# Patient Record
Sex: FEMALE | Race: WHITE | NOT HISPANIC OR LATINO
[De-identification: names, ages, dates, MRNs, and addresses within clinical notes are randomized per-mention and may not be internally consistent; named-entity substitution may affect disease eponyms.]

---

## 2018-08-18 ENCOUNTER — TRANSCRIPTION ENCOUNTER (OUTPATIENT)
Age: 63
End: 2018-08-18

## 2022-09-09 ENCOUNTER — INPATIENT (INPATIENT)
Facility: HOSPITAL | Age: 67
LOS: 20 days | Discharge: NURSING, REHAB OR LONG-TERM CARE | DRG: 454 | End: 2022-09-30
Attending: INTERNAL MEDICINE | Admitting: HOSPITALIST
Payer: MEDICARE

## 2022-09-09 VITALS
TEMPERATURE: 99 F | OXYGEN SATURATION: 96 % | WEIGHT: 130.07 LBS | RESPIRATION RATE: 20 BRPM | HEART RATE: 74 BPM | DIASTOLIC BLOOD PRESSURE: 84 MMHG | SYSTOLIC BLOOD PRESSURE: 150 MMHG | HEIGHT: 65.5 IN

## 2022-09-09 LAB
ALBUMIN SERPL ELPH-MCNC: 4.1 G/DL — SIGNIFICANT CHANGE UP (ref 3.3–5.2)
ALP SERPL-CCNC: 150 U/L — HIGH (ref 40–120)
ALT FLD-CCNC: 17 U/L — SIGNIFICANT CHANGE UP
ANION GAP SERPL CALC-SCNC: 18 MMOL/L — HIGH (ref 5–17)
AST SERPL-CCNC: 61 U/L — HIGH
BASOPHILS # BLD AUTO: 0.07 K/UL — SIGNIFICANT CHANGE UP (ref 0–0.2)
BASOPHILS NFR BLD AUTO: 1 % — SIGNIFICANT CHANGE UP (ref 0–2)
BILIRUB SERPL-MCNC: 1.2 MG/DL — SIGNIFICANT CHANGE UP (ref 0.4–2)
BUN SERPL-MCNC: 11.7 MG/DL — SIGNIFICANT CHANGE UP (ref 8–20)
CALCIUM SERPL-MCNC: 10.4 MG/DL — SIGNIFICANT CHANGE UP (ref 8.4–10.5)
CHLORIDE SERPL-SCNC: 103 MMOL/L — SIGNIFICANT CHANGE UP (ref 98–107)
CK SERPL-CCNC: 95 U/L — SIGNIFICANT CHANGE UP (ref 25–170)
CO2 SERPL-SCNC: 20 MMOL/L — LOW (ref 22–29)
CREAT SERPL-MCNC: 0.4 MG/DL — LOW (ref 0.5–1.3)
EGFR: 109 ML/MIN/1.73M2 — SIGNIFICANT CHANGE UP
EOSINOPHIL # BLD AUTO: 0.03 K/UL — SIGNIFICANT CHANGE UP (ref 0–0.5)
EOSINOPHIL NFR BLD AUTO: 0.4 % — SIGNIFICANT CHANGE UP (ref 0–6)
GLUCOSE SERPL-MCNC: 84 MG/DL — SIGNIFICANT CHANGE UP (ref 70–99)
HCT VFR BLD CALC: 42 % — SIGNIFICANT CHANGE UP (ref 34.5–45)
HGB BLD-MCNC: 15 G/DL — SIGNIFICANT CHANGE UP (ref 11.5–15.5)
IMM GRANULOCYTES NFR BLD AUTO: 0.6 % — SIGNIFICANT CHANGE UP (ref 0–1.5)
LYMPHOCYTES # BLD AUTO: 1.33 K/UL — SIGNIFICANT CHANGE UP (ref 1–3.3)
LYMPHOCYTES # BLD AUTO: 18.8 % — SIGNIFICANT CHANGE UP (ref 13–44)
MCHC RBC-ENTMCNC: 30.4 PG — SIGNIFICANT CHANGE UP (ref 27–34)
MCHC RBC-ENTMCNC: 35.7 GM/DL — SIGNIFICANT CHANGE UP (ref 32–36)
MCV RBC AUTO: 85 FL — SIGNIFICANT CHANGE UP (ref 80–100)
MONOCYTES # BLD AUTO: 0.47 K/UL — SIGNIFICANT CHANGE UP (ref 0–0.9)
MONOCYTES NFR BLD AUTO: 6.7 % — SIGNIFICANT CHANGE UP (ref 2–14)
NEUTROPHILS # BLD AUTO: 5.12 K/UL — SIGNIFICANT CHANGE UP (ref 1.8–7.4)
NEUTROPHILS NFR BLD AUTO: 72.5 % — SIGNIFICANT CHANGE UP (ref 43–77)
PLATELET # BLD AUTO: 240 K/UL — SIGNIFICANT CHANGE UP (ref 150–400)
POTASSIUM SERPL-MCNC: 3.9 MMOL/L — SIGNIFICANT CHANGE UP (ref 3.5–5.3)
POTASSIUM SERPL-SCNC: 3.9 MMOL/L — SIGNIFICANT CHANGE UP (ref 3.5–5.3)
PROT SERPL-MCNC: 7.1 G/DL — SIGNIFICANT CHANGE UP (ref 6.6–8.7)
RBC # BLD: 4.94 M/UL — SIGNIFICANT CHANGE UP (ref 3.8–5.2)
RBC # FLD: 11.9 % — SIGNIFICANT CHANGE UP (ref 10.3–14.5)
SODIUM SERPL-SCNC: 141 MMOL/L — SIGNIFICANT CHANGE UP (ref 135–145)
WBC # BLD: 7.06 K/UL — SIGNIFICANT CHANGE UP (ref 3.8–10.5)
WBC # FLD AUTO: 7.06 K/UL — SIGNIFICANT CHANGE UP (ref 3.8–10.5)

## 2022-09-09 PROCEDURE — 99285 EMERGENCY DEPT VISIT HI MDM: CPT

## 2022-09-09 PROCEDURE — G1004: CPT

## 2022-09-09 PROCEDURE — 74177 CT ABD & PELVIS W/CONTRAST: CPT | Mod: 26,MG

## 2022-09-09 PROCEDURE — 71260 CT THORAX DX C+: CPT | Mod: 26,MG

## 2022-09-09 PROCEDURE — 72125 CT NECK SPINE W/O DYE: CPT | Mod: 26,MG

## 2022-09-09 PROCEDURE — 70450 CT HEAD/BRAIN W/O DYE: CPT | Mod: 26,MG

## 2022-09-09 NOTE — ED PROVIDER NOTE - PROGRESS NOTE DETAILS
pt stable in no pain dr patricia nolen of plan of care for tonight [place]: CTs suggestive of diffusely metastatic breast malignancy with extensive bony metastasis to the spine. Bladder distended on CT but pt since able to void. Spine consulted. Will admit to medical service for pain management and specialty consultations.

## 2022-09-09 NOTE — ED ADULT TRIAGE NOTE - CHIEF COMPLAINT QUOTE
Pt BIBA c/o worsening low back pain, bilateral shoulder pain and right arm tingling and "feels like it asleep."  Pt. states low back pain has been worsening since march; shoulder pain began 3 weeks ago and 12 days ago, pt states tingling and asleep sensation to right arm began.  Pt denies trauma or injury and is endorsing pain is so severe she is unable to walk.  Pt also states to the RN she has not urinated since last night at 8pm; "I feel like I have to go but the pain is so bad I can't walk to the bathroom so I haven't drank that much and am holding it."

## 2022-09-09 NOTE — ED PROVIDER NOTE - OBJECTIVE STATEMENT
BIBA c/o worsening low back pain, bilateral shoulder pain and right arm tingling and "feels like it asleep."  Pt. states low back pain has been worsening since march; shoulder pain began 3 weeks ago and 12 days ago, pt states tingling and asleep sensation to right arm began.  Pt denies trauma or injury and is endorsing pain is so severe she is unable to walk.  Pt also states to the RN she has not urinated since last night at 8pm; "I feel like I have to go but the pain is so bad I can't walk to the bathroom so I haven't drank that much and am holding it. denies fever. no headache no vision loss. states she works a lot and has not seen a primary doctor in quite some time.

## 2022-09-09 NOTE — ED ADULT NURSE NOTE - NSICDXPASTMEDICALHX_GEN_ALL_CORE_FT
PAST MEDICAL HISTORY:  Abdominal mass 2014    Concussion pt hit head on MD examining table when she was 16  no surgery performed    Skin cancer of arm, left pt was 16 year old    Stomach ulcer 20 years ago   no pain not taking any meds

## 2022-09-09 NOTE — ED ADULT NURSE NOTE - NSICDXFAMILYHX_GEN_ALL_CORE_FT
FAMILY HISTORY:  Father  Still living? No  Family history of colon cancer, Age at diagnosis: Age Unknown    Mother  Still living? No  Family history of dementia, Age at diagnosis: Age Unknown

## 2022-09-09 NOTE — ED ADULT NURSE NOTE - OBJECTIVE STATEMENT
pt arrives to ED c/o increasing pain to shoulders, back pain and right hip pain. pt with hx of uterine cancer, reporting no chest pain no SOB no abd pain, but her body aches. no recent fevers or illnesses. even and unlabored resps present. skin warm dry and intact.

## 2022-09-10 DIAGNOSIS — Z90.710 ACQUIRED ABSENCE OF BOTH CERVIX AND UTERUS: Chronic | ICD-10-CM

## 2022-09-10 DIAGNOSIS — C50.919 MALIGNANT NEOPLASM OF UNSPECIFIED SITE OF UNSPECIFIED FEMALE BREAST: ICD-10-CM

## 2022-09-10 LAB
ANION GAP SERPL CALC-SCNC: 15 MMOL/L — SIGNIFICANT CHANGE UP (ref 5–17)
ANION GAP SERPL CALC-SCNC: 16 MMOL/L — SIGNIFICANT CHANGE UP (ref 5–17)
APPEARANCE UR: CLEAR — SIGNIFICANT CHANGE UP
BACTERIA # UR AUTO: ABNORMAL
BASOPHILS # BLD AUTO: 0.06 K/UL — SIGNIFICANT CHANGE UP (ref 0–0.2)
BASOPHILS NFR BLD AUTO: 0.9 % — SIGNIFICANT CHANGE UP (ref 0–2)
BILIRUB UR-MCNC: NEGATIVE — SIGNIFICANT CHANGE UP
BUN SERPL-MCNC: 11 MG/DL — SIGNIFICANT CHANGE UP (ref 8–20)
BUN SERPL-MCNC: 11.8 MG/DL — SIGNIFICANT CHANGE UP (ref 8–20)
CALCIUM SERPL-MCNC: 10 MG/DL — SIGNIFICANT CHANGE UP (ref 8.4–10.5)
CALCIUM SERPL-MCNC: 10.2 MG/DL — SIGNIFICANT CHANGE UP (ref 8.4–10.5)
CHLORIDE SERPL-SCNC: 102 MMOL/L — SIGNIFICANT CHANGE UP (ref 98–107)
CHLORIDE SERPL-SCNC: 103 MMOL/L — SIGNIFICANT CHANGE UP (ref 98–107)
CO2 SERPL-SCNC: 21 MMOL/L — LOW (ref 22–29)
CO2 SERPL-SCNC: 21 MMOL/L — LOW (ref 22–29)
COLOR SPEC: YELLOW — SIGNIFICANT CHANGE UP
CREAT SERPL-MCNC: 0.42 MG/DL — LOW (ref 0.5–1.3)
CREAT SERPL-MCNC: 0.43 MG/DL — LOW (ref 0.5–1.3)
DIFF PNL FLD: ABNORMAL
EGFR: 107 ML/MIN/1.73M2 — SIGNIFICANT CHANGE UP
EGFR: 108 ML/MIN/1.73M2 — SIGNIFICANT CHANGE UP
EOSINOPHIL # BLD AUTO: 0.06 K/UL — SIGNIFICANT CHANGE UP (ref 0–0.5)
EOSINOPHIL NFR BLD AUTO: 0.9 % — SIGNIFICANT CHANGE UP (ref 0–6)
EPI CELLS # UR: ABNORMAL
GLUCOSE SERPL-MCNC: 103 MG/DL — HIGH (ref 70–99)
GLUCOSE SERPL-MCNC: 111 MG/DL — HIGH (ref 70–99)
GLUCOSE UR QL: NEGATIVE MG/DL — SIGNIFICANT CHANGE UP
HCT VFR BLD CALC: 38.8 % — SIGNIFICANT CHANGE UP (ref 34.5–45)
HGB BLD-MCNC: 13.9 G/DL — SIGNIFICANT CHANGE UP (ref 11.5–15.5)
IMM GRANULOCYTES NFR BLD AUTO: 0.6 % — SIGNIFICANT CHANGE UP (ref 0–1.5)
KETONES UR-MCNC: ABNORMAL
LEUKOCYTE ESTERASE UR-ACNC: NEGATIVE — SIGNIFICANT CHANGE UP
LYMPHOCYTES # BLD AUTO: 1.31 K/UL — SIGNIFICANT CHANGE UP (ref 1–3.3)
LYMPHOCYTES # BLD AUTO: 19.9 % — SIGNIFICANT CHANGE UP (ref 13–44)
MAGNESIUM SERPL-MCNC: 1.7 MG/DL — SIGNIFICANT CHANGE UP (ref 1.6–2.6)
MAGNESIUM SERPL-MCNC: 2 MG/DL — SIGNIFICANT CHANGE UP (ref 1.6–2.6)
MCHC RBC-ENTMCNC: 30.1 PG — SIGNIFICANT CHANGE UP (ref 27–34)
MCHC RBC-ENTMCNC: 35.8 GM/DL — SIGNIFICANT CHANGE UP (ref 32–36)
MCV RBC AUTO: 84 FL — SIGNIFICANT CHANGE UP (ref 80–100)
MONOCYTES # BLD AUTO: 0.47 K/UL — SIGNIFICANT CHANGE UP (ref 0–0.9)
MONOCYTES NFR BLD AUTO: 7.2 % — SIGNIFICANT CHANGE UP (ref 2–14)
NEUTROPHILS # BLD AUTO: 4.63 K/UL — SIGNIFICANT CHANGE UP (ref 1.8–7.4)
NEUTROPHILS NFR BLD AUTO: 70.5 % — SIGNIFICANT CHANGE UP (ref 43–77)
NITRITE UR-MCNC: NEGATIVE — SIGNIFICANT CHANGE UP
PH UR: 5 — SIGNIFICANT CHANGE UP (ref 5–8)
PHOSPHATE SERPL-MCNC: 3.4 MG/DL — SIGNIFICANT CHANGE UP (ref 2.4–4.7)
PLATELET # BLD AUTO: 210 K/UL — SIGNIFICANT CHANGE UP (ref 150–400)
POTASSIUM SERPL-MCNC: 2.8 MMOL/L — CRITICAL LOW (ref 3.5–5.3)
POTASSIUM SERPL-MCNC: 3.8 MMOL/L — SIGNIFICANT CHANGE UP (ref 3.5–5.3)
POTASSIUM SERPL-SCNC: 2.8 MMOL/L — CRITICAL LOW (ref 3.5–5.3)
POTASSIUM SERPL-SCNC: 3.8 MMOL/L — SIGNIFICANT CHANGE UP (ref 3.5–5.3)
PROT UR-MCNC: 15
RAPID RVP RESULT: SIGNIFICANT CHANGE UP
RBC # BLD: 4.62 M/UL — SIGNIFICANT CHANGE UP (ref 3.8–5.2)
RBC # FLD: 11.9 % — SIGNIFICANT CHANGE UP (ref 10.3–14.5)
RBC CASTS # UR COMP ASSIST: SIGNIFICANT CHANGE UP /HPF (ref 0–4)
SARS-COV-2 RNA SPEC QL NAA+PROBE: SIGNIFICANT CHANGE UP
SODIUM SERPL-SCNC: 138 MMOL/L — SIGNIFICANT CHANGE UP (ref 135–145)
SODIUM SERPL-SCNC: 140 MMOL/L — SIGNIFICANT CHANGE UP (ref 135–145)
SP GR SPEC: 1.02 — SIGNIFICANT CHANGE UP (ref 1.01–1.02)
UROBILINOGEN FLD QL: NEGATIVE MG/DL — SIGNIFICANT CHANGE UP
WBC # BLD: 6.57 K/UL — SIGNIFICANT CHANGE UP (ref 3.8–10.5)
WBC # FLD AUTO: 6.57 K/UL — SIGNIFICANT CHANGE UP (ref 3.8–10.5)
WBC UR QL: SIGNIFICANT CHANGE UP /HPF (ref 0–5)

## 2022-09-10 PROCEDURE — 99223 1ST HOSP IP/OBS HIGH 75: CPT

## 2022-09-10 PROCEDURE — 99497 ADVNCD CARE PLAN 30 MIN: CPT | Mod: 25

## 2022-09-10 RX ORDER — LANOLIN ALCOHOL/MO/W.PET/CERES
3 CREAM (GRAM) TOPICAL AT BEDTIME
Refills: 0 | Status: DISCONTINUED | OUTPATIENT
Start: 2022-09-10 | End: 2022-09-21

## 2022-09-10 RX ORDER — ACETAMINOPHEN 500 MG
650 TABLET ORAL EVERY 6 HOURS
Refills: 0 | Status: DISCONTINUED | OUTPATIENT
Start: 2022-09-10 | End: 2022-09-21

## 2022-09-10 RX ORDER — POTASSIUM CHLORIDE 20 MEQ
10 PACKET (EA) ORAL ONCE
Refills: 0 | Status: COMPLETED | OUTPATIENT
Start: 2022-09-10 | End: 2022-09-10

## 2022-09-10 RX ORDER — MORPHINE SULFATE 50 MG/1
2 CAPSULE, EXTENDED RELEASE ORAL EVERY 4 HOURS
Refills: 0 | Status: DISCONTINUED | OUTPATIENT
Start: 2022-09-10 | End: 2022-09-16

## 2022-09-10 RX ORDER — DEXAMETHASONE 0.5 MG/5ML
4 ELIXIR ORAL EVERY 6 HOURS
Refills: 0 | Status: DISCONTINUED | OUTPATIENT
Start: 2022-09-10 | End: 2022-09-21

## 2022-09-10 RX ORDER — ACETAMINOPHEN 500 MG
1000 TABLET ORAL ONCE
Refills: 0 | Status: COMPLETED | OUTPATIENT
Start: 2022-09-10 | End: 2022-09-10

## 2022-09-10 RX ORDER — MORPHINE SULFATE 50 MG/1
4 CAPSULE, EXTENDED RELEASE ORAL EVERY 4 HOURS
Refills: 0 | Status: DISCONTINUED | OUTPATIENT
Start: 2022-09-10 | End: 2022-09-17

## 2022-09-10 RX ORDER — POTASSIUM CHLORIDE 20 MEQ
10 PACKET (EA) ORAL ONCE
Refills: 0 | Status: DISCONTINUED | OUTPATIENT
Start: 2022-09-10 | End: 2022-09-10

## 2022-09-10 RX ORDER — SENNA PLUS 8.6 MG/1
2 TABLET ORAL AT BEDTIME
Refills: 0 | Status: DISCONTINUED | OUTPATIENT
Start: 2022-09-10 | End: 2022-09-21

## 2022-09-10 RX ORDER — ONDANSETRON 8 MG/1
4 TABLET, FILM COATED ORAL EVERY 8 HOURS
Refills: 0 | Status: DISCONTINUED | OUTPATIENT
Start: 2022-09-10 | End: 2022-09-21

## 2022-09-10 RX ORDER — PANTOPRAZOLE SODIUM 20 MG/1
40 TABLET, DELAYED RELEASE ORAL
Refills: 0 | Status: DISCONTINUED | OUTPATIENT
Start: 2022-09-10 | End: 2022-09-21

## 2022-09-10 RX ORDER — DEXAMETHASONE 0.5 MG/5ML
10 ELIXIR ORAL ONCE
Refills: 0 | Status: COMPLETED | OUTPATIENT
Start: 2022-09-10 | End: 2022-09-10

## 2022-09-10 RX ORDER — POTASSIUM CHLORIDE 20 MEQ
20 PACKET (EA) ORAL ONCE
Refills: 0 | Status: COMPLETED | OUTPATIENT
Start: 2022-09-10 | End: 2022-09-10

## 2022-09-10 RX ORDER — MAGNESIUM SULFATE 500 MG/ML
2 VIAL (ML) INJECTION ONCE
Refills: 0 | Status: COMPLETED | OUTPATIENT
Start: 2022-09-10 | End: 2022-09-10

## 2022-09-10 RX ORDER — INFLUENZA VIRUS VACCINE 15; 15; 15; 15 UG/.5ML; UG/.5ML; UG/.5ML; UG/.5ML
0.7 SUSPENSION INTRAMUSCULAR ONCE
Refills: 0 | Status: DISCONTINUED | OUTPATIENT
Start: 2022-09-10 | End: 2022-09-30

## 2022-09-10 RX ORDER — MORPHINE SULFATE 50 MG/1
4 CAPSULE, EXTENDED RELEASE ORAL ONCE
Refills: 0 | Status: DISCONTINUED | OUTPATIENT
Start: 2022-09-10 | End: 2022-09-10

## 2022-09-10 RX ORDER — MAGNESIUM OXIDE 400 MG ORAL TABLET 241.3 MG
400 TABLET ORAL
Refills: 0 | Status: COMPLETED | OUTPATIENT
Start: 2022-09-10 | End: 2022-09-12

## 2022-09-10 RX ORDER — NALOXONE HYDROCHLORIDE 4 MG/.1ML
0.4 SPRAY NASAL ONCE
Refills: 0 | Status: DISCONTINUED | OUTPATIENT
Start: 2022-09-10 | End: 2022-09-21

## 2022-09-10 RX ORDER — POLYETHYLENE GLYCOL 3350 17 G/17G
17 POWDER, FOR SOLUTION ORAL DAILY
Refills: 0 | Status: DISCONTINUED | OUTPATIENT
Start: 2022-09-10 | End: 2022-09-21

## 2022-09-10 RX ORDER — ENOXAPARIN SODIUM 100 MG/ML
40 INJECTION SUBCUTANEOUS EVERY 24 HOURS
Refills: 0 | Status: DISCONTINUED | OUTPATIENT
Start: 2022-09-10 | End: 2022-09-11

## 2022-09-10 RX ADMIN — MAGNESIUM OXIDE 400 MG ORAL TABLET 400 MILLIGRAM(S): 241.3 TABLET ORAL at 17:14

## 2022-09-10 RX ADMIN — Medication 400 MILLIGRAM(S): at 01:37

## 2022-09-10 RX ADMIN — Medication 650 MILLIGRAM(S): at 21:08

## 2022-09-10 RX ADMIN — Medication 4 MILLIGRAM(S): at 23:07

## 2022-09-10 RX ADMIN — MORPHINE SULFATE 4 MILLIGRAM(S): 50 CAPSULE, EXTENDED RELEASE ORAL at 04:15

## 2022-09-10 RX ADMIN — POLYETHYLENE GLYCOL 3350 17 GRAM(S): 17 POWDER, FOR SOLUTION ORAL at 11:33

## 2022-09-10 RX ADMIN — Medication 25 GRAM(S): at 09:44

## 2022-09-10 RX ADMIN — Medication 100 MILLIEQUIVALENT(S): at 08:31

## 2022-09-10 RX ADMIN — MORPHINE SULFATE 4 MILLIGRAM(S): 50 CAPSULE, EXTENDED RELEASE ORAL at 04:00

## 2022-09-10 RX ADMIN — Medication 1000 MILLIGRAM(S): at 02:37

## 2022-09-10 RX ADMIN — Medication 650 MILLIGRAM(S): at 08:21

## 2022-09-10 RX ADMIN — Medication 650 MILLIGRAM(S): at 06:36

## 2022-09-10 RX ADMIN — ENOXAPARIN SODIUM 40 MILLIGRAM(S): 100 INJECTION SUBCUTANEOUS at 21:07

## 2022-09-10 RX ADMIN — Medication 650 MILLIGRAM(S): at 22:08

## 2022-09-10 RX ADMIN — MORPHINE SULFATE 4 MILLIGRAM(S): 50 CAPSULE, EXTENDED RELEASE ORAL at 12:00

## 2022-09-10 RX ADMIN — SENNA PLUS 2 TABLET(S): 8.6 TABLET ORAL at 21:08

## 2022-09-10 RX ADMIN — MAGNESIUM OXIDE 400 MG ORAL TABLET 400 MILLIGRAM(S): 241.3 TABLET ORAL at 11:33

## 2022-09-10 RX ADMIN — MORPHINE SULFATE 4 MILLIGRAM(S): 50 CAPSULE, EXTENDED RELEASE ORAL at 11:14

## 2022-09-10 RX ADMIN — Medication 10 MILLIGRAM(S): at 14:09

## 2022-09-10 RX ADMIN — Medication 20 MILLIEQUIVALENT(S): at 14:31

## 2022-09-10 RX ADMIN — Medication 4 MILLIGRAM(S): at 17:14

## 2022-09-10 NOTE — H&P ADULT - NSHPLABSRESULTS_GEN_ALL_CORE
CARDIAC MARKERS ( 09 Sep 2022 16:50 )  x     / x     / 95 U/L / x     / x                                15.0   7.06  )-----------( 240      ( 09 Sep 2022 16:50 )             42.0     09 Sep 2022 16:50    141    |  103    |  11.7   ----------------------------<  84     3.9     |  20.0   |  0.40     Ca    10.4       09 Sep 2022 16:50    TPro  7.1    /  Alb  4.1    /  TBili  1.2    /  DBili  x      /  AST  61     /  ALT  17     /  AlkPhos  150    09 Sep 2022 16:50      CAPILLARY BLOOD GLUCOSE        LIVER FUNCTIONS - ( 09 Sep 2022 16:50 )  Alb: 4.1 g/dL / Pro: 7.1 g/dL / ALK PHOS: 150 U/L / ALT: 17 U/L / AST: 61 U/L / GGT: x             < from: CT Chest w/ IV Cont (09.09.22 @ 21:42) >    IMPRESSION:  1. 6.5 cm right breast mass.  2. Diffuse metastatic bone disease including the entire visualized axial  skeleton.  3. Extensive right axillary and subpectoral adenopathy. Diffuse right   axillary  and right chest wall edematous changes.  4. Extensive superior and middle mediastinal as well as bilateral hilar   and  subcarinal adenopathy.  5.  Findings consistent with metastatic breast cancer.    < end of copied text >    < from: CT Chest w/ IV Cont (09.09.22 @ 21:42) >      IMPRESSION:  1. Severely Distended urinary bladder.  2. Diffuse metastatic bone disease including the entire visualized axial  skeleton.  3. Findings consistent with metastatic breast cancer.      < end of copied text >

## 2022-09-10 NOTE — CONSULT NOTE ADULT - SUBJECTIVE AND OBJECTIVE BOX
Patient is a 66y old  Female who presents with a chief complaint of   HPI:      HISTORY OF PRESENT ILLNESS:   66yF [facility]     PAST MEDICAL & SURGICAL HISTORY:  Abdominal mass  2014      Stomach ulcer  20 years ago   no pain not taking any meds      Skin cancer of arm, left  pt was 16 year old      Concussion  pt hit head on MD examining table when she was 16  no surgery performed      H/O excision of mass  left arm  when patient was 16 years ago        FAMILY HISTORY:  Family history of dementia (Mother)    Family history of colon cancer (Father)        SOCIAL HISTORY:  Tobacco Use:  EtOH use:   Substance:    Allergies    tetanus toxoid (Hives)    Intolerances    lactose (Other)      REVIEW OF SYSTEMS  Negative except as noted in HPI  CONSTITUTIONAL: No fever, weight loss, or fatigue  EYES: No eye pain, visual disturbances, or discharge  ENMT:  No difficulty hearing, tinnitus, vertigo; No sinus or throat pain  NECK: No pain or stiffness  BREASTS: No pain, masses, or nipple discharge  RESPIRATORY: No cough, wheezing, chills or hemoptysis; No shortness of breath  CARDIOVASCULAR: No chest pain, palpitations, dizziness, or leg swelling  GASTROINTESTINAL: No abdominal or epigastric pain. No nausea, vomiting, or hematemesis; No diarrhea or constipation. No melena or hematochezia.  GENITOURINARY: No dysuria, frequency, hematuria, or incontinence  NEUROLOGICAL: No headaches, memory loss, loss of strength, numbness, or tremors  SKIN: No itching, burning, rashes, or lesions   LYMPH NODES: No enlarged glands  ENDOCRINE: No heat or cold intolerance; No hair loss  MUSCULOSKELETAL: No joint pain or swelling; No muscle, back, or extremity pain  PSYCHIATRIC: No depression, anxiety, mood swings, or difficulty sleeping  HEME/LYMPH: No easy bruising, or bleeding gums  ALLERY AND IMMUNOLOGIC: No hives or eczema    HOME MEDICATIONS:  Home Medications:  hydrocortisone topical 2.5% topical cream: Apply topically to affected area 3 times a day, As Needed (08 Dec 2014 09:47)   mg oral tablet: 1 tab(s) orally every 6 hours, As needed, mild pain (08 Dec 2014 10:44)  Percocet 5/325 oral tablet: 1 tab(s) orally every 3 hours, As needed, Moderate Pain (08 Dec 2014 10:44)      MEDICATIONS:  Antibiotics:    Neuro:  acetaminophen   IVPB .. 1000 milliGRAM(s) IV Intermittent Once  morphine  - Injectable 4 milliGRAM(s) IV Push Once    Anticoagulation:    OTHER:    IVF:      Vital Signs Last 24 Hrs  T(C): 37.2 (10 Sep 2022 00:41), Max: 37.2 (09 Sep 2022 15:26)  T(F): 99 (10 Sep 2022 00:41), Max: 99 (10 Sep 2022 00:41)  HR: 76 (10 Sep 2022 00:41) (74 - 76)  BP: 137/87 (10 Sep 2022 00:41) (137/87 - 150/84)  BP(mean): --  RR: 19 (10 Sep 2022 00:41) (19 - 20)  SpO2: 96% (10 Sep 2022 00:41) (96% - 96%)    Parameters below as of 10 Sep 2022 00:41  Patient On (Oxygen Delivery Method): room air          PHYSICAL EXAM:  GENERAL: NAD, well-groomed, well-developed  HEAD:  Atraumatic, normocephalic  DRAINS:   WOUND: Dressing clean dry intact; well healed  SHUNT: easily compressible and refills  EYES: Conjunctiva and sclera clear; corneal reflex intact  ENMT: No tonsillar erythema, exudates, or enlargement; moist mucous membranes, good dentition, no lesions  NECK: Supple, no JVD, dormal thyroid  JULIA COMA SCORE: E- V- M- =       E: 4= opens eyes spontaneously 3= to voice 2= to noxious 1= no opening       V: 5= oriented 4= confused 3= inappropriate words 2= incomprehensible sounds 1= nonverbal 1T= intubated       M: 6= follows commands 5= localizes 4= withdraws 3= flexor posturing 2= extensor posturing 1= no movement  MENTAL STATUS: AAO x3; Awake/Comatose; Opens eyes spontaneously/to voice/to light touch/to noxious stimuli; Appropriately conversant without aphasia/Nonverbal; following simple commands/mimicking/not following commands  CRANIAL NERVES: Visual acuity normal for age, visual fields full to confrontation, PERRL. EOMI without nystagmus. Facial sensation intact V1-3 distribution b/l. Face symmetric w/ normal eye closure and smile, tongue midline. Hearing grossly intact. Speech clear. Head turning and shoulder shrug intact.   REFLEXES: PERRL. Corneals intact b/l. Gag intact. Cough intact. Oculocephalic reflex intact (Doll's eye). Negative Mcdonald's b/l. Negative clonus b/l  MOTOR: strength 5/5 b/l upper and lower extremities  Uppers     Delt (C5/6)     Bicep (C5/6)     Wrist Extend (C6)     Tricep (C7)     HG (C8/T1)  R                     5/5                 5/5                         5/5                           5/5                   5/5  L                      5/5                 5/5                         5/5                           5/5                   5/5  Lowers      HF(L1/L2)     KE (L3)     DF (L4)     EHL (L5)     PF (S1)      R                     5/5              5/5           5/5           5/5            5/5  L                     5/5               5/5          5/5            5/5            5/5  SENSATION: grossly intact to light touch all extremities  COORDINATION: Gait intact; rapid alternating movements intact b/l upper extremities; no upper extremity dysmetria  MUSCLE STRETCH REFLEXES: DTRs 2+ intact and symmetric  PLANTAR: upgoing/downgoing/mute (Babinski)  CHEST/LUNG: Clear to auscultation bilaterally; no rales, rhonchi, wheezing, or rubs  HEART: +S1/+S2; Regular rate and rhythm; no murmurs, rubs, or gallops  ABDOMEN: Soft, nontender, nondistended; bowel sounds present all four quadrants  EXTREMITIES:  2+ peripheral pulses, no clubbing, cyanosis, or edema  LYMPH: No lymphadenopathy noted  SKIN: Warm, dry; no rashes or lesions    LABS:                        15.0   7.06  )-----------( 240      ( 09 Sep 2022 16:50 )             42.0     09-09    141  |  103  |  11.7  ----------------------------<  84  3.9   |  20.0<L>  |  0.40<L>    Ca    10.4      09 Sep 2022 16:50    TPro  7.1  /  Alb  4.1  /  TBili  1.2  /  DBili  x   /  AST  61<H>  /  ALT  17  /  AlkPhos  150<H>  09-09          CULTURES:      RADIOLOGY & ADDITIONAL STUDIES:      CAPRINI SCORE [CLOT]:  Patient has an estimated Caprini score of greater than 5.  However, the patient's unique clinical situation will be addressed in an individual manner to determine appropriate anticoagulation treatment, if any. 65 yo female with no known pmhx presented to the ED with progressive weakness to the point that she felt she could not get out of bed this morning. She started to note pain in her shoulders and lower back at the end of July. She went to the ED at [facility] and also to urgent care, but states they sent her home. She also has had decreased appetite over this time. Her last mammogram was in 2014. She reports they found 2 lumps in her right breast and recommended biopsy. When she went for biopsy, she could not tolerate the pain. She was supposed to return to re-attempt with pain medications, but she never went back. Currently, her pain is well controlled after morphine in the ED. She denies chest pain, SOB, N/V/D, F/C, or other associated symptoms. Patient denies skin changes on the breast or nipple discharge. (10 Sep 2022 02:11)     She reports inability to ambulate 2/2 pain when standing up and not so much weakness of LE.  Over the past 2 weeks she has had severe pain especially when upright and over the last week she complains of RUE pain and numbness as well w/ inability to use right hand.    The pain originated in March but became more significant in July and rapidly increased since August 1st.  She also complains of b/l hip/pelvis pain w/o radiation to LE       PAST MEDICAL & SURGICAL HISTORY:  Stomach ulcer  20 years ago   no pain not taking any meds      Skin cancer of arm, left  pt was 16 year old      H/O excision of mass  left arm  when patient was 16 years ago      S/P hysterectomy        FAMILY HISTORY:  Family history of dementia (Mother)    Family history of colon cancer (Father)        SOCIAL HISTORY:  Tobacco Use: Denies   EtOH use: Denies   Substance: Denies     Allergies    tetanus toxoid (Hives)    Intolerances    lactose (Other)      REVIEW OF SYSTEMS  Negative except as noted in HPI      Neuro:  acetaminophen     Tablet .. 650 milliGRAM(s) Oral every 6 hours PRN  melatonin 3 milliGRAM(s) Oral at bedtime PRN  morphine  - Injectable 2 milliGRAM(s) IV Push every 4 hours PRN  morphine  - Injectable 4 milliGRAM(s) IV Push every 4 hours PRN  ondansetron Injectable 4 milliGRAM(s) IV Push every 8 hours PRN    Anticoagulation:  enoxaparin Injectable 40 milliGRAM(s) SubCutaneous every 24 hours    OTHER:  aluminum hydroxide/magnesium hydroxide/simethicone Suspension 30 milliLiter(s) Oral every 4 hours PRN  bisacodyl 5 milliGRAM(s) Oral daily PRN  influenza  Vaccine (HIGH DOSE) 0.7 milliLiter(s) IntraMuscular once  naloxone Injectable 0.4 milliGRAM(s) IV Push once  polyethylene glycol 3350 17 Gram(s) Oral daily  senna 2 Tablet(s) Oral at bedtime      Vital Signs Last 24 Hrs  T(C): 37 (10 Sep 2022 04:20), Max: 37.2 (09 Sep 2022 15:26)  T(F): 98.6 (10 Sep 2022 04:20), Max: 99 (10 Sep 2022 00:41)  HR: 69 (10 Sep 2022 04:20) (69 - 76)  BP: 138/75 (10 Sep 2022 04:20) (137/87 - 150/84)  BP(mean): --  RR: 18 (10 Sep 2022 04:20) (18 - 20)  SpO2: 94% (10 Sep 2022 04:20) (94% - 96%)    Parameters below as of 10 Sep 2022 04:20  Patient On (Oxygen Delivery Method): room air        PHYSICAL EXAM:  GENERAL: NAD, well-groomed, well-developed  HEAD:  Atraumatic, normocephalic  EYES: Conjunctiva and sclera clear; corneal reflex intact  ENMT: No tonsillar erythema, exudates, or enlargement; moist mucous membranes, good dentition, no lesions  NECK: Supple, no JVD  Awake, alert and oriented x3; speech clear and fluent   Pupils equal and reactive b/l; CHAMORRO x4   Uppers     Delt (C5/6)     Bicep (C5/6)     Wrist Extend (C6)     Tricep (C7)     HG (C8/T1)  R                4+/5                 4/5                         4-/5                 4-/5          3/5  L                  5/5                 5/5                         5/5                   5/5            5/5  Lowers      HF(L1/L2)     KE (L3)     DF (L4)     EHL (L5)     PF (S1)      R                    4+/5          5/5           5/5           5/5            5/5  L                     5/5               5/5          5/5            5/5          5/5  SENSATION: decreased sensation to RUE  No Karla's, No clonus   following commands briskly   CHEST/LUNG: Clear to auscultation bilaterally  HEART: +S1/+S2; Regular rate and rhythm  ABDOMEN: Soft, nontender, nondistended  EXTREMITIES:  2+ peripheral pulses  SKIN: Warm, dry    LABS:                        15.0   7.06  )-----------( 240      ( 09 Sep 2022 16:50 )             42.0     09-09    141  |  103  |  11.7  ----------------------------<  84  3.9   |  20.0<L>  |  0.40<L>    Ca    10.4      09 Sep 2022 16:50    TPro  7.1  /  Alb  4.1  /  TBili  1.2  /  DBili  x   /  AST  61<H>  /  ALT  17  /  AlkPhos  150<H>  09-09        RADIOLOGY & ADDITIONAL STUDIES:  CT spinal axis: extensive vertebral body metastasis; C7/T1 with pathological fx   no malalignment     CAPRINI SCORE [CLOT]:  Patient has an estimated Caprini score of greater than 5.  However, the patient's unique clinical situation will be addressed in an individual manner to determine appropriate anticoagulation treatment, if any.

## 2022-09-10 NOTE — H&P ADULT - HISTORY OF PRESENT ILLNESS
65 yo female with no known pmhx presented to the ED with progressive weakness to the point that she felt she could not get out of bed this morning. She started to note pain in her shoulders and lower back at the end of July. She went to the ED at [facility] and also to urgent care, but states they sent her home. She also has had decreased appetite over this time. Her last mammogram was in 2014. She reports they found 2 lumps in her right breast and recommended biopsy. When she went for biopsy, she could not tolerate the pain. She was supposed to return to re-attempt with pain medications, but she never went back. Currently, her pain is well controlled after morphine in the ED. She denies chest pain, SOB, N/V/D, F/C, or other associated symptoms. Patient denies skin changes on the breast or nipple discharge.

## 2022-09-10 NOTE — CONSULT NOTE ADULT - ASSESSMENT
67 y/o female with severe pain since July presents with newly diagnosed breast mass and extensive spinal metastatic disease; ? C7/T1 pathological fx   - needs custom Cervical collar but in mean time please place patient in cervical collar from in house; will order custom brace   - bedrest   - MRI entire cranial/spinal axis +/- contrast   - pain control   - surgery for breast mass  - rad/med oncology

## 2022-09-10 NOTE — H&P ADULT - NSHPSOCIALHISTORY_GEN_ALL_CORE
Works as a live in health aide, stays in a hotel between jobs  Closest family member is her brother who lives nearby

## 2022-09-10 NOTE — H&P ADULT - NSHPOUTPATIENTPROVIDERS_GEN_ALL_CORE
Patient does not currently have a PCP, has and appointment scheduled for 9/14 to establish with Dr. Mello in [place]

## 2022-09-10 NOTE — H&P ADULT - CONVERSATION DETAILS
Discussed the treatment options in the event that patient were to go into cardiac arrest including chest compressions and intubation. Explain in detail what this entails. Patient would like all measure to be taken. Verbalized understanding and consent.

## 2022-09-10 NOTE — CHART NOTE - NSCHARTNOTEFT_GEN_A_CORE
RN called to report critical value K 2.8  mag added to am labs  patient placed on telemetry  Potassium supplemented

## 2022-09-10 NOTE — PATIENT PROFILE ADULT - FALL HARM RISK - HARM RISK INTERVENTIONS
Assistance with ambulation/Assistance OOB with selected safe patient handling equipment/Communicate Risk of Fall with Harm to all staff/Discuss with provider need for PT consult/Monitor gait and stability/Reinforce activity limits and safety measures with patient and family/Tailored Fall Risk Interventions/Visual Cue: Yellow wristband and red socks/Bed in lowest position, wheels locked, appropriate side rails in place/Call bell, personal items and telephone in reach/Instruct patient to call for assistance before getting out of bed or chair/Non-slip footwear when patient is out of bed/[place] to call system/Physically safe environment - no spills, clutter or unnecessary equipment/Purposeful Proactive Rounding/Room/bathroom lighting operational, light cord in reach

## 2022-09-10 NOTE — PROGRESS NOTE ADULT - SUBJECTIVE AND OBJECTIVE BOX
Sherley was awake and alert in bed as I fit her with an AspenCTO4 cervical brace with thoracic extension. She was instructed on donning and adjustment of the brace. The Velcro straps were marked to help with keeping it postioned  under her chin. Additional liners were provided for regular washing and changing. Brace should be wiped clean daily. Contact info given.  [facility]  [phone]

## 2022-09-10 NOTE — H&P ADULT - NSICDXPASTSURGICALHX_GEN_ALL_CORE_FT
PAST SURGICAL HISTORY:  H/O excision of mass left arm  when patient was 16 years ago    S/P hysterectomy

## 2022-09-10 NOTE — H&P ADULT - NSICDXPASTMEDICALHX_GEN_ALL_CORE_FT
PAST MEDICAL HISTORY:  Skin cancer of arm, left pt was 16 year old    Stomach ulcer 20 years ago   no pain not taking any meds

## 2022-09-10 NOTE — H&P ADULT - ASSESSMENT
67 yo female with no known pmhx presented to the ED with progressive weakness to the point that she felt she could not get out of bed this morning. 65 yo female with no known pmhx presented to the ED with progressive weakness to the point that she felt she could not get out of bed this morning. CT imaging revealed a 6.5 cm breast mass with multiple spinal lesions concerning for metastatic breast cancer. 67 yo female with no known pmhx presented to the ED with progressive weakness to the point that she felt she could not get out of bed this morning. CT imaging revealed a 6.5 cm breast mass with multiple spinal lesions concerning for metastatic breast cancer.    Generalized weakness and pain 2/2 likely metastatic breast cancer   -Admit to medicine  -CT showing 6.5 cm breast mass and spinal lesions  -Consult oncology, [facility]  -Pain control  -Physical therapy eval  -Monitor routine labwork    DVTppx: Lovenox  GOC: Full Code

## 2022-09-10 NOTE — PATIENT PROFILE ADULT - STATED REASON FOR ADMISSION
C/o right sided pain radiating from neck to shoulders and hip. Inability to ambulate due to the pain.

## 2022-09-10 NOTE — H&P ADULT - NSHPPHYSICALEXAM_GEN_ALL_CORE
Vital Signs Last 24 Hrs  T(C): 37.2 (10 Sep 2022 00:41), Max: 37.2 (09 Sep 2022 15:26)  T(F): 99 (10 Sep 2022 00:41), Max: 99 (10 Sep 2022 00:41)  HR: 76 (10 Sep 2022 00:41) (74 - 76)  BP: 137/87 (10 Sep 2022 00:41) (137/87 - 150/84)  BP(mean): --  RR: 19 (10 Sep 2022 00:41) (19 - 20)  SpO2: 96% (10 Sep 2022 00:41) (96% - 96%)    Parameters below as of 10 Sep 2022 00:41  Patient On (Oxygen Delivery Method): room air    General: Age-appearing, in no acute distress  Head: Normochephalic, atraumatic  ENMT: EOMI, neck supple  Cardiovascular: +S1, S2; Regular rate and rhythm, no murmurs, rubs, gallops  Respiratory: CTA BL, no wheezes, rales, rhonchi  Breast: Firm, baseball-sized mass in RUQ of the R breast, no associated skin changes  Gastrointestinal: Abdomen soft, non-tender, +BS in all 4 quadrants  Extremities: No clubbing, cyanosis, or edema  Vascular: 2+ pulses, cap refill < 2 seconds  Neuro: Non-focal, AAOx4, sensation intact BL  Musculoskeletal: Normal tone, no deformities  Skin: Warm, dry; no acute rash seen  Psych: Flat affect, cooperative

## 2022-09-11 DIAGNOSIS — N63.0 UNSPECIFIED LUMP IN UNSPECIFIED BREAST: ICD-10-CM

## 2022-09-11 DIAGNOSIS — C79.51 SECONDARY MALIGNANT NEOPLASM OF BONE: ICD-10-CM

## 2022-09-11 DIAGNOSIS — E87.6 HYPOKALEMIA: ICD-10-CM

## 2022-09-11 DIAGNOSIS — R53.1 WEAKNESS: ICD-10-CM

## 2022-09-11 LAB
ALP SERPL-CCNC: 147 U/L — HIGH (ref 40–120)
ANION GAP SERPL CALC-SCNC: 15 MMOL/L — SIGNIFICANT CHANGE UP (ref 5–17)
ANION GAP SERPL CALC-SCNC: 16 MMOL/L — SIGNIFICANT CHANGE UP (ref 5–17)
BUN SERPL-MCNC: 16.8 MG/DL — SIGNIFICANT CHANGE UP (ref 8–20)
BUN SERPL-MCNC: 19.5 MG/DL — SIGNIFICANT CHANGE UP (ref 8–20)
CALCIUM SERPL-MCNC: 10.7 MG/DL — HIGH (ref 8.4–10.5)
CALCIUM SERPL-MCNC: 11 MG/DL — HIGH (ref 8.4–10.5)
CHLORIDE SERPL-SCNC: 101 MMOL/L — SIGNIFICANT CHANGE UP (ref 98–107)
CHLORIDE SERPL-SCNC: 101 MMOL/L — SIGNIFICANT CHANGE UP (ref 98–107)
CO2 SERPL-SCNC: 22 MMOL/L — SIGNIFICANT CHANGE UP (ref 22–29)
CO2 SERPL-SCNC: 23 MMOL/L — SIGNIFICANT CHANGE UP (ref 22–29)
CREAT SERPL-MCNC: 0.48 MG/DL — LOW (ref 0.5–1.3)
CREAT SERPL-MCNC: 0.49 MG/DL — LOW (ref 0.5–1.3)
CULTURE RESULTS: SIGNIFICANT CHANGE UP
EGFR: 104 ML/MIN/1.73M2 — SIGNIFICANT CHANGE UP
EGFR: 104 ML/MIN/1.73M2 — SIGNIFICANT CHANGE UP
GLUCOSE SERPL-MCNC: 106 MG/DL — HIGH (ref 70–99)
GLUCOSE SERPL-MCNC: 110 MG/DL — HIGH (ref 70–99)
HCT VFR BLD CALC: 41.5 % — SIGNIFICANT CHANGE UP (ref 34.5–45)
HCV AB S/CO SERPL IA: 0.06 S/CO — SIGNIFICANT CHANGE UP (ref 0–0.99)
HCV AB SERPL-IMP: SIGNIFICANT CHANGE UP
HGB BLD-MCNC: 14.6 G/DL — SIGNIFICANT CHANGE UP (ref 11.5–15.5)
MAGNESIUM SERPL-MCNC: 2.1 MG/DL — SIGNIFICANT CHANGE UP (ref 1.6–2.6)
MCHC RBC-ENTMCNC: 29.7 PG — SIGNIFICANT CHANGE UP (ref 27–34)
MCHC RBC-ENTMCNC: 35.2 GM/DL — SIGNIFICANT CHANGE UP (ref 32–36)
MCV RBC AUTO: 84.5 FL — SIGNIFICANT CHANGE UP (ref 80–100)
PHOSPHATE SERPL-MCNC: 4 MG/DL — SIGNIFICANT CHANGE UP (ref 2.4–4.7)
PLATELET # BLD AUTO: 251 K/UL — SIGNIFICANT CHANGE UP (ref 150–400)
POTASSIUM SERPL-MCNC: 4.3 MMOL/L — SIGNIFICANT CHANGE UP (ref 3.5–5.3)
POTASSIUM SERPL-MCNC: 4.4 MMOL/L — SIGNIFICANT CHANGE UP (ref 3.5–5.3)
POTASSIUM SERPL-SCNC: 4.3 MMOL/L — SIGNIFICANT CHANGE UP (ref 3.5–5.3)
POTASSIUM SERPL-SCNC: 4.4 MMOL/L — SIGNIFICANT CHANGE UP (ref 3.5–5.3)
RBC # BLD: 4.91 M/UL — SIGNIFICANT CHANGE UP (ref 3.8–5.2)
RBC # FLD: 11.9 % — SIGNIFICANT CHANGE UP (ref 10.3–14.5)
SODIUM SERPL-SCNC: 138 MMOL/L — SIGNIFICANT CHANGE UP (ref 135–145)
SODIUM SERPL-SCNC: 139 MMOL/L — SIGNIFICANT CHANGE UP (ref 135–145)
SPECIMEN SOURCE: SIGNIFICANT CHANGE UP
WBC # BLD: 7.02 K/UL — SIGNIFICANT CHANGE UP (ref 3.8–10.5)
WBC # FLD AUTO: 7.02 K/UL — SIGNIFICANT CHANGE UP (ref 3.8–10.5)

## 2022-09-11 PROCEDURE — 72156 MRI NECK SPINE W/O & W/DYE: CPT | Mod: 26

## 2022-09-11 PROCEDURE — 72157 MRI CHEST SPINE W/O & W/DYE: CPT | Mod: 26

## 2022-09-11 PROCEDURE — 99233 SBSQ HOSP IP/OBS HIGH 50: CPT

## 2022-09-11 PROCEDURE — 99232 SBSQ HOSP IP/OBS MODERATE 35: CPT | Mod: FS

## 2022-09-11 PROCEDURE — 99223 1ST HOSP IP/OBS HIGH 75: CPT

## 2022-09-11 PROCEDURE — 72158 MRI LUMBAR SPINE W/O & W/DYE: CPT | Mod: 26

## 2022-09-11 PROCEDURE — 70553 MRI BRAIN STEM W/O & W/DYE: CPT | Mod: 26

## 2022-09-11 RX ORDER — LACTULOSE 10 G/15ML
20 SOLUTION ORAL DAILY
Refills: 0 | Status: DISCONTINUED | OUTPATIENT
Start: 2022-09-11 | End: 2022-09-21

## 2022-09-11 RX ORDER — GABAPENTIN 400 MG/1
200 CAPSULE ORAL THREE TIMES A DAY
Refills: 0 | Status: DISCONTINUED | OUTPATIENT
Start: 2022-09-11 | End: 2022-09-21

## 2022-09-11 RX ORDER — SODIUM CHLORIDE 9 MG/ML
1000 INJECTION INTRAMUSCULAR; INTRAVENOUS; SUBCUTANEOUS
Refills: 0 | Status: DISCONTINUED | OUTPATIENT
Start: 2022-09-11 | End: 2022-09-14

## 2022-09-11 RX ORDER — ACETAMINOPHEN 500 MG
1000 TABLET ORAL ONCE
Refills: 0 | Status: COMPLETED | OUTPATIENT
Start: 2022-09-11 | End: 2022-09-11

## 2022-09-11 RX ORDER — METHOCARBAMOL 500 MG/1
500 TABLET, FILM COATED ORAL THREE TIMES A DAY
Refills: 0 | Status: DISCONTINUED | OUTPATIENT
Start: 2022-09-11 | End: 2022-09-21

## 2022-09-11 RX ADMIN — MORPHINE SULFATE 4 MILLIGRAM(S): 50 CAPSULE, EXTENDED RELEASE ORAL at 13:52

## 2022-09-11 RX ADMIN — GABAPENTIN 200 MILLIGRAM(S): 400 CAPSULE ORAL at 22:18

## 2022-09-11 RX ADMIN — MAGNESIUM OXIDE 400 MG ORAL TABLET 400 MILLIGRAM(S): 241.3 TABLET ORAL at 08:26

## 2022-09-11 RX ADMIN — Medication 650 MILLIGRAM(S): at 06:11

## 2022-09-11 RX ADMIN — Medication 4 MILLIGRAM(S): at 22:18

## 2022-09-11 RX ADMIN — Medication 4 MILLIGRAM(S): at 17:38

## 2022-09-11 RX ADMIN — SENNA PLUS 2 TABLET(S): 8.6 TABLET ORAL at 22:18

## 2022-09-11 RX ADMIN — Medication 400 MILLIGRAM(S): at 15:35

## 2022-09-11 RX ADMIN — MORPHINE SULFATE 4 MILLIGRAM(S): 50 CAPSULE, EXTENDED RELEASE ORAL at 18:05

## 2022-09-11 RX ADMIN — MORPHINE SULFATE 4 MILLIGRAM(S): 50 CAPSULE, EXTENDED RELEASE ORAL at 12:39

## 2022-09-11 RX ADMIN — MORPHINE SULFATE 4 MILLIGRAM(S): 50 CAPSULE, EXTENDED RELEASE ORAL at 18:52

## 2022-09-11 RX ADMIN — MAGNESIUM OXIDE 400 MG ORAL TABLET 400 MILLIGRAM(S): 241.3 TABLET ORAL at 17:38

## 2022-09-11 RX ADMIN — Medication 650 MILLIGRAM(S): at 05:11

## 2022-09-11 RX ADMIN — Medication 4 MILLIGRAM(S): at 05:11

## 2022-09-11 RX ADMIN — MAGNESIUM OXIDE 400 MG ORAL TABLET 400 MILLIGRAM(S): 241.3 TABLET ORAL at 12:50

## 2022-09-11 RX ADMIN — METHOCARBAMOL 500 MILLIGRAM(S): 500 TABLET, FILM COATED ORAL at 22:18

## 2022-09-11 RX ADMIN — SODIUM CHLORIDE 100 MILLILITER(S): 9 INJECTION INTRAMUSCULAR; INTRAVENOUS; SUBCUTANEOUS at 13:08

## 2022-09-11 RX ADMIN — PANTOPRAZOLE SODIUM 40 MILLIGRAM(S): 20 TABLET, DELAYED RELEASE ORAL at 05:11

## 2022-09-11 RX ADMIN — LACTULOSE 20 GRAM(S): 10 SOLUTION ORAL at 17:37

## 2022-09-11 RX ADMIN — Medication 4 MILLIGRAM(S): at 12:50

## 2022-09-11 RX ADMIN — POLYETHYLENE GLYCOL 3350 17 GRAM(S): 17 POWDER, FOR SOLUTION ORAL at 12:51

## 2022-09-11 RX ADMIN — MORPHINE SULFATE 4 MILLIGRAM(S): 50 CAPSULE, EXTENDED RELEASE ORAL at 08:26

## 2022-09-11 RX ADMIN — Medication 1000 MILLIGRAM(S): at 16:03

## 2022-09-11 RX ADMIN — MORPHINE SULFATE 4 MILLIGRAM(S): 50 CAPSULE, EXTENDED RELEASE ORAL at 15:20

## 2022-09-11 RX ADMIN — METHOCARBAMOL 500 MILLIGRAM(S): 500 TABLET, FILM COATED ORAL at 15:55

## 2022-09-11 NOTE — PROGRESS NOTE ADULT - ASSESSMENT
65 yo female with no known pmhx presented to the ED with progressive weakness to the point that she felt she could not get out of bed this morning. CT imaging revealed a 6.5 cm breast mass with multiple spinal lesions concerning for metastatic breast cancer.    1- Breast mass with mets to spine , liver possible breast cancer   NS on board ;  d/w Dr Dhaliwal med oncology on the phone  rec biopsy of mass ,d/w IR DR Quigley  , IR can not do breast biopsy as inpatient apparently   will try for liver biopsy  by IR in am ?   called surgical oncology consult re breast biopsy ; also i was told they do not do breast BX , to contact DR Abbott in am , she does not come to inpatient consult ?       called radiation oncology consult stat for radiation therapy to spine   bed rest keep head and whole spine flat per NS   nurse instructed   cont decadron iv   overall prognosis is poor   cont pain meds , add gabapentin   iv morphine prn     2- Neck pain , back pain due to spina involevements   flat in the bed strict bed rest needed   NS input appreciated   official MR result P   cont pain meds     3- Hypercalcemia due to bone mets malignancy \  add IV fluid , check alk phos   cont to trend daily   phos normal     4- Hypokalemia   replaced   resolved     DVT prophylaxis on lovenox   will hold for possible liver bx in am     DVTppx: Lovenox  GOC: Full Code

## 2022-09-11 NOTE — CONSULT NOTE ADULT - SUBJECTIVE AND OBJECTIVE BOX
Patient is a 66y old  Female who presents with a chief complaint of Weakness, new breast mass with spinal lesions suspicious for metastasis (11 Sep 2022 11:26)      HPI:  65 yo female with no known pmhx presented to the ED with progressive weakness to the point that she felt she could not get out of bed. She started to note pain in her shoulders and lower back at the end of July. She went to the ED at [facility] and also to urgent care, but states they sent her home. She also has had decreased appetite over this time. Her last mammogram was in 2014. She reports they found 2 lumps in her right breast and recommended biopsy. When she went for biopsy, she could not tolerate the pain. She was supposed to return to re-attempt with pain medications, but she never went back. Her pain is controlled after morphine in the ED. She denies chest pain, SOB, N/V/D, F/C, or other associated symptoms. Patient denies skin changes on the breast or nipple discharge. 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  Noted right breast lump ~3/2022.  Finished work in end of July, and started to undergo medical evaluation in early August.  Went to [facility] ED x2 with low back pain, and was referred to orthopaedics and pain management.  Was scheduled for outpatient MRI, but unable to get out of bed due to pain.  Describes right arm weakness (patient left-handed), inability to grasp items, and pain limiting ADLs.    < from: CT Head & C-Spine No Cont (09.09.22 @ 21:34) >  Head CT: No displaced calvarial fracture or acute intracranial hemorrhage.  Cervical spine CT: Lytic lesion of the left C5 lamina. Lytic lesions involving the C6, C7, T1, and T2 vertebral bodies. There is T1 vertebral plana. Suspect T1 spinous process lytic lesion. Lytic lesions involve the right T1 transverse process, bilateral T1 pedicles, and right first and second ribs. Fractured right posterior third rib.    < from: CT Chest, Abdomen and Pelvis w/ IV Cont (09.09.22 @ 21:42) >  Dominant right breast mass. Right chest wall, axillary and retropectoral involvement.  Osseous, pulmonary, and likely hepatic metastatic disease. Metastatic adenopathy of the thorax.  Diffuse extensive osseous metastatic disease with multiple lucent and sclerotic lesions and compression fractures of T1, T9, T12, and L2.   Severe T1 compression with epidural extension. Cord compression cannot be excluded. Additional neoplastic disease of the spine with epidural extension. Multifocal neoplastic lesions involving the pelvis and ribs as well.     MRI C/T/L-spine performed earlier this morning, report pending.    Patient currently on strict bed rest with AspenCTO4 cervical brace.      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 1000 milliLiter(s) (100 mL/Hr) IV Continuous <Continuous>    MEDICATIONS  (PRN):  acetaminophen     Tablet .. 650 milliGRAM(s) Oral every 6 hours PRN Temp greater or equal to 38C (100.4F), Mild Pain (1 - 3)  aluminum hydroxide/magnesium hydroxide/simethicone Suspension 30 milliLiter(s) Oral every 4 hours PRN Dyspepsia  bisacodyl 5 milliGRAM(s) Oral daily PRN Constipation  melatonin 3 milliGRAM(s) Oral at bedtime PRN Insomnia  morphine  - Injectable 2 milliGRAM(s) IV Push every 4 hours PRN Moderate Pain (4 - 6)  morphine  - Injectable 4 milliGRAM(s) IV Push every 4 hours PRN Severe Pain (7 - 10)  ondansetron Injectable 4 milliGRAM(s) IV Push every 8 hours PRN Nausea and/or Vomiting      PHYSICAL EXAM:  Vital Signs Last 24 Hrs  T(C): 36.6 (11 Sep 2022 05:17), Max: 36.7 (10 Sep 2022 16:23)  T(F): 97.9 (11 Sep 2022 05:17), Max: 98 (10 Sep 2022 16:23)  HR: 62 (11 Sep 2022 05:17) (62 - 74)  BP: 148/86 (11 Sep 2022 05:17) (148/86 - 148/86)  BP(mean): --  RR: 18 (11 Sep 2022 05:17) (18 - 18)  SpO2: 94% (11 Sep 2022 05:17) (94% - 95%)    Parameters below as of 11 Sep 2022 05:17  Patient On (Oxygen Delivery Method): room air      IMAGING/LABS/PATHOLOGY: I have personally reviewed the relevant labs, pathology, and imaging as noted in the HPI.  In addition,                        14.6   7.02  )-----------( 251      ( 11 Sep 2022 06:37 )             41.5     09-11    138  |  101  |  16.8  ----------------------------<  110<H>  4.3   |  22.0  |  0.48<L>    Ca    11.0<H>      11 Sep 2022 06:37  Phos  4.0     09-11  Mg     2.1     09-11    TPro  7.1  /  Alb  4.1  /  TBili  1.2  /  DBili  x   /  AST  61<H>  /  ALT  17  /  AlkPhos  150<H>  09-09

## 2022-09-11 NOTE — PROGRESS NOTE ADULT - ASSESSMENT
65 y/o female with metastatic breast ca to the entire spine, liver & possibly lung.     1. Strict bedrest  2. HOB flat. May elevate bed for meals not elevate head of bed for meals  3. Emergent rad/onc consult for diffuse breast ca with mets  4. Heme/Onc consulted, official note pending  5. IR consult for biopsy of right breast mass  6. MRI B/C/T/L spine with & without kandy done, pending read

## 2022-09-11 NOTE — PROGRESS NOTE ADULT - SUBJECTIVE AND OBJECTIVE BOX
HPI: 67 yo female with no known pmhx presented to the ED with progressive weakness to the point that she felt she could not get out of bed this morning. She started to note pain in her shoulders and lower back at the end of July. She went to the ED at [facility] and also to urgent care, but states they sent her home. She also has had decreased appetite over this time. Her last mammogram was in . She reports they found 2 lumps in her right breast and recommended biopsy. When she went for biopsy, she could not tolerate the pain. She was supposed to return to re-attempt with pain medications, but she never went back. Currently, her pain is well controlled after morphine in the ED. She denies chest pain, SOB, N/V/D, F/C, or other associated symptoms. Patient denies skin changes on the breast or nipple discharge. (10 Sep 2022 02:11)      INTERVAL OVERNIGHT EVENTS:   Cervical collar with thoracic extension ordered & delivered yesterday 9/10 with good fit & comfort. MRI's completed this am     Vital Signs Last 24 Hrs  T(C): 36.6 (11 Sep 2022 05:17), Max: 36.8 (10 Sep 2022 11:57)  T(F): 97.9 (11 Sep 2022 05:17), Max: 98.2 (10 Sep 2022 11:57)  HR: 62 (11 Sep 2022 05:17) (62 - 74)  BP: 148/86 (11 Sep 2022 05:17) (148/86 - 149/89)  BP(mean): --  RR: 18 (11 Sep 2022 05:17) (18 - 18)  SpO2: 94% (11 Sep 2022 05:17) (94% - 95%)    Parameters below as of 11 Sep 2022 05:17  Patient On (Oxygen Delivery Method): room air        PHYSICAL EXAM:  GENERAL: NAD, well-groomed, well-developed  HEAD:  Atraumatic, normocephalic  MENTAL STATUS: AAO x3,  Appropriately conversant without aphasia,  following simple commands  CRANIAL NERVES:  EOMI without nystagmus. Facial sensation intact V1-3 distribution b/l. Face symmetric w/ normal eye closure and smile, tongue midline. Hearing grossly intact. Speech clear. Head turning and shoulder shrug intact.   MOTOR:   Uppers     Delt (C5/6)     Bicep (C5/6)          Tricep (C7)     HG (C8/T1)  R                     5/5                 4-/5                   4-/5         3/5                     L                      5/5                 5/5                    5/5        5/5                     Lowers      HF(L1/L2)     KE (L3)     DF (L4)     EHL (L5)     PF (S1)      R                     5/5              5/5           5/5           5/5            5/5  L                     5/5               5/5          5/5            5/5            5/5    SENSATION: Diminished in right hand      LABS:                        14.6   7.02  )-----------( 251      ( 11 Sep 2022 06:37 )             41.5         138  |  101  |  16.8  ----------------------------<  110<H>  4.3   |  22.0  |  0.48<L>    Ca    11.0<H>      11 Sep 2022 06:37  Phos  4.0       Mg     2.1         TPro  7.1  /  Alb  4.1  /  TBili  1.2  /  DBili  x   /  AST  61<H>  /  ALT  17  /  AlkPhos  150<H>        Urinalysis Basic - ( 10 Sep 2022 07:48 )    Color: Yellow / Appearance: Clear / S.025 / pH: x  Gluc: x / Ketone: Large  / Bili: Negative / Urobili: Negative mg/dL   Blood: x / Protein: 15 / Nitrite: Negative   Leuk Esterase: Negative / RBC: 0-2 /HPF / WBC 3-5 /HPF   Sq Epi: x / Non Sq Epi: Moderate / Bacteria: Occasional        09-10 @ 07:  -   @ 07:00  --------------------------------------------------------  IN: 630 mL / OUT: 400 mL / NET: 230 mL        RADIOLOGY & ADDITIONAL TESTS:     CT Cervical Spine No Cont (22 @ 21:34)   ALIGNMENT: The normal cervical lordosis is maintained.  BONES: No acutefracture or prevertebral soft tissue swelling. The   craniocervical junction is unremarkable. Lytic lesion of the left C5   lamina. Lytic lesions involving the C6, C7,, T1, and T2 vertebral bodies.   There is T1 vertebral plana. Suspect T1 spinous process lytic lesion.   Lytic lesions involve the right T1 transverse process, bilateral T1   pedicles, and right first and second ribs. Fractured right posterior   third rib.  DISC LEVEL EVALUATION: Multilevel degenerative changes of the cervical   spine characterized by marginal osteophyte formation, small disc bulges,   and uncovertebral and facet joint arthrosis.  NECK SOFT TISSUES: Within normal limits.  VISUALIZED LUNGS: Within normal limits.    IMPRESSION:    Head CT: No displaced calvarial fracture or acute intracranial hemorrhage.    Cervical spine CT: No acute fracture. Multiple lytic lesions of the   cervical and visualized thoracic spine as well as right first and second   ribs concerning for metastases versus multiple myeloma.       CT Chest w/ IV Cont (22 @ 21:42)   Metastatic breast cancer with dominant right breast mass. Inflammatory   right breast cancer cannot be excluded. Right chest wall, axillary and   retropectoral involvement.    Osseous, pulmonary, and likely hepatic metastatic disease. Metastatic   adenopathy of the thorax.    Diffuse extensive osseous metastatic disease with multiple lucent and   sclerotic lesions and compression fractures of T1, T9, T12, and L2.   Severe T1 compression with epidural extension. Cord compression cannot be   excluded. Additional neoplastic disease of the spine with epidural   extension. Multifocal neoplastic lesions involving the pelvis and ribs as   well. Correlate with pending spinal MRI to evaluate for cord compression.    Dr. Huang discussed these above findings with Dr. Rowe on   9/10/2022 at 12:47PM, with read back.    Distended urinary bladder.            CAPRINI SCORE [CLOT]:  Patient has an estimated Caprini score of greater than 5.  However, the patient's unique clinical situation will be addressed in an individual manner to determine appropriate anticoagulation treatment, if any.

## 2022-09-11 NOTE — CHART NOTE - NSCHARTNOTEFT_GEN_A_CORE
67 yo female a/w neck pain, RUE weakness, lumbar, and hip pain.  MRI spine concerning for T1 cord compression.  Chart, labs and VS reviewed.  Plan for breast or liver biopsy on Monday with IR.  Spoke with Dr. Shah who does not come to [facility] for inpatient breast biopsy.  Will need tissue to confirm diagnosis prior to potential systemic  treatment.  Plan per RT onc Dr. bIarra is for emergent palliative RT to symptomatic area.  Cont decadron iv, gabapentin and iv morphine prn.  Discussed with primary team, Dr. Rowe.  Official consult in AM. 67 yo female a/w neck pain, RUE weakness, lumbar, and hip pain.  CT:  Metastatic breast cancer with dominant right breast mass 6 x 6 cm. Right chest wall, axillary, thoracic and retropectoral involvement.  Diffuse extensive osseous metastatic disease, pulmonary, and likely hepatic metastatic disease, largest 1.1 cm.  Pending spinal MRI to evaluate for cord compression, concerning for T1 cord compression.  Chart, labs and VS reviewed.  Plan for breast or liver biopsy on Monday with IR.  Spoke with Dr. Shah who does not come to [facility] for inpatient breast biopsy.  Will need tissue to confirm diagnosis prior to potential systemic  treatment.  Plan per RT onc Dr. Ibarra is for emergent palliative RT to symptomatic area.  Cont decadron iv, gabapentin and iv morphine prn.  Discussed with primary team, Dr. Rowe.  Official consult in AM.

## 2022-09-11 NOTE — PROGRESS NOTE ADULT - SUBJECTIVE AND OBJECTIVE BOX
Patient is a 66y old  Female who presents with a chief complaint of Weakness, new breast mass with spinal lesions suspicious for metastasis    pt is seen in am   she is with severe pain , MR is pending thsi am   performed now   d/w NS team     ]    ALLERGIES:  lactose (Other)  tetanus toxoid (Hives)    MEDICATIONS  (STANDING):  dexAMETHasone  Injectable 4 milliGRAM(s) IV Push every 6 hours  enoxaparin Injectable 40 milliGRAM(s) SubCutaneous every 24 hours  influenza  Vaccine (HIGH DOSE) 0.7 milliLiter(s) IntraMuscular once  magnesium oxide 400 milliGRAM(s) Oral three times a day with meals  naloxone Injectable 0.4 milliGRAM(s) IV Push once  pantoprazole    Tablet 40 milliGRAM(s) Oral before breakfast  polyethylene glycol 3350 17 Gram(s) Oral daily  senna 2 Tablet(s) Oral at bedtime  sodium chloride 0.9%. 1000 milliLiter(s) (100 mL/Hr) IV Continuous <Continuous>    MEDICATIONS  (PRN):  acetaminophen     Tablet .. 650 milliGRAM(s) Oral every 6 hours PRN Temp greater or equal to 38C (100.4F), Mild Pain (1 - 3)  aluminum hydroxide/magnesium hydroxide/simethicone Suspension 30 milliLiter(s) Oral every 4 hours PRN Dyspepsia  bisacodyl 5 milliGRAM(s) Oral daily PRN Constipation  melatonin 3 milliGRAM(s) Oral at bedtime PRN Insomnia  morphine  - Injectable 2 milliGRAM(s) IV Push every 4 hours PRN Moderate Pain (4 - 6)  morphine  - Injectable 4 milliGRAM(s) IV Push every 4 hours PRN Severe Pain (7 - 10)  ondansetron Injectable 4 milliGRAM(s) IV Push every 8 hours PRN Nausea and/or Vomiting    Vital Signs Last 24 Hrs  T(F): 97.9 (11 Sep 2022 05:17), Max: 98.2 (10 Sep 2022 11:57)  HR: 62 (11 Sep 2022 05:17) (62 - 74)  BP: 148/86 (11 Sep 2022 05:17) (148/86 - 149/89)  RR: 18 (11 Sep 2022 05:17) (18 - 18)  SpO2: 94% (11 Sep 2022 05:17) (94% - 95%)  I&O's Summary    10 Sep 2022 07:01  -  11 Sep 2022 07:00  --------------------------------------------------------  IN: 630 mL / OUT: 400 mL / NET: 230 mL        PHYSICAL EXAM:  General: awake alert   Neck: c collar in place   Lungs: CTA bilateral   Cardio: RRR, S1/S2, No murmur  Abdomen: Soft, Nontender, Nondistended; Bowel sounds present  Extremities: No calf tenderness, No pitting edema  Musculoskeletal: limited exam can not move flat in the bed       LABS:                        14.6   7.02  )-----------( 251      ( 11 Sep 2022 06:37 )             41.5         138  |  101  |  16.8  ----------------------------<  110  4.3   |  22.0  |  0.48    Ca    11.0      11 Sep 2022 06:37  Phos  4.0       Mg     2.1         TPro  7.1  /  Alb  4.1  /  TBili  1.2  /  DBili  x   /  AST  61  /  ALT  17  /  AlkPhos  150                  CARDIAC MARKERS ( 09 Sep 2022 16:50 )  x     / x     / 95 U/L / x     / x                            Urinalysis Basic - ( 10 Sep 2022 07:48 )    Color: Yellow / Appearance: Clear / S.025 / pH: x  Gluc: x / Ketone: Large  / Bili: Negative / Urobili: Negative mg/dL   Blood: x / Protein: 15 / Nitrite: Negative   Leuk Esterase: Negative / RBC: 0-2 /HPF / WBC 3-5 /HPF   Sq Epi: x / Non Sq Epi: Moderate / Bacteria: Occasional          RADIOLOGY & ADDITIONAL TESTS:

## 2022-09-11 NOTE — CONSULT NOTE ADULT - ASSESSMENT
66 year old woman with clinical findings suspicious for metastatic breast cancer with diffuse bone involvement and concern for T1 cord compression.

## 2022-09-12 ENCOUNTER — RESULT REVIEW (OUTPATIENT)
Age: 67
End: 2022-09-12

## 2022-09-12 LAB — 24R-OH-CALCIDIOL SERPL-MCNC: 66.7 NG/ML — SIGNIFICANT CHANGE UP (ref 30–80)

## 2022-09-12 PROCEDURE — 19083 BX BREAST 1ST LESION US IMAG: CPT | Mod: RT

## 2022-09-12 PROCEDURE — 88342 IMHCHEM/IMCYTCHM 1ST ANTB: CPT | Mod: 26

## 2022-09-12 PROCEDURE — 88305 TISSUE EXAM BY PATHOLOGIST: CPT | Mod: 26

## 2022-09-12 PROCEDURE — 88341 IMHCHEM/IMCYTCHM EA ADD ANTB: CPT | Mod: 26

## 2022-09-12 PROCEDURE — 99233 SBSQ HOSP IP/OBS HIGH 50: CPT

## 2022-09-12 PROCEDURE — 99223 1ST HOSP IP/OBS HIGH 75: CPT

## 2022-09-12 PROCEDURE — 99232 SBSQ HOSP IP/OBS MODERATE 35: CPT | Mod: FS

## 2022-09-12 RX ADMIN — METHOCARBAMOL 500 MILLIGRAM(S): 500 TABLET, FILM COATED ORAL at 21:12

## 2022-09-12 RX ADMIN — SENNA PLUS 2 TABLET(S): 8.6 TABLET ORAL at 21:12

## 2022-09-12 RX ADMIN — METHOCARBAMOL 500 MILLIGRAM(S): 500 TABLET, FILM COATED ORAL at 05:33

## 2022-09-12 RX ADMIN — POLYETHYLENE GLYCOL 3350 17 GRAM(S): 17 POWDER, FOR SOLUTION ORAL at 14:15

## 2022-09-12 RX ADMIN — SODIUM CHLORIDE 100 MILLILITER(S): 9 INJECTION INTRAMUSCULAR; INTRAVENOUS; SUBCUTANEOUS at 09:30

## 2022-09-12 RX ADMIN — MORPHINE SULFATE 2 MILLIGRAM(S): 50 CAPSULE, EXTENDED RELEASE ORAL at 09:31

## 2022-09-12 RX ADMIN — MORPHINE SULFATE 2 MILLIGRAM(S): 50 CAPSULE, EXTENDED RELEASE ORAL at 10:00

## 2022-09-12 RX ADMIN — GABAPENTIN 200 MILLIGRAM(S): 400 CAPSULE ORAL at 21:12

## 2022-09-12 RX ADMIN — LACTULOSE 20 GRAM(S): 10 SOLUTION ORAL at 14:15

## 2022-09-12 RX ADMIN — METHOCARBAMOL 500 MILLIGRAM(S): 500 TABLET, FILM COATED ORAL at 14:15

## 2022-09-12 RX ADMIN — Medication 4 MILLIGRAM(S): at 12:05

## 2022-09-12 RX ADMIN — Medication 4 MILLIGRAM(S): at 17:29

## 2022-09-12 RX ADMIN — GABAPENTIN 200 MILLIGRAM(S): 400 CAPSULE ORAL at 14:13

## 2022-09-12 RX ADMIN — Medication 650 MILLIGRAM(S): at 21:12

## 2022-09-12 RX ADMIN — Medication 4 MILLIGRAM(S): at 05:33

## 2022-09-12 RX ADMIN — Medication 650 MILLIGRAM(S): at 21:42

## 2022-09-12 RX ADMIN — GABAPENTIN 200 MILLIGRAM(S): 400 CAPSULE ORAL at 05:33

## 2022-09-12 NOTE — CONSULT NOTE ADULT - ASSESSMENT
67 yo female no no significant past medical history now admitted with RUE weakness, lumbar, and hip pain.  CT:      Metastatic breast cancer with dominant right breast mass 6 x 6 cm. Right chest wall, axillary, thoracic and retropectoral involvement.  Diffuse extensive osseous metastatic disease, pulmonary, and likely hepatic metastatic disease, largest 1.1 cm.  Pending spinal MRI to evaluate for cord compression, concerning for T1 cord compression.  Chart, labs and VS reviewed.  Plan for breast or liver biopsy on Monday with IR.  Spoke with Dr. Shah who does not come to [facility] for inpatient breast biopsy.  Will need tissue to confirm diagnosis prior to potential systemic  treatment.  Plan per RT onc Dr. Ibarra is for emergent palliative RT to symptomatic area.  Cont decadron iv, gabapentin and iv morphine prn.  Discussed with primary team, Dr. Rowe.  Official consult in AM.    < from: CT Chest, Abdomen and Pelvis w/ IV Cont (09.09.22 @ 21:42) >  Dominant right breast mass. Right chest wall, axillary and retropectoral involvement.  Osseous, pulmonary, and likely hepatic metastatic disease. Metastatic adenopathy of the thorax.  Diffuse extensive osseous metastatic disease with multiple lucent and sclerotic lesions and compression fractures of T1, T9, T12, and L2.   Severe T1 compression with epidural extension. Cord compression cannot be excluded. Additional neoplastic disease of the spine with epidural extension. Multifocal neoplastic lesions involving the pelvis and ribs as well.       MRI TLS SPINE:  Extensive osseous metastatic disease with pathologic compression fracture deformities at C7, T1, T9, T12 and L2, severe at T1.  Abnormal ventral and dorsal epidural enhancement from C7 through T2. Severe canal stenosis at T1 and L2 without associated cord or cauda  equina compression.  There is motion artifact present in the thoracic spine which limits evaluation. No abnormal leptomeningeal enhancement or definite cord signal abnormality.    MRI BRAIN:< from: MR Head w/wo IV Cont (09.11.22 @ 11:27) >Subcentimeter enhancing parenchymal lesions as above compatible with intracranial metastatic disease. No associated vasogenic edema. No   evidence of acute infarct or hemorrhage.  Likely left parietal calvarial osseous metastatic lesion.    INPROGRESS   65 yo female no significant past medical history now admitted with RUE weakness, lumbar, and hip pain.    Rt sided large breast lesion, ~ 6 cm,, diffuse oseous metasttaic disease, LIver and pulm metastases with osseous disese in the spine and Spinal disease from C7- t2    No Leptomeningeal enhancement  Subcentimeter enhancing parenchymal lesions     # Metastatic Cancer , presumably breast primary   # Cord compression     - Plan for Breat biopsy per IR. Discussed at length with patient that prognosis/ possible treatment options. However will not be able to give options unless receptor status on breast is finalized Given that she notice breast mass 5-6 months ago and now widely metastatic, likely to be aggressive.   -  palliative RT to spine per Dr mojica   - alexandr kwan      67 yo female no significant past medical history now admitted with RUE weakness, lumbar, and hip pain.    Rt sided large breast lesion, ~ 6 cm,, diffuse oseous metasttaic disease, LIver and pulm metastases with osseous disese in the spine and Spinal disease from C7- t2    No Leptomeningeal enhancement  Subcentimeter enhancing parenchymal lesions     # Metastatic Cancer , presumably breast primary   # Cord compression     - Plan for Breat biopsy per IR. Discussed at length with patient that prognosis/ possible treatment options. However will not be able to give options unless receptor status on breast is finalized Given that she notice breast mass 5-6 months ago and now widely metastatic, likely to be aggressive.   - no leptomeningeal disease, consider neurosurgical intervention for stabilization of spine/ Cord compression  and pathological fracture   - continue decadron 4 q6

## 2022-09-12 NOTE — CONSULT NOTE ADULT - ASSESSMENT
66F  acute spine pain 2/2 breast CA mets  pain controlled on morphine IVP + non-opioid adjuncts  patient will like to keep the pain meds as is    Pain controlled  Pain service will sign off  Please reconsult as needed     When due for discharge planning:  - DC morphine IVP  - Recommend starting oxycodone PO 5mg/10mg [id number] PRN mod/severe pain

## 2022-09-12 NOTE — CONSULT NOTE ADULT - SUBJECTIVE AND OBJECTIVE BOX
HPI:  67 yo female with no known pmhx presented to the ED with progressive weakness to the point that she felt she could not get out of bed. She started to note pain in her shoulders and lower back at the end of July. She went to the ED at [facility] and also to urgent care, but states they sent her home. She also has had decreased appetite over this time. Her last mammogram was in 2014. She reports they found 2 lumps in her right breast and recommended biopsy. When she went for biopsy, she could not tolerate the pain. She was supposed to return to re-attempt with pain medications, but she never went back. Currently, her pain is well controlled after morphine in the ED. She denies chest pain, SOB, N/V/D, F/C, or other associated symptoms. Patient denies skin changes on the breast or nipple discharge. 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  Noted right breast lump ~3/2022.  Finished work in end of July, and started to undergo medical evaluation in early August.  Went to [facility] ED x2 with low back pain, and was referred to orthopaedics and pain management.  Was scheduled for outpatient MRI, but unable to get out of bed due to pain.  Describes right arm weakness (patient left-handed), inability to grasp items, and pain limiting ADLs.    < from: CT Chest, Abdomen and Pelvis w/ IV Cont (09.09.22 @ 21:42) >  Dominant right breast mass. Right chest wall, axillary and retropectoral involvement.  Osseous, pulmonary, and likely hepatic metastatic disease. Metastatic adenopathy of the thorax.  Diffuse extensive osseous metastatic disease with multiple lucent and sclerotic lesions and compression fractures of T1, T9, T12, and L2.   Severe T1 compression with epidural extension. Cord compression cannot be excluded. Additional neoplastic disease of the spine with epidural extension. Multifocal neoplastic lesions involving the pelvis and ribs as well.       MRI TLS SPINE:  Extensive osseous metastatic disease with pathologic compression fracture deformities at C7, T1, T9, T12 and L2, severe at T1.  Abnormal ventral and dorsal epidural enhancement from C7 through T2. Severe canal stenosis at T1 and L2 without associated cord or cauda  equina compression.  There is motion artifact present in the thoracic spine which limits evaluation. No abnormal leptomeningeal enhancement or definite cord signal abnormality.    MRI BRAIN:< from: MR Head w/wo IV Cont (09.11.22 @ 11:27) >Subcentimeter enhancing parenchymal lesions as above compatible with intracranial metastatic disease. No associated vasogenic edema. No   evidence of acute infarct or hemorrhage.  Likely left parietal calvarial osseous metastatic lesion.      REVIEW OF SYSTEMS:  Constitutional, Eyes, ENT, Cardiovascular, Respiratory, Gastrointestinal, Genitourinary, Musculoskeletal, Integumentary, Neurological, Psychiatric, Endocrine, Heme/Lymph, and Allergic/Immunologic review of systems are otherwise negative except as noted in the HPI.    PAST MEDICAL & SURGICAL HISTORY:  Stomach ulcer  20 years ago   no pain not taking any meds      Skin cancer of arm, left  pt was 16 year old      H/O excision of mass  left arm  when patient was 16 years ago      S/P hysterectomy          FAMILY HISTORY:  Family history of dementia (Mother)    Family history of colon cancer (Father)        SOCIAL HISTORY:    Allergies    tetanus toxoid (Hives)    Intolerances    lactose (Other)      MEDICATIONS  (STANDING):  dexAMETHasone  Injectable 4 milliGRAM(s) IV Push every 6 hours  gabapentin 200 milliGRAM(s) Oral three times a day  influenza  Vaccine (HIGH DOSE) 0.7 milliLiter(s) IntraMuscular once  lactulose Syrup 20 Gram(s) Oral daily  methocarbamol 500 milliGRAM(s) Oral three times a day  naloxone Injectable 0.4 milliGRAM(s) IV Push once  pantoprazole    Tablet 40 milliGRAM(s) Oral before breakfast  polyethylene glycol 3350 17 Gram(s) Oral daily  senna 2 Tablet(s) Oral at bedtime  sodium chloride 0.9%. 1000 milliLiter(s) (100 mL/Hr) IV Continuous <Continuous>    MEDICATIONS  (PRN):  acetaminophen     Tablet .. 650 milliGRAM(s) Oral every 6 hours PRN Temp greater or equal to 38C (100.4F), Mild Pain (1 - 3)  aluminum hydroxide/magnesium hydroxide/simethicone Suspension 30 milliLiter(s) Oral every 4 hours PRN Dyspepsia  bisacodyl 5 milliGRAM(s) Oral daily PRN Constipation  melatonin 3 milliGRAM(s) Oral at bedtime PRN Insomnia  morphine  - Injectable 4 milliGRAM(s) IV Push every 4 hours PRN Severe Pain (7 - 10)  morphine  - Injectable 2 milliGRAM(s) IV Push every 4 hours PRN Moderate Pain (4 - 6)  ondansetron Injectable 4 milliGRAM(s) IV Push every 8 hours PRN Nausea and/or Vomiting      Vital Signs Last 24 Hrs  T(C): 36.7 (12 Sep 2022 05:31), Max: 36.7 (12 Sep 2022 05:31)  T(F): 98.1 (12 Sep 2022 05:31), Max: 98.1 (12 Sep 2022 05:31)  HR: 64 (12 Sep 2022 05:31) (64 - 64)  BP: 148/92 (12 Sep 2022 05:31) (148/92 - 148/92)  BP(mean): --  RR: 18 (12 Sep 2022 05:31) (18 - 18)  SpO2: 93% (12 Sep 2022 05:31) (93% - 93%)        PHYSICAL EXAM:    GENERAL: NAD, well-groomed, well-developed  HEAD:  Atraumatic, Normocephalic  EYES: EOMI, PERRLA, conjunctiva and sclera clear  ENMT: No tonsillar erythema, exudates, or enlargement; Moist mucous membranes, Good dentition, No lesions  NECK: Supple, No JVD, Normal thyroid  NERVOUS SYSTEM:  Alert & Oriented X3, Good concentration; Motor Strength 5/5 B/L upper and lower extremities; DTRs 2+ intact and symmetric  CHEST/LUNG: Clear to auscultation bilaterally; No rales, rhonchi, wheezing, or rubs  HEART: Regular rate and rhythm; No murmurs, rubs, or gallops  ABDOMEN: Soft, Nontender, Nondistended; Bowel sounds present  EXTREMITIES:  2+ Peripheral Pulses, No clubbing, cyanosis, or edema  LYMPH: No lymphadenopathy noted  SKIN: No rashes or lesions      LABS:                        14.6   7.02  )-----------( 251      ( 11 Sep 2022 06:37 )             41.5     09-11    139  |  101  |  19.5  ----------------------------<  106<H>  4.4   |  23.0  |  0.49<L>    Ca    10.7<H>      11 Sep 2022 12:29  Phos  4.0     09-11  Mg     2.1     09-11    TPro  x   /  Alb  x   /  TBili  x   /  DBili  x   /  AST  x   /  ALT  x   /  AlkPhos  147<H>  09-11            RADIOLOGY & ADDITIONAL STUDIES:    PATHOLOGY:     HPI:  67 yo female with no known pmhx presented to the ED with progressive weakness to the point that she felt she could not get out of bed. She started to note pain in her shoulders and lower back at the end of July. She went to the ED at [facility] and also to urgent care, but states they sent her home. She also has had decreased appetite over this time. Her last mammogram was in 2014. She reports they found 2 lumps in her right breast and recommended biopsy. When she went for biopsy, she could not tolerate the pain. She was supposed to return to re-attempt with pain medications, but she never went back. Currently, her pain is well controlled after morphine in the ED. She denies chest pain, SOB, N/V/D, F/C, or other associated symptoms. Patient denies skin changes on the breast or nipple discharge. 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  Noted right breast lump ~3/2022.  Finished work in end of July, and started to undergo medical evaluation in early August.  Went to [facility] ED x2 with low back pain, and was referred to orthopaedics and pain management.  Was scheduled for outpatient MRI, but unable to get out of bed due to pain.  Describes right arm weakness (patient left-handed), inability to grasp items, and pain limiting ADLs.    < from: CT Chest, Abdomen and Pelvis w/ IV Cont (09.09.22 @ 21:42) >  Dominant right breast mass. Right chest wall, axillary and retropectoral involvement.  Osseous, pulmonary, and likely hepatic metastatic disease. Metastatic adenopathy of the thorax.  Diffuse extensive osseous metastatic disease with multiple lucent and sclerotic lesions and compression fractures of T1, T9, T12, and L2.   Severe T1 compression with epidural extension. Cord compression cannot be excluded. Additional neoplastic disease of the spine with epidural extension. Multifocal neoplastic lesions involving the pelvis and ribs as well.       MRI TLS SPINE:  Extensive osseous metastatic disease with pathologic compression fracture deformities at C7, T1, T9, T12 and L2, severe at T1.  Abnormal ventral and dorsal epidural enhancement from C7 through T2. Severe canal stenosis at T1 and L2 without associated cord or cauda  equina compression.  There is motion artifact present in the thoracic spine which limits evaluation. No abnormal leptomeningeal enhancement or definite cord signal abnormality.    MRI BRAIN:< from: MR Head w/wo IV Cont (09.11.22 @ 11:27) >Subcentimeter enhancing parenchymal lesions as above compatible with intracranial metastatic disease. No associated vasogenic edema. No   evidence of acute infarct or hemorrhage.  Likely left parietal calvarial osseous metastatic lesion.      REVIEW OF SYSTEMS:  Constitutional, Eyes, ENT, Cardiovascular, Respiratory, Gastrointestinal, Genitourinary, Musculoskeletal, Integumentary, Neurological, Psychiatric, Endocrine, Heme/Lymph, and Allergic/Immunologic review of systems are otherwise negative except as noted in the HPI.    PAST MEDICAL & SURGICAL HISTORY:  Stomach ulcer  20 years ago   no pain not taking any meds      Skin cancer of arm, left  pt was 16 year old      H/O excision of mass  left arm  when patient was 16 years ago      S/P hysterectomy          FAMILY HISTORY:  Family history of dementia (Mother)    Family history of colon cancer (Father)        SOCIAL HISTORY:    Allergies    tetanus toxoid (Hives)    Intolerances    lactose (Other)      MEDICATIONS  (STANDING):  dexAMETHasone  Injectable 4 milliGRAM(s) IV Push every 6 hours  gabapentin 200 milliGRAM(s) Oral three times a day  influenza  Vaccine (HIGH DOSE) 0.7 milliLiter(s) IntraMuscular once  lactulose Syrup 20 Gram(s) Oral daily  methocarbamol 500 milliGRAM(s) Oral three times a day  naloxone Injectable 0.4 milliGRAM(s) IV Push once  pantoprazole    Tablet 40 milliGRAM(s) Oral before breakfast  polyethylene glycol 3350 17 Gram(s) Oral daily  senna 2 Tablet(s) Oral at bedtime  sodium chloride 0.9%. 1000 milliLiter(s) (100 mL/Hr) IV Continuous <Continuous>    MEDICATIONS  (PRN):  acetaminophen     Tablet .. 650 milliGRAM(s) Oral every 6 hours PRN Temp greater or equal to 38C (100.4F), Mild Pain (1 - 3)  aluminum hydroxide/magnesium hydroxide/simethicone Suspension 30 milliLiter(s) Oral every 4 hours PRN Dyspepsia  bisacodyl 5 milliGRAM(s) Oral daily PRN Constipation  melatonin 3 milliGRAM(s) Oral at bedtime PRN Insomnia  morphine  - Injectable 4 milliGRAM(s) IV Push every 4 hours PRN Severe Pain (7 - 10)  morphine  - Injectable 2 milliGRAM(s) IV Push every 4 hours PRN Moderate Pain (4 - 6)  ondansetron Injectable 4 milliGRAM(s) IV Push every 8 hours PRN Nausea and/or Vomiting      Vital Signs Last 24 Hrs  T(C): 36.7 (12 Sep 2022 05:31), Max: 36.7 (12 Sep 2022 05:31)  T(F): 98.1 (12 Sep 2022 05:31), Max: 98.1 (12 Sep 2022 05:31)  HR: 64 (12 Sep 2022 05:31) (64 - 64)  BP: 148/92 (12 Sep 2022 05:31) (148/92 - 148/92)  BP(mean): --  RR: 18 (12 Sep 2022 05:31) (18 - 18)  SpO2: 93% (12 Sep 2022 05:31) (93% - 93%)        PHYSICAL EXAM:    GENERAL: NAD,   HEAD:  Atraumatic, Normocephalic  breast: 6 cm large breast mass on upper outer quadrant of RT breast   EYES: EOMI, PERRLA, conjunctiva and sclera clear  ENMT: No tonsillar erythema, exudates, or enlargement; Moist mucous membranes, Good dentition, No lesions  NECK: Supple, No JVD, Normal thyroid  CHEST/LUNG: Clear to auscultation bilaterally; No rales, rhonchi, wheezing, or rubs  HEART: Regular rate and rhythm; No murmurs, rubs, or gallops  ABDOMEN: Soft, Nontender, Nondistended; Bowel sounds present  EXTREMITIES:  2+ Peripheral Pulses, No clubbing, cyanosis, or edema  LYMPH: No lymphadenopathy noted  SKIN: No rashes or lesions      LABS:                        14.6   7.02  )-----------( 251      ( 11 Sep 2022 06:37 )             41.5     09-11    139  |  101  |  19.5  ----------------------------<  106<H>  4.4   |  23.0  |  0.49<L>    Ca    10.7<H>      11 Sep 2022 12:29  Phos  4.0     09-11  Mg     2.1     09-11    TPro  x   /  Alb  x   /  TBili  x   /  DBili  x   /  AST  x   /  ALT  x   /  AlkPhos  147<H>  09-11            RADIOLOGY & ADDITIONAL STUDIES:    PATHOLOGY:

## 2022-09-12 NOTE — PROCEDURE NOTE - PROCEDURE FINDINGS AND DETAILS
procedure performed at the bedside due to patient's condition    5 cores US guided right breast mass    no compilation    formal report to follow

## 2022-09-12 NOTE — DIETITIAN INITIAL EVALUATION ADULT - OTHER INFO
67 yo female with no known pmhx presented to the ED with progressive weakness to the point that she felt she could not get out of bed this morning. She started to note pain in her shoulders and lower back at the end of July. She went to the ED at [facility] and also to urgent care, but states they sent her home. She also has had decreased appetite over this time. Her last mammogram was in 2014. She reports they found 2 lumps in her right breast and recommended biopsy. Pt s/p breast biopsy today. Aware possible spine, brain and liver mets. Concern for T1 cord compression fx.

## 2022-09-12 NOTE — PROGRESS NOTE ADULT - ASSESSMENT
This is a 66yf admitted on 9/10 with decreased toleration of ambulation and standing.   Hx of breast mass.     Imp diffuse extensive osseous metastatic disease with compression fracture of the T1, T9, T12, L2. Abnorm ventral and dorsal epidural enhancement c7-T2    Plan  1. hematology/oncology - d/w with Dr De Paz who believe the patient would benefit more readily with decompression and fusion of the spine than just adjuct therapy without decompression will discuss with Dr Saha.  2. Radiation therapy- will need radiation therapy which time frame will be determined as per Dr Ibarra  3. continue with decadron 4mg q 6  4. pain control  5. IR performed a right breast bx today - awaiting path results.  6. will review the recomm with Dr Trevino

## 2022-09-12 NOTE — DIETITIAN INITIAL EVALUATION ADULT - ORAL INTAKE PTA/DIET HISTORY
Pt reports weight 6mo ago ~145-150lbs, weight PTA ~130lbs. 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 Discussed the importance of adequate nutrition to maintain LBM and optimize nutrition status given increase calorie expenditure 2/2 metastatic breast CA. Pt receptive to recommendations and agreeable to trial Ensure Plant.

## 2022-09-12 NOTE — CONSULT NOTE ADULT - SUBJECTIVE AND OBJECTIVE BOX
Patient is a 66y old  Female who presents with a chief complaint of Weakness, new breast mass with spinal lesions suspicious for metastasis (12 Sep 2022 11:16)      HPI:  67 yo female with no known pmhx presented to the ED with progressive weakness to the point that she felt she could not get out of bed this morning. She started to note pain in her shoulders and lower back at the end of July. She went to the ED at [facility] and also to urgent care, but states they sent her home. She also has had decreased appetite over this time. Her last mammogram was in 2014. She reports they found 2 lumps in her right breast and recommended biopsy. When she went for biopsy, she could not tolerate the pain. She was supposed to return to re-attempt with pain medications, but she never went back. Currently, her pain is well controlled after morphine in the ED. She denies chest pain, SOB, N/V/D, F/C, or other associated symptoms. Patient denies skin changes on the breast or nipple discharge. (10 Sep 2022 02:11)        Interval Hx:  Patient seen during rounds  c/o neck and back pain  Patient reports pain to be controlled on current medications  reports return of pain w movement  Patient denies sedation with medications   does not take any opioids as outpatient  offered to increase pain meds  patient refused stating that she would like to remain on the current medications as they are working for her  I informed patient that I will rec to keep pain meds as is until discharge planning    Analgesic Dosing for past 24 hours reviewed as below:    acetaminophen   IVPB ..   400 mL/Hr IV Intermittent (09-11-22 @ 15:35)    gabapentin   200 milliGRAM(s) Oral (09-12-22 @ 05:33)   200 milliGRAM(s) Oral (09-11-22 @ 22:18)    methocarbamol   500 milliGRAM(s) Oral (09-12-22 @ 05:33)   500 milliGRAM(s) Oral (09-11-22 @ 22:18)   500 milliGRAM(s) Oral (09-11-22 @ 15:55)    morphine  - Injectable   2 milliGRAM(s) IV Push (09-12-22 @ 09:31)    morphine  - Injectable   4 milliGRAM(s) IV Push (09-11-22 @ 18:05)   4 milliGRAM(s) IV Push (09-11-22 @ 13:52)          T(C): 36.7 (09-12-22 @ 11:16), Max: 36.7 (09-12-22 @ 05:31)  HR: 82 (09-12-22 @ 11:16) (64 - 82)  BP: 159/77 (09-12-22 @ 11:16) (148/92 - 159/77)  RR: 18 (09-12-22 @ 11:16) (18 - 18)  SpO2: 93% (09-12-22 @ 05:31) (93% - 93%)      09-11-22 @ 07:01  -  09-12-22 @ 07:00  --------------------------------------------------------  IN: 700 mL / OUT: 0 mL / NET: 700 mL        acetaminophen     Tablet .. 650 milliGRAM(s) Oral every 6 hours PRN  aluminum hydroxide/magnesium hydroxide/simethicone Suspension 30 milliLiter(s) Oral every 4 hours PRN  bisacodyl 5 milliGRAM(s) Oral daily PRN  dexAMETHasone  Injectable 4 milliGRAM(s) IV Push every 6 hours  gabapentin 200 milliGRAM(s) Oral three times a day  influenza  Vaccine (HIGH DOSE) 0.7 milliLiter(s) IntraMuscular once  lactulose Syrup 20 Gram(s) Oral daily  melatonin 3 milliGRAM(s) Oral at bedtime PRN  methocarbamol 500 milliGRAM(s) Oral three times a day  morphine  - Injectable 2 milliGRAM(s) IV Push every 4 hours PRN  morphine  - Injectable 4 milliGRAM(s) IV Push every 4 hours PRN  naloxone Injectable 0.4 milliGRAM(s) IV Push once  ondansetron Injectable 4 milliGRAM(s) IV Push every 8 hours PRN  pantoprazole    Tablet 40 milliGRAM(s) Oral before breakfast  polyethylene glycol 3350 17 Gram(s) Oral daily  senna 2 Tablet(s) Oral at bedtime  sodium chloride 0.9%. 1000 milliLiter(s) IV Continuous <Continuous>                          14.6   7.02  )-----------( 251      ( 11 Sep 2022 06:37 )             41.5     09-11    139  |  101  |  19.5  ----------------------------<  106<H>  4.4   |  23.0  |  0.49<L>    Ca    10.7<H>      11 Sep 2022 12:29  Phos  4.0     09-11  Mg     2.1     09-11    TPro  x   /  Alb  x   /  TBili  x   /  DBili  x   /  AST  x   /  ALT  x   /  AlkPhos  147<H>  09-11          Pain Service   [phone]

## 2022-09-12 NOTE — PROGRESS NOTE ADULT - SUBJECTIVE AND OBJECTIVE BOX
Patient is a 66y old  Female who presents with a chief complaint of right arm Weakness, dxx with  breast mass with spinal lesions suspicious for metastasis    pt is seen in am , pain controlled lying in the bed   Pt is awake alert     d/w the IR attending Dr Haro , for breast mass biopsy liekly later today   ordered     oncology input appreciated       ROS   constipation , right hand weak , no HA , no diarrhea , no fever       ALLERGIES:  lactose (Other)  tetanus toxoid (Hives)    MEDICATIONS  (STANDING):  dexAMETHasone  Injectable 4 milliGRAM(s) IV Push every 6 hours  gabapentin 200 milliGRAM(s) Oral three times a day  influenza  Vaccine (HIGH DOSE) 0.7 milliLiter(s) IntraMuscular once  lactulose Syrup 20 Gram(s) Oral daily  methocarbamol 500 milliGRAM(s) Oral three times a day  naloxone Injectable 0.4 milliGRAM(s) IV Push once  pantoprazole    Tablet 40 milliGRAM(s) Oral before breakfast  polyethylene glycol 3350 17 Gram(s) Oral daily  senna 2 Tablet(s) Oral at bedtime  sodium chloride 0.9%. 1000 milliLiter(s) (100 mL/Hr) IV Continuous <Continuous>    ondansetron Injectable 4 milliGRAM(s) IV Push every 8 hours PRN Nausea and/or Vomiting    Vital Signs Last 24 Hrs  T(C): 36.7 (12 Sep 2022 11:16), Max: 36.7 (12 Sep 2022 05:31)  T(F): 98 (12 Sep 2022 11:16), Max: 98.1 (12 Sep 2022 05:31)  HR: 82 (12 Sep 2022 11:16) (64 - 82)  BP: 159/77 (12 Sep 2022 11:16) (148/92 - 159/77)  BP(mean): --  RR: 18 (12 Sep 2022 11:16) (18 - 18)  SpO2: 93% (12 Sep 2022 05:31) (93% - 93%)      PHYSICAL EXAM:  General: awake alert , no distress   Neck: c collar in place   Lungs: CTA bilateral   Breast : right breast outer upper large mass hard tender , not warm skin normal color   Cardio: RRR, S1/S2, No murmur  Abdomen: Soft, Nontender, Nondistended; Bowel sounds present  Extremities: No calf tenderness, No pitting edema  Neuro awake alert , orineted , generalized weakness right hand grib weak , able to move extremities , normal speech   Skin : no rash , warm       LABS:                        14.6   7.02  )-----------( 251      ( 11 Sep 2022 06:37 )             41.5         138  |  101  |  16.8  ----------------------------<  110  4.3   |  22.0  |  0.48    Ca    11.0      11 Sep 2022 06:37  Phos  4.0       Mg     2.1         TPro  7.1  /  Alb  4.1  /  TBili  1.2  /  DBili  x   /  AST  61  /  ALT  17  /  AlkPhos  150                  CARDIAC MARKERS ( 09 Sep 2022 16:50 )  x     / x     / 95 U/L / x     / x                            Urinalysis Basic - ( 10 Sep 2022 07:48 )    Color: Yellow / Appearance: Clear / S.025 / pH: x  Gluc: x / Ketone: Large  / Bili: Negative / Urobili: Negative mg/dL   Blood: x / Protein: 15 / Nitrite: Negative   Leuk Esterase: Negative / RBC: 0-2 /HPF / WBC 3-5 /HPF   Sq Epi: x / Non Sq Epi: Moderate / Bacteria: Occasional          RADIOLOGY & ADDITIONAL TESTS:      mmr< from: MR Head w/wo IV Cont (22 @ 11:27) >    IMPRESSION:  Subcentimeter enhancing parenchymal lesions as above compatible with   intracranial metastatic disease. No associated vasogenic edema. No   evidence of acute infarct or hemorrhage.    Likely left parietal calvarial osseous metastatic lesion.    --- End of Report ---      < end of copied text >  < from: MR Cervical Spine w/wo IV Cont (22 @ 11:27) >  IMPRESSION:  Extensive osseous metastatic disease with pathologic compression fracture   deformities at C7, T1, T9, T12 and L2, severe at T1.    Abnormal ventral and dorsal epidural enhancement from C7 through T2.   Severe canal stenosis at T1 and L2 without associated cord or cauda   equina compression.    There is motion artifact present in the thoracic spine which limits   evaluation. No abnormal leptomeningeal enhancement or definite cord   signal abnormality.      < end of copied text >

## 2022-09-12 NOTE — DIETITIAN INITIAL EVALUATION ADULT - PERTINENT LABORATORY DATA
09-11    139  |  101  |  19.5  ----------------------------<  106<H>  4.4   |  23.0  |  0.49<L>    Ca    10.7<H>      11 Sep 2022 12:29  Phos  4.0     09-11  Mg     2.1     09-11    TPro  x   /  Alb  x   /  TBili  x   /  DBili  x   /  AST  x   /  ALT  x   /  AlkPhos  147<H>  09-11

## 2022-09-12 NOTE — DIETITIAN NUTRITION RISK NOTIFICATION - ADDITIONAL COMMENTS/DIETITIAN RECOMMENDATIONS
1) Add Ensure Plant TID   2) Encourage HBV protein sources   3) Monitor weights daily for trend/accuracy

## 2022-09-12 NOTE — DIETITIAN INITIAL EVALUATION ADULT - PERTINENT MEDS FT
MEDICATIONS  (STANDING):  dexAMETHasone  Injectable 4 milliGRAM(s) IV Push every 6 hours  gabapentin 200 milliGRAM(s) Oral three times a day  influenza  Vaccine (HIGH DOSE) 0.7 milliLiter(s) IntraMuscular once  lactulose Syrup 20 Gram(s) Oral daily  methocarbamol 500 milliGRAM(s) Oral three times a day  naloxone Injectable 0.4 milliGRAM(s) IV Push once  pantoprazole    Tablet 40 milliGRAM(s) Oral before breakfast  polyethylene glycol 3350 17 Gram(s) Oral daily  senna 2 Tablet(s) Oral at bedtime  sodium chloride 0.9%. 1000 milliLiter(s) (100 mL/Hr) IV Continuous <Continuous>    MEDICATIONS  (PRN):  acetaminophen     Tablet .. 650 milliGRAM(s) Oral every 6 hours PRN Temp greater or equal to 38C (100.4F), Mild Pain (1 - 3)  aluminum hydroxide/magnesium hydroxide/simethicone Suspension 30 milliLiter(s) Oral every 4 hours PRN Dyspepsia  bisacodyl 5 milliGRAM(s) Oral daily PRN Constipation  melatonin 3 milliGRAM(s) Oral at bedtime PRN Insomnia  morphine  - Injectable 2 milliGRAM(s) IV Push every 4 hours PRN Moderate Pain (4 - 6)  morphine  - Injectable 4 milliGRAM(s) IV Push every 4 hours PRN Severe Pain (7 - 10)  ondansetron Injectable 4 milliGRAM(s) IV Push every 8 hours PRN Nausea and/or Vomiting

## 2022-09-12 NOTE — PROGRESS NOTE ADULT - ASSESSMENT
67 yo female with no known pmhx presented to the ED with progressive weakness to the point that she felt she could not get out of bed this morning. CT imaging revealed a 6.5 cm breast mass with multiple spinal lesions concerning for metastatic breast cancer.    1- Breast mass with mets to spine , brain liver liekly breast ca   for right breast mass biopsy today by IR Dr Haro   med oncology and radiation oncology input appreciated   bed rest keep head and whole spine flat per NS   cont decadron iv   overall prognosis is poor   cont pain meds , gabapentin   iv morphine prn     2-Neck pain arm weakness due to Extensive osseous metastatic disease with pathologic compression fracture deformities at C7, T1, T9, T12 and L2, severe at T1.  Abnormal ventral and dorsal epidural enhancement from C7 through T2  seen by radiation oncology and NS team   pt for radiation of spine pending Dr Ibarra input   cont iv decadron   cont strict bed rest   mr result reviewed     3- Hypercalcemia due to bone mets malignancy   on IVF   repeat lab Ca is better   will continue to monitor     4- Hypokalemia   replaced , resolved       DVT prophylaxis on lovenox  resume post biopsy       GOC: Full Code

## 2022-09-12 NOTE — PROGRESS NOTE ADULT - SUBJECTIVE AND OBJECTIVE BOX
INTERVAL HPI/OVERNIGHT EVENTS:  This is a 66yf admitted on 9/10 with a dx in March of a breast mass.  Pt has been having difficulty with standing for long periods.  Mri of the brain /c/t/l demonstrates extensive lytic disease.   Pt states that her pain in under control with the meds at this time.   MEDICATIONS  (STANDING):  dexAMETHasone  Injectable 4 milliGRAM(s) IV Push every 6 hours  gabapentin 200 milliGRAM(s) Oral three times a day  influenza  Vaccine (HIGH DOSE) 0.7 milliLiter(s) IntraMuscular once  lactulose Syrup 20 Gram(s) Oral daily  methocarbamol 500 milliGRAM(s) Oral three times a day  naloxone Injectable 0.4 milliGRAM(s) IV Push once  pantoprazole    Tablet 40 milliGRAM(s) Oral before breakfast  polyethylene glycol 3350 17 Gram(s) Oral daily  senna 2 Tablet(s) Oral at bedtime  sodium chloride 0.9%. 1000 milliLiter(s) (100 mL/Hr) IV Continuous <Continuous>    MEDICATIONS  (PRN):  acetaminophen     Tablet .. 650 milliGRAM(s) Oral every 6 hours PRN Temp greater or equal to 38C (100.4F), Mild Pain (1 - 3)  aluminum hydroxide/magnesium hydroxide/simethicone Suspension 30 milliLiter(s) Oral every 4 hours PRN Dyspepsia  bisacodyl 5 milliGRAM(s) Oral daily PRN Constipation  melatonin 3 milliGRAM(s) Oral at bedtime PRN Insomnia  morphine  - Injectable 2 milliGRAM(s) IV Push every 4 hours PRN Moderate Pain (4 - 6)  morphine  - Injectable 4 milliGRAM(s) IV Push every 4 hours PRN Severe Pain (7 - 10)  ondansetron Injectable 4 milliGRAM(s) IV Push every 8 hours PRN Nausea and/or Vomiting    Allergies  tetanus toxoid (Hives)  Intolerances  lactose (Other)    Vital Signs Last 24 Hrs  T(C): 36.7 (12 Sep 2022 11:16), Max: 36.7 (12 Sep 2022 05:31)  T(F): 98 (12 Sep 2022 11:16), Max: 98.1 (12 Sep 2022 05:31)  HR: 82 (12 Sep 2022 11:16) (64 - 82)  BP: 159/77 (12 Sep 2022 11:16) (148/92 - 159/77)  BP(mean): --  RR: 18 (12 Sep 2022 11:16) (18 - 18)  SpO2: 93% (12 Sep 2022 05:31) (93% - 93%)     BMI (kg/m2): 21.3 (09-09-22 @ 15:26)    PHYSICAL EXAM  GENERAL: NAD,   HEAD:  Atraumatic, Normocephalic  EYES: EOMI, PERRLA, conjunctiva and sclera clear  ENMT: No tonsillar erythema, exudates, or enlargement; Moist mucous membranes, Good dentition, neg facial  NECK: aspen collar with thoracic extension .   NERVOUS SYSTEM:  Alert & Oriented X3, Good concentration; Motor Strength 5/5 B/L upper and lower extremities; DTRs 2+ intact and symmetric  Uppers     Delt (C5/6)     Bicep (C5/6)     Wrist Extend (C6)     Tricep (C7)     HG (C8/T1)  R                4+/5                 4/5                         4-/5                 4-/5          3/5  L                  5/5                 5/5                         5/5                   5/5            5/5  Lowers      HF(L1/L2)     KE (L3)     DF (L4)     EHL (L5)     PF (S1)      R                    4+/5          5/5           5/5           5/5            5/5  L                     5/5               5/5          5/5            5/5          5/5  SENSATION: decreased sensation to RUE  No Karla's, No clonus   CHEST/LUNG: Clear BS bilat  HEART: Regular rate and rhythm; No murmurs, rubs, or gallops  ABDOMEN: Soft, Nontender, Nondistended; Bowel sounds present  EXTREMITIES:  No edema      LABS:                          14.6   7.02  )-----------( 251      ( 11 Sep 2022 06:37 )             41.5     09-11    139  |  101  |  19.5  ----------------------------<  106<H>  4.4   |  23.0  |  0.49<L>    Ca    10.7<H>      11 Sep 2022 12:29  Phos  4.0     09-11  Mg     2.1     09-11    TPro  x   /  Alb  x   /  TBili  x   /  DBili  x   /  AST  x   /  ALT  x   /  AlkPhos  147<H>  09-11      I&O's Detail    11 Sep 2022 07:01  -  12 Sep 2022 07:00  --------------------------------------------------------  IN:    IV PiggyBack: 100 mL    sodium chloride 0.9%: 600 mL  Total IN: 700 mL    OUT:  Total OUT: 0 mL    Total NET: 700 mL    RADIOLOGY & ADDITIONAL TESTS:  < from: CT Abdomen and Pelvis w/ IV Cont (09.09.22 @ 21:42) >  ACC: [id number] EXAM:  CT ABDOMEN AND PELVIS IC                        ACC: [id number] EXAM:  CT CHEST IC                        PROCEDURE DATE:  09/09/2022    < end of copied text >  < from: CT Abdomen and Pelvis w/ IV Cont (09.09.22 @ 21:42) >  IMPRESSION:  < from: MR Cervical Spine w/wo IV Cont (09.11.22 @ 11:27) >  ACC: [id number] EXAM:  MR SPINE LUMBAR WAW IC                        ACC: [id number] EXAM:  MR SPINE THORACIC WAW IC                        ACC: [id number] EXAM:  MR SPINE CERVICAL WAW IC                         from: MR Cervical Spine w/wo IV Cont (09.11.22 @ 11:27) >  IMPRESSION:  Extensive osseous metastatic disease with pathologic compression fracture   deformities at C7, T1, T9, T12 and L2, severe at T1.  Abnormal ventral and dorsal epidural enhancement from C7 through T2.   Severe canal stenosis at T1 and L2 without associated cord or cauda   equina compression.  There is motion artifact present in the thoracic spine which limits   evaluation. No abnormal leptomeningeal enhancement or definite cord   signal abnormality.  --- End of Report ---  < end of copied text >    Metastatic breast cancer with dominant right breast mass. Inflammatory   right breast cancer cannot be excluded. Right chest wall, axillary and   retropectoral involvement.  Osseous, pulmonary, and likely hepatic metastatic disease. Metastatic   adenopathy of the thorax.  Diffuse extensive osseous metastatic disease with multiple lucent and   sclerotic lesions and compression fractures of T1, T9, T12, and L2.   Severe T1 compression with epidural extension. Cord compression cannot be   excluded. Additional neoplastic disease of the spine with epidural   extension. Multifocal neoplastic lesions involving the pelvis and ribs as   well. Correlate with pending spinal MRI to evaluate for cord compression.  Distended urinary bladder.  --- End of Report ---  < end of copied text >

## 2022-09-13 LAB
ALBUMIN SERPL ELPH-MCNC: 3.6 G/DL — SIGNIFICANT CHANGE UP (ref 3.3–5.2)
ALP SERPL-CCNC: 140 U/L — HIGH (ref 40–120)
ALT FLD-CCNC: 8 U/L — SIGNIFICANT CHANGE UP
ANION GAP SERPL CALC-SCNC: 12 MMOL/L — SIGNIFICANT CHANGE UP (ref 5–17)
AST SERPL-CCNC: 48 U/L — HIGH
BILIRUB SERPL-MCNC: 0.2 MG/DL — LOW (ref 0.4–2)
BUN SERPL-MCNC: 21.3 MG/DL — HIGH (ref 8–20)
CALCIUM SERPL-MCNC: 9.7 MG/DL — SIGNIFICANT CHANGE UP (ref 8.4–10.5)
CHLORIDE SERPL-SCNC: 106 MMOL/L — SIGNIFICANT CHANGE UP (ref 98–107)
CO2 SERPL-SCNC: 21 MMOL/L — LOW (ref 22–29)
CREAT SERPL-MCNC: 0.54 MG/DL — SIGNIFICANT CHANGE UP (ref 0.5–1.3)
EGFR: 101 ML/MIN/1.73M2 — SIGNIFICANT CHANGE UP
GLUCOSE SERPL-MCNC: 113 MG/DL — HIGH (ref 70–99)
POTASSIUM SERPL-MCNC: 3.9 MMOL/L — SIGNIFICANT CHANGE UP (ref 3.5–5.3)
POTASSIUM SERPL-SCNC: 3.9 MMOL/L — SIGNIFICANT CHANGE UP (ref 3.5–5.3)
PROT SERPL-MCNC: 5.9 G/DL — LOW (ref 6.6–8.7)
SODIUM SERPL-SCNC: 139 MMOL/L — SIGNIFICANT CHANGE UP (ref 135–145)

## 2022-09-13 PROCEDURE — 99232 SBSQ HOSP IP/OBS MODERATE 35: CPT | Mod: FS

## 2022-09-13 PROCEDURE — 99233 SBSQ HOSP IP/OBS HIGH 50: CPT

## 2022-09-13 RX ADMIN — LACTULOSE 20 GRAM(S): 10 SOLUTION ORAL at 13:07

## 2022-09-13 RX ADMIN — GABAPENTIN 200 MILLIGRAM(S): 400 CAPSULE ORAL at 13:07

## 2022-09-13 RX ADMIN — PANTOPRAZOLE SODIUM 40 MILLIGRAM(S): 20 TABLET, DELAYED RELEASE ORAL at 05:34

## 2022-09-13 RX ADMIN — Medication 4 MILLIGRAM(S): at 23:43

## 2022-09-13 RX ADMIN — Medication 4 MILLIGRAM(S): at 05:34

## 2022-09-13 RX ADMIN — MORPHINE SULFATE 2 MILLIGRAM(S): 50 CAPSULE, EXTENDED RELEASE ORAL at 10:09

## 2022-09-13 RX ADMIN — GABAPENTIN 200 MILLIGRAM(S): 400 CAPSULE ORAL at 05:35

## 2022-09-13 RX ADMIN — SODIUM CHLORIDE 100 MILLILITER(S): 9 INJECTION INTRAMUSCULAR; INTRAVENOUS; SUBCUTANEOUS at 17:33

## 2022-09-13 RX ADMIN — METHOCARBAMOL 500 MILLIGRAM(S): 500 TABLET, FILM COATED ORAL at 21:22

## 2022-09-13 RX ADMIN — POLYETHYLENE GLYCOL 3350 17 GRAM(S): 17 POWDER, FOR SOLUTION ORAL at 13:07

## 2022-09-13 RX ADMIN — Medication 4 MILLIGRAM(S): at 00:26

## 2022-09-13 RX ADMIN — Medication 4 MILLIGRAM(S): at 17:34

## 2022-09-13 RX ADMIN — METHOCARBAMOL 500 MILLIGRAM(S): 500 TABLET, FILM COATED ORAL at 05:34

## 2022-09-13 RX ADMIN — MORPHINE SULFATE 2 MILLIGRAM(S): 50 CAPSULE, EXTENDED RELEASE ORAL at 10:53

## 2022-09-13 RX ADMIN — SENNA PLUS 2 TABLET(S): 8.6 TABLET ORAL at 21:22

## 2022-09-13 RX ADMIN — Medication 4 MILLIGRAM(S): at 13:07

## 2022-09-13 RX ADMIN — METHOCARBAMOL 500 MILLIGRAM(S): 500 TABLET, FILM COATED ORAL at 13:07

## 2022-09-13 RX ADMIN — GABAPENTIN 200 MILLIGRAM(S): 400 CAPSULE ORAL at 21:22

## 2022-09-13 NOTE — PROGRESS NOTE ADULT - SUBJECTIVE AND OBJECTIVE BOX
INTERVAL HPI/OVERNIGHT EVENTS:  This is a 66yf admitted on 9/10 with a dx in March of a breast mass.  Pt has been having difficulty with standing for long periods.  Mri of the brain /c/t/l demonstrates extensive lytic disease.   PT seen today and states that she is tolerating wearing the collar. She has raised the bed to eat.   Pain is somewhat under control.    MEDICATIONS  (STANDING):  dexAMETHasone  Injectable 4 milliGRAM(s) IV Push every 6 hours  gabapentin 200 milliGRAM(s) Oral three times a day  influenza  Vaccine (HIGH DOSE) 0.7 milliLiter(s) IntraMuscular once  lactulose Syrup 20 Gram(s) Oral daily  methocarbamol 500 milliGRAM(s) Oral three times a day  naloxone Injectable 0.4 milliGRAM(s) IV Push once  pantoprazole    Tablet 40 milliGRAM(s) Oral before breakfast  polyethylene glycol 3350 17 Gram(s) Oral daily  senna 2 Tablet(s) Oral at bedtime  sodium chloride 0.9%. 1000 milliLiter(s) (100 mL/Hr) IV Continuous <Continuous>    MEDICATIONS  (PRN):  acetaminophen     Tablet .. 650 milliGRAM(s) Oral every 6 hours PRN Temp greater or equal to 38C (100.4F), Mild Pain (1 - 3)  aluminum hydroxide/magnesium hydroxide/simethicone Suspension 30 milliLiter(s) Oral every 4 hours PRN Dyspepsia  bisacodyl 5 milliGRAM(s) Oral daily PRN Constipation  melatonin 3 milliGRAM(s) Oral at bedtime PRN Insomnia  morphine  - Injectable 2 milliGRAM(s) IV Push every 4 hours PRN Moderate Pain (4 - 6)  morphine  - Injectable 4 milliGRAM(s) IV Push every 4 hours PRN Severe Pain (7 - 10)  ondansetron Injectable 4 milliGRAM(s) IV Push every 8 hours PRN Nausea and/or Vomiting      Allergies  tetanus toxoid (Hives)  Intolerances  lactose (Other)    Vital Signs Last 24 Hrs  T(C): 36.6 (13 Sep 2022 18:13), Max: 36.8 (13 Sep 2022 11:33)  T(F): 97.9 (13 Sep 2022 18:13), Max: 98.3 (13 Sep 2022 11:33)  HR: 77 (13 Sep 2022 18:13) (67 - 77)  BP: 151/87 (13 Sep 2022 18:13) (121/82 - 158/77)  BP(mean): --  RR: 18 (13 Sep 2022 18:13) (18 - 19)  SpO2: 91% (13 Sep 2022 18:13) (91% - 94%)    Parameters below as of 13 Sep 2022 18:13  Patient On (Oxygen Delivery Method): room air     BMI (kg/m2): 21.3 (09-09-22 @ 15:26)      PHYSICAL EXAM  GENERAL: NAD, well-groomed, well-developed  HEAD:  Atraumatic, Normocephalic  EYES: EOMI, PERRLA, conjunctiva and sclera clear  ENMT: No tonsillar erythema, exudates, or enlargement; Moist mucous membranes, Good dentition, No lesions  NECK: Supple,  NERVOUS SYSTEM:  Alert & Oriented X3, Motor Strength right 4/5 and l 5/5 B/L upper and lower extremities; DTRs 2+ intact and symmetric  CHEST/LUNG: Clear BS bilaterally;  HEART: Regular rate and rhythm; No murmurs, rubs, or gallops  ABDOMEN: Soft, Nontender, Nondistended; Bowel sounds present  EXTREMITIES:  2+ Peripheral Pulses, No edema      LABS:      09-13    139  |  106  |  21.3<H>  ----------------------------<  113<H>  3.9   |  21.0<L>  |  0.54    Ca    9.7      13 Sep 2022 04:58    TPro  5.9<L>  /  Alb  3.6  /  TBili  0.2<L>  /  DBili  x   /  AST  48<H>  /  ALT  8   /  AlkPhos  140<H>  09-13        I&O's Detail    12 Sep 2022 07:01  -  13 Sep 2022 07:00  --------------------------------------------------------  IN:  Total IN: 0 mL    OUT:    Voided (mL): 450 mL  Total OUT: 450 mL    Total NET: -450 mL    RADIOLOGY & ADDITIONAL TESTS:  < from: CT Abdomen and Pelvis w/ IV Cont (09.09.22 @ 21:42) >  ACC: [id number] EXAM:  CT ABDOMEN AND PELVIS IC                        ACC: [id number] EXAM:  CT CHEST IC                        PROCEDURE DATE:  09/09/2022    IMPRESSION:  Metastatic breast cancer with dominant right breast mass. Inflammatory   right breast cancer cannot be excluded. Right chest wall, axillary and   retropectoral involvement.  Osseous, pulmonary, and likely hepatic metastatic disease. Metastatic   adenopathy of the thorax.  Diffuse extensive osseous metastatic disease with multiple lucent and   sclerotic lesions and compression fractures of T1, T9, T12, and L2.   Severe T1 compression with epidural extension. Cord compression cannot be   excluded. Additional neoplastic disease of the spine with epidural   extension. Multifocal neoplastic lesions involving the pelvis and ribs as   well. Correlate with pending spinal MRI to evaluate for cord compression.  Dr. Huang discussed these above findings with Dr. Rowe on   9/10/2022 at 12:47PM, with read back.  Distended urinary bladder.  --- End of Report ---  < from: MR Cervical Spine w/wo IV Cont (09.11.22 @ 11:27) >  ACC: [id number] EXAM:  MR SPINE LUMBAR WAW IC                        ACC: [id number] EXAM:  MR SPINE THORACIC WAW IC                        ACC: [id number] EXAM:  MR SPINE CERVICAL WAW IC                        PROCEDURE DATE:  09/11/2022    IMPRESSION:  Extensive osseous metastatic disease with pathologic compression fracture   deformities at C7, T1, T9, T12 and L2, severe at T1.  Abnormal ventral and dorsal epidural enhancement from C7 through T2.   Severe canal stenosis at T1 and L2 without associated cord or cauda   equina compression.  There is motion artifact present in the thoracic spine which limits   evaluation. No abnormal leptomeningeal enhancement or definite cord   signal abnormality.  --- End of Report ---  < end of copied text >   INTERVAL HPI/OVERNIGHT EVENTS:  This is a 66yf admitted on 9/10 with a dx in March of a breast mass.  Pt has been having difficulty with standing for long periods.  Mri of the brain /c/t/l demonstrates extensive lytic disease.   PT seen today and states that she is tolerating wearing the collar. She has raised the bed to eat.   Pain is somewhat under control.    MEDICATIONS  (STANDING):  dexAMETHasone  Injectable 4 milliGRAM(s) IV Push every 6 hours  gabapentin 200 milliGRAM(s) Oral three times a day  influenza  Vaccine (HIGH DOSE) 0.7 milliLiter(s) IntraMuscular once  lactulose Syrup 20 Gram(s) Oral daily  methocarbamol 500 milliGRAM(s) Oral three times a day  naloxone Injectable 0.4 milliGRAM(s) IV Push once  pantoprazole    Tablet 40 milliGRAM(s) Oral before breakfast  polyethylene glycol 3350 17 Gram(s) Oral daily  senna 2 Tablet(s) Oral at bedtime  sodium chloride 0.9%. 1000 milliLiter(s) (100 mL/Hr) IV Continuous <Continuous>    MEDICATIONS  (PRN):  acetaminophen     Tablet .. 650 milliGRAM(s) Oral every 6 hours PRN Temp greater or equal to 38C (100.4F), Mild Pain (1 - 3)  aluminum hydroxide/magnesium hydroxide/simethicone Suspension 30 milliLiter(s) Oral every 4 hours PRN Dyspepsia  bisacodyl 5 milliGRAM(s) Oral daily PRN Constipation  melatonin 3 milliGRAM(s) Oral at bedtime PRN Insomnia  morphine  - Injectable 2 milliGRAM(s) IV Push every 4 hours PRN Moderate Pain (4 - 6)  morphine  - Injectable 4 milliGRAM(s) IV Push every 4 hours PRN Severe Pain (7 - 10)  ondansetron Injectable 4 milliGRAM(s) IV Push every 8 hours PRN Nausea and/or Vomiting      Allergies  tetanus toxoid (Hives)  Intolerances  lactose (Other)    Vital Signs Last 24 Hrs  T(C): 36.6 (13 Sep 2022 18:13), Max: 36.8 (13 Sep 2022 11:33)  T(F): 97.9 (13 Sep 2022 18:13), Max: 98.3 (13 Sep 2022 11:33)  HR: 77 (13 Sep 2022 18:13) (67 - 77)  BP: 151/87 (13 Sep 2022 18:13) (121/82 - 158/77)  BP(mean): --  RR: 18 (13 Sep 2022 18:13) (18 - 19)  SpO2: 91% (13 Sep 2022 18:13) (91% - 94%)    Parameters below as of 13 Sep 2022 18:13  Patient On (Oxygen Delivery Method): room air     BMI (kg/m2): 21.3 (09-09-22 @ 15:26)      PHYSICAL EXAM  GENERAL: NAD, well-groomed, well-developed  HEAD:  Atraumatic, Normocephalic  EYES: EOMI, PERRLA, conjunctiva and sclera clear  ENMT: No tonsillar erythema, exudates, or enlargement; Moist mucous membranes, Good dentition, No lesions  NECK: aspen collar with extension   NERVOUS SYSTEM:  Alert & Oriented X3, Motor Strength right 4/5 grasp 3/5and l 5/5 B/L upper and lower extremities; DTRs 2+ intact and symmetric  CHEST/LUNG: Clear BS bilaterally;  HEART: Regular rate and rhythm; No murmurs, rubs, or gallops  ABDOMEN: Soft, Nontender, Nondistended; Bowel sounds present  EXTREMITIES:  2+ Peripheral Pulses, No edema      LABS:      09-13    139  |  106  |  21.3<H>  ----------------------------<  113<H>  3.9   |  21.0<L>  |  0.54    Ca    9.7      13 Sep 2022 04:58    TPro  5.9<L>  /  Alb  3.6  /  TBili  0.2<L>  /  DBili  x   /  AST  48<H>  /  ALT  8   /  AlkPhos  140<H>  09-13        I&O's Detail    12 Sep 2022 07:01  -  13 Sep 2022 07:00  --------------------------------------------------------  IN:  Total IN: 0 mL    OUT:    Voided (mL): 450 mL  Total OUT: 450 mL    Total NET: -450 mL    RADIOLOGY & ADDITIONAL TESTS:  < from: CT Abdomen and Pelvis w/ IV Cont (09.09.22 @ 21:42) >  ACC: [id number] EXAM:  CT ABDOMEN AND PELVIS IC                        ACC: [id number] EXAM:  CT CHEST IC                        PROCEDURE DATE:  09/09/2022    IMPRESSION:  Metastatic breast cancer with dominant right breast mass. Inflammatory   right breast cancer cannot be excluded. Right chest wall, axillary and   retropectoral involvement.  Osseous, pulmonary, and likely hepatic metastatic disease. Metastatic   adenopathy of the thorax.  Diffuse extensive osseous metastatic disease with multiple lucent and   sclerotic lesions and compression fractures of T1, T9, T12, and L2.   Severe T1 compression with epidural extension. Cord compression cannot be   excluded. Additional neoplastic disease of the spine with epidural   extension. Multifocal neoplastic lesions involving the pelvis and ribs as   well. Correlate with pending spinal MRI to evaluate for cord compression.  Dr. Huang discussed these above findings with Dr. Rowe on   9/10/2022 at 12:47PM, with read back.  Distended urinary bladder.  --- End of Report ---  < from: MR Cervical Spine w/wo IV Cont (09.11.22 @ 11:27) >  ACC: [id number] EXAM:  MR SPINE LUMBAR WAW IC                        ACC: [id number] EXAM:  MR SPINE THORACIC WAW IC                        ACC: [id number] EXAM:  MR SPINE CERVICAL WAW IC                        PROCEDURE DATE:  09/11/2022    IMPRESSION:  Extensive osseous metastatic disease with pathologic compression fracture   deformities at C7, T1, T9, T12 and L2, severe at T1.  Abnormal ventral and dorsal epidural enhancement from C7 through T2.   Severe canal stenosis at T1 and L2 without associated cord or cauda   equina compression.  There is motion artifact present in the thoracic spine which limits   evaluation. No abnormal leptomeningeal enhancement or definite cord   signal abnormality.  --- End of Report ---  < end of copied text >

## 2022-09-13 NOTE — PROGRESS NOTE ADULT - ASSESSMENT
65 yo female with no known pmhx presented to the ED with progressive weakness to the point that she felt she could not get out of bed this morning. CT imaging revealed a 6.5 cm breast mass with multiple spinal lesions concerning for metastatic breast cancer.    1- Breast mass with mets to spine , brain liver liekly breast ca   s/p right breast biopsy   med oncology and radiation oncology input appreciated   bed rest keep head and whole spine flat per NS   cont decadron iv   cont pain meds , gabapentin   iv morphine prn     2-Neck pain arm weakness due to Extensive osseous metastatic disease with pathologic compression fracture deformities at C7, T1, T9, T12 and L2, severe at T1.  Abnormal ventral and dorsal epidural enhancement from C7 through T2  seen by radiation oncology and NS team   pt for possible NS intervention pending decision from NS team   cont strict bed rest   radiation therapy eventually for the spine mets per DR Ibarra     3- Hypercalcemia due to bone mets malignancy   on IVF   repeat ca improving   will continue to monitor     4- Hypokalemia   replaced , resolved       DVT prophylaxis on loevonox      GOC: Full Code

## 2022-09-13 NOTE — PROGRESS NOTE ADULT - ASSESSMENT
This is a 66yf admitted on 9/10 with decreased toleration of ambulation and standing.   Hx of breast mass.     Imp diffuse extensive osseous metastatic disease with compression fracture of the T1, T9, T12, L2. Abnorm ventral and dorsal epidural enhancement c7-T2  Plan  1. pt informed that surgical intervention is being planned.  Pt will discuss with Oncologist about prognosis and then will consider surg.   2. Radiation therapy- Following will Dr Ibarra  3. continue with decadron 4mg q 6  4. pain control  5 bx results pending.  6. please medical clear for surgical intervention This is a 66yf admitted on 9/10 with decreased toleration of ambulation and standing.   Hx of breast mass.     Imp diffuse extensive osseous metastatic disease with compression fracture of the T1, T9, T12, L2. Abnorm ventral and dorsal epidural enhancement c7-T2  Plan  1. pt informed that surgical intervention is being planned.  Pt will discuss with Oncologist about prognosis and then will consider surg.   2. Radiation therapy- Following will Dr Ibarra  3. continue with decadron 4mg q 6  4. pain control  5 bx results pending.  6. please medically clear for surgical intervention

## 2022-09-13 NOTE — PROGRESS NOTE ADULT - SUBJECTIVE AND OBJECTIVE BOX
Patient is a 66y old  Female with metastatic breast ca , T spine pathologic fracture     pt is seen in am , pain controlled at rest   c/.o back pain neck pain with movements     d/w NS and oncology , radiation oncology team re plan     + BM     ROS    as above , otherwise neg       ALLERGIES:  lactose (Other)  tetanus toxoid (Hives)  MEDICATIONS  (STANDING):  dexAMETHasone  Injectable 4 milliGRAM(s) IV Push every 6 hours  gabapentin 200 milliGRAM(s) Oral three times a day  influenza  Vaccine (HIGH DOSE) 0.7 milliLiter(s) IntraMuscular once  lactulose Syrup 20 Gram(s) Oral daily  methocarbamol 500 milliGRAM(s) Oral three times a day  naloxone Injectable 0.4 milliGRAM(s) IV Push once  pantoprazole    Tablet 40 milliGRAM(s) Oral before breakfast  polyethylene glycol 3350 17 Gram(s) Oral daily  senna 2 Tablet(s) Oral at bedtime  sodium chloride 0.9%. 1000 milliLiter(s) (100 mL/Hr) IV Continuous <Continuous>      Vital Signs Last 24 Hrs  T(C): 36.8 (13 Sep 2022 11:33), Max: 36.8 (13 Sep 2022 11:33)  T(F): 98.3 (13 Sep 2022 11:33), Max: 98.3 (13 Sep 2022 11:33)  HR: 76 (13 Sep 2022 11:33) (67 - 100)  BP: 121/82 (13 Sep 2022 11:33) (121/82 - 164/81)  BP(mean): --  RR: 19 (13 Sep 2022 11:33) (18 - 19)  SpO2: 94% (13 Sep 2022 04:55) (94% - 95%)    Parameters below as of 13 Sep 2022 04:55  Patient On (Oxygen Delivery Method): room air        PHYSICAL EXAM:  General: awake alert , no distress   Neck: c collar in place   Lungs: CTA bilateral anteriorly   Cardio: RRR, S1/S2, No murmur  Abdomen: Soft, Nontender, Nondistended; Bowel sounds +   Extremities: No calf tenderness, No pitting edema  Neuro awake alert , oriented , generalized weakness right hand weak  Skin : no rash , warm     09-13    139  |  106  |  21.3<H>  ----------------------------<  113<H>  3.9   |  21.0<L>  |  0.54    Ca    9.7      13 Sep 2022 04:58    TPro  5.9<L>  /  Alb  3.6  /  TBili  0.2<L>  /  DBili  x   /  AST  48<H>  /  ALT  8   /  AlkPhos  140<H>  09-13

## 2022-09-14 PROCEDURE — 99232 SBSQ HOSP IP/OBS MODERATE 35: CPT

## 2022-09-14 PROCEDURE — 99232 SBSQ HOSP IP/OBS MODERATE 35: CPT | Mod: FS

## 2022-09-14 RX ADMIN — Medication 4 MILLIGRAM(S): at 12:11

## 2022-09-14 RX ADMIN — MORPHINE SULFATE 2 MILLIGRAM(S): 50 CAPSULE, EXTENDED RELEASE ORAL at 19:05

## 2022-09-14 RX ADMIN — METHOCARBAMOL 500 MILLIGRAM(S): 500 TABLET, FILM COATED ORAL at 05:12

## 2022-09-14 RX ADMIN — GABAPENTIN 200 MILLIGRAM(S): 400 CAPSULE ORAL at 05:11

## 2022-09-14 RX ADMIN — GABAPENTIN 200 MILLIGRAM(S): 400 CAPSULE ORAL at 12:13

## 2022-09-14 RX ADMIN — METHOCARBAMOL 500 MILLIGRAM(S): 500 TABLET, FILM COATED ORAL at 21:35

## 2022-09-14 RX ADMIN — PANTOPRAZOLE SODIUM 40 MILLIGRAM(S): 20 TABLET, DELAYED RELEASE ORAL at 05:12

## 2022-09-14 RX ADMIN — Medication 4 MILLIGRAM(S): at 18:05

## 2022-09-14 RX ADMIN — MORPHINE SULFATE 2 MILLIGRAM(S): 50 CAPSULE, EXTENDED RELEASE ORAL at 18:08

## 2022-09-14 RX ADMIN — SENNA PLUS 2 TABLET(S): 8.6 TABLET ORAL at 21:35

## 2022-09-14 RX ADMIN — SODIUM CHLORIDE 100 MILLILITER(S): 9 INJECTION INTRAMUSCULAR; INTRAVENOUS; SUBCUTANEOUS at 02:48

## 2022-09-14 RX ADMIN — METHOCARBAMOL 500 MILLIGRAM(S): 500 TABLET, FILM COATED ORAL at 12:11

## 2022-09-14 RX ADMIN — GABAPENTIN 200 MILLIGRAM(S): 400 CAPSULE ORAL at 21:35

## 2022-09-14 RX ADMIN — Medication 4 MILLIGRAM(S): at 05:11

## 2022-09-14 NOTE — PROGRESS NOTE ADULT - SUBJECTIVE AND OBJECTIVE BOX
Patient is a 66y old  Female with metastatic breast ca , T spine pathologic fracture     pt is seen in am , pain controlled at present   + BM  multiple   c/o hemorrhoids pain in rectal area     d/w NS and oncology , radiation oncology team re plan       ROS    as above , otherwise neg       ALLERGIES:  lactose (Other)  tetanus toxoid (Hives)  MEDICATIONS  (STANDING):  dexAMETHasone  Injectable 4 milliGRAM(s) IV Push every 6 hours  gabapentin 200 milliGRAM(s) Oral three times a day  influenza  Vaccine (HIGH DOSE) 0.7 milliLiter(s) IntraMuscular once  lactulose Syrup 20 Gram(s) Oral daily  methocarbamol 500 milliGRAM(s) Oral three times a day  naloxone Injectable 0.4 milliGRAM(s) IV Push once  pantoprazole    Tablet 40 milliGRAM(s) Oral before breakfast  polyethylene glycol 3350 17 Gram(s) Oral daily  senna 2 Tablet(s) Oral at bedtime  sodium chloride 0.9%. 1000 milliLiter(s) (100 mL/Hr) IV Continuous <Continuous>      Vital Signs Last 24 Hrs  T(C): 36.8 (14 Sep 2022 10:49), Max: 36.8 (14 Sep 2022 04:20)  T(F): 98.2 (14 Sep 2022 10:49), Max: 98.3 (14 Sep 2022 04:20)  HR: 66 (14 Sep 2022 10:49) (62 - 77)  BP: 127/79 (14 Sep 2022 10:49) (127/79 - 155/90)  BP(mean): --  RR: 19 (14 Sep 2022 10:49) (18 - 19)  SpO2: 95% (14 Sep 2022 10:49) (91% - 95%)    Parameters below as of 14 Sep 2022 10:49  Patient On (Oxygen Delivery Method): room air        PHYSICAL EXAM:  General: awake alert , no distress   Neck: c collar in place   Lungs: CTA bilateral anteriorly   Cardio: RRR, S1/S2, No murmur  Abdomen: Soft, Nontender, Nondistended; Bowel sounds +   Extremities: No calf tenderness, No pitting edema  Neuro awake alert , oriented , generalized weakness right hand weak  Skin : no rash , warm

## 2022-09-14 NOTE — PROGRESS NOTE ADULT - SUBJECTIVE AND OBJECTIVE BOX
INTERVAL HPI/OVERNIGHT EVENTS:    This is a 66yf admitted on 9/10 with a dx in March of a breast mass.  Pt has been having difficulty with standing for long periods.  Mri of the brain /c/t/l demonstrates extensive lytic disease.   PT seen today cleaning up. Pt planning to discuss      MEDICATIONS  (STANDING):  dexAMETHasone  Injectable 4 milliGRAM(s) IV Push every 6 hours  gabapentin 200 milliGRAM(s) Oral three times a day  influenza  Vaccine (HIGH DOSE) 0.7 milliLiter(s) IntraMuscular once  lactulose Syrup 20 Gram(s) Oral daily  methocarbamol 500 milliGRAM(s) Oral three times a day  naloxone Injectable 0.4 milliGRAM(s) IV Push once  pantoprazole    Tablet 40 milliGRAM(s) Oral before breakfast  polyethylene glycol 3350 17 Gram(s) Oral daily  senna 2 Tablet(s) Oral at bedtime    MEDICATIONS  (PRN):  acetaminophen     Tablet .. 650 milliGRAM(s) Oral every 6 hours PRN Temp greater or equal to 38C (100.4F), Mild Pain (1 - 3)  aluminum hydroxide/magnesium hydroxide/simethicone Suspension 30 milliLiter(s) Oral every 4 hours PRN Dyspepsia  bisacodyl 5 milliGRAM(s) Oral daily PRN Constipation  melatonin 3 milliGRAM(s) Oral at bedtime PRN Insomnia  morphine  - Injectable 2 milliGRAM(s) IV Push every 4 hours PRN Moderate Pain (4 - 6)  morphine  - Injectable 4 milliGRAM(s) IV Push every 4 hours PRN Severe Pain (7 - 10)  ondansetron Injectable 4 milliGRAM(s) IV Push every 8 hours PRN Nausea and/or Vomiting      Allergies  tetanus toxoid (Hives)  Intolerances  lactose (Other)    Vital Signs Last 24 Hrs  T(C): 36.8 (14 Sep 2022 10:49), Max: 36.8 (14 Sep 2022 04:20)  T(F): 98.2 (14 Sep 2022 10:49), Max: 98.3 (14 Sep 2022 04:20)  HR: 66 (14 Sep 2022 10:49) (62 - 77)  BP: 127/79 (14 Sep 2022 10:49) (127/79 - 155/90)  BP(mean): --  RR: 19 (14 Sep 2022 10:49) (18 - 19)  SpO2: 95% (14 Sep 2022 10:49) (91% - 95%)    Parameters below as of 14 Sep 2022 10:49  Patient On (Oxygen Delivery Method): room air     BMI (kg/m2): 21.3 (09-09-22 @ 15:26)      PHYSICAL EXAM  GENERAL: NAD,   HEAD:  Atraumatic, Normocephalic  EYES: EOMI, PERRLA, conjunctiva and sclera clear  ENMT: No tonsillar erythema, exudates, or enlargement; Moist mucous membranes, Good dentition, No lesions, neg facial  NECK: aspen collar with extension  NERVOUS SYSTEM:  Alert & Oriented X3, Good concentration; Motor Strength right upper extrem -4/5  3/5. Left upper and lower extremities; DTRs 2+ intact and symmetric  EXTREMITIES:  2+ Peripheral Pulses, No edema      LABS:    09-13    139  |  106  |  21.3<H>  ----------------------------<  113<H>  3.9   |  21.0<L>  |  0.54    Ca    9.7      13 Sep 2022 04:58    TPro  5.9<L>  /  Alb  3.6  /  TBili  0.2<L>  /  DBili  x   /  AST  48<H>  /  ALT  8   /  AlkPhos  140<H>  09-13      I&O's Detail    RADIOLOGY & ADDITIONAL TESTS:  < from: CT Cervical Spine No Cont (09.09.22 @ 21:34) >  ACC: [id number] EXAM:  CT CERVICAL SPINE                        ACC: [id number] EXAM:  CT BRAIN                        PROCEDURE DATE:  09/09/2022    IMPRESSION:  Head CT: No displaced calvarial fracture or acute intracranial hemorrhage.  Cervical spine CT: No acute fracture. Multiple lytic lesions of the   cervical and visualized thoracic spine as well as right first and second   ribs concerning for metastases versus multiple myeloma.  --- End of Report ---  < end of copied text >

## 2022-09-14 NOTE — PROGRESS NOTE ADULT - ASSESSMENT
67 yo female with no known pmhx presented to the ED with progressive weakness to the point that she felt she could not get out of bed this morning. CT imaging revealed a 6.5 cm breast mass with multiple spinal lesions concerning for metastatic breast cancer.    1- Breast mass with mets to spine , brain liver liekly breast ca   s/p right breast biopsy   med oncology and radiation oncology input appreciated   bed rest keep head and whole spine flat per NS   cont decadron iv   cont pain meds , gabapentin   iv morphine prn     2-Neck pain arm weakness due to Extensive osseous metastatic disease with pathologic compression fracture deformities at C7, T1, T9, T12 and L2, severe at T1.  Abnormal ventral and dorsal epidural enhancement from C7 through T2  seen by radiation oncology and NS team   pt for possible NS intervention pending decision from NS team   cont strict bed rest   radiation therapy eventually for the spine mets per DR Ibarra     3- Hypercalcemia due to bone mets malignancy   on IVF   repeat ca improving   will continue to monitor     4- Hypokalemia   replaced , resolved       DVT prophylaxis on loevonox      GOC: Full Code

## 2022-09-14 NOTE — PROGRESS NOTE ADULT - ASSESSMENT
66yf presented with hx of breast now ct of the spine demonstrating multi lytic lesion  cervical and thoracic spine.   IMP Diffuse extensive osseous metastatic disease with compression fracture of T1, T9, T12, L2 abnormal ventral and dorsal epidural enhancement C7-T2.     Plan  1 will schedule surgical intervention based on Heme/Onc recommendation and pt's consenting to.  2. Continued Aspen collar with extension  3. Decadron 4mg q 6  4. pain control

## 2022-09-14 NOTE — ADVANCED PRACTICE NURSE CONSULT - RECOMMEDATIONS
Awaiting patient's decision to go for surgery before further oncology planning can occur. Dr. Ibarra aware for possible need for RT, awaiting for decision.

## 2022-09-15 LAB
ANION GAP SERPL CALC-SCNC: 13 MMOL/L — SIGNIFICANT CHANGE UP (ref 5–17)
BUN SERPL-MCNC: 17.2 MG/DL — SIGNIFICANT CHANGE UP (ref 8–20)
CALCIUM SERPL-MCNC: 10.1 MG/DL — SIGNIFICANT CHANGE UP (ref 8.4–10.5)
CHLORIDE SERPL-SCNC: 99 MMOL/L — SIGNIFICANT CHANGE UP (ref 98–107)
CO2 SERPL-SCNC: 22 MMOL/L — SIGNIFICANT CHANGE UP (ref 22–29)
CREAT SERPL-MCNC: 0.47 MG/DL — LOW (ref 0.5–1.3)
EGFR: 105 ML/MIN/1.73M2 — SIGNIFICANT CHANGE UP
GLUCOSE SERPL-MCNC: 105 MG/DL — HIGH (ref 70–99)
HCT VFR BLD CALC: 40.6 % — SIGNIFICANT CHANGE UP (ref 34.5–45)
HGB BLD-MCNC: 14.6 G/DL — SIGNIFICANT CHANGE UP (ref 11.5–15.5)
MCHC RBC-ENTMCNC: 30.2 PG — SIGNIFICANT CHANGE UP (ref 27–34)
MCHC RBC-ENTMCNC: 36 GM/DL — SIGNIFICANT CHANGE UP (ref 32–36)
MCV RBC AUTO: 84.1 FL — SIGNIFICANT CHANGE UP (ref 80–100)
PLATELET # BLD AUTO: 286 K/UL — SIGNIFICANT CHANGE UP (ref 150–400)
POTASSIUM SERPL-MCNC: 3.6 MMOL/L — SIGNIFICANT CHANGE UP (ref 3.5–5.3)
POTASSIUM SERPL-SCNC: 3.6 MMOL/L — SIGNIFICANT CHANGE UP (ref 3.5–5.3)
RBC # BLD: 4.83 M/UL — SIGNIFICANT CHANGE UP (ref 3.8–5.2)
RBC # FLD: 11.9 % — SIGNIFICANT CHANGE UP (ref 10.3–14.5)
SODIUM SERPL-SCNC: 134 MMOL/L — LOW (ref 135–145)
WBC # BLD: 13.03 K/UL — HIGH (ref 3.8–10.5)
WBC # FLD AUTO: 13.03 K/UL — HIGH (ref 3.8–10.5)

## 2022-09-15 PROCEDURE — 99232 SBSQ HOSP IP/OBS MODERATE 35: CPT

## 2022-09-15 PROCEDURE — 99233 SBSQ HOSP IP/OBS HIGH 50: CPT

## 2022-09-15 PROCEDURE — 99232 SBSQ HOSP IP/OBS MODERATE 35: CPT | Mod: FS

## 2022-09-15 RX ORDER — ENOXAPARIN SODIUM 100 MG/ML
40 INJECTION SUBCUTANEOUS EVERY 24 HOURS
Refills: 0 | Status: DISCONTINUED | OUTPATIENT
Start: 2022-09-15 | End: 2022-09-20

## 2022-09-15 RX ORDER — POTASSIUM CHLORIDE 20 MEQ
40 PACKET (EA) ORAL ONCE
Refills: 0 | Status: COMPLETED | OUTPATIENT
Start: 2022-09-15 | End: 2022-09-15

## 2022-09-15 RX ADMIN — Medication 4 MILLIGRAM(S): at 05:20

## 2022-09-15 RX ADMIN — LACTULOSE 20 GRAM(S): 10 SOLUTION ORAL at 11:09

## 2022-09-15 RX ADMIN — Medication 650 MILLIGRAM(S): at 09:19

## 2022-09-15 RX ADMIN — Medication 650 MILLIGRAM(S): at 17:35

## 2022-09-15 RX ADMIN — GABAPENTIN 200 MILLIGRAM(S): 400 CAPSULE ORAL at 05:20

## 2022-09-15 RX ADMIN — METHOCARBAMOL 500 MILLIGRAM(S): 500 TABLET, FILM COATED ORAL at 13:48

## 2022-09-15 RX ADMIN — ENOXAPARIN SODIUM 40 MILLIGRAM(S): 100 INJECTION SUBCUTANEOUS at 08:38

## 2022-09-15 RX ADMIN — PANTOPRAZOLE SODIUM 40 MILLIGRAM(S): 20 TABLET, DELAYED RELEASE ORAL at 05:19

## 2022-09-15 RX ADMIN — Medication 650 MILLIGRAM(S): at 18:16

## 2022-09-15 RX ADMIN — Medication 650 MILLIGRAM(S): at 08:19

## 2022-09-15 RX ADMIN — Medication 4 MILLIGRAM(S): at 11:10

## 2022-09-15 RX ADMIN — POLYETHYLENE GLYCOL 3350 17 GRAM(S): 17 POWDER, FOR SOLUTION ORAL at 11:10

## 2022-09-15 RX ADMIN — Medication 4 MILLIGRAM(S): at 17:26

## 2022-09-15 RX ADMIN — Medication 4 MILLIGRAM(S): at 01:35

## 2022-09-15 RX ADMIN — GABAPENTIN 200 MILLIGRAM(S): 400 CAPSULE ORAL at 21:19

## 2022-09-15 RX ADMIN — METHOCARBAMOL 500 MILLIGRAM(S): 500 TABLET, FILM COATED ORAL at 05:19

## 2022-09-15 RX ADMIN — METHOCARBAMOL 500 MILLIGRAM(S): 500 TABLET, FILM COATED ORAL at 21:19

## 2022-09-15 RX ADMIN — SENNA PLUS 2 TABLET(S): 8.6 TABLET ORAL at 21:19

## 2022-09-15 RX ADMIN — GABAPENTIN 200 MILLIGRAM(S): 400 CAPSULE ORAL at 13:48

## 2022-09-15 RX ADMIN — Medication 40 MILLIEQUIVALENT(S): at 13:47

## 2022-09-15 NOTE — PROGRESS NOTE ADULT - SUBJECTIVE AND OBJECTIVE BOX
Patient is a 66y old  Female with metastatic breast ca , T spine pathologic fracture     pt is seen in am resting in the bed ,pain with any movements   otherwise controlled     no overnight events reported         ROS    as above , otherwise neg       MEDICATIONS  (STANDING):  dexAMETHasone  Injectable 4 milliGRAM(s) IV Push every 6 hours  enoxaparin Injectable 40 milliGRAM(s) SubCutaneous every 24 hours  gabapentin 200 milliGRAM(s) Oral three times a day  influenza  Vaccine (HIGH DOSE) 0.7 milliLiter(s) IntraMuscular once  lactulose Syrup 20 Gram(s) Oral daily  methocarbamol 500 milliGRAM(s) Oral three times a day  naloxone Injectable 0.4 milliGRAM(s) IV Push once  pantoprazole    Tablet 40 milliGRAM(s) Oral before breakfast  polyethylene glycol 3350 17 Gram(s) Oral daily  senna 2 Tablet(s) Oral at bedtime    Vital Signs Last 24 Hrs  T(C): 36.8 (15 Sep 2022 11:36), Max: 36.8 (14 Sep 2022 17:29)  T(F): 98.2 (15 Sep 2022 11:36), Max: 98.3 (14 Sep 2022 17:29)  HR: 65 (15 Sep 2022 11:36) (61 - 68)  BP: 125/79 (15 Sep 2022 11:36) (125/79 - 144/83)  BP(mean): --  RR: 18 (15 Sep 2022 11:36) (18 - 18)  SpO2: 93% (15 Sep 2022 11:36) (91% - 94%)    Parameters below as of 15 Sep 2022 11:36  Patient On (Oxygen Delivery Method): room air        General: awake alert , no distress   Mouth : no oral lesion   Lungs: CTA bilateral anteriorly   Cardio: RRR, S1/S2, No murmur  Abdomen: Soft, Nontender, Nondistended; Bowel sounds +   Extremities: No calf tenderness, No pitting edema  Neuro awake alert. no distress ;latrice in the bed   Skin : no rash , warm                           14.6   13.03 )-----------( 286      ( 15 Sep 2022 09:45 )             40.6   09-15    134<L>  |  99  |  17.2  ----------------------------<  105<H>  3.6   |  22.0  |  0.47<L>    Ca    10.1      15 Sep 2022 08:30

## 2022-09-15 NOTE — PROGRESS NOTE ADULT - ASSESSMENT
65 yo female no significant past medical history now admitted with RUE weakness, lumbar, and hip pain.    Rt sided large breast lesion, ~ 6 cm,, diffuse oseous metastatic disease, LIver and pulm metastases with osseous disease in the spine and Spinal disease from C7- t2    No Leptomeningeal enhancement  Subcentimeter enhancing parenchymal lesions     # Metastatic Cancer , presumably breast primary   # Cord compression with pathologic fracture  with epidural enhancement at C7-T2    - s/p Breat biopsy per IR. Will await final path  with receptor status   - no leptomeningeal disease,   - neurosurgical intervention for stabilization of spine/ Cord compression  and pathological fracture   - continue decadron 4 q6

## 2022-09-15 NOTE — PROGRESS NOTE ADULT - ASSESSMENT
66F with new Dx of breast mass presenting to [facility] with back pain & weakness.   -MR spinal axis reveals extensive spine mets.  -MR brain reveals metastatic lesions.    Plan:  -D/w Dr. Bennett  -Q4h neuro checks  -Continue c-collar at all times   -Continue Decadron 4a6  -Pain control PRN   -SCDs & Lovenox for DVT PPX   -Strict bedrest with pt. maintaining head & spine flat   -Heme/onc following; need pt.'s decision regarding surgery prior to onc planning  -Will formulate surgical plan today with pt. & family  -Further medical management per primary team  66F with new Dx of breast mass presenting to [facility] with back pain & weakness.   -MR spinal axis reveals extensive spine mets.  -MR brain reveals metastatic lesions.    Plan:  -D/w Dr. Bennett  -Q4h neuro checks  -Continue c-collar at all times   -Continue Decadron 4a6  -Pain control PRN   -SCDs & Lovenox for DVT PPX   -Strict bedrest with pt. maintaining head & spine flat   -Heme/onc following; need pt.'s decision regarding surgery prior to onc planning  -Pt. to discuss surgery with her brother & sister (her HCPs)  -Further medical management per primary team

## 2022-09-15 NOTE — PROGRESS NOTE ADULT - SUBJECTIVE AND OBJECTIVE BOX
HPI:  67 yo female with no known pmhx presented to the ED with progressive weakness to the point that she felt she could not get out of bed this morning. She started to note pain in her shoulders and lower back at the end of July. She went to the ED at [facility] and also to urgent care, but states they sent her home. She also has had decreased appetite over this time. Her last mammogram was in 2014. She reports they found 2 lumps in her right breast and recommended biopsy. When she went for biopsy, she could not tolerate the pain. She was supposed to return to re-attempt with pain medications, but she never went back. Currently, her pain is well controlled after morphine in the ED. She denies chest pain, SOB, N/V/D, F/C, or other associated symptoms. Patient denies skin changes on the breast or nipple discharge. (10 Sep 2022 02:11)     She reports inability to ambulate 2/2 pain when standing up and not so much weakness of LE.  Over the past 2 weeks she has had severe pain especially when upright and over the last week she complains of RUE pain and numbness as well w/ inability to use right hand.    The pain originated in March but became more significant in July and rapidly increased since August 1st.  She also complains of b/l hip/pelvis pain w/o radiation to LE     INTERVAL HPI/OVERNIGHT EVENTS:  66y Female s/p __ seen lying comfortably in bed. Tolerating diet. Passing gas/BM. Voiding. Hamm in with __ clear urine. Denies headache, weakness, numbness, n/v/d, fevers, chills, chest pain, SOB.     Vital Signs Last 24 Hrs  T(C): 36.4 (15 Sep 2022 04:26), Max: 36.8 (14 Sep 2022 10:49)  T(F): 97.5 (15 Sep 2022 04:26), Max: 98.3 (14 Sep 2022 17:29)  HR: 61 (15 Sep 2022 04:26) (61 - 68)  BP: 137/78 (15 Sep 2022 04:26) (127/79 - 144/83)  BP(mean): --  RR: 18 (15 Sep 2022 04:26) (18 - 19)  SpO2: 91% (15 Sep 2022 04:26) (91% - 95%)    Parameters below as of 14 Sep 2022 17:29  Patient On (Oxygen Delivery Method): room air    PHYSICAL EXAM:  GENERAL: NAD, well-groomed  HEAD:  Atraumatic, normocephalic  MENTAL STATUS: AAO x3; Awake; Opens eyes spontaneously; Appropriately conversant without aphasia; following simple commands  CRANIAL NERVES: PERRL. Face symmetric.  REFLEXES: Negative Mcdonald's b/l. Negative clonus b/l  MOTOR: RUE bicep 4-/5, tricep 4-/5, HG 3/5, LUE/RLE/LLE 5/5  SENSATION: grossly intact to light touch all extremities  EXTREMITIES:  2+ peripheral pulses, no clubbing, cyanosis, or edema  SKIN: Warm, dry; no rashes or lesions    RADIOLOGY & ADDITIONAL TESTS:  < from: MR Cervical/Thoracic/Lumbar Spine w/wo IV Cont (09.11.22 @ 11:27) >  IMPRESSION:  Extensive osseous metastatic disease with pathologic compression fracture   deformities at C7, T1, T9, T12 and L2, severe at T1.    Abnormal ventral and dorsal epidural enhancement from C7 through T2.   Severe canal stenosis at T1 and L2 without associated cord or cauda   equina compression.    There is motion artifact present in the thoracic spine which limits   evaluation. No abnormal leptomeningeal enhancement or definite cord   signal abnormality.    --- End of Report ---      CARMEN POOLE MD; Attending Radiologist  This document has been electronically signed. Sep 11 2022  2:34PM    < end of copied text >      < from: MR Head w/wo IV Cont (09.11.22 @ 11:27) >  IMPRESSION:  Subcentimeter enhancing parenchymal lesions as above compatible with   intracranial metastatic disease. No associated vasogenic edema. No   evidence of acute infarct or hemorrhage.    Likely left parietal calvarial osseous metastatic lesion.    --- End of Report ---            CARMEN POOLE MD; Attending Radiologist  This document has been electronically signed. Sep 11 2022  2:56PM    < end of copied text >   HPI:  65 yo female with no known pmhx presented to the ED with progressive weakness to the point that she felt she could not get out of bed this morning. She started to note pain in her shoulders and lower back at the end of July. She went to the ED at [facility] and also to urgent care, but states they sent her home. She also has had decreased appetite over this time. Her last mammogram was in 2014. She reports they found 2 lumps in her right breast and recommended biopsy. When she went for biopsy, she could not tolerate the pain. She was supposed to return to re-attempt with pain medications, but she never went back. Currently, her pain is well controlled after morphine in the ED. She denies chest pain, SOB, N/V/D, F/C, or other associated symptoms. Patient denies skin changes on the breast or nipple discharge. (10 Sep 2022 02:11)     She reports inability to ambulate 2/2 pain when standing up and not so much weakness of LE.  Over the past 2 weeks she has had severe pain especially when upright and over the last week she complains of RUE pain and numbness as well w/ inability to use right hand.    The pain originated in March but became more significant in July and rapidly increased since August 1st.  She also complains of b/l hip/pelvis pain w/o radiation to LE     INTERVAL HPI/OVERNIGHT EVENTS:  66y Female seen lying comfortably in bed. Patient states she had pain this AM but feels relief after eating breakfast and taking Tylenol. Pt. states she's leaning toward surgery but would like to discuss with her HCPs (brother and sister).    Vital Signs Last 24 Hrs  T(C): 36.4 (15 Sep 2022 04:26), Max: 36.8 (14 Sep 2022 10:49)  T(F): 97.5 (15 Sep 2022 04:26), Max: 98.3 (14 Sep 2022 17:29)  HR: 61 (15 Sep 2022 04:26) (61 - 68)  BP: 137/78 (15 Sep 2022 04:26) (127/79 - 144/83)  BP(mean): --  RR: 18 (15 Sep 2022 04:26) (18 - 19)  SpO2: 91% (15 Sep 2022 04:26) (91% - 95%)    Parameters below as of 14 Sep 2022 17:29  Patient On (Oxygen Delivery Method): room air    PHYSICAL EXAM:  GENERAL: NAD, well-groomed  HEAD:  Atraumatic, normocephalic  MENTAL STATUS: AAO x3; Awake; Opens eyes spontaneously; Appropriately conversant without aphasia; following simple commands  CRANIAL NERVES: PERRL. Face symmetric.  REFLEXES: Negative Mcdonald's b/l. Negative clonus b/l  MOTOR: RUE delt 5/5, bicep 4-/5, tricep 4-/5, HG 3/5, LUE/RLE/LLE 5/5  SENSATION: Diminished sensation in RUE specifically 4th and 5th digit, otherwise intact to light touch all extremities  EXTREMITIES:  2+ peripheral pulses, no clubbing, cyanosis, or edema  SKIN: Warm, dry; no rashes or lesions    RADIOLOGY & ADDITIONAL TESTS:  < from: MR Cervical/Thoracic/Lumbar Spine w/wo IV Cont (09.11.22 @ 11:27) >  IMPRESSION:  Extensive osseous metastatic disease with pathologic compression fracture   deformities at C7, T1, T9, T12 and L2, severe at T1.    Abnormal ventral and dorsal epidural enhancement from C7 through T2.   Severe canal stenosis at T1 and L2 without associated cord or cauda   equina compression.    There is motion artifact present in the thoracic spine which limits   evaluation. No abnormal leptomeningeal enhancement or definite cord   signal abnormality.    --- End of Report ---      CARMEN POOLE MD; Attending Radiologist  This document has been electronically signed. Sep 11 2022  2:34PM    < end of copied text >      < from: MR Head w/wo IV Cont (09.11.22 @ 11:27) >  IMPRESSION:  Subcentimeter enhancing parenchymal lesions as above compatible with   intracranial metastatic disease. No associated vasogenic edema. No   evidence of acute infarct or hemorrhage.    Likely left parietal calvarial osseous metastatic lesion.    --- End of Report ---            CARMEN POOLE MD; Attending Radiologist  This document has been electronically signed. Sep 11 2022  2:56PM    < end of copied text >

## 2022-09-15 NOTE — PROGRESS NOTE ADULT - SUBJECTIVE AND OBJECTIVE BOX
Patient is a 66y old  Female who presents with a chief complaint of Weakness, new breast mass with spinal lesions suspicious for metastasis (15 Sep 2022 13:21)      SUBJECTIVE / OVERNIGHT EVENTS: Patient seen and examined   Brother at bedside with her   No pain     ADDITIONAL REVIEW OF SYSTEMS:    MEDICATIONS  (STANDING):  dexAMETHasone  Injectable 4 milliGRAM(s) IV Push every 6 hours  enoxaparin Injectable 40 milliGRAM(s) SubCutaneous every 24 hours  gabapentin 200 milliGRAM(s) Oral three times a day  influenza  Vaccine (HIGH DOSE) 0.7 milliLiter(s) IntraMuscular once  lactulose Syrup 20 Gram(s) Oral daily  methocarbamol 500 milliGRAM(s) Oral three times a day  naloxone Injectable 0.4 milliGRAM(s) IV Push once  pantoprazole    Tablet 40 milliGRAM(s) Oral before breakfast  polyethylene glycol 3350 17 Gram(s) Oral daily  senna 2 Tablet(s) Oral at bedtime    MEDICATIONS  (PRN):  acetaminophen     Tablet .. 650 milliGRAM(s) Oral every 6 hours PRN Temp greater or equal to 38C (100.4F), Mild Pain (1 - 3)  aluminum hydroxide/magnesium hydroxide/simethicone Suspension 30 milliLiter(s) Oral every 4 hours PRN Dyspepsia  bisacodyl 5 milliGRAM(s) Oral daily PRN Constipation  melatonin 3 milliGRAM(s) Oral at bedtime PRN Insomnia  morphine  - Injectable 2 milliGRAM(s) IV Push every 4 hours PRN Moderate Pain (4 - 6)  morphine  - Injectable 4 milliGRAM(s) IV Push every 4 hours PRN Severe Pain (7 - 10)  ondansetron Injectable 4 milliGRAM(s) IV Push every 8 hours PRN Nausea and/or Vomiting    CAPILLARY BLOOD GLUCOSE        I&O's Summary      PHYSICAL EXAM:  Vital Signs Last 24 Hrs  T(C): 36.8 (15 Sep 2022 11:36), Max: 36.8 (14 Sep 2022 17:29)  T(F): 98.2 (15 Sep 2022 11:36), Max: 98.3 (14 Sep 2022 17:29)  HR: 65 (15 Sep 2022 11:36) (61 - 68)  BP: 125/79 (15 Sep 2022 11:36) (125/79 - 144/83)  BP(mean): --  RR: 18 (15 Sep 2022 11:36) (18 - 18)  SpO2: 93% (15 Sep 2022 11:36) (91% - 94%)    Parameters below as of 15 Sep 2022 11:36  Patient On (Oxygen Delivery Method): room air      CONSTITUTIONAL: NAD, well-developed, well-groomed  ENMT: Moist oral mucosa, no pharyngeal injection or exudates; normal dentition  RESPIRATORY: Normal respiratory effort; lungs are clear to auscultation bilaterally  CARDIOVASCULAR: Regular rate and rhythm, normal S1 and S2, no murmur/rub/gallop; No lower extremity edema; Peripheral pulses are 2+ bilaterally  ABDOMEN: Nontender to palpation, normoactive bowel sounds, no rebound/guarding; No hepatosplenomegaly  PSYCH: A+O to person, place, and time; affect appropriate  NEUROLOGY: CN 2-12 are intact and symmetric; no gross sensory deficits   SKIN: No rashes; no palpable lesions    LABS:                        14.6   13.03 )-----------( 286      ( 15 Sep 2022 09:45 )             40.6     09-15    134<L>  |  99  |  17.2  ----------------------------<  105<H>  3.6   |  22.0  |  0.47<L>    Ca    10.1      15 Sep 2022 08:30                SARS-CoV-2: NotDetec (10 Sep 2022 02:11)      RADIOLOGY & ADDITIONAL TESTS:  Imaging from Last 24 Hours:    Electrocardiogram/QTc Interval:    COORDINATION OF CARE:  Care Discussed with Consultants/Other Providers:

## 2022-09-15 NOTE — PROGRESS NOTE ADULT - ASSESSMENT
67 yo female with no known pmhx presented to the ED with progressive weakness to the point that she felt she could not get out of bed this morning. CT imaging revealed a 6.5 cm breast mass with multiple spinal lesions concerning for metastatic breast cancer.    1-Large R  Breast mass with mets to spine , brain liver / breast ca   s/p right breast biopsy ,   med oncology and radiation oncology input appreciated   oncology and radiatiion oncology consult appreciated   diffuse spine involemnet pathologic fx   need surgery , pendiing pt discussion with her brother and sisters to make final decision   leaning toward surgery   cont Iv decadron ,   Strict bedrest with pt. maintaining head & spine flat per NS   cont pain meds , gabapentin   iv morphine prn     2-Neck . back pain with  arm weakness due to Extensive osseous metastatic disease with pathologic compression fracture deformities at C7, T1, T9, T12 and L2, severe at T1.  Abnormal ventral and dorsal epidural enhancement from C7 through T2  MR with diffuse spine involment    radiation oncology and NS team on board   pt for possible NS intervention surgery pending Pt decision   cont strict bed rest   radiation therapy eventually for the spine mets per DR Ibarra     3- Hypercalcemia due to bone mets malignancy   s/p IVF   improving   cont to monitor      4- Hypokalemia   replaced , resolved       DVT prophylaxis on lovenox      GOC: Full Code

## 2022-09-16 LAB
GLUCOSE BLDC GLUCOMTR-MCNC: 107 MG/DL — HIGH (ref 70–99)
SURGICAL PATHOLOGY STUDY: SIGNIFICANT CHANGE UP

## 2022-09-16 PROCEDURE — 99232 SBSQ HOSP IP/OBS MODERATE 35: CPT

## 2022-09-16 PROCEDURE — 99232 SBSQ HOSP IP/OBS MODERATE 35: CPT | Mod: FS

## 2022-09-16 RX ADMIN — GABAPENTIN 200 MILLIGRAM(S): 400 CAPSULE ORAL at 22:51

## 2022-09-16 RX ADMIN — Medication 4 MILLIGRAM(S): at 12:50

## 2022-09-16 RX ADMIN — GABAPENTIN 200 MILLIGRAM(S): 400 CAPSULE ORAL at 13:48

## 2022-09-16 RX ADMIN — SENNA PLUS 2 TABLET(S): 8.6 TABLET ORAL at 22:51

## 2022-09-16 RX ADMIN — Medication 4 MILLIGRAM(S): at 05:13

## 2022-09-16 RX ADMIN — ENOXAPARIN SODIUM 40 MILLIGRAM(S): 100 INJECTION SUBCUTANEOUS at 12:50

## 2022-09-16 RX ADMIN — POLYETHYLENE GLYCOL 3350 17 GRAM(S): 17 POWDER, FOR SOLUTION ORAL at 12:50

## 2022-09-16 RX ADMIN — GABAPENTIN 200 MILLIGRAM(S): 400 CAPSULE ORAL at 05:12

## 2022-09-16 RX ADMIN — Medication 4 MILLIGRAM(S): at 17:14

## 2022-09-16 RX ADMIN — MORPHINE SULFATE 2 MILLIGRAM(S): 50 CAPSULE, EXTENDED RELEASE ORAL at 07:04

## 2022-09-16 RX ADMIN — METHOCARBAMOL 500 MILLIGRAM(S): 500 TABLET, FILM COATED ORAL at 05:12

## 2022-09-16 RX ADMIN — PANTOPRAZOLE SODIUM 40 MILLIGRAM(S): 20 TABLET, DELAYED RELEASE ORAL at 05:12

## 2022-09-16 RX ADMIN — LACTULOSE 20 GRAM(S): 10 SOLUTION ORAL at 12:50

## 2022-09-16 RX ADMIN — MORPHINE SULFATE 4 MILLIGRAM(S): 50 CAPSULE, EXTENDED RELEASE ORAL at 13:20

## 2022-09-16 RX ADMIN — Medication 4 MILLIGRAM(S): at 00:19

## 2022-09-16 RX ADMIN — MORPHINE SULFATE 2 MILLIGRAM(S): 50 CAPSULE, EXTENDED RELEASE ORAL at 06:49

## 2022-09-16 RX ADMIN — METHOCARBAMOL 500 MILLIGRAM(S): 500 TABLET, FILM COATED ORAL at 13:48

## 2022-09-16 RX ADMIN — METHOCARBAMOL 500 MILLIGRAM(S): 500 TABLET, FILM COATED ORAL at 22:51

## 2022-09-16 RX ADMIN — Medication 4 MILLIGRAM(S): at 23:03

## 2022-09-16 RX ADMIN — MORPHINE SULFATE 4 MILLIGRAM(S): 50 CAPSULE, EXTENDED RELEASE ORAL at 12:49

## 2022-09-16 NOTE — PROGRESS NOTE ADULT - ASSESSMENT
66F with new Dx of breast mass presenting to [facility] with back pain & weakness.   - MR spinal axis reveals extensive spine mets.  - MR brain reveals metastatic lesions.      Plan  - Q4h neuro checks  - Continue c-collar at all times   - Continue Decadron 4a6  - Pain control PRN   - b/l SCDs & Lovenox 40  - Strict bedrest with pt. maintaining head & spine flat   - Heme/onc following; need pt.'s decision regarding surgery prior to onc planning  - Pending family disucussion  - Further medical management per primary team   - D/w Dr. Bennett

## 2022-09-16 NOTE — PROGRESS NOTE ADULT - SUBJECTIVE AND OBJECTIVE BOX
Patient is a 66y old  Female with metastatic cancer with spine and pathologic fracture C7- T2       pt is seen in am c/o back pain     no overnight events , pt is leaning toward spine surgery to discuss with NS team about details       ROS    as above , otherwise neg       Vital Signs Last 24 Hrs  T(C): 36.5 (16 Sep 2022 11:37), Max: 36.7 (16 Sep 2022 04:25)  T(F): 97.7 (16 Sep 2022 11:37), Max: 98 (16 Sep 2022 04:25)  HR: 66 (16 Sep 2022 11:37) (62 - 73)  BP: 130/81 (16 Sep 2022 11:37) (130/81 - 151/82)  BP(mean): --  RR: 18 (16 Sep 2022 11:37) (18 - 18)  SpO2: 95% (16 Sep 2022 11:37) (95% - 96%)    Parameters below as of 16 Sep 2022 11:37  Patient On (Oxygen Delivery Method): room air    MEDICATIONS  (STANDING):  dexAMETHasone  Injectable 4 milliGRAM(s) IV Push every 6 hours  enoxaparin Injectable 40 milliGRAM(s) SubCutaneous every 24 hours  gabapentin 200 milliGRAM(s) Oral three times a day  influenza  Vaccine (HIGH DOSE) 0.7 milliLiter(s) IntraMuscular once  lactulose Syrup 20 Gram(s) Oral daily  methocarbamol 500 milliGRAM(s) Oral three times a day  naloxone Injectable 0.4 milliGRAM(s) IV Push once  pantoprazole    Tablet 40 milliGRAM(s) Oral before breakfast  polyethylene glycol 3350 17 Gram(s) Oral daily  senna 2 Tablet(s) Oral at bedtime        General: awake alert , no distress   Mouth : mucosa moist   Lungs: CTA bilateral anteriorly   Cardio: RRR, S1/S2, No murmur  Abdomen: Soft, Nontender, Nondistended; Bowel sounds +   Extremities: No calf tenderness, No pitting edema  Neuro awake alert,  no distress , lying in the bed   Skin : no rash , warm normal color                           14.6   13.03 )-----------( 286      ( 15 Sep 2022 09:45 )             40.6   09-15    134<L>  |  99  |  17.2  ----------------------------<  105<H>  3.6   |  22.0  |  0.47<L>    Ca    10.1      15 Sep 2022 08:30

## 2022-09-16 NOTE — PROGRESS NOTE ADULT - SUBJECTIVE AND OBJECTIVE BOX
HPI:  66 year old Female with no known pmhx presented to the ED with progressive weakness to the point that she felt she could not get out of bed this morning. She started to note pain in her shoulders and lower back at the end of July. She went to the ED at [facility] and also to urgent care, but states they sent her home. She also has had decreased appetite over this time. Her last mammogram was in 2014. She reports they found 2 lumps in her right breast and recommended biopsy. When she went for biopsy, she could not tolerate the pain. She was supposed to return to re-attempt with pain medications, but she never went back. Currently, her pain is well controlled after morphine in the ED. She denies chest pain, SOB, N/V/D, F/C, or other associated symptoms. Patient denies skin changes on the breast or nipple discharge. (10 Sep 2022 02:11)    INTERVAL HPI/OVERNIGHT EVENTS:  Patient seen and examined with Dr. Bennett. Pending family discussion    Vital Signs Last 24 Hrs  T(C): 36.5 (16 Sep 2022 11:37), Max: 36.7 (16 Sep 2022 04:25)  T(F): 97.7 (16 Sep 2022 11:37), Max: 98 (16 Sep 2022 04:25)  HR: 66 (16 Sep 2022 11:37) (62 - 66)  BP: 130/81 (16 Sep 2022 11:37) (130/81 - 151/82)  BP(mean): --  RR: 18 (16 Sep 2022 11:37) (18 - 18)  SpO2: 95% (16 Sep 2022 11:37) (95% - 95%)  Parameters below as of 16 Sep 2022 11:37  Patient On (Oxygen Delivery Method): room air    PHYSICAL EXAM:  GENERAL: NAD, well-groomed, pleasant  HEAD: Atraumatic, normocephalic  NECK: Cervical collar in place  MENTAL STATUS: AAO x3; Awake; Opens eyes spontaneously; Appropriately conversant without aphasia; following simple commands  CRANIAL NERVES: PERRL. Face symmetric.  REFLEXES: Negative Mcdonald's b/l. Negative clonus b/l  MOTOR: RUE delt 5/5, bicep 4-/5, tricep 4-/5, HG 3/5, LUE/RLE/LLE 5/5  SENSATION: Diminished sensation in RUE specifically 4th and 5th digit, otherwise intact to light touch all extremities  SKIN: Warm, dry    LABS:             14.6   13.03 )-----------( 286      ( 15 Sep 2022 09:45 )             40.6     09-15    134<L>  |  99  |  17.2  ----------------------------<  105<H>  3.6   |  22.0  |  0.47<L>    Ca    10.1      15 Sep 2022 08:30      RADIOLOGY & ADDITIONAL TESTS:  MR Cervical/Thoracic/Lumbar Spine w/wo IV Cont (09.11.22 @ 11:27)   IMPRESSION:  Extensive osseous metastatic disease with pathologic compression fracture   deformities at C7, T1, T9, T12 and L2, severe at T1.  Abnormal ventral and dorsal epidural enhancement from C7 through T2.   Severe canal stenosis at T1 and L2 without associated cord or cauda   equina compression.  There is motion artifact present in the thoracic spine which limits   evaluation. No abnormal leptomeningeal enhancement or definite cord   signal abnormality.

## 2022-09-16 NOTE — PROGRESS NOTE ADULT - ASSESSMENT
67 yo female with no known pmhx presented to the ED with progressive weakness to the point that she felt she could not get out of bed this morning. CT imaging revealed a 6.5 cm breast mass with multiple spinal lesions concerning for metastatic breast cancer.    1-Large R  Breast mass likely cancer with  mets to spine , brain,  liver   s/p right breast biopsy  by IR , result P   med oncology and radiation oncology input appreciated   result pending     2-Back pain   due to Extensive osseous metastatic disease with pathologic compression fracture deformities at C7, T1, T9, T12 and L2, severe at T1.  need surgery to stabilize FX in T spine   likely next week  cont Iv decadron , pain meds as needed   Strict bedrest with pt. maintaining head & spine flat per NS    radiation oncology and NS team on board     radiation therapy eventually for the spine mets per DR Ibarra     3- Hypercalcemia due to bone mets malignancy   s/p IVF , improving   cont to monitor      4- Hypokalemia   replaced     5- Hyponatremia   monitor closely     6- Moderate protein malia malnutrition   regular diet   add ensure     DVT prophylaxis on lovenox      GOC: Full Code

## 2022-09-16 NOTE — CHART NOTE - NSCHARTNOTEFT_GEN_A_CORE
Source: Patient [ x]  Family [ ]   other [ ]    Current Diet: Diet, Regular (09-10-22 @ 01:31)    PO intake:  < 50% [ ]   50-75%  [x ]   %  [ ]  other :    Current Weight:   (9/9) 130 lbs    % Weight Change - no new wt to assess, will continue to monitor.  No edema noted.     Pertinent Medications: MEDICATIONS  (STANDING):  dexAMETHasone  Injectable 4 milliGRAM(s) IV Push every 6 hours  enoxaparin Injectable 40 milliGRAM(s) SubCutaneous every 24 hours  gabapentin 200 milliGRAM(s) Oral three times a day  influenza  Vaccine (HIGH DOSE) 0.7 milliLiter(s) IntraMuscular once  lactulose Syrup 20 Gram(s) Oral daily  methocarbamol 500 milliGRAM(s) Oral three times a day  naloxone Injectable 0.4 milliGRAM(s) IV Push once  pantoprazole    Tablet 40 milliGRAM(s) Oral before breakfast  polyethylene glycol 3350 17 Gram(s) Oral daily  senna 2 Tablet(s) Oral at bedtime    MEDICATIONS  (PRN):  acetaminophen     Tablet .. 650 milliGRAM(s) Oral every 6 hours PRN Temp greater or equal to 38C (100.4F), Mild Pain (1 - 3)  aluminum hydroxide/magnesium hydroxide/simethicone Suspension 30 milliLiter(s) Oral every 4 hours PRN Dyspepsia  bisacodyl 5 milliGRAM(s) Oral daily PRN Constipation  melatonin 3 milliGRAM(s) Oral at bedtime PRN Insomnia  morphine  - Injectable 2 milliGRAM(s) IV Push every 4 hours PRN Moderate Pain (4 - 6)  morphine  - Injectable 4 milliGRAM(s) IV Push every 4 hours PRN Severe Pain (7 - 10)  ondansetron Injectable 4 milliGRAM(s) IV Push every 8 hours PRN Nausea and/or Vomiting    Pertinent Labs: CBC Full  -  ( 15 Sep 2022 09:45 )  WBC Count : 13.03 K/uL  RBC Count : 4.83 M/uL  Hemoglobin : 14.6 g/dL  Hematocrit : 40.6 %  Platelet Count - Automated : 286 K/uL  Mean Cell Volume : 84.1 fl  Mean Cell Hemoglobin : 30.2 pg  Mean Cell Hemoglobin Concentration : 36.0 gm/dL  09-15 Na134 mmol/L<L> Glu 105 mg/dL<H> K+ 3.6 mmol/L Cr  0.47 mg/dL<L> BUN 17.2 mg/dL Phos n/a   Alb n/a   PAB n/a       Skin: no skin breakdown noted     Nutrition focused physical exam previously conducted - found signs of malnutrition [ ]absent [x ]present    Subcutaneous fat loss: [x ] Orbital fat pads region, [ ]Buccal fat region, [ ]Triceps region,  [ ]Ribs region    Muscle wasting: [x ]Temples region, [ x]Clavicle region, [ x]Shoulder region, [ ]Scapula region, [ ]Interosseous region,  [ ]thigh region, [ ]Calf region    Current Nutrition Diagnosis: Pt remains at nutrition risk secondary to moderate protein calorie malnutrition (chronic) related to inability to meet sufficient protein-energy needs in setting of metastatic breast CA as evidenced by pt with moderate muscle wasting/fat loss, meeting <75% estimated energy intake >1 month and 15lb (10%) unintentional wt loss x 6 months.  Pt continues with mostly fair po intake at meals; no issues at this time.  Aware pt awaiting radiation treatment.  RD to remain available.     Recommendations:   1. RX: Ensure Plant TID  2. Obtain daily weight and monitor trend     Monitoring and Evaluation:   [ x] PO intake [x ] Tolerance to diet prescription [X] Weights  [X] Follow up per protocol [X] Labs:

## 2022-09-17 LAB
GLUCOSE BLDC GLUCOMTR-MCNC: 103 MG/DL — HIGH (ref 70–99)
GLUCOSE BLDC GLUCOMTR-MCNC: 119 MG/DL — HIGH (ref 70–99)
GLUCOSE BLDC GLUCOMTR-MCNC: 134 MG/DL — HIGH (ref 70–99)
SARS-COV-2 RNA SPEC QL NAA+PROBE: SIGNIFICANT CHANGE UP

## 2022-09-17 PROCEDURE — 99232 SBSQ HOSP IP/OBS MODERATE 35: CPT

## 2022-09-17 PROCEDURE — 99232 SBSQ HOSP IP/OBS MODERATE 35: CPT | Mod: FS

## 2022-09-17 RX ADMIN — MORPHINE SULFATE 4 MILLIGRAM(S): 50 CAPSULE, EXTENDED RELEASE ORAL at 05:15

## 2022-09-17 RX ADMIN — METHOCARBAMOL 500 MILLIGRAM(S): 500 TABLET, FILM COATED ORAL at 13:13

## 2022-09-17 RX ADMIN — MORPHINE SULFATE 4 MILLIGRAM(S): 50 CAPSULE, EXTENDED RELEASE ORAL at 13:11

## 2022-09-17 RX ADMIN — POLYETHYLENE GLYCOL 3350 17 GRAM(S): 17 POWDER, FOR SOLUTION ORAL at 13:12

## 2022-09-17 RX ADMIN — MORPHINE SULFATE 4 MILLIGRAM(S): 50 CAPSULE, EXTENDED RELEASE ORAL at 20:10

## 2022-09-17 RX ADMIN — SENNA PLUS 2 TABLET(S): 8.6 TABLET ORAL at 21:58

## 2022-09-17 RX ADMIN — MORPHINE SULFATE 4 MILLIGRAM(S): 50 CAPSULE, EXTENDED RELEASE ORAL at 04:27

## 2022-09-17 RX ADMIN — METHOCARBAMOL 500 MILLIGRAM(S): 500 TABLET, FILM COATED ORAL at 07:09

## 2022-09-17 RX ADMIN — GABAPENTIN 200 MILLIGRAM(S): 400 CAPSULE ORAL at 13:17

## 2022-09-17 RX ADMIN — GABAPENTIN 200 MILLIGRAM(S): 400 CAPSULE ORAL at 07:09

## 2022-09-17 RX ADMIN — Medication 4 MILLIGRAM(S): at 13:12

## 2022-09-17 RX ADMIN — MORPHINE SULFATE 4 MILLIGRAM(S): 50 CAPSULE, EXTENDED RELEASE ORAL at 13:43

## 2022-09-17 RX ADMIN — GABAPENTIN 200 MILLIGRAM(S): 400 CAPSULE ORAL at 21:57

## 2022-09-17 RX ADMIN — ENOXAPARIN SODIUM 40 MILLIGRAM(S): 100 INJECTION SUBCUTANEOUS at 07:08

## 2022-09-17 RX ADMIN — Medication 4 MILLIGRAM(S): at 07:09

## 2022-09-17 RX ADMIN — METHOCARBAMOL 500 MILLIGRAM(S): 500 TABLET, FILM COATED ORAL at 21:58

## 2022-09-17 RX ADMIN — MORPHINE SULFATE 4 MILLIGRAM(S): 50 CAPSULE, EXTENDED RELEASE ORAL at 21:27

## 2022-09-17 RX ADMIN — LACTULOSE 20 GRAM(S): 10 SOLUTION ORAL at 13:12

## 2022-09-17 RX ADMIN — PANTOPRAZOLE SODIUM 40 MILLIGRAM(S): 20 TABLET, DELAYED RELEASE ORAL at 07:09

## 2022-09-17 NOTE — PROGRESS NOTE ADULT - SUBJECTIVE AND OBJECTIVE BOX
chief complaint: weakness    Patient seen and examined at bedside. No acute overnight events reported. No fever, chills, cough, nausea or vomiting.     Vital Signs Last 24 Hrs  T(F): 98.4 (17 Sep 2022 04:25), Max: 98.5 (16 Sep 2022 17:58)  HR: 89 (17 Sep 2022 04:25) (66 - 89)  BP: 142/87 (17 Sep 2022 04:25) (130/81 - 142/87)  RR: 18 (17 Sep 2022 04:25) (18 - 18)  SpO2: 91% (17 Sep 2022 04:25) (91% - 95%)    Physical Exam:  Constitutional: alert and oriented, in no acute distress   Neck: Soft and supple  Respiratory: Clear to auscultation bilaterally  Cardiovascular: Regular rate and rhyhtm  Gastrointestinal: Soft, non-tender to palpation, +bs  Vascular: 2+ peripheral pulses  Musculoskeletal: no lower extremity edema bilaterally

## 2022-09-17 NOTE — PROGRESS NOTE ADULT - ASSESSMENT
65 yo female with no known pmhx presented to the ED with progressive weakness to the point that she felt she could not get out of bed this morning. CT imaging revealed a 6.5 cm breast mass with multiple spinal lesions concerning for metastatic breast cancer.    Large right breast mass  - likely cancer with mets to spine, brain, liver  - s/p right breast biopsy   - Oncology recs appreciated  - Biopsy positive for invasive ductal carcinoma     Back pain due to extensive osseous metastatic disease with pathologic compression fracture deformities at C7, T1, T9, T12 and L2, severe at T1  - Decadron 4mg IVP q6h  - Robaxin 500mg TID  - Gabapentin 200mg TID  - Morphine PRN  - Oncology recs appreciated  - NSG recs appreciated    Hypercalcemia due to bone mets malignancy   - s/p IVF , improving   - Monitor BMP     Hyponatremia  - 134    Moderate protein malia malnutrition   - Regular diet  - Ensure added    DVT ppx  - Lovenox SQ

## 2022-09-17 NOTE — PROGRESS NOTE ADULT - SUBJECTIVE AND OBJECTIVE BOX
HPI:  66 year old Female with no known pmhx presented to the ED with progressive weakness to the point that she felt she could not get out of bed this morning. She started to note pain in her shoulders and lower back at the end of July. She went to the ED at [facility] and also to urgent care, but states they sent her home. She also has had decreased appetite over this time. Her last mammogram was in 2014. She reports they found 2 lumps in her right breast and recommended biopsy. When she went for biopsy, she could not tolerate the pain. She was supposed to return to re-attempt with pain medications, but she never went back. Currently, her pain is well controlled after morphine in the ED. She denies chest pain, SOB, N/V/D, F/C, or other associated symptoms. Patient denies skin changes on the breast or nipple discharge. (10 Sep 2022 02:11)    INTERVAL HPI/OVERNIGHT EVENTS:  66 year old female seen and examined by neurosurgery team, lying comfortably in bed. Family discussion held yesterday with Dr. Bennett, patient agreeable to surgery and expresses full understanding of risks vs benefits. No acute overnight events reported. Reports scapular pain which is improving, feels the strength in R arm improving minimally each day.    Vital Signs Last 24 Hrs  T(C): 36.6 (17 Sep 2022 10:54), Max: 36.9 (16 Sep 2022 17:58)  T(F): 97.9 (17 Sep 2022 10:54), Max: 98.5 (16 Sep 2022 17:58)  HR: 75 (17 Sep 2022 10:54) (75 - 89)  BP: 125/79 (17 Sep 2022 10:54) (125/79 - 142/87)  BP(mean): --  RR: 18 (17 Sep 2022 10:54) (18 - 18)  SpO2: 93% (17 Sep 2022 10:54) (91% - 93%)    Parameters below as of 17 Sep 2022 10:54  Patient On (Oxygen Delivery Method): room air        PHYSICAL EXAM:  GENERAL: NAD, well-groomed, pleasant  HEAD: Atraumatic, normocephalic  NECK: Cervical collar in place  MENTAL STATUS: AAO x3; Awake; Opens eyes spontaneously; Appropriately conversant without aphasia; following simple commands  CRANIAL NERVES: PERRL. Face symmetric.  REFLEXES: Negative Mcdonald's b/l. Negative clonus b/l  MOTOR: RUE delt 5/5, bicep 4-/5, tricep 4-/5, HG 3/5, LUE/RLE/LLE 5/5  SENSATION: Diminished sensation in RUE specifically 4th and 5th digit, otherwise intact to light touch all extremities  SKIN: Warm, dry      LABS:      RADIOLOGY & ADDITIONAL TESTS:  IR US Guided Needle Placement (09.12.22 @ 14:04):  IMPRESSION:  SUCCESSFUL CORE BIOPSY OF A LARGE PALPABLE RIGHT BREAST MASS.    MR Cervical Spine w/wo IV Cont (09.11.22 @ 11:27):  IMPRESSION:  Extensive osseous metastatic disease with pathologic compression fracture   deformities at C7, T1, T9, T12 and L2, severe at T1.    Abnormal ventral and dorsal epidural enhancement from C7 through T2.   Severe canal stenosis at T1 and L2 without associated cord or cauda   equina compression.    There is motion artifact present in the thoracic spine which limits   evaluation. No abnormal leptomeningeal enhancement or definite cord   signal abnormality.    MR Head w/wo IV Cont (09.11.22 @ 11:27):  IMPRESSION:  Subcentimeter enhancing parenchymal lesions as above compatible with   intracranial metastatic disease. No associated vasogenic edema. No   evidence of acute infarct or hemorrhage.    Likely left parietal calvarial osseous metastatic lesion.    MR Cervical, Thoracic, & Lumbar Spine w/wo IV Cont (09.11.22 @ 11:26):  IMPRESSION:  Extensive osseous metastatic disease with pathologic compression fracture   deformities at C7, T1, T9, T12 and L2, severe at T1.    Abnormal ventral and dorsal epidural enhancement from C7 through T2.   Severe canal stenosis at T1 and L2 without associated cord or cauda   equina compression.    There is motion artifact present in the thoracic spine which limits   evaluation. No abnormal leptomeningeal enhancement or definite cord   signal abnormality.

## 2022-09-17 NOTE — PROGRESS NOTE ADULT - ASSESSMENT
66F with new Dx of breast mass presenting to [facility] with back pain & weakness.   - MR spinal axis reveals extensive spine mets.  - MR brain reveals metastatic lesions.    Plan:  - Q4h neuro checks  - Continue c-collar at all times   - Continue Decadron 4mg Q6hrs  - Pain control PRN, avoid oversedation   - b/l SCDs & Lovenox 40  - Strict bedrest with pt. maintaining head & spine flat   - Heme/onc following; appreciate recommendations   - Parient/family agreeable for neurosurgical intervention and express full understanding of risks vs benefits, OR tentatively planned for Wednesday 9/21  - Further medical management/supportive care per primary team   - D/w attending Dr. Awan

## 2022-09-18 LAB
ANION GAP SERPL CALC-SCNC: 13 MMOL/L — SIGNIFICANT CHANGE UP (ref 5–17)
BUN SERPL-MCNC: 25.8 MG/DL — HIGH (ref 8–20)
CALCIUM SERPL-MCNC: 10.1 MG/DL — SIGNIFICANT CHANGE UP (ref 8.4–10.5)
CHLORIDE SERPL-SCNC: 98 MMOL/L — SIGNIFICANT CHANGE UP (ref 98–107)
CO2 SERPL-SCNC: 22 MMOL/L — SIGNIFICANT CHANGE UP (ref 22–29)
CREAT SERPL-MCNC: 0.44 MG/DL — LOW (ref 0.5–1.3)
EGFR: 107 ML/MIN/1.73M2 — SIGNIFICANT CHANGE UP
GLUCOSE SERPL-MCNC: 94 MG/DL — SIGNIFICANT CHANGE UP (ref 70–99)
HCT VFR BLD CALC: 41.7 % — SIGNIFICANT CHANGE UP (ref 34.5–45)
HGB BLD-MCNC: 14.7 G/DL — SIGNIFICANT CHANGE UP (ref 11.5–15.5)
MCHC RBC-ENTMCNC: 30.1 PG — SIGNIFICANT CHANGE UP (ref 27–34)
MCHC RBC-ENTMCNC: 35.3 GM/DL — SIGNIFICANT CHANGE UP (ref 32–36)
MCV RBC AUTO: 85.3 FL — SIGNIFICANT CHANGE UP (ref 80–100)
PLATELET # BLD AUTO: 312 K/UL — SIGNIFICANT CHANGE UP (ref 150–400)
POTASSIUM SERPL-MCNC: 4.3 MMOL/L — SIGNIFICANT CHANGE UP (ref 3.5–5.3)
POTASSIUM SERPL-SCNC: 4.3 MMOL/L — SIGNIFICANT CHANGE UP (ref 3.5–5.3)
RBC # BLD: 4.89 M/UL — SIGNIFICANT CHANGE UP (ref 3.8–5.2)
RBC # FLD: 12.3 % — SIGNIFICANT CHANGE UP (ref 10.3–14.5)
SODIUM SERPL-SCNC: 133 MMOL/L — LOW (ref 135–145)
WBC # BLD: 16.58 K/UL — HIGH (ref 3.8–10.5)
WBC # FLD AUTO: 16.58 K/UL — HIGH (ref 3.8–10.5)

## 2022-09-18 PROCEDURE — 99232 SBSQ HOSP IP/OBS MODERATE 35: CPT | Mod: FS

## 2022-09-18 PROCEDURE — 99232 SBSQ HOSP IP/OBS MODERATE 35: CPT

## 2022-09-18 RX ORDER — MORPHINE SULFATE 50 MG/1
4 CAPSULE, EXTENDED RELEASE ORAL EVERY 4 HOURS
Refills: 0 | Status: DISCONTINUED | OUTPATIENT
Start: 2022-09-18 | End: 2022-09-21

## 2022-09-18 RX ORDER — MORPHINE SULFATE 50 MG/1
1 CAPSULE, EXTENDED RELEASE ORAL ONCE
Refills: 0 | Status: DISCONTINUED | OUTPATIENT
Start: 2022-09-18 | End: 2022-09-18

## 2022-09-18 RX ORDER — MORPHINE SULFATE 50 MG/1
2 CAPSULE, EXTENDED RELEASE ORAL EVERY 4 HOURS
Refills: 0 | Status: DISCONTINUED | OUTPATIENT
Start: 2022-09-18 | End: 2022-09-21

## 2022-09-18 RX ADMIN — MORPHINE SULFATE 4 MILLIGRAM(S): 50 CAPSULE, EXTENDED RELEASE ORAL at 13:39

## 2022-09-18 RX ADMIN — Medication 4 MILLIGRAM(S): at 01:22

## 2022-09-18 RX ADMIN — LACTULOSE 20 GRAM(S): 10 SOLUTION ORAL at 13:12

## 2022-09-18 RX ADMIN — MORPHINE SULFATE 1 MILLIGRAM(S): 50 CAPSULE, EXTENDED RELEASE ORAL at 05:25

## 2022-09-18 RX ADMIN — MORPHINE SULFATE 1 MILLIGRAM(S): 50 CAPSULE, EXTENDED RELEASE ORAL at 05:10

## 2022-09-18 RX ADMIN — MORPHINE SULFATE 4 MILLIGRAM(S): 50 CAPSULE, EXTENDED RELEASE ORAL at 20:56

## 2022-09-18 RX ADMIN — Medication 4 MILLIGRAM(S): at 17:12

## 2022-09-18 RX ADMIN — MORPHINE SULFATE 2 MILLIGRAM(S): 50 CAPSULE, EXTENDED RELEASE ORAL at 17:10

## 2022-09-18 RX ADMIN — ENOXAPARIN SODIUM 40 MILLIGRAM(S): 100 INJECTION SUBCUTANEOUS at 05:18

## 2022-09-18 RX ADMIN — GABAPENTIN 200 MILLIGRAM(S): 400 CAPSULE ORAL at 05:17

## 2022-09-18 RX ADMIN — MORPHINE SULFATE 2 MILLIGRAM(S): 50 CAPSULE, EXTENDED RELEASE ORAL at 17:25

## 2022-09-18 RX ADMIN — Medication 650 MILLIGRAM(S): at 04:46

## 2022-09-18 RX ADMIN — GABAPENTIN 200 MILLIGRAM(S): 400 CAPSULE ORAL at 20:48

## 2022-09-18 RX ADMIN — MORPHINE SULFATE 4 MILLIGRAM(S): 50 CAPSULE, EXTENDED RELEASE ORAL at 13:24

## 2022-09-18 RX ADMIN — PANTOPRAZOLE SODIUM 40 MILLIGRAM(S): 20 TABLET, DELAYED RELEASE ORAL at 05:18

## 2022-09-18 RX ADMIN — METHOCARBAMOL 500 MILLIGRAM(S): 500 TABLET, FILM COATED ORAL at 13:12

## 2022-09-18 RX ADMIN — Medication 4 MILLIGRAM(S): at 13:12

## 2022-09-18 RX ADMIN — SENNA PLUS 2 TABLET(S): 8.6 TABLET ORAL at 20:46

## 2022-09-18 RX ADMIN — METHOCARBAMOL 500 MILLIGRAM(S): 500 TABLET, FILM COATED ORAL at 05:11

## 2022-09-18 RX ADMIN — Medication 4 MILLIGRAM(S): at 05:11

## 2022-09-18 RX ADMIN — Medication 650 MILLIGRAM(S): at 05:46

## 2022-09-18 RX ADMIN — Medication 4 MILLIGRAM(S): at 20:46

## 2022-09-18 RX ADMIN — POLYETHYLENE GLYCOL 3350 17 GRAM(S): 17 POWDER, FOR SOLUTION ORAL at 13:12

## 2022-09-18 RX ADMIN — GABAPENTIN 200 MILLIGRAM(S): 400 CAPSULE ORAL at 13:13

## 2022-09-18 RX ADMIN — METHOCARBAMOL 500 MILLIGRAM(S): 500 TABLET, FILM COATED ORAL at 20:46

## 2022-09-18 NOTE — PROGRESS NOTE ADULT - ASSESSMENT
67 y/o F with no known PMH presented to the ED with progressive weakness to the point that she felt she could not get out of bed this morning. CT imaging revealed a 6.5 cm breast mass with multiple spinal lesions concerning for metastatic breast cancer.    Large right breast mass  - likely cancer with mets to spine, brain, liver  - s/p right breast biopsy   - Oncology recs appreciated  - Biopsy positive for invasive ductal carcinoma     Back pain due to extensive osseous metastatic disease with pathologic compression fracture deformities at C7, T1, T9, T12 and L2, severe at T1  - Decadron 4mg IVP q6h  - Robaxin 500mg TID  - Gabapentin 200mg TID  - Morphine PRN  - Oncology recs appreciated  - NSG recs appreciated  - OR tentatively planned for 9/21  - Pain management consult    Hypercalcemia due to bone mets malignancy   - s/p IVF , improving   - Monitor BMP     Moderate protein malia malnutrition   - Regular diet  - Ensure added    DVT ppx  - Lovenox SQ

## 2022-09-18 NOTE — PROGRESS NOTE ADULT - ASSESSMENT
66F with new Dx of breast mass presenting to [facility] with back pain & weakness.   - MR spinal axis reveals extensive spine mets.  - MR brain reveals metastatic lesions.    Plan:  -D/w Dr. Awan  -Q4h neuro checks  -Continue c-collar at all times   -Continue Decadron 4a6  -Pain control PRN   -SCDs & Lovenox for DVT PPX   -Strict bedrest with pt. maintaining head & spine flat   -Heme/onc following; appreciate recs  -Pt. agreeable to surgery with tentative plan for OR Wednesday 9/21, will f/u schedule tomorrow   -Further medical management per primary team

## 2022-09-18 NOTE — PROGRESS NOTE ADULT - SUBJECTIVE AND OBJECTIVE BOX
HPI:  66 year old Female with no known pmhx presented to the ED with progressive weakness to the point that she felt she could not get out of bed this morning. She started to note pain in her shoulders and lower back at the end of July. She went to the ED at [facility] and also to urgent care, but states they sent her home. She also has had decreased appetite over this time. Her last mammogram was in 2014. She reports they found 2 lumps in her right breast and recommended biopsy. When she went for biopsy, she could not tolerate the pain. She was supposed to return to re-attempt with pain medications, but she never went back. Currently, her pain is well controlled after morphine in the ED. She denies chest pain, SOB, N/V/D, F/C, or other associated symptoms. Patient denies skin changes on the breast or nipple discharge. (10 Sep 2022 02:11)    INTERVAL HPI/OVERNIGHT EVENTS:  66y Female seen lying comfortably in bed. Patient has no acute complaints today, strength in R arm with continued improvement.     Vital Signs Last 24 Hrs  T(C): 36.6 (18 Sep 2022 11:44), Max: 36.7 (18 Sep 2022 04:30)  T(F): 97.9 (18 Sep 2022 11:44), Max: 98.1 (18 Sep 2022 04:30)  HR: 87 (18 Sep 2022 11:44) (65 - 87)  BP: 127/73 (18 Sep 2022 11:44) (127/73 - 134/81)  BP(mean): --  RR: 19 (18 Sep 2022 11:44) (18 - 19)  SpO2: 93% (18 Sep 2022 11:44) (93% - 94%)    Parameters below as of 18 Sep 2022 11:44  Patient On (Oxygen Delivery Method): room air    PHYSICAL EXAM:  GENERAL: NAD, well-groomed, pleasant  HEAD: Atraumatic, normocephalic  NECK: Cervical collar in place  MENTAL STATUS: AAO x3; Awake; Opens eyes spontaneously; Appropriately conversant without aphasia; following simple commands  CRANIAL NERVES: PERRL. Face symmetric.  REFLEXES: Negative Mcdonald's b/l. Negative clonus b/l  MOTOR: RUE delt 5/5, bicep 4-/5, tricep 4-/5, HG 3/5, LUE/RLE/LLE 5/5  SENSATION: Diminished sensation in RUE specifically 4th and 5th digit, otherwise intact to light touch all extremities  SKIN: Warm, dry    LABS:                        14.7   16.58 )-----------( 312      ( 18 Sep 2022 05:22 )             41.7     09-18    133<L>  |  98  |  25.8<H>  ----------------------------<  94  4.3   |  22.0  |  0.44<L>    Ca    10.1      18 Sep 2022 05:22              RADIOLOGY & ADDITIONAL TESTS:  IR US Guided Needle Placement (09.12.22 @ 14:04):  IMPRESSION:  SUCCESSFUL CORE BIOPSY OF A LARGE PALPABLE RIGHT BREAST MASS.    MR Cervical Spine w/wo IV Cont (09.11.22 @ 11:27):  IMPRESSION:  Extensive osseous metastatic disease with pathologic compression fracture   deformities at C7, T1, T9, T12 and L2, severe at T1.    Abnormal ventral and dorsal epidural enhancement from C7 through T2.   Severe canal stenosis at T1 and L2 without associated cord or cauda   equina compression.    There is motion artifact present in the thoracic spine which limits   evaluation. No abnormal leptomeningeal enhancement or definite cord   signal abnormality.    MR Head w/wo IV Cont (09.11.22 @ 11:27):  IMPRESSION:  Subcentimeter enhancing parenchymal lesions as above compatible with   intracranial metastatic disease. No associated vasogenic edema. No   evidence of acute infarct or hemorrhage.    Likely left parietal calvarial osseous metastatic lesion.    MR Cervical, Thoracic, & Lumbar Spine w/wo IV Cont (09.11.22 @ 11:26):  IMPRESSION:  Extensive osseous metastatic disease with pathologic compression fracture   deformities at C7, T1, T9, T12 and L2, severe at T1.    Abnormal ventral and dorsal epidural enhancement from C7 through T2.   Severe canal stenosis at T1 and L2 without associated cord or cauda   equina compression.    There is motion artifact present in the thoracic spine which limits   evaluation. No abnormal leptomeningeal enhancement or definite cord   signal abnormality.

## 2022-09-18 NOTE — PROGRESS NOTE ADULT - SUBJECTIVE AND OBJECTIVE BOX
Chief complaint: Weakness    Patient seen and examined at bedside. No acute overnight events reported. No fever, chills, cough, chest pain, nausea or vomiting.    Vital Signs Last 24 Hrs  T(F): 97.9 (18 Sep 2022 11:44), Max: 98.1 (18 Sep 2022 04:30)  HR: 87 (18 Sep 2022 11:44) (65 - 87)  BP: 127/73 (18 Sep 2022 11:44) (127/73 - 134/81)  RR: 19 (18 Sep 2022 11:44) (18 - 19)  SpO2: 93% (18 Sep 2022 11:44) (93% - 94%)    Physical Exam:  Constitutional: alert and oriented, in no acute distress   Neck: Soft and supple  Respiratory: Clear to auscultation bilaterally  Cardiovascular: Regular rate and rhyhtm  Gastrointestinal: Soft, non-tender to palpation, +bs  Musculoskeletal: no lower extremity edema bilaterally    Labs:                        14.7   16.58 )-----------( 312      ( 18 Sep 2022 05:22 )             41.7   09-18    133<L>  |  98  |  25.8<H>  ----------------------------<  94  4.3   |  22.0  |  0.44<L>    Ca    10.1      18 Sep 2022 05:22

## 2022-09-19 LAB
ANION GAP SERPL CALC-SCNC: 11 MMOL/L — SIGNIFICANT CHANGE UP (ref 5–17)
BUN SERPL-MCNC: 25.2 MG/DL — HIGH (ref 8–20)
CALCIUM SERPL-MCNC: 9.8 MG/DL — SIGNIFICANT CHANGE UP (ref 8.4–10.5)
CHLORIDE SERPL-SCNC: 100 MMOL/L — SIGNIFICANT CHANGE UP (ref 98–107)
CO2 SERPL-SCNC: 24 MMOL/L — SIGNIFICANT CHANGE UP (ref 22–29)
CREAT SERPL-MCNC: 0.45 MG/DL — LOW (ref 0.5–1.3)
EGFR: 106 ML/MIN/1.73M2 — SIGNIFICANT CHANGE UP
GLUCOSE BLDC GLUCOMTR-MCNC: 111 MG/DL — HIGH (ref 70–99)
GLUCOSE BLDC GLUCOMTR-MCNC: 123 MG/DL — HIGH (ref 70–99)
GLUCOSE SERPL-MCNC: 104 MG/DL — HIGH (ref 70–99)
HCT VFR BLD CALC: 38.9 % — SIGNIFICANT CHANGE UP (ref 34.5–45)
HGB BLD-MCNC: 13.7 G/DL — SIGNIFICANT CHANGE UP (ref 11.5–15.5)
MCHC RBC-ENTMCNC: 30 PG — SIGNIFICANT CHANGE UP (ref 27–34)
MCHC RBC-ENTMCNC: 35.2 GM/DL — SIGNIFICANT CHANGE UP (ref 32–36)
MCV RBC AUTO: 85.1 FL — SIGNIFICANT CHANGE UP (ref 80–100)
PLATELET # BLD AUTO: 292 K/UL — SIGNIFICANT CHANGE UP (ref 150–400)
POTASSIUM SERPL-MCNC: 4.3 MMOL/L — SIGNIFICANT CHANGE UP (ref 3.5–5.3)
POTASSIUM SERPL-SCNC: 4.3 MMOL/L — SIGNIFICANT CHANGE UP (ref 3.5–5.3)
RBC # BLD: 4.57 M/UL — SIGNIFICANT CHANGE UP (ref 3.8–5.2)
RBC # FLD: 12.5 % — SIGNIFICANT CHANGE UP (ref 10.3–14.5)
SODIUM SERPL-SCNC: 135 MMOL/L — SIGNIFICANT CHANGE UP (ref 135–145)
WBC # BLD: 14.83 K/UL — HIGH (ref 3.8–10.5)
WBC # FLD AUTO: 14.83 K/UL — HIGH (ref 3.8–10.5)

## 2022-09-19 PROCEDURE — 99232 SBSQ HOSP IP/OBS MODERATE 35: CPT | Mod: FS

## 2022-09-19 PROCEDURE — 99232 SBSQ HOSP IP/OBS MODERATE 35: CPT

## 2022-09-19 RX ADMIN — MORPHINE SULFATE 4 MILLIGRAM(S): 50 CAPSULE, EXTENDED RELEASE ORAL at 07:05

## 2022-09-19 RX ADMIN — LACTULOSE 20 GRAM(S): 10 SOLUTION ORAL at 12:32

## 2022-09-19 RX ADMIN — Medication 4 MILLIGRAM(S): at 17:48

## 2022-09-19 RX ADMIN — METHOCARBAMOL 500 MILLIGRAM(S): 500 TABLET, FILM COATED ORAL at 07:05

## 2022-09-19 RX ADMIN — Medication 4 MILLIGRAM(S): at 12:32

## 2022-09-19 RX ADMIN — POLYETHYLENE GLYCOL 3350 17 GRAM(S): 17 POWDER, FOR SOLUTION ORAL at 12:36

## 2022-09-19 RX ADMIN — GABAPENTIN 200 MILLIGRAM(S): 400 CAPSULE ORAL at 07:05

## 2022-09-19 RX ADMIN — METHOCARBAMOL 500 MILLIGRAM(S): 500 TABLET, FILM COATED ORAL at 13:40

## 2022-09-19 RX ADMIN — MORPHINE SULFATE 4 MILLIGRAM(S): 50 CAPSULE, EXTENDED RELEASE ORAL at 01:37

## 2022-09-19 RX ADMIN — Medication 30 MILLILITER(S): at 12:32

## 2022-09-19 RX ADMIN — ENOXAPARIN SODIUM 40 MILLIGRAM(S): 100 INJECTION SUBCUTANEOUS at 07:04

## 2022-09-19 RX ADMIN — GABAPENTIN 200 MILLIGRAM(S): 400 CAPSULE ORAL at 13:40

## 2022-09-19 RX ADMIN — SENNA PLUS 2 TABLET(S): 8.6 TABLET ORAL at 21:15

## 2022-09-19 RX ADMIN — GABAPENTIN 200 MILLIGRAM(S): 400 CAPSULE ORAL at 21:15

## 2022-09-19 RX ADMIN — Medication 30 MILLILITER(S): at 21:55

## 2022-09-19 RX ADMIN — MORPHINE SULFATE 4 MILLIGRAM(S): 50 CAPSULE, EXTENDED RELEASE ORAL at 16:33

## 2022-09-19 RX ADMIN — PANTOPRAZOLE SODIUM 40 MILLIGRAM(S): 20 TABLET, DELAYED RELEASE ORAL at 07:05

## 2022-09-19 RX ADMIN — Medication 4 MILLIGRAM(S): at 07:04

## 2022-09-19 RX ADMIN — MORPHINE SULFATE 4 MILLIGRAM(S): 50 CAPSULE, EXTENDED RELEASE ORAL at 02:06

## 2022-09-19 RX ADMIN — MORPHINE SULFATE 4 MILLIGRAM(S): 50 CAPSULE, EXTENDED RELEASE ORAL at 07:06

## 2022-09-19 RX ADMIN — MORPHINE SULFATE 4 MILLIGRAM(S): 50 CAPSULE, EXTENDED RELEASE ORAL at 16:07

## 2022-09-19 RX ADMIN — MORPHINE SULFATE 4 MILLIGRAM(S): 50 CAPSULE, EXTENDED RELEASE ORAL at 01:39

## 2022-09-19 RX ADMIN — METHOCARBAMOL 500 MILLIGRAM(S): 500 TABLET, FILM COATED ORAL at 21:15

## 2022-09-19 NOTE — PROGRESS NOTE ADULT - ASSESSMENT
67 y/o F with no known PMH presented to the ED with progressive weakness to the point that she felt she could not get out of bed this morning. CT imaging revealed a 6.5 cm breast mass with multiple spinal lesions concerning for metastatic breast cancer.    Back pain due to extensive osseous metastatic disease with pathologic compression fracture deformities at C7, T1, T9, T12 and L2, severe at T1  - Decadron 4mg IVP q6h  - Robaxin 500mg TID  - Gabapentin 200mg TID  - Morphine PRN  - Oncology recs appreciated  - NSG recs appreciated  - Pain management consulted  - OR on 9/21  - Cardiology consulted for clearance    Large right breast mass  - likely cancer with mets to spine, brain, liver  - s/p right breast biopsy   - Oncology recs appreciated  - Biopsy positive for invasive ductal carcinoma      Hypercalcemia due to bone mets malignancy   - s/p IVF , improving   - Monitor BMP     Moderate protein malia malnutrition   - Regular diet  - Ensure added    DVT ppx  - Lovenox SQ    Dispo: Pending clinical course

## 2022-09-19 NOTE — PROGRESS NOTE ADULT - ASSESSMENT
66F  acute spine pain 2/2 breast CA mets  pain mod controlled on morphine IVP + non-opioid adjuncts    Pain Recs:  increase robaxin to 750mg TID standing HOLD for sedation       If pain continues to persist:  change morphine IVP order to q3h PRN mod/sev pain

## 2022-09-19 NOTE — CONSULT NOTE ADULT - SUBJECTIVE AND OBJECTIVE BOX
[facility]                                   [address] [place] [address]                                                      PHONE: [phone]                                                         FAX: [phone]  [email or web address]  ---------------------------------------------------------------------------------------------------------------------------------    Reason for Consult: Preoperative Risk Stratification    HPI:  SHELLEY DE LEON is an 66y Female with no PMHx who presents with weakness. Patient has been having progressive weakness and musculoskeletal pain. Patient was found to have right breast mass. Patient was having so much weakness she could not get out of bed. Patient found to have metastatic disease. Cardiology consulted for preoperative risk stratification. Patient denies any chest pain, SOB, palpitations, near syncope, or syncope. Patient states that she used to walk 3-5 hours and would be able to walk up 2 flights of steps without a problem prior to her weakness.    PAST MEDICAL & SURGICAL HISTORY:  Stomach ulcer  20 years ago   no pain not taking any meds      Skin cancer of arm, left  pt was 16 year old      H/O excision of mass  left arm  when patient was 16 years ago      S/P hysterectomy          lactose (Other)  tetanus toxoid (Hives)      MEDICATIONS  (STANDING):  dexAMETHasone  Injectable 4 milliGRAM(s) IV Push every 6 hours  enoxaparin Injectable 40 milliGRAM(s) SubCutaneous every 24 hours  gabapentin 200 milliGRAM(s) Oral three times a day  influenza  Vaccine (HIGH DOSE) 0.7 milliLiter(s) IntraMuscular once  lactulose Syrup 20 Gram(s) Oral daily  methocarbamol 500 milliGRAM(s) Oral three times a day  naloxone Injectable 0.4 milliGRAM(s) IV Push once  pantoprazole    Tablet 40 milliGRAM(s) Oral before breakfast  polyethylene glycol 3350 17 Gram(s) Oral daily  senna 2 Tablet(s) Oral at bedtime    MEDICATIONS  (PRN):  acetaminophen     Tablet .. 650 milliGRAM(s) Oral every 6 hours PRN Temp greater or equal to 38C (100.4F), Mild Pain (1 - 3)  aluminum hydroxide/magnesium hydroxide/simethicone Suspension 30 milliLiter(s) Oral every 4 hours PRN Dyspepsia  bisacodyl 5 milliGRAM(s) Oral daily PRN Constipation  melatonin 3 milliGRAM(s) Oral at bedtime PRN Insomnia  morphine  - Injectable 2 milliGRAM(s) IV Push every 4 hours PRN Moderate Pain (4 - 6)  morphine  - Injectable 4 milliGRAM(s) IV Push every 4 hours PRN Severe Pain (7 - 10)  ondansetron Injectable 4 milliGRAM(s) IV Push every 8 hours PRN Nausea and/or Vomiting      Social History:  Cigarettes:  Denies current tobacco use                  Alcohol:  Denies daily etoh            Illicit Drug Abuse:  Denies    Family History:  denies sudden cardiac death.    ROS: Negative other than as mentioned in HPI.    Vital Signs Last 24 Hrs  T(C): 36.7 (19 Sep 2022 12:44), Max: 36.7 (18 Sep 2022 17:59)  T(F): 98 (19 Sep 2022 12:44), Max: 98 (18 Sep 2022 17:59)  HR: 84 (19 Sep 2022 12:44) (82 - 84)  BP: 143/78 (19 Sep 2022 12:44) (128/84 - 143/78)  BP(mean): --  RR: 18 (19 Sep 2022 12:44) (18 - 19)  SpO2: 94% (19 Sep 2022 12:44) (92% - 94%)      ICU Vital Signs Last 24 Hrs  SHELLEY DE LEON  I&O's Detail    I&O's Summary    Drug Dosing Weight  SHELLEY HALEY      PHYSICAL EXAM:  General: NAD  HEENT: Head; normocephalic, atraumatic.  Eyes: EOMI, conjunctiva normal  Neck: In c-collar  CARDIOVASCULAR: RRR, S1 S2, No murmurs or gallops  LUNGS: Clear to auscultation b/l, No rales, rhonchi or wheeze, normal inspiratory effort  ABDOMEN: Soft, nontender, non-distended, +bowel sounds  EXTREMITIES: No edema b/l, no cyanosis   SKIN: warm and dry  NEURO: Alert/oriented x 3  PSYCH: normal affect.        LABS:                        13.7   14.83 )-----------( 292      ( 19 Sep 2022 04:55 )             38.9     09-19    135  |  100  |  25.2<H>  ----------------------------<  104<H>  4.3   |  24.0  |  0.45<L>    Ca    9.8      19 Sep 2022 04:55      SHELLEY DE LEON            RADIOLOGY & ADDITIONAL STUDIES:    ECG: Sinus rhythm, isolated TWI in lead V2    Assessment and Plan:  66 year old female who presents with metastatic disease.    Preoperative Risk Stratification  - patient going to OR on 9/21  - EKG with sinus rhythm with isolated TWI in lead V2  - patient denies any chest pain  - RCRI 0 points  - Patient able to tolerate > 4 METS  - No clinical evidence to suggest severe AS, ACS, or ADHF  - No further cardiac workup required prior to planned procedure  - Patient is low to intermediate risk for an intermediate to high risk procedure    Thank you for letting [facility] to assist in the management of this patient. Please call with any questions.

## 2022-09-19 NOTE — PROGRESS NOTE ADULT - SUBJECTIVE AND OBJECTIVE BOX
Interval Hx:  Patient seen during rounds  Patient reports pain to be mod controlled on current medications  good relief w morphine for ~3 hours  mild pain  that persists  Patient denies sedation with medications     Analgesic Dosing for past 24 hours reviewed as below:    gabapentin   200 milliGRAM(s) Oral (09-19-22 @ 13:40)   200 milliGRAM(s) Oral (09-19-22 @ 07:05)   200 milliGRAM(s) Oral (09-18-22 @ 20:48)    methocarbamol   500 milliGRAM(s) Oral (09-19-22 @ 13:40)   500 milliGRAM(s) Oral (09-19-22 @ 07:05)   500 milliGRAM(s) Oral (09-18-22 @ 20:46)    morphine  - Injectable   2 milliGRAM(s) IV Push (09-18-22 @ 17:10)    morphine  - Injectable   4 milliGRAM(s) IV Push (09-19-22 @ 07:05)   4 milliGRAM(s) IV Push (09-19-22 @ 01:39)   4 milliGRAM(s) IV Push (09-18-22 @ 20:56)          T(C): 36.7 (09-19-22 @ 12:44), Max: 36.7 (09-18-22 @ 17:59)  HR: 84 (09-19-22 @ 12:44) (82 - 84)  BP: 143/78 (09-19-22 @ 12:44) (128/84 - 143/78)  RR: 18 (09-19-22 @ 12:44) (18 - 19)  SpO2: 94% (09-19-22 @ 12:44) (92% - 94%)        acetaminophen     Tablet .. 650 milliGRAM(s) Oral every 6 hours PRN  aluminum hydroxide/magnesium hydroxide/simethicone Suspension 30 milliLiter(s) Oral every 4 hours PRN  bisacodyl 5 milliGRAM(s) Oral daily PRN  dexAMETHasone  Injectable 4 milliGRAM(s) IV Push every 6 hours  enoxaparin Injectable 40 milliGRAM(s) SubCutaneous every 24 hours  gabapentin 200 milliGRAM(s) Oral three times a day  influenza  Vaccine (HIGH DOSE) 0.7 milliLiter(s) IntraMuscular once  lactulose Syrup 20 Gram(s) Oral daily  melatonin 3 milliGRAM(s) Oral at bedtime PRN  methocarbamol 500 milliGRAM(s) Oral three times a day  morphine  - Injectable 2 milliGRAM(s) IV Push every 4 hours PRN  morphine  - Injectable 4 milliGRAM(s) IV Push every 4 hours PRN  naloxone Injectable 0.4 milliGRAM(s) IV Push once  ondansetron Injectable 4 milliGRAM(s) IV Push every 8 hours PRN  pantoprazole    Tablet 40 milliGRAM(s) Oral before breakfast  polyethylene glycol 3350 17 Gram(s) Oral daily  senna 2 Tablet(s) Oral at bedtime                          13.7   14.83 )-----------( 292      ( 19 Sep 2022 04:55 )             38.9     09-19    135  |  100  |  25.2<H>  ----------------------------<  104<H>  4.3   |  24.0  |  0.45<L>    Ca    9.8      19 Sep 2022 04:55            Pain Service   [phone]

## 2022-09-19 NOTE — PROGRESS NOTE ADULT - ASSESSMENT
65 y/o female  admitted with RUE weakness, found to have Rt  large breast lesion with diffuse osseous metastatic disease, Liver and pulm metastases with osseous disease in the spine. No Leptomeningeal enhancement    1. Need Medical Clearance for OR Wednesday 9/21  2. Need cardiac clearance for OR Wednesday 9/21  3.  Cont Q4h neuro checks  4. Continue c-collar at all times   5. Continue Decadron 4a6  6. Pain control PRN   7. SCDs & Lovenox for DVT PPX   8. Strict bedrest with pt. maintaining head & spine flat   9. Heme/onc following; appreciate recs

## 2022-09-19 NOTE — PROGRESS NOTE ADULT - SUBJECTIVE AND OBJECTIVE BOX
Chief complaint: weakness    Patient seen and examined at bedside. No acute overnight events reported. No fever, chills, cough, chest pain, nausea or vomiting.     Vital Signs Last 24 Hrs  T(F): 98 (18 Sep 2022 17:59), Max: 98 (18 Sep 2022 17:59)  HR: 82 (18 Sep 2022 17:59) (82 - 82)  BP: 128/84 (18 Sep 2022 17:59) (128/84 - 128/84)  RR: 19 (18 Sep 2022 17:59) (19 - 19)  SpO2: 92% (18 Sep 2022 17:59) (92% - 92%)    Physical Exam:  Constitutional: alert and oriented, in no acute distress   Neck: Soft and supple  Respiratory: Clear to auscultation bilaterally, no wheezes or crackles  Cardiovascular: Regular rate and rhyhtm, no murmurs, gallops, rubs  Gastrointestinal: Soft, non-tender to palpation, +bs  Musculoskeletal: no lower extremity edema bilaterally    Labs:                        13.7   14.83 )-----------( 292      ( 19 Sep 2022 04:55 )             38.9   09-19    135  |  100  |  25.2<H>  ----------------------------<  104<H>  4.3   |  24.0  |  0.45<L>    Ca    9.8      19 Sep 2022 04:55

## 2022-09-19 NOTE — PROGRESS NOTE ADULT - SUBJECTIVE AND OBJECTIVE BOX
HPI: 65 yo female with no known pmhx presented to the ED with progressive weakness to the point that she felt she could not get out of bed this morning. She started to note pain in her shoulders and lower back at the end of July. She went to the ED at [facility] and also to urgent care, but states they sent her home. She also has had decreased appetite over this time. Her last mammogram was in 2014. She reports they found 2 lumps in her right breast and recommended biopsy. When she went for biopsy, she could not tolerate the pain. She was supposed to return to re-attempt with pain medications, but she never went back. Currently, her pain is well controlled after morphine in the ED. She denies chest pain, SOB, N/V/D, F/C, or other associated symptoms. Patient denies skin changes on the breast or nipple discharge. (10 Sep 2022 02:11)      INTERVAL OVERNIGHT EVENTS: 9/19  66y Female seen lying in bed.  Cervical collar with thoracic extension on at all times. No change in motor exam.           Vital Signs Last 24 Hrs  T(C): 36.7 (18 Sep 2022 17:59), Max: 36.7 (18 Sep 2022 17:59)  T(F): 98 (18 Sep 2022 17:59), Max: 98 (18 Sep 2022 17:59)  HR: 82 (18 Sep 2022 17:59) (82 - 87)  BP: 128/84 (18 Sep 2022 17:59) (127/73 - 128/84)  BP(mean): --  RR: 19 (18 Sep 2022 17:59) (19 - 19)  SpO2: 92% (18 Sep 2022 17:59) (92% - 93%)    Parameters below as of 18 Sep 2022 11:44  Patient On (Oxygen Delivery Method): room air        PHYSICAL EXAM:  GENERAL: NAD, well-groomed, well-developed female  HEAD:  Atraumatic, normocephalic  MENTAL STATUS: AAO x3,Appropriately conversant without aphasia,  following simple commands  MOTOR: strength 5/5 b/l upper and lower extremities  Uppers     Delt (C5/6)     Bicep (C5/6)         Tricep (C7)     HG (C8/T1)  R                     5/5                 4-/5              4-/5                 3/5                    L                      5/5                 5/5             5/5                 5/5                     Lowers      HF(L1/L2)     KE (L3)     DF (L4)        EHL (L5)       PF (S1)      R                     5/5 5/5 5/5 5/5 5/5  L                     5/5 5/5 5/5           5/5 5/5  SENSATION: decreased on right upper extremity    LABS:                        13.7   14.83 )-----------( 292      ( 19 Sep 2022 04:55 )             38.9     09-19    135  |  100  |  25.2<H>  ----------------------------<  104<H>  4.3   |  24.0  |  0.45<L>    Ca    9.8      19 Sep 2022 04:55              RADIOLOGY & ADDITIONAL TESTS:          CAPRINI SCORE [CLOT]:  Patient has an estimated Caprini score of greater than 5.  However, the patient's unique clinical situation will be addressed in an individual manner to determine appropriate anticoagulation treatment, if any.

## 2022-09-20 LAB
ABO RH CONFIRMATION: SIGNIFICANT CHANGE UP
APTT BLD: 23.9 SEC — LOW (ref 27.5–35.5)
BLD GP AB SCN SERPL QL: SIGNIFICANT CHANGE UP
GLUCOSE BLDC GLUCOMTR-MCNC: 112 MG/DL — HIGH (ref 70–99)
GLUCOSE BLDC GLUCOMTR-MCNC: 116 MG/DL — HIGH (ref 70–99)
GLUCOSE BLDC GLUCOMTR-MCNC: 118 MG/DL — HIGH (ref 70–99)
INR BLD: 0.96 RATIO — SIGNIFICANT CHANGE UP (ref 0.88–1.16)
PROTHROM AB SERPL-ACNC: 11.1 SEC — SIGNIFICANT CHANGE UP (ref 10.5–13.4)
SARS-COV-2 RNA SPEC QL NAA+PROBE: SIGNIFICANT CHANGE UP

## 2022-09-20 PROCEDURE — 99221 1ST HOSP IP/OBS SF/LOW 40: CPT | Mod: FS

## 2022-09-20 PROCEDURE — 99232 SBSQ HOSP IP/OBS MODERATE 35: CPT

## 2022-09-20 PROCEDURE — 99223 1ST HOSP IP/OBS HIGH 75: CPT | Mod: FS,57

## 2022-09-20 PROCEDURE — 72128 CT CHEST SPINE W/O DYE: CPT | Mod: 26

## 2022-09-20 PROCEDURE — 72125 CT NECK SPINE W/O DYE: CPT | Mod: 26

## 2022-09-20 RX ADMIN — PANTOPRAZOLE SODIUM 40 MILLIGRAM(S): 20 TABLET, DELAYED RELEASE ORAL at 05:16

## 2022-09-20 RX ADMIN — Medication 4 MILLIGRAM(S): at 12:57

## 2022-09-20 RX ADMIN — SENNA PLUS 2 TABLET(S): 8.6 TABLET ORAL at 22:54

## 2022-09-20 RX ADMIN — MORPHINE SULFATE 2 MILLIGRAM(S): 50 CAPSULE, EXTENDED RELEASE ORAL at 10:53

## 2022-09-20 RX ADMIN — GABAPENTIN 200 MILLIGRAM(S): 400 CAPSULE ORAL at 14:24

## 2022-09-20 RX ADMIN — METHOCARBAMOL 500 MILLIGRAM(S): 500 TABLET, FILM COATED ORAL at 22:55

## 2022-09-20 RX ADMIN — GABAPENTIN 200 MILLIGRAM(S): 400 CAPSULE ORAL at 22:56

## 2022-09-20 RX ADMIN — METHOCARBAMOL 500 MILLIGRAM(S): 500 TABLET, FILM COATED ORAL at 05:12

## 2022-09-20 RX ADMIN — LACTULOSE 20 GRAM(S): 10 SOLUTION ORAL at 12:57

## 2022-09-20 RX ADMIN — METHOCARBAMOL 500 MILLIGRAM(S): 500 TABLET, FILM COATED ORAL at 14:24

## 2022-09-20 RX ADMIN — ENOXAPARIN SODIUM 40 MILLIGRAM(S): 100 INJECTION SUBCUTANEOUS at 09:07

## 2022-09-20 RX ADMIN — Medication 4 MILLIGRAM(S): at 18:26

## 2022-09-20 RX ADMIN — MORPHINE SULFATE 2 MILLIGRAM(S): 50 CAPSULE, EXTENDED RELEASE ORAL at 11:23

## 2022-09-20 RX ADMIN — Medication 4 MILLIGRAM(S): at 05:13

## 2022-09-20 RX ADMIN — POLYETHYLENE GLYCOL 3350 17 GRAM(S): 17 POWDER, FOR SOLUTION ORAL at 12:57

## 2022-09-20 RX ADMIN — GABAPENTIN 200 MILLIGRAM(S): 400 CAPSULE ORAL at 05:12

## 2022-09-20 RX ADMIN — MORPHINE SULFATE 2 MILLIGRAM(S): 50 CAPSULE, EXTENDED RELEASE ORAL at 19:36

## 2022-09-20 RX ADMIN — Medication 4 MILLIGRAM(S): at 00:21

## 2022-09-20 RX ADMIN — MORPHINE SULFATE 2 MILLIGRAM(S): 50 CAPSULE, EXTENDED RELEASE ORAL at 05:19

## 2022-09-20 RX ADMIN — MORPHINE SULFATE 2 MILLIGRAM(S): 50 CAPSULE, EXTENDED RELEASE ORAL at 05:34

## 2022-09-20 RX ADMIN — MORPHINE SULFATE 2 MILLIGRAM(S): 50 CAPSULE, EXTENDED RELEASE ORAL at 19:06

## 2022-09-20 NOTE — PROGRESS NOTE ADULT - NS ATTEND AMEND GEN_ALL_CORE FT
agree
The procedure was explained in details to the pt along with the and the necessity of the procedure. The risks were explained including but not limited to infection, leg and arms weakness, paralysis DVT, PE, MI, death were explained in details The pt signed the consent.
agree
The medical oncologists and Radiation oncologist estimated the life expectancy at at least 1 year. They recommend stabilization procedure. I explained the procedure in details to the pt and her health care proxy her brother and nephew. I went through the risk and the morbidity of the procedure including would infection, post op hematoma, leg, arm weakness or paralysis, pneumonia, pneumothorax, DVT, MI, PE, stroke, risk of death. All alternatives were explained. I specifically asked the pt to have a discussion with her oncologists regarding life expectancy. The pt after thinking together with the health care proxy decided to proceed with surgery. All appropriate clearances will be obtained.
The pat asked for a more in depth discussion with oncologist before consider surgery.
The procedure, the goals of the procedure and the necessity of the procedure were explained in details to the pt again. The risks of the surgery including but not limited to infection, risk for arm or leg weakness, swallowing issues, tracheostomy, PE, DVT, MI, STROKE, death were explained in details. The pt signed the consent.
NSGY Attg:    see above    patient seen and examined    agree with exam and plan as above
NSGY Attg:    see above    patient seen and examined    agree with exam and plan as above

## 2022-09-20 NOTE — PROGRESS NOTE ADULT - SUBJECTIVE AND OBJECTIVE BOX
Chief complaint: back pain    Patient seen and examined at bedside. No acute overnight events reported. Pain is well controlled. No fever, chills, cough, chest pain, nausea or vomiting.     Vital Signs Last 24 Hrs  T(F): 98 (20 Sep 2022 04:28), Max: 98 (19 Sep 2022 12:44)  HR: 74 (20 Sep 2022 04:28) (74 - 84)  BP: 133/81 (20 Sep 2022 04:28) (133/81 - 143/78)  RR: 18 (20 Sep 2022 04:28) (18 - 18)  SpO2: 94% (20 Sep 2022 04:28) (92% - 94%)    Physical Exam:  Constitutional: alert and oriented, in no acute distress   Neck: collar in place  Respiratory: Clear to auscultation bilaterally  Cardiovascular: Regular rate and rhyhtm  Gastrointestinal: Soft, non-tender to palpation, +bs  Vascular: 2+ peripheral pulses  Musculoskeletal: no lower extremity edema bilaterally    Labs:                      13.7   14.83 )-----------( 292      ( 19 Sep 2022 04:55 )             38.9   09-19    135  |  100  |  25.2<H>  ----------------------------<  104<H>  4.3   |  24.0  |  0.45<L>    Ca    9.8      19 Sep 2022 04:55

## 2022-09-20 NOTE — PROGRESS NOTE ADULT - ASSESSMENT
67 y/o F with no known PMH presented to the ED with progressive weakness to the point that she felt she could not get out of bed this morning. CT imaging revealed a 6.5 cm breast mass with multiple spinal lesions concerning for metastatic breast cancer.    Back pain due to extensive osseous metastatic disease with pathologic compression fracture deformities at C7, T1, T9, T12 and L2, severe at T1  - Decadron 4mg IVP q6h  - Robaxin 500mg TID  - Gabapentin 200mg TID  - Morphine PRN  - Oncology recs appreciated  - NSG recs appreciated  - Pain management consult appreciated  - OR on 9/21 for corpectomy and laminectomy   - Cardiology consulted for clearance    Large right breast mass  - likely cancer with mets to spine, brain, liver  - s/p right breast biopsy   - Oncology recs appreciated  - Biopsy positive for invasive ductal carcinoma      Hypercalcemia due to bone mets malignancy   - s/p IVF , improving   - Monitor BMP     Moderate protein malia malnutrition   - Regular diet  - Ensure    DVT ppx  - Lovenox SQ

## 2022-09-20 NOTE — CONSULT NOTE ADULT - PROBLEM SELECTOR RECOMMENDATION 9
OR 9/21 with neurosx for corpectomy and laminectomy  thoracic surgery to be on standby for possible sternotomy for exposure  remainder of care per primary team    d/w Dr. Jordan
- Patient with associated neck pain, RUE weakness, lumbar, and hip pain  - MRI spine completed, report pending; concerning for T1 cord compression  - Briefly discussed results of testing thus far with patient, and potential roles of systemic therapy, radiation, and surgery in the management of metastatic disease.  - Appreciate Neurosurgery/Ortho input regarding role of surgery.  - If no surgery recommended, would plan for emergent palliative RT to symptomatic area(s).  Overview of logistics, rationale, risks, benefits, and alternatives.  Specifically addressed limitation of RT if structural instability/vertebral fractures.

## 2022-09-20 NOTE — CONSULT NOTE ADULT - SUBJECTIVE AND OBJECTIVE BOX
Surgeon: Nikki    Consult requesting by: neurosx     HISTORY OF PRESENT ILLNESS:  66F, pmhx no significant pmhx, newly dx breast mass in July presented 9/10 with progressive weakness, ultimately found to have large right breast lesion with diffuse osseous metastatic disease, Liver and pulm metastases with pathological fx of the spine, pending OR with neurosx for corpectomy and laminectomy, thoracic surgery consulted for possible sternotomy for exposure.      PAST MEDICAL & SURGICAL HISTORY:  Stomach ulcer  20 years ago   no pain not taking any meds      Skin cancer of arm, left  pt was 16 year old  H/O excision of mass  left arm  when patient was 16 years ago  S/P hysterectomy    MEDICATIONS  (STANDING):  dexAMETHasone  Injectable 4 milliGRAM(s) IV Push every 6 hours  enoxaparin Injectable 40 milliGRAM(s) SubCutaneous every 24 hours  gabapentin 200 milliGRAM(s) Oral three times a day  influenza  Vaccine (HIGH DOSE) 0.7 milliLiter(s) IntraMuscular once  lactulose Syrup 20 Gram(s) Oral daily  methocarbamol 500 milliGRAM(s) Oral three times a day  naloxone Injectable 0.4 milliGRAM(s) IV Push once  pantoprazole    Tablet 40 milliGRAM(s) Oral before breakfast  polyethylene glycol 3350 17 Gram(s) Oral daily  senna 2 Tablet(s) Oral at bedtime    MEDICATIONS  (PRN):  acetaminophen     Tablet .. 650 milliGRAM(s) Oral every 6 hours PRN Temp greater or equal to 38C (100.4F), Mild Pain (1 - 3)  aluminum hydroxide/magnesium hydroxide/simethicone Suspension 30 milliLiter(s) Oral every 4 hours PRN Dyspepsia  bisacodyl 5 milliGRAM(s) Oral daily PRN Constipation  melatonin 3 milliGRAM(s) Oral at bedtime PRN Insomnia  morphine  - Injectable 2 milliGRAM(s) IV Push every 4 hours PRN Moderate Pain (4 - 6)  morphine  - Injectable 4 milliGRAM(s) IV Push every 4 hours PRN Severe Pain (7 - 10)  ondansetron Injectable 4 milliGRAM(s) IV Push every 8 hours PRN Nausea and/or Vomiting    Allergies  tetanus toxoid (Hives)  Intolerances  lactose (Other)      SOCIAL HISTORY:      FAMILY HISTORY:  Family history of dementia (Mother)  Family history of colon cancer (Father)    Review of Systems  negative x 10 systems except as noted above    PHYSICAL EXAM  Vital Signs Last 24 Hrs  T(C): 36.7 (20 Sep 2022 04:28), Max: 36.7 (19 Sep 2022 12:44)  T(F): 98 (20 Sep 2022 04:28), Max: 98 (19 Sep 2022 12:44)  HR: 74 (20 Sep 2022 04:28) (74 - 84)  BP: 133/81 (20 Sep 2022 04:28) (133/81 - 143/78)  BP(mean): --  RR: 18 (20 Sep 2022 04:28) (18 - 18)  SpO2: 94% (20 Sep 2022 04:28) (92% - 94%)    Parameters below as of 19 Sep 2022 20:27  Patient On (Oxygen Delivery Method): room air    Gen:  Neuro:  HEENT:  Neck:  CV:  Pulm:  Abd:  Ext:  skin:                                                          LABS:                        13.7   14.83 )-----------( 292      ( 19 Sep 2022 04:55 )             38.9     09-19    135  |  100  |  25.2<H>  ----------------------------<  104<H>  4.3   |  24.0  |  0.45<L>    Ca    9.8      19 Sep 2022 04:55    PT/INR - ( 20 Sep 2022 05:28 )   PT: 11.1 sec;   INR: 0.96 ratio    PTT - ( 20 Sep 2022 05:28 )  PTT:23.9 sec                               Surgeon: Nikki    Consult requesting by: neurosx     HISTORY OF PRESENT ILLNESS:  66F, pmhx no significant pmhx, newly dx breast mass, presented 9/10 with progressive weakness, ultimately found to have large right breast lesion with diffuse osseous metastatic disease, Liver and pulm metastases with pathological fx of the spine, pending OR with neurosx for corpectomy and laminectomy, thoracic surgery consulted for possible sternotomy for exposure. Pt states she was initially told she had 2 small breast masses in March and was waiting to finish a job assignment before follow up. In July she attended a "healing conference" in [place] which she felt helped briefly with the size of her mass. At the beginning of August she developed pain across her back between her shoulders and breast pain. She states the mass began to swell and become hard. Since August 1st, she has had rapid progression of pain and weakness. For last 2 weeks she has notice RIGHT hand weakness/numbness/tingling to the point she can not hold anything (non dominant hand).  The day of admission pt was unable to get out of bed due to weakness/fatigue so her brother called an ambulance for her to come to the hospital. She denies numbness/tingling of her LE or pain radiating down her back. She states that the pain in her back/breast was sometimes so unbearable she could not stand for more then a few minutes before feeling like she was going to pass out. Pt denies CP, palpitations, SOB, cough, fever, chills, itchiness/rash, diaphoresis, vision changes, HA, dizziness/lightheadedness, numbness/tingling, abd pain, N/V.    PAST MEDICAL & SURGICAL HISTORY:  Stomach ulcer  20 years ago   no pain not taking any meds    Skin cancer of arm, left  pt was 16 year old  H/O excision of mass  left arm  when patient was 16 years ago  S/P hysterectomy    MEDICATIONS  (STANDING):  dexAMETHasone  Injectable 4 milliGRAM(s) IV Push every 6 hours  enoxaparin Injectable 40 milliGRAM(s) SubCutaneous every 24 hours  gabapentin 200 milliGRAM(s) Oral three times a day  influenza  Vaccine (HIGH DOSE) 0.7 milliLiter(s) IntraMuscular once  lactulose Syrup 20 Gram(s) Oral daily  methocarbamol 500 milliGRAM(s) Oral three times a day  naloxone Injectable 0.4 milliGRAM(s) IV Push once  pantoprazole    Tablet 40 milliGRAM(s) Oral before breakfast  polyethylene glycol 3350 17 Gram(s) Oral daily  senna 2 Tablet(s) Oral at bedtime    MEDICATIONS  (PRN):  acetaminophen     Tablet .. 650 milliGRAM(s) Oral every 6 hours PRN Temp greater or equal to 38C (100.4F), Mild Pain (1 - 3)  aluminum hydroxide/magnesium hydroxide/simethicone Suspension 30 milliLiter(s) Oral every 4 hours PRN Dyspepsia  bisacodyl 5 milliGRAM(s) Oral daily PRN Constipation  melatonin 3 milliGRAM(s) Oral at bedtime PRN Insomnia  morphine  - Injectable 2 milliGRAM(s) IV Push every 4 hours PRN Moderate Pain (4 - 6)  morphine  - Injectable 4 milliGRAM(s) IV Push every 4 hours PRN Severe Pain (7 - 10)  ondansetron Injectable 4 milliGRAM(s) IV Push every 8 hours PRN Nausea and/or Vomiting    Allergies  tetanus toxoid (Hives)  Intolerances  lactose (Other)    SOCIAL HISTORY:  not , no children, has a brother and sister local and nieces/nephews that are supportive  non smoker, denies ETOH/illicit drugs   does not have permanent residence due to her job. 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  independent prior to this hospitalization    FAMILY HISTORY:  Family history of dementia (Mother)  Family history of colon cancer (Father)    Review of Systems  negative x 10 systems except as noted above    PHYSICAL EXAM  Vital Signs Last 24 Hrs  T(C): 36.7 (20 Sep 2022 04:28), Max: 36.7 (19 Sep 2022 12:44)  T(F): 98 (20 Sep 2022 04:28), Max: 98 (19 Sep 2022 12:44)  HR: 74 (20 Sep 2022 04:28) (74 - 84)  BP: 133/81 (20 Sep 2022 04:28) (133/81 - 143/78)  BP(mean): --  RR: 18 (20 Sep 2022 04:28) (18 - 18)  SpO2: 94% (20 Sep 2022 04:28) (92% - 94%)    Parameters below as of 19 Sep 2022 20:27  Patient On (Oxygen Delivery Method): room air    Gen: NAD  Neuro:A&Ox3, no LE weakness, + R hand weakness,   HEENT:PERRL  Neck: C collar in place, unable to exam  CV: S1S2 RRR no murmurs  Pulm: CTA b/l no wheezing  Abd: +BS soft NT ND, old mid abd scar  Ext: no edema, or cyanosis, CHAMORRO  skin: warm dry perfused no rashes, firm R breast mass                                                           LABS:                        13.7   14.83 )-----------( 292      ( 19 Sep 2022 04:55 )             38.9     09-19    135  |  100  |  25.2<H>  ----------------------------<  104<H>  4.3   |  24.0  |  0.45<L>    Ca    9.8      19 Sep 2022 04:55    PT/INR - ( 20 Sep 2022 05:28 )   PT: 11.1 sec;   INR: 0.96 ratio    PTT - ( 20 Sep 2022 05:28 )  PTT:23.9 sec

## 2022-09-20 NOTE — CONSULT NOTE ADULT - NS ATTEND AMEND GEN_ALL_CORE FT
I explained to the pt in details the plan.
Pt with met spinal focus for stabilization tomorrow.  Will be available for sternotomy if needed.

## 2022-09-20 NOTE — CONSULT NOTE ADULT - ASSESSMENT
66F, pmhx no significant pmhx, newly dx breast mass in July presented 9/10 with progressive weakness, ultimately found to have large right breast lesion with diffuse osseous metastatic disease, Liver and pulm metastases with pathological fx of the spine, pending OR with neurosx for corpectomy and laminectomy, thoracic surgery consulted for possible sternotomy for exposure.

## 2022-09-20 NOTE — PROGRESS NOTE ADULT - ASSESSMENT
66F  acute spine pain 2/2 breast CA mets  pain controlled on morphine IVP + non-opioid adjuncts    Pain controlled  Pain service will sign off  Please reconsult as needed

## 2022-09-20 NOTE — PROGRESS NOTE ADULT - SUBJECTIVE AND OBJECTIVE BOX
Interval Hx:  Patient seen during rounds  Patient reports pain to be controlled on current medications  Patient denies sedation with medications     Analgesic Dosing for past 24 hours reviewed as below:    gabapentin   200 milliGRAM(s) Oral (09-20-22 @ 05:12)   200 milliGRAM(s) Oral (09-19-22 @ 21:15)   200 milliGRAM(s) Oral (09-19-22 @ 13:40)    methocarbamol   500 milliGRAM(s) Oral (09-20-22 @ 05:12)   500 milliGRAM(s) Oral (09-19-22 @ 21:15)   500 milliGRAM(s) Oral (09-19-22 @ 13:40)    morphine  - Injectable   2 milliGRAM(s) IV Push (09-20-22 @ 05:19)    morphine  - Injectable   4 milliGRAM(s) IV Push (09-19-22 @ 16:07)          T(C): 36.7 (09-20-22 @ 04:28), Max: 36.7 (09-19-22 @ 12:44)  HR: 74 (09-20-22 @ 04:28) (74 - 84)  BP: 133/81 (09-20-22 @ 04:28) (133/81 - 143/78)  RR: 18 (09-20-22 @ 04:28) (18 - 18)  SpO2: 94% (09-20-22 @ 04:28) (92% - 94%)      09-19-22 @ 07:01  -  09-20-22 @ 07:00  --------------------------------------------------------  IN: 0 mL / OUT: 300 mL / NET: -300 mL        acetaminophen     Tablet .. 650 milliGRAM(s) Oral every 6 hours PRN  aluminum hydroxide/magnesium hydroxide/simethicone Suspension 30 milliLiter(s) Oral every 4 hours PRN  bisacodyl 5 milliGRAM(s) Oral daily PRN  dexAMETHasone  Injectable 4 milliGRAM(s) IV Push every 6 hours  enoxaparin Injectable 40 milliGRAM(s) SubCutaneous every 24 hours  gabapentin 200 milliGRAM(s) Oral three times a day  influenza  Vaccine (HIGH DOSE) 0.7 milliLiter(s) IntraMuscular once  lactulose Syrup 20 Gram(s) Oral daily  melatonin 3 milliGRAM(s) Oral at bedtime PRN  methocarbamol 500 milliGRAM(s) Oral three times a day  morphine  - Injectable 2 milliGRAM(s) IV Push every 4 hours PRN  morphine  - Injectable 4 milliGRAM(s) IV Push every 4 hours PRN  naloxone Injectable 0.4 milliGRAM(s) IV Push once  ondansetron Injectable 4 milliGRAM(s) IV Push every 8 hours PRN  pantoprazole    Tablet 40 milliGRAM(s) Oral before breakfast  polyethylene glycol 3350 17 Gram(s) Oral daily  senna 2 Tablet(s) Oral at bedtime                          13.7   14.83 )-----------( 292      ( 19 Sep 2022 04:55 )             38.9     09-19    135  |  100  |  25.2<H>  ----------------------------<  104<H>  4.3   |  24.0  |  0.45<L>    Ca    9.8      19 Sep 2022 04:55      PT/INR - ( 20 Sep 2022 05:28 )   PT: 11.1 sec;   INR: 0.96 ratio         PTT - ( 20 Sep 2022 05:28 )  PTT:23.9 sec      Pain Service   [phone]

## 2022-09-20 NOTE — PROGRESS NOTE ADULT - SUBJECTIVE AND OBJECTIVE BOX
HPI:  65 yo female with no known pmhx presented to the ED with progressive weakness to the point that she felt she could not get out of bed this morning. She started to note pain in her shoulders and lower back at the end of July. She went to the ED at [facility] and also to urgent care, but states they sent her home. She also has had decreased appetite over this time. Her last mammogram was in 2014. She reports they found 2 lumps in her right breast and recommended biopsy. When she went for biopsy, she could not tolerate the pain. She was supposed to return to re-attempt with pain medications, but she never went back. Currently, her pain is well controlled after morphine in the ED. She denies chest pain, SOB, N/V/D, F/C, or other associated symptoms. Patient denies skin changes on the breast or nipple discharge. (10 Sep 2022 02:11)      INTERVAL HPI/OVERNIGHT EVENTS:  66y Female s/p __ seen lying comfortably in bed. Tolerating diet. Passing gas/BM. Voiding. Hamm in with __ clear urine. Denies headache, weakness, numbness, n/v/d, fevers, chills, chest pain, SOB.     Vital Signs Last 24 Hrs  T(C): 37.1 (20 Sep 2022 12:24), Max: 37.1 (20 Sep 2022 12:24)  T(F): 98.8 (20 Sep 2022 12:24), Max: 98.8 (20 Sep 2022 12:24)  HR: 74 (20 Sep 2022 12:24) (74 - 83)  BP: 126/74 (20 Sep 2022 12:24) (126/74 - 142/87)  BP(mean): --  RR: 18 (20 Sep 2022 12:24) (18 - 18)  SpO2: 94% (20 Sep 2022 12:24) (92% - 94%)    Parameters below as of 20 Sep 2022 12:24  Patient On (Oxygen Delivery Method): room air        PHYSICAL EXAM:  GENERAL: NAD, well-groomed, well-developed  HEAD:  Atraumatic, normocephalic  DRAINS:   WOUND: Dressing clean dry intact  JULIA COMA SCORE: E- V- M- =       E: 4= opens eyes spontaneously 3= to voice 2= to noxious 1= no opening       V: 5= oriented 4= confused 3= inappropriate words 2= incomprehensible sounds 1= nonverbal 1T= intubated       M: 6= follows commands 5= localizes 4= withdraws 3= flexor posturing 2= extensor posturing 1= no movement  MENTAL STATUS: AAO x3; Awake/Comatose; Opens eyes spontaneously/to voice/to light touch/to noxious stimuli; Appropriately conversant without aphasia/Nonverbal; following simple commands/mimicking/not following commands  CRANIAL NERVES: Visual acuity normal for age, visual fields full to confrontation, PERRL. EOMI without nystagmus. Facial sensation intact V1-3 distribution b/l. Face symmetric w/ normal eye closure and smile, tongue midline. Hearing grossly intact. Speech clear. Head turning and shoulder shrug intact.   REFLEXES: PERRL. Corneals intact b/l. Gag intact. Cough intact. Oculocephalic reflex intact (Doll's eye). Negative Mcdonald's b/l. Negative clonus b/l  MOTOR: strength 5/5 b/l upper and lower extremities  Uppers     Delt (C5/6)     Bicep (C5/6)     Wrist Extend (C6)     Tricep (C7)     HG (C8/T1)  R                     5/5                 5/5                         5/5                           5/5                   5/5  L                      5/5                 5/5                         5/5                           5/5                   5/5  Lowers      HF(L1/L2)     KE (L3)     DF (L4)     EHL (L5)     PF (S1)      R                     5/5              5/5           5/5           5/5            5/5  L                     5/5               5/5          5/5            5/5            5/5  SENSATION: grossly intact to light touch all extremities  COORDINATION: Gait intact; rapid alternating movements intact; heel to shin intact; no upper extremity dysmetria  CHEST/LUNG: Clear to auscultation bilaterally; no rales, rhonchi, wheezing, or rubs  HEART: +S1/+S2; Regular rate and rhythm; no murmurs, rubs, or gallops  ABDOMEN: Soft, nontender, nondistended; bowel sounds present all four quadrants  EXTREMITIES:  2+ peripheral pulses, no clubbing, cyanosis, or edema  SKIN: Warm, dry; no rashes or lesions    LABS:                        13.7   14.83 )-----------( 292      ( 19 Sep 2022 04:55 )             38.9     09-19    135  |  100  |  25.2<H>  ----------------------------<  104<H>  4.3   |  24.0  |  0.45<L>    Ca    9.8      19 Sep 2022 04:55      PT/INR - ( 20 Sep 2022 05:28 )   PT: 11.1 sec;   INR: 0.96 ratio         PTT - ( 20 Sep 2022 05:28 )  PTT:23.9 sec      09-19 @ 07:01  -  09-20 @ 07:00  --------------------------------------------------------  IN: 0 mL / OUT: 300 mL / NET: -300 mL        RADIOLOGY & ADDITIONAL TESTS:          CAPRINI SCORE [CLOT]:  Patient has an estimated Caprini score of greater than 5.  However, the patient's unique clinical situation will be addressed in an individual manner to determine appropriate anticoagulation treatment, if any. HPI:  67 yo female with no known pmhx presented to the ED with progressive weakness to the point that she felt she could not get out of bed this morning. She started to note pain in her shoulders and lower back at the end of July. She went to the ED at [facility] and also to urgent care, but states they sent her home. She also has had decreased appetite over this time. Her last mammogram was in 2014. She reports they found 2 lumps in her right breast and recommended biopsy. When she went for biopsy, she could not tolerate the pain. She was supposed to return to re-attempt with pain medications, but she never went back. Currently, her pain is well controlled after morphine in the ED. She denies chest pain, SOB, N/V/D, F/C, or other associated symptoms. Patient denies skin changes on the breast or nipple discharge. (10 Sep 2022 02:11)      INTERVAL HPI/OVERNIGHT EVENTS:  66y Female seen lying comfortably in bed. Patient has no acute complaints. Anticipating OR in AM.    Vital Signs Last 24 Hrs  T(C): 37.1 (20 Sep 2022 12:24), Max: 37.1 (20 Sep 2022 12:24)  T(F): 98.8 (20 Sep 2022 12:24), Max: 98.8 (20 Sep 2022 12:24)  HR: 74 (20 Sep 2022 12:24) (74 - 83)  BP: 126/74 (20 Sep 2022 12:24) (126/74 - 142/87)  BP(mean): --  RR: 18 (20 Sep 2022 12:24) (18 - 18)  SpO2: 94% (20 Sep 2022 12:24) (92% - 94%)    Parameters below as of 20 Sep 2022 12:24  Patient On (Oxygen Delivery Method): room air      PHYSICAL EXAM:  GENERAL: NAD, well-groomed, pleasant  HEAD: Atraumatic, normocephalic  NECK: Cervical collar in place  MENTAL STATUS: AAO x3; Awake; Opens eyes spontaneously; Appropriately conversant without aphasia; following simple commands  CRANIAL NERVES: PERRL. Face symmetric.  REFLEXES: Negative Mcdonald's b/l. Negative clonus b/l  MOTOR: RUE delt 5/5, bicep 4-/5, tricep 4-/5, HG 3/5, LUE/RLE/LLE 5/5  SENSATION: Diminished sensation in RUE specifically 4th and 5th digit, otherwise intact to light touch all extremities  SKIN: Warm, dry    LABS:                        13.7   14.83 )-----------( 292      ( 19 Sep 2022 04:55 )             38.9     09-19    135  |  100  |  25.2<H>  ----------------------------<  104<H>  4.3   |  24.0  |  0.45<L>    Ca    9.8      19 Sep 2022 04:55      PT/INR - ( 20 Sep 2022 05:28 )   PT: 11.1 sec;   INR: 0.96 ratio         PTT - ( 20 Sep 2022 05:28 )  PTT:23.9 sec      09-19 @ 07:01  -  09-20 @ 07:00  --------------------------------------------------------  IN: 0 mL / OUT: 300 mL / NET: -300 mL        RADIOLOGY & ADDITIONAL TESTS:  IR US Guided Needle Placement (09.12.22 @ 14:04):  IMPRESSION:  SUCCESSFUL CORE BIOPSY OF A LARGE PALPABLE RIGHT BREAST MASS.    MR Cervical Spine w/wo IV Cont (09.11.22 @ 11:27):  IMPRESSION:  Extensive osseous metastatic disease with pathologic compression fracture   deformities at C7, T1, T9, T12 and L2, severe at T1.    Abnormal ventral and dorsal epidural enhancement from C7 through T2.   Severe canal stenosis at T1 and L2 without associated cord or cauda   equina compression.    There is motion artifact present in the thoracic spine which limits   evaluation. No abnormal leptomeningeal enhancement or definite cord   signal abnormality.    MR Head w/wo IV Cont (09.11.22 @ 11:27):  IMPRESSION:  Subcentimeter enhancing parenchymal lesions as above compatible with   intracranial metastatic disease. No associated vasogenic edema. No   evidence of acute infarct or hemorrhage.    Likely left parietal calvarial osseous metastatic lesion.    MR Cervical, Thoracic, & Lumbar Spine w/wo IV Cont (09.11.22 @ 11:26):  IMPRESSION:  Extensive osseous metastatic disease with pathologic compression fracture   deformities at C7, T1, T9, T12 and L2, severe at T1.    Abnormal ventral and dorsal epidural enhancement from C7 through T2.   Severe canal stenosis at T1 and L2 without associated cord or cauda   equina compression.    There is motion artifact present in the thoracic spine which limits   evaluation. No abnormal leptomeningeal enhancement or definite cord   signal abnormality.

## 2022-09-20 NOTE — PROGRESS NOTE ADULT - ASSESSMENT
66F with new Dx of breast mass presenting to [facility] with back pain & weakness.   - MR spinal axis reveals extensive spine mets.  - MR brain reveals metastatic lesions.    Plan:  -D/w Dr. Bennett   -Q4h neuro checks  -Continue c-collar at all times   -Continue Decadron 4a6  -Pain control PRN   -SCDs for DVT PPX  -Strict bedrest with pt. maintaining head & spine flat   -Heme/onc following; appreciate recs  -Plan for OR tomorrow 9/21; pre-op   -Further medical management per primary team

## 2022-09-21 ENCOUNTER — APPOINTMENT (OUTPATIENT)
Dept: NEUROSURGERY | Facility: HOSPITAL | Age: 67
End: 2022-09-21

## 2022-09-21 ENCOUNTER — TRANSCRIPTION ENCOUNTER (OUTPATIENT)
Age: 67
End: 2022-09-21

## 2022-09-21 ENCOUNTER — RESULT REVIEW (OUTPATIENT)
Age: 67
End: 2022-09-21

## 2022-09-21 ENCOUNTER — APPOINTMENT (OUTPATIENT)
Dept: THORACIC SURGERY | Facility: HOSPITAL | Age: 67
End: 2022-09-21

## 2022-09-21 LAB
ANION GAP SERPL CALC-SCNC: 12 MMOL/L — SIGNIFICANT CHANGE UP (ref 5–17)
ANION GAP SERPL CALC-SCNC: 13 MMOL/L — SIGNIFICANT CHANGE UP (ref 5–17)
APTT BLD: 25.1 SEC — LOW (ref 27.5–35.5)
BUN SERPL-MCNC: 21.9 MG/DL — HIGH (ref 8–20)
BUN SERPL-MCNC: 27 MG/DL — HIGH (ref 8–20)
CALCIUM SERPL-MCNC: 10 MG/DL — SIGNIFICANT CHANGE UP (ref 8.4–10.5)
CALCIUM SERPL-MCNC: 10 MG/DL — SIGNIFICANT CHANGE UP (ref 8.4–10.5)
CHLORIDE SERPL-SCNC: 102 MMOL/L — SIGNIFICANT CHANGE UP (ref 98–107)
CHLORIDE SERPL-SCNC: 103 MMOL/L — SIGNIFICANT CHANGE UP (ref 98–107)
CO2 SERPL-SCNC: 22 MMOL/L — SIGNIFICANT CHANGE UP (ref 22–29)
CO2 SERPL-SCNC: 24 MMOL/L — SIGNIFICANT CHANGE UP (ref 22–29)
CREAT SERPL-MCNC: 0.44 MG/DL — LOW (ref 0.5–1.3)
CREAT SERPL-MCNC: 0.51 MG/DL — SIGNIFICANT CHANGE UP (ref 0.5–1.3)
EGFR: 103 ML/MIN/1.73M2 — SIGNIFICANT CHANGE UP
EGFR: 107 ML/MIN/1.73M2 — SIGNIFICANT CHANGE UP
GAS PNL BLDA: SIGNIFICANT CHANGE UP
GAS PNL BLDA: SIGNIFICANT CHANGE UP
GLUCOSE BLDC GLUCOMTR-MCNC: 100 MG/DL — HIGH (ref 70–99)
GLUCOSE SERPL-MCNC: 100 MG/DL — HIGH (ref 70–99)
GLUCOSE SERPL-MCNC: 146 MG/DL — HIGH (ref 70–99)
HCT VFR BLD CALC: 32.1 % — LOW (ref 34.5–45)
HCT VFR BLD CALC: 38.3 % — SIGNIFICANT CHANGE UP (ref 34.5–45)
HGB BLD-MCNC: 11 G/DL — LOW (ref 11.5–15.5)
HGB BLD-MCNC: 13.3 G/DL — SIGNIFICANT CHANGE UP (ref 11.5–15.5)
INR BLD: 0.95 RATIO — SIGNIFICANT CHANGE UP (ref 0.88–1.16)
MAGNESIUM SERPL-MCNC: 1.9 MG/DL — SIGNIFICANT CHANGE UP (ref 1.6–2.6)
MCHC RBC-ENTMCNC: 30.1 PG — SIGNIFICANT CHANGE UP (ref 27–34)
MCHC RBC-ENTMCNC: 30.2 PG — SIGNIFICANT CHANGE UP (ref 27–34)
MCHC RBC-ENTMCNC: 34.3 GM/DL — SIGNIFICANT CHANGE UP (ref 32–36)
MCHC RBC-ENTMCNC: 34.7 GM/DL — SIGNIFICANT CHANGE UP (ref 32–36)
MCV RBC AUTO: 87 FL — SIGNIFICANT CHANGE UP (ref 80–100)
MCV RBC AUTO: 87.7 FL — SIGNIFICANT CHANGE UP (ref 80–100)
PHOSPHATE SERPL-MCNC: 4.5 MG/DL — SIGNIFICANT CHANGE UP (ref 2.4–4.7)
PLATELET # BLD AUTO: 261 K/UL — SIGNIFICANT CHANGE UP (ref 150–400)
PLATELET # BLD AUTO: 281 K/UL — SIGNIFICANT CHANGE UP (ref 150–400)
POTASSIUM SERPL-MCNC: 4.1 MMOL/L — SIGNIFICANT CHANGE UP (ref 3.5–5.3)
POTASSIUM SERPL-MCNC: 4.4 MMOL/L — SIGNIFICANT CHANGE UP (ref 3.5–5.3)
POTASSIUM SERPL-SCNC: 4.1 MMOL/L — SIGNIFICANT CHANGE UP (ref 3.5–5.3)
POTASSIUM SERPL-SCNC: 4.4 MMOL/L — SIGNIFICANT CHANGE UP (ref 3.5–5.3)
PROTHROM AB SERPL-ACNC: 11 SEC — SIGNIFICANT CHANGE UP (ref 10.5–13.4)
RBC # BLD: 3.66 M/UL — LOW (ref 3.8–5.2)
RBC # BLD: 4.4 M/UL — SIGNIFICANT CHANGE UP (ref 3.8–5.2)
RBC # FLD: 12.6 % — SIGNIFICANT CHANGE UP (ref 10.3–14.5)
RBC # FLD: 12.7 % — SIGNIFICANT CHANGE UP (ref 10.3–14.5)
SODIUM SERPL-SCNC: 137 MMOL/L — SIGNIFICANT CHANGE UP (ref 135–145)
SODIUM SERPL-SCNC: 139 MMOL/L — SIGNIFICANT CHANGE UP (ref 135–145)
WBC # BLD: 14.97 K/UL — HIGH (ref 3.8–10.5)
WBC # BLD: 28.03 K/UL — HIGH (ref 3.8–10.5)
WBC # FLD AUTO: 14.97 K/UL — HIGH (ref 3.8–10.5)
WBC # FLD AUTO: 28.03 K/UL — HIGH (ref 3.8–10.5)

## 2022-09-21 PROCEDURE — 22600 ARTHRD PST TQ 1NTRSPC CRV: CPT

## 2022-09-21 PROCEDURE — 72040 X-RAY EXAM NECK SPINE 2-3 VW: CPT | Mod: 26

## 2022-09-21 PROCEDURE — 22843 INSERT SPINE FIXATION DEVICE: CPT

## 2022-09-21 PROCEDURE — 22614 ARTHRD PST TQ 1NTRSPC EA ADD: CPT | Mod: 82

## 2022-09-21 PROCEDURE — 22552 ARTHRD ANT NTRBD CERVICAL EA: CPT | Mod: 82

## 2022-09-21 PROCEDURE — 88307 TISSUE EXAM BY PATHOLOGIST: CPT | Mod: 26

## 2022-09-21 PROCEDURE — 22552 ARTHRD ANT NTRBD CERVICAL EA: CPT

## 2022-09-21 PROCEDURE — 22854 INSJ BIOMECHANICAL DEVICE: CPT | Mod: 82

## 2022-09-21 PROCEDURE — 22600 ARTHRD PST TQ 1NTRSPC CRV: CPT | Mod: 82

## 2022-09-21 PROCEDURE — 22846 INSERT SPINE FIXATION DEVICE: CPT | Mod: 82,59

## 2022-09-21 PROCEDURE — 63308 REMOVE VERTEBRAL BODY ADD-ON: CPT

## 2022-09-21 PROCEDURE — 22551 ARTHRD ANT NTRBDY CERVICAL: CPT

## 2022-09-21 PROCEDURE — 88342 IMHCHEM/IMCYTCHM 1ST ANTB: CPT | Mod: 26

## 2022-09-21 PROCEDURE — 88341 IMHCHEM/IMCYTCHM EA ADD ANTB: CPT | Mod: 26

## 2022-09-21 PROCEDURE — 22846 INSERT SPINE FIXATION DEVICE: CPT | Mod: 59

## 2022-09-21 PROCEDURE — 22854 INSJ BIOMECHANICAL DEVICE: CPT

## 2022-09-21 PROCEDURE — 63300 REMOVE VERT XDRL BODY CRVCL: CPT | Mod: 82,22

## 2022-09-21 PROCEDURE — 63300 REMOVE VERT XDRL BODY CRVCL: CPT | Mod: 22

## 2022-09-21 PROCEDURE — 22843 INSERT SPINE FIXATION DEVICE: CPT | Mod: 82

## 2022-09-21 PROCEDURE — 22551 ARTHRD ANT NTRBDY CERVICAL: CPT | Mod: 82

## 2022-09-21 PROCEDURE — 22614 ARTHRD PST TQ 1NTRSPC EA ADD: CPT

## 2022-09-21 PROCEDURE — 63308 REMOVE VERTEBRAL BODY ADD-ON: CPT | Mod: 82

## 2022-09-21 DEVICE — SPONGE HSTAT SURGICEL 2X14": Type: IMPLANTABLE DEVICE | Status: FUNCTIONAL

## 2022-09-21 DEVICE — SPONGE GELFOAM COMPRESSED SZ 100: Type: IMPLANTABLE DEVICE | Status: FUNCTIONAL

## 2022-09-21 DEVICE — IMPLANTABLE DEVICE: Type: IMPLANTABLE DEVICE | Status: FUNCTIONAL

## 2022-09-21 DEVICE — SCREW SET YUKON: Type: IMPLANTABLE DEVICE | Status: FUNCTIONAL

## 2022-09-21 DEVICE — OSTEOSELECT DBM PLUS 5CC: Type: IMPLANTABLE DEVICE | Status: FUNCTIONAL

## 2022-09-21 DEVICE — SCREW POLYAXIAL 3.5X12MM: Type: IMPLANTABLE DEVICE | Status: FUNCTIONAL

## 2022-09-21 DEVICE — OSTEOSELECT DBM PLUS 2.5CC: Type: IMPLANTABLE DEVICE | Status: FUNCTIONAL

## 2022-09-21 DEVICE — SCREW SELF STRT VAR 4.5X14MM: Type: IMPLANTABLE DEVICE | Status: FUNCTIONAL

## 2022-09-21 DEVICE — SURGIFOAM PAD SZ 100: Type: IMPLANTABLE DEVICE | Status: FUNCTIONAL

## 2022-09-21 DEVICE — VITOSS SUB FOAM PK GRAFT BONE: Type: IMPLANTABLE DEVICE | Status: FUNCTIONAL

## 2022-09-21 DEVICE — SPONGE GELFOAM SZ 100 UNCOMPRESSED: Type: IMPLANTABLE DEVICE | Status: FUNCTIONAL

## 2022-09-21 DEVICE — SEALANT FLOSEAL FAST PREP HEMOSTATIC MATRIX 10ML: Type: IMPLANTABLE DEVICE | Status: FUNCTIONAL

## 2022-09-21 DEVICE — CHIP CANCELLOUS 1-8MM 30CC: Type: IMPLANTABLE DEVICE | Status: FUNCTIONAL

## 2022-09-21 RX ORDER — SODIUM CHLORIDE 9 MG/ML
1000 INJECTION INTRAMUSCULAR; INTRAVENOUS; SUBCUTANEOUS
Refills: 0 | Status: DISCONTINUED | OUTPATIENT
Start: 2022-09-21 | End: 2022-09-23

## 2022-09-21 RX ORDER — HYDROMORPHONE HYDROCHLORIDE 2 MG/ML
1 INJECTION INTRAMUSCULAR; INTRAVENOUS; SUBCUTANEOUS
Refills: 0 | Status: DISCONTINUED | OUTPATIENT
Start: 2022-09-21 | End: 2022-09-24

## 2022-09-21 RX ORDER — ONDANSETRON 8 MG/1
4 TABLET, FILM COATED ORAL EVERY 6 HOURS
Refills: 0 | Status: DISCONTINUED | OUTPATIENT
Start: 2022-09-21 | End: 2022-09-30

## 2022-09-21 RX ORDER — CEFAZOLIN SODIUM 1 G
2000 VIAL (EA) INJECTION EVERY 8 HOURS
Refills: 0 | Status: COMPLETED | OUTPATIENT
Start: 2022-09-21 | End: 2022-09-22

## 2022-09-21 RX ORDER — POLYETHYLENE GLYCOL 3350 17 G/17G
17 POWDER, FOR SOLUTION ORAL DAILY
Refills: 0 | Status: DISCONTINUED | OUTPATIENT
Start: 2022-09-21 | End: 2022-09-23

## 2022-09-21 RX ORDER — SENNA PLUS 8.6 MG/1
2 TABLET ORAL AT BEDTIME
Refills: 0 | Status: DISCONTINUED | OUTPATIENT
Start: 2022-09-21 | End: 2022-09-30

## 2022-09-21 RX ORDER — PHENYLEPHRINE HYDROCHLORIDE 10 MG/ML
0.1 INJECTION INTRAVENOUS
Qty: 40 | Refills: 0 | Status: DISCONTINUED | OUTPATIENT
Start: 2022-09-21 | End: 2022-09-22

## 2022-09-21 RX ORDER — METHOCARBAMOL 500 MG/1
500 TABLET, FILM COATED ORAL EVERY 8 HOURS
Refills: 0 | Status: DISCONTINUED | OUTPATIENT
Start: 2022-09-21 | End: 2022-09-24

## 2022-09-21 RX ORDER — GABAPENTIN 400 MG/1
200 CAPSULE ORAL EVERY 8 HOURS
Refills: 0 | Status: DISCONTINUED | OUTPATIENT
Start: 2022-09-21 | End: 2022-09-24

## 2022-09-21 RX ORDER — CHLORHEXIDINE GLUCONATE 213 G/1000ML
1 SOLUTION TOPICAL DAILY
Refills: 0 | Status: DISCONTINUED | OUTPATIENT
Start: 2022-09-21 | End: 2022-09-23

## 2022-09-21 RX ADMIN — HYDROMORPHONE HYDROCHLORIDE 1 MILLIGRAM(S): 2 INJECTION INTRAMUSCULAR; INTRAVENOUS; SUBCUTANEOUS at 00:34

## 2022-09-21 RX ADMIN — METHOCARBAMOL 500 MILLIGRAM(S): 500 TABLET, FILM COATED ORAL at 22:54

## 2022-09-21 RX ADMIN — HYDROMORPHONE HYDROCHLORIDE 1 MILLIGRAM(S): 2 INJECTION INTRAMUSCULAR; INTRAVENOUS; SUBCUTANEOUS at 23:12

## 2022-09-21 RX ADMIN — GABAPENTIN 200 MILLIGRAM(S): 400 CAPSULE ORAL at 22:50

## 2022-09-21 RX ADMIN — Medication 100 MILLIGRAM(S): at 21:42

## 2022-09-21 RX ADMIN — Medication 4 MILLIGRAM(S): at 00:08

## 2022-09-21 RX ADMIN — MORPHINE SULFATE 4 MILLIGRAM(S): 50 CAPSULE, EXTENDED RELEASE ORAL at 00:23

## 2022-09-21 RX ADMIN — SODIUM CHLORIDE 50 MILLILITER(S): 9 INJECTION INTRAMUSCULAR; INTRAVENOUS; SUBCUTANEOUS at 21:12

## 2022-09-21 RX ADMIN — MORPHINE SULFATE 4 MILLIGRAM(S): 50 CAPSULE, EXTENDED RELEASE ORAL at 00:08

## 2022-09-21 RX ADMIN — Medication 4 MILLIGRAM(S): at 06:31

## 2022-09-21 RX ADMIN — SENNA PLUS 2 TABLET(S): 8.6 TABLET ORAL at 22:54

## 2022-09-21 RX ADMIN — HYDROMORPHONE HYDROCHLORIDE 1 MILLIGRAM(S): 2 INJECTION INTRAMUSCULAR; INTRAVENOUS; SUBCUTANEOUS at 21:09

## 2022-09-21 RX ADMIN — HYDROMORPHONE HYDROCHLORIDE 1 MILLIGRAM(S): 2 INJECTION INTRAMUSCULAR; INTRAVENOUS; SUBCUTANEOUS at 21:39

## 2022-09-21 RX ADMIN — ONDANSETRON 4 MILLIGRAM(S): 8 TABLET, FILM COATED ORAL at 21:09

## 2022-09-21 NOTE — PROGRESS NOTE ADULT - SUBJECTIVE AND OBJECTIVE BOX
POST-OPERATIVE NOTE  Procedure: C7-T1-T2 Corpectomy & fusion, Posterior C4-T5 Fusion  Diagnosis/Indication: Spinal Mets  Surgeon: Dr. Sabrina NAIDU, Dr. Yifan NAIDU    INTERVAL HPI/ACUTE EVENTS:  66Female now s/p C7-T1-T2 Corpectomy & fusion, Posterior C4-T5 Fusion POD#0. Patient seen lying comfortably in bed. Extubated, still waking up from anesthesia      VITALS:  T(C): 36.4 (09-21-22 @ 06:50), Max: 36.7 (09-21-22 @ 04:33)  HR: 72 (09-21-22 @ 06:50) (72 - 89)  BP: 140/90 (09-21-22 @ 06:50) (140/90 - 148/87)  RR: 20 (09-21-22 @ 06:50) (18 - 20)  SpO2: 97% (09-21-22 @ 06:50) (91% - 97%)  Wt(kg): --    PHYSICAL EXAM: unable to assess secondary to anesthesia.    LABS:                        13.3   14.97 )-----------( 281      ( 21 Sep 2022 06:06 )             38.3     09-21    139  |  103  |  27.0<H>  ----------------------------<  100<H>  4.4   |  24.0  |  0.44<L>    Ca    10.0      21 Sep 2022 06:06        RADIOLOGY/OTHER:  < from: CT Thoracic Spine No Cont (09.20.22 @ 12:02) >  IMPRESSION:    CT CERVICAL SPINE:  Osseous metastases and pathologic fractures as described    CT THORACIC SPINE:  Osseous metastases and pathologic fractures as described.    < end of copied text >      CAPRINI SCORE [CLOT]:  Patient has an estimated Caprini score of greater than 5.  However, the patient's unique clinical situation will be addressed in an individual manner to determine appropriate anticoagulation treatment, if any.

## 2022-09-21 NOTE — PROGRESS NOTE ADULT - ASSESSMENT
ASSESSMENT  66F no known PMH presented with progressive generalized weakness, found to have new diagnosis of breast mass with extensive spinal metastatic lesions, now s/p C7-T1-T2 Corpectomy & fusion, Posterior C4-T5 Fusion POD 0.    PLAN  - case d/w team  - neuro checks q1  - pain control as needed, avoid over sedation  - CT C & T spine in AM without contrast  - C-collar at all times, will need thoracic extension in AM  - Anterior & posterior CASIMIRO drains to full suction, monitor output  - Ancef while drains in  - MAPS >80  - SCD's b/l for dvt ppx  - further medical care per NSICU  - will continue to follow

## 2022-09-21 NOTE — BRIEF OPERATIVE NOTE - NSICDXBRIEFPROCEDURE_GEN_ALL_CORE_FT
PROCEDURES:  Corpectomy, thoracic, with fusion 21-Sep-2022 18:34:56 T1-T2 corpectomy, C4-T5 fusion Kinga Walker  Cervical corpectomy 21-Sep-2022 18:35:08  Kinga Walker  Corpectomy, spine, cervical 21-Sep-2022 18:38:07  Kinga Walker

## 2022-09-21 NOTE — BRIEF OPERATIVE NOTE - ESTIMATED BLOOD LOSS
02/03/2020 LIZA   (QTY 1)  RIGHT  KNEE. NO AUTHORIZATION REQUIRED. STATE COVERED CODE. NOTES W/CLAIM FOR MEDICAL REVIEW OF MEDICAL NECESSITY. VALID & BILLABLE. CAN BUY& BILL. PER CAMILA ASTORGA @ [address] [place] [address] ON 2/3/3030, REF [id number].   AP
500
500

## 2022-09-21 NOTE — ASU PREOP CHECKLIST - LAST TOOK
04/13/18 0800   Patient Belongings   Clothing Search Yes   Second Staff Pt present.   A               Admission:  I am responsible for any personal items that are not sent to the safe or pharmacy.  [facility] is not responsible for loss, theft or damage of any property in my possession.    2 pair sweat pants  1 t-shirt - game of thrones  1 pair underwear      Given to pt for use on the unit.    Elastics for braces placed in pt locker.        Signature:  _________________________________ Date: _______  Time: _____                                              Staff Signature:  ____________________________ Date: ________  Time: _____      2nd Staff person, if patient is unable/unwilling to sign:    Signature: ________________________________ Date: ________  Time: _____     Discharge:  [facility] has returned all of my personal belongings:    Signature: _________________________________ Date: ________  Time: _____                                          Staff Signature:  ____________________________ Date: ________  Time: _____          solids

## 2022-09-21 NOTE — BRIEF OPERATIVE NOTE - NSICDXBRIEFPREOP_GEN_ALL_CORE_FT
PRE-OP DIAGNOSIS:  Metastasis to spinal column 21-Sep-2022 18:40:33  Kinga Walker  
PRE-OP DIAGNOSIS:  Metastasis to spinal column 21-Sep-2022 18:40:33  Kinga Walker

## 2022-09-21 NOTE — BRIEF OPERATIVE NOTE - NSICDXBRIEFPOSTOP_GEN_ALL_CORE_FT
POST-OP DIAGNOSIS:  Metastasis to spinal column 21-Sep-2022 18:41:44  Kinga Walker  
POST-OP DIAGNOSIS:  Metastasis to spinal column 21-Sep-2022 18:41:44  Kinga Walker

## 2022-09-21 NOTE — BRIEF OPERATIVE NOTE - NSICDXBRIEFPROCEDURE_GEN_ALL_CORE_FT
PROCEDURES:  Corpectomy, thoracic, with fusion 21-Sep-2022 18:34:56 T1-T2 corpectomy, C4-T5 fusion Kinga Walker  Corpectomy, spine, cervical 21-Sep-2022 18:38:07 C7 corpectomy Kinga Walker

## 2022-09-22 LAB
ANION GAP SERPL CALC-SCNC: 11 MMOL/L — SIGNIFICANT CHANGE UP (ref 5–17)
BUN SERPL-MCNC: 23.9 MG/DL — HIGH (ref 8–20)
CALCIUM SERPL-MCNC: 10 MG/DL — SIGNIFICANT CHANGE UP (ref 8.4–10.5)
CHLORIDE SERPL-SCNC: 102 MMOL/L — SIGNIFICANT CHANGE UP (ref 98–107)
CO2 SERPL-SCNC: 24 MMOL/L — SIGNIFICANT CHANGE UP (ref 22–29)
CREAT SERPL-MCNC: 0.52 MG/DL — SIGNIFICANT CHANGE UP (ref 0.5–1.3)
EGFR: 102 ML/MIN/1.73M2 — SIGNIFICANT CHANGE UP
GLUCOSE SERPL-MCNC: 144 MG/DL — HIGH (ref 70–99)
HCT VFR BLD CALC: 34.8 % — SIGNIFICANT CHANGE UP (ref 34.5–45)
HGB BLD-MCNC: 11.7 G/DL — SIGNIFICANT CHANGE UP (ref 11.5–15.5)
MAGNESIUM SERPL-MCNC: 2.1 MG/DL — SIGNIFICANT CHANGE UP (ref 1.6–2.6)
MCHC RBC-ENTMCNC: 29.9 PG — SIGNIFICANT CHANGE UP (ref 27–34)
MCHC RBC-ENTMCNC: 33.6 GM/DL — SIGNIFICANT CHANGE UP (ref 32–36)
MCV RBC AUTO: 89 FL — SIGNIFICANT CHANGE UP (ref 80–100)
MRSA PCR RESULT.: SIGNIFICANT CHANGE UP
PHOSPHATE SERPL-MCNC: 5.1 MG/DL — HIGH (ref 2.4–4.7)
PLATELET # BLD AUTO: 234 K/UL — SIGNIFICANT CHANGE UP (ref 150–400)
POTASSIUM SERPL-MCNC: 4.1 MMOL/L — SIGNIFICANT CHANGE UP (ref 3.5–5.3)
POTASSIUM SERPL-SCNC: 4.1 MMOL/L — SIGNIFICANT CHANGE UP (ref 3.5–5.3)
RBC # BLD: 3.91 M/UL — SIGNIFICANT CHANGE UP (ref 3.8–5.2)
RBC # FLD: 12.9 % — SIGNIFICANT CHANGE UP (ref 10.3–14.5)
S AUREUS DNA NOSE QL NAA+PROBE: SIGNIFICANT CHANGE UP
SODIUM SERPL-SCNC: 137 MMOL/L — SIGNIFICANT CHANGE UP (ref 135–145)
WBC # BLD: 22.35 K/UL — HIGH (ref 3.8–10.5)
WBC # FLD AUTO: 22.35 K/UL — HIGH (ref 3.8–10.5)

## 2022-09-22 PROCEDURE — 72128 CT CHEST SPINE W/O DYE: CPT | Mod: 26

## 2022-09-22 PROCEDURE — 72125 CT NECK SPINE W/O DYE: CPT | Mod: 26

## 2022-09-22 PROCEDURE — 99232 SBSQ HOSP IP/OBS MODERATE 35: CPT | Mod: FS

## 2022-09-22 PROCEDURE — 99233 SBSQ HOSP IP/OBS HIGH 50: CPT

## 2022-09-22 RX ORDER — OXYCODONE HYDROCHLORIDE 5 MG/1
5 TABLET ORAL EVERY 4 HOURS
Refills: 0 | Status: DISCONTINUED | OUTPATIENT
Start: 2022-09-22 | End: 2022-09-24

## 2022-09-22 RX ORDER — FAMOTIDINE 10 MG/ML
20 INJECTION INTRAVENOUS
Refills: 0 | Status: COMPLETED | OUTPATIENT
Start: 2022-09-22 | End: 2022-09-25

## 2022-09-22 RX ORDER — OXYCODONE HYDROCHLORIDE 5 MG/1
10 TABLET ORAL EVERY 4 HOURS
Refills: 0 | Status: DISCONTINUED | OUTPATIENT
Start: 2022-09-22 | End: 2022-09-24

## 2022-09-22 RX ORDER — ACETAMINOPHEN 500 MG
1000 TABLET ORAL ONCE
Refills: 0 | Status: COMPLETED | OUTPATIENT
Start: 2022-09-22 | End: 2022-09-22

## 2022-09-22 RX ORDER — ACETAMINOPHEN 500 MG
650 TABLET ORAL EVERY 6 HOURS
Refills: 0 | Status: DISCONTINUED | OUTPATIENT
Start: 2022-09-22 | End: 2022-09-24

## 2022-09-22 RX ORDER — DEXAMETHASONE 0.5 MG/5ML
4 ELIXIR ORAL EVERY 6 HOURS
Refills: 0 | Status: DISCONTINUED | OUTPATIENT
Start: 2022-09-22 | End: 2022-09-23

## 2022-09-22 RX ADMIN — HYDROMORPHONE HYDROCHLORIDE 1 MILLIGRAM(S): 2 INJECTION INTRAMUSCULAR; INTRAVENOUS; SUBCUTANEOUS at 03:31

## 2022-09-22 RX ADMIN — HYDROMORPHONE HYDROCHLORIDE 1 MILLIGRAM(S): 2 INJECTION INTRAMUSCULAR; INTRAVENOUS; SUBCUTANEOUS at 14:46

## 2022-09-22 RX ADMIN — HYDROMORPHONE HYDROCHLORIDE 1 MILLIGRAM(S): 2 INJECTION INTRAMUSCULAR; INTRAVENOUS; SUBCUTANEOUS at 01:43

## 2022-09-22 RX ADMIN — HYDROMORPHONE HYDROCHLORIDE 1 MILLIGRAM(S): 2 INJECTION INTRAMUSCULAR; INTRAVENOUS; SUBCUTANEOUS at 01:13

## 2022-09-22 RX ADMIN — ONDANSETRON 4 MILLIGRAM(S): 8 TABLET, FILM COATED ORAL at 23:27

## 2022-09-22 RX ADMIN — HYDROMORPHONE HYDROCHLORIDE 1 MILLIGRAM(S): 2 INJECTION INTRAMUSCULAR; INTRAVENOUS; SUBCUTANEOUS at 06:10

## 2022-09-22 RX ADMIN — HYDROMORPHONE HYDROCHLORIDE 1 MILLIGRAM(S): 2 INJECTION INTRAMUSCULAR; INTRAVENOUS; SUBCUTANEOUS at 08:38

## 2022-09-22 RX ADMIN — OXYCODONE HYDROCHLORIDE 10 MILLIGRAM(S): 5 TABLET ORAL at 20:30

## 2022-09-22 RX ADMIN — POLYETHYLENE GLYCOL 3350 17 GRAM(S): 17 POWDER, FOR SOLUTION ORAL at 11:21

## 2022-09-22 RX ADMIN — HYDROMORPHONE HYDROCHLORIDE 1 MILLIGRAM(S): 2 INJECTION INTRAMUSCULAR; INTRAVENOUS; SUBCUTANEOUS at 21:18

## 2022-09-22 RX ADMIN — OXYCODONE HYDROCHLORIDE 10 MILLIGRAM(S): 5 TABLET ORAL at 13:07

## 2022-09-22 RX ADMIN — HYDROMORPHONE HYDROCHLORIDE 1 MILLIGRAM(S): 2 INJECTION INTRAMUSCULAR; INTRAVENOUS; SUBCUTANEOUS at 03:40

## 2022-09-22 RX ADMIN — OXYCODONE HYDROCHLORIDE 10 MILLIGRAM(S): 5 TABLET ORAL at 19:31

## 2022-09-22 RX ADMIN — FAMOTIDINE 20 MILLIGRAM(S): 10 INJECTION INTRAVENOUS at 17:56

## 2022-09-22 RX ADMIN — METHOCARBAMOL 500 MILLIGRAM(S): 500 TABLET, FILM COATED ORAL at 21:03

## 2022-09-22 RX ADMIN — Medication 400 MILLIGRAM(S): at 04:04

## 2022-09-22 RX ADMIN — HYDROMORPHONE HYDROCHLORIDE 1 MILLIGRAM(S): 2 INJECTION INTRAMUSCULAR; INTRAVENOUS; SUBCUTANEOUS at 21:03

## 2022-09-22 RX ADMIN — GABAPENTIN 200 MILLIGRAM(S): 400 CAPSULE ORAL at 13:07

## 2022-09-22 RX ADMIN — Medication 100 MILLIGRAM(S): at 08:41

## 2022-09-22 RX ADMIN — SODIUM CHLORIDE 50 MILLILITER(S): 9 INJECTION INTRAMUSCULAR; INTRAVENOUS; SUBCUTANEOUS at 13:07

## 2022-09-22 RX ADMIN — CHLORHEXIDINE GLUCONATE 1 APPLICATION(S): 213 SOLUTION TOPICAL at 11:21

## 2022-09-22 RX ADMIN — HYDROMORPHONE HYDROCHLORIDE 1 MILLIGRAM(S): 2 INJECTION INTRAMUSCULAR; INTRAVENOUS; SUBCUTANEOUS at 08:53

## 2022-09-22 RX ADMIN — HYDROMORPHONE HYDROCHLORIDE 1 MILLIGRAM(S): 2 INJECTION INTRAMUSCULAR; INTRAVENOUS; SUBCUTANEOUS at 07:00

## 2022-09-22 RX ADMIN — Medication 1000 MILLIGRAM(S): at 04:30

## 2022-09-22 RX ADMIN — GABAPENTIN 200 MILLIGRAM(S): 400 CAPSULE ORAL at 06:11

## 2022-09-22 RX ADMIN — GABAPENTIN 200 MILLIGRAM(S): 400 CAPSULE ORAL at 21:03

## 2022-09-22 RX ADMIN — Medication 4 MILLIGRAM(S): at 11:20

## 2022-09-22 RX ADMIN — SENNA PLUS 2 TABLET(S): 8.6 TABLET ORAL at 21:03

## 2022-09-22 RX ADMIN — Medication 4 MILLIGRAM(S): at 17:56

## 2022-09-22 RX ADMIN — Medication 4 MILLIGRAM(S): at 06:10

## 2022-09-22 RX ADMIN — OXYCODONE HYDROCHLORIDE 10 MILLIGRAM(S): 5 TABLET ORAL at 14:07

## 2022-09-22 RX ADMIN — METHOCARBAMOL 500 MILLIGRAM(S): 500 TABLET, FILM COATED ORAL at 13:07

## 2022-09-22 RX ADMIN — METHOCARBAMOL 500 MILLIGRAM(S): 500 TABLET, FILM COATED ORAL at 06:11

## 2022-09-22 RX ADMIN — HYDROMORPHONE HYDROCHLORIDE 1 MILLIGRAM(S): 2 INJECTION INTRAMUSCULAR; INTRAVENOUS; SUBCUTANEOUS at 15:01

## 2022-09-22 NOTE — PROGRESS NOTE ADULT - SUBJECTIVE AND OBJECTIVE BOX
Sherley was awake in bed as I replaced her current collar with an Aspen Multipost cervical brace. Additional liners were provided for regular washing and changing. Velcro straps were marked to the tightness needed to keep brace under her chin. I also measured and sized an Aspen TLSO with shoulder straps for OOB use. Donning and adjustment of the brace was demonstrated to her and RN Merlyn. Printed instructions were provided and the brace was labeled and placed on couch .   [facility]   [phone]

## 2022-09-22 NOTE — CHART NOTE - NSCHARTNOTEFT_GEN_A_CORE
Source: Patient [x ]  Family [ ]   other [ ] EMR and discussed in A.M. rounds     Current Diet: Diet, Clear Liquid (09-22-22 @ 06:36)  Diet, Regular (09-21-22 @ 18:10)  Diet, Full Liquid (09-21-22 @ 18:10)    PO intake:  < 50% [x ]   50-75%  [ ]   %  [ ]  other :    Source for PO intake [x ] Patient [ ] family [ ] chart [ ] staff [ ] other    Current Weight:   9/21: 59.6 kg   9/9: 59 kg     % Weight Change: N/A     Pertinent Medications: MEDICATIONS  (STANDING):  chlorhexidine 2% Cloths 1 Application(s) Topical daily  dexAMETHasone  Injectable 4 milliGRAM(s) IV Push every 6 hours  gabapentin 200 milliGRAM(s) Oral every 8 hours  influenza  Vaccine (HIGH DOSE) 0.7 milliLiter(s) IntraMuscular once  methocarbamol 500 milliGRAM(s) Oral every 8 hours  polyethylene glycol 3350 17 Gram(s) Oral daily  senna 2 Tablet(s) Oral at bedtime  sodium chloride 0.9%. 1000 milliLiter(s) (50 mL/Hr) IV Continuous <Continuous>    MEDICATIONS  (PRN):  bisacodyl Suppository 10 milliGRAM(s) Rectal daily PRN Constipation  HYDROmorphone  Injectable 1 milliGRAM(s) IV Push every 2 hours PRN Severe Pain (7 - 10)  ondansetron Injectable 4 milliGRAM(s) IV Push every 6 hours PRN Nausea and/or Vomiting    Pertinent Labs: CBC Full  -  ( 22 Sep 2022 02:23 )  WBC Count : 22.35 K/uL  RBC Count : 3.91 M/uL  Hemoglobin : 11.7 g/dL  Hematocrit : 34.8 %  Platelet Count - Automated : 234 K/uL  Mean Cell Volume : 89.0 fl  Mean Cell Hemoglobin : 29.9 pg  Mean Cell Hemoglobin Concentration : 33.6 gm/dL  Auto Neutrophil # : x  Auto Lymphocyte # : x  Auto Monocyte # : x  Auto Eosinophil # : x  Auto Basophil # : x  Auto Neutrophil % : x  Auto Lymphocyte % : x  Auto Monocyte % : x  Auto Eosinophil % : x  Auto Basophil % : x      Skin: Posterior neck surgical site       Estimated Needs:   [x ] no change since previous assessment  [ ] recalculated:     Brief Hospital Course:   Pt is a 66 year old Female with no known PMH who originally presented for progressive weakness, found to have C7-T1 pathologic fracture, newly diagnosed breast ca with brain, liver, and lung metastatic lesions. Underwent C7-T2 corpectomy and fusion with posterior C4-T5 fusion on 9/21.       Current Nutrition Diagnosis:  Pt remains at nutrition risk secondary to moderate protein calorie malnutrition (chronic) related to inability to meet sufficient protein-energy needs in setting of metastatic breast CA,C7-T1 pathologic fracture (s/p fusion)  as evidenced by pt with moderate muscle wasting/fat loss, meeting <75% estimated energy intake >1 month and 15lb (10%) unintentional wt loss x 6 months.  Pt s/p fusion; started on clear liquid diet this morning; pt with c-collar in place, tolerating sips of clears at this time pt reports not being able to swallow anything too thick at this time secondary collar being too tight. Observed pt drinking water; no s/s of aspiration noted.  RD to remain available.       Recommendations:   1. RX: MVI and Vit. C daily (500mg).   2. Check weight daily to monitor trends   3. Assistance with meals as needed   4. Advance diet to Full liquids with Ensure TID as tolerated/medically feasible     Monitoring and Evaluation:   [ x] PO intake [ x] Tolerance to diet prescription [X] Weights  [X] Follow up per protocol [X] Labs:

## 2022-09-22 NOTE — PROGRESS NOTE ADULT - ASSESSMENT
Assessment:  66F with no known PMH who originally presented for progressive weakness, found to have C7-T1 pathologic fracture, newly diagnosed breast ca with brain, liver, and lung metastatic lesions. Underwent C7-T2 corpectomy and fusion with posterior C4-T5 fusion on 9/21.   - POD1   - Exam somewhat improved from baseline      Plan:  - Q1 hour neuro checks  - MAP goal >80 for spinal perfusion  - CT C/T spine pending  - C collar at all times, will require thoracic extension  - Keep flat until cleared by attending  - Anterior / Posterior CASIMIRO drains to full suction, monitor and record output  - DVT prophylaxis: SCDs only   - Ancef x3 doses for drain prophylaxis   - Pain control PRN, will re-consult pain, possible PCA pump?   - PT/OT/OOB when cleared by attending  - Chest PT / incentive spirometry  - Last BM 9/19, on bowel regimen, escalated given narcotic administration for pain  - Further care per NSICU team  - Final plan pending morning rounds with attending

## 2022-09-22 NOTE — PROGRESS NOTE ADULT - SUBJECTIVE AND OBJECTIVE BOX
Patient is a 66y old  Female who presents with a chief complaint of Weakness, new breast mass with spinal lesions suspicious for metastasis (21 Sep 2022 19:01)    HPI:  65 yo female with no known pmhx presented to the ED with progressive weakness to the point that she felt she could not get out of bed this morning. She started to note pain in her shoulders and lower back at the end of July. She went to the ED at [facility] and also to urgent care, but states they sent her home. She also has had decreased appetite over this time. Her last mammogram was in 2014. She reports they found 2 lumps in her right breast and recommended biopsy. When she went for biopsy, she could not tolerate the pain. She was supposed to return to re-attempt with pain medications, but she never went back. Currently, her pain is well controlled after morphine in the ED. She denies chest pain, SOB, N/V/D, F/C, or other associated symptoms. Patient denies skin changes on the breast or nipple discharge. (10 Sep 2022 02:11)      Interval history:  Patient seen and examined by neurosurgery team. Patient now POD1 from C7-T2 corpectomy & fusion, posterior C4-T5 fusion. Patient extubated and admitted to NSICU for post-op care. Patient reports she has pain but otherwise no acute complaints.       Vital Signs Last 24 Hrs  T(C): 36.8 (21 Sep 2022 23:43), Max: 36.8 (21 Sep 2022 19:15)  T(F): 98.2 (21 Sep 2022 23:43), Max: 98.3 (21 Sep 2022 19:15)  HR: 78 (22 Sep 2022 02:30) (66 - 89)  BP: 121/75 (22 Sep 2022 02:30) (102/65 - 148/87)  BP(mean): 90 (22 Sep 2022 02:30) (77 - 108)  RR: 12 (22 Sep 2022 02:30) (7 - 25)  SpO2: 99% (22 Sep 2022 02:30) (91% - 100%)    Parameters below as of 22 Sep 2022 00:00  Patient On (Oxygen Delivery Method): nasal cannula  O2 Flow (L/min): 2      Physical Exam:  Constitutional: NAD, lying in bed  Neuro  * Mental Status:  GCS 15: Awake, alert, oriented to conversation. No aphasia or difficulty speaking. No dysarthria.   * Cranial Nerves: Cnii-Cnxii grossly intact. PERRL, EOMI, tongue midline, no gaze deviation  * Motor: RUE 4+/5, LUE 5/5, RLE 4+/5, LLE 5/5, no drift   * Sensory: Sensation intact to light touch  * Reflexes: not assessed   Neck: C collar in place, anterior and posterior CASIMIRO drains in place   Cardiovascular: regular rate and rhythm  Respiratory: nonlabored breathing       LABS:                        11.0   28.03 )-----------( 261      ( 21 Sep 2022 20:37 )             32.1     09-21    137  |  102  |  21.9<H>  ----------------------------<  146<H>  4.1   |  22.0  |  0.51    Ca    10.0      21 Sep 2022 20:37  Phos  4.5     09-21  Mg     1.9     09-21    PT/INR - ( 21 Sep 2022 06:06 )   PT: 11.0 sec;   INR: 0.95 ratio    PTT - ( 21 Sep 2022 06:06 )  PTT:25.1 sec    CULTURES:  Culture Results:   <10,000 CFU/mL Normal Urogenital Lainey (09-10 @ 07:48)      Medications:  MEDICATIONS  (STANDING):  acetaminophen   IVPB .. 1000 milliGRAM(s) IV Intermittent once  ceFAZolin   IVPB 2000 milliGRAM(s) IV Intermittent every 8 hours  chlorhexidine 2% Cloths 1 Application(s) Topical daily  gabapentin 200 milliGRAM(s) Oral every 8 hours  influenza  Vaccine (HIGH DOSE) 0.7 milliLiter(s) IntraMuscular once  methocarbamol 500 milliGRAM(s) Oral every 8 hours  phenylephrine    Infusion 0.1 MICROgram(s)/kG/Min (2.21 mL/Hr) IV Continuous <Continuous>  polyethylene glycol 3350 17 Gram(s) Oral daily  senna 2 Tablet(s) Oral at bedtime  sodium chloride 0.9%. 1000 milliLiter(s) (50 mL/Hr) IV Continuous <Continuous>    MEDICATIONS  (PRN):  bisacodyl Suppository 10 milliGRAM(s) Rectal daily PRN Constipation  HYDROmorphone  Injectable 1 milliGRAM(s) IV Push every 2 hours PRN Severe Pain (7 - 10)  ondansetron Injectable 4 milliGRAM(s) IV Push every 6 hours PRN Nausea and/or Vomiting      RADIOLOGY & ADDITIONAL STUDIES:  No new neurosurgical imaging to review.     < from: CT Cervical Spine No Cont (09.20.22 @ 12:01) >  IMPRESSION:    CT CERVICAL SPINE:  Osseous metastases and pathologic fractures as described    CT THORACIC SPINE:  Osseous metastases and pathologic fractures as described.    --- End of Report ---  DILCIA HARKINS MD; Attending Radiologist  This document has been electronically signed. Sep 20 2022  1:30PM    < end of copied text >

## 2022-09-22 NOTE — PROGRESS NOTE ADULT - ASSESSMENT
66F s/p C7-T2 corpectomy and fusion with posterior C4-T5 fusion on 9/21 for C7-T1 pathologic fracture (mts dz).  Newly diagnosed breast Ca with brain, liver, and lung metastatic lesions.   With unremarkable PMH.    Plan:  - Q1 hour neuro checks  - MAP goal >80 for spinal perfusion  - CT C/T spine pending  - C collar at all times, will require thoracic extension  - Keep flat until cleared by attending  - Anterior / Posterior CASIMIRO drains to full suction, monitor and record output  - DVT prophylaxis: SCDs only   - Ancef x3 doses for drain prophylaxis   - Pain control PRN, will re-consult pain, possible PCA pump?   - PT/OT/OOB when cleared by attending  - Chest PT / incentive spirometry  - Last BM 9/19, on bowel regimen, escalated given narcotic administration for pain  - Further care per NSICU team  - Final plan pending morning rounds with attending    66F s/p C7-T2 corpectomy and fusion with posterior C4-T5 fusion on 9/21 for C7-T1 pathologic fracture (mts dz).  Newly diagnosed breast Ca with brain, liver, and lung metastatic lesions.   With unremarkable PMH.    Plan:  - Q1 hour neuro checks  - maintain normotension and MAP>65, avoid hypotension  - C collar at all times, adding TLSO in view of other spinal lesions  - Keep flat till am  - Anterior / Posterior CASIMIRO drains to full suction, monitor and record output, per NSGy  - Ancef x3 doses for drain prophylaxis, completed  - Pain control PRN, regimen adjusted - Oxy with Dilaudid for breakthrough pain  - Chest PT / incentive spirometry  - Last BM 9/19, on bowel regimen  - diet as tolerated  - monitor e-lytes and FS  - DVT prophylaxis: SCDs only as fresh postop

## 2022-09-22 NOTE — PROGRESS NOTE ADULT - SUBJECTIVE AND OBJECTIVE BOX
HPI:  65 yo female with no known pmhx presented to the ED with progressive weakness to the point that she felt she could not get out of bed this morning. She started to note pain in her shoulders and lower back at the end of July. She went to the ED at [facility] and also to urgent care, but states they sent her home. She also has had decreased appetite over this time. Her last mammogram was in 2014. She reports they found 2 lumps in her right breast and recommended biopsy. When she went for biopsy, she could not tolerate the pain. She was supposed to return to re-attempt with pain medications, but she never went back. Currently, her pain is well controlled after morphine in the ED. She denies chest pain, SOB, N/V/D, F/C, or other associated symptoms. Patient denies skin changes on the breast or nipple discharge. (10 Sep 2022 02:11)  Underwent C7-T2 corpectomy & fusion, posterior C4-T5 fusion.      OVERNIGHT EVENTS: No acute events overnight.    VITALS:  T(C): , Max: 36.8 (09-21-22 @ 19:15)  HR:  (66 - 103)  BP:  (102/65 - 143/80)  ABP:  (105/86 - 151/73)  RR:  (6 - 25)  SpO2:  (95% - 100%)      09-21-22 @ 07:01  -  09-22-22 @ 07:00  --------------------------------------------------------  IN: 612 mL / OUT: 887 mL / NET: -275 mL    09-22-22 @ 07:01  -  09-22-22 @ 18:18  --------------------------------------------------------  IN: 900 mL / OUT: 45 mL / NET: 855 mL      LABS:  Na: 137 (09-22 @ 02:23), 137 (09-21 @ 20:37), 139 (09-21 @ 06:06)  K: 4.1 (09-22 @ 02:23), 4.1 (09-21 @ 20:37), 4.4 (09-21 @ 06:06)  Cl: 102 (09-22 @ 02:23), 102 (09-21 @ 20:37), 103 (09-21 @ 06:06)  CO2: 24.0 (09-22 @ 02:23), 22.0 (09-21 @ 20:37), 24.0 (09-21 @ 06:06)  BUN: 23.9 (09-22 @ 02:23), 21.9 (09-21 @ 20:37), 27.0 (09-21 @ 06:06)  Cr: 0.52 (09-22 @ 02:23), 0.51 (09-21 @ 20:37), 0.44 (09-21 @ 06:06)  Glu: 144(09-22 @ 02:23), 146(09-21 @ 20:37), 100(09-21 @ 06:06)    Hgb: 11.7 (09-22 @ 02:23), 11.0 (09-21 @ 20:37), 13.3 (09-21 @ 06:06)  Hct: 34.8 (09-22 @ 02:23), 32.1 (09-21 @ 20:37), 38.3 (09-21 @ 06:06)  WBC: 22.35 (09-22 @ 02:23), 28.03 (09-21 @ 20:37), 14.97 (09-21 @ 06:06)  Plt: 234 (09-22 @ 02:23), 261 (09-21 @ 20:37), 281 (09-21 @ 06:06)    INR: 0.95 09-21-22 @ 06:06, 0.96 09-20-22 @ 05:28  PTT: 25.1 09-21-22 @ 06:06, 23.9 09-20-22 @ 05:28    IMAGING:   Recent imaging studies were reviewed.    MEDICATIONS:  acetaminophen     Tablet .. 650 milliGRAM(s) Oral every 6 hours PRN  bisacodyl Suppository 10 milliGRAM(s) Rectal daily PRN  chlorhexidine 2% Cloths 1 Application(s) Topical daily  dexAMETHasone  Injectable 4 milliGRAM(s) IV Push every 6 hours  famotidine    Tablet 20 milliGRAM(s) Oral two times a day  gabapentin 200 milliGRAM(s) Oral every 8 hours  HYDROmorphone  Injectable 1 milliGRAM(s) IV Push every 2 hours PRN  influenza  Vaccine (HIGH DOSE) 0.7 milliLiter(s) IntraMuscular once  methocarbamol 500 milliGRAM(s) Oral every 8 hours  ondansetron Injectable 4 milliGRAM(s) IV Push every 6 hours PRN  oxyCODONE    IR 5 milliGRAM(s) Oral every 4 hours PRN  oxyCODONE    IR 10 milliGRAM(s) Oral every 4 hours PRN  polyethylene glycol 3350 17 Gram(s) Oral daily  senna 2 Tablet(s) Oral at bedtime  sodium chloride 0.9%. 1000 milliLiter(s) IV Continuous <Continuous>    EXAMINATION:  General:  calm, cooperative.  Neuro:  awake, alert, oriented x 3, follows commands, moves all extremities - exam affected by pain in the back - BL UE 5/5 except for 4-/5 R HG with no drift, R LE 4-/5, LLE 4/5.  Cards:  RRR  Respiratory:  no respiratory distress, CTAB.  Abdomen:  soft, nontender  Extremities:  no edema  Skin:  warm/dry

## 2022-09-23 LAB
ANION GAP SERPL CALC-SCNC: 11 MMOL/L — SIGNIFICANT CHANGE UP (ref 5–17)
BUN SERPL-MCNC: 20.5 MG/DL — HIGH (ref 8–20)
CALCIUM SERPL-MCNC: 10.1 MG/DL — SIGNIFICANT CHANGE UP (ref 8.4–10.5)
CHLORIDE SERPL-SCNC: 101 MMOL/L — SIGNIFICANT CHANGE UP (ref 98–107)
CO2 SERPL-SCNC: 25 MMOL/L — SIGNIFICANT CHANGE UP (ref 22–29)
CREAT SERPL-MCNC: 0.4 MG/DL — LOW (ref 0.5–1.3)
EGFR: 109 ML/MIN/1.73M2 — SIGNIFICANT CHANGE UP
GLUCOSE SERPL-MCNC: 138 MG/DL — HIGH (ref 70–99)
HCT VFR BLD CALC: 33.9 % — LOW (ref 34.5–45)
HGB BLD-MCNC: 11.4 G/DL — LOW (ref 11.5–15.5)
MAGNESIUM SERPL-MCNC: 2 MG/DL — SIGNIFICANT CHANGE UP (ref 1.8–2.6)
MCHC RBC-ENTMCNC: 30.2 PG — SIGNIFICANT CHANGE UP (ref 27–34)
MCHC RBC-ENTMCNC: 33.6 GM/DL — SIGNIFICANT CHANGE UP (ref 32–36)
MCV RBC AUTO: 89.7 FL — SIGNIFICANT CHANGE UP (ref 80–100)
PHOSPHATE SERPL-MCNC: 2.5 MG/DL — SIGNIFICANT CHANGE UP (ref 2.4–4.7)
PLATELET # BLD AUTO: 219 K/UL — SIGNIFICANT CHANGE UP (ref 150–400)
POTASSIUM SERPL-MCNC: 4.1 MMOL/L — SIGNIFICANT CHANGE UP (ref 3.5–5.3)
POTASSIUM SERPL-SCNC: 4.1 MMOL/L — SIGNIFICANT CHANGE UP (ref 3.5–5.3)
RBC # BLD: 3.78 M/UL — LOW (ref 3.8–5.2)
RBC # FLD: 13 % — SIGNIFICANT CHANGE UP (ref 10.3–14.5)
SODIUM SERPL-SCNC: 137 MMOL/L — SIGNIFICANT CHANGE UP (ref 135–145)
WBC # BLD: 19.87 K/UL — HIGH (ref 3.8–10.5)
WBC # FLD AUTO: 19.87 K/UL — HIGH (ref 3.8–10.5)

## 2022-09-23 PROCEDURE — 99233 SBSQ HOSP IP/OBS HIGH 50: CPT | Mod: 57

## 2022-09-23 PROCEDURE — 99233 SBSQ HOSP IP/OBS HIGH 50: CPT

## 2022-09-23 PROCEDURE — 99232 SBSQ HOSP IP/OBS MODERATE 35: CPT | Mod: FS

## 2022-09-23 RX ORDER — POLYETHYLENE GLYCOL 3350 17 G/17G
17 POWDER, FOR SOLUTION ORAL EVERY 12 HOURS
Refills: 0 | Status: DISCONTINUED | OUTPATIENT
Start: 2022-09-23 | End: 2022-09-30

## 2022-09-23 RX ORDER — DEXAMETHASONE 0.5 MG/5ML
ELIXIR ORAL
Refills: 0 | Status: DISCONTINUED | OUTPATIENT
Start: 2022-09-23 | End: 2022-09-24

## 2022-09-23 RX ORDER — DEXAMETHASONE 0.5 MG/5ML
2 ELIXIR ORAL EVERY 12 HOURS
Refills: 0 | Status: DISCONTINUED | OUTPATIENT
Start: 2022-09-24 | End: 2022-09-24

## 2022-09-23 RX ORDER — DEXAMETHASONE 0.5 MG/5ML
2 ELIXIR ORAL EVERY 6 HOURS
Refills: 0 | Status: COMPLETED | OUTPATIENT
Start: 2022-09-23 | End: 2022-09-24

## 2022-09-23 RX ADMIN — HYDROMORPHONE HYDROCHLORIDE 1 MILLIGRAM(S): 2 INJECTION INTRAMUSCULAR; INTRAVENOUS; SUBCUTANEOUS at 11:10

## 2022-09-23 RX ADMIN — POLYETHYLENE GLYCOL 3350 17 GRAM(S): 17 POWDER, FOR SOLUTION ORAL at 17:31

## 2022-09-23 RX ADMIN — OXYCODONE HYDROCHLORIDE 10 MILLIGRAM(S): 5 TABLET ORAL at 17:30

## 2022-09-23 RX ADMIN — OXYCODONE HYDROCHLORIDE 10 MILLIGRAM(S): 5 TABLET ORAL at 04:00

## 2022-09-23 RX ADMIN — METHOCARBAMOL 500 MILLIGRAM(S): 500 TABLET, FILM COATED ORAL at 06:08

## 2022-09-23 RX ADMIN — OXYCODONE HYDROCHLORIDE 10 MILLIGRAM(S): 5 TABLET ORAL at 10:15

## 2022-09-23 RX ADMIN — HYDROMORPHONE HYDROCHLORIDE 1 MILLIGRAM(S): 2 INJECTION INTRAMUSCULAR; INTRAVENOUS; SUBCUTANEOUS at 10:54

## 2022-09-23 RX ADMIN — Medication 2 MILLIGRAM(S): at 23:12

## 2022-09-23 RX ADMIN — OXYCODONE HYDROCHLORIDE 10 MILLIGRAM(S): 5 TABLET ORAL at 09:14

## 2022-09-23 RX ADMIN — HYDROMORPHONE HYDROCHLORIDE 1 MILLIGRAM(S): 2 INJECTION INTRAMUSCULAR; INTRAVENOUS; SUBCUTANEOUS at 22:59

## 2022-09-23 RX ADMIN — FAMOTIDINE 20 MILLIGRAM(S): 10 INJECTION INTRAVENOUS at 17:31

## 2022-09-23 RX ADMIN — OXYCODONE HYDROCHLORIDE 10 MILLIGRAM(S): 5 TABLET ORAL at 03:00

## 2022-09-23 RX ADMIN — Medication 4 MILLIGRAM(S): at 00:11

## 2022-09-23 RX ADMIN — GABAPENTIN 200 MILLIGRAM(S): 400 CAPSULE ORAL at 21:36

## 2022-09-23 RX ADMIN — Medication 2 MILLIGRAM(S): at 17:30

## 2022-09-23 RX ADMIN — Medication 4 MILLIGRAM(S): at 06:09

## 2022-09-23 RX ADMIN — METHOCARBAMOL 500 MILLIGRAM(S): 500 TABLET, FILM COATED ORAL at 21:36

## 2022-09-23 RX ADMIN — METHOCARBAMOL 500 MILLIGRAM(S): 500 TABLET, FILM COATED ORAL at 15:00

## 2022-09-23 RX ADMIN — SENNA PLUS 2 TABLET(S): 8.6 TABLET ORAL at 21:36

## 2022-09-23 RX ADMIN — Medication 2 MILLIGRAM(S): at 13:46

## 2022-09-23 RX ADMIN — FAMOTIDINE 20 MILLIGRAM(S): 10 INJECTION INTRAVENOUS at 06:08

## 2022-09-23 RX ADMIN — HYDROMORPHONE HYDROCHLORIDE 1 MILLIGRAM(S): 2 INJECTION INTRAMUSCULAR; INTRAVENOUS; SUBCUTANEOUS at 04:43

## 2022-09-23 RX ADMIN — OXYCODONE HYDROCHLORIDE 10 MILLIGRAM(S): 5 TABLET ORAL at 21:36

## 2022-09-23 RX ADMIN — OXYCODONE HYDROCHLORIDE 10 MILLIGRAM(S): 5 TABLET ORAL at 22:00

## 2022-09-23 RX ADMIN — GABAPENTIN 200 MILLIGRAM(S): 400 CAPSULE ORAL at 15:01

## 2022-09-23 RX ADMIN — GABAPENTIN 200 MILLIGRAM(S): 400 CAPSULE ORAL at 06:07

## 2022-09-23 NOTE — CHART NOTE - NSCHARTNOTEFT_GEN_A_CORE
HPI:  65 yo female with no known pmhx presented to the ED with progressive weakness to the point that she felt she could not get out of bed this morning. She started to note pain in her shoulders and lower back at the end of July. She went to the ED at [facility] and also to urgent care, but states they sent her home. She also has had decreased appetite over this time. Her last mammogram was in 2014. She reports they found 2 lumps in her right breast and recommended biopsy. When she went for biopsy, she could not tolerate the pain. She was supposed to return to re-attempt with pain medications, but she never went back. Currently, her pain is well controlled after morphine in the ED. She denies chest pain, SOB, N/V/D, F/C, or other associated symptoms. Patient denies skin changes on the breast or nipple discharge. (10 Sep 2022 02:11)  Underwent C7-T2 corpectomy & fusion, posterior C4-T5 fusion.      Hospital Course:   9/10 - 9/20 admitted to the medicine floor for mets work up, newly diagnosed breast cancer   9/21: patient went to OR for c2-t2 corpectomy and anterior fusion, c4-t5 posterior fusion for c7-t1 pathologic fracture. Patient tolerated well, was extubated and return to ICU post op. Post op course uncomplicated, gomez removed pt requiring some straight cath, pt also with some subjective dysphagia requesting liquids only at this time.     Vital Signs Last 24 Hrs  T(C): 36.8 (23 Sep 2022 07:16), Max: 36.8 (22 Sep 2022 20:48)  T(F): 98.2 (23 Sep 2022 07:16), Max: 98.2 (22 Sep 2022 20:48)  HR: 90 (23 Sep 2022 07:00) (88 - 108)  BP: 140/74 (23 Sep 2022 07:00) (122/83 - 156/83)  BP(mean): 95 (23 Sep 2022 07:00) (94 - 107)  RR: 11 (23 Sep 2022 07:00) (7 - 20)  SpO2: 98% (23 Sep 2022 07:00) (94% - 100%)    Parameters below as of 23 Sep 2022 07:00  Patient On (Oxygen Delivery Method): room air    EXAMINATION:  General:  calm, cooperative.  Neuro:  awake, alert, oriented x 3, follows commands, moves all extremities  LUE 5/5, RUE handgrip 4/5, bicep 4/5, tri 4/5, delt 5/5 + drift, b/l LE 4/5  Cards:  RRR  Respiratory:  no respiratory distress, CTAB.  Abdomen:  soft, nontender, last BM 9/19  Extremities:  no edema  Skin:  warm/dry    66F s/p C7-T2 corpectomy and fusion with posterior C4-T5 fusion on 9/21 for C7-T1 pathologic fracture (mts dz).  Newly diagnosed breast Ca with brain, liver, and lung metastatic lesions.   With unremarkable PMH.    Plan  Neuro: q2 neuro checks, c-collar at all times, TSLO when oob. Dexamethasone taper over 2 days, gabapentin 200 TID, robaxin 500 TID, PRN dilaudid, oxy 5/10, apap as needed. pain is well controlled   Cards: -160, no medications required   Resp: on room air, encourage IS   GI: full liquids for subjective dysphagia, speech consult palced. Pt on miralax increased to twice a day, senna, dulcolax PRN last BM was 9/19, abd is soft +passing gas. Zofran PRN   : voiding, bladder scan q6   ID: afebrile   Endo: no issues   Drains: Anterior and posterior nguyen drain to full suction   Neurosurgery continuing to follow HPI:  67 yo female with no known pmhx presented to the ED with progressive weakness to the point that she felt she could not get out of bed this morning. She started to note pain in her shoulders and lower back at the end of July. She went to the ED at [facility] and also to urgent care, but states they sent her home. She also has had decreased appetite over this time. Her last mammogram was in 2014. She reports they found 2 lumps in her right breast and recommended biopsy. When she went for biopsy, she could not tolerate the pain. She was supposed to return to re-attempt with pain medications, but she never went back. Currently, her pain is well controlled after morphine in the ED. She denies chest pain, SOB, N/V/D, F/C, or other associated symptoms. Patient denies skin changes on the breast or nipple discharge. (10 Sep 2022 02:11)  Underwent C7-T2 corpectomy & fusion, posterior C4-T5 fusion.      Hospital Course:   9/10 - 9/20 admitted to the medicine floor for mets work up, newly diagnosed breast cancer   9/21: patient went to OR for c2-t2 corpectomy and anterior fusion, c4-t5 posterior fusion for c7-t1 pathologic fracture. Patient tolerated well, was extubated and return to ICU post op. Post op course uncomplicated, gomez removed pt requiring some straight cath, pt also with some subjective dysphagia requesting liquids only at this time.     Vital Signs Last 24 Hrs  T(C): 36.8 (23 Sep 2022 07:16), Max: 36.8 (22 Sep 2022 20:48)  T(F): 98.2 (23 Sep 2022 07:16), Max: 98.2 (22 Sep 2022 20:48)  HR: 90 (23 Sep 2022 07:00) (88 - 108)  BP: 140/74 (23 Sep 2022 07:00) (122/83 - 156/83)  BP(mean): 95 (23 Sep 2022 07:00) (94 - 107)  RR: 11 (23 Sep 2022 07:00) (7 - 20)  SpO2: 98% (23 Sep 2022 07:00) (94% - 100%)    Parameters below as of 23 Sep 2022 07:00  Patient On (Oxygen Delivery Method): room air    EXAMINATION:  General:  calm, cooperative.  Neuro:  awake, alert, oriented x 3, follows commands, moves all extremities  LUE 5/5, RUE handgrip 4/5, bicep 4/5, tri 4/5, delt 5/5 + drift, b/l LE 4/5  Cards:  RRR  Respiratory:  no respiratory distress, CTAB.  Abdomen:  soft, nontender, last BM 9/19  Extremities:  no edema  Skin:  warm/dry    66F s/p C7-T2 corpectomy and fusion with posterior C4-T5 fusion on 9/21 for C7-T1 pathologic fracture (mts dz).  Newly diagnosed breast Ca with brain, liver, and lung metastatic lesions.   With unremarkable PMH.    Plan  Neuro: q2 neuro checks, c-collar at all times, TSLO when oob. Dexamethasone taper over 2 days, gabapentin 200 TID, robaxin 500 TID, PRN dilaudid, oxy 5/10, apap as needed. pain is well controlled   Cards: -160, no medications required   Resp: on room air, encourage IS   GI: full liquids for subjective dysphagia, speech consult palced. Pt on miralax increased to twice a day, senna, dulcolax PRN last BM was 9/19, abd is soft +passing gas. Zofran PRN   : voiding, bladder scan q6   ID: afebrile   Endo: no issues   Drains: Anterior and posterior nguyen drain to full suction   Neurosurgery continuing to follow  Pt stable for downgrade to stepdown under medicine, signed out to Dr. Alford.

## 2022-09-23 NOTE — PROGRESS NOTE ADULT - SUBJECTIVE AND OBJECTIVE BOX
SHELLEY DE LEON    [id number]    66y      Female    Patient is a 66y old  Female who presents with a chief complaint of Weakness, new breast mass with spinal lesions suspicious for metastasis (22 Sep 2022 18:17)      INTERVAL HPI/OVERNIGHT EVENTS:    patient's pain is well controlled with pain meds, able to tolerate clear liquid diet, working with swallow team, did not have bowel movement for few days  REVIEW OF SYSTEMS:    CONSTITUTIONAL: No fever, has some fatigue  RESPIRATORY: No cough, No shortness of breath  CARDIOVASCULAR: No chest pain, palpitations  NEUROLOGICAL: No headaches   MISCELLANEOUS: No joint swelling or pain       Vital Signs Last 24 Hrs  T(C): 36.8 (23 Sep 2022 07:16), Max: 36.8 (22 Sep 2022 20:48)  T(F): 98.2 (23 Sep 2022 07:16), Max: 98.2 (22 Sep 2022 20:48)  HR: 86 (23 Sep 2022 08:00) (86 - 108)  BP: 139/78 (23 Sep 2022 08:00) (122/83 - 156/83)  BP(mean): 96 (23 Sep 2022 08:00) (94 - 107)  RR: 6 (23 Sep 2022 08:00) (6 - 20)  SpO2: 96% (23 Sep 2022 08:00) (94% - 100%)    Parameters below as of 23 Sep 2022 08:00  Patient On (Oxygen Delivery Method): room air        PHYSICAL EXAM:    GENERAL: Middle age female looking comfortable   HEENT: PERRL  NECK:  CHEST/LUNG: Decrease air entry bilaterally; No wheezing  HEART: S1S2+, Regular rate and rhythm; No murmurs  ABDOMEN: Soft, Nontender, Nondistended; Bowel sounds present  EXTREMITIES:  1+ Peripheral Pulses, No edema  SKIN: No rashes or lesions  NEURO: AAOX3  PSYCH: normal mood  Post upper back and neck has clean dressings on and has drain in place     LABS:                        11.4   19.87 )-----------( 219      ( 23 Sep 2022 03:07 )             33.9     09-23    137  |  101  |  20.5<H>  ----------------------------<  138<H>  4.1   |  25.0  |  0.40<L>    Ca    10.1      23 Sep 2022 03:07  Phos  2.5     09-23  Mg     2.0     09-23              I&O's Summary    22 Sep 2022 07:01  -  23 Sep 2022 07:00  --------------------------------------------------------  IN: 2130 mL / OUT: 1325 mL / NET: 805 mL        MEDICATIONS  (STANDING):  dexAMETHasone  Injectable   IV Push   dexAMETHasone  Injectable 2 milliGRAM(s) IV Push every 6 hours  famotidine    Tablet 20 milliGRAM(s) Oral two times a day  gabapentin 200 milliGRAM(s) Oral every 8 hours  influenza  Vaccine (HIGH DOSE) 0.7 milliLiter(s) IntraMuscular once  methocarbamol 500 milliGRAM(s) Oral every 8 hours  polyethylene glycol 3350 17 Gram(s) Oral every 12 hours  senna 2 Tablet(s) Oral at bedtime    MEDICATIONS  (PRN):  acetaminophen     Tablet .. 650 milliGRAM(s) Oral every 6 hours PRN Mild Pain (1 - 3)  bisacodyl Suppository 10 milliGRAM(s) Rectal daily PRN Constipation  HYDROmorphone  Injectable 1 milliGRAM(s) IV Push every 2 hours PRN breakthrough pain  ondansetron Injectable 4 milliGRAM(s) IV Push every 6 hours PRN Nausea and/or Vomiting  oxyCODONE    IR 5 milliGRAM(s) Oral every 4 hours PRN Moderate Pain (4 - 6)  oxyCODONE    IR 10 milliGRAM(s) Oral every 4 hours PRN Severe Pain (7 - 10)

## 2022-09-23 NOTE — PHYSICAL THERAPY INITIAL EVALUATION ADULT - GENERAL OBSERVATIONS, REHAB EVAL
Pt received supine in bed, + C-Collar, + TLSO + Telemetry//BP + IV. C/o 7/10 pre and 9/10 Post PT pain, (+) Pain meds on board per RN. Pt agreeable to PT

## 2022-09-23 NOTE — PHYSICAL THERAPY INITIAL EVALUATION ADULT - ADDITIONAL COMMENTS
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 Pt was staying in a hotel pending her next assignment. Pt has a brother in [place] and a sister in [facility] however, Pt does not know if she can stay with them but agreeable to discuss with family. Pt does not own DME and was independent PTA.

## 2022-09-23 NOTE — SWALLOW BEDSIDE ASSESSMENT ADULT - COMMENTS
declining further solids at this time Pt reports subjective dysphagia with solids & at times liquids, indicating, "sometimes it comes back up". Indicating onset since placement of cervical collar, worsening post op.

## 2022-09-23 NOTE — SWALLOW BEDSIDE ASSESSMENT ADULT - SWALLOW EVAL: DIAGNOSIS
Overall assessment limited due to small amounts of PO accepted puree & thin liquids. Oropharyngeal swallow appears clinically unremarkable, no overt s/s penetration or aspiration demonstrated across consistencies. Pt indicating no odynophagia or globus sensation w/small controlled size/rate of intake, as well as with moistening pureed solids w/liquids at bedside.

## 2022-09-23 NOTE — PROGRESS NOTE ADULT - ASSESSMENT
Assessment:  66F with no known PMH who originally presented for progressive weakness, found to have C7-T1 pathologic fracture, newly diagnosed breast ca with brain, liver, and lung metastatic lesions. Underwent C7-T2 corpectomy and fusion with posterior C4-T5 fusion on 9/21.   - POD2  - Pain improved       Plan:  - Discussed with Dr. Bennett   - Q2 hour neuro checks  - C collar at all times with thoracic extension when OOB, ok to ambulate   - Anterior / Posterior CASIMIRO drains to full suction, monitor and record output  - Dexamethasone taper over 2 days   - DVT prophylaxis: SCDs only   - Pain control PRN, avoid oversedation   - PT/OT/OOB   - Chest PT / incentive spirometry  - Last BM 9/22, on bowel regimen  - Downgraded to SDU under medicine, will follow   - Further care per NSICU team

## 2022-09-23 NOTE — PHYSICAL THERAPY INITIAL EVALUATION ADULT - IMPAIRED TRANSFERS: SIT/STAND, REHAB EVAL
impaired balance/impaired coordination/pain/impaired postural control/decreased sensation/decreased strength

## 2022-09-23 NOTE — PHARMACOTHERAPY INTERVENTION NOTE - COMMENTS
end date added- to end with dexamethasone
recommended to add pain regimen- tylenol, oxycodone prn and change dilaudid prn to breakthrough pain  recommended to add pepcid while patient on decadron

## 2022-09-23 NOTE — SWALLOW BEDSIDE ASSESSMENT ADULT - SLP GENERAL OBSERVATIONS
Pt recd awake/upright in bed, A&A Ox3, 0/10 pain pre/post, +cervical collar, +CASIMIRO drain, tolerating RA no overt distress

## 2022-09-23 NOTE — PROGRESS NOTE ADULT - ASSESSMENT
67 yo female with no known pmhx was brought to the ED with progressive weakness to the point that she felt she could not get out of bed, she has some remote hx of should pain, decrease appetite, her last mammogram was in 2014. She reports they found 2 lumps in her right breast and recommended biopsy, she went for biopsy, she could not tolerate the pain. She was supposed to return to re-attempt with pain medications, but she never went back, when she came to the ED she was having pain as well, they did CT scan of the head showed No displaced calvarial fracture or acute intracranial hemorrhage, but CT scan of the Cervical spine showed Multiple lytic lesions of the cervical and visualized thoracic spine as well as right first and second   ribs concerning for metastases versus multiple myeloma, she was admitted under medicine service for further workup and management, she was provided pain meds and her further imaging done showed Metastatic breast cancer with dominant right breast mass, Right chest wall, axillary and   retropectoral involvement, Osseous, pulmonary, and likely hepatic metastatic disease. Metastatic adenopathy of the thorax, diffuse extensive osseous metastatic disease with multiple lucent and sclerotic lesions and compression fractures of T1, T9, T12, and L2. Severe T1 compression with epidural extension. Cord compression cannot be excluded. Additional neoplastic disease of the spine with epidural extension. Multifocal neoplastic lesions involving the pelvis and ribs as well, she was seen by Oncology, CT surgery and Neurosurgery team she underwent C7-T2 corpectomy & fusion, posterior C4-T5 fusion, managed in the ICU post operative and was extubated, Post op course uncomplicated, gomez removed pt requiring some straight cath, pt also with some subjective dysphagia requesting liquids only at this time, being seen by speech and swallow team, being downgraded under medicine for further management.     Plan:     Extensive osseous metastatic disease with pathologic compression fracture deformities at C7, T1, T9, T12 and L2, severe at T1  Likely Primary metastatic Breast CA  s/p  C7-T2 corpectomy & fusion, posterior C4-T5 fusion post op day 02  - Decadron 2mg IVP q6h  - Robaxin 500mg TID  - Gabapentin 200mg TID  - Oxycodone and Dilaudid PRN  - once biopsy is back will update oncology  - Neuro checks   - Neurosurgery follow   - Pain management team is following   - Bowel meds   - monitor for urine retention  - PT and OT, PM&R consult  - wound care as per Neurosurgery team   - has drains in place, manage as per Neurosurgery team       Large right breast mass:   - likely cancer with mets to spine, brain, liver  - s/p right breast biopsy shows Biopsy positive for invasive ductal carcinoma     - Oncology follow requested    Leucocytosis: Likely post op and being on steriods, no focus of infection, no fever, no chills.     Hypercalcemia due to bone mets malignancy   - s/p IVF , improved   - Monitor BMP     Moderate protein malia malnutrition   - Ensure  -dietician on board.       DVT prophylaxis: SCDs for now, once ok from the Neurosurgery team will start Lovenox sc

## 2022-09-23 NOTE — PROGRESS NOTE ADULT - SUBJECTIVE AND OBJECTIVE BOX
HPI:  67 yo female with no known pmhx presented to the ED with progressive weakness to the point that she felt she could not get out of bed this morning. She started to note pain in her shoulders and lower back at the end of July. She went to the ED at [facility] and also to urgent care, but states they sent her home. She also has had decreased appetite over this time. Her last mammogram was in 2014. She reports they found 2 lumps in her right breast and recommended biopsy. When she went for biopsy, she could not tolerate the pain. She was supposed to return to re-attempt with pain medications, but she never went back. Currently, her pain is well controlled after morphine in the ED. She denies chest pain, SOB, N/V/D, F/C, or other associated symptoms. Patient denies skin changes on the breast or nipple discharge. (10 Sep 2022 02:11)  Underwent C7-T2 corpectomy & fusion, posterior C4-T5 fusion.      OVERNIGHT EVENTS: No acute events overnight.    VITALS:  T(C): , Max: 36.8 (09-21-22 @ 19:15)  HR:  (66 - 103)  BP:  (102/65 - 143/80)  ABP:  (105/86 - 151/73)  RR:  (6 - 25)  SpO2:  (95% - 100%)      09-21-22 @ 07:01  -  09-22-22 @ 07:00  --------------------------------------------------------  IN: 612 mL / OUT: 887 mL / NET: -275 mL    09-22-22 @ 07:01  -  09-22-22 @ 18:18  --------------------------------------------------------  IN: 900 mL / OUT: 45 mL / NET: 855 mL      LABS:  Na: 137 (09-22 @ 02:23), 137 (09-21 @ 20:37), 139 (09-21 @ 06:06)  K: 4.1 (09-22 @ 02:23), 4.1 (09-21 @ 20:37), 4.4 (09-21 @ 06:06)  Cl: 102 (09-22 @ 02:23), 102 (09-21 @ 20:37), 103 (09-21 @ 06:06)  CO2: 24.0 (09-22 @ 02:23), 22.0 (09-21 @ 20:37), 24.0 (09-21 @ 06:06)  BUN: 23.9 (09-22 @ 02:23), 21.9 (09-21 @ 20:37), 27.0 (09-21 @ 06:06)  Cr: 0.52 (09-22 @ 02:23), 0.51 (09-21 @ 20:37), 0.44 (09-21 @ 06:06)  Glu: 144(09-22 @ 02:23), 146(09-21 @ 20:37), 100(09-21 @ 06:06)    Hgb: 11.7 (09-22 @ 02:23), 11.0 (09-21 @ 20:37), 13.3 (09-21 @ 06:06)  Hct: 34.8 (09-22 @ 02:23), 32.1 (09-21 @ 20:37), 38.3 (09-21 @ 06:06)  WBC: 22.35 (09-22 @ 02:23), 28.03 (09-21 @ 20:37), 14.97 (09-21 @ 06:06)  Plt: 234 (09-22 @ 02:23), 261 (09-21 @ 20:37), 281 (09-21 @ 06:06)    INR: 0.95 09-21-22 @ 06:06, 0.96 09-20-22 @ 05:28  PTT: 25.1 09-21-22 @ 06:06, 23.9 09-20-22 @ 05:28    IMAGING:   Recent imaging studies were reviewed.    MEDICATIONS:  acetaminophen     Tablet .. 650 milliGRAM(s) Oral every 6 hours PRN  bisacodyl Suppository 10 milliGRAM(s) Rectal daily PRN  chlorhexidine 2% Cloths 1 Application(s) Topical daily  dexAMETHasone  Injectable 4 milliGRAM(s) IV Push every 6 hours  famotidine    Tablet 20 milliGRAM(s) Oral two times a day  gabapentin 200 milliGRAM(s) Oral every 8 hours  HYDROmorphone  Injectable 1 milliGRAM(s) IV Push every 2 hours PRN  influenza  Vaccine (HIGH DOSE) 0.7 milliLiter(s) IntraMuscular once  methocarbamol 500 milliGRAM(s) Oral every 8 hours  ondansetron Injectable 4 milliGRAM(s) IV Push every 6 hours PRN  oxyCODONE    IR 5 milliGRAM(s) Oral every 4 hours PRN  oxyCODONE    IR 10 milliGRAM(s) Oral every 4 hours PRN  polyethylene glycol 3350 17 Gram(s) Oral daily  senna 2 Tablet(s) Oral at bedtime  sodium chloride 0.9%. 1000 milliLiter(s) IV Continuous <Continuous>    EXAMINATION:  General:  calm, cooperative.  Neuro:  awake, alert, oriented x 3, follows commands, moves all extremities - exam affected by pain in the back - BL UE 5/5 except for 4-/5 R HG with no drift, BL LE 4/5 effort dependent/pain limited.  Cards:  RRR  Respiratory:  no respiratory distress, CTAB.  Abdomen:  soft, nontender  Extremities:  no edema  Skin:  warm/dry

## 2022-09-23 NOTE — OCCUPATIONAL THERAPY INITIAL EVALUATION ADULT - MANUAL MUSCLE TESTING RESULTS, REHAB EVAL
3/5 right UE- 2/5 at right instrinsic musculature of hand. left UE appears WFL- not formally assessed secondary to injury

## 2022-09-23 NOTE — PHYSICAL THERAPY INITIAL EVALUATION ADULT - PERTINENT HX OF CURRENT PROBLEM, REHAB EVAL
65 yo female with no known pmhx was brought to the ED with progressive weakness to the point that she felt she could not get out of bed, she has some remote hx of should pain, decrease appetite, her last mammogram was in 2014. She reports they found 2 lumps in her right breast and recommended biopsy. Pt unable to tolerate biopsy, did not follow up. Came to the ED 2*2 pain, CT scan of the head showed. No displaced calvarial fracture or acute intracranial hemorrhage, but CT scan of the Cervical spine showed Multiple lytic lesions of the cervical and visualized thoracic spine as well as right first and second   ribs concerning for metastases versus multiple myeloma, she was admitted under medicine service for further workup and management. Pt is now s/p C7-T2 corpectomy and fusion with C4-T5 posterior fusion 2*2 Extensive osseous metastatic disease with pathologic compression fracture deformities at C7, T1, T9, T12 and L2, severe at T1.

## 2022-09-23 NOTE — PROGRESS NOTE ADULT - SUBJECTIVE AND OBJECTIVE BOX
Patient is a 66y old  Female who presents with a chief complaint of Weakness, new breast mass with spinal lesions suspicious for metastasis (23 Sep 2022 11:28)    HPI:  67 yo female with no known pmhx presented to the ED with progressive weakness to the point that she felt she could not get out of bed this morning. She started to note pain in her shoulders and lower back at the end of July. She went to the ED at [facility] and also to urgent care, but states they sent her home. She also has had decreased appetite over this time. Her last mammogram was in 2014. She reports they found 2 lumps in her right breast and recommended biopsy. When she went for biopsy, she could not tolerate the pain. She was supposed to return to re-attempt with pain medications, but she never went back. Currently, her pain is well controlled after morphine in the ED. She denies chest pain, SOB, N/V/D, F/C, or other associated symptoms. Patient denies skin changes on the breast or nipple discharge. (10 Sep 2022 02:11)      Interval history:  Patient seen and examined by neurosurgery team. Patient now POD2 from anterior/posterior fusion. Patient's pain is improving. Patient being downgraded from NSICU.  No acute events reported.       Vital Signs Last 24 Hrs  T(C): 37.1 (23 Sep 2022 11:46), Max: 37.1 (23 Sep 2022 11:46)  T(F): 98.7 (23 Sep 2022 11:46), Max: 98.7 (23 Sep 2022 11:46)  HR: 114 (23 Sep 2022 10:00) (86 - 114)  BP: 156/83 (23 Sep 2022 10:00) (122/83 - 156/83)  BP(mean): 101 (23 Sep 2022 10:00) (94 - 107)  RR: 12 (23 Sep 2022 10:00) (6 - 19)  SpO2: 96% (23 Sep 2022 10:00) (94% - 100%)    Parameters below as of 23 Sep 2022 08:00  Patient On (Oxygen Delivery Method): room air      Physical Exam:  Constitutional: NAD, lying in bed  Neuro  * Mental Status:  GCS 15: Awake, alert, oriented to conversation. No aphasia or difficulty speaking. No dysarthria.   * Cranial Nerves: Cnii-Cnxii grossly intact. PERRL, EOMI, tongue midline, no gaze deviation  * Motor: RUE 4+/5, LUE 5/5, RLE 4+/5, LLE 5/5, no drift   * Sensory: Sensation intact to light touch  * Reflexes: not assessed   Neck: C collar in place, anterior and posterior CASIMIRO drains in place   Cardiovascular: regular rate and rhythm  Respiratory: nonlabored breathing       LABS:                        11.4   19.87 )-----------( 219      ( 23 Sep 2022 03:07 )             33.9     09-23    137  |  101  |  20.5<H>  ----------------------------<  138<H>  4.1   |  25.0  |  0.40<L>    Ca    10.1      23 Sep 2022 03:07  Phos  2.5     09-23  Mg     2.0     09-23    CULTURES:  Culture Results:   <10,000 CFU/mL Normal Urogenital Lainey (09-10 @ 07:48)      Medications:  MEDICATIONS  (STANDING):  dexAMETHasone  Injectable   IV Push   dexAMETHasone  Injectable 2 milliGRAM(s) IV Push every 6 hours  famotidine    Tablet 20 milliGRAM(s) Oral two times a day  gabapentin 200 milliGRAM(s) Oral every 8 hours  influenza  Vaccine (HIGH DOSE) 0.7 milliLiter(s) IntraMuscular once  methocarbamol 500 milliGRAM(s) Oral every 8 hours  polyethylene glycol 3350 17 Gram(s) Oral every 12 hours  senna 2 Tablet(s) Oral at bedtime    MEDICATIONS  (PRN):  acetaminophen     Tablet .. 650 milliGRAM(s) Oral every 6 hours PRN Mild Pain (1 - 3)  bisacodyl Suppository 10 milliGRAM(s) Rectal daily PRN Constipation  HYDROmorphone  Injectable 1 milliGRAM(s) IV Push every 2 hours PRN breakthrough pain  ondansetron Injectable 4 milliGRAM(s) IV Push every 6 hours PRN Nausea and/or Vomiting  oxyCODONE    IR 5 milliGRAM(s) Oral every 4 hours PRN Moderate Pain (4 - 6)  oxyCODONE    IR 10 milliGRAM(s) Oral every 4 hours PRN Severe Pain (7 - 10)      RADIOLOGY & ADDITIONAL STUDIES:  < from: CT Cervical Spine No Cont (09.22.22 @ 04:03) >  IMPRESSION:    The patient is status post C7, T1, T2 corpectomy with cage device   placement. Anterior fixation hardware at the C6 & T3 levels with   vertebral body screws and anterior fixation plate. Bilateral posterior   fixation screws at the C4, C5, C6, T3, T4 levels and vertical stabilizing   rods.  Expected postoperative soft changes changes at the operative  sites. Hardware is in satisfactory position. Diffuse cervical, thoracic,   lumbar spine osseous metastasis.    --- End of Report ---    KAILEY DIAZ MD; Attending Radiologist  This document has been electronically signed. Sep 22 2022 10:25AM    < end of copied text >

## 2022-09-23 NOTE — SWALLOW BEDSIDE ASSESSMENT ADULT - ASR SWALLOW REFERRAL
consider GI consult due to report of globus sensation with occasional regurgitation of PO since sx/Registered Dietitian

## 2022-09-23 NOTE — PROGRESS NOTE ADULT - ASSESSMENT
66F s/p C7-T2 corpectomy and fusion with posterior C4-T5 fusion on 9/21 for C7-T1 pathologic fracture (mts dz).  Newly diagnosed breast Ca with brain, liver, and lung metastatic lesions.   With unremarkable PMH.    Plan:  - neuro checks  - maintain normotension, avoid hypotension  - C collar at all times, TLSO when mobilized in view of other spinal lesions  - Anterior / Posterior CASIMIRO drains to full suction, monitor and record output, per NSGy  - Pain control PRN - Oxy with Dilaudid for breakthrough pain  - Chest PT / incentive spirometry  - Last BM 9/19, on bowel regimen  - diet as tolerated  - monitor e-lytes and FS  - DVT prophylaxis: SCDs only as fresh postop  - stable to be downgraded to SDU, Medicine involvement would be appreciated

## 2022-09-23 NOTE — PHYSICAL THERAPY INITIAL EVALUATION ADULT - PASSIVE RANGE OF MOTION EXAMINATION, REHAB EVAL
tested to 90 degrees flexion only./bilateral upper extremity Passive ROM was WFL (within functional limits)

## 2022-09-23 NOTE — PROGRESS NOTE ADULT - ASSESSMENT
67 yo female no significant past medical history now admitted with RUE weakness, lumbar, and hip pain.    Rt sided large breast lesion, ~ 6 cm, diffuse osseous metastatic disease, liver and pulm metastases with osseous disease in the spine and Spinal disease from C7- t2    No Leptomeningeal enhancement  MRI head w/ subcentimeter enhancing parenchymal lesions - 7 mm right parietal, 3 mm L frontal    # de griselda metastatic breast cancer - triple positive  # Cord compression   #brain metastases    Plan:  - S/p C7-T2 corpectomy and fusion with posterior C4-T5 fusion on 9/21 for C7-T1 for metastatic disease  -right breast biopsy - 9/12/22 - c/w invasive ductal carcinoma, grade 2, ER 40-50%, AL 30-35%, HER2 3+  -spinal surgery pathology is pending - to confirm receptor status for breast ca at a metastatic focus.   -for systemic therapy  - standard front line therapy is a taxane + Herceptin + Perjeta, alternatively could consider tucatinib+capecitabine+herceptin given brain mets but as these are tiny could be a candidate for SRS. Would allow healing from neurosurgery prior to initiating systemic therapy, usually minimum 3-4 weeks  -brain mets - needs rad-onc evaluation   67 yo female no significant past medical history now admitted with RUE weakness, lumbar, and hip pain.    Rt sided large breast lesion, ~ 6 cm, diffuse osseous metastatic disease, liver and pulm metastases with osseous disease in the spine and Spinal disease from C7- t2    No Leptomeningeal enhancement  MRI head w/ subcentimeter enhancing parenchymal lesions - 7 mm right parietal, 3 mm L frontal    # de griselda metastatic breast cancer - triple positive  #Cord compression   #brain metastases    Plan:  - S/p C7-T2 corpectomy and fusion with posterior C4-T5 fusion on 9/21 for C7-T1 for metastatic disease  -right breast biopsy - 9/12/22 - c/w invasive ductal carcinoma, grade 2, ER 40-50%, CT 30-35%, HER2 3+  -spinal surgery pathology is pending -   -for systemic therapy  - standard front line therapy is a taxane + Herceptin + Perjeta, alternatively could consider tucatinib+capecitabine+herceptin given brain mets but as these are tiny could be a candidate for SRS. Would allow healing from neurosurgery prior to initiating systemic therapy, usually minimum 3-4 weeks  -brain mets - needs rad-onc evaluation  -please call with questions.   -plan discussed with patient / family

## 2022-09-23 NOTE — PHYSICAL THERAPY INITIAL EVALUATION ADULT - IMPAIRMENTS CONTRIBUTING TO GAIT DEVIATIONS, PT EVAL
+ Bilateral knees buckling, decreased BLE coordination, difficulty grasping RW with right hand./impaired balance/impaired coordination/pain/impaired postural control/decreased strength

## 2022-09-23 NOTE — PROGRESS NOTE ADULT - SUBJECTIVE AND OBJECTIVE BOX
History of Present Illness:  66y Female    Past Medical History  Abdominal mass  2014    Stomach ulcer  20 years ago   no pain not taking any meds    Skin cancer of arm, left  pt was 16 year old    Concussion  pt hit head on MD examining table when she was 16  no surgery performed        Past Surgical History  H/O excision of mass  left arm  when patient was 16 years ago    S/P hysterectomy        MEDICATIONS  (STANDING):  dexAMETHasone  Injectable   IV Push   dexAMETHasone  Injectable 2 milliGRAM(s) IV Push every 6 hours  famotidine    Tablet 20 milliGRAM(s) Oral two times a day  gabapentin 200 milliGRAM(s) Oral every 8 hours  influenza  Vaccine (HIGH DOSE) 0.7 milliLiter(s) IntraMuscular once  methocarbamol 500 milliGRAM(s) Oral every 8 hours  polyethylene glycol 3350 17 Gram(s) Oral every 12 hours  senna 2 Tablet(s) Oral at bedtime    MEDICATIONS  (PRN):  acetaminophen     Tablet .. 650 milliGRAM(s) Oral every 6 hours PRN Mild Pain (1 - 3)  bisacodyl Suppository 10 milliGRAM(s) Rectal daily PRN Constipation  HYDROmorphone  Injectable 1 milliGRAM(s) IV Push every 2 hours PRN breakthrough pain  ondansetron Injectable 4 milliGRAM(s) IV Push every 6 hours PRN Nausea and/or Vomiting  oxyCODONE    IR 5 milliGRAM(s) Oral every 4 hours PRN Moderate Pain (4 - 6)  oxyCODONE    IR 10 milliGRAM(s) Oral every 4 hours PRN Severe Pain (7 - 10)    Antiplatelet therapy:                           Last dose/amt:    Allergies: lactose (Other)  tetanus toxoid (Hives)      SOCIAL HISTORY:  Smoker: [ ] Yes  [ ] No        PACK YEARS:                         WHEN QUIT?  ETOH use: [ ] Yes  [ ] No              FREQUENCY / QUANTITY:  Ilicit Drug use:  [ ] Yes  [ ] No  Occupation:  Live with:  Assist device use:    Relevant Family History  FAMILY HISTORY:  Family history of dementia (Mother)    Family history of colon cancer (Father)        Review of Systems  GENERAL:  Fevers[] chills[] sweats[] fatigue[] weight loss[] weight gain []                                        NEURO:  parathesias[] seizures []  syncope []  confusion []                                                                                  EYES: glasses[]  blurry vision[]  discharge[] pain[] glaucoma []                                                                            ENMT:  difficulty hearing []  vertigo[]  dysphagia[] epistaxis[] recent dental work []                                      CV:  chest pain[] palpitations[] BAXTER [] diaphoresis [] edema[]                                                                                             RESPIRATORY:  wheezing[] SOB[] cough [] sputum[] hemoptysis[]                                                                    GI:  nausea[]  vomiting []  diarrhea[] constipation [] melena []                                                                        : hematuria[ ]  dysuria[ ] urgency[] incontinence[]                                                                                              MUSKULOSKELETAL:  arthritis[ ]  joint swelling [ ] muscle weakness [ ]                                                                  SKIN/BREAST:  rash[ ] itching [ ]  hair loss[ ] masses[ ]                                                                                                PSYCH:  dementia [ ] depresion [ ] anxiety[ ]                                                                                                                  HEME/LYMPH:  bruises easily[ ] enlarged lymph nodes[ ] tender lymph nodes[ ]                                                 ENDOCRINE:  cold intolerance[ ] heat intolerance[ ] polydipsia[ ]                                                                              PHYSICAL EXAM  Vital Signs Last 24 Hrs  T(C): 36.8 (23 Sep 2022 07:16), Max: 36.8 (22 Sep 2022 20:48)  T(F): 98.2 (23 Sep 2022 07:16), Max: 98.2 (22 Sep 2022 20:48)  HR: 114 (23 Sep 2022 10:00) (86 - 114)  BP: 156/83 (23 Sep 2022 10:00) (122/83 - 156/83)  BP(mean): 101 (23 Sep 2022 10:00) (94 - 107)  RR: 12 (23 Sep 2022 10:00) (6 - 20)  SpO2: 96% (23 Sep 2022 10:00) (94% - 100%)    Parameters below as of 23 Sep 2022 08:00  Patient On (Oxygen Delivery Method): room air        General: Well nourished, well developed, no acute distress.                                                         Neuro: Normal exam oriented to person/place & time with no focal motor or sensory  deficits.                    Eyes: Normal exam of conjunctiva & lids, pupils equally reactive.   ENT: Normal exam of nasal/oral mucosa with absence of cyanosis.   Neck: Normal exam of jugular veins, trachea & thyroid.   Chest: Normal lung exam with good air movement absence of wheezes, rales, or rhonchi:                                                                          CV:  Auscultation: normal [ ] S3[ ] S4[ ] Irregular [ ] Rub[ ] Clicks[ ]  Murmurs none:[ ]systolic [ ]  diastolic [ ] holosystolic [ ]  Carotids: No Bruits[ ] Other____________ Abdominal Aorta: normal [ ] nonpalpable[ ]                                                                         GI: Normal exam of abdomen, liver & spleen with no noted masses or tenderness.                                                                                              Extremities: Normal no evidence of cyanosis or deformity Edema: none[ ]trace[ ]1+[ ]2+[ ]3+[ ]4+[ ]  Lower Extremity Pulses: Right[ ] Left[ ]Varicosities[ ]  SKIN : Normal exam to inspection & palation.                                                           LABS:                        11.4   19.87 )-----------( 219      ( 23 Sep 2022 03:07 )             33.9     09-23    137  |  101  |  20.5<H>  ----------------------------<  138<H>  4.1   |  25.0  |  0.40<L>    Ca    10.1      23 Sep 2022 03:07  Phos  2.5     09-23  Mg     2.0     09-23                  CXR:  CT Chest:        Assessment:  66y Female presents with Weakness    Metastatic breast cancer        Plan: Pt s/p cervical fusion for met cancer.  No need for sternotomy for exposure.  Doing well.  Will sign off.    35 Minutes was spent during this encounter.

## 2022-09-23 NOTE — PROGRESS NOTE ADULT - SUBJECTIVE AND OBJECTIVE BOX
Interval History:        REVIEW OF SYSTEMS:  Constitutional, Eyes, ENT, Cardiovascular, Respiratory, Gastrointestinal, Genitourinary, Musculoskeletal, Integumentary, Neurological, Psychiatric, Endocrine, Heme/Lymph, and Allergic/Immunologic review of systems are otherwise negative except as noted in the HPI.    PAST MEDICAL & SURGICAL HISTORY:  Stomach ulcer  20 years ago   no pain not taking any meds      Skin cancer of arm, left  pt was 16 year old      H/O excision of mass  left arm  when patient was 16 years ago      S/P hysterectomy          FAMILY HISTORY:  Family history of dementia (Mother)    Family history of colon cancer (Father)        SOCIAL HISTORY:    Allergies    tetanus toxoid (Hives)    Intolerances    lactose (Other)      MEDICATIONS  (STANDING):  dexAMETHasone  Injectable 4 milliGRAM(s) IV Push every 6 hours  gabapentin 200 milliGRAM(s) Oral three times a day  influenza  Vaccine (HIGH DOSE) 0.7 milliLiter(s) IntraMuscular once  lactulose Syrup 20 Gram(s) Oral daily  methocarbamol 500 milliGRAM(s) Oral three times a day  naloxone Injectable 0.4 milliGRAM(s) IV Push once  pantoprazole    Tablet 40 milliGRAM(s) Oral before breakfast  polyethylene glycol 3350 17 Gram(s) Oral daily  senna 2 Tablet(s) Oral at bedtime  sodium chloride 0.9%. 1000 milliLiter(s) (100 mL/Hr) IV Continuous <Continuous>    MEDICATIONS  (PRN):  acetaminophen     Tablet .. 650 milliGRAM(s) Oral every 6 hours PRN Temp greater or equal to 38C (100.4F), Mild Pain (1 - 3)  aluminum hydroxide/magnesium hydroxide/simethicone Suspension 30 milliLiter(s) Oral every 4 hours PRN Dyspepsia  bisacodyl 5 milliGRAM(s) Oral daily PRN Constipation  melatonin 3 milliGRAM(s) Oral at bedtime PRN Insomnia  morphine  - Injectable 4 milliGRAM(s) IV Push every 4 hours PRN Severe Pain (7 - 10)  morphine  - Injectable 2 milliGRAM(s) IV Push every 4 hours PRN Moderate Pain (4 - 6)  ondansetron Injectable 4 milliGRAM(s) IV Push every 8 hours PRN Nausea and/or Vomiting      Vital Signs Last 24 Hrs  T(C): 36.7 (12 Sep 2022 05:31), Max: 36.7 (12 Sep 2022 05:31)  T(F): 98.1 (12 Sep 2022 05:31), Max: 98.1 (12 Sep 2022 05:31)  HR: 64 (12 Sep 2022 05:31) (64 - 64)  BP: 148/92 (12 Sep 2022 05:31) (148/92 - 148/92)  BP(mean): --  RR: 18 (12 Sep 2022 05:31) (18 - 18)  SpO2: 93% (12 Sep 2022 05:31) (93% - 93%)        PHYSICAL EXAM:    GENERAL: NAD,   HEAD:  Atraumatic, Normocephalic  breast: 6 cm large breast mass on upper outer quadrant of RT breast   EYES: EOMI, PERRLA, conjunctiva and sclera clear  ENMT: No tonsillar erythema, exudates, or enlargement; Moist mucous membranes, Good dentition, No lesions  NECK: Supple, No JVD, Normal thyroid  CHEST/LUNG: Clear to auscultation bilaterally; No rales, rhonchi, wheezing, or rubs  HEART: Regular rate and rhythm; No murmurs, rubs, or gallops  ABDOMEN: Soft, Nontender, Nondistended; Bowel sounds present  EXTREMITIES:  2+ Peripheral Pulses, No clubbing, cyanosis, or edema  LYMPH: No lymphadenopathy noted  SKIN: No rashes or lesions      LABS:                        14.6   7.02  )-----------( 251      ( 11 Sep 2022 06:37 )             41.5     09-11    139  |  101  |  19.5  ----------------------------<  106<H>  4.4   |  23.0  |  0.49<L>    Ca    10.7<H>      11 Sep 2022 12:29  Phos  4.0     09-11  Mg     2.1     09-11    TPro  x   /  Alb  x   /  TBili  x   /  DBili  x   /  AST  x   /  ALT  x   /  AlkPhos  147<H>  09-11            RADIOLOGY & ADDITIONAL STUDIES:    < from: CT Chest, Abdomen and Pelvis w/ IV Cont (09.09.22 @ 21:42) >  Dominant right breast mass. Right chest wall, axillary and retropectoral involvement.  Osseous, pulmonary, and likely hepatic metastatic disease. Metastatic adenopathy of the thorax.  Diffuse extensive osseous metastatic disease with multiple lucent and sclerotic lesions and compression fractures of T1, T9, T12, and L2.   Severe T1 compression with epidural extension. Cord compression cannot be excluded. Additional neoplastic disease of the spine with epidural extension. Multifocal neoplastic lesions involving the pelvis and ribs as well.       MRI TLS SPINE:  Extensive osseous metastatic disease with pathologic compression fracture deformities at C7, T1, T9, T12 and L2, severe at T1.  Abnormal ventral and dorsal epidural enhancement from C7 through T2. Severe canal stenosis at T1 and L2 without associated cord or cauda  equina compression.  There is motion artifact present in the thoracic spine which limits evaluation. No abnormal leptomeningeal enhancement or definite cord signal abnormality.    MRI BRAIN:< from: MR Head w/wo IV Cont (09.11.22 @ 11:27) >Subcentimeter enhancing parenchymal lesions as above compatible with intracranial metastatic disease. No associated vasogenic edema. No   evidence of acute infarct or hemorrhage.  Likely left parietal calvarial osseous metastatic lesion.    Surgical Pathology Report:   ACCESSION No:  [id number]      Accession:                             [id number]    Collected Date/Time:                   9/12/2022 13:00 EDT  Received Date/Time:                    9/12/2022 14:01 EDT    Surgical Pathology Report - Auth (Verified)    Specimen(s) Submitted  1  Right breast biopsy    Final Diagnosis  1. Right breast, biopsy:  - Invasive ductal carcinoma, [place] grade 2 (T3, N2, M1)  - No DCIS seen  - No lymphovascular invasion seen      [facility] ([facility] and reported at [facility].  Controls are appropriate  Kaley 3: Positive  P63: Negative  E-cadherin: Intact    ER, NH and Her2 were performed and interpreted at [facility]  ER: Positive, weak, 40-45%  NH: Positive, weak, 30-35%  Her2: Positive,3+  Verified by: Bianka Lacey MD  (Electronic Signature)  Reported on: 09/16/22 09:07 EDT, [facility], [address] [place] [id number]  _________________________________________________________________      Comment  This case represents a current recent malignant diagnosis. This patient  will be registered in the [facility] Cancer Registry Database in accordance with  [facility] Public Health Law 2401. The cancer registrar and or [facility] may contact  you for clinical follow up.    Technical report from [facility], [place], [place] on file in the Department  of Pathology    Clinical Information  No history, right breast tumor with mets (probable), right breast tumor    Gross Description  Received:  In formalin labeled  "right breast biopsy"  Size:  Five cores ranging from 0.5 cm to 2.2 cm  Description:  Tan-yellow cores of soft tissue  Submitted:  Entirely in five cassettes: 1A-1E      Erlin Urena 09/12/2022 02:24 PM    Disclaimer  In addition to other data that may appear onthe specimen containers, all  labels have been inspected to confirm the presence of the patient's name  and date of birth.    Histological Processing Performed at [facility],  Department of Pathology, [address] [place] [address]. (09.12.22 @ 13:00)       Interval History:  Awake, alert  No acute overnight events  Denies pain      REVIEW OF SYSTEMS:  Constitutional, Eyes, ENT, Cardiovascular, Respiratory, Gastrointestinal, Genitourinary, Musculoskeletal, Integumentary, Neurological, Psychiatric, Endocrine, Heme/Lymph, and Allergic/Immunologic review of systems are otherwise negative except as noted in the HPI.        SOCIAL HISTORY:    Allergies    tetanus toxoid (Hives)    Intolerances    lactose (Other)      MEDICATIONS  (STANDING):  dexAMETHasone  Injectable 4 milliGRAM(s) IV Push every 6 hours  gabapentin 200 milliGRAM(s) Oral three times a day  influenza  Vaccine (HIGH DOSE) 0.7 milliLiter(s) IntraMuscular once  lactulose Syrup 20 Gram(s) Oral daily  methocarbamol 500 milliGRAM(s) Oral three times a day  naloxone Injectable 0.4 milliGRAM(s) IV Push once  pantoprazole    Tablet 40 milliGRAM(s) Oral before breakfast  polyethylene glycol 3350 17 Gram(s) Oral daily  senna 2 Tablet(s) Oral at bedtime  sodium chloride 0.9%. 1000 milliLiter(s) (100 mL/Hr) IV Continuous <Continuous>    MEDICATIONS  (PRN):  acetaminophen     Tablet .. 650 milliGRAM(s) Oral every 6 hours PRN Temp greater or equal to 38C (100.4F), Mild Pain (1 - 3)  aluminum hydroxide/magnesium hydroxide/simethicone Suspension 30 milliLiter(s) Oral every 4 hours PRN Dyspepsia  bisacodyl 5 milliGRAM(s) Oral daily PRN Constipation  melatonin 3 milliGRAM(s) Oral at bedtime PRN Insomnia  morphine  - Injectable 4 milliGRAM(s) IV Push every 4 hours PRN Severe Pain (7 - 10)  morphine  - Injectable 2 milliGRAM(s) IV Push every 4 hours PRN Moderate Pain (4 - 6)  ondansetron Injectable 4 milliGRAM(s) IV Push every 8 hours PRN Nausea and/or Vomiting      Vital Signs Last 24 Hrs  T(C): 36.7 (12 Sep 2022 05:31), Max: 36.7 (12 Sep 2022 05:31)  T(F): 98.1 (12 Sep 2022 05:31), Max: 98.1 (12 Sep 2022 05:31)  HR: 64 (12 Sep 2022 05:31) (64 - 64)  BP: 148/92 (12 Sep 2022 05:31) (148/92 - 148/92)  BP(mean): --  RR: 18 (12 Sep 2022 05:31) (18 - 18)  SpO2: 93% (12 Sep 2022 05:31) (93% - 93%)        PHYSICAL EXAM:    GENERAL: NAD,   HEAD:  Atraumatic, Normocephalic  breast: 6 cm large breast mass on upper outer quadrant of RT breast   EYES: EOMI,   NECK: C-collar  CHEST/LUNG: Clear to auscultation   HEART: Regular rate and rhythm;   ABDOMEN: Soft, Nontender,   EXTREMITIES:  no edema  LYMPH: No lymphadenopathy noted  SKIN: No rashes or lesions      LABS:                        14.6   7.02  )-----------( 251      ( 11 Sep 2022 06:37 )             41.5     09-11    139  |  101  |  19.5  ----------------------------<  106<H>  4.4   |  23.0  |  0.49<L>    Ca    10.7<H>      11 Sep 2022 12:29  Phos  4.0     09-11  Mg     2.1     09-11    TPro  x   /  Alb  x   /  TBili  x   /  DBili  x   /  AST  x   /  ALT  x   /  AlkPhos  147<H>  09-11            RADIOLOGY & ADDITIONAL STUDIES:    < from: CT Chest, Abdomen and Pelvis w/ IV Cont (09.09.22 @ 21:42) >  Dominant right breast mass. Right chest wall, axillary and retropectoral involvement.  Osseous, pulmonary, and likely hepatic metastatic disease. Metastatic adenopathy of the thorax.  Diffuse extensive osseous metastatic disease with multiple lucent and sclerotic lesions and compression fractures of T1, T9, T12, and L2.   Severe T1 compression with epidural extension. Cord compression cannot be excluded. Additional neoplastic disease of the spine with epidural extension. Multifocal neoplastic lesions involving the pelvis and ribs as well.       MRI TLS SPINE:  Extensive osseous metastatic disease with pathologic compression fracture deformities at C7, T1, T9, T12 and L2, severe at T1.  Abnormal ventral and dorsal epidural enhancement from C7 through T2. Severe canal stenosis at T1 and L2 without associated cord or cauda  equina compression.  There is motion artifact present in the thoracic spine which limits evaluation. No abnormal leptomeningeal enhancement or definite cord signal abnormality.    MRI BRAIN:< from: MR Head w/wo IV Cont (09.11.22 @ 11:27) >Subcentimeter enhancing parenchymal lesions as above compatible with intracranial metastatic disease. No associated vasogenic edema. No   evidence of acute infarct or hemorrhage.  Likely left parietal calvarial osseous metastatic lesion.    Surgical Pathology Report:   ACCESSION No:  [id number]      Accession:                             [id number]    Collected Date/Time:                   9/12/2022 13:00 EDT  Received Date/Time:                    9/12/2022 14:01 EDT    Surgical Pathology Report - Auth (Verified)    Specimen(s) Submitted  1  Right breast biopsy    Final Diagnosis  1. Right breast, biopsy:  - Invasive ductal carcinoma, [place] grade 2 (T3, N2, M1)  - No DCIS seen  - No lymphovascular invasion seen      [facility] were performed [facility] ([facility]) and reported at [facility].  Controls are appropriate  Kaley 3: Positive  P63: Negative  E-cadherin: Intact    ER, DE and Her2 were performed and interpreted at [facility]  ER: Positive, weak, 40-45%  DE: Positive, weak, 30-35%  Her2: Positive,3+  Verified by: Bianka Lacey MD  (Electronic Signature)  Reported on: 09/16/22 09:07 EDT, [facility], [address] [place] [id number]  _________________________________________________________________      Comment  This case represents a current recent malignant diagnosis. This patient  will be registered in the [facility] Cancer Registry Database in accordance with  [facility] Public Health Law 2401. The cancer registrar and or [facility] may contact  you for clinical follow up.    Technical report from [facility], [place], [place] on file in the Department  of Pathology    Clinical Information  No history, right breast tumor with mets (probable), right breast tumor    Gross Description  Received:  In formalin labeled  "right breast biopsy"  Size:  Five cores ranging from 0.5 cm to 2.2 cm  Description:  Tan-yellow cores of soft tissue  Submitted:  Entirely in five cassettes: 1A-1E      Erlin Urena 09/12/2022 02:24 PM    Disclaimer  In addition to other data that may appear onthe specimen containers, all  labels have been inspected to confirm the presence of the patient's name  and date of birth.    Histological Processing Performed at [facility],  Department of Pathology, [address] [place] [address]. (09.12.22 @ 13:00)

## 2022-09-23 NOTE — SWALLOW BEDSIDE ASSESSMENT ADULT - SLP PERTINENT HISTORY OF CURRENT PROBLEM
As per MD note, "66F with no known PMH who originally presented for progressive weakness, found to have C7-T1 pathologic fracture, newly diagnosed breast ca with brain, liver, and lung metastatic lesions. Underwent C7-T2 corpectomy and fusion with posterior C4-T5 fusion on 9/21".

## 2022-09-23 NOTE — OCCUPATIONAL THERAPY INITIAL EVALUATION ADULT - PERTINENT HX OF CURRENT PROBLEM, REHAB EVAL
As per MD note:  CT surgery and Neurosurgery team she underwent C7-T2 corpectomy & fusion, posterior C4-T5 fusion, managed in the ICU post operative and was extubated, Post op course uncomplicated, gomez removed pt requiring some straight cath, pt also with some subjective dysphagia requesting liquids only at this time, being seen by speech and swallow team, being downgraded under medicine for further management.

## 2022-09-24 LAB
ALBUMIN SERPL ELPH-MCNC: 2.6 G/DL — LOW (ref 3.3–5.2)
ALP SERPL-CCNC: 122 U/L — HIGH (ref 40–120)
ALT FLD-CCNC: 7 U/L — SIGNIFICANT CHANGE UP
ANION GAP SERPL CALC-SCNC: 11 MMOL/L — SIGNIFICANT CHANGE UP (ref 5–17)
AST SERPL-CCNC: 46 U/L — HIGH
BASOPHILS # BLD AUTO: 0.04 K/UL — SIGNIFICANT CHANGE UP (ref 0–0.2)
BASOPHILS NFR BLD AUTO: 0.2 % — SIGNIFICANT CHANGE UP (ref 0–2)
BILIRUB SERPL-MCNC: 0.3 MG/DL — LOW (ref 0.4–2)
BUN SERPL-MCNC: 18.3 MG/DL — SIGNIFICANT CHANGE UP (ref 8–20)
CALCIUM SERPL-MCNC: 9.6 MG/DL — SIGNIFICANT CHANGE UP (ref 8.4–10.5)
CHLORIDE SERPL-SCNC: 99 MMOL/L — SIGNIFICANT CHANGE UP (ref 98–107)
CO2 SERPL-SCNC: 26 MMOL/L — SIGNIFICANT CHANGE UP (ref 22–29)
CREAT SERPL-MCNC: 0.27 MG/DL — LOW (ref 0.5–1.3)
EGFR: 120 ML/MIN/1.73M2 — SIGNIFICANT CHANGE UP
EOSINOPHIL # BLD AUTO: 0 K/UL — SIGNIFICANT CHANGE UP (ref 0–0.5)
EOSINOPHIL NFR BLD AUTO: 0 % — SIGNIFICANT CHANGE UP (ref 0–6)
GLUCOSE SERPL-MCNC: 101 MG/DL — HIGH (ref 70–99)
HCT VFR BLD CALC: 27.4 % — LOW (ref 34.5–45)
HGB BLD-MCNC: 9.3 G/DL — LOW (ref 11.5–15.5)
IMM GRANULOCYTES NFR BLD AUTO: 2.1 % — HIGH (ref 0–0.9)
LYMPHOCYTES # BLD AUTO: 0.92 K/UL — LOW (ref 1–3.3)
LYMPHOCYTES # BLD AUTO: 4.9 % — LOW (ref 13–44)
MAGNESIUM SERPL-MCNC: 2 MG/DL — SIGNIFICANT CHANGE UP (ref 1.6–2.6)
MANUAL SMEAR VERIFICATION: SIGNIFICANT CHANGE UP
MCHC RBC-ENTMCNC: 30 PG — SIGNIFICANT CHANGE UP (ref 27–34)
MCHC RBC-ENTMCNC: 33.9 GM/DL — SIGNIFICANT CHANGE UP (ref 32–36)
MCV RBC AUTO: 88.4 FL — SIGNIFICANT CHANGE UP (ref 80–100)
MONOCYTES # BLD AUTO: 0.66 K/UL — SIGNIFICANT CHANGE UP (ref 0–0.9)
MONOCYTES NFR BLD AUTO: 3.5 % — SIGNIFICANT CHANGE UP (ref 2–14)
NEUTROPHILS # BLD AUTO: 16.8 K/UL — HIGH (ref 1.8–7.4)
NEUTROPHILS NFR BLD AUTO: 89.3 % — HIGH (ref 43–77)
PHOSPHATE SERPL-MCNC: 1.8 MG/DL — LOW (ref 2.4–4.7)
PLAT MORPH BLD: NORMAL — SIGNIFICANT CHANGE UP
PLATELET # BLD AUTO: 201 K/UL — SIGNIFICANT CHANGE UP (ref 150–400)
POTASSIUM SERPL-MCNC: 4.7 MMOL/L — SIGNIFICANT CHANGE UP (ref 3.5–5.3)
POTASSIUM SERPL-SCNC: 4.7 MMOL/L — SIGNIFICANT CHANGE UP (ref 3.5–5.3)
PROT SERPL-MCNC: 5 G/DL — LOW (ref 6.6–8.7)
RBC # BLD: 3.1 M/UL — LOW (ref 3.8–5.2)
RBC # FLD: 12.9 % — SIGNIFICANT CHANGE UP (ref 10.3–14.5)
RBC BLD AUTO: NORMAL — SIGNIFICANT CHANGE UP
SODIUM SERPL-SCNC: 136 MMOL/L — SIGNIFICANT CHANGE UP (ref 135–145)
WBC # BLD: 18.81 K/UL — HIGH (ref 3.8–10.5)
WBC # FLD AUTO: 18.81 K/UL — HIGH (ref 3.8–10.5)

## 2022-09-24 PROCEDURE — 99233 SBSQ HOSP IP/OBS HIGH 50: CPT

## 2022-09-24 RX ORDER — ACETAMINOPHEN 500 MG
1000 TABLET ORAL ONCE
Refills: 0 | Status: DISCONTINUED | OUTPATIENT
Start: 2022-09-24 | End: 2022-09-26

## 2022-09-24 RX ORDER — OXYCODONE HYDROCHLORIDE 5 MG/1
10 TABLET ORAL EVERY 4 HOURS
Refills: 0 | Status: DISCONTINUED | OUTPATIENT
Start: 2022-09-24 | End: 2022-09-27

## 2022-09-24 RX ORDER — METHOCARBAMOL 500 MG/1
750 TABLET, FILM COATED ORAL THREE TIMES A DAY
Refills: 0 | Status: DISCONTINUED | OUTPATIENT
Start: 2022-09-24 | End: 2022-09-30

## 2022-09-24 RX ORDER — GABAPENTIN 400 MG/1
300 CAPSULE ORAL EVERY 8 HOURS
Refills: 0 | Status: DISCONTINUED | OUTPATIENT
Start: 2022-09-24 | End: 2022-09-27

## 2022-09-24 RX ORDER — OXYCODONE HYDROCHLORIDE 5 MG/1
5 TABLET ORAL EVERY 4 HOURS
Refills: 0 | Status: DISCONTINUED | OUTPATIENT
Start: 2022-09-24 | End: 2022-09-27

## 2022-09-24 RX ORDER — HYDROMORPHONE HYDROCHLORIDE 2 MG/ML
2 INJECTION INTRAMUSCULAR; INTRAVENOUS; SUBCUTANEOUS
Refills: 0 | Status: DISCONTINUED | OUTPATIENT
Start: 2022-09-24 | End: 2022-09-27

## 2022-09-24 RX ADMIN — Medication 650 MILLIGRAM(S): at 09:10

## 2022-09-24 RX ADMIN — OXYCODONE HYDROCHLORIDE 10 MILLIGRAM(S): 5 TABLET ORAL at 15:32

## 2022-09-24 RX ADMIN — POLYETHYLENE GLYCOL 3350 17 GRAM(S): 17 POWDER, FOR SOLUTION ORAL at 05:06

## 2022-09-24 RX ADMIN — FAMOTIDINE 20 MILLIGRAM(S): 10 INJECTION INTRAVENOUS at 05:06

## 2022-09-24 RX ADMIN — HYDROMORPHONE HYDROCHLORIDE 1 MILLIGRAM(S): 2 INJECTION INTRAMUSCULAR; INTRAVENOUS; SUBCUTANEOUS at 09:47

## 2022-09-24 RX ADMIN — OXYCODONE HYDROCHLORIDE 10 MILLIGRAM(S): 5 TABLET ORAL at 16:25

## 2022-09-24 RX ADMIN — Medication 650 MILLIGRAM(S): at 09:45

## 2022-09-24 RX ADMIN — GABAPENTIN 300 MILLIGRAM(S): 400 CAPSULE ORAL at 21:34

## 2022-09-24 RX ADMIN — METHOCARBAMOL 500 MILLIGRAM(S): 500 TABLET, FILM COATED ORAL at 05:06

## 2022-09-24 RX ADMIN — SENNA PLUS 2 TABLET(S): 8.6 TABLET ORAL at 21:34

## 2022-09-24 RX ADMIN — FAMOTIDINE 20 MILLIGRAM(S): 10 INJECTION INTRAVENOUS at 17:25

## 2022-09-24 RX ADMIN — OXYCODONE HYDROCHLORIDE 10 MILLIGRAM(S): 5 TABLET ORAL at 05:05

## 2022-09-24 RX ADMIN — HYDROMORPHONE HYDROCHLORIDE 1 MILLIGRAM(S): 2 INJECTION INTRAMUSCULAR; INTRAVENOUS; SUBCUTANEOUS at 17:36

## 2022-09-24 RX ADMIN — HYDROMORPHONE HYDROCHLORIDE 1 MILLIGRAM(S): 2 INJECTION INTRAMUSCULAR; INTRAVENOUS; SUBCUTANEOUS at 09:59

## 2022-09-24 RX ADMIN — Medication 2 MILLIGRAM(S): at 17:25

## 2022-09-24 RX ADMIN — OXYCODONE HYDROCHLORIDE 10 MILLIGRAM(S): 5 TABLET ORAL at 06:00

## 2022-09-24 RX ADMIN — METHOCARBAMOL 750 MILLIGRAM(S): 500 TABLET, FILM COATED ORAL at 21:34

## 2022-09-24 RX ADMIN — Medication 2 MILLIGRAM(S): at 05:05

## 2022-09-24 RX ADMIN — HYDROMORPHONE HYDROCHLORIDE 1 MILLIGRAM(S): 2 INJECTION INTRAMUSCULAR; INTRAVENOUS; SUBCUTANEOUS at 17:50

## 2022-09-24 RX ADMIN — OXYCODONE HYDROCHLORIDE 10 MILLIGRAM(S): 5 TABLET ORAL at 22:00

## 2022-09-24 RX ADMIN — METHOCARBAMOL 500 MILLIGRAM(S): 500 TABLET, FILM COATED ORAL at 13:54

## 2022-09-24 RX ADMIN — HYDROMORPHONE HYDROCHLORIDE 1 MILLIGRAM(S): 2 INJECTION INTRAMUSCULAR; INTRAVENOUS; SUBCUTANEOUS at 00:30

## 2022-09-24 RX ADMIN — POLYETHYLENE GLYCOL 3350 17 GRAM(S): 17 POWDER, FOR SOLUTION ORAL at 17:25

## 2022-09-24 RX ADMIN — Medication 10 MILLIGRAM(S): at 10:03

## 2022-09-24 RX ADMIN — GABAPENTIN 200 MILLIGRAM(S): 400 CAPSULE ORAL at 05:05

## 2022-09-24 RX ADMIN — GABAPENTIN 200 MILLIGRAM(S): 400 CAPSULE ORAL at 13:54

## 2022-09-24 RX ADMIN — OXYCODONE HYDROCHLORIDE 10 MILLIGRAM(S): 5 TABLET ORAL at 21:33

## 2022-09-24 NOTE — PROGRESS NOTE ADULT - SUBJECTIVE AND OBJECTIVE BOX
Sherley was awake and alert in bed as I measured and sized and aspen LSO for her. Since she preferred not to be fit with it at this time I demonstrated donning and adjustment of the brace to her. Printed instructions and contact info were provided, and the brace was labeled and placed on chair for later OOB use.   [facility]  [phone]

## 2022-09-24 NOTE — PROGRESS NOTE ADULT - SUBJECTIVE AND OBJECTIVE BOX
chief complaint of Weakness, new breast mass with spinal lesions suspicious for metastasis       INTERVAL HPI/OVERNIGHT EVENTS:  c/o pain at the operative site when she moves otherwise is well controlled       REVIEW OF SYSTEMS:    CONSTITUTIONAL: No fever, has some fatigue  RESPIRATORY: No cough, No shortness of breath  CARDIOVASCULAR: No chest pain, palpitations  NEUROLOGICAL: No headaches       Vital Signs Last 24 Hrs  T(C): 37.1 (24 Sep 2022 07:28), Max: 37.1 (24 Sep 2022 07:28)  T(F): 98.8 (24 Sep 2022 07:28), Max: 98.8 (24 Sep 2022 07:28)  HR: 82 (24 Sep 2022 10:00) (68 - 102)  BP: 134/81 (24 Sep 2022 10:00) (111/65 - 159/76)  BP(mean): 91 (24 Sep 2022 06:00) (76 - 101)  RR: 14 (24 Sep 2022 10:00) (10 - 20)  SpO2: 95% (24 Sep 2022 10:00) (95% - 99%)    Parameters below as of 24 Sep 2022 10:00  Patient On (Oxygen Delivery Method): room air          PHYSICAL EXAM:    GENERAL: Middle age female looking comfortable   HEENT: PERRL  CHEST/LUNG: Decrease air entry bilaterally; No wheezing  HEART: S1S2+, Regular rate and rhythm; No murmurs  ABDOMEN: Soft, Nontender, Nondistended; Bowel sounds present  EXTREMITIES:  No edema  SKIN: No rashes or lesions  NEURO: AAOX3  PSYCH: normal mood  Post upper back and neck has clean dressings on and has drain in place         LABS:                                          9.3    18.81 )-----------( 201      ( 24 Sep 2022 10:10 )             27.4   09-24    136  |  99  |  18.3  ----------------------------<  101<H>  4.7   |  26.0  |  0.27<L>    Ca    9.6      24 Sep 2022 10:10  Phos  1.8     09-24  Mg     2.0     09-24    TPro  5.0<L>  /  Alb  2.6<L>  /  TBili  0.3<L>  /  DBili  x   /  AST  46<H>  /  ALT  7   /  AlkPhos  122<H>  09-24      MEDICATIONS  (STANDING):  dexAMETHasone  Injectable   IV Push   dexAMETHasone  Injectable 2 milliGRAM(s) IV Push every 12 hours  famotidine    Tablet 20 milliGRAM(s) Oral two times a day  gabapentin 200 milliGRAM(s) Oral every 8 hours  influenza  Vaccine (HIGH DOSE) 0.7 milliLiter(s) IntraMuscular once  methocarbamol 500 milliGRAM(s) Oral every 8 hours  polyethylene glycol 3350 17 Gram(s) Oral every 12 hours  senna 2 Tablet(s) Oral at bedtime    MEDICATIONS  (PRN):  acetaminophen     Tablet .. 650 milliGRAM(s) Oral every 6 hours PRN Mild Pain (1 - 3)  bisacodyl Suppository 10 milliGRAM(s) Rectal daily PRN Constipation  HYDROmorphone  Injectable 1 milliGRAM(s) IV Push every 2 hours PRN breakthrough pain  ondansetron Injectable 4 milliGRAM(s) IV Push every 6 hours PRN Nausea and/or Vomiting  oxyCODONE    IR 5 milliGRAM(s) Oral every 4 hours PRN Moderate Pain (4 - 6)  oxyCODONE    IR 10 milliGRAM(s) Oral every 4 hours PRN Severe Pain (7 - 10)

## 2022-09-24 NOTE — PROGRESS NOTE ADULT - SUBJECTIVE AND OBJECTIVE BOX
Neurosurgery : POD# 3    HPI:   65 yo female with no known pmhx presented to the ED with progressively worsening pain and weakness to the point that she felt she could not get out of bed this prior to admission. She started to note pain in her shoulders and lower back at the end of July. She went to the ED at [facility] and also to urgent care, but states they sent her home. She also has had decreased appetite over this time. Her last mammogram was in 2014. She reports they found 2 lumps in her right breast and recommended biopsy. When she went for biopsy, she could not tolerate the pain. She was supposed to return to re-attempt with pain medications, but she never went back. Currently, her pain is well controlled after morphine in the ED. She denies chest pain, SOB, N/V/D, F/C, or other associated symptoms. Patient denies skin changes on the breast or nipple discharge. (10 Sep 2022 02:11)    Work up revealed Breast cancer with Mets  to spine under went Anterior & Posterior Decompression     9/21/22: Corpectomy, thoracic, with fusion 21-Sep-2022 18:34:56 T1-T2 corpectomy, C4-T5 fusion   Corpectomy, spine, cervical 21-Sep-2022 18:38:07 C7 corpectomy     9/24/22: POD#3 Continues to have severe pain in the posterior neck and Shoulders     MEDICATIONS  (STANDING):  dexAMETHasone  Injectable   IV Push   dexAMETHasone  Injectable 2 milliGRAM(s) IV Push every 12 hours  famotidine    Tablet 20 milliGRAM(s) Oral two times a day  gabapentin 200 milliGRAM(s) Oral every 8 hours  influenza  Vaccine (HIGH DOSE) 0.7 milliLiter(s) IntraMuscular once  methocarbamol 500 milliGRAM(s) Oral every 8 hours  polyethylene glycol 3350 17 Gram(s) Oral every 12 hours  senna 2 Tablet(s) Oral at bedtime    MEDICATIONS  (PRN):  acetaminophen     Tablet .. 650 milliGRAM(s) Oral every 6 hours PRN Mild Pain (1 - 3)  bisacodyl Suppository 10 milliGRAM(s) Rectal daily PRN Constipation  HYDROmorphone  Injectable 1 milliGRAM(s) IV Push every 2 hours PRN breakthrough pain  ondansetron Injectable 4 milliGRAM(s) IV Push every 6 hours PRN Nausea and/or Vomiting  oxyCODONE    IR 5 milliGRAM(s) Oral every 4 hours PRN Moderate Pain (4 - 6)  oxyCODONE    IR 10 milliGRAM(s) Oral every 4 hours PRN Severe Pain (7 - 10)      Allergies    tetanus toxoid (Hives)    Intolerances    lactose (Other)    Vital Signs Last 24 Hrs  T(C): 36.7 (24 Sep 2022 15:56), Max: 37.1 (24 Sep 2022 07:28)  T(F): 98 (24 Sep 2022 15:56), Max: 98.8 (24 Sep 2022 07:28)  HR: 98 (24 Sep 2022 14:00) (68 - 102)  BP: 131/78 (24 Sep 2022 14:00) (111/65 - 159/76)  BP(mean): 91 (24 Sep 2022 06:00) (76 - 101)  RR: 18 (24 Sep 2022 14:00) (10 - 20)  SpO2: 98% (24 Sep 2022 14:00) (95% - 99%)    Parameters below as of 24 Sep 2022 14:00  Patient On (Oxygen Delivery Method): room air        PHYSICAL EXAM:  Cervical collar in place anxious   NEURO:  Awake alert oriented to self, place situation   RUE weakness  3/5 Bicep 3+/5 Tricep 3+/5 Deltoid , apposition index to thumb and Thumb to pinky 0/5   LUE5/5   RLE 4/5   LLE 5/5     CASIMIRO Drains Anterior 3 / Posterior 50 cc / 24 hours     In sterile fashion Drains discontinued wound dressed Collar secured     LABS:                        9.3    18.81 )-----------( 201      ( 24 Sep 2022 10:10 )             27.4     09-24    136  |  99  |  18.3  ----------------------------<  101<H>  4.7   |  26.0  |  0.27<L>    Ca    9.6      24 Sep 2022 10:10  Phos  1.8     09-24  Mg     2.0     09-24    TPro  5.0<L>  /  Alb  2.6<L>  /  TBili  0.3<L>  /  DBili  x   /  AST  46<H>  /  ALT  7   /  AlkPhos  122<H>  09-24      RADIOLOGY & ADDITIONAL TESTS:  < from: MR Lumbar Spine w/wo IV Cont (09.11.22 @ 11:26) >    ACC: [id number] EXAM:  MR SPINE LUMBAR WAW IC                        ACC: [id number] EXAM:  MR SPINE THORACIC WAW IC                        ACC: [id number] EXAM:  MR SPINE CERVICAL WAW IC                          PROCEDURE DATE:  09/11/2022          INTERPRETATION:  CLINICAL HISTORY / REASON FOR EXAM: Weakness. Metastatic   breast cancer. Evaluate for fracture.    TECHNIQUE: MRI of the cervical, thoracic and lumbar spines with IV   contrast contrast.  Examination consists of precontrast sagittal and transaxial T1 and   T2-weighted images as well as sagittal STIR images of the spine.   Postcontrast transaxial and sagittal T1 and T1 fat sat images are   provided.  7 ml of Gadavist gadolinium-based contrast was provided without untoward   event. 1mldiscarded.    COMPARISON: CT cervical spine 9/9/2022 and CT of the chest, abdomen and   pelvis 9/9/2022    FINDINGS:    Cervical spine:  There is mild straightening of the normal cervical lordosis could be due   to positioning. No evidence of malalignment. Multilevel facet alignment   is preserved. Mild vertebral body height loss at C7 with associated   underlying vertebral body marrow signal and enhancement compatible with   pathologic mild compression fracture. Additional abnormal marrow signal  with enhancement is seen in the left lamina of C2 as well as C5 and   within the inferior posterior aspect of the C6 vertebral body. The   remaining cervical vertebral body heights are maintained. The   atlantodental interval is within normal limits.    The cervical spinal cord is normal in signal intensity and morphology. No   abnormal enhancement along or within the cord.    Multilevel disc desiccation changes are present with intervertebral disc   space narrowing most notable at C4/C5 and C5/C6. Multilevel degenerative   changes including small disc osteophyte complexes, facet arthropathy and   ligamentum flavum thickening are present contributing to mild canal   stenosis at multiple levels. Varying degrees of mild to moderate neural   foraminal narrowing are present.    There is abnormal ventral epidural enhancement beginning at the C6 level   and continuing inferiorly into the thoracic spine where the abnormal   enhancement is more circumferential. Abnormal prevertebral enhancementis   seen at the C7 level    The ligamentous structures are intact. The paraspinal musculature is   unremarkable. There is a dominant left vertebral flow void and   hypoplastic right. Both flow voids are maintained, indicating their   patency.    Thoracic spine:  There is motion artifact present on multiple pulse sequences which   degrades image quality.    Thoracic alignment is intact. Severe pathologic compression fracture   deformity at T1 with retropulsion of bone into the epidural space which   contacts but does not compress the upper thoracic spinal cord. The T1   compressed vertebra also extends into the prevertebral space and has   associated surrounding abnormal enhancement to the above and below at C7   and T1. Numerous additional enhancing lesions are seen throughout the   thoracic vertebral bodies and posterior elements without additional   significant pathologic compression fracture deformities. There is very   mild wedging of the superior endplates of the T9 and T12 vertebral bodies.    No significant thoracic cord signal abnormality or enhancement. Cord   signal suggested at the T12/L1 level on series 7 image 41 is likely   related to motion artifact. There is no abnormal leptomeningeal or cord   enhancement. There is abnormal dorsal and ventral epidural enhancement   from the lower cervical level through to T2. Mild extraosseous extension   of tumor from the posterior vertebral body of T9 into the ventral   epidural space and posterior elements of T11 and T12 into the dorsal   epidural space. No associated cord compression.    Severe canal stenosis at T1 secondary to retropulsion of bone. Mild canal   stenosis at T9 and moderate at T11 and T12.    Small bilateral pleural effusions. Pulmonary nodules and mediastinal   adenopathy is seen on the recent chest CT compatible with metastatic   disease.    The paraspinal musculature is unremarkable.    Lumbar spine:  The lumbosacral alignment is intact. Extensive osseous metastatic disease   is seen as in the thoracic spine with numerous enhancing lesions in the   lumbar and sacral vertebral bodies and posterior elements. There is mild   pathologic compression fracture of L2. There is mild associated   retropulsion of bone into the epidural space contributing to canal   stenosis. The remaining lumbar vertebral body heights are maintained.    The nerve roots of the cauda equina are normal in signal intensity and   morphology. There is no abnormal leptomeningeal enhancement.    Multilevel disc desiccation changes and intervertebral disc space   narrowing. Along with facet arthropathy and mild ligamentum thickening   findings contribute to canal and foraminal stenosis. The degree of   stenosis is most notable at L2 secondary to retropulsion of bone into the   ventral epidural space which contributes to severe canal stenosis. No   evidence of cauda equina or nerve root compression.    There is no abnormal epidural enhancement. No epidural fluid collection   or hematoma.    The paraspinal musculature is of normal bulk and morphology for the   patient's stated age.    IMPRESSION:  Extensive osseous metastatic disease with pathologic compression fracture   deformities at C7, T1, T9, T12 and L2, severe at T1.    Abnormal ventral and dorsal epidural enhancement from C7 through T2.   Severe canal stenosis at T1 and L2 without associated cord or cauda   equina compression.    There is motion artifact present in the thoracic spine which limits   evaluation. No abnormal leptomeningeal enhancement or definite cord   signal abnormality.    --- End of Report ---      CARMEN POOLE MD; Attending Radiologist  This document has been electronically signed. Sep 11 2022  2:34PM    < end of copied text >    ACC: [id number] EXAM:  CT THORACIC SPINE                        ACC: [id number] EXAM:  CT CERVICAL SPINE                          PROCEDURE DATE:  09/22/2022          INTERPRETATION:  CLINICAL INDICATIONS: post op, osseous breasts   metastasis and pathologic fractures,T1-T2 corpectomy, C4-T5 fusion ,   C7-T2 corpectomy and fusion with posterior C4-T5 fusion    COMPARISON: CT C-spine dated 9/20/2022. CT thoracic spine dated 9/20/2022    TECHNIQUE: Noncontrast CT of the cervical and thoracic spine. Multiple   contiguous axial images through the cervical and thoracic spine as well   as multiplanar reformatted images are submitted for interpretation   without the administration of intravenous contrast. 3-D images.    FINDINGS:    CERVICAL AND THORACIC SPINE CT:    The patient is status post C7, T1, T2 corpectomy with cage device   placement. Anterior fixation hardware at the C6 & T3 levels with   vertebral body screws and anterior fixation plate. Bilateral posterior   fixation screws at the C4,C5, C6, T3, T4 levels and vertical stabilizing   rods. Extradural posterior soft tissue drainage of bone graft material.   Posterior midline cutaneous staples. Expected postoperative soft tissue   swelling and fluid extradurally at the operative levels. Anterior soft   tissue drain. Anterior right neck surgical changes.    Redemonstration of lytic, destructive metastases involving the T1   bilateral pedicles and transverse processes, the right T1 rib, right T2   pedicle and transverse process, and right T2 posterior rib.    The lower cervical and upper thoracic spinal canal contents are poorly   evaluated due to streak artifact from shoulders.    Redemonstration of diffuse thoracic lytic osseous metastases involving   the thoracic vertebral bodies and lower posterior elements.    Redemonstration of mild pathologic fracture of the superior endplate of   T12 and moderate pathologic fracture of L2. No significantly retropulsed   bone.    Spinal canal contents are poorly evaluated.    Moderate chronic L2 pathologic compression fracture. Possible metastasis   involving L1 and L3 levels.    Bilateral lower lobe atelectasis.    IMPRESSION:    The patient is status post C7, T1, T2 corpectomy with cage device   placement. Anterior fixation hardware at the C6 & T3 levels with   vertebral body screws and anterior fixation plate. Bilateral posterior   fixation screws at the C4, C5, C6, T3, T4 levels and vertical stabilizing   rods.  Expected postoperative soft changes changes at the operative  sites. Hardware is in satisfactory position. Diffuse cervical, thoracic,   lumbar spine osseous metastasis.    --- End of Report ---      KAILEY DIAZ MD; Attending Radiologist  This document has been electronically signed. Sep 22 2022 10:25AM    A/P: 66F found to have newly diagnosed Breast Cancer with Mets to Liver Lung and Spine who originally presented for progressive weakness, found to have C7-T1 pathologic fracture. Underwent C7-T2 Anterior / Posterior Corpectomy and fusion with posterior C4-T5 fusion on 9/21 with Instrumentation . Stable     Cervical collar at all times   Drains are discontinued   Start Lovenox in am   Continue Multimodal pain regimen with PO and IV pain meds   Consider calling the Pain Service   LSO Brace  for L2 Pathological Fracture   Continue working with PT mobilize daily OOB to chair with assistance   d/w Dr Bennett

## 2022-09-24 NOTE — PROGRESS NOTE ADULT - ASSESSMENT
67 yo female with no known pmhx was brought to the ED with progressive weakness to the point that she felt she could not get out of bed, she has some remote hx of should pain, decrease appetite, her last mammogram was in 2014. She reports they found 2 lumps in her right breast and recommended biopsy, she went for biopsy, she could not tolerate the pain.  CT scan of the Cervical spine showed Multiple lytic lesions of the cervical and visualized thoracic spine as well as right first and second ribs concerning for metastases versus multiple myeloma, she was admitted under medicine service for further workup and management, she was provided pain meds and her further imaging done showed Metastatic breast cancer with dominant right breast mass, Right chest wall, axillary and retropectoral involvement, Osseous, pulmonary, and likely hepatic metastatic disease. Metastatic adenopathy of the thorax, diffuse extensive osseous metastatic disease with multiple lucent and sclerotic lesions and compression fractures of T1, T9, T12, and L2. Severe T1 compression with epidural extension. Cord compression cannot be excluded. Additional neoplastic disease of the spine with epidural extension. Multifocal neoplastic lesions involving the pelvis and ribs as well, she was seen by Oncology, CT surgery and Neurosurgery team she underwent C7-T2 corpectomy & fusion, posterior C4-T5 fusion, managed in the ICU post operative and was extubated, Post op course uncomplicated, gomez removed pt requiring some straight cath, pt also with some subjective dysphagia - seen by speech therapy - recommned purred diet.     Plan:     Extensive osseous metastatic disease with pathologic compression fracture deformities at C7, T1, T9, T12 and L2, severe at T1  Likely Primary metastatic Breast CA  s/p  C7-T2 corpectomy & fusion, posterior C4-T5 fusion post op day 02  - Decadron 2mg IVP q6h  - Robaxin 500mg TID  - Gabapentin 200mg TID  - Oxycodone and Dilaudid PRN  - once biopsy is back will update oncology  - Neuro checks   - Neurosurgery follow   - Pain management team is following   - Bowel meds   - monitor for urine retention  - PT - acute rehab -  PM&R consult  - wound care as per Neurosurgery team   - has drains in place, manage as per Neurosurgery team       Large right breast mass:   - likely cancer with mets to spine, brain, liver  - s/p right breast biopsy shows Biopsy positive for invasive ductal carcinoma     - Oncology - Chemotherapy after neurosx wound heals, rad-onc consult     Leucocytosis: Likely post op and being on steriods, no focus of infection, no fever, no chills.     Hypercalcemia due to bone mets malignancy   - s/p IVF , improved   - Monitor BMP     Moderate protein malia malnutrition   - Ensure  -dietician on board.       DVT prophylaxis: SCDs for now, once ok from the Neurosurgery team will start Lovenox sc

## 2022-09-25 PROCEDURE — 99233 SBSQ HOSP IP/OBS HIGH 50: CPT

## 2022-09-25 RX ORDER — ENOXAPARIN SODIUM 100 MG/ML
40 INJECTION SUBCUTANEOUS EVERY 24 HOURS
Refills: 0 | Status: DISCONTINUED | OUTPATIENT
Start: 2022-09-26 | End: 2022-09-30

## 2022-09-25 RX ADMIN — HYDROMORPHONE HYDROCHLORIDE 2 MILLIGRAM(S): 2 INJECTION INTRAMUSCULAR; INTRAVENOUS; SUBCUTANEOUS at 05:38

## 2022-09-25 RX ADMIN — OXYCODONE HYDROCHLORIDE 10 MILLIGRAM(S): 5 TABLET ORAL at 04:00

## 2022-09-25 RX ADMIN — OXYCODONE HYDROCHLORIDE 10 MILLIGRAM(S): 5 TABLET ORAL at 02:25

## 2022-09-25 RX ADMIN — GABAPENTIN 300 MILLIGRAM(S): 400 CAPSULE ORAL at 21:13

## 2022-09-25 RX ADMIN — HYDROMORPHONE HYDROCHLORIDE 2 MILLIGRAM(S): 2 INJECTION INTRAMUSCULAR; INTRAVENOUS; SUBCUTANEOUS at 18:31

## 2022-09-25 RX ADMIN — SENNA PLUS 2 TABLET(S): 8.6 TABLET ORAL at 21:13

## 2022-09-25 RX ADMIN — HYDROMORPHONE HYDROCHLORIDE 2 MILLIGRAM(S): 2 INJECTION INTRAMUSCULAR; INTRAVENOUS; SUBCUTANEOUS at 06:15

## 2022-09-25 RX ADMIN — GABAPENTIN 300 MILLIGRAM(S): 400 CAPSULE ORAL at 13:57

## 2022-09-25 RX ADMIN — METHOCARBAMOL 750 MILLIGRAM(S): 500 TABLET, FILM COATED ORAL at 13:57

## 2022-09-25 RX ADMIN — HYDROMORPHONE HYDROCHLORIDE 2 MILLIGRAM(S): 2 INJECTION INTRAMUSCULAR; INTRAVENOUS; SUBCUTANEOUS at 12:28

## 2022-09-25 RX ADMIN — HYDROMORPHONE HYDROCHLORIDE 2 MILLIGRAM(S): 2 INJECTION INTRAMUSCULAR; INTRAVENOUS; SUBCUTANEOUS at 12:43

## 2022-09-25 RX ADMIN — GABAPENTIN 300 MILLIGRAM(S): 400 CAPSULE ORAL at 05:38

## 2022-09-25 RX ADMIN — POLYETHYLENE GLYCOL 3350 17 GRAM(S): 17 POWDER, FOR SOLUTION ORAL at 05:38

## 2022-09-25 RX ADMIN — METHOCARBAMOL 750 MILLIGRAM(S): 500 TABLET, FILM COATED ORAL at 05:38

## 2022-09-25 RX ADMIN — METHOCARBAMOL 750 MILLIGRAM(S): 500 TABLET, FILM COATED ORAL at 21:13

## 2022-09-25 RX ADMIN — FAMOTIDINE 20 MILLIGRAM(S): 10 INJECTION INTRAVENOUS at 17:20

## 2022-09-25 RX ADMIN — OXYCODONE HYDROCHLORIDE 5 MILLIGRAM(S): 5 TABLET ORAL at 11:00

## 2022-09-25 RX ADMIN — OXYCODONE HYDROCHLORIDE 10 MILLIGRAM(S): 5 TABLET ORAL at 18:30

## 2022-09-25 RX ADMIN — FAMOTIDINE 20 MILLIGRAM(S): 10 INJECTION INTRAVENOUS at 05:39

## 2022-09-25 RX ADMIN — POLYETHYLENE GLYCOL 3350 17 GRAM(S): 17 POWDER, FOR SOLUTION ORAL at 17:20

## 2022-09-25 RX ADMIN — OXYCODONE HYDROCHLORIDE 5 MILLIGRAM(S): 5 TABLET ORAL at 10:04

## 2022-09-25 RX ADMIN — OXYCODONE HYDROCHLORIDE 10 MILLIGRAM(S): 5 TABLET ORAL at 18:15

## 2022-09-25 RX ADMIN — HYDROMORPHONE HYDROCHLORIDE 2 MILLIGRAM(S): 2 INJECTION INTRAMUSCULAR; INTRAVENOUS; SUBCUTANEOUS at 18:38

## 2022-09-25 NOTE — PROGRESS NOTE ADULT - ASSESSMENT
66F with no known PMH who originally presented for progressive weakness, found to have C7-T1 pathologic fracture, newly diagnosed breast ca with brain, liver, and lung metastatic lesions. Underwent C7-T2 corpectomy and fusion with posterior C4-T5 fusion on 9/21.   -Anterior & posterior CASIMIRO drains D/C'd 9/24.    Plan:  -D/w Dr. Bennett  -Heme/onc following   -Recommend re-calling pain mgmt as pt. with continued pain   -Recommend rad/onc consult   -F/u final pathology   -Ok to begin Lovenox for DVT PPX  -Sutures to be D/C'd on 10/5  -Remain in c-collar at all times & aspen collar when OOB  -Follow up with Dr. Bennett in his office in 2 weeks  -Neurosurgery signing off; please re-call as needed with any further questions/concerns  -Further medical management per primary team

## 2022-09-25 NOTE — PROGRESS NOTE ADULT - SUBJECTIVE AND OBJECTIVE BOX
chief complaint of Weakness, new breast mass with spinal lesions suspicious for metastasis   s/p  T1-T2 corpectomy, C4-T5 fusion    INTERVAL HPI/OVERNIGHT EVENTS:  c/o pain at the operative site when she moves otherwise is well controlled - improved overnight       REVIEW OF SYSTEMS:    CONSTITUTIONAL: No fever, has some fatigue  RESPIRATORY: No cough, No shortness of breath  CARDIOVASCULAR: No chest pain, palpitations  NEUROLOGICAL: No headaches     Vital Signs Last 24 Hrs  T(C): 36.4 (25 Sep 2022 09:00), Max: 36.7 (24 Sep 2022 15:56)  T(F): 97.5 (25 Sep 2022 09:00), Max: 98.1 (24 Sep 2022 20:00)  HR: 96 (25 Sep 2022 10:14) (71 - 98)  BP: 136/64 (25 Sep 2022 10:14) (115/61 - 136/80)  BP(mean): 78 (25 Sep 2022 04:00) (77 - 82)  RR: 18 (25 Sep 2022 10:14) (11 - 18)  SpO2: 96% (25 Sep 2022 10:14) (95% - 98%)    Parameters below as of 25 Sep 2022 10:14  Patient On (Oxygen Delivery Method): room air          PHYSICAL EXAM:    GENERAL: Middle age female looking comfortable in cervical collar   HEENT: PERRL  CHEST/LUNG: Decrease air entry bilaterally; No wheezing  HEART: S1S2+, Regular rate and rhythm; No murmurs  ABDOMEN: Soft, Nontender, Nondistended; Bowel sounds present  EXTREMITIES:  No edema  SKIN: No rashes or lesions  NEURO: AAOX3  PSYCH: normal mood  Post upper back and neck has clean dressings on and has drain in place         LABS:                                                             9.3    18.81 )-----------( 201      ( 24 Sep 2022 10:10 )             27.4   09-24    136  |  99  |  18.3  ----------------------------<  101<H>  4.7   |  26.0  |  0.27<L>    Ca    9.6      24 Sep 2022 10:10  Phos  1.8     09-24  Mg     2.0     09-24    TPro  5.0<L>  /  Alb  2.6<L>  /  TBili  0.3<L>  /  DBili  x   /  AST  46<H>  /  ALT  7   /  AlkPhos  122<H>  09-24          MEDICATIONS  (STANDING):  acetaminophen   IVPB .. 1000 milliGRAM(s) IV Intermittent once  famotidine    Tablet 20 milliGRAM(s) Oral two times a day  gabapentin 300 milliGRAM(s) Oral every 8 hours  influenza  Vaccine (HIGH DOSE) 0.7 milliLiter(s) IntraMuscular once  methocarbamol 750 milliGRAM(s) Oral three times a day  polyethylene glycol 3350 17 Gram(s) Oral every 12 hours  senna 2 Tablet(s) Oral at bedtime    MEDICATIONS  (PRN):  bisacodyl Suppository 10 milliGRAM(s) Rectal daily PRN Constipation  HYDROmorphone  Injectable 2 milliGRAM(s) IV Push every 3 hours PRN Severe Pain (7 - 10)  ondansetron Injectable 4 milliGRAM(s) IV Push every 6 hours PRN Nausea and/or Vomiting  oxyCODONE    IR 5 milliGRAM(s) Oral every 4 hours PRN Mild Pain (1 - 3)  oxyCODONE    IR 10 milliGRAM(s) Oral every 4 hours PRN Moderate Pain (4 - 6)

## 2022-09-25 NOTE — PROGRESS NOTE ADULT - SUBJECTIVE AND OBJECTIVE BOX
HPI:  67 yo female with no known pmhx presented to the ED with progressive weakness to the point that she felt she could not get out of bed this morning. She started to note pain in her shoulders and lower back at the end of July. She went to the ED at [facility] and also to urgent care, but states they sent her home. She also has had decreased appetite over this time. Her last mammogram was in 2014. She reports they found 2 lumps in her right breast and recommended biopsy. When she went for biopsy, she could not tolerate the pain. She was supposed to return to re-attempt with pain medications, but she never went back. Currently, her pain is well controlled after morphine in the ED. She denies chest pain, SOB, N/V/D, F/C, or other associated symptoms. Patient denies skin changes on the breast or nipple discharge. (10 Sep 2022 02:11)    INTERVAL HPI/OVERNIGHT EVENTS:  66y Female seen lying comfortably in bed. Patient continues to have neck and shoulder pain. No overnight events reported. Anterior and posterior drains D/C'd yesterday 9/24.    Vital Signs Last 24 Hrs  T(C): 36.4 (25 Sep 2022 09:00), Max: 36.7 (24 Sep 2022 15:56)  T(F): 97.5 (25 Sep 2022 09:00), Max: 98.1 (24 Sep 2022 20:00)  HR: 96 (25 Sep 2022 10:14) (71 - 98)  BP: 136/64 (25 Sep 2022 10:14) (115/61 - 136/80)  BP(mean): 78 (25 Sep 2022 04:00) (77 - 82)  RR: 18 (25 Sep 2022 10:14) (11 - 18)  SpO2: 96% (25 Sep 2022 10:14) (95% - 98%)    Parameters below as of 25 Sep 2022 10:14  Patient On (Oxygen Delivery Method): room air      PHYSICAL EXAM:  GENERAL: NAD, well-groomed, pleasant  HEAD: Atraumatic, normocephalic  NECK: Cervical collar in place  MENTAL STATUS: AAO x3; Awake; Opens eyes spontaneously; Appropriately conversant without aphasia; following simple commands  CRANIAL NERVES: PERRL. Face symmetric.  REFLEXES: Negative Mcdonald's b/l. Negative clonus b/l  MOTOR: RUE delt 5/5, bicep 4-/5, tricep 4-/5, HG 3/5, LUE/RLE/LLE 5/5  SENSATION: Diminished sensation in RUE specifically 4th and 5th digit, otherwise intact to light touch all extremities  SKIN: Warm, dry      LABS:                        9.3    18.81 )-----------( 201      ( 24 Sep 2022 10:10 )             27.4     09-24    136  |  99  |  18.3  ----------------------------<  101<H>  4.7   |  26.0  |  0.27<L>    Ca    9.6      24 Sep 2022 10:10  Phos  1.8     09-24  Mg     2.0     09-24    TPro  5.0<L>  /  Alb  2.6<L>  /  TBili  0.3<L>  /  DBili  x   /  AST  46<H>  /  ALT  7   /  AlkPhos  122<H>  09-24 09-24 @ 07:01  -  09-25 @ 07:00  --------------------------------------------------------  IN: 0 mL / OUT: 400 mL / NET: -400 mL        RADIOLOGY & ADDITIONAL TESTS:  < from: CT Thoracic Spine No Cont (09.22.22 @ 04:06) >  IMPRESSION:    The patient is status post C7, T1, T2 corpectomy with cage device   placement. Anterior fixation hardware at the C6 & T3 levels with   vertebral body screws and anterior fixation plate. Bilateral posterior   fixation screws at the C4, C5, C6, T3, T4 levels and vertical stabilizing   rods.  Expected postoperative soft changes changes at the operative  sites. Hardware is in satisfactory position. Diffuse cervical, thoracic,   lumbar spine osseous metastasis.    --- End of Report ---            KAILEY DIAZ MD; Attending Radiologist  This document has been electronically signed. Sep 22 2022 10:25AM    < end of copied text >

## 2022-09-25 NOTE — PROGRESS NOTE ADULT - ASSESSMENT
67 yo female with no known pmhx was brought to the ED with progressive weakness to the point that she felt she could not get out of bed, she has some remote hx of should pain, decrease appetite, her last mammogram was in 2014. She reports they found 2 lumps in her right breast and recommended biopsy, she went for biopsy, she could not tolerate the pain.  CT scan of the Cervical spine showed Multiple lytic lesions of the cervical and visualized thoracic spine as well as right first and second ribs concerning for metastases versus multiple myeloma, she was admitted under medicine service for further workup and management, she was provided pain meds and her further imaging done showed Metastatic breast cancer with dominant right breast mass, Right chest wall, axillary and retropectoral involvement, Osseous, pulmonary, and likely hepatic metastatic disease. Metastatic adenopathy of the thorax, diffuse extensive osseous metastatic disease with multiple lucent and sclerotic lesions and compression fractures of T1, T9, T12, and L2. Severe T1 compression with epidural extension. Cord compression cannot be excluded. Additional neoplastic disease of the spine with epidural extension. Multifocal neoplastic lesions involving the pelvis and ribs as well, she was seen by Oncology, CT surgery and Neurosurgery team she underwent C7-T2 corpectomy & fusion, posterior C4-T5 fusion, managed in the ICU post operative and was extubated, Post op course uncomplicated, gomez removed pt requiring some straight cath, pt also with some subjective dysphagia - seen by speech therapy - recommned purred diet.       Extensive osseous metastatic disease with pathologic compression fracture deformities at C7, T1, T9, T12 and L2, severe at T1  Likely Primary metastatic Breast CA  s/p  C7-T2 corpectomy & fusion, posterior C4-T5 fusion   - Decadron 2mg IVP q6h  - Robaxin 500mg TID  - Gabapentin 200mg TID  - Oxycodone and Dilaudid PRN  - f/u biopsy Neuro checks   - Bowel regimen  - PT - acute rehab -  PM&R consult  - wound care as per Neurosurgery team   - has drains in place, manage as per Neurosurgery team     Post operative urinary retention   resolved       Large right breast mass:   - likely cancer with mets to spine, brain, liver  - s/p right breast biopsy shows  positive for invasive ductal carcinoma     - Oncology - Chemotherapy after neurosx wound heals 3-4 weeks, rad-onc consult     Leucocytosis: Likely post op and being on steriods, no focus of infection, no fever, no chills.     Hypercalcemia due to bone mets malignancy   - s/p IVF , improved   - Monitor BMP     Moderate protein malia malnutrition   - Ensure  -dietician on board.       DVT prophylaxis: SCDs for now, ok from the Neurosurgery team to start Lovenox sc in am

## 2022-09-26 DIAGNOSIS — C50.919 MALIGNANT NEOPLASM OF UNSPECIFIED SITE OF UNSPECIFIED FEMALE BREAST: ICD-10-CM

## 2022-09-26 LAB
ANION GAP SERPL CALC-SCNC: 10 MMOL/L — SIGNIFICANT CHANGE UP (ref 5–17)
BUN SERPL-MCNC: 8.8 MG/DL — SIGNIFICANT CHANGE UP (ref 8–20)
CALCIUM SERPL-MCNC: 9.2 MG/DL — SIGNIFICANT CHANGE UP (ref 8.4–10.5)
CHLORIDE SERPL-SCNC: 99 MMOL/L — SIGNIFICANT CHANGE UP (ref 98–107)
CO2 SERPL-SCNC: 27 MMOL/L — SIGNIFICANT CHANGE UP (ref 22–29)
CREAT SERPL-MCNC: 0.29 MG/DL — LOW (ref 0.5–1.3)
EGFR: 118 ML/MIN/1.73M2 — SIGNIFICANT CHANGE UP
GLUCOSE SERPL-MCNC: 79 MG/DL — SIGNIFICANT CHANGE UP (ref 70–99)
HCT VFR BLD CALC: 28.9 % — LOW (ref 34.5–45)
HGB BLD-MCNC: 10.2 G/DL — LOW (ref 11.5–15.5)
MCHC RBC-ENTMCNC: 30.9 PG — SIGNIFICANT CHANGE UP (ref 27–34)
MCHC RBC-ENTMCNC: 35.3 GM/DL — SIGNIFICANT CHANGE UP (ref 32–36)
MCV RBC AUTO: 87.6 FL — SIGNIFICANT CHANGE UP (ref 80–100)
PLATELET # BLD AUTO: 249 K/UL — SIGNIFICANT CHANGE UP (ref 150–400)
POTASSIUM SERPL-MCNC: 3.8 MMOL/L — SIGNIFICANT CHANGE UP (ref 3.5–5.3)
POTASSIUM SERPL-SCNC: 3.8 MMOL/L — SIGNIFICANT CHANGE UP (ref 3.5–5.3)
RBC # BLD: 3.3 M/UL — LOW (ref 3.8–5.2)
RBC # FLD: 13.2 % — SIGNIFICANT CHANGE UP (ref 10.3–14.5)
SODIUM SERPL-SCNC: 136 MMOL/L — SIGNIFICANT CHANGE UP (ref 135–145)
SURGICAL PATHOLOGY STUDY: SIGNIFICANT CHANGE UP
WBC # BLD: 13.28 K/UL — HIGH (ref 3.8–10.5)
WBC # FLD AUTO: 13.28 K/UL — HIGH (ref 3.8–10.5)

## 2022-09-26 PROCEDURE — 99231 SBSQ HOSP IP/OBS SF/LOW 25: CPT

## 2022-09-26 PROCEDURE — 99223 1ST HOSP IP/OBS HIGH 75: CPT

## 2022-09-26 PROCEDURE — 99497 ADVNCD CARE PLAN 30 MIN: CPT | Mod: 25

## 2022-09-26 PROCEDURE — 99233 SBSQ HOSP IP/OBS HIGH 50: CPT

## 2022-09-26 RX ORDER — OXYCODONE HYDROCHLORIDE 5 MG/1
20 TABLET ORAL EVERY 12 HOURS
Refills: 0 | Status: DISCONTINUED | OUTPATIENT
Start: 2022-09-26 | End: 2022-09-30

## 2022-09-26 RX ORDER — ACETAMINOPHEN 500 MG
650 TABLET ORAL EVERY 6 HOURS
Refills: 0 | Status: DISCONTINUED | OUTPATIENT
Start: 2022-09-26 | End: 2022-09-26

## 2022-09-26 RX ORDER — ACETAMINOPHEN 500 MG
975 TABLET ORAL EVERY 8 HOURS
Refills: 0 | Status: COMPLETED | OUTPATIENT
Start: 2022-09-26 | End: 2022-09-27

## 2022-09-26 RX ADMIN — ENOXAPARIN SODIUM 40 MILLIGRAM(S): 100 INJECTION SUBCUTANEOUS at 06:03

## 2022-09-26 RX ADMIN — Medication 650 MILLIGRAM(S): at 09:00

## 2022-09-26 RX ADMIN — METHOCARBAMOL 750 MILLIGRAM(S): 500 TABLET, FILM COATED ORAL at 21:37

## 2022-09-26 RX ADMIN — Medication 975 MILLIGRAM(S): at 16:10

## 2022-09-26 RX ADMIN — OXYCODONE HYDROCHLORIDE 20 MILLIGRAM(S): 5 TABLET ORAL at 21:17

## 2022-09-26 RX ADMIN — GABAPENTIN 300 MILLIGRAM(S): 400 CAPSULE ORAL at 15:28

## 2022-09-26 RX ADMIN — OXYCODONE HYDROCHLORIDE 10 MILLIGRAM(S): 5 TABLET ORAL at 14:20

## 2022-09-26 RX ADMIN — Medication 650 MILLIGRAM(S): at 07:36

## 2022-09-26 RX ADMIN — OXYCODONE HYDROCHLORIDE 20 MILLIGRAM(S): 5 TABLET ORAL at 11:43

## 2022-09-26 RX ADMIN — Medication 975 MILLIGRAM(S): at 22:22

## 2022-09-26 RX ADMIN — OXYCODONE HYDROCHLORIDE 20 MILLIGRAM(S): 5 TABLET ORAL at 13:10

## 2022-09-26 RX ADMIN — HYDROMORPHONE HYDROCHLORIDE 2 MILLIGRAM(S): 2 INJECTION INTRAMUSCULAR; INTRAVENOUS; SUBCUTANEOUS at 21:02

## 2022-09-26 RX ADMIN — METHOCARBAMOL 750 MILLIGRAM(S): 500 TABLET, FILM COATED ORAL at 06:03

## 2022-09-26 RX ADMIN — SENNA PLUS 2 TABLET(S): 8.6 TABLET ORAL at 21:37

## 2022-09-26 RX ADMIN — METHOCARBAMOL 750 MILLIGRAM(S): 500 TABLET, FILM COATED ORAL at 14:20

## 2022-09-26 RX ADMIN — Medication 975 MILLIGRAM(S): at 14:20

## 2022-09-26 RX ADMIN — OXYCODONE HYDROCHLORIDE 10 MILLIGRAM(S): 5 TABLET ORAL at 16:10

## 2022-09-26 RX ADMIN — GABAPENTIN 300 MILLIGRAM(S): 400 CAPSULE ORAL at 21:37

## 2022-09-26 RX ADMIN — HYDROMORPHONE HYDROCHLORIDE 2 MILLIGRAM(S): 2 INJECTION INTRAMUSCULAR; INTRAVENOUS; SUBCUTANEOUS at 21:17

## 2022-09-26 RX ADMIN — GABAPENTIN 300 MILLIGRAM(S): 400 CAPSULE ORAL at 06:03

## 2022-09-26 RX ADMIN — Medication 975 MILLIGRAM(S): at 21:37

## 2022-09-26 NOTE — CONSULT NOTE ADULT - SUBJECTIVE AND OBJECTIVE BOX
66yF was admitted on 09-10 with progressive weakness and inability to function at her usual baseline.  Patient was found to have breast mass with likely metastasis to spine and brain. She is s/p T1-T2 corpectomy and C4-T5 fusion.     Imaging performed:  MRI C, T L SPINE 9/11 - Extensive osseous metastatic disease with pathologic compression fracture deformities at C7, T1, T9, T12 and L2, severe at T1. Abnormal ventral and dorsal epidural enhancement from C7 through T2. Severe canal stenosis at T1 and L2 without associated cord or cauda equina compression. There is motion artifact present in the thoracic spine which limits evaluation. No abnormal leptomeningeal enhancement or definite cord signal abnormality.    MRI HEAD 9/11 - Subcentimeter enhancing parenchymal lesions as above compatible with intracranial metastatic disease. No associated vasogenic edema. No evidence of acute infarct or hemorrhage. Likely left parietal calvarial osseous metastatic lesion.    CT C & T SPINE 9/22 - The patient is status post C7, T1, T2 corpectomy with cage device placement. Anterior fixation hardware at the C6 & T3 levels with vertebral body screws and anterior fixation plate. Bilateral posterior fixation screws at the C4, C5, C6, T3, T4 levels and vertical stabilizing rods.  Expected postoperative soft changes changes at the operative sites. Hardware is in satisfactory position. Diffuse cervical, thoracic, lumbar spine osseous metastasis.     Patient reports ongoing pain of her neck and upper back.   Patient feels that her neck is snapping in two.  Has neuropathic pain in her right>left UE.    REVIEW OF SYSTEMS  Constitutional - +fever, No weight loss, +fatigue  HEENT - No eye pain, No visual disturbances, No difficulty hearing, No tinnitus, No vertigo, +neck pain  Respiratory - No cough, No wheezing, No shortness of breath  Cardiovascular - No chest pain, No palpitations  Gastrointestinal - No abdominal pain, No nausea, No vomiting, No diarrhea, No constipation  Genitourinary - No dysuria, No frequency, No hematuria, No incontinence  Neurological - No headaches, No memory loss, +loss of strength, +numbness, No tremors  Skin - No itching, No rashes, No lesions   Endocrine - No temperature intolerance  Musculoskeletal - +joint pain, No joint swelling, +muscle pain  Psychiatric - No depression, No anxiety    VITALS  T(C): 38.1 (09-26-22 @ 06:14), Max: 38.1 (09-26-22 @ 06:14)  HR: 101 (09-26-22 @ 05:09) (85 - 101)  BP: 119/74 (09-26-22 @ 05:09) (115/73 - 136/80)  RR: 18 (09-26-22 @ 05:09) (18 - 18)  SpO2: 94% (09-26-22 @ 05:09) (94% - 97%)  Wt(kg): --    PAST MEDICAL & SURGICAL HISTORY  Abdominal mass    Stomach ulcer    Skin cancer of arm, left    Concussion    H/O excision of mass    S/P hysterectomy        FUNCTIONAL HISTORY  Lives in hotel when not working  Independent    CURRENT FUNCTIONAL STATUS  9/23 SLP  · Recommended Consistencies	Puree w/thin liquids as tolerated    9/23 PT  Bed Mobility: Rolling/Turning:     · Level of [place]	maximum assist (25% patients effort)  · Physical Assist/Nonphysical Assist	2 person assist    Bed Mobility: Supine to Sit:     · Level of [place]	maximum assist (25% patients effort)  · Physical Assist/Nonphysical Assist	2 person assist    Bed Mobility Analysis:     · Bed Mobility Limitations	impaired ability to control trunk for mobility  · Impairments Contributing to Impaired Bed Mobility	impaired balance; pain; impaired postural control; decreased strength    Transfer: Sit to Stand:     · Level of [place]	moderate assist (50% patients effort)  · Physical Assist/Nonphysical Assist	2 person assist  · Weight-Bearing Restrictions	weight-bearing as tolerated  · Assistive Device	rolling walker    Transfer: Stand to Sit:     · Level of [place]	moderate assist (50% patients effort)  · Physical Assist/Nonphysical Assist	2 person assist  · Weight-Bearing Restrictions	weight-bearing as tolerated  · Assistive Device	rolling walker    Sit/Stand Transfer Safety Analysis:     · Transfer Safety Concerns Noted	losing balance; decreased weight-shifting ability  · Impairments Contributing to Impaired Transfers	impaired balance; pain; impaired postural control; decreased strength; decreased sensation; impaired coordination    Gait Skills:     · Level of [place]	moderate assist (50% patients effort)  · Physical Assist/Nonphysical Assist	2 person assist  · Weight-Bearing Restrictions	weight-bearing as tolerated  · Assistive Device	rolling walker  · Gait Distance	bed to chair  · Brace/Orthotics	TLSO; cervical collar    Gait Analysis:     · Gait Pattern Used	2-point gait  · Gait Deviations Noted	decreased pia; decreased weight-shifting ability; decreased step length  · Impairments Contributing to Gait Deviations	impaired balance; impaired postural control; pain; decreased strength; impaired coordination; + Bilateral knees buckling, decreased BLE coordination, difficulty grasping RW with right hand.    Stair Negotiation:     · Level of [place]	TBA    9/23 OT  Bathing Training:     · Level of [place]	maximum assist (25% patients effort)    Upper Body Dressing Training:     · Level of [place]	moderate assist (50% patients effort)    Lower Body Dressing Training:     · Level of [place]	dependent (less than 25% patients effort)    Toilet Hygiene Training:     · Level of [place]	maximum assist (25% patients effort)    Grooming Training:     · Level of [place]	stand-by assist  · Physical Assist/Nonphysical Assist	set-up required    Eating/Self-Feeding Training:     · Level of [place]	TBA      SOCIAL HISTORY - as per documentation/history  Smoking - Quit  EtOH - None  Drugs - None    FAMILY HISTORY   Family history of dementia (Mother)    Family history of colon cancer (Father)        RECENT LABS - Reviewed  CBC Full  -  ( 26 Sep 2022 05:54 )  WBC Count : 13.28 K/uL  RBC Count : 3.30 M/uL  Hemoglobin : 10.2 g/dL  Hematocrit : 28.9 %  Platelet Count - Automated : 249 K/uL  Mean Cell Volume : 87.6 fl  Mean Cell Hemoglobin : 30.9 pg  Mean Cell Hemoglobin Concentration : 35.3 gm/dL  Auto Neutrophil # : x  Auto Lymphocyte # : x  Auto Monocyte # : x  Auto Eosinophil # : x  Auto Basophil # : x  Auto Neutrophil % : x  Auto Lymphocyte % : x  Auto Monocyte % : x  Auto Eosinophil % : x  Auto Basophil % : x    09-26    136  |  99  |  8.8  ----------------------------<  79  3.8   |  27.0  |  0.29<L>    Ca    9.2      26 Sep 2022 05:54  Phos  1.8     09-24  Mg     2.0     09-24    TPro  5.0<L>  /  Alb  2.6<L>  /  TBili  0.3<L>  /  DBili  x   /  AST  46<H>  /  ALT  7   /  AlkPhos  122<H>  09-24        ALLERGIES  lactose (Other)  tetanus toxoid (Hives)      MEDICATIONS   acetaminophen     Tablet .. 650 milliGRAM(s) Oral every 6 hours PRN  acetaminophen   IVPB .. 1000 milliGRAM(s) IV Intermittent once  bisacodyl Suppository 10 milliGRAM(s) Rectal daily PRN  enoxaparin Injectable 40 milliGRAM(s) SubCutaneous every 24 hours  gabapentin 300 milliGRAM(s) Oral every 8 hours  HYDROmorphone  Injectable 2 milliGRAM(s) IV Push every 3 hours PRN  influenza  Vaccine (HIGH DOSE) 0.7 milliLiter(s) IntraMuscular once  methocarbamol 750 milliGRAM(s) Oral three times a day  ondansetron Injectable 4 milliGRAM(s) IV Push every 6 hours PRN  oxyCODONE    IR 5 milliGRAM(s) Oral every 4 hours PRN  oxyCODONE    IR 10 milliGRAM(s) Oral every 4 hours PRN  polyethylene glycol 3350 17 Gram(s) Oral every 12 hours  senna 2 Tablet(s) Oral at bedtime      ----------------------------------------------------------------------------------------  PHYSICAL EXAM  Constitutional - NAD, Uncomfortable  HEENT - NCAT, EOMI  Neck - +C-Collar   Chest - Breathing comfortably, No wheezing  Cardiovascular - S1S2   Abdomen - Soft   Extremities - No C/C/E, No calf tenderness   Neurologic Exam -                    Cognitive - AAO to self, place, date, year, situation     Communication - Fluent, No dysarthria     Cranial Nerves - CN 2-12 intact     Motor -                      LEFT    UE - C5 4/5, C6 4/5, C7 4/5, C8 4/5, T1 4/5                    RIGHT UE - C5 4/5, C6 4/5, C7 4/5, C8 4/5, T1 4/5                    LEFT    LE - L2 5/5, L3 5/5, L4 5/5, L5 5/5, S1 5/5                    RIGHT LE - L2 5/5, L3 5/5, L4 5/5, L5 5/5, S1 5/5      Sensory - Decreased to BUE, specifically right C7/8/T1  Psychiatric - Mood stable, Affect WNL  ----------------------------------------------------------------------------------------  ASSESSMENT/PLAN  66yFemale with functional deficits after developing progressive weakness related to metastatic breast (intraductal) mass to spine  Multiple spinal pathological fractures s/p T1-T2 corpectomy/C4-T5 fusion - C- Collar, Outpatient Chemo after wound healing   Fevers - Not on steroids at this time  Pain - Tylenol, Neurontin, Dilaudid IV, Robaxin, Oxycodone - recommend pain management   DVT PPX - SCDs, Lovenox  Rehab - Although patient would significantly benefit and progress with AR, patient is essentially homeless (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 Spoke candidly with patient about potential options with family that lives near by. Otherwise, plan will be [facility].     Will continue to follow. Rehab recommendations are dependent on how functional progress changes as well as how patient continues to participate and tolerate therapeutic interventions, which may change. Recommend ongoing mobilization by staff to maintain cardiopulmonary function and prevention of secondary complications related to debility. Discussed with rehab team.

## 2022-09-26 NOTE — CONSULT NOTE ADULT - CONSULT REQUESTED DATE/TIME
10-Sep-2022 01:29
26-Sep-2022 08:13
26-Sep-2022 10:42
12-Sep-2022 09:29
19-Sep-2022 13:35
20-Sep-2022
12-Sep-2022 12:48
11-Sep-2022 12:09

## 2022-09-26 NOTE — CONSULT NOTE ADULT - CONSULT REASON
Spinal Mass
metastatic Cancer
Pain Management
GOC/PAIN
Preoperative Risk Stratification
Spine mets
need for sternotomy
bone metastases

## 2022-09-26 NOTE — CONSULT NOTE ADULT - CONVERSATION DETAILS
Discussed advanced directives - patient states her brother Arun and sister Jordana are her HCPs and she has an official form - In fact, she states they are also currently in the process of drawing up a living will and power of   Discussed code status - patient expressed she has recently started to think about her options of CPR/I vs DNR/I as now she has a diagnosis of cancer. At this time she remains Full Code. Encouraged her to continue these conversations with her brother and sister as in the event she cannot make the decision for herself that they would step in as her HCPs to honor her wishes. Patient was appreciative and expressed understanding.  Patient states she is going to reach out to her siblings today to discuss who can step up to help her with her living arrangements after acute rehab  Hospice eligible if patient no longer wants to pursue oncologic intervention but at this time is interested in treatment options of radiation and any other oncologic agents that can be offered  Emotional support provided    Surrogate/HCP/Guardian: Phone#:Arun Kearney [phone] and Jordana Dex [phone] (brother and sister)  Code status: FULL code

## 2022-09-26 NOTE — CONSULT NOTE ADULT - REASON FOR ADMISSION
Weakness, new breast mass with spinal lesions suspicious for metastasis

## 2022-09-26 NOTE — PROGRESS NOTE ADULT - SUBJECTIVE AND OBJECTIVE BOX
CC: Follow up     INTERVAL HPI/OVERNIGHT EVENTS: Patient seen and examined, has pain in the neck with movement in bed and out of. Tmax of 100. No urinary or bowel complaints. Denies cough sob or palpitations. No chills or sweats.       Vital Signs Last 24 Hrs  T(C): 37.1 (26 Sep 2022 09:34), Max: 38.1 (26 Sep 2022 06:14)  T(F): 98.8 (26 Sep 2022 09:34), Max: 100.5 (26 Sep 2022 06:14)  HR: 113 (26 Sep 2022 09:34) (94 - 113)  BP: 105/67 (26 Sep 2022 09:34) (105/67 - 131/73)  BP(mean): --  RR: 18 (26 Sep 2022 05:09) (18 - 18)  SpO2: 94% (26 Sep 2022 09:34) (94% - 97%)    Parameters below as of 26 Sep 2022 09:34  Patient On (Oxygen Delivery Method): room air        PHYSICAL EXAM:    GENERAL: NAD, AOX3  HEAD:  Atraumatic, Normocephalic  EYES:  conjunctiva and sclera clear  ENMT: Moist mucous membranes  NECK:C Collar   CHEST/LUNG: Clear to auscultation bilaterally; No rales, rhonchi, wheezing, or rubs  HEART: Regular rate and rhythm; No murmurs, rubs, or gallops  ABDOMEN: Soft, Nontender, Nondistended; Bowel sounds present  EXTREMITIES:  2+ Peripheral Pulses, No clubbing, cyanosis, or edema        MEDICATIONS  (STANDING):  acetaminophen   IVPB .. 1000 milliGRAM(s) IV Intermittent once  enoxaparin Injectable 40 milliGRAM(s) SubCutaneous every 24 hours  gabapentin 300 milliGRAM(s) Oral every 8 hours  influenza  Vaccine (HIGH DOSE) 0.7 milliLiter(s) IntraMuscular once  methocarbamol 750 milliGRAM(s) Oral three times a day  oxyCODONE  ER Tablet 20 milliGRAM(s) Oral every 12 hours  polyethylene glycol 3350 17 Gram(s) Oral every 12 hours  senna 2 Tablet(s) Oral at bedtime    MEDICATIONS  (PRN):  acetaminophen     Tablet .. 650 milliGRAM(s) Oral every 6 hours PRN Temp greater or equal to 38C (100.4F), Mild Pain (1 - 3)  bisacodyl Suppository 10 milliGRAM(s) Rectal daily PRN Constipation  HYDROmorphone  Injectable 2 milliGRAM(s) IV Push every 3 hours PRN Severe Pain (7 - 10)  ondansetron Injectable 4 milliGRAM(s) IV Push every 6 hours PRN Nausea and/or Vomiting  oxyCODONE    IR 5 milliGRAM(s) Oral every 4 hours PRN Mild Pain (1 - 3)  oxyCODONE    IR 10 milliGRAM(s) Oral every 4 hours PRN Moderate Pain (4 - 6)      Allergies    tetanus toxoid (Hives)    Intolerances    lactose (Other)        LABS:                          10.2   13.28 )-----------( 249      ( 26 Sep 2022 05:54 )             28.9     09-26    136  |  99  |  8.8  ----------------------------<  79  3.8   |  27.0  |  0.29<L>    Ca    9.2      26 Sep 2022 05:54            RADIOLOGY & ADDITIONAL TESTS:

## 2022-09-26 NOTE — PROGRESS NOTE ADULT - ASSESSMENT
66 year old woman with metastatic breast cancer, s/p C7, T1-T2 corpectomy, posterior C4-T5 fusion on 9/21/22.

## 2022-09-26 NOTE — PROGRESS NOTE ADULT - ASSESSMENT
67 yo female with no known pmhx was brought to the ED with progressive weakness to the point that she felt she could not get out of bed, she has some remote hx of should pain, decrease appetite, her last mammogram was in 2014. She reports they found 2 lumps in her right breast and recommended biopsy, she went for biopsy, she could not tolerate the pain.  CT scan of the Cervical spine showed Multiple lytic lesions of the cervical and visualized thoracic spine as well as right first and second ribs concerning for metastases versus multiple myeloma, she was admitted under medicine service for further workup and management, she was provided pain meds and her further imaging done showed Metastatic breast cancer with dominant right breast mass, Right chest wall, axillary and retropectoral involvement, Osseous, pulmonary, and likely hepatic metastatic disease. Metastatic adenopathy of the thorax, diffuse extensive osseous metastatic disease with multiple lucent and sclerotic lesions and compression fractures of T1, T9, T12, and L2. Severe T1 compression with epidural extension. Cord compression cannot be excluded. Additional neoplastic disease of the spine with epidural extension. Multifocal neoplastic lesions involving the pelvis and ribs as well, she was seen by Oncology, CT surgery and Neurosurgery team she underwent C7-T2 corpectomy & fusion, posterior C4-T5 fusion, managed in the ICU post operative and was extubated, Post op course uncomplicated, gomez removed pt requiring some straight cath, pt also with some subjective dysphagia - seen by speech therapy recommended pureed diet    Assessment/plan:    1. Extensive osseous metastatic disease with pathologic compression fracture deformities at C7, T1, T9, T12 and L2, severe at T1  Secondary to  Primary metastatic Breast CA  -s/p  C7-T2 corpectomy & fusion, posterior C4-T5 fusion   - Decadron 2mg IVP q6h wean down   - Robaxin 500mg TID  - Gabapentin 200mg TID  - Oxycodone IR with Oxycodone Er for longer acting pain control  - Bowel regimen  - wound care as per Neurosurgery team   - has drains in place, manage as per Neurosurgery team     2. Post operative urinary retention   - Resolved    3. Large right breast mass:   - likely cancer with mets to spine, brain, liver  - s/p right breast biopsy shows  positive for invasive ductal carcinoma     - Oncology - Chemotherapy after neurosx wound heals 3-4 weeks  - Radiation oncology consult for brain mets for evaluation and establish outpatient follow up     4. Leucocytosis  - Likely post op and being on steriods,  - no focus of infection  - Tmax of 100 this morning, monitor closely     5. Hypercalcemia due to bone mets malignancy   - s/p IVF , improved   - Monitor BMP     6. Moderate protein malia malnutrition   - Ensure    DVT prophylaxis: Lovenox Subcut    PT to follow     Discharge disposition; Discharge planning in place; Patient has no place to live on discharge making Acute rehab disposition difficult. Explained to patient she would benefit from acute rehab; she will discuss options of living with her brother./sister upon completion of rehab given the assistance and support she will require. Alternative plan will be [facility]     65 yo female with no known pmhx was brought to the ED with progressive weakness to the point that she felt she could not get out of bed, she has some remote hx of should pain, decrease appetite, her last mammogram was in 2014. She reports they found 2 lumps in her right breast and recommended biopsy, she went for biopsy, she could not tolerate the pain.  CT scan of the Cervical spine showed Multiple lytic lesions of the cervical and visualized thoracic spine as well as right first and second ribs concerning for metastases versus multiple myeloma, she was admitted under medicine service for further workup and management, she was provided pain meds and her further imaging done showed Metastatic breast cancer with dominant right breast mass, Right chest wall, axillary and retropectoral involvement, Osseous, pulmonary, and likely hepatic metastatic disease. Metastatic adenopathy of the thorax, diffuse extensive osseous metastatic disease with multiple lucent and sclerotic lesions and compression fractures of T1, T9, T12, and L2. Severe T1 compression with epidural extension. Cord compression cannot be excluded. Additional neoplastic disease of the spine with epidural extension. Multifocal neoplastic lesions involving the pelvis and ribs as well, she was seen by Oncology, CT surgery and Neurosurgery team she underwent C7-T2 corpectomy & fusion, posterior C4-T5 fusion, managed in the ICU post operative and was extubated, Post op course uncomplicated, gomez removed pt requiring some straight cath, pt also with some subjective dysphagia - seen by speech therapy recommended pureed diet    Assessment/plan:    1. Extensive osseous metastatic disease with pathologic compression fracture deformities at C7, T1, T9, T12 and L2, severe at T1  Secondary to  Primary metastatic Breast CA  -s/p  C7-T2 corpectomy & fusion, posterior C4-T5 fusion   - Robaxin 500mg TID  - Gabapentin 200mg TID  - Oxycodone IR with Oxycodone Er for longer acting pain control  - Bowel regimen  - wound care as per Neurosurgery team   - has drains in place, manage as per Neurosurgery team     2. Post operative urinary retention   - Resolved    3. Large right breast mass:   - likely cancer with mets to spine, brain, liver  - s/p right breast biopsy shows  positive for invasive ductal carcinoma     - Oncology - Chemotherapy after neurosx wound heals 3-4 weeks  - Radiation oncology consult for brain mets for evaluation and establish outpatient follow up     4. Leucocytosis  - Likely post op and being on steriods,  - no focus of infection  - Tmax of 100 this morning, monitor closely     5. Hypercalcemia due to bone mets malignancy   - s/p IVF , improved   - Monitor BMP     6. Moderate protein malia malnutrition   - Ensure    DVT prophylaxis: Lovenox Subcut    PT to follow     Discharge disposition; Discharge planning in place; Patient has no place to live on discharge making Acute rehab disposition difficult. Explained to patient she would benefit from acute rehab; she will discuss options of living with her brother./sister upon completion of rehab given the assistance and support she will require. Alternative plan will be [facility]

## 2022-09-26 NOTE — CONSULT NOTE ADULT - SUBJECTIVE AND OBJECTIVE BOX
Palliative Care Consult  HPI This is a 66 year old female PMHx stomach ulcer, skin cancer of left arm presented to [facility] with weakness and pain in her shoulders and lower back since the end of July. Associated symptoms of decreased appetite - admits that last mammogram in 2014 which 2 lumps were found on right breast with biopsy recommendation which never occurred. No reports of chest pain, SOB, N/V/D, F/C in ED and no repprts pf skin changes to breast or nipple. Patient admitted under medicine service.  Further imaging done showed Metastatic breast cancer with dominant right breast mass, Right chest wall, axillary and retropectoral involvement, Osseous, pulmonary, and likely hepatic metastatic disease. Metastatic adenopathy of the thorax, diffuse extensive osseous metastatic disease with multiple lucent and sclerotic lesions and compression fractures of T1, T9, T12, and L2. Severe T1 compression with epidural extension. Cord compression cannot be excluded. Additional neoplastic disease of the spine with epidural extension. Multifocal neoplastic lesions involving the pelvis and ribs as well, she was seen by Oncology, CT surgery and Neurosurgery team she underwent C7-T2 corpectomy & fusion, posterior C4-T5 fusion, managed in the ICU post operative and was extubated. Now with continued pain despite current regimen, seen by Rehab medicine for acute rehab. Palliative consult for assistance in GOC and pain management.     HPI:  65 yo female with no known pmhx presented to the ED with progressive weakness to the point that she felt she could not get out of bed this morning. She started to note pain in her shoulders and lower back at the end of July. She went to the ED at [facility] and also to urgent care, but states they sent her home. She also has had decreased appetite over this time. Her last mammogram was in 2014. She reports they found 2 lumps in her right breast and recommended biopsy. When she went for biopsy, she could not tolerate the pain. She was supposed to return to re-attempt with pain medications, but she never went back. Currently, her pain is well controlled after morphine in the ED. She denies chest pain, SOB, N/V/D, F/C, or other associated symptoms. Patient denies skin changes on the breast or nipple discharge. (10 Sep 2022 02:11)      PERTINENT PMH REVIEWED: Yes     PAST MEDICAL & SURGICAL HISTORY:  Stomach ulcer  20 years ago     Skin cancer of arm, left  pt was 16 year old    H/O excision of mass  left arm  when patient was 16 years ago    S/P hysterectomy    SOCIAL HISTORY:                      Substance history: no                    Admitted from:  living in hotel                    Voodoo/spirituality: cat                    Cultural concerns: none                      Surrogate/HCP/Guardian: Phone#:Arun Kearney [phone] and Jordana Kowalski [phone] (brother and sister)    FAMILY HISTORY:  Family history of dementia (Mother)  Family history of colon cancer (Father)    Allergies  tetanus toxoid (Hives)    Intolerances  lactose (Other)    ADVANCE DIRECTIVES/TREATMENT PREFERENCES:  Full Code    Baseline ADLs (prior to admission):  Independent    Karnofsky/Palliative Performance Status Version 2:  %50  http://npcrc.org/files/news/palliative_performance_scale_[email or web address].pdf    Present Symptoms:   Dyspnea: no  Nausea/Vomiting: No  Anxiety:  No  Depression: No  Fatigue: No  Loss of appetite: No  Constipation: no    Pain: 5/10 rest 9/10 exertion            Character-            Duration-            Effect-            Factors-            Frequency-            Location-            Severity-    Pain AD Score:2  [email or web address] (press ctrl + left click to view)    Review of Systems: Reviewed                     Negative: no dyspnea  All others negative    MEDICATIONS  (STANDING):  acetaminophen   IVPB .. 1000 milliGRAM(s) IV Intermittent once  enoxaparin Injectable 40 milliGRAM(s) SubCutaneous every 24 hours  gabapentin 300 milliGRAM(s) Oral every 8 hours  influenza  Vaccine (HIGH DOSE) 0.7 milliLiter(s) IntraMuscular once  methocarbamol 750 milliGRAM(s) Oral three times a day  oxyCODONE  ER Tablet 20 milliGRAM(s) Oral every 12 hours  polyethylene glycol 3350 17 Gram(s) Oral every 12 hours  senna 2 Tablet(s) Oral at bedtime    MEDICATIONS  (PRN):  acetaminophen     Tablet .. 650 milliGRAM(s) Oral every 6 hours PRN Temp greater or equal to 38C (100.4F), Mild Pain (1 - 3)  bisacodyl Suppository 10 milliGRAM(s) Rectal daily PRN Constipation  HYDROmorphone  Injectable 2 milliGRAM(s) IV Push every 3 hours PRN Severe Pain (7 - 10)  ondansetron Injectable 4 milliGRAM(s) IV Push every 6 hours PRN Nausea and/or Vomiting  oxyCODONE    IR 5 milliGRAM(s) Oral every 4 hours PRN Mild Pain (1 - 3)  oxyCODONE    IR 10 milliGRAM(s) Oral every 4 hours PRN Moderate Pain (4 - 6)      PHYSICAL EXAM:    Vital Signs Last 24 Hrs  T(C): 37.1 (26 Sep 2022 09:34), Max: 38.1 (26 Sep 2022 06:14)  T(F): 98.8 (26 Sep 2022 09:34), Max: 100.5 (26 Sep 2022 06:14)  HR: 113 (26 Sep 2022 09:34) (94 - 113)  BP: 105/67 (26 Sep 2022 09:34) (105/67 - 131/73)  BP(mean): --  RR: 18 (26 Sep 2022 05:09) (18 - 18)  SpO2: 94% (26 Sep 2022 09:34) (94% - 97%)    Parameters below as of 26 Sep 2022 09:34  Patient On (Oxygen Delivery Method): room air      General: alert  oriented x _3    HEENT: normal     Lungs: comfortable    CV: normal      GI: normal     : gomez    MSK: weakness , cervical collar    Neuro: no focal deficits cognitive impairment     Skin:  surgical incision to chest and neck    LABS:                        10.2   13.28 )-----------( 249      ( 26 Sep 2022 05:54 )             28.9     09-26    136  |  99  |  8.8  ----------------------------<  79  3.8   |  27.0  |  0.29<L>    Ca    9.2      26 Sep 2022 05:54    I&O's Summary    25 Sep 2022 07:01  -  26 Sep 2022 07:00  --------------------------------------------------------  IN: 230 mL / OUT: 0 mL / NET: 230 mL    26 Sep 2022 07:01  -  26 Sep 2022 10:24  --------------------------------------------------------  IN: 0 mL / OUT: 400 mL / NET: -400 mL    RADIOLOGY & ADDITIONAL STUDIES:  CT Chest 9/9/22  IMPRESSION:  Metastatic breast cancer with dominant right breast mass. Inflammatory   right breast cancer cannot be excluded. Right chest wall, axillary and   retropectoral involvement.  Osseous, pulmonary, and likely hepatic metastatic disease. Metastatic   adenopathy of the thorax.  Diffuse extensive osseous metastatic disease with multiple lucent and   sclerotic lesions and compression fractures of T1, T9, T12, and L2.   Severe T1 compression with epidural extension. Cord compression cannot be   excluded. Additional neoplastic disease of the spine with epidural   extension. Multifocal neoplastic lesions involving the pelvis and ribs as   well. Correlate with pending spinal MRI to evaluate for cord compression.    MR spine 9/11/22  IMPRESSION:  Extensive osseous metastatic disease with pathologic compression fracture   deformities at C7, T1, T9, T12 and L2, severe at T1.  Abnormal ventral and dorsal epidural enhancement from C7 through T2.   Severe canal stenosis at T1 and L2 without associated cord or cauda   equina compression.  There is motion artifact present in the thoracic spine which limits   evaluation. No abnormal leptomeningeal enhancement or definite cord   signal abnormality.    CT spine 9/22/22  IMPRESSION:  The patient is status post C7, T1, T2 corpectomy with cage device   placement. Anterior fixation hardware at the C6 & T3 levels with   vertebral body screws and anterior fixation plate. Bilateral posterior   fixation screws at the C4, C5, C6, T3, T4 levels and vertical stabilizing   rods.  Expected postoperative soft changes changes at the operative  sites. Hardware is in satisfactory position. Diffuse cervical, thoracic,   lumbar spine osseous metastasis.

## 2022-09-26 NOTE — CONSULT NOTE ADULT - PROVIDER SPECIALTY LIST ADULT
Cardiology
Palliative Care
Thoracic Surgery
Neurosurgery
Rehab Medicine
Heme/Onc
Pain Medicine
Rad Onc

## 2022-09-26 NOTE — PROGRESS NOTE ADULT - SUBJECTIVE AND OBJECTIVE BOX
Patient is a 66y old  Female who presents with a chief complaint of Weakness, new breast mass with spinal lesions suspicious for metastasis (26 Sep 2022 11:18)      S.  Patient optimistic; feels that things are healing.  Continues to have pain with neck movements and after therapy, but overall decreased pain.    Underwent C7, T1-T2 corpectomy, posterior C4-T5 fusion on 9/21/22.      ALLERGIES  tetanus toxoid (Hives)  Intolerances  lactose (Other)    MEDICATIONS  (STANDING):  acetaminophen     Tablet .. 975 milliGRAM(s) Oral every 8 hours  enoxaparin Injectable 40 milliGRAM(s) SubCutaneous every 24 hours  gabapentin 300 milliGRAM(s) Oral every 8 hours  influenza  Vaccine (HIGH DOSE) 0.7 milliLiter(s) IntraMuscular once  methocarbamol 750 milliGRAM(s) Oral three times a day  oxyCODONE  ER Tablet 20 milliGRAM(s) Oral every 12 hours  polyethylene glycol 3350 17 Gram(s) Oral every 12 hours  senna 2 Tablet(s) Oral at bedtime      PHYSICAL EXAM:  Vital Signs Last 24 Hrs  T(C): 36.7 (26 Sep 2022 13:36), Max: 38.1 (26 Sep 2022 06:14)  T(F): 98.1 (26 Sep 2022 13:36), Max: 100.5 (26 Sep 2022 06:14)  HR: 100 (26 Sep 2022 13:36) (94 - 113)  BP: 102/62 (26 Sep 2022 13:36) (102/62 - 131/73)  BP(mean): --  RR: 18 (26 Sep 2022 13:36) (18 - 18)  SpO2: 92% (26 Sep 2022 13:36) (92% - 97%)    Parameters below as of 26 Sep 2022 13:36  Patient On (Oxygen Delivery Method): room air      PHYSICAL EXAM:  GEN: NAD, laying comfortably in bed  HEENT: EOMI, OP clear, MMM  NECK: collar in place  PULM: no wheeze, rhonchi  CV: RRR  ABD: Soft, NT/ND  EXT: CHAMORRO, warm, no edema               10.2   13.28 )-----------( 249      ( 26 Sep 2022 05:54 )             28.9     09-26    136  |  99  |  8.8  ----------------------------<  79  3.8   |  27.0  |  0.29<L>    Ca    9.2      26 Sep 2022 05:54

## 2022-09-26 NOTE — CONSULT NOTE ADULT - ASSESSMENT
66 year old female found to have breast cancer with diffuse cervical, thoracic, lumbar spine osseous metastasis  Now s/p C7, T1, T2 corpectomy with cage device placement    Problem/Recommendation 1: Metastatic breast cancer  Breast cancer with diffuse cervical, thoracic, lumbar spine osseous metastasis  Now s/p C7, T1, T2 corpectomy with cage device placementNow s/p C7, T1, T2 corpectomy with cage device placement  Rehab medicine following  Hospice eligible if patient no longer wants to pursue oncologic intervention but at this time is interested in treatment options     Problem/Recommendation 2:Pain   Added extended release oxyCODONE Tablet 20 milliGRAM(s) Oral every 12 hours  Can consider increasing dose of gabapentin     MEDICATIONS  (STANDING):  gabapentin 300 milliGRAM(s) Oral every 8 hours  methocarbamol 750 milliGRAM(s) Oral three times a day  MEDICATIONS  (PRN):  acetaminophen     Tablet .. 650 milliGRAM(s) Oral every 6 hours PRN Mild Pain (1 - 3)  oxyCODONE    IR 5 milliGRAM(s) Oral every 4 hours PRN Mild Pain (1 - 3)  oxyCODONE    IR 10 milliGRAM(s) Oral every 4 hours PRN Moderate Pain (4 - 6)  HYDROmorphone  Injectable 2 milliGRAM(s) IV Push every 3 hours PRN Severe Pain (7 - 10)    Problem/Recommendation 3: Debility  Assist in ADLS  Maintain safety, fall, aspiration precautions    Problem/Recommendation 4: Palliative Care Encounter  Met with patient at bedside, introduced Palliative Care Team and Services at [facility].  Patient receptive to encounter   Discussed her pain, at rest 5/10 and on exertion 9/10 - Discussed realistic expectations and that the pain medication is to make it tolerable to participate in her ADLs and activity at rehabilitation but that it wont make the pain go completely away - patient expressed understanding  Recommend starting patient on extended release Oxycodone to see if it decreases her 24 hour PRN intake of pain medications and aide in weaning her off IV form of Dilaudid for eventual discharge planning   Can also consider increasing dose of Gabapentin if her pain becomes more associated with neuropathy like symptoms  Discussed advanced directives - patient states her brother Arun and sister Jordana are her HCPs and she has an official form - In fact, she states they are also currently in the process of drawing up a living will and power of   Discussed code status - patient expressed she has recently started to think about her options of CPR/I vs DNR/I as now she has a diagnosis of cancer. At this time she remains Full Code. Encouraged her to continue these conversations with her brother and sister as in the event she cannot make the decision for herself that they would step in as her HCPs to honor her wishes. Patient was appreciative and expressed understanding.  Patient states she is going to reach out to her siblings today to discuss who can step up to help her with her living arrangements after acute rehab  Hospice eligible if patient no longer wants to pursue oncologic intervention but at this time is interested in treatment options of radiation and any other oncologic agents that can be offered  Emotional support provided  Discussed case with Dr. Reyna   Palliative care to continue to follow    Total Time Spent__70__ minutes  This includes chart review, patient assessment, discussion and collaboration with interdisciplinary team members, ACP planning    COUNSELING:  Face to face meeting to discuss Advanced Care Planning - Time Spent __25____Minutes.      Thank you for the opportunity to assist with the care of this patient.   [facility] Palliative Medicine Consult Service [phone].  66 year old female found to have breast cancer with diffuse cervical, thoracic, lumbar spine osseous metastasis  Now s/p C7, T1, T2 corpectomy with cage device placement    Problem/Recommendation 1: Metastatic breast cancer  Breast cancer with diffuse cervical, thoracic, lumbar spine osseous metastasis  Now s/p C7, T1, T2 corpectomy with cage device placementNow s/p C7, T1, T2 corpectomy with cage device placement  Rehab medicine following  Hospice eligible if patient no longer wants to pursue oncologic intervention but at this time is interested in treatment options     Problem/Recommendation 2:Pain   Added extended release oxyCODONE Tablet 20 milliGRAM(s) Oral every 12 hours  Can consider increasing dose of gabapentin     MEDICATIONS  (STANDING):  gabapentin 300 milliGRAM(s) Oral every 8 hours  methocarbamol 750 milliGRAM(s) Oral three times a day  MEDICATIONS  (PRN):  acetaminophen     Tablet .. 650 milliGRAM(s) Oral every 6 hours PRN Mild Pain (1 - 3)  oxyCODONE    IR 5 milliGRAM(s) Oral every 4 hours PRN Mild Pain (1 - 3)  oxyCODONE    IR 10 milliGRAM(s) Oral every 4 hours PRN Moderate Pain (4 - 6)  HYDROmorphone  Injectable 2 milliGRAM(s) IV Push every 3 hours PRN Severe Pain (7 - 10)    Problem/Recommendation 3: Debility  Assist in ADLS  Maintain safety, fall, aspiration precautions    Problem/Recommendation 4: Palliative Care Encounter  Met with patient at bedside, introduced Palliative Care Team and Services at [facility].  Patient receptive to encounter   Discussed her pain, at rest 5/10 and on exertion 9/10 - Discussed realistic expectations and that the pain medication is to make it tolerable to participate in her ADLs and activity at rehabilitation but that it wont make the pain go completely away - patient expressed understanding  Recommend starting patient on extended release Oxycodone to see if it decreases her 24 hour PRN intake of pain medications and aide in weaning her off IV form of Dilaudid for eventual discharge planning   Can also consider increasing dose of Gabapentin if her pain becomes more associated with neuropathy like symptoms  Discussed advanced directives - patient states her brother Arun and sister Jordana are her HCPs and she has an official form - In fact, she states they are also currently in the process of drawing up a living will and power of   Discussed code status - patient expressed she has recently started to think about her options of CPR/I vs DNR/I as now she has a diagnosis of cancer. At this time she remains Full Code. Encouraged her to continue these conversations with her brother and sister as in the event she cannot make the decision for herself that they would step in as her HCPs to honor her wishes. Patient was appreciative and expressed understanding.  Patient states she is going to reach out to her siblings today to discuss who can step up to help her with her living arrangements after acute rehab  Hospice eligible if patient no longer wants to pursue oncologic intervention but at this time is interested in treatment options of radiation and any other oncologic agents that can be offered  Emotional support provided  Discussed case with Dr. Reyna   Palliative care to continue to follow    Surrogate/HCP/Guardian: Phone#:Arun Caio [phone] and Jordanagraham Kowalski [phone] (brother and sister)  Code status: FULL code     Total Time Spent__70__ minutes  This includes chart review, patient assessment, discussion and collaboration with interdisciplinary team members, ACP planning    COUNSELING:  Face to face meeting to discuss Advanced Care Planning - Time Spent __25____Minutes.      Thank you for the opportunity to assist with the care of this patient.   [facility] Palliative Medicine Consult Service [phone].  66 year old female found to have breast cancer with diffuse cervical, thoracic, lumbar spine osseous metastasis  Now s/p C7, T1, T2 corpectomy with cage device placement    Problem/Recommendation 1: Metastatic breast cancer  Breast cancer with diffuse cervical, thoracic, lumbar spine osseous metastasis  Now s/p C7, T1, T2 corpectomy with cage device placementNow s/p C7, T1, T2 corpectomy with cage device placement  Rehab medicine following  Hospice eligible if patient no longer wants to pursue oncologic intervention but at this time is interested in treatment options     Problem/Recommendation 2:Pain   Added extended release oxyCODONE Tablet 20 milliGRAM(s) Oral every 12 hours  Can consider increasing dose of gabapentin     MEDICATIONS  (STANDING):  gabapentin 300 milliGRAM(s) Oral every 8 hours  methocarbamol 750 milliGRAM(s) Oral three times a day  MEDICATIONS  (PRN):  acetaminophen     Tablet .. 650 milliGRAM(s) Oral every 6 hours PRN Mild Pain (1 - 3)  oxyCODONE    IR 5 milliGRAM(s) Oral every 4 hours PRN Mild Pain (1 - 3)  oxyCODONE    IR 10 milliGRAM(s) Oral every 4 hours PRN Moderate Pain (4 - 6)  HYDROmorphone  Injectable 2 milliGRAM(s) IV Push every 3 hours PRN Severe Pain (7 - 10)    Problem/Recommendation 3: Debility  Assist in ADLS  Maintain safety, fall, aspiration precautions    Problem/Recommendation 4: Palliative Care Encounter  Met with patient at bedside, introduced Palliative Care Team and Services at [facility].  Patient receptive to encounter   Discussed her pain, at rest 5/10 and on exertion 9/10 - Discussed realistic expectations and that the pain medication is to make it tolerable to participate in her ADLs and activity at rehabilitation but that it wont make the pain go completely away - patient expressed understanding  Added extended release Oxycodone to see if it decreases her 24 hour PRN intake of pain medications and aide in weaning her off IV form of Dilaudid for eventual discharge planning   Can also consider increasing dose of Gabapentin if her pain becomes more associated with neuropathy like symptoms  Discussed advanced directives - patient states her brother Arun and sister Jordana are her HCPs and she has an official form - In fact, she states they are also currently in the process of drawing up a living will and power of   Discussed code status - patient expressed she has recently started to think about her options of CPR/I vs DNR/I as now she has a diagnosis of cancer. At this time she remains Full Code. Encouraged her to continue these conversations with her brother and sister as in the event she cannot make the decision for herself that they would step in as her HCPs to honor her wishes. Patient was appreciative and expressed understanding.  Patient states she is going to reach out to her siblings today to discuss who can step up to help her with her living arrangements after acute rehab  Hospice eligible if patient no longer wants to pursue oncologic intervention but at this time is interested in treatment options of radiation and any other oncologic agents that can be offered  Emotional support provided  Discussed case with Dr. Reyna   Palliative care to continue to follow    Surrogate/HCP/Guardian: Phone#:Arun Caio [phone] and Jordana Peterschristine [phone] (brother and sister)  Code status: FULL code     Total Time Spent__60__ minutes  This includes chart review, patient assessment, discussion and collaboration with interdisciplinary team members, ACP planning    COUNSELING:  Face to face meeting to discuss Advanced Care Planning - Time Spent __25____Minutes.      Thank you for the opportunity to assist with the care of this patient.   [facility] Palliative Medicine Consult Service [phone].

## 2022-09-26 NOTE — PROGRESS NOTE ADULT - PROBLEM SELECTOR PLAN 1
- Pathology from 9/12/22 right breast biopsy showed triple positive invasive ductal carcinoma  - cT3 N2 M1.  - S/p spine surgery 9/21/22.  - MRI brain showed subcentimeter parenchymal lesions (7 mm right parietal, 3 mm left frontal, 3 mm parasagital right frontal); asymptomatic.  - May benefit from post-operative RT to the spine, as well as other symptomatic areas of spine disease.  - Also discussed with patient the potential role of RT for brain metastases.   - Plan for spine and brain RT as outpatient, after healed from surgery.  - Anticipate continued PT, rehab.  - Patient has been inbetween job assignments, so technically without permanent residence; will potentially stay with brother after rehab.

## 2022-09-27 LAB
ALBUMIN SERPL ELPH-MCNC: 2.4 G/DL — LOW (ref 3.3–5.2)
ALP SERPL-CCNC: 148 U/L — HIGH (ref 40–120)
ALT FLD-CCNC: 8 U/L — SIGNIFICANT CHANGE UP
ANION GAP SERPL CALC-SCNC: 10 MMOL/L — SIGNIFICANT CHANGE UP (ref 5–17)
AST SERPL-CCNC: 32 U/L — HIGH
BASOPHILS # BLD AUTO: 0.01 K/UL — SIGNIFICANT CHANGE UP (ref 0–0.2)
BASOPHILS NFR BLD AUTO: 0.1 % — SIGNIFICANT CHANGE UP (ref 0–2)
BILIRUB SERPL-MCNC: 0.4 MG/DL — SIGNIFICANT CHANGE UP (ref 0.4–2)
BUN SERPL-MCNC: 7 MG/DL — LOW (ref 8–20)
CALCIUM SERPL-MCNC: 9.6 MG/DL — SIGNIFICANT CHANGE UP (ref 8.4–10.5)
CHLORIDE SERPL-SCNC: 96 MMOL/L — LOW (ref 98–107)
CO2 SERPL-SCNC: 28 MMOL/L — SIGNIFICANT CHANGE UP (ref 22–29)
CREAT SERPL-MCNC: 0.32 MG/DL — LOW (ref 0.5–1.3)
EGFR: 115 ML/MIN/1.73M2 — SIGNIFICANT CHANGE UP
EOSINOPHIL # BLD AUTO: 0.06 K/UL — SIGNIFICANT CHANGE UP (ref 0–0.5)
EOSINOPHIL NFR BLD AUTO: 0.5 % — SIGNIFICANT CHANGE UP (ref 0–6)
GLUCOSE SERPL-MCNC: 100 MG/DL — HIGH (ref 70–99)
HCT VFR BLD CALC: 29.1 % — LOW (ref 34.5–45)
HGB BLD-MCNC: 10 G/DL — LOW (ref 11.5–15.5)
IMM GRANULOCYTES NFR BLD AUTO: 0.9 % — SIGNIFICANT CHANGE UP (ref 0–0.9)
LACTATE SERPL-SCNC: 0.8 MMOL/L — SIGNIFICANT CHANGE UP (ref 0.5–2)
LYMPHOCYTES # BLD AUTO: 1.01 K/UL — SIGNIFICANT CHANGE UP (ref 1–3.3)
LYMPHOCYTES # BLD AUTO: 7.8 % — LOW (ref 13–44)
MCHC RBC-ENTMCNC: 30.7 PG — SIGNIFICANT CHANGE UP (ref 27–34)
MCHC RBC-ENTMCNC: 34.4 GM/DL — SIGNIFICANT CHANGE UP (ref 32–36)
MCV RBC AUTO: 89.3 FL — SIGNIFICANT CHANGE UP (ref 80–100)
MONOCYTES # BLD AUTO: 0.48 K/UL — SIGNIFICANT CHANGE UP (ref 0–0.9)
MONOCYTES NFR BLD AUTO: 3.7 % — SIGNIFICANT CHANGE UP (ref 2–14)
NEUTROPHILS # BLD AUTO: 11.22 K/UL — HIGH (ref 1.8–7.4)
NEUTROPHILS NFR BLD AUTO: 87 % — HIGH (ref 43–77)
PLATELET # BLD AUTO: 285 K/UL — SIGNIFICANT CHANGE UP (ref 150–400)
POTASSIUM SERPL-MCNC: 3.8 MMOL/L — SIGNIFICANT CHANGE UP (ref 3.5–5.3)
POTASSIUM SERPL-SCNC: 3.8 MMOL/L — SIGNIFICANT CHANGE UP (ref 3.5–5.3)
PROCALCITONIN SERPL-MCNC: 0.15 NG/ML — HIGH (ref 0.02–0.1)
PROT SERPL-MCNC: 5.1 G/DL — LOW (ref 6.6–8.7)
RBC # BLD: 3.26 M/UL — LOW (ref 3.8–5.2)
RBC # FLD: 13.5 % — SIGNIFICANT CHANGE UP (ref 10.3–14.5)
SARS-COV-2 RNA SPEC QL NAA+PROBE: SIGNIFICANT CHANGE UP
SODIUM SERPL-SCNC: 134 MMOL/L — LOW (ref 135–145)
WBC # BLD: 12.89 K/UL — HIGH (ref 3.8–10.5)
WBC # FLD AUTO: 12.89 K/UL — HIGH (ref 3.8–10.5)

## 2022-09-27 PROCEDURE — 71045 X-RAY EXAM CHEST 1 VIEW: CPT | Mod: 26

## 2022-09-27 PROCEDURE — 99233 SBSQ HOSP IP/OBS HIGH 50: CPT

## 2022-09-27 RX ORDER — HYDROMORPHONE HYDROCHLORIDE 2 MG/ML
2 INJECTION INTRAMUSCULAR; INTRAVENOUS; SUBCUTANEOUS EVERY 4 HOURS
Refills: 0 | Status: DISCONTINUED | OUTPATIENT
Start: 2022-09-27 | End: 2022-09-30

## 2022-09-27 RX ORDER — HYDROMORPHONE HYDROCHLORIDE 2 MG/ML
4 INJECTION INTRAMUSCULAR; INTRAVENOUS; SUBCUTANEOUS EVERY 4 HOURS
Refills: 0 | Status: DISCONTINUED | OUTPATIENT
Start: 2022-09-27 | End: 2022-09-30

## 2022-09-27 RX ORDER — GABAPENTIN 400 MG/1
600 CAPSULE ORAL EVERY 8 HOURS
Refills: 0 | Status: DISCONTINUED | OUTPATIENT
Start: 2022-09-27 | End: 2022-09-30

## 2022-09-27 RX ORDER — ACETAMINOPHEN 500 MG
650 TABLET ORAL ONCE
Refills: 0 | Status: COMPLETED | OUTPATIENT
Start: 2022-09-27 | End: 2022-09-27

## 2022-09-27 RX ADMIN — HYDROMORPHONE HYDROCHLORIDE 4 MILLIGRAM(S): 2 INJECTION INTRAMUSCULAR; INTRAVENOUS; SUBCUTANEOUS at 20:00

## 2022-09-27 RX ADMIN — GABAPENTIN 300 MILLIGRAM(S): 400 CAPSULE ORAL at 06:22

## 2022-09-27 RX ADMIN — Medication 975 MILLIGRAM(S): at 07:22

## 2022-09-27 RX ADMIN — Medication 650 MILLIGRAM(S): at 21:55

## 2022-09-27 RX ADMIN — OXYCODONE HYDROCHLORIDE 20 MILLIGRAM(S): 5 TABLET ORAL at 22:05

## 2022-09-27 RX ADMIN — Medication 650 MILLIGRAM(S): at 21:40

## 2022-09-27 RX ADMIN — OXYCODONE HYDROCHLORIDE 20 MILLIGRAM(S): 5 TABLET ORAL at 11:00

## 2022-09-27 RX ADMIN — Medication 10 MILLIGRAM(S): at 10:15

## 2022-09-27 RX ADMIN — GABAPENTIN 600 MILLIGRAM(S): 400 CAPSULE ORAL at 21:05

## 2022-09-27 RX ADMIN — HYDROMORPHONE HYDROCHLORIDE 4 MILLIGRAM(S): 2 INJECTION INTRAMUSCULAR; INTRAVENOUS; SUBCUTANEOUS at 21:00

## 2022-09-27 RX ADMIN — OXYCODONE HYDROCHLORIDE 20 MILLIGRAM(S): 5 TABLET ORAL at 23:05

## 2022-09-27 RX ADMIN — OXYCODONE HYDROCHLORIDE 20 MILLIGRAM(S): 5 TABLET ORAL at 10:13

## 2022-09-27 RX ADMIN — Medication 975 MILLIGRAM(S): at 06:22

## 2022-09-27 RX ADMIN — METHOCARBAMOL 750 MILLIGRAM(S): 500 TABLET, FILM COATED ORAL at 06:22

## 2022-09-27 RX ADMIN — OXYCODONE HYDROCHLORIDE 20 MILLIGRAM(S): 5 TABLET ORAL at 00:27

## 2022-09-27 RX ADMIN — SENNA PLUS 2 TABLET(S): 8.6 TABLET ORAL at 21:05

## 2022-09-27 RX ADMIN — ENOXAPARIN SODIUM 40 MILLIGRAM(S): 100 INJECTION SUBCUTANEOUS at 06:23

## 2022-09-27 RX ADMIN — GABAPENTIN 600 MILLIGRAM(S): 400 CAPSULE ORAL at 14:46

## 2022-09-27 RX ADMIN — METHOCARBAMOL 750 MILLIGRAM(S): 500 TABLET, FILM COATED ORAL at 14:46

## 2022-09-27 RX ADMIN — METHOCARBAMOL 750 MILLIGRAM(S): 500 TABLET, FILM COATED ORAL at 21:05

## 2022-09-27 NOTE — PROGRESS NOTE ADULT - SUBJECTIVE AND OBJECTIVE BOX
CC: Follow up     INTERVAL HPI/OVERNIGHT EVENTS: Patient seen and examined, no acute complaints overnight. Afebrile. Pain 10/10 when gettign out of bed with PT yesterday. Last BM 4 days ago       Vital Signs Last 24 Hrs  T(C): 36.6 (27 Sep 2022 08:36), Max: 37.6 (26 Sep 2022 20:30)  T(F): 97.8 (27 Sep 2022 08:36), Max: 99.6 (26 Sep 2022 20:30)  HR: 78 (27 Sep 2022 08:36) (78 - 100)  BP: 118/75 (27 Sep 2022 08:36) (102/62 - 119/72)  BP(mean): --  RR: 18 (27 Sep 2022 08:36) (18 - 18)  SpO2: 96% (27 Sep 2022 08:36) (92% - 98%)    Parameters below as of 27 Sep 2022 08:36  Patient On (Oxygen Delivery Method): room air        PHYSICAL EXAM:    GENERAL: NAD, AOX3  HEAD:  Atraumatic, Normocephalic  EYES:  conjunctiva and sclera clear  ENMT: Moist mucous membranes  NECK: C COllar   CHEST/LUNG: Clear to auscultation bilaterally; No rales, rhonchi, wheezing, or rubs  HEART: Regular rate and rhythm; No murmurs, rubs, or gallops  ABDOMEN: Soft, Nontender, Nondistended; Bowel sounds present  EXTREMITIES:  2+ Peripheral Pulses, No clubbing, cyanosis, or edema        MEDICATIONS  (STANDING):  enoxaparin Injectable 40 milliGRAM(s) SubCutaneous every 24 hours  gabapentin 600 milliGRAM(s) Oral every 8 hours  influenza  Vaccine (HIGH DOSE) 0.7 milliLiter(s) IntraMuscular once  methocarbamol 750 milliGRAM(s) Oral three times a day  oxyCODONE  ER Tablet 20 milliGRAM(s) Oral every 12 hours  polyethylene glycol 3350 17 Gram(s) Oral every 12 hours  senna 2 Tablet(s) Oral at bedtime    MEDICATIONS  (PRN):  bisacodyl Suppository 10 milliGRAM(s) Rectal daily PRN Constipation  ondansetron Injectable 4 milliGRAM(s) IV Push every 6 hours PRN Nausea and/or Vomiting  oxyCODONE    IR 5 milliGRAM(s) Oral every 4 hours PRN Mild Pain (1 - 3)  oxyCODONE    IR 10 milliGRAM(s) Oral every 4 hours PRN Moderate Pain (4 - 6)      Allergies    tetanus toxoid (Hives)    Intolerances    lactose (Other)        LABS:                          10.2   13.28 )-----------( 249      ( 26 Sep 2022 05:54 )             28.9     09-26    136  |  99  |  8.8  ----------------------------<  79  3.8   |  27.0  |  0.29<L>    Ca    9.2      26 Sep 2022 05:54            RADIOLOGY & ADDITIONAL TESTS:

## 2022-09-27 NOTE — PROGRESS NOTE ADULT - ASSESSMENT
Body Location Override (Optional - Billing Will Still Be Based On Selected Body Map Location If Applicable): right plantar medial 1st MPJ Detail Level: Detailed Depth Of Biopsy: dermis 66 year old female found to have breast cancer with diffuse cervical, thoracic, lumbar spine osseous metastasis  Now s/p C7, T1, T2 corpectomy with cage device placement    Problem/Recommendation 1: Metastatic breast cancer  Breast cancer with diffuse cervical, thoracic, lumbar spine osseous metastasis  Now s/p C7, T1, T2 corpectomy with cage device placementNow s/p C7, T1, T2 corpectomy with cage device placement  Rehab medicine following  Hospice eligible if patient no longer wants to pursue oncologic intervention but at this time is interested in treatment options     Problem/Recommendation 2:Pain   Added extended release oxyCODONE Tablet 20 milliGRAM(s) Oral every 12 hours  Can consider increasing dose of gabapentin     MEDICATIONS  (STANDING):  gabapentin 300 milliGRAM(s) Oral every 8 hours  methocarbamol 750 milliGRAM(s) Oral three times a day  MEDICATIONS  (PRN):  acetaminophen     Tablet .. 650 milliGRAM(s) Oral every 6 hours PRN Mild Pain (1 - 3)  oxyCODONE    IR 5 milliGRAM(s) Oral every 4 hours PRN Mild Pain (1 - 3)  oxyCODONE    IR 10 milliGRAM(s) Oral every 4 hours PRN Moderate Pain (4 - 6)  HYDROmorphone  Injectable 2 milliGRAM(s) IV Push every 3 hours PRN Severe Pain (7 - 10)    Problem/Recommendation 3: Debility  Assist in ADLS  Maintain safety, fall, aspiration precautions    Problem/Recommendation 4: Palliative Care Encounter  Met with patient at bedside - eating breakfast at the time  Followed up regarding pain as Oxycodone ER 20 mg initiated yesterday - patient states her pain is significantly improved since starting the medication - Noted on MAR that no breakthrough medications for oxycodone was given overnight - patient expressed she didn't need any breakthrough medication as her pain was well managed  Educated patient that if she feels her pain increasing to let the Nurse know in order to medicate appropriately for relief - patient expressed understanding   Prior conversation held regarding advance directives - Patient has 2 HCPs (her brother Arun and sister Jordana)  Code status is Full Code (CPR/I)   Emotional support provided  Palliative care to continue to follow    Surrogate/HCP/Guardian: Phone#:Arun Caio [phone] and Jordana Dex [phone] (brother and sister)  Code status: FULL code     Total Time Spent__30__ minutes  This includes chart review, patient assessment, discussion and collaboration with interdisciplinary team members, ACP planning    Thank you for the opportunity to assist with the care of this patient.   [facility] Palliative Medicine Consult Service [phone].    Was A Bandage Applied: Yes Size Of Lesion In Cm: 0.6 X Size Of Lesion In Cm: 0 Biopsy Type: H and E Biopsy Method: Personna blade Anesthesia Type: 1% lidocaine without epinephrine and a 1:10 solution of 8.4% sodium bicarbonate Anesthesia Volume In Cc (Will Not Render If 0): 0.5 Hemostasis: Aluminum Chloride and Electrocautery Wound Care: Vaseline Dressing: pressure dressing with telfa Destruction After The Procedure: No Type Of Destruction Used: Curettage Curettage Text: The wound bed was treated with curettage after the biopsy was performed. Cryotherapy Text: The wound bed was treated with cryotherapy after the biopsy was performed. Electrodesiccation Text: The wound bed was treated with electrodesiccation after the biopsy was performed. Electrodesiccation And Curettage Text: The wound bed was treated with electrodesiccation and curettage after the biopsy was performed. Silver Nitrate Text: The wound bed was treated with silver nitrate after the biopsy was performed. Lab: [facility] Lab Facility: [address] Notes (To The Dermatopathologist): 0.6cm Consent: Written consent was obtained and risks were reviewed including but not limited to scarring, infection, bleeding, scabbing, incomplete removal, nerve damage and allergy to anesthesia. Post-Care Instructions: I reviewed with the patient in detail post-care instructions. Patient is to keep the biopsy site dry overnight, and then apply polysporin or vasoline twice daily until healed. Patient is to keep the lesion covered with a bandaid. Notification Instructions: Patient will be notified of biopsy results. However, patient instructed to call the office if not contacted within 2 weeks. Billing Type: [employer] Information: Selecting Yes will display possible errors in your note based on the variables you have selected. This validation is only offered as a suggestion for you. PLEASE NOTE THAT THE VALIDATION TEXT WILL BE REMOVED WHEN YOU FINALIZE YOUR NOTE. IF YOU WANT TO FAX A PRELIMINARY NOTE YOU WILL NEED TO TOGGLE THIS TO 'NO' IF YOU DO NOT WANT IT IN YOUR FAXED NOTE.

## 2022-09-27 NOTE — PROGRESS NOTE ADULT - SUBJECTIVE AND OBJECTIVE BOX
Palliative Care Followup    OVERNIGHT EVENTS:no acute overnight events - pain improving since starting oxycodone ER    Present Symptoms:   Dyspnea: no  Nausea/Vomiting: No  Anxiety:  No  Depression: No  Fatigue: No  Loss of appetite: No  Constipation: no    Pain: Denies pain during encounter            Character-            Duration-            Effect-            Factors-            Frequency-            Location-            Severity-    Pain AD Score:0  [email or web address] (press ctrl + left click to view)    Review of Systems: Reviewed                     Negative: no dyspnea  All others negative    MEDICATIONS  (STANDING):  enoxaparin Injectable 40 milliGRAM(s) SubCutaneous every 24 hours  gabapentin 600 milliGRAM(s) Oral every 8 hours  influenza  Vaccine (HIGH DOSE) 0.7 milliLiter(s) IntraMuscular once  methocarbamol 750 milliGRAM(s) Oral three times a day  oxyCODONE  ER Tablet 20 milliGRAM(s) Oral every 12 hours  polyethylene glycol 3350 17 Gram(s) Oral every 12 hours  senna 2 Tablet(s) Oral at bedtime    MEDICATIONS  (PRN):  bisacodyl Suppository 10 milliGRAM(s) Rectal daily PRN Constipation  ondansetron Injectable 4 milliGRAM(s) IV Push every 6 hours PRN Nausea and/or Vomiting  oxyCODONE    IR 5 milliGRAM(s) Oral every 4 hours PRN Mild Pain (1 - 3)  oxyCODONE    IR 10 milliGRAM(s) Oral every 4 hours PRN Moderate Pain (4 - 6)      PHYSICAL EXAM:    Vital Signs Last 24 Hrs  T(C): 36.6 (27 Sep 2022 08:36), Max: 37.6 (26 Sep 2022 20:30)  T(F): 97.8 (27 Sep 2022 08:36), Max: 99.6 (26 Sep 2022 20:30)  HR: 78 (27 Sep 2022 08:36) (78 - 113)  BP: 118/75 (27 Sep 2022 08:36) (102/62 - 119/72)  BP(mean): --  RR: 18 (27 Sep 2022 08:36) (18 - 18)  SpO2: 96% (27 Sep 2022 08:36) (92% - 98%)    Parameters below as of 27 Sep 2022 08:36  Patient On (Oxygen Delivery Method): room air    General: alert  oriented x _3    HEENT: normal     Lungs: comfortable    CV: normal      GI: normal     : normal    MSK: weakness , cervical collar    Neuro: no focal deficits cognitive impairment     Skin:  surgical incision to chest and neck    LABS:                          10.2   13.28 )-----------( 249      ( 26 Sep 2022 05:54 )             28.9     09-26    136  |  99  |  8.8  ----------------------------<  79  3.8   |  27.0  |  0.29<L>    Ca    9.2      26 Sep 2022 05:54    I&O's Summary    26 Sep 2022 07:01  -  27 Sep 2022 07:00  --------------------------------------------------------  IN: 230 mL / OUT: 400 mL / NET: -170 mL    RADIOLOGY & ADDITIONAL STUDIES:  CT Chest 9/9/22  IMPRESSION:  Metastatic breast cancer with dominant right breast mass. Inflammatory   right breast cancer cannot be excluded. Right chest wall, axillary and   retropectoral involvement.  Osseous, pulmonary, and likely hepatic metastatic disease. Metastatic   adenopathy of the thorax.  Diffuse extensive osseous metastatic disease with multiple lucent and   sclerotic lesions and compression fractures of T1, T9, T12, and L2.   Severe T1 compression with epidural extension. Cord compression cannot be   excluded. Additional neoplastic disease of the spine with epidural   extension. Multifocal neoplastic lesions involving the pelvis and ribs as   well. Correlate with pending spinal MRI to evaluate for cord compression.    MR spine 9/11/22  IMPRESSION:  Extensive osseous metastatic disease with pathologic compression fracture   deformities at C7, T1, T9, T12 and L2, severe at T1.  Abnormal ventral and dorsal epidural enhancement from C7 through T2.   Severe canal stenosis at T1 and L2 without associated cord or cauda   equina compression.  There is motion artifact present in the thoracic spine which limits   evaluation. No abnormal leptomeningeal enhancement or definite cord   signal abnormality.    CT spine 9/22/22  IMPRESSION:  The patient is status post C7, T1, T2 corpectomy with cage device   placement. Anterior fixation hardware at the C6 & T3 levels with   vertebral body screws and anterior fixation plate. Bilateral posterior   fixation screws at the C4, C5, C6, T3, T4 levels and vertical stabilizing   rods.  Expected postoperative soft changes changes at the operative  sites. Hardware is in satisfactory position. Diffuse cervical, thoracic,   lumbar spine osseous metastasis.    ADVANCE DIRECTIVES/TREATMENT PREFERENCES:  Full Code

## 2022-09-27 NOTE — PROGRESS NOTE ADULT - SUBJECTIVE AND OBJECTIVE BOX
Interval Hx:  Patient seen during rounds  Patient reports pain to be mod controlled on current medications  Patient denies sedation with medications     Analgesic Dosing for past 24 hours reviewed as below:  acetaminophen     Tablet ..   975 milliGRAM(s) Oral (09-27-22 @ 06:22)   975 milliGRAM(s) Oral (09-26-22 @ 21:37)   975 milliGRAM(s) Oral (09-26-22 @ 14:20)    gabapentin   300 milliGRAM(s) Oral (09-27-22 @ 06:22)   300 milliGRAM(s) Oral (09-26-22 @ 21:37)   300 milliGRAM(s) Oral (09-26-22 @ 15:28)    HYDROmorphone  Injectable   2 milliGRAM(s) IV Push (09-26-22 @ 21:02)    methocarbamol   750 milliGRAM(s) Oral (09-27-22 @ 06:22)   750 milliGRAM(s) Oral (09-26-22 @ 21:37)   750 milliGRAM(s) Oral (09-26-22 @ 14:20)    oxyCODONE    IR   10 milliGRAM(s) Oral (09-26-22 @ 14:20)    oxyCODONE  ER Tablet   20 milliGRAM(s) Oral (09-27-22 @ 10:13)   20 milliGRAM(s) Oral (09-27-22 @ 00:27)          T(C): 36.6 (09-27-22 @ 08:36), Max: 37.6 (09-26-22 @ 20:30)  HR: 78 (09-27-22 @ 08:36) (78 - 100)  BP: 118/75 (09-27-22 @ 08:36) (102/62 - 119/72)  RR: 18 (09-27-22 @ 08:36) (18 - 18)  SpO2: 96% (09-27-22 @ 08:36) (92% - 98%)      09-26-22 @ 07:01  -  09-27-22 @ 07:00  --------------------------------------------------------  IN: 230 mL / OUT: 400 mL / NET: -170 mL        bisacodyl Suppository 10 milliGRAM(s) Rectal daily PRN  enoxaparin Injectable 40 milliGRAM(s) SubCutaneous every 24 hours  gabapentin 600 milliGRAM(s) Oral every 8 hours  influenza  Vaccine (HIGH DOSE) 0.7 milliLiter(s) IntraMuscular once  methocarbamol 750 milliGRAM(s) Oral three times a day  ondansetron Injectable 4 milliGRAM(s) IV Push every 6 hours PRN  oxyCODONE    IR 5 milliGRAM(s) Oral every 4 hours PRN  oxyCODONE    IR 10 milliGRAM(s) Oral every 4 hours PRN  oxyCODONE  ER Tablet 20 milliGRAM(s) Oral every 12 hours  polyethylene glycol 3350 17 Gram(s) Oral every 12 hours  senna 2 Tablet(s) Oral at bedtime                          10.2   13.28 )-----------( 249      ( 26 Sep 2022 05:54 )             28.9     09-26    136  |  99  |  8.8  ----------------------------<  79  3.8   |  27.0  |  0.29<L>    Ca    9.2      26 Sep 2022 05:54            Pain Service   [phone]

## 2022-09-27 NOTE — PROGRESS NOTE ADULT - ASSESSMENT
67 yo female with no known pmhx was brought to the ED with progressive weakness to the point that she felt she could not get out of bed, she has some remote hx of should pain, decrease appetite, her last mammogram was in 2014. She reports they found 2 lumps in her right breast and recommended biopsy, she went for biopsy, she could not tolerate the pain.  CT scan of the Cervical spine showed Multiple lytic lesions of the cervical and visualized thoracic spine as well as right first and second ribs concerning for metastases versus multiple myeloma, she was admitted under medicine service for further workup and management, she was provided pain meds and her further imaging done showed Metastatic breast cancer with dominant right breast mass, Right chest wall, axillary and retropectoral involvement, Osseous, pulmonary, and likely hepatic metastatic disease. Metastatic adenopathy of the thorax, diffuse extensive osseous metastatic disease with multiple lucent and sclerotic lesions and compression fractures of T1, T9, T12, and L2. Severe T1 compression with epidural extension. Cord compression cannot be excluded. Additional neoplastic disease of the spine with epidural extension. Multifocal neoplastic lesions involving the pelvis and ribs as well, she was seen by Oncology, CT surgery and Neurosurgery team she underwent C7-T2 corpectomy & fusion, posterior C4-T5 fusion, managed in the ICU post operative and was extubated, Post op course uncomplicated, gomez removed pt requiring some straight cath, pt also with some subjective dysphagia - seen by speech therapy recommended pureed diet    Assessment/plan:    1. Extensive osseous metastatic disease with pathologic compression fracture deformities at C7, T1, T9, T12 and L2, severe at T1  Secondary to  Primary metastatic Breast CA  -s/p  C7-T2 corpectomy & fusion, posterior C4-T5 fusion   - Robaxin 500mg TID  - Gabapentin 200mg TID  - Oxycodone IR with Oxycodone Er for longer acting pain control  - DC Dilaudid  - pain management reconsulted for recommendations   - Bowel regimen  - wound care as per Neurosurgery team     2. Post operative urinary retention   - Resolved    3. Large right breast mass:   - likely cancer with mets to spine, brain, liver  - s/p right breast biopsy shows  positive for invasive ductal carcinoma     - Oncology - Chemotherapy after neurosx wound heals 3-4 weeks  - Radiation oncology consult for brain mets to follow up as outpatient post rehab once surgical wounds have healed     4. Leucocytosis  - Likely post op and being on steroids  - no focus of infection  - Afebrile now  - Improving     5. Hypercalcemia due to bone mets malignancy   - s/p IVF , improved   - Monitor BMP     6. Moderate protein malia malnutrition   - Ensure    DVT prophylaxis: Lovenox Subcut    PT following     Discharge disposition; [facility] vs Acute rehab once pain improved. CM following for discharge disposition    65 yo female with no known pmhx was brought to the ED with progressive weakness to the point that she felt she could not get out of bed, she has some remote hx of should pain, decrease appetite, her last mammogram was in 2014. She reports they found 2 lumps in her right breast and recommended biopsy, she went for biopsy, she could not tolerate the pain.  CT scan of the Cervical spine showed Multiple lytic lesions of the cervical and visualized thoracic spine as well as right first and second ribs concerning for metastases versus multiple myeloma, she was admitted under medicine service for further workup and management, she was provided pain meds and her further imaging done showed Metastatic breast cancer with dominant right breast mass, Right chest wall, axillary and retropectoral involvement, Osseous, pulmonary, and likely hepatic metastatic disease. Metastatic adenopathy of the thorax, diffuse extensive osseous metastatic disease with multiple lucent and sclerotic lesions and compression fractures of T1, T9, T12, and L2. Severe T1 compression with epidural extension. Cord compression cannot be excluded. Additional neoplastic disease of the spine with epidural extension. Multifocal neoplastic lesions involving the pelvis and ribs as well, she was seen by Oncology, CT surgery and Neurosurgery team she underwent C7-T2 corpectomy & fusion, posterior C4-T5 fusion, managed in the ICU post operative and was extubated, Post op course uncomplicated, gomez removed pt requiring some straight cath, pt also with some subjective dysphagia - seen by speech therapy recommended pureed diet    Assessment/plan:    1. Extensive osseous metastatic disease with pathologic compression fracture deformities at C7, T1, T9, T12 and L2, severe at T1  Secondary to  Primary metastatic Breast CA  -s/p  C7-T2 corpectomy & fusion, posterior C4-T5 fusion   - Robaxin 500mg TID  - Gabapentin 200mg TID increased to 600mg   -  Spoke with Dr Bennett- [id number] Oxycodone ER; Start Dilaudid 2mg Q4 hours prn for mod pain; 4mg Q4 hours prn for severe pain  - OXycodone ER 20mg Q12 hours   - Bowel regimen  - wound care as per Neurosurgery team     2. Post operative urinary retention   - Resolved    3. Large right breast mass:   - likely cancer with mets to spine, brain, liver  - s/p right breast biopsy shows  positive for invasive ductal carcinoma     - Oncology - Chemotherapy after neurosx wound heals 3-4 weeks  - Radiation oncology consult for brain mets to follow up as outpatient post rehab once surgical wounds have healed   = Patient will not require chemo/radiation while in rehab     4. Leucocytosis  - Likely post op and being on steroids  - no focus of infection  - Afebrile now  - Improving     5. Hypercalcemia due to bone mets malignancy   - s/p IVF , improved   - Monitor BMP     6. Moderate protein malia malnutrition   - Ensure    DVT prophylaxis: Lovenox Subcut    PT following     Discharge disposition; [facility] vs Acute rehab once pain improved. CM following for discharge disposition

## 2022-09-27 NOTE — PROGRESS NOTE ADULT - SUBJECTIVE AND OBJECTIVE BOX
Patient reports pain is more controlled.  Was unable to tolerate much of OOB yesterday due to pain.   Able to speak to brother to stay for a period of time upon DC.     REVIEW OF SYSTEMS  Constitutional - No fever,  +fatigue  HEENT - No vertigo, +neck pain  Neurological - No headaches, No memory loss, +loss of strength, +numbness, No tremors  Musculoskeletal - +joint pain, No joint swelling, +muscle pain    FUNCTIONAL PROGRESS  9/26 SLP  Progress Summary: Chart reviewed. Follow up for swallow. Pt received asleep in bed, arousable to voice, 0x3, +c-collar. Swallow reassess attempted, however pt expressing preference for pureed foods at this time, declining trials of solids >puree. Good management of liquids reported, and overt s/s aspiration denied. This service to therefore sign off at this time. Please reconsult as needed. Pt left NAD, pain 0/10, RN aware.     9/26 PT  Bed Mobility  Bed Mobility Training Rehab Potential: good, to achieve stated therapy goals  Bed Mobility Training Sit-to-Supine: moderate assist (50% patient effort);  2 person assist  Bed Mobility Training Supine-to-Sit: moderate assist (50% patient effort);  2 person assist  Bed Mobility Training Limitations: decreased ability to use arms for pushing/pulling;  decreased ability to use legs for bridging/pushing;  decreased strength;  impaired balance;  pain    Sit-Stand Transfer Training  Sit-to-Stand Transfer Training Rehab Potential: good, to achieve stated therapy goals  Transfer Training Sit-to-Stand Transfer: moderate assist (50% patient effort);  2 person assist;  rolling walker;  C-collar and TLSO from OOB  Transfer Training Stand-to-Sit Transfer: moderate assist (50% patient effort);  1 person assist;  rolling walker;  C-collar and TLSO from OOB  Sit-to-Stand Transfer Training Transfer Safety Analysis: decreased weight-shifting ability;  decreased strength;  impaired balance    Gait Training  Gait Training Rehab Potential: good, to achieve stated therapy goals  Gait Training: moderate assist (50% patient effort);  1 person assist;  rolling walker;  3ft  Gait Analysis: decreased step length;  decreased stride length    9/26 OT  Bed Mobility  Bed Mobility Training Sit-to-Supine: moderate assist (50% patient effort);  2 person assist  Bed Mobility Training Supine-to-Sit: moderate assist (50% patient effort);  2 person assist  Bed Mobility Training Limitations: decreased ability to use arms for pushing/pulling    Sit-Stand Transfer Training  Transfer Training Sit-to-Stand Transfer: moderate assist (50% patient effort);  2 person assist  Transfer Training Stand-to-Sit Transfer: moderate assist (50% patient effort);  2 person assist  Sit-to-Stand Transfer Training Transfer Safety Analysis: decreased weight-shifting ability;  impaired postural control;  decreased strength    Lower Body Dressing Training  Lower Body Dressing Training Assistance: maximum assist (25% patient effort)    Upper Body Dressing Training  Upper Body Dressing Training Assistance: maximum assist (25% patient effort);  sitting at edge of bed    VITALS  T(C): 36.4 (09-27-22 @ 04:59), Max: 37.6 (09-26-22 @ 20:30)  HR: 82 (09-27-22 @ 04:59) (82 - 113)  BP: 119/69 (09-27-22 @ 04:59) (102/62 - 119/72)  RR: 18 (09-27-22 @ 04:59) (18 - 18)  SpO2: 92% (09-27-22 @ 04:59) (92% - 98%)  Wt(kg): --    MEDICATIONS   bisacodyl Suppository 10 milliGRAM(s) daily PRN  enoxaparin Injectable 40 milliGRAM(s) every 24 hours  gabapentin 300 milliGRAM(s) every 8 hours  HYDROmorphone  Injectable 2 milliGRAM(s) every 3 hours PRN  influenza  Vaccine (HIGH DOSE) 0.7 milliLiter(s) once  methocarbamol 750 milliGRAM(s) three times a day  ondansetron Injectable 4 milliGRAM(s) every 6 hours PRN  oxyCODONE    IR 5 milliGRAM(s) every 4 hours PRN  oxyCODONE    IR 10 milliGRAM(s) every 4 hours PRN  oxyCODONE  ER Tablet 20 milliGRAM(s) every 12 hours  polyethylene glycol 3350 17 Gram(s) every 12 hours  senna 2 Tablet(s) at bedtime      RECENT LABS/IMAGING                          10.2   13.28 )-----------( 249      ( 26 Sep 2022 05:54 )             28.9     09-26    136  |  99  |  8.8  ----------------------------<  79  3.8   |  27.0  |  0.29<L>    Ca    9.2      26 Sep 2022 05:54                  MRI C, T L SPINE 9/11 - Extensive osseous metastatic disease with pathologic compression fracture deformities at C7, T1, T9, T12 and L2, severe at T1. Abnormal ventral and dorsal epidural enhancement from C7 through T2. Severe canal stenosis at T1 and L2 without associated cord or cauda equina compression. There is motion artifact present in the thoracic spine which limits evaluation. No abnormal leptomeningeal enhancement or definite cord signal abnormality.    MRI HEAD 9/11 - Subcentimeter enhancing parenchymal lesions as above compatible with intracranial metastatic disease. No associated vasogenic edema. No evidence of acute infarct or hemorrhage. Likely left parietal calvarial osseous metastatic lesion.    CT C & T SPINE 9/22 - The patient is status post C7, T1, T2 corpectomy with cage device placement. Anterior fixation hardware at the C6 & T3 levels with vertebral body screws and anterior fixation plate. Bilateral posterior fixation screws at the C4, C5, C6, T3, T4 levels and vertical stabilizing rods.  Expected postoperative soft changes changes at the operative sites. Hardware is in satisfactory position. Diffuse cervical, thoracic, lumbar spine osseous metastasis.    ----------------------------------------------------------------------------------------  PHYSICAL EXAM  Constitutional - NAD, Comfortable  Neck - +C-Collar   Extremities - No C/C/E, No calf tenderness   Neurologic Exam -                    Cognitive - AAO to self, place, date, year, situation     Communication - Fluent, No dysarthria     Cranial Nerves - CN 2-12 intact     Motor -                      LEFT    UE - C5 4/5, C6 4/5, C7 4/5, C8 4/5, T1 4/5                    RIGHT UE - C5 4/5, C6 4/5, C7 4/5, C8 4/5, T1 4/5                    LEFT    LE - L2 5/5, L3 5/5, L4 5/5, L5 5/5, S1 5/5                    RIGHT LE - L2 5/5, L3 5/5, L4 5/5, L5 5/5, S1 5/5      Sensory - Decreased to BUE, specifically right C7/8/T1  Psychiatric - Mood stable, Affect WNL  ----------------------------------------------------------------------------------------  ASSESSMENT/PLAN  66yFemale with functional deficits after developing progressive weakness related to metastatic 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 Will continue to follow for ongoing rehab needs and recommendations. Patient will be able to tolerate 3 hours a day.    Continue bedside therapy as well as OOB throughout the day with mobilization throughout the day with staff to maintain cardiopulmonary function and prevention of secondary complications related to debility.     Discussed with rehab clinical team.

## 2022-09-27 NOTE — CHART NOTE - NSCHARTNOTEFT_GEN_A_CORE
Called by RN to report fever of 100.9  Vital Signs:  T(C): 38.3)  T(F): 100.9   HR: 118   BP: 137/80  RR: 19  SpO2: 95% on room air    Patient had blood cultures drawn yesterday in AM.  Tylenol, CBC, CMP, Procalcitonin, Lactate, Urinalysis and CXR ordered.                          10.0   12.89 )-----------( 285      ( 27 Sep 2022 21:43 )             29.1     09-27    134<L>  |  96<L>  |  7.0<L>  ----------------------------<  100<H>  3.8   |  28.0  |  0.32<L>    Ca    9.6      27 Sep 2022 21:43    TPro  5.1<L>  /  Alb  2.4<L>  /  TBili  0.4  /  DBili  x   /  AST  32<H>  /  ALT  8   /  AlkPhos  148<H>  09-27    Lactate: 0.8    Continue to monitor patient, notify PA of any changes in patient status.

## 2022-09-28 LAB
ANION GAP SERPL CALC-SCNC: 9 MMOL/L — SIGNIFICANT CHANGE UP (ref 5–17)
APPEARANCE UR: ABNORMAL
BACTERIA # UR AUTO: ABNORMAL
BILIRUB UR-MCNC: NEGATIVE — SIGNIFICANT CHANGE UP
BUN SERPL-MCNC: 7.3 MG/DL — LOW (ref 8–20)
CALCIUM SERPL-MCNC: 9.9 MG/DL — SIGNIFICANT CHANGE UP (ref 8.4–10.5)
CHLORIDE SERPL-SCNC: 100 MMOL/L — SIGNIFICANT CHANGE UP (ref 98–107)
CO2 SERPL-SCNC: 28 MMOL/L — SIGNIFICANT CHANGE UP (ref 22–29)
COLOR SPEC: YELLOW — SIGNIFICANT CHANGE UP
CREAT SERPL-MCNC: 0.36 MG/DL — LOW (ref 0.5–1.3)
DIFF PNL FLD: ABNORMAL
EGFR: 112 ML/MIN/1.73M2 — SIGNIFICANT CHANGE UP
EPI CELLS # UR: SIGNIFICANT CHANGE UP
GLUCOSE SERPL-MCNC: 85 MG/DL — SIGNIFICANT CHANGE UP (ref 70–99)
GLUCOSE UR QL: NEGATIVE — SIGNIFICANT CHANGE UP
HCT VFR BLD CALC: 28.1 % — LOW (ref 34.5–45)
HGB BLD-MCNC: 9.3 G/DL — LOW (ref 11.5–15.5)
KETONES UR-MCNC: ABNORMAL
LEUKOCYTE ESTERASE UR-ACNC: ABNORMAL
MCHC RBC-ENTMCNC: 30.3 PG — SIGNIFICANT CHANGE UP (ref 27–34)
MCHC RBC-ENTMCNC: 33.1 GM/DL — SIGNIFICANT CHANGE UP (ref 32–36)
MCV RBC AUTO: 91.5 FL — SIGNIFICANT CHANGE UP (ref 80–100)
NITRITE UR-MCNC: POSITIVE
PH UR: 7 — SIGNIFICANT CHANGE UP (ref 5–8)
PLATELET # BLD AUTO: 271 K/UL — SIGNIFICANT CHANGE UP (ref 150–400)
POTASSIUM SERPL-MCNC: 4.2 MMOL/L — SIGNIFICANT CHANGE UP (ref 3.5–5.3)
POTASSIUM SERPL-SCNC: 4.2 MMOL/L — SIGNIFICANT CHANGE UP (ref 3.5–5.3)
PROT UR-MCNC: 30 MG/DL
RBC # BLD: 3.07 M/UL — LOW (ref 3.8–5.2)
RBC # FLD: 13.7 % — SIGNIFICANT CHANGE UP (ref 10.3–14.5)
RBC CASTS # UR COMP ASSIST: ABNORMAL /HPF (ref 0–4)
SODIUM SERPL-SCNC: 137 MMOL/L — SIGNIFICANT CHANGE UP (ref 135–145)
SP GR SPEC: 1.01 — SIGNIFICANT CHANGE UP (ref 1.01–1.02)
UROBILINOGEN FLD QL: NEGATIVE — SIGNIFICANT CHANGE UP
WBC # BLD: 9.2 K/UL — SIGNIFICANT CHANGE UP (ref 3.8–10.5)
WBC # FLD AUTO: 9.2 K/UL — SIGNIFICANT CHANGE UP (ref 3.8–10.5)
WBC UR QL: >50 /HPF (ref 0–5)

## 2022-09-28 PROCEDURE — 99233 SBSQ HOSP IP/OBS HIGH 50: CPT

## 2022-09-28 RX ORDER — CEFTRIAXONE 500 MG/1
1000 INJECTION, POWDER, FOR SOLUTION INTRAMUSCULAR; INTRAVENOUS EVERY 24 HOURS
Refills: 0 | Status: DISCONTINUED | OUTPATIENT
Start: 2022-09-28 | End: 2022-09-30

## 2022-09-28 RX ADMIN — GABAPENTIN 600 MILLIGRAM(S): 400 CAPSULE ORAL at 05:31

## 2022-09-28 RX ADMIN — OXYCODONE HYDROCHLORIDE 20 MILLIGRAM(S): 5 TABLET ORAL at 22:01

## 2022-09-28 RX ADMIN — GABAPENTIN 600 MILLIGRAM(S): 400 CAPSULE ORAL at 13:49

## 2022-09-28 RX ADMIN — ENOXAPARIN SODIUM 40 MILLIGRAM(S): 100 INJECTION SUBCUTANEOUS at 05:31

## 2022-09-28 RX ADMIN — Medication 30 MILLILITER(S): at 12:02

## 2022-09-28 RX ADMIN — METHOCARBAMOL 750 MILLIGRAM(S): 500 TABLET, FILM COATED ORAL at 22:00

## 2022-09-28 RX ADMIN — POLYETHYLENE GLYCOL 3350 17 GRAM(S): 17 POWDER, FOR SOLUTION ORAL at 18:16

## 2022-09-28 RX ADMIN — POLYETHYLENE GLYCOL 3350 17 GRAM(S): 17 POWDER, FOR SOLUTION ORAL at 05:32

## 2022-09-28 RX ADMIN — OXYCODONE HYDROCHLORIDE 20 MILLIGRAM(S): 5 TABLET ORAL at 23:00

## 2022-09-28 RX ADMIN — METHOCARBAMOL 750 MILLIGRAM(S): 500 TABLET, FILM COATED ORAL at 13:49

## 2022-09-28 RX ADMIN — OXYCODONE HYDROCHLORIDE 20 MILLIGRAM(S): 5 TABLET ORAL at 13:01

## 2022-09-28 RX ADMIN — OXYCODONE HYDROCHLORIDE 20 MILLIGRAM(S): 5 TABLET ORAL at 12:01

## 2022-09-28 RX ADMIN — GABAPENTIN 600 MILLIGRAM(S): 400 CAPSULE ORAL at 22:00

## 2022-09-28 RX ADMIN — SENNA PLUS 2 TABLET(S): 8.6 TABLET ORAL at 22:00

## 2022-09-28 RX ADMIN — METHOCARBAMOL 750 MILLIGRAM(S): 500 TABLET, FILM COATED ORAL at 05:31

## 2022-09-28 RX ADMIN — CEFTRIAXONE 1000 MILLIGRAM(S): 500 INJECTION, POWDER, FOR SOLUTION INTRAMUSCULAR; INTRAVENOUS at 22:00

## 2022-09-28 NOTE — PROGRESS NOTE ADULT - ASSESSMENT
66 year old female found to have breast cancer with diffuse cervical, thoracic, lumbar spine osseous metastasis  Now s/p C7, T1, T2 corpectomy with cage device placement    Problem/Recommendation 1: Metastatic breast cancer  Breast cancer with diffuse cervical, thoracic, lumbar spine osseous metastasis  Now s/p C7, T1, T2 corpectomy with cage device placementNow s/p C7, T1, T2 corpectomy with cage device placement  Rehab medicine following  Hospice eligible if patient no longer wants to pursue oncologic intervention but at this time is interested in treatment options     Problem/Recommendation 2:Pain   Agree with opioid rotation from oxycodone to dilaudid PO   Added extended release oxyCODONE Tablet 20 milliGRAM(s) Oral every 12 hours  Can consider increasing dose of gabapentin     MEDICATIONS  gabapentin 600 milliGRAM(s) Oral every 8 hours  methocarbamol 750 milliGRAM(s) Oral three times a day  oxyCODONE  ER Tablet 20 milliGRAM(s) Oral every 12 hours  HYDROmorphone   Tablet 4 milliGRAM(s) Oral every 4 hours PRN Severe Pain (7 - 10)  HYDROmorphone   Tablet 2 milliGRAM(s) Oral every 4 hours PRN Moderate Pain (4 - 6)  ondansetron Injectable 4 milliGRAM(s) IV Push every 6 hours PRN Nausea and/or Vomiting    Problem/Recommendation 3: Debility  Assist in ADLS  Maintain safety, fall, aspiration precautions    Problem/Recommendation 4: Palliative Care Encounter  Met with patient at bedside - resting comfortably in bed  Patient states she had a fever overnight and sent her urine for testing since she was having burning sensations when trying to void   Discussed her pain. Patient explained at rest her pain is tolerable at 5/10 but on exertion is when her pain rises  Started patient on Oxycodone ER 20 mg during inital consult which patient states has helped her pain but yesterday her pain significantly increased when participating with PT. Pain management followed up and placed patient on Dilaudid PO instead of oxycodone IR for breakthrough, Agree with this opioid rotation.   Prior conversation held regarding advance directives - Patient has 2 HCPs (her brother Arun and sister Jordana)  Code status is Full Code (CPR/I)   Continued social and emotional support provided  Palliative care to continue to follow    Surrogate/HCP/Guardian: Phone#:Arun Kearney [phone] and Jordana Dex [phone] (brother and sister)  Code status: FULL code     Total Time Spent__35_ minutes  This includes chart review, patient assessment, discussion and collaboration with interdisciplinary team members, ACP planning    Thank you for the opportunity to assist with the care of this patient.   [facility] Palliative Medicine Consult Service [phone].

## 2022-09-28 NOTE — CHART NOTE - NSCHARTNOTEFT_GEN_A_CORE
Palliative care social work note.    SW contacted patients brother Quique to address questions and concerns as well family adjustments with caregiving and coping with patient ca dx. Quique was engaging and discussed goals with acute rehab planning.

## 2022-09-28 NOTE — PROGRESS NOTE ADULT - SUBJECTIVE AND OBJECTIVE BOX
Patient feels that her pain is improved.  Numbness seems to be improved.     REVIEW OF SYSTEMS  Constitutional - No fever,  No fatigue  HEENT - No vertigo, +neck pain  Neurological - No headaches, No memory loss, +loss of strength, +numbness, No tremors  Musculoskeletal - +joint pain, +muscle pain    FUNCTIONAL PROGRESS   SLP  Progress Summary: Chart reviewed. Follow up for swallow. Pt received asleep in bed, arousable to voice, 0x3, +c-collar. Swallow reassess attempted, however pt expressing preference for pureed foods at this time, declining trials of solids >puree. Good management of liquids reported, and overt s/s aspiration denied. This service to therefore sign off at this time. Please reconsult as needed. Pt left NAD, pain 0/10, RN aware.      PT  Bed Mobility  Bed Mobility Training Rehab Potential: good, to achieve stated therapy goals  Bed Mobility Training Sit-to-Supine: moderate assist (50% patient effort);  2 person assist  Bed Mobility Training Supine-to-Sit: moderate assist (50% patient effort);  2 person assist  Bed Mobility Training Limitations: decreased ability to use arms for pushing/pulling;  decreased ability to use legs for bridging/pushing;  decreased strength;  impaired balance;  pain    Sit-Stand Transfer Training  Sit-to-Stand Transfer Training Rehab Potential: good, to achieve stated therapy goals  Transfer Training Sit-to-Stand Transfer: moderate assist (50% patient effort);  2 person assist;  rolling walker;  C-collar and TLSO from OOB  Transfer Training Stand-to-Sit Transfer: moderate assist (50% patient effort);  1 person assist;  rolling walker;  C-collar and TLSO from OOB  Sit-to-Stand Transfer Training Transfer Safety Analysis: decreased weight-shifting ability;  decreased strength;  impaired balance    Gait Training  Gait Training Rehab Potential: good, to achieve stated therapy goals  Gait Training: moderate assist (50% patient effort);  1 person assist;  rolling walker;  3ft  Gait Analysis: decreased step length;  decreased stride length     OT  Bed Mobility  Bed Mobility Training Sit-to-Supine: moderate assist (50% patient effort);  2 person assist  Bed Mobility Training Supine-to-Sit: moderate assist (50% patient effort);  2 person assist  Bed Mobility Training Limitations: decreased ability to use arms for pushing/pulling    Sit-Stand Transfer Training  Transfer Training Sit-to-Stand Transfer: moderate assist (50% patient effort);  2 person assist  Transfer Training Stand-to-Sit Transfer: moderate assist (50% patient effort);  2 person assist  Sit-to-Stand Transfer Training Transfer Safety Analysis: decreased weight-shifting ability;  impaired postural control;  decreased strength    Lower Body Dressing Training  Lower Body Dressing Training Assistance: maximum assist (25% patient effort)    Upper Body Dressing Training  Upper Body Dressing Training Assistance: maximum assist (25% patient effort);  sitting at edge of bed      VITALS  T(C): 36.5 (22 @ 05:33), Max: 38.3 (22 @ 20:19)  HR: 84 (22 @ 05:33) (78 - 118)  BP: 129/79 (22 @ 05:33) (118/75 - 137/80)  RR: 16 (22 @ 05:33) (16 - 19)  SpO2: 92% (22 @ 05:33) (92% - 96%)  Wt(kg): --    MEDICATIONS   bisacodyl Suppository 10 milliGRAM(s) daily PRN  enoxaparin Injectable 40 milliGRAM(s) every 24 hours  gabapentin 600 milliGRAM(s) every 8 hours  HYDROmorphone   Tablet 4 milliGRAM(s) every 4 hours PRN  HYDROmorphone   Tablet 2 milliGRAM(s) every 4 hours PRN  influenza  Vaccine (HIGH DOSE) 0.7 milliLiter(s) once  methocarbamol 750 milliGRAM(s) three times a day  ondansetron Injectable 4 milliGRAM(s) every 6 hours PRN  oxyCODONE  ER Tablet 20 milliGRAM(s) every 12 hours  polyethylene glycol 3350 17 Gram(s) every 12 hours  senna 2 Tablet(s) at bedtime      RECENT LABS/IMAGING                          9.3    9.20  )-----------( 271      ( 28 Sep 2022 05:01 )             28.1         137  |  100  |  7.3<L>  ----------------------------<  85  4.2   |  28.0  |  0.36<L>    Ca    9.9      28 Sep 2022 05:01    TPro  5.1<L>  /  Alb  2.4<L>  /  TBili  0.4  /  DBili  x   /  AST  32<H>  /  ALT  8   /  AlkPhos  148<H>        Urinalysis Basic - ( 28 Sep 2022 05:00 )    Color: Yellow / Appearance: very cloudy / S.015 / pH: x  Gluc: x / Ketone: Small  / Bili: Negative / Urobili: Negative   Blood: x / Protein: 30 mg/dL / Nitrite: Positive   Leuk Esterase: Moderate / RBC: 3-5 /HPF / WBC >50 /HPF   Sq Epi: x / Non Sq Epi: Few / Bacteria: Many                  MRI C, T L SPINE  - Extensive osseous metastatic disease with pathologic compression fracture deformities at C7, T1, T9, T12 and L2, severe at T1. Abnormal ventral and dorsal epidural enhancement from C7 through T2. Severe canal stenosis at T1 and L2 without associated cord or cauda equina compression. There is motion artifact present in the thoracic spine which limits evaluation. No abnormal leptomeningeal enhancement or definite cord signal abnormality.    MRI HEAD  - Subcentimeter enhancing parenchymal lesions as above compatible with intracranial metastatic disease. No associated vasogenic edema. No evidence of acute infarct or hemorrhage. Likely left parietal calvarial osseous metastatic lesion.    CT C & T SPINE  - The patient is status post C7, T1, T2 corpectomy with cage device placement. Anterior fixation hardware at the C6 & T3 levels with vertebral body screws and anterior fixation plate. Bilateral posterior fixation screws at the C4, C5, C6, T3, T4 levels and vertical stabilizing rods.  Expected postoperative soft changes changes at the operative sites. Hardware is in satisfactory position. Diffuse cervical, thoracic, lumbar spine osseous metastasis.    ----------------------------------------------------------------------------------------  PHYSICAL EXAM  Constitutional - NAD, Comfortable  Neck - +C-Collar   Extremities - No C/C/E, No calf tenderness   Neurologic Exam -                    Cognitive - AAO to self, place, date, year, situation     Communication - Fluent, No dysarthria     Cranial Nerves - CN 2-12 intact     Motor -                      LEFT    UE - C5 4/5, C6 4/5, C7 4/5, C8 4/5, T1 4/5                    RIGHT UE - C5 4/5, C6 4/5, C7 4/5, C8 4/5, T1 4/5                    LEFT    LE - L2 5/5, L3 5/5, L4 5/5, L5 5/5, S1 5/5                    RIGHT LE - L2 5/5, L3 5/5, L4 5/5, L5 5/5, S1 5/5      Sensory - Decreased to BUE, specifically right C7/8/T1  Psychiatric - Mood stable, Affect WNL  ----------------------------------------------------------------------------------------  ASSESSMENT/PLAN  66yFemale with functional deficits after developing progressive weakness related to 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 Will continue to follow for ongoing rehab needs and recommendations. Patient will be able to tolerate 3 hours a day.    Continue bedside therapy as well as OOB throughout the day with mobilization throughout the day with staff to maintain cardiopulmonary function and prevention of secondary complications related to debility.     Discussed with rehab clinical team.

## 2022-09-28 NOTE — PROGRESS NOTE ADULT - ASSESSMENT
67 yo female with no known pmhx was brought to the ED with progressive weakness to the point that she felt she could not get out of bed, she has some remote hx of should pain, decrease appetite, her last mammogram was in 2014. She reports they found 2 lumps in her right breast and recommended biopsy, she went for biopsy, she could not tolerate the pain.  CT scan of the Cervical spine showed Multiple lytic lesions of the cervical and visualized thoracic spine as well as right first and second ribs concerning for metastases versus multiple myeloma, she was admitted under medicine service for further workup and management, she was provided pain meds and her further imaging done showed Metastatic breast cancer with dominant right breast mass, Right chest wall, axillary and retropectoral involvement, Osseous, pulmonary, and likely hepatic metastatic disease. Metastatic adenopathy of the thorax, diffuse extensive osseous metastatic disease with multiple lucent and sclerotic lesions and compression fractures of T1, T9, T12, and L2. Severe T1 compression with epidural extension. Cord compression cannot be excluded. Additional neoplastic disease of the spine with epidural extension. Multifocal neoplastic lesions involving the pelvis and ribs as well, she was seen by Oncology, CT surgery and Neurosurgery team she underwent C7-T2 corpectomy & fusion, posterior C4-T5 fusion, managed in the ICU post operative and was extubated, Post op course uncomplicated, gomez removed pt requiring some straight cath, pt also with some subjective dysphagia - seen by speech therapy recommended pureed diet which patient continues to prefer     Assessment/plan:    1. Extensive osseous metastatic disease with pathologic compression fracture deformities at C7, T1, T9, T12 and L2, severe at T1  Secondary to  Primary metastatic Breast CA  -s/p  C7-T2 corpectomy & fusion, posterior C4-T5 fusion   - Robaxin 500mg TID  - Gabapentin 200mg TID increased to 600mg   - Dilaudid 2mg Q4 hours prn for mod pain; 4mg Q4 hours prn for severe pain  - Oxycodone ER 20mg Q12 hours   - Bowel regimen  - wound care as per Neurosurgery team     2. Fever  - Leukocytosis resolved  - Blood cultures x 2 were negative on 09/26  - Temp of 100 overnight with tachycardia  - UA positive  - Urine culture     3. Post operative urinary retention   - Resolved    4. Large right breast mass:   - likely cancer with mets to spine, brain, liver  - s/p right breast biopsy shows  positive for invasive ductal carcinoma     - Oncology - Chemotherapy after neurosx wound heals 3-4 weeks  - Radiation oncology consult for brain mets to follow up as outpatient post rehab once surgical wounds have healed   = Patient will not require chemo/radiation while in rehab     5. Hypercalcemia due to bone mets malignancy   - s/p IVF , improved   - Monitor BMP     6. Moderate protein malia malnutrition   - Ensure    DVT prophylaxis: Lovenox Subcut    PT following     Discharge disposition: Acute rehab once medically stable  67 yo female with no known pmhx was brought to the ED with progressive weakness to the point that she felt she could not get out of bed, she has some remote hx of should pain, decrease appetite, her last mammogram was in 2014. She reports they found 2 lumps in her right breast and recommended biopsy, she went for biopsy, she could not tolerate the pain.  CT scan of the Cervical spine showed Multiple lytic lesions of the cervical and visualized thoracic spine as well as right first and second ribs concerning for metastases versus multiple myeloma, she was admitted under medicine service for further workup and management, she was provided pain meds and her further imaging done showed Metastatic breast cancer with dominant right breast mass, Right chest wall, axillary and retropectoral involvement, Osseous, pulmonary, and likely hepatic metastatic disease. Metastatic adenopathy of the thorax, diffuse extensive osseous metastatic disease with multiple lucent and sclerotic lesions and compression fractures of T1, T9, T12, and L2. Severe T1 compression with epidural extension. Cord compression cannot be excluded. Additional neoplastic disease of the spine with epidural extension. Multifocal neoplastic lesions involving the pelvis and ribs as well, she was seen by Oncology, CT surgery and Neurosurgery team she underwent C7-T2 corpectomy & fusion, posterior C4-T5 fusion, managed in the ICU post operative and was extubated, Post op course uncomplicated, gomez removed pt requiring some straight cath, pt also with some subjective dysphagia - seen by speech therapy recommended pureed diet which patient continues to prefer     Assessment/plan:    1. Extensive osseous metastatic disease with pathologic compression fracture deformities at C7, T1, T9, T12 and L2, severe at T1  Secondary to  Primary metastatic Breast CA  -s/p  C7-T2 corpectomy & fusion, posterior C4-T5 fusion   - Robaxin 500mg TID  - Gabapentin 200mg TID increased to 600mg   - Dilaudid 2mg Q4 hours prn for mod pain; 4mg Q4 hours prn for severe pain  - Oxycodone ER 20mg Q12 hours   - Bowel regimen  - wound care as per Neurosurgery team     2. Fever likely secondary to UTI   - Leukocytosis resolved  - Blood cultures x 2 were negative on 09/26  - Temp of 100 overnight with tachycardia  - UA positive  - Urine culture straight cath sample to be sent then start Rocephin     3. Post operative urinary retention   - Resolved    4. Large right breast mass:   - likely cancer with mets to spine, brain, liver  - s/p right breast biopsy shows  positive for invasive ductal carcinoma     - Oncology - Chemotherapy after neurosx wound heals 3-4 weeks  - Radiation oncology consult for brain mets to follow up as outpatient post rehab once surgical wounds have healed   = Patient will not require chemo/radiation while in rehab     5. Hypercalcemia due to bone mets malignancy   - s/p IVF , improved   - Monitor BMP     6. Moderate protein malia malnutrition   - Ensure    DVT prophylaxis: Lovenox Subcut    PT following     Discharge disposition: Acute rehab once medically stable

## 2022-09-28 NOTE — PROGRESS NOTE ADULT - ASSESSMENT
66F  acute spine pain 2/2 breast CA mets  s/p C7-T2 corpectomy and fusion with posterior C4-T5 fusion on 9/21.   pt due for LEONOR soon    gabapentin 600 milliGRAM(s) Oral every 8 hours  methocarbamol 750 milliGRAM(s) Oral three times a day  oxyCODONE  ER Tablet 20 milliGRAM(s) Oral every 12 hours  hydromorphone PO 2mg/4mg [id number] PRN mod/severe pain    Pain controlled  Pain service will sign off  Please reconsult as needed

## 2022-09-28 NOTE — PROGRESS NOTE ADULT - SUBJECTIVE AND OBJECTIVE BOX
Palliative Care Followup    OVERNIGHT EVENTS:with fever overnight - met with patient, resting comfortably in bed    Present Symptoms:   Dyspnea: no  Nausea/Vomiting: No  Anxiety:  No  Depression: No  Fatigue: No  Loss of appetite: No  Constipation: no    Pain: 5/10 rest and 9/10 exertion            Character-            Duration-            Effect-            Factors-            Frequency-            Location-            Severity-    Pain AD Score:0  [email or web address] (press ctrl + left click to view)    Review of Systems: Reviewed                     Negative: no dyspnea  All others negative    MEDICATIONS  (STANDING):  enoxaparin Injectable 40 milliGRAM(s) SubCutaneous every 24 hours  gabapentin 600 milliGRAM(s) Oral every 8 hours  influenza  Vaccine (HIGH DOSE) 0.7 milliLiter(s) IntraMuscular once  methocarbamol 750 milliGRAM(s) Oral three times a day  oxyCODONE  ER Tablet 20 milliGRAM(s) Oral every 12 hours  polyethylene glycol 3350 17 Gram(s) Oral every 12 hours  senna 2 Tablet(s) Oral at bedtime    MEDICATIONS  (PRN):  bisacodyl Suppository 10 milliGRAM(s) Rectal daily PRN Constipation  HYDROmorphone   Tablet 4 milliGRAM(s) Oral every 4 hours PRN Severe Pain (7 - 10)  HYDROmorphone   Tablet 2 milliGRAM(s) Oral every 4 hours PRN Moderate Pain (4 - 6)  ondansetron Injectable 4 milliGRAM(s) IV Push every 6 hours PRN Nausea and/or Vomiting    PHYSICAL EXAM:    Vital Signs Last 24 Hrs  Vital Signs Last 24 Hrs  T(C): 37.1 (28 Sep 2022 10:27), Max: 38.3 (27 Sep 2022 20:19)  T(F): 98.8 (28 Sep 2022 10:27), Max: 100.9 (27 Sep 2022 20:19)  HR: 100 (28 Sep 2022 10:27) (84 - 118)  BP: 122/76 (28 Sep 2022 10:27) (122/76 - 137/80)  BP(mean): --  RR: 17 (28 Sep 2022 10:27) (16 - 19)  SpO2: 95% (28 Sep 2022 10:27) (92% - 95%)    Parameters below as of 28 Sep 2022 10:27  Patient On (Oxygen Delivery Method): room air    General: alert  oriented x _3    HEENT: normal     Lungs: comfortable    CV: normal      GI: normal     : gomez    MSK: weakness , cervical collar    Neuro: no focal deficits cognitive impairment     Skin:  surgical incision to chest and neck    LABS:  Basic Metabolic Panel in AM (09.28.22 @ 05:01)    Sodium, Serum: 137 mmol/L    Potassium, Serum: 4.2 mmol/L    Chloride, Serum: 100 mmol/L    Carbon Dioxide, Serum: 28.0 mmol/L    Anion Gap, Serum: 9 mmol/L    Blood Urea Nitrogen, Serum: 7.3 mg/dL    Creatinine, Serum: 0.36 mg/dL    Glucose, Serum: 85 mg/dL    Calcium, Total Serum: 9.9: Prior Reference Range of 8.6 – 10.2 was amended as of 7/26/2022 to 8.4 –  10.5. mg/dL    eGFR: 112: The estimated glomerular filtration rate (eGFR) is calculated using the  2021 CKD-EPI creatinine equation, which does not have a coefficient for  race and is validated in individuals 18 years of age and older (N Engl J  Med 2021; 385:0995-2924). Creatinine-based eGFR may be inaccurate in  various situations including but not limited to extremes of muscle mass,  altered dietary protein intake, or medications that affect renal tubular  creatinine secretion. mL/min/1.73m2      I&O's Summary    26 Sep 2022 07:01  -  27 Sep 2022 07:00  --------------------------------------------------------  IN: 230 mL / OUT: 400 mL / NET: -170 mL    RADIOLOGY & ADDITIONAL STUDIES:  CT Chest 9/9/22  IMPRESSION:  Metastatic breast cancer with dominant right breast mass. Inflammatory   right breast cancer cannot be excluded. Right chest wall, axillary and   retropectoral involvement.  Osseous, pulmonary, and likely hepatic metastatic disease. Metastatic   adenopathy of the thorax.  Diffuse extensive osseous metastatic disease with multiple lucent and   sclerotic lesions and compression fractures of T1, T9, T12, and L2.   Severe T1 compression with epidural extension. Cord compression cannot be   excluded. Additional neoplastic disease of the spine with epidural   extension. Multifocal neoplastic lesions involving the pelvis and ribs as   well. Correlate with pending spinal MRI to evaluate for cord compression.    MR spine 9/11/22  IMPRESSION:  Extensive osseous metastatic disease with pathologic compression fracture   deformities at C7, T1, T9, T12 and L2, severe at T1.  Abnormal ventral and dorsal epidural enhancement from C7 through T2.   Severe canal stenosis at T1 and L2 without associated cord or cauda   equina compression.  There is motion artifact present in the thoracic spine which limits   evaluation. No abnormal leptomeningeal enhancement or definite cord   signal abnormality.    CT spine 9/22/22  IMPRESSION:  The patient is status post C7, T1, T2 corpectomy with cage device   placement. Anterior fixation hardware at the C6 & T3 levels with   vertebral body screws and anterior fixation plate. Bilateral posterior   fixation screws at the C4, C5, C6, T3, T4 levels and vertical stabilizing   rods.  Expected postoperative soft changes changes at the operative  sites. Hardware is in satisfactory position. Diffuse cervical, thoracic,   lumbar spine osseous metastasis.    ADVANCE DIRECTIVES/TREATMENT PREFERENCES:  Full Code

## 2022-09-29 ENCOUNTER — TRANSCRIPTION ENCOUNTER (OUTPATIENT)
Age: 67
End: 2022-09-29

## 2022-09-29 PROCEDURE — 99233 SBSQ HOSP IP/OBS HIGH 50: CPT

## 2022-09-29 RX ADMIN — POLYETHYLENE GLYCOL 3350 17 GRAM(S): 17 POWDER, FOR SOLUTION ORAL at 17:11

## 2022-09-29 RX ADMIN — OXYCODONE HYDROCHLORIDE 20 MILLIGRAM(S): 5 TABLET ORAL at 12:46

## 2022-09-29 RX ADMIN — GABAPENTIN 600 MILLIGRAM(S): 400 CAPSULE ORAL at 21:55

## 2022-09-29 RX ADMIN — POLYETHYLENE GLYCOL 3350 17 GRAM(S): 17 POWDER, FOR SOLUTION ORAL at 06:14

## 2022-09-29 RX ADMIN — METHOCARBAMOL 750 MILLIGRAM(S): 500 TABLET, FILM COATED ORAL at 06:13

## 2022-09-29 RX ADMIN — OXYCODONE HYDROCHLORIDE 20 MILLIGRAM(S): 5 TABLET ORAL at 11:46

## 2022-09-29 RX ADMIN — HYDROMORPHONE HYDROCHLORIDE 4 MILLIGRAM(S): 2 INJECTION INTRAMUSCULAR; INTRAVENOUS; SUBCUTANEOUS at 09:39

## 2022-09-29 RX ADMIN — SENNA PLUS 2 TABLET(S): 8.6 TABLET ORAL at 21:55

## 2022-09-29 RX ADMIN — METHOCARBAMOL 750 MILLIGRAM(S): 500 TABLET, FILM COATED ORAL at 13:41

## 2022-09-29 RX ADMIN — GABAPENTIN 600 MILLIGRAM(S): 400 CAPSULE ORAL at 13:41

## 2022-09-29 RX ADMIN — HYDROMORPHONE HYDROCHLORIDE 4 MILLIGRAM(S): 2 INJECTION INTRAMUSCULAR; INTRAVENOUS; SUBCUTANEOUS at 10:39

## 2022-09-29 RX ADMIN — CEFTRIAXONE 1000 MILLIGRAM(S): 500 INJECTION, POWDER, FOR SOLUTION INTRAMUSCULAR; INTRAVENOUS at 21:55

## 2022-09-29 RX ADMIN — OXYCODONE HYDROCHLORIDE 20 MILLIGRAM(S): 5 TABLET ORAL at 22:55

## 2022-09-29 RX ADMIN — METHOCARBAMOL 750 MILLIGRAM(S): 500 TABLET, FILM COATED ORAL at 21:55

## 2022-09-29 RX ADMIN — OXYCODONE HYDROCHLORIDE 20 MILLIGRAM(S): 5 TABLET ORAL at 21:55

## 2022-09-29 RX ADMIN — GABAPENTIN 600 MILLIGRAM(S): 400 CAPSULE ORAL at 06:13

## 2022-09-29 RX ADMIN — ENOXAPARIN SODIUM 40 MILLIGRAM(S): 100 INJECTION SUBCUTANEOUS at 06:13

## 2022-09-29 NOTE — DISCHARGE NOTE PROVIDER - NSDCCPCAREPLAN_GEN_ALL_CORE_FT
PRINCIPAL DISCHARGE DIAGNOSIS  Diagnosis: Metastatic breast cancer  Assessment and Plan of Treatment: - s/p right breast biopsy shows  positive for invasive ductal carcinoma     - Oncology - Chemotherapy after neurosx wound heals 3-4 weeks  - Radiation oncology consult for brain mets to follow up as outpatient post rehab once surgical wounds have healed   = Patient will not require chemo/radiation while in rehab      SECONDARY DISCHARGE DIAGNOSES  Diagnosis: Cervical compression fracture  Assessment and Plan of Treatment: ompression fracture deformities at C7, T1, T9, T12 and L2, severe at T1  Secondary to  Primary metastatic Breast CA  -s/p  C7-T2 corpectomy & fusion, posterior C4-T5 fusion   - Robaxin 500mg TID  - Gabapentin 200mg TID increased to 600mg   - Dilaudid 2mg Q4 hours prn for mod pain; 4mg Q4 hours prn for severe pain  - Oxycodone ER 20mg Q12 hours   - Bowel regimen- Encouraged to USe miralax and senna daily on narcotics    Diagnosis: Moderate protein-calorie malnutrition  Assessment and Plan of Treatment: Ensure TID    Diagnosis: Hypercalcemia  Assessment and Plan of Treatment: Resolved    Diagnosis: Acute UTI  Assessment and Plan of Treatment:      PRINCIPAL DISCHARGE DIAGNOSIS  Diagnosis: Metastatic breast cancer  Assessment and Plan of Treatment: - s/p right breast biopsy shows  positive for invasive ductal carcinoma     - Oncology - Chemotherapy after neurosx wound heals 3-4 weeks  - Radiation oncology consult for brain mets to follow up as outpatient post rehab once surgical wounds have healed   = Patient will not require chemo/radiation while in rehab      SECONDARY DISCHARGE DIAGNOSES  Diagnosis: Cervical compression fracture  Assessment and Plan of Treatment: ompression fracture deformities at C7, T1, T9, T12 and L2, severe at T1  Secondary to  Primary metastatic Breast CA  -s/p  C7-T2 corpectomy & fusion, posterior C4-T5 fusion   - Robaxin 500mg TID  - Gabapentin 200mg TID increased to 600mg   - Dilaudid 2mg Q4 hours prn for mod pain; 4mg Q4 hours prn for severe pain  - Oxycodone ER 20mg Q12 hours   - Bowel regimen- Encouraged to USe miralax and senna daily on narcotics    Diagnosis: Moderate protein-calorie malnutrition  Assessment and Plan of Treatment: Ensure TID    Diagnosis: Hypercalcemia  Assessment and Plan of Treatment: Resolved    Diagnosis: Acute UTI  Assessment and Plan of Treatment: Blood cultures negative x 2  Urine culture + GNR  - PO Vantin BID for 7 days

## 2022-09-29 NOTE — PROGRESS NOTE ADULT - SUBJECTIVE AND OBJECTIVE BOX
Patient performing some of her exercises.   Patient reports having improved pain.   No fevers in 24 hrs.     REVIEW OF SYSTEMS  Constitutional - No fever,  No fatigue  HEENT - No vertigo, +neck pain  Neurological - No headaches, No memory loss, +loss of strength, +numbness, No tremors  Musculoskeletal - +joint pain, +muscle pain    FUNCTIONAL PROGRESS   SLP  Progress Summary: Chart reviewed. Follow up for swallow. Pt received asleep in bed, arousable to voice, 0x3, +c-collar. Swallow reassess attempted, however pt expressing preference for pureed foods at this time, declining trials of solids >puree. Good management of liquids reported, and overt s/s aspiration denied. This service to therefore sign off at this time. Please reconsult as needed. Pt left NAD, pain 0/10, RN aware.      PT  Bed Mobility  Bed Mobility Training Rehab Potential: good, to achieve stated therapy goals  Bed Mobility Training Sit-to-Supine: moderate assist (50% patient effort);  2 person assist  Bed Mobility Training Supine-to-Sit: moderate assist (50% patient effort);  2 person assist  Bed Mobility Training Limitations: decreased ability to use arms for pushing/pulling;  decreased ability to use legs for bridging/pushing;  decreased strength;  impaired balance;  pain    Sit-Stand Transfer Training  Sit-to-Stand Transfer Training Rehab Potential: good, to achieve stated therapy goals  Transfer Training Sit-to-Stand Transfer: moderate assist (50% patient effort);  2 person assist;  rolling walker;  C-collar and TLSO from OOB  Transfer Training Stand-to-Sit Transfer: moderate assist (50% patient effort);  1 person assist;  rolling walker;  C-collar and TLSO from OOB  Sit-to-Stand Transfer Training Transfer Safety Analysis: decreased weight-shifting ability;  decreased strength;  impaired balance    Gait Training  Gait Training Rehab Potential: good, to achieve stated therapy goals  Gait Training: moderate assist (50% patient effort);  1 person assist;  rolling walker;  3ft  Gait Analysis: decreased step length;  decreased stride length     OT  Bed Mobility  Bed Mobility Training Sit-to-Supine: moderate assist (50% patient effort);  2 person assist  Bed Mobility Training Supine-to-Sit: moderate assist (50% patient effort);  2 person assist  Bed Mobility Training Limitations: decreased ability to use arms for pushing/pulling    Sit-Stand Transfer Training  Transfer Training Sit-to-Stand Transfer: moderate assist (50% patient effort);  2 person assist  Transfer Training Stand-to-Sit Transfer: moderate assist (50% patient effort);  2 person assist  Sit-to-Stand Transfer Training Transfer Safety Analysis: decreased weight-shifting ability;  impaired postural control;  decreased strength    Lower Body Dressing Training  Lower Body Dressing Training Assistance: maximum assist (25% patient effort)    Upper Body Dressing Training  Upper Body Dressing Training Assistance: maximum assist (25% patient effort);  sitting at edge of bed    VITALS  T(C): 36.6 (22 @ 04:50), Max: 37.2 (22 @ 20:56)  HR: 96 (22 @ 04:50) (84 - 100)  BP: 121/75 (22 @ 04:50) (116/76 - 122/76)  RR: 18 (22 @ 04:50) (17 - 18)  SpO2: 95% (22 @ 04:50) (95% - 96%)  Wt(kg): --    MEDICATIONS   bisacodyl Suppository 10 milliGRAM(s) daily PRN  cefTRIAXone Injectable. 1000 milliGRAM(s) every 24 hours  enoxaparin Injectable 40 milliGRAM(s) every 24 hours  gabapentin 600 milliGRAM(s) every 8 hours  HYDROmorphone   Tablet 4 milliGRAM(s) every 4 hours PRN  HYDROmorphone   Tablet 2 milliGRAM(s) every 4 hours PRN  influenza  Vaccine (HIGH DOSE) 0.7 milliLiter(s) once  methocarbamol 750 milliGRAM(s) three times a day  ondansetron Injectable 4 milliGRAM(s) every 6 hours PRN  oxyCODONE  ER Tablet 20 milliGRAM(s) every 12 hours  polyethylene glycol 3350 17 Gram(s) every 12 hours  senna 2 Tablet(s) at bedtime      RECENT LABS/IMAGING                          9.3    9.20  )-----------( 271      ( 28 Sep 2022 05:01 )             28.1         137  |  100  |  7.3<L>  ----------------------------<  85  4.2   |  28.0  |  0.36<L>    Ca    9.9      28 Sep 2022 05:01    TPro  5.1<L>  /  Alb  2.4<L>  /  TBili  0.4  /  DBili  x   /  AST  32<H>  /  ALT  8   /  AlkPhos  148<H>        Urinalysis Basic - ( 28 Sep 2022 05:00 )    Color: Yellow / Appearance: very cloudy / S.015 / pH: x  Gluc: x / Ketone: Small  / Bili: Negative / Urobili: Negative   Blood: x / Protein: 30 mg/dL / Nitrite: Positive   Leuk Esterase: Moderate / RBC: 3-5 /HPF / WBC >50 /HPF   Sq Epi: x / Non Sq Epi: Few / Bacteria: Many                MRI C, T L SPINE  - Extensive osseous metastatic disease with pathologic compression fracture deformities at C7, T1, T9, T12 and L2, severe at T1. Abnormal ventral and dorsal epidural enhancement from C7 through T2. Severe canal stenosis at T1 and L2 without associated cord or cauda equina compression. There is motion artifact present in the thoracic spine which limits evaluation. No abnormal leptomeningeal enhancement or definite cord signal abnormality.    MRI HEAD  - Subcentimeter enhancing parenchymal lesions as above compatible with intracranial metastatic disease. No associated vasogenic edema. No evidence of acute infarct or hemorrhage. Likely left parietal calvarial osseous metastatic lesion.    CT C & T SPINE  - The patient is status post C7, T1, T2 corpectomy with cage device placement. Anterior fixation hardware at the C6 & T3 levels with vertebral body screws and anterior fixation plate. Bilateral posterior fixation screws at the C4, C5, C6, T3, T4 levels and vertical stabilizing rods.  Expected postoperative soft changes changes at the operative sites. Hardware is in satisfactory position. Diffuse cervical, thoracic, lumbar spine osseous metastasis.    ----------------------------------------------------------------------------------------  PHYSICAL EXAM  Constitutional - NAD, Comfortable  Neck - +C-Collar   Extremities - No C/C/E, No calf tenderness   Neurologic Exam -                    Cognitive - AAO to self, place, date, year, situation     Communication - Fluent, No dysarthria     Cranial Nerves - CN 2-12 intact     Motor -                      LEFT    UE - C5 4/5, C6 4/5, C7 4/5, C8 4/5, T1 4/5                    RIGHT UE - C5 4/5, C6 4/5, C7 4/5, C8 4/5, T1 4/5                    LEFT    LE - L2 5/5, L3 5/5, L4 5/5, L5 5/5, S1 5/5                    RIGHT LE - L2 5/5, L3 5/5, L4 5/5, L5 5/5, S1 5/5      Sensory - Decreased to BUE, Decreased most at right C7/8/T1  Psychiatric - Mood stable, Affect WNL  ----------------------------------------------------------------------------------------  ASSESSMENT/PLAN  66yFemale with functional deficits after developing progressive weakness related to metastatic breast (intraductal) mass to spine and brain  Multiple spinal pathological fractures s/p T1-T2 corpectomy/C4-T5 fusion - C- Collar, Outpatient Chemo after wound healing   UTI - Rocephin   Pain - Tylenol, Neurontin, Dilaudid, Robaxin, Oxycodone, Oxycontin    DVT PPX - SCDs, Lovenox  Rehab - Continues to recommend ACUTE inpatient rehabilitation for the functional deficits consisting of 3 hours of therapy/day & 24 hour RN/daily PMR physician for comorbid medical management. Will continue to follow for ongoing rehab needs and recommendations. Patient will be able to tolerate 3 hours a day.    Continue bedside therapy as well as OOB throughout the day with mobilization throughout the day with staff to maintain cardiopulmonary function and prevention of secondary complications related to debility.     Discussed with rehab clinical team.

## 2022-09-29 NOTE — DISCHARGE NOTE PROVIDER - NSDCMRMEDTOKEN_GEN_ALL_CORE_FT
bisacodyl 10 mg rectal suppository: 1 suppository(ies) rectal once a day, As needed, Constipation  cefpodoxime 200 mg oral tablet: 1 tab(s) orally every 12 hours  gabapentin 300 mg oral capsule: 2 cap(s) orally every 8 hours  HYDROmorphone 2 mg oral tablet: 1 tab(s) orally every 4 hours, As needed, Moderate Pain (4 - 6)  HYDROmorphone 4 mg oral tablet: 1 tab(s) orally every 4 hours, As needed, Severe Pain (7 - 10)  methocarbamol 750 mg oral tablet: 1 tab(s) orally 3 times a day  oxyCODONE 20 mg oral tablet, extended release: 1 tab(s) orally every 12 hours  polyethylene glycol 3350 oral powder for reconstitution: 17 gram(s) orally every 12 hours  senna leaf extract oral tablet: 2 tab(s) orally once a day (at bedtime)

## 2022-09-29 NOTE — PROGRESS NOTE ADULT - ASSESSMENT
65 yo female with no known pmhx was brought to the ED with progressive weakness to the point that she felt she could not get out of bed, she has some remote hx of should pain, decrease appetite, her last mammogram was in 2014. She reports they found 2 lumps in her right breast and recommended biopsy, she went for biopsy, she could not tolerate the pain.  CT scan of the Cervical spine showed Multiple lytic lesions of the cervical and visualized thoracic spine as well as right first and second ribs concerning for metastases versus multiple myeloma, she was admitted under medicine service for further workup and management, she was provided pain meds and her further imaging done showed Metastatic breast cancer with dominant right breast mass, Right chest wall, axillary and retropectoral involvement, Osseous, pulmonary, and likely hepatic metastatic disease. Metastatic adenopathy of the thorax, diffuse extensive osseous metastatic disease with multiple lucent and sclerotic lesions and compression fractures of T1, T9, T12, and L2. Severe T1 compression with epidural extension. Cord compression cannot be excluded. Additional neoplastic disease of the spine with epidural extension. Multifocal neoplastic lesions involving the pelvis and ribs as well, she was seen by Oncology, CT surgery and Neurosurgery team she underwent C7-T2 corpectomy & fusion, posterior C4-T5 fusion, managed in the ICU post operative and was extubated, Post op course uncomplicated, gomez removed pt requiring some straight cath, pt also with some subjective dysphagia - seen by speech therapy recommended pureed diet which patient continues to prefer     Assessment/plan:    1. Extensive osseous metastatic disease with pathologic compression fracture deformities at C7, T1, T9, T12 and L2, severe at T1  Secondary to  Primary metastatic Breast CA  -s/p  C7-T2 corpectomy & fusion, posterior C4-T5 fusion   - Robaxin 500mg TID  - Gabapentin 200mg TID increased to 600mg   - Dilaudid 2mg Q4 hours prn for mod pain; 4mg Q4 hours prn for severe pain  - Oxycodone ER 20mg Q12 hours   - Bowel regimen- Encouraged to USe miralax and senna daily on narcotics   - wound care as per Neurosurgery team     2. Fever likely secondary to UTI   - Leukocytosis resolved  - Blood cultures x 2 were negative on 09/26  - Afebrile  - UA positive on i V rocephin  - Follow up urine culture   - Urine culture straight cath sample to be sent then start Rocephin     3. Post operative urinary retention   - Resolved    4. Large right breast mass:   - likely cancer with mets to spine, brain, liver  - s/p right breast biopsy shows  positive for invasive ductal carcinoma     - Oncology - Chemotherapy after neurosx wound heals 3-4 weeks  - Radiation oncology consult for brain mets to follow up as outpatient post rehab once surgical wounds have healed   = Patient will not require chemo/radiation while in rehab     5. Hypercalcemia due to bone mets malignancy   - s/p IVF , improved   - Monitor BMP     6. Moderate protein malia malnutrition   - Ensure    DVT prophylaxis: Lovenox Subcut    PT following     Discharge disposition: Acute rehab in AM pending urine culture    65 yo female with no known pmhx was brought to the ED with progressive weakness to the point that she felt she could not get out of bed, she has some remote hx of should pain, decrease appetite, her last mammogram was in 2014. She reports they found 2 lumps in her right breast and recommended biopsy, she went for biopsy, she could not tolerate the pain.  CT scan of the Cervical spine showed Multiple lytic lesions of the cervical and visualized thoracic spine as well as right first and second ribs concerning for metastases versus multiple myeloma, she was admitted under medicine service for further workup and management, she was provided pain meds and her further imaging done showed Metastatic breast cancer with dominant right breast mass, Right chest wall, axillary and retropectoral involvement, Osseous, pulmonary, and likely hepatic metastatic disease. Metastatic adenopathy of the thorax, diffuse extensive osseous metastatic disease with multiple lucent and sclerotic lesions and compression fractures of T1, T9, T12, and L2. Severe T1 compression with epidural extension. Cord compression cannot be excluded. Additional neoplastic disease of the spine with epidural extension. Multifocal neoplastic lesions involving the pelvis and ribs as well, she was seen by Oncology, CT surgery and Neurosurgery team she underwent C7-T2 corpectomy & fusion, posterior C4-T5 fusion, managed in the ICU post operative and was extubated, Post op course uncomplicated, gomez removed pt requiring some straight cath, pt also with some subjective dysphagia - seen by speech therapy recommended pureed diet which patient continues to prefer     Assessment/plan:    1. Extensive osseous metastatic disease with pathologic compression fracture deformities at C7, T1, T9, T12 and L2, severe at T1  Secondary to  Primary metastatic Breast CA  -s/p  C7-T2 corpectomy & fusion, posterior C4-T5 fusion   - Robaxin 500mg TID  - Gabapentin 200mg TID increased to 600mg   - Dilaudid 2mg Q4 hours prn for mod pain; 4mg Q4 hours prn for severe pain  - Oxycodone ER 20mg Q12 hours   - Bowel regimen- Encouraged to USe miralax and senna daily on narcotics   - wound care as per Neurosurgery team     2. Fever likely secondary to UTI   - Leukocytosis resolved  - Blood cultures x 2 were negative on 09/26  - Chest xray negative   - Afebrile  - UA positive on i V rocephin  - Follow up urine culture   - Urine culture straight cath sample to be sent then start Rocephin     3. Post operative urinary retention   - Resolved    4. Large right breast mass:   - likely cancer with mets to spine, brain, liver  - s/p right breast biopsy shows  positive for invasive ductal carcinoma     - Oncology - Chemotherapy after neurosx wound heals 3-4 weeks  - Radiation oncology consult for brain mets to follow up as outpatient post rehab once surgical wounds have healed   = Patient will not require chemo/radiation while in rehab     5. Hypercalcemia due to bone mets malignancy   - s/p IVF , improved   - Monitor BMP     6. Moderate protein malia malnutrition   - Ensure    DVT prophylaxis: Lovenox Subcut    PT following     Discharge disposition: Acute rehab in AM pending urine culture

## 2022-09-29 NOTE — DISCHARGE NOTE PROVIDER - CARE PROVIDER_API CALL
Marj Del Castillo)  Hematology; HospicePalliative Medicine; Internal Medicine; Medical Oncology  [address] [place] [address]  Phone: [phone]  Fax: [phone]  Follow Up Time: 2 weeks    Bret Ibarra)  Radiation Oncology  [address] [place] [address]  Phone: [phone]  Fax: [phone]  Follow Up Time: 2 weeks

## 2022-09-29 NOTE — DISCHARGE NOTE PROVIDER - DETAILS OF MALNUTRITION DIAGNOSIS/DIAGNOSES
This patient has been assessed with a concern for Malnutrition and was treated during this hospitalization for the following Nutrition diagnosis/diagnoses:     -  09/12/2022: Moderate protein-calorie malnutrition

## 2022-09-29 NOTE — PROGRESS NOTE ADULT - ASSESSMENT
66 year old female found to have breast cancer with diffuse cervical, thoracic, lumbar spine osseous metastasis  Now s/p C7, T1, T2 corpectomy with cage device placement    Problem/Recommendation 1: Metastatic breast cancer  Breast cancer with diffuse cervical, thoracic, lumbar spine osseous metastasis  Now s/p C7, T1, T2 corpectomy with cage device placementNow s/p C7, T1, T2 corpectomy with cage device placement  Rehab medicine following  Hospice eligible if patient no longer wants to pursue oncologic intervention but at this time is interested in treatment options     Problem/Recommendation 2:Pain   Agree with opioid rotation from oxycodone to dilaudid PO   Added extended release oxyCODONE Tablet 20 milliGRAM(s) Oral every 12 hours  Can consider increasing dose of gabapentin     MEDICATIONS  gabapentin 600 milliGRAM(s) Oral every 8 hours  methocarbamol 750 milliGRAM(s) Oral three times a day  oxyCODONE  ER Tablet 20 milliGRAM(s) Oral every 12 hours  HYDROmorphone   Tablet 4 milliGRAM(s) Oral every 4 hours PRN Severe Pain (7 - 10)  HYDROmorphone   Tablet 2 milliGRAM(s) Oral every 4 hours PRN Moderate Pain (4 - 6)  ondansetron Injectable 4 milliGRAM(s) IV Push every 6 hours PRN Nausea and/or Vomiting    Problem/Recommendation 3: Debility  Assist in ADLS  Maintain safety, fall, aspiration precautions    Problem/Recommendation 4: Palliative Care Encounter  Met with patient at bedside - resting comfortably in bed  Patient states she had some pain this morning and PT came by to ambulate her but she needed to take her pain medication first before participating with PT  Patient states her pain was 8/10 when requesting pain medication. Currently at 6/10 but states she just took the medication and it isn't at full effect yet   Patient states she's feeling good, hoping to go to acute rehab sometime soon  Prior conversation held regarding advance directives - Patient has 2 HCPs (her brother Arun and sister Jordana)  Code status is Full Code (CPR/I)   Goal to continue with followup outpatient after rehab with oncologist and neuro  Hospice eligible if patient no longer wants to pursue oncologic intervention but at this time is interested in treatment options     GOC have been established, no further needs to be addressed from Palliative care perspective.  Will sign off at this time. Please reconsult if GOC change or condition decompensates requiring further palliative care assistance.    Surrogate/HCP/Guardian: Phone#:Arun Kearney [phone] and Jordana Kowalski [phone] (brother and sister)  Code status: FULL code     Total Time Spent__36 minutes  This includes chart review, patient assessment, discussion and collaboration with interdisciplinary team members, ACP planning    Thank you for the opportunity to assist with the care of this patient.   [facility] Palliative Medicine Consult Service [phone].    66 year old female found to have breast cancer with diffuse cervical, thoracic, lumbar spine osseous metastasis  Now s/p C7, T1, T2 corpectomy with cage device placement    Problem/Recommendation 1: Metastatic breast cancer  Breast cancer with diffuse cervical, thoracic, lumbar spine osseous metastasis  Now s/p C7, T1, T2 corpectomy with cage device placementNow s/p C7, T1, T2 corpectomy with cage device placement  Rehab medicine following  Hospice eligible if patient no longer wants to pursue oncologic intervention but at this time is interested in treatment options     Problem/Recommendation 2:Pain   Agree with opioid rotation from oxycodone to dilaudid PO   Added extended release oxyCODONE Tablet 20 milliGRAM(s) Oral every 12 hours  Can consider increasing dose of gabapentin     MEDICATIONS  gabapentin 600 milliGRAM(s) Oral every 8 hours  methocarbamol 750 milliGRAM(s) Oral three times a day  oxyCODONE  ER Tablet 20 milliGRAM(s) Oral every 12 hours  HYDROmorphone   Tablet 4 milliGRAM(s) Oral every 4 hours PRN Severe Pain (7 - 10)  HYDROmorphone   Tablet 2 milliGRAM(s) Oral every 4 hours PRN Moderate Pain (4 - 6)  ondansetron Injectable 4 milliGRAM(s) IV Push every 6 hours PRN Nausea and/or Vomiting    Problem/Recommendation 3: Debility  Assist in ADLS  Maintain safety, fall, aspiration precautions    Problem/Recommendation 4: Palliative Care Encounter  Met with patient at bedside - resting comfortably in bed  Patient states she had some pain this morning and PT came by to ambulate her but she needed to take her pain medication first before participating with PT  Patient states her pain was 8/10 when requesting pain medication. Currently at 6/10 but states she just took the medication and it isn't at full effect yet   Pain management following for pain control  Patient states she's feeling good, hoping to go to acute rehab sometime soon  Prior conversation held regarding advance directives - Patient has 2 HCPs (her brother Arun and sister Jordana)  Code status is Full Code (CPR/I)   Goal to continue with followup outpatient after rehab with oncologist and neuro  Hospice eligible if patient no longer wants to pursue oncologic intervention but at this time is interested in treatment options     GOC have been established, no further needs to be addressed from Palliative care perspective.  Will sign off at this time. Please reconsult if GOC change or condition decompensates requiring further palliative care assistance.    Surrogate/HCP/Guardian: Phone#:Arun Kearney [phone] and Jordana Kowalski [phone] (brother and sister)  Code status: FULL code     Total Time Spent__36 minutes  This includes chart review, patient assessment, discussion and collaboration with interdisciplinary team members, ACP planning    Thank you for the opportunity to assist with the care of this patient.   [facility] Palliative Medicine Consult Service [phone].

## 2022-09-29 NOTE — DISCHARGE NOTE PROVIDER - CARE PROVIDERS DIRECT ADDRESSES
[email or web address]

## 2022-09-29 NOTE — DISCHARGE NOTE PROVIDER - PROVIDER TOKENS
PROVIDER:[TOKEN:[[id number]],FOLLOWUP:[2 weeks]],PROVIDER:[TOKEN:[[id number]],FOLLOWUP:[2 weeks]]

## 2022-09-29 NOTE — PROGRESS NOTE ADULT - SUBJECTIVE AND OBJECTIVE BOX
Palliative Care Followup    OVERNIGHT EVENTS: Patient states her pain is better managed she just took a dose of dilaudid 4 mg PO    Present Symptoms:   Dyspnea: no  Nausea/Vomiting: No  Anxiety:  No  Depression: No  Fatigue: No  Loss of appetite: No  Constipation: no    Pain: 5/10 rest             Character-            Duration-            Effect-            Factors-            Frequency-            Location-            Severity-    Pain AD Score:0  [email or web address] (press ctrl + left click to view)    Review of Systems: Reviewed                     Negative: no dyspnea  All others negative    MEDICATIONS  (STANDING):  enoxaparin Injectable 40 milliGRAM(s) SubCutaneous every 24 hours  gabapentin 600 milliGRAM(s) Oral every 8 hours  influenza  Vaccine (HIGH DOSE) 0.7 milliLiter(s) IntraMuscular once  methocarbamol 750 milliGRAM(s) Oral three times a day  oxyCODONE  ER Tablet 20 milliGRAM(s) Oral every 12 hours  polyethylene glycol 3350 17 Gram(s) Oral every 12 hours  senna 2 Tablet(s) Oral at bedtime    MEDICATIONS  (PRN):  bisacodyl Suppository 10 milliGRAM(s) Rectal daily PRN Constipation  HYDROmorphone   Tablet 4 milliGRAM(s) Oral every 4 hours PRN Severe Pain (7 - 10)  HYDROmorphone   Tablet 2 milliGRAM(s) Oral every 4 hours PRN Moderate Pain (4 - 6)  ondansetron Injectable 4 milliGRAM(s) IV Push every 6 hours PRN Nausea and/or Vomiting    PHYSICAL EXAM:    Vital Signs Last 24 Hrs  T(C): 36.8 (29 Sep 2022 09:59), Max: 37.2 (28 Sep 2022 20:56)  T(F): 98.3 (29 Sep 2022 09:59), Max: 98.9 (28 Sep 2022 20:56)  HR: 95 (29 Sep 2022 09:59) (84 - 100)  BP: 117/76 (29 Sep 2022 09:59) (116/76 - 121/76)  BP(mean): --  RR: 18 (29 Sep 2022 09:59) (18 - 18)  SpO2: 94% (29 Sep 2022 09:59) (94% - 96%)    Parameters below as of 29 Sep 2022 09:59  Patient On (Oxygen Delivery Method): room air    General: alert  oriented x _3    HEENT: normal     Lungs: comfortable    CV: normal      GI: normal     : gomez    MSK: weakness , cervical collar    Neuro: no focal deficits cognitive impairment     Skin:  surgical incision to chest and neck    LABS:  Basic Metabolic Panel in AM (09.28.22 @ 05:01)    Sodium, Serum: 137 mmol/L    Potassium, Serum: 4.2 mmol/L    Chloride, Serum: 100 mmol/L    Carbon Dioxide, Serum: 28.0 mmol/L    Anion Gap, Serum: 9 mmol/L    Blood Urea Nitrogen, Serum: 7.3 mg/dL    Creatinine, Serum: 0.36 mg/dL    Glucose, Serum: 85 mg/dL    Calcium, Total Serum: 9.9: Prior Reference Range of 8.6 – 10.2 was amended as of 7/26/2022 to 8.4 –  10.5. mg/dL    eGFR: 112: The estimated glomerular filtration rate (eGFR) is calculated using the  2021 CKD-EPI creatinine equation, which does not have a coefficient for  race and is validated in individuals 18 years of age and older (N Engl J  Med 2021; 385:0407-5939). Creatinine-based eGFR may be inaccurate in  various situations including but not limited to extremes of muscle mass,  altered dietary protein intake, or medications that affect renal tubular  creatinine secretion. mL/min/1.73m2        RADIOLOGY & ADDITIONAL STUDIES:  CT Chest 9/9/22  IMPRESSION:  Metastatic breast cancer with dominant right breast mass. Inflammatory   right breast cancer cannot be excluded. Right chest wall, axillary and   retropectoral involvement.  Osseous, pulmonary, and likely hepatic metastatic disease. Metastatic   adenopathy of the thorax.  Diffuse extensive osseous metastatic disease with multiple lucent and   sclerotic lesions and compression fractures of T1, T9, T12, and L2.   Severe T1 compression with epidural extension. Cord compression cannot be   excluded. Additional neoplastic disease of the spine with epidural   extension. Multifocal neoplastic lesions involving the pelvis and ribs as   well. Correlate with pending spinal MRI to evaluate for cord compression.    MR spine 9/11/22  IMPRESSION:  Extensive osseous metastatic disease with pathologic compression fracture   deformities at C7, T1, T9, T12 and L2, severe at T1.  Abnormal ventral and dorsal epidural enhancement from C7 through T2.   Severe canal stenosis at T1 and L2 without associated cord or cauda   equina compression.  There is motion artifact present in the thoracic spine which limits   evaluation. No abnormal leptomeningeal enhancement or definite cord   signal abnormality.    CT spine 9/22/22  IMPRESSION:  The patient is status post C7, T1, T2 corpectomy with cage device   placement. Anterior fixation hardware at the C6 & T3 levels with   vertebral body screws and anterior fixation plate. Bilateral posterior   fixation screws at the C4, C5, C6, T3, T4 levels and vertical stabilizing   rods.  Expected postoperative soft changes changes at the operative  sites. Hardware is in satisfactory position. Diffuse cervical, thoracic,   lumbar spine osseous metastasis.    ADVANCE DIRECTIVES/TREATMENT PREFERENCES:  Full Code

## 2022-09-29 NOTE — DISCHARGE NOTE PROVIDER - HOSPITAL COURSE
67 yo female with no known pmhx was brought to the ED with progressive weakness to the point that she felt she could not get out of bed, she has some remote hx of should pain, decrease appetite, her last mammogram was in 2014. She reports they found 2 lumps in her right breast and recommended biopsy, she went for biopsy, she could not tolerate the pain.  CT scan of the Cervical spine showed Multiple lytic lesions of the cervical and visualized thoracic spine as well as right first and second ribs concerning for metastases versus multiple myeloma, she was admitted under medicine service for further workup and management, she was provided pain meds and her further imaging done showed Metastatic breast cancer with dominant right breast mass, Right chest wall, axillary and retropectoral involvement, Osseous, pulmonary, and likely hepatic metastatic disease. Metastatic adenopathy of the thorax, diffuse extensive osseous metastatic disease with multiple lucent and sclerotic lesions and compression fractures of T1, T9, T12, and L2. Severe T1 compression with epidural extension. Cord compression cannot be excluded. Additional neoplastic disease of the spine with epidural extension. Multifocal neoplastic lesions involving the pelvis and ribs as well, she was seen by Oncology, CT surgery and Neurosurgery team she underwent C7-T2 corpectomy & fusion, posterior C4-T5 fusion, managed in the ICU post operative and was extubated, Post op course uncomplicated, gomez removed pt requiring some straight cath, pt also with some subjective dysphagia - seen by speech therapy recommended pureed diet which patient continues to prefer. Developed a low grade fever with positive UA and symptoms; started on IV Rocephin. Urine culture sent    67 yo female with no known pmhx was brought to the ED with progressive weakness to the point that she felt she could not get out of bed, she has some remote hx of should pain, decrease appetite, her last mammogram was in 2014. She reports they found 2 lumps in her right breast and recommended biopsy, she went for biopsy, she could not tolerate the pain.  CT scan of the Cervical spine showed Multiple lytic lesions of the cervical and visualized thoracic spine as well as right first and second ribs concerning for metastases versus multiple myeloma, she was admitted under medicine service for further workup and management, she was provided pain meds and her further imaging done showed Metastatic breast cancer with dominant right breast mass, Right chest wall, axillary and retropectoral involvement, Osseous, pulmonary, and likely hepatic metastatic disease. Metastatic adenopathy of the thorax, diffuse extensive osseous metastatic disease with multiple lucent and sclerotic lesions and compression fractures of T1, T9, T12, and L2. Severe T1 compression with epidural extension. Cord compression cannot be excluded. Additional neoplastic disease of the spine with epidural extension. Multifocal neoplastic lesions involving the pelvis and ribs as well, she was seen by Oncology, CT surgery and Neurosurgery team she underwent C7-T2 corpectomy & fusion, posterior C4-T5 fusion, managed in the ICU post operative and was extubated, Post op course uncomplicated, gomez removed pt requiring some straight cath, pt also with some subjective dysphagia - seen by speech therapy recommended pureed diet which patient continues to prefer. Patient with low grade temp and positive UA> Started oN IV rocephin for UTI. Blood cultures negative, Urine cutlure positive for GNR> Afebrile, leukocytosis and urinary symptoms improved. Changed to PO Vantin for 7 days. Discharge to Acute rehab

## 2022-09-29 NOTE — PROGRESS NOTE ADULT - SUBJECTIVE AND OBJECTIVE BOX
CC: Follow up     INTERVAL HPI/OVERNIGHT EVENTS: Patient seen and examined, states she has gas pains in her abdomen. NO BM x 2 days      Vital Signs Last 24 Hrs  T(C): 36.8 (29 Sep 2022 09:59), Max: 37.2 (28 Sep 2022 20:56)  T(F): 98.3 (29 Sep 2022 09:59), Max: 98.9 (28 Sep 2022 20:56)  HR: 95 (29 Sep 2022 09:59) (84 - 100)  BP: 117/76 (29 Sep 2022 09:59) (116/76 - 121/76)  BP(mean): --  RR: 18 (29 Sep 2022 09:59) (18 - 18)  SpO2: 94% (29 Sep 2022 09:59) (94% - 96%)    Parameters below as of 29 Sep 2022 09:59  Patient On (Oxygen Delivery Method): room air        PHYSICAL EXAM:    GENERAL: NAD,AOX3  HEAD:  Atraumatic, Normocephalic  EYES: conjunctiva and sclera clear  ENMT: Moist mucous membranes  NECK: C COllar  CHEST/LUNG: Clear to auscultation bilaterally; No rales, rhonchi, wheezing, or rubs  HEART: Regular rate and rhythm; No murmurs, rubs, or gallops  ABDOMEN: Soft, Nontender, Nondistended; Bowel sounds present  EXTREMITIES:  2+ Peripheral Pulses, No clubbing, cyanosis, or edema        MEDICATIONS  (STANDING):  cefTRIAXone Injectable. 1000 milliGRAM(s) IV Push every 24 hours  enoxaparin Injectable 40 milliGRAM(s) SubCutaneous every 24 hours  gabapentin 600 milliGRAM(s) Oral every 8 hours  influenza  Vaccine (HIGH DOSE) 0.7 milliLiter(s) IntraMuscular once  methocarbamol 750 milliGRAM(s) Oral three times a day  oxyCODONE  ER Tablet 20 milliGRAM(s) Oral every 12 hours  polyethylene glycol 3350 17 Gram(s) Oral every 12 hours  senna 2 Tablet(s) Oral at bedtime    MEDICATIONS  (PRN):  bisacodyl Suppository 10 milliGRAM(s) Rectal daily PRN Constipation  HYDROmorphone   Tablet 4 milliGRAM(s) Oral every 4 hours PRN Severe Pain (7 - 10)  HYDROmorphone   Tablet 2 milliGRAM(s) Oral every 4 hours PRN Moderate Pain (4 - 6)  ondansetron Injectable 4 milliGRAM(s) IV Push every 6 hours PRN Nausea and/or Vomiting      Allergies    tetanus toxoid (Hives)    Intolerances    lactose (Other)        LABS:                          9.3    9.20  )-----------( 271      ( 28 Sep 2022 05:01 )             28.1         137  |  100  |  7.3<L>  ----------------------------<  85  4.2   |  28.0  |  0.36<L>    Ca    9.9      28 Sep 2022 05:01    TPro  5.1<L>  /  Alb  2.4<L>  /  TBili  0.4  /  DBili  x   /  AST  32<H>  /  ALT  8   /  AlkPhos  148<H>        Urinalysis Basic - ( 28 Sep 2022 05:00 )    Color: Yellow / Appearance: very cloudy / S.015 / pH: x  Gluc: x / Ketone: Small  / Bili: Negative / Urobili: Negative   Blood: x / Protein: 30 mg/dL / Nitrite: Positive   Leuk Esterase: Moderate / RBC: 3-5 /HPF / WBC >50 /HPF   Sq Epi: x / Non Sq Epi: Few / Bacteria: Many        RADIOLOGY & ADDITIONAL TESTS:

## 2022-09-30 ENCOUNTER — TRANSCRIPTION ENCOUNTER (OUTPATIENT)
Age: 67
End: 2022-09-30

## 2022-09-30 ENCOUNTER — INPATIENT (INPATIENT)
Facility: HOSPITAL | Age: 67
LOS: 26 days | Discharge: ANOTHER HOSPITAL | DRG: 949 | End: 2022-10-27
Attending: PHYSICAL MEDICINE & REHABILITATION | Admitting: PHYSICAL MEDICINE & REHABILITATION
Payer: MEDICARE

## 2022-09-30 VITALS — WEIGHT: 130.29 LBS | HEIGHT: 65 IN

## 2022-09-30 VITALS
HEART RATE: 88 BPM | RESPIRATION RATE: 19 BRPM | SYSTOLIC BLOOD PRESSURE: 122 MMHG | DIASTOLIC BLOOD PRESSURE: 80 MMHG | TEMPERATURE: 98 F | OXYGEN SATURATION: 94 %

## 2022-09-30 DIAGNOSIS — M84.58XA PATHOLOGICAL FRACTURE IN NEOPLASTIC DISEASE, OTHER SPECIFIED SITE, INITIAL ENCOUNTER FOR FRACTURE: ICD-10-CM

## 2022-09-30 DIAGNOSIS — Z90.710 ACQUIRED ABSENCE OF BOTH CERVIX AND UTERUS: Chronic | ICD-10-CM

## 2022-09-30 LAB
ANION GAP SERPL CALC-SCNC: 9 MMOL/L — SIGNIFICANT CHANGE UP (ref 5–17)
BUN SERPL-MCNC: 6.1 MG/DL — LOW (ref 8–20)
CALCIUM SERPL-MCNC: 9.9 MG/DL — SIGNIFICANT CHANGE UP (ref 8.4–10.5)
CHLORIDE SERPL-SCNC: 99 MMOL/L — SIGNIFICANT CHANGE UP (ref 98–107)
CO2 SERPL-SCNC: 28 MMOL/L — SIGNIFICANT CHANGE UP (ref 22–29)
CREAT SERPL-MCNC: 0.41 MG/DL — LOW (ref 0.5–1.3)
EGFR: 108 ML/MIN/1.73M2 — SIGNIFICANT CHANGE UP
GLUCOSE SERPL-MCNC: 125 MG/DL — HIGH (ref 70–99)
HCT VFR BLD CALC: 26.5 % — LOW (ref 34.5–45)
HGB BLD-MCNC: 9 G/DL — LOW (ref 11.5–15.5)
MCHC RBC-ENTMCNC: 30.6 PG — SIGNIFICANT CHANGE UP (ref 27–34)
MCHC RBC-ENTMCNC: 34 GM/DL — SIGNIFICANT CHANGE UP (ref 32–36)
MCV RBC AUTO: 90.1 FL — SIGNIFICANT CHANGE UP (ref 80–100)
PLATELET # BLD AUTO: 308 K/UL — SIGNIFICANT CHANGE UP (ref 150–400)
POTASSIUM SERPL-MCNC: 3.5 MMOL/L — SIGNIFICANT CHANGE UP (ref 3.5–5.3)
POTASSIUM SERPL-SCNC: 3.5 MMOL/L — SIGNIFICANT CHANGE UP (ref 3.5–5.3)
RBC # BLD: 2.94 M/UL — LOW (ref 3.8–5.2)
RBC # FLD: 13.4 % — SIGNIFICANT CHANGE UP (ref 10.3–14.5)
SARS-COV-2 RNA SPEC QL NAA+PROBE: SIGNIFICANT CHANGE UP
SODIUM SERPL-SCNC: 136 MMOL/L — SIGNIFICANT CHANGE UP (ref 135–145)
WBC # BLD: 6.03 K/UL — SIGNIFICANT CHANGE UP (ref 3.8–10.5)
WBC # FLD AUTO: 6.03 K/UL — SIGNIFICANT CHANGE UP (ref 3.8–10.5)

## 2022-09-30 PROCEDURE — G1004: CPT

## 2022-09-30 PROCEDURE — 84295 ASSAY OF SERUM SODIUM: CPT

## 2022-09-30 PROCEDURE — A9579: CPT

## 2022-09-30 PROCEDURE — 72157 MRI CHEST SPINE W/O & W/DYE: CPT

## 2022-09-30 PROCEDURE — 87640 STAPH A DNA AMP PROBE: CPT

## 2022-09-30 PROCEDURE — 71260 CT THORAX DX C+: CPT | Mod: MG

## 2022-09-30 PROCEDURE — C1713: CPT

## 2022-09-30 PROCEDURE — 82330 ASSAY OF CALCIUM: CPT

## 2022-09-30 PROCEDURE — 72050 X-RAY EXAM NECK SPINE 4/5VWS: CPT

## 2022-09-30 PROCEDURE — 87186 SC STD MICRODIL/AGAR DIL: CPT

## 2022-09-30 PROCEDURE — 36415 COLL VENOUS BLD VENIPUNCTURE: CPT

## 2022-09-30 PROCEDURE — 84132 ASSAY OF SERUM POTASSIUM: CPT

## 2022-09-30 PROCEDURE — 85730 THROMBOPLASTIN TIME PARTIAL: CPT

## 2022-09-30 PROCEDURE — 86923 COMPATIBILITY TEST ELECTRIC: CPT

## 2022-09-30 PROCEDURE — 80053 COMPREHEN METABOLIC PANEL: CPT

## 2022-09-30 PROCEDURE — 76000 FLUOROSCOPY <1 HR PHYS/QHP: CPT

## 2022-09-30 PROCEDURE — 70450 CT HEAD/BRAIN W/O DYE: CPT | Mod: MG

## 2022-09-30 PROCEDURE — 88341 IMHCHEM/IMCYTCHM EA ADD ANTB: CPT

## 2022-09-30 PROCEDURE — 85027 COMPLETE CBC AUTOMATED: CPT

## 2022-09-30 PROCEDURE — 82803 BLOOD GASES ANY COMBINATION: CPT

## 2022-09-30 PROCEDURE — 97167 OT EVAL HIGH COMPLEX 60 MIN: CPT

## 2022-09-30 PROCEDURE — 82947 ASSAY GLUCOSE BLOOD QUANT: CPT

## 2022-09-30 PROCEDURE — 99233 SBSQ HOSP IP/OBS HIGH 50: CPT

## 2022-09-30 PROCEDURE — 83605 ASSAY OF LACTIC ACID: CPT

## 2022-09-30 PROCEDURE — 87040 BLOOD CULTURE FOR BACTERIA: CPT

## 2022-09-30 PROCEDURE — 86803 HEPATITIS C AB TEST: CPT

## 2022-09-30 PROCEDURE — 86900 BLOOD TYPING SEROLOGIC ABO: CPT

## 2022-09-30 PROCEDURE — 87077 CULTURE AEROBIC IDENTIFY: CPT

## 2022-09-30 PROCEDURE — 86850 RBC ANTIBODY SCREEN: CPT

## 2022-09-30 PROCEDURE — 85014 HEMATOCRIT: CPT

## 2022-09-30 PROCEDURE — 87641 MR-STAPH DNA AMP PROBE: CPT

## 2022-09-30 PROCEDURE — U0003: CPT

## 2022-09-30 PROCEDURE — 93005 ELECTROCARDIOGRAM TRACING: CPT

## 2022-09-30 PROCEDURE — 87086 URINE CULTURE/COLONY COUNT: CPT

## 2022-09-30 PROCEDURE — 83735 ASSAY OF MAGNESIUM: CPT

## 2022-09-30 PROCEDURE — 97530 THERAPEUTIC ACTIVITIES: CPT

## 2022-09-30 PROCEDURE — 85025 COMPLETE CBC W/AUTO DIFF WBC: CPT

## 2022-09-30 PROCEDURE — 85018 HEMOGLOBIN: CPT

## 2022-09-30 PROCEDURE — C1889: CPT

## 2022-09-30 PROCEDURE — 84100 ASSAY OF PHOSPHORUS: CPT

## 2022-09-30 PROCEDURE — 72128 CT CHEST SPINE W/O DYE: CPT

## 2022-09-30 PROCEDURE — 81001 URINALYSIS AUTO W/SCOPE: CPT

## 2022-09-30 PROCEDURE — 71045 X-RAY EXAM CHEST 1 VIEW: CPT

## 2022-09-30 PROCEDURE — 72158 MRI LUMBAR SPINE W/O & W/DYE: CPT

## 2022-09-30 PROCEDURE — 82435 ASSAY OF BLOOD CHLORIDE: CPT

## 2022-09-30 PROCEDURE — 72156 MRI NECK SPINE W/O & W/DYE: CPT

## 2022-09-30 PROCEDURE — 99239 HOSP IP/OBS DSCHRG MGMT >30: CPT

## 2022-09-30 PROCEDURE — 88342 IMHCHEM/IMCYTCHM 1ST ANTB: CPT

## 2022-09-30 PROCEDURE — U0005: CPT

## 2022-09-30 PROCEDURE — 99285 EMERGENCY DEPT VISIT HI MDM: CPT | Mod: 25

## 2022-09-30 PROCEDURE — 72040 X-RAY EXAM NECK SPINE 2-3 VW: CPT

## 2022-09-30 PROCEDURE — 70553 MRI BRAIN STEM W/O & W/DYE: CPT

## 2022-09-30 PROCEDURE — 82550 ASSAY OF CK (CPK): CPT

## 2022-09-30 PROCEDURE — 97110 THERAPEUTIC EXERCISES: CPT

## 2022-09-30 PROCEDURE — 84075 ASSAY ALKALINE PHOSPHATASE: CPT

## 2022-09-30 PROCEDURE — 86901 BLOOD TYPING SEROLOGIC RH(D): CPT

## 2022-09-30 PROCEDURE — 0225U NFCT DS DNA&RNA 21 SARSCOV2: CPT

## 2022-09-30 PROCEDURE — 82962 GLUCOSE BLOOD TEST: CPT

## 2022-09-30 PROCEDURE — 92526 ORAL FUNCTION THERAPY: CPT

## 2022-09-30 PROCEDURE — 88305 TISSUE EXAM BY PATHOLOGIST: CPT

## 2022-09-30 PROCEDURE — 80048 BASIC METABOLIC PNL TOTAL CA: CPT

## 2022-09-30 PROCEDURE — 88307 TISSUE EXAM BY PATHOLOGIST: CPT

## 2022-09-30 PROCEDURE — 76942 ECHO GUIDE FOR BIOPSY: CPT

## 2022-09-30 PROCEDURE — 84145 PROCALCITONIN (PCT): CPT

## 2022-09-30 PROCEDURE — 74177 CT ABD & PELVIS W/CONTRAST: CPT | Mod: MG

## 2022-09-30 PROCEDURE — 72125 CT NECK SPINE W/O DYE: CPT

## 2022-09-30 PROCEDURE — 97163 PT EVAL HIGH COMPLEX 45 MIN: CPT

## 2022-09-30 PROCEDURE — 85610 PROTHROMBIN TIME: CPT

## 2022-09-30 PROCEDURE — 82306 VITAMIN D 25 HYDROXY: CPT

## 2022-09-30 RX ORDER — HYDROMORPHONE HYDROCHLORIDE 2 MG/ML
4 INJECTION INTRAMUSCULAR; INTRAVENOUS; SUBCUTANEOUS EVERY 4 HOURS
Refills: 0 | Status: DISCONTINUED | OUTPATIENT
Start: 2022-09-30 | End: 2022-10-05

## 2022-09-30 RX ORDER — METHOCARBAMOL 500 MG/1
1 TABLET, FILM COATED ORAL
Qty: 0 | Refills: 0 | DISCHARGE
Start: 2022-09-30

## 2022-09-30 RX ORDER — CEFPODOXIME PROXETIL 100 MG
1 TABLET ORAL
Qty: 0 | Refills: 0 | DISCHARGE
Start: 2022-09-30

## 2022-09-30 RX ORDER — GABAPENTIN 400 MG/1
600 CAPSULE ORAL EVERY 8 HOURS
Refills: 0 | Status: DISCONTINUED | OUTPATIENT
Start: 2022-09-30 | End: 2022-10-02

## 2022-09-30 RX ORDER — ENOXAPARIN SODIUM 100 MG/ML
40 INJECTION SUBCUTANEOUS EVERY 24 HOURS
Refills: 0 | Status: DISCONTINUED | OUTPATIENT
Start: 2022-10-01 | End: 2022-10-27

## 2022-09-30 RX ORDER — GABAPENTIN 400 MG/1
2 CAPSULE ORAL
Qty: 0 | Refills: 0 | DISCHARGE
Start: 2022-09-30

## 2022-09-30 RX ORDER — SENNA PLUS 8.6 MG/1
2 TABLET ORAL AT BEDTIME
Refills: 0 | Status: DISCONTINUED | OUTPATIENT
Start: 2022-09-30 | End: 2022-10-02

## 2022-09-30 RX ORDER — HYDROMORPHONE HYDROCHLORIDE 2 MG/ML
2 INJECTION INTRAMUSCULAR; INTRAVENOUS; SUBCUTANEOUS EVERY 4 HOURS
Refills: 0 | Status: DISCONTINUED | OUTPATIENT
Start: 2022-09-30 | End: 2022-10-05

## 2022-09-30 RX ORDER — HYDROMORPHONE HYDROCHLORIDE 2 MG/ML
1 INJECTION INTRAMUSCULAR; INTRAVENOUS; SUBCUTANEOUS
Qty: 0 | Refills: 0 | DISCHARGE
Start: 2022-09-30

## 2022-09-30 RX ORDER — CEFPODOXIME PROXETIL 100 MG
200 TABLET ORAL EVERY 12 HOURS
Refills: 0 | Status: COMPLETED | OUTPATIENT
Start: 2022-09-30 | End: 2022-10-07

## 2022-09-30 RX ORDER — SENNA PLUS 8.6 MG/1
2 TABLET ORAL
Qty: 0 | Refills: 0 | DISCHARGE
Start: 2022-09-30

## 2022-09-30 RX ORDER — METHOCARBAMOL 500 MG/1
750 TABLET, FILM COATED ORAL THREE TIMES A DAY
Refills: 0 | Status: DISCONTINUED | OUTPATIENT
Start: 2022-09-30 | End: 2022-10-04

## 2022-09-30 RX ORDER — POLYETHYLENE GLYCOL 3350 17 G/17G
17 POWDER, FOR SOLUTION ORAL EVERY 12 HOURS
Refills: 0 | Status: DISCONTINUED | OUTPATIENT
Start: 2022-09-30 | End: 2022-10-02

## 2022-09-30 RX ORDER — OXYCODONE HYDROCHLORIDE 5 MG/1
1 TABLET ORAL
Qty: 0 | Refills: 0 | DISCHARGE
Start: 2022-09-30

## 2022-09-30 RX ORDER — OXYCODONE HYDROCHLORIDE 5 MG/1
20 TABLET ORAL EVERY 12 HOURS
Refills: 0 | Status: DISCONTINUED | OUTPATIENT
Start: 2022-09-30 | End: 2022-10-05

## 2022-09-30 RX ORDER — POLYETHYLENE GLYCOL 3350 17 G/17G
17 POWDER, FOR SOLUTION ORAL
Qty: 0 | Refills: 0 | DISCHARGE
Start: 2022-09-30

## 2022-09-30 RX ORDER — CEFPODOXIME PROXETIL 100 MG
200 TABLET ORAL EVERY 12 HOURS
Refills: 0 | Status: DISCONTINUED | OUTPATIENT
Start: 2022-09-30 | End: 2022-09-30

## 2022-09-30 RX ADMIN — OXYCODONE HYDROCHLORIDE 20 MILLIGRAM(S): 5 TABLET ORAL at 11:30

## 2022-09-30 RX ADMIN — GABAPENTIN 600 MILLIGRAM(S): 400 CAPSULE ORAL at 22:23

## 2022-09-30 RX ADMIN — METHOCARBAMOL 750 MILLIGRAM(S): 500 TABLET, FILM COATED ORAL at 05:31

## 2022-09-30 RX ADMIN — METHOCARBAMOL 750 MILLIGRAM(S): 500 TABLET, FILM COATED ORAL at 22:23

## 2022-09-30 RX ADMIN — OXYCODONE HYDROCHLORIDE 20 MILLIGRAM(S): 5 TABLET ORAL at 19:16

## 2022-09-30 RX ADMIN — Medication 200 MILLIGRAM(S): at 19:17

## 2022-09-30 RX ADMIN — GABAPENTIN 600 MILLIGRAM(S): 400 CAPSULE ORAL at 05:31

## 2022-09-30 RX ADMIN — POLYETHYLENE GLYCOL 3350 17 GRAM(S): 17 POWDER, FOR SOLUTION ORAL at 05:31

## 2022-09-30 RX ADMIN — OXYCODONE HYDROCHLORIDE 20 MILLIGRAM(S): 5 TABLET ORAL at 12:30

## 2022-09-30 RX ADMIN — ENOXAPARIN SODIUM 40 MILLIGRAM(S): 100 INJECTION SUBCUTANEOUS at 05:31

## 2022-09-30 RX ADMIN — HYDROMORPHONE HYDROCHLORIDE 4 MILLIGRAM(S): 2 INJECTION INTRAMUSCULAR; INTRAVENOUS; SUBCUTANEOUS at 15:05

## 2022-09-30 NOTE — H&P ADULT - HISTORY OF PRESENT ILLNESS
Neha is a 67 yo female with no known pmhx was brought to the ED with progressive weakness to the point that she felt she could not get out of bed, she has some remote hx of should pain, decrease appetite, her last mammogram was in 2014. She reports they found 2 lumps in her right breast and recommended biopsy, she went for biopsy, she could not tolerate the pain.  CT scan of the Cervical spine showed Multiple lytic lesions of the cervical and visualized thoracic spine as well as right first and second ribs concerning for metastases versus multiple myeloma, she was admitted under medicine service for further workup and management, she was provided pain meds and her further imaging done showed Metastatic breast cancer with dominant right breast mass, Right chest wall, axillary and retropectoral involvement, Osseous, pulmonary, and likely hepatic metastatic disease. Metastatic adenopathy of the thorax, diffuse extensive osseous metastatic disease with multiple lucent and sclerotic lesions and compression fractures of T1, T9, T12, and L2. Severe T1 compression with epidural extension. Cord compression cannot be excluded. Additional neoplastic disease of the spine with epidural extension. Multifocal neoplastic lesions involving the pelvis and ribs as well, she was seen by Oncology, CT surgery and Neurosurgery team she underwent C7-T2 corpectomy & fusion, posterior C4-T5 fusion, managed in the ICU post operative and was extubated, Post op course uncomplicated, gomez removed pt requiring some straight cath, pt also with some subjective dysphagia - seen by speech therapy recommended pureed diet which patient continues to prefer. Patient with low grade temp and positive UA> Started oN IV rocephin for UTI. Blood cultures negative, Urine cutlure positive for GNR> Afebrile, leukocytosis and urinary symptoms improved. Changed to PO Vantin for 7 days. Discharge to Acute rehab  Neha is a 67 yo female with PMH of left arm skin CA s/p excision at age 16, stomach ulcerwas [facility] with progressive weakness to the point that she felt she could not get out of bed, she has some remote hx of should pain, decrease appetite, her last mammogram was in 2014. She reports they found 2 lumps in her right breast and recommended biopsy, she went for biopsy, she could not tolerate the pain. CT scan of the Cervical spine showed Multiple lytic lesions of the cervical and visualized thoracic spine as well as right first and second ribs concerning for metastases versus multiple myeloma.    she was admitted under medicine service for further workup and management. She was provided pain meds and her further imaging done showed Metastatic breast cancer with dominant right breast mass, right chest wall, axillary and retropectoral involvement, Osseous, pulmonary, and likely hepatic metastatic disease. Metastatic adenopathy of the thorax, diffuse extensive osseous metastatic disease with multiple lucent and sclerotic lesions and compression fractures of T1, T9, T12, and L2. Severe T1 compression with epidural extension. Cord compression cannot be excluded. Additional neoplastic disease of the spine with epidural extension. Multifocal neoplastic lesions involving the pelvis and ribs as well, she was seen by Oncology, CT surgery and Neurosurgery team.    She underwent C7-T2 corpectomy & fusion, posterior C4-T5 fusion, managed in the ICU post operative and was extubated. Post-op course uncomplicated.  Hamm removed pt requiring some straight cath, pt also with some subjective dysphagia - seen by speech therapy recommended pureed diet which patient continues to prefer. Patient with low grade temp and positive UA> Started on IV Rocephin for UTI. Blood cultures negative, Urine culture positive for GNR> Afebrile, leukocytosis and urinary symptoms improved. Changed to PO Vantin for 7 days.    Patient was evaluated by PM&R and therapy for functional deficits, gait/ADL impairments and acute rehabilitation was recommended. Patient was medically optimized for discharge to [facility] IRU on 9/30/22. Neha is a 67 yo female with PMH of left arm skin CA s/p excision at age 16, stomach ulcer, her last mammogram was in 2014, who was brought to [facility] ED 9/10/22 with progressive weakness to the point that she felt she could not get out of bed. She also reported remote shoulder pain, decreased appetite. Was found to have two lumps in her right breast but could not tolerate recommended biopsy due to pain. CT scan of the Cervical spine performed which showed Multiple lytic lesions of the cervical and visualized thoracic spine as well as right first and second ribs concerning for metastases versus multiple myeloma.    She was admitted under medicine service for further workup and management, including of her pain. Further imaging done showed Metastatic breast cancer with dominant right breast mass, right chest wall, axillary and retropectoral involvement; osseous, pulmonary, and likely hepatic metastatic disease. She was also found to have metastatic adenopathy of the thorax, diffuse extensive osseous metastatic disease with multiple lucent and sclerotic lesions and compression fractures of T1, T9, T12, and L2. Severe T1 compression with epidural extension; cord compression cannot be excluded. +additional neoplastic disease of the spine with epidural extension. Multifocal neoplastic lesions involving the pelvis and ribs as well.    Patient was seen by Oncology, CT surgery and Neurosurgery team. She underwent C7-T2 corpectomy & fusion, posterior C4-T5 fusion. Post-op course uncomplicated.  Hamm removed, pt requiring some straight cath for retention. She also had subjective dysphagia - seen by speech therapy recommended pureed diet which patient continues to prefer. Patient with low grade temp and positive UA. Started on IV Rocephin for UTI. Blood cultures negative, Urine culture positive for GNR, changed to PO Vantin for 7 days.    Patient was medically optimized for discharge to [facility] IRF on 9/30/22.

## 2022-09-30 NOTE — H&P ADULT - NSHPREVIEWOFSYSTEMS_GEN_ALL_CORE
REVIEW OF SYSTEMS  Constitutional: No fever, No Chills, + fatigue  HEENT: No eye pain, No visual disturbances, No difficulty hearing  Pulm: No cough,  No shortness of breath  Cardio: No chest pain, No palpitations  GI:  No abdominal pain, No nausea, No vomiting, No diarrhea, No constipation  : No dysuria, No frequency, No hematuria  Neuro: No headaches, No memory loss, + loss of strength, + numbness, No tremors  Skin: No itching, No rashes, No lesions   Endo: No temperature intolerance  MSK: + joint pain, No joint swelling, No muscle pain, No Neck pain, + back pain, + neck pain  Psych:  No depression, No anxiety

## 2022-09-30 NOTE — PROGRESS NOTE ADULT - SUBJECTIVE AND OBJECTIVE BOX
CC: Follow up     INTERVAL HPI/OVERNIGHT EVENTS: Patient seen and examined, no acute complaints overnight.       Vital Signs Last 24 Hrs  T(C): 36.8 (30 Sep 2022 05:19), Max: 37.3 (29 Sep 2022 20:33)  T(F): 98.3 (30 Sep 2022 05:19), Max: 99.2 (29 Sep 2022 20:33)  HR: 88 (30 Sep 2022 05:19) (88 - 105)  BP: 122/80 (30 Sep 2022 05:19) (122/80 - 140/72)  BP(mean): --  RR: 19 (30 Sep 2022 05:19) (18 - 19)  SpO2: 94% (30 Sep 2022 05:19) (92% - 94%)    Parameters below as of 30 Sep 2022 05:19  Patient On (Oxygen Delivery Method): room air        PHYSICAL EXAM:    GENERAL: NAD, AOX3  HEAD:  Atraumatic, Normocephalic  EYES: conjunctiva and sclera clear  ENMT: Moist mucous membranes  NECK: C COllar   CHEST/LUNG: Clear to auscultation bilaterally; No rales, rhonchi, wheezing, or rubs  HEART: Regular rate and rhythm; No murmurs, rubs, or gallops  ABDOMEN: Soft, Nontender, Nondistended; Bowel sounds present  EXTREMITIES:  2+ Peripheral Pulses, No clubbing, cyanosis, or edema        MEDICATIONS  (STANDING):  cefpodoxime 200 milliGRAM(s) Oral every 12 hours  enoxaparin Injectable 40 milliGRAM(s) SubCutaneous every 24 hours  gabapentin 600 milliGRAM(s) Oral every 8 hours  influenza  Vaccine (HIGH DOSE) 0.7 milliLiter(s) IntraMuscular once  methocarbamol 750 milliGRAM(s) Oral three times a day  oxyCODONE  ER Tablet 20 milliGRAM(s) Oral every 12 hours  polyethylene glycol 3350 17 Gram(s) Oral every 12 hours  senna 2 Tablet(s) Oral at bedtime    MEDICATIONS  (PRN):  bisacodyl Suppository 10 milliGRAM(s) Rectal daily PRN Constipation  HYDROmorphone   Tablet 4 milliGRAM(s) Oral every 4 hours PRN Severe Pain (7 - 10)  HYDROmorphone   Tablet 2 milliGRAM(s) Oral every 4 hours PRN Moderate Pain (4 - 6)  ondansetron Injectable 4 milliGRAM(s) IV Push every 6 hours PRN Nausea and/or Vomiting      Allergies    tetanus toxoid (Hives)    Intolerances    lactose (Other)        LABS:                          9.0    6.03  )-----------( 308      ( 30 Sep 2022 06:35 )             26.5     09-30    136  |  99  |  6.1<L>  ----------------------------<  125<H>  3.5   |  28.0  |  0.41<L>    Ca    9.9      30 Sep 2022 06:35            RADIOLOGY & ADDITIONAL TESTS:

## 2022-09-30 NOTE — DISCHARGE NOTE NURSING/CASE MANAGEMENT/SOCIAL WORK - PATIENT PORTAL LINK FT
You can access the FollowMyHealth Patient Portal offered by [facility] by registering at the following website: [email or web address]. By joining [facility]’s FollowMyHealth portal, you will also be able to view your health information using other applications (apps) compatible with our system.

## 2022-09-30 NOTE — PROGRESS NOTE ADULT - SUBJECTIVE AND OBJECTIVE BOX
Patient feels well.  Fatigued, slept intermittently in the night.   Pain is controlled.     REVIEW OF SYSTEMS  Constitutional - No fever,  +fatigue  HEENT - No vertigo, +neck pain    FUNCTIONAL PROGRESS  9/29 PT  Bed Mobility  Bed Mobility Training Sit-to-Supine: maximum assist (25% patient effort);  1 person assist;  bed rails;  elevated HOB  Bed Mobility Training Supine-to-Sit: 1 person assist;  maximum assist (25% patient effort);  bed rails;  elevated [facility]  Bed Mobility Training Limitations: decreased ability to use legs for bridging/pushing;  decreased ability to use arms for pushing/pulling;  decreased strength;  impaired balance;  pain    Sit-Stand Transfer Training  Transfer Training Sit-to-Stand Transfer: unable to perform;  pt declined due to pain,requested to lay back down    Therapeutic Exercise  Therapeutic Exercise Detail: Performed bedside BLE knee-to-chest with resist ext's, quad sets, hip abd/add, ankle pumps for 10 reps each           VITALS  T(C): 36.8 (09-30-22 @ 05:19), Max: 37.3 (09-29-22 @ 20:33)  HR: 88 (09-30-22 @ 05:19) (88 - 105)  BP: 122/80 (09-30-22 @ 05:19) (117/76 - 140/72)  RR: 19 (09-30-22 @ 05:19) (18 - 19)  SpO2: 94% (09-30-22 @ 05:19) (92% - 94%)  Wt(kg): --    MEDICATIONS   bisacodyl Suppository 10 milliGRAM(s) daily PRN  cefTRIAXone Injectable. 1000 milliGRAM(s) every 24 hours  enoxaparin Injectable 40 milliGRAM(s) every 24 hours  gabapentin 600 milliGRAM(s) every 8 hours  HYDROmorphone   Tablet 4 milliGRAM(s) every 4 hours PRN  HYDROmorphone   Tablet 2 milliGRAM(s) every 4 hours PRN  influenza  Vaccine (HIGH DOSE) 0.7 milliLiter(s) once  methocarbamol 750 milliGRAM(s) three times a day  ondansetron Injectable 4 milliGRAM(s) every 6 hours PRN  oxyCODONE  ER Tablet 20 milliGRAM(s) every 12 hours  polyethylene glycol 3350 17 Gram(s) every 12 hours  senna 2 Tablet(s) at bedtime      RECENT LABS/IMAGING                          9.0    6.03  )-----------( 308      ( 30 Sep 2022 06:35 )             26.5     09-30    136  |  99  |  6.1<L>  ----------------------------<  125<H>  3.5   |  28.0  |  0.41<L>    Ca    9.9      30 Sep 2022 06:35                  MRI C, T L SPINE 9/11 - Extensive osseous metastatic disease with pathologic compression fracture deformities at C7, T1, T9, T12 and L2, severe at T1. Abnormal ventral and dorsal epidural enhancement from C7 through T2. Severe canal stenosis at T1 and L2 without associated cord or cauda equina compression. There is motion artifact present in the thoracic spine which limits evaluation. No abnormal leptomeningeal enhancement or definite cord signal abnormality.    MRI HEAD 9/11 - Subcentimeter enhancing parenchymal lesions as above compatible with intracranial metastatic disease. No associated vasogenic edema. No evidence of acute infarct or hemorrhage. Likely left parietal calvarial osseous metastatic lesion.    CT C & T SPINE 9/22 - The patient is status post C7, T1, T2 corpectomy with cage device placement. Anterior fixation hardware at the C6 & T3 levels with vertebral body screws and anterior fixation plate. Bilateral posterior fixation screws at the C4, C5, C6, T3, T4 levels and vertical stabilizing rods.  Expected postoperative soft changes changes at the operative sites. Hardware is in satisfactory position. Diffuse cervical, thoracic, lumbar spine osseous metastasis.    ----------------------------------------------------------------------------------------  PHYSICAL EXAM  Constitutional - NAD, Comfortable  Neck - +C-Collar   Extremities - No C/C/E, No calf tenderness   Neurologic Exam -                    Cognitive - AAO to self, place, date, year, situation     Communication - Fluent, No dysarthria     Cranial Nerves - CN 2-12 intact     Motor -                      LEFT    UE - C5 4/5, C6 4/5, C7 4/5, C8 4/5, T1 4/5                    RIGHT UE - C5 4/5, C6 4/5, C7 4/5, C8 4/5, T1 4/5                    LEFT    LE - L2 5/5, L3 5/5, L4 5/5, L5 5/5, S1 5/5                    RIGHT LE - L2 5/5, L3 5/5, L4 5/5, L5 5/5, S1 5/5      Sensory - Decreased to BUE, Decreased most at right C7/8/T1  Psychiatric - Mood stable, Affect WNL  ----------------------------------------------------------------------------------------  ASSESSMENT/PLAN  66yFemale with functional deficits after developing progressive weakness related to metastatic breast (intraductal) mass to spine and brain  Multiple spinal pathological fractures s/p T1-T2 corpectomy/C4-T5 fusion - C- Collar, Outpatient Chemo after wound healing   UTI - Rocephin   Pain - Tylenol, Neurontin, Dilaudid, Robaxin, Oxycodone, Oxycontin    DVT PPX - SCDs, Lovenox  Rehab - Continues to recommend ACUTE inpatient rehabilitation for the functional deficits consisting of 3 hours of therapy/day & 24 hour RN/daily PMR physician for comorbid medical management. Will continue to follow for ongoing rehab needs and recommendations. Patient will be able to tolerate 3 hours a day.    Continue bedside therapy as well as OOB throughout the day with mobilization throughout the day with staff to maintain cardiopulmonary function and prevention of secondary complications related to debility.     Discussed with rehab clinical team.

## 2022-09-30 NOTE — H&P ADULT - ASSESSMENT
ASSESSMENT/PLAN  Neha is a 67 yo female with PMH of left arm skin CA s/p excision at age 16, stomach ulcer was brought to [facility] ED on 9/10 with progressive weakness to the point that she felt she could not get out of bed.  CT scan of the Cervical spine showed Multiple lytic lesions of the cervical and visualized thoracic spine as well as right first and second ribs concerning for metastases versus multiple myeloma. Further imaging done showed Metastatic breast cancer with dominant right breast mass, right chest wall, axillary and retropectoral involvement, Osseous, pulmonary, and likely hepatic metastatic disease. Compression fractures of T1, T9, T12, and L2. Severe T1 compression with epidural extension. She underwent C7-T2 corpectomy & fusion, posterior C4-T5 fusion, managed in the ICU post operative and was extubated. Hospital course significant for urinary retention s/p gomez and SC, dysphagia now on puree diet which she is tolerating, UTI s/p IV ABX now changed to PO. Patient now with gait Instability, ADL impairments and Functional impairments.    - Start Comprehensive Rehab Program: PT/OT/ST, 3hours daily and 5 days weekly  - PT: Focused on improving strength, endurance, coordination, balance, functional mobility, and transfers  - OT: Focused on improving strength, fine motor skills, coordination, posture and ADLs.    - ST: to diagnose and treat deficits in swallowing, cognition and communication.     #Metastatic Breast CA  - Metastatic breast cancer with dominant right breast mass, right chest wall, axillary and retropectoral involvement, Osseous, pulmonary, and likely hepatic metastatic disease.  - s/p right breast biopsy shows  positive for invasive ductal carcinoma  - Compression fractures of T1, T9, T12, and L2. Severe T1 compression with epidural extension.   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  - Patient will not require chemo/radiation while in rehab     #Pain management  - Robaxin 500mg TID  - Gabapentin 200mg TID increased to 600mg   - Dilaudid 2mg Q4 hours prn for mod pain; 4mg Q4 hours prn for severe pain  - Oxycodone ER 20mg Q12 hours     #UTI  - PO Vantin BID for 7 days    #DVT ppx  - Lovenox, SCD, TEDs    #GI ppx  - Pepcid 20mg , Nexium, Protonix 40mg    #Bowel Regimen  - Senna, miralax PRN    #Bladder management  - BS on admission, and q 8 hours (SC if > 400)  - Monitor UO    #FEN   - Diet: Pureed    #Skin:  - Skin on admission:   - Pressure injury/Skin: Turn Q2hrs while in bed, OOB to Chair, PT/OT     #Dysphagia    - SLP: evaluation and treatment    #Sleep:   - Maintain quiet hours and low stim environment.  - Melatonin PRN to maximize participation in therapy during the day.     #Precaution  - Fall, Aspiration, cardiac, Sternal, Spinal    #GOC  CODE STATUS: FULL CODE     Outpatient Follow-up (Specialty/Name of physician):    Marj Del Castillo)  Hematology; Hospice Palliative Medicine; Internal Medicine; Medical Oncology  [address] [place] [address]  Phone: [phone]  Fax: [phone]  Follow Up Time: 2 weeks    Bret Ibarra)  Radiation Oncology  [address] [place] [address]  Phone: [phone]  Fax: [phone]  Follow Up Time: 2 weeks    MEDICAL PROGNOSIS: GOOD            REHAB POTENTIAL: GOOD             ESTIMATED DISPOSITION: HOME WITH HOME CARE            ELOS: 10-14 Days   EXPECTED THERAPY:     P.T. 1hr/day       O.T. 1hr/day      S.L.P. 1hr/day     P&O Unnecessary     EXP FREQUENCY: 5 days per 7 day period     PRESCREEN COMPARISON:   I have reviewed the prescreen information and I have found no relevant changes between the preadmission screening and my post admission evaluation     RATIONALE FOR INPATIENT ADMISSION - Patient demonstrates the following: (check all that apply)  [X] Medically appropriate for rehabilitation admission  [X] Has attainable rehab goals with an appropriate initial discharge plan  [X] Has rehabilitation potential (expected to make a significant improvement within a reasonable period of time)   [X] Requires close medical management by a rehab physician, rehab nursing care, Hospitalist and comprehensive interdisciplinary team (including PT, OT, & or SLP, Prosthetics and Orthotics)   ASSESSMENT/PLAN  Neha is a 65 yo female with PMH of left arm skin CA s/p excision at age 16, stomach ulcer was brought to [facility] ED on 9/10 with progressive weakness to the point that she felt she could not get out of bed.  CT scan of the Cervical spine showed Multiple lytic lesions of the cervical and visualized thoracic spine as well as right first and second ribs concerning for metastases versus multiple myeloma. Further imaging done showed Metastatic breast cancer with dominant right breast mass, right chest wall, axillary and retropectoral involvement, Osseous, pulmonary, and likely hepatic metastatic disease. Compression fractures of T1, T9, T12, and L2. Severe T1 compression with epidural extension. She underwent C7-T2 corpectomy & fusion, posterior C4-T5 fusion, managed in the ICU post operative and was extubated. Hospital course significant for urinary retention s/p gomez and SC, dysphagia now on puree diet which she is tolerating, UTI s/p IV ABX now changed to PO. Patient now with gait Instability, ADL impairments and Functional impairments.    - Start Comprehensive Rehab Program: PT/OT/ST, 3hours daily and 5 days weekly  - PT: Focused on improving strength, endurance, coordination, balance, functional mobility, and transfers  - OT: Focused on improving strength, fine motor skills, coordination, posture and ADLs.    - ST: to diagnose and treat deficits in swallowing, cognition and communication.     #Metastatic Breast CA  - Metastatic breast cancer with dominant right breast mass, right chest wall, axillary and retropectoral involvement, Osseous, pulmonary, and likely hepatic metastatic disease.  - s/p right breast biopsy shows  positive for invasive ductal carcinoma  - Compression fractures of T1, T9, T12, and L2. Severe T1 compression with epidural extension.   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  - Patient will not require chemo/radiation while in rehab   - cervical collar, TLS Brace    #Pain management  - Robaxin 500mg TID  - Gabapentin 200mg TID increased to 600mg   - Dilaudid 2mg Q4 hours prn for mod pain; 4mg Q4 hours prn for severe pain  - Oxycodone ER 20mg Q12 hours     #UTI  - PO Vantin BID for 7 days    #DVT ppx  - Lovenox, SCD, TEDs    #Bowel Regimen  - Senna, miralax BID, Dulcolax supp PRN    #Bladder management  - BS on admission, and q 8 hours (SC if > 400)  - Monitor UO    #FEN   - Diet: Pureed    #Skin:  - Skin on admission:   - Pressure injury/Skin: Turn Q2hrs while in bed, OOB to Chair, PT/OT     #Dysphagia    - SLP: evaluation and treatment    #Sleep:   - Maintain quiet hours and low stim environment.  - Melatonin PRN to maximize participation in therapy during the day.     #Precaution  - Fall, Aspiration, Spinal    #GOC  CODE STATUS: FULL CODE     Outpatient Follow-up (Specialty/Name of physician):    Marj Del Castillo)  Hematology; Hospice Palliative Medicine; Internal Medicine; Medical Oncology  [address] [place] [address]  Phone: [phone]  Fax: [phone]  Follow Up Time: 2 weeks    Bret Ibarra)  Radiation Oncology  [address] [place] [address]  Phone: [phone]  Fax: [phone]  Follow Up Time: 2 weeks    MEDICAL PROGNOSIS: GOOD            REHAB POTENTIAL: GOOD             ESTIMATED DISPOSITION: HOME WITH HOME CARE            ELOS: 10-14 Days   EXPECTED THERAPY:     P.T. 1hr/day       O.T. 1hr/day      S.L.P. 1hr/day     P&O Unnecessary     EXP FREQUENCY: 5 days per 7 day period     PRESCREEN COMPARISON:   I have reviewed the prescreen information and I have found no relevant changes between the preadmission screening and my post admission evaluation     RATIONALE FOR INPATIENT ADMISSION - Patient demonstrates the following: (check all that apply)  [X] Medically appropriate for rehabilitation admission  [X] Has attainable rehab goals with an appropriate initial discharge plan  [X] Has rehabilitation potential (expected to make a significant improvement within a reasonable period of time)   [X] Requires close medical management by a rehab physician, rehab nursing care, Hospitalist and comprehensive interdisciplinary team (including PT, OT, & or SLP, Prosthetics and Orthotics)    67 yo female PMH left arm skin CA s/p excision, stomach ulcer who presented to [facility] 9/10/22 with progressive weakness, found to have multiple lytic lesions of the cervical and thoracic spine as well as right first and second ribs, lung, liver secondary to metastatic breast cancer with dominant right breast mass, right chest wall, axillary and retropectoral involvement. Compression fractures of T1, T9, T12, and L2. Severe T1 compression with epidural extension. She underwent C7-T2 corpectomy & fusion, posterior C4-T5 fusion, hospital course significant for urinary retention and dysphagia, UTI    - Start Comprehensive Rehab Program: PT/OT/ST, 3hours daily and 5 days weekly  - PT: Focused on improving strength, endurance, coordination, balance, functional mobility, and transfers  - OT: Focused on improving strength, fine motor skills, coordination, posture and ADLs.    - ST: to diagnose and treat deficits in swallowing, cognition and communication.     # Metastatic Breast CA to lung, liver, ribs, spine  - s/p right breast biopsy shows  positive for invasive ductal carcinoma  - Compression fractures of T1, T9, T12, and L2. Severe T1 compression with epidural extension.   - s/p C7-T2 corpectomy & fusion, posterior C4-T5 fusion  - Patient will not require chemo/radiation while in rehab. 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  - Precautions: CA, fall, aspiration, spinal, cervical collar, TLSO Brace    # Pain management  - Robaxin 500mg TID  - Gabapentin 200mg TID increased to 600mg   - Dilaudid 2mg Q4 hours prn for mod pain; 4mg Q4 hours prn for severe pain  - Oxycodone ER 20mg Q12 hours   - bowel program as below    # UTI  - PO Vantin BID for 7 days  - monitor bladder scans  - SC for PVR >350 ml, toileting schedule    # Bowel Regimen  - Senna, miralax BID  - Dulcolax supp PRN    # FEN   - Diet: Pureed  - SLP and nutrition evaluation    # Skin:  - Skin on admission:   - Pressure injury/Skin: Turn Q2hrs while in bed, OOB to Chair, PT/OT     # Sleep:   - Maintain quiet hours and low stim environment.  - Melatonin PRN to maximize participation in therapy during the day.     # DVT ppx  - Lovenox  - SCD, TEDs    #GOC  CODE STATUS: FULL CODE     Outpatient Follow-up (Specialty/Name of physician):    Marj Del Castillo)  Hematology; Hospice Palliative Medicine; Internal Medicine; Medical Oncology  [address] [place] [address]  Phone: [phone]  Fax: [phone]  Follow Up Time: 2 weeks    Bret Ibarra)  Radiation Oncology  [address] [place] [address]  Phone: [phone]  Fax: [phone]  Follow Up Time: 2 weeks    MEDICAL PROGNOSIS: fair           REHAB POTENTIAL: GOOD -            ESTIMATED DISPOSITION: HOME WITH HOME CARE            ELOS: 17-21 Days   EXPECTED THERAPY:     P.T. 1hr/day       O.T. 1hr/day      S.L.P. 1hr/day     P&O Unnecessary     EXP FREQUENCY: 5 days per 7 day period     PRESCREEN COMPARISON:   I have reviewed the prescreen information and I have found no relevant changes between the preadmission screening and my post admission evaluation     RATIONALE FOR INPATIENT ADMISSION - Patient demonstrates the following: (check all that apply)  [X] Medically appropriate for rehabilitation admission  [X] Has attainable rehab goals with an appropriate initial discharge plan  [X] Has rehabilitation potential (expected to make a significant improvement within a reasonable period of time)   [X] Requires close medical management by a rehab physician, rehab nursing care, Hospitalist and comprehensive interdisciplinary team (including PT, OT, & or SLP, Prosthetics and Orthotics)    65 yo female PMH left arm skin CA s/p excision, stomach ulcer who presented to [facility] 9/10/22 with progressive weakness, found to have multiple lytic lesions of the cervical and thoracic spine as well as right first and second ribs, lung, liver secondary to metastatic breast cancer with dominant right breast mass, right chest wall, axillary and retropectoral involvement. Compression fractures of T1, T9, T12, and L2. Severe T1 compression with epidural extension. She underwent C7-T2 corpectomy & fusion, posterior C4-T5 fusion, hospital course significant for urinary retention and dysphagia, UTI    # Metastatic Breast CA to lung, liver, ribs, with spine involvement and neurogenic bladder  - s/p right breast biopsy shows  positive for invasive ductal carcinoma  - Compression fractures of T1, T9, T12, and L2. Severe T1 compression with epidural extension.   - s/p C7-T2 corpectomy & fusion, posterior C4-T5 fusion  - Patient will not require chemo/radiation while in rehab. 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  - Precautions: CA, fall, aspiration, spinal, cervical collar, TLSO Brace    # Pain management  - Robaxin 500mg TID  - Gabapentin 200mg TID increased to 600mg   - Dilaudid 2mg Q4 hours prn for mod pain; 4mg Q4 hours prn for severe pain  - Oxycodone ER 20mg Q12 hours   - bowel program as below    # UTI  - PO Vantin BID for 7 days  - monitor bladder scans  - SC for PVR >350 ml, toileting schedule    # Bowel Regimen  - Senna, miralax BID  - Dulcolax supp PRN    # FEN   - Diet: Pureed  - SLP and nutrition evaluation    # Skin:  - Skin on admission:   - Pressure injury/Skin: Turn Q2hrs while in bed, OOB to Chair, PT/OT     # Sleep:   - Maintain quiet hours and low stim environment.  - Melatonin PRN to maximize participation in therapy during the day.     # DVT ppx  - Lovenox  - SCD, TEDs    #GOC  CODE STATUS: FULL CODE     Outpatient Follow-up (Specialty/Name of physician):    Marj Del Castillo (MD)  Hematology; Hospice Palliative Medicine; Internal Medicine; Medical Oncology  [address] [place] [address]  Phone: [phone]  Fax: [phone]  Follow Up Time: 2 weeks    Bret Ibarra)  Radiation Oncology  [address] [place] [address]  Phone: [phone]  Fax: [phone]  Follow Up Time: 2 weeks    MEDICAL PROGNOSIS: fair           REHAB POTENTIAL: GOOD -            ESTIMATED DISPOSITION: HOME WITH HOME CARE            ELOS: 17-21 Days   EXPECTED THERAPY:     P.T. 1hr/day       O.T. 1hr/day      S.L.P. 1hr/day     P&O Unnecessary     EXP FREQUENCY: 5 days per 7 day period     PRESCREEN COMPARISON:   I have reviewed the prescreen information and I have found no relevant changes between the preadmission screening and my post admission evaluation     RATIONALE FOR INPATIENT ADMISSION - Patient demonstrates the following: (check all that apply)  [X] Medically appropriate for rehabilitation admission  [X] Has attainable rehab goals with an appropriate initial discharge plan  [X] Has rehabilitation potential (expected to make a significant improvement within a reasonable period of time)   [X] Requires close medical management by a rehab physician, rehab nursing care, Hospitalist and comprehensive interdisciplinary team (including PT, OT, & or SLP, Prosthetics and Orthotics)    67 yo female PMH left arm skin CA s/p excision, stomach ulcer who presented to [facility] 9/10/22 with progressive weakness, found to have multiple lytic lesions of the cervical and thoracic spine as well as right first and second ribs, lung, liver secondary to metastatic breast cancer with dominant right breast mass, right chest wall, axillary and retropectoral involvement. Compression fractures of T1, T9, T12, and L2. Severe T1 compression with epidural extension. She underwent C7-T2 corpectomy & fusion, posterior C4-T5 fusion, hospital course significant for urinary retention and dysphagia, UTI    # Metastatic Breast CA to lung, liver, ribs, with spine involvement and neurogenic bladder  - s/p right breast biopsy shows  positive for invasive ductal carcinoma  - Compression fractures of T1, T9, T12, and L2. Severe T1 compression with epidural extension.   - s/p C7-T2 corpectomy & fusion, posterior C4-T5 fusion  - Patient will not require chemo/radiation while in rehab. 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  - Precautions: CA, fall, aspiration, spinal, cervical collar, TLSO Brace    # Pain management  - Robaxin 500mg TID  - Gabapentin 200mg TID increased to 600mg   - Dilaudid 2mg Q4 hours prn for mod pain; 4mg Q4 hours prn for severe pain  - Oxycodone ER 20mg Q12 hours   - bowel program as below    # UTI  - PO Vantin BID for 7 days  - monitor bladder scans  - SC for PVR >350 ml, toileting schedule    # Bowel Regimen  - Senna, miralax BID  - Dulcolax supp PRN    # FEN   - Diet: Pureed  - SLP and nutrition evaluation    # Skin:  - Skin on admission: surgical incision to anterior neck with steristrips, posterior cervical and thoracic spine incision with primary dressing  - Pressure injury/Skin: Turn Q2hrs while in bed, OOB to Chair, PT/OT     # Sleep:   - Maintain quiet hours and low stim environment.  - Melatonin PRN to maximize participation in therapy during the day.     # DVT ppx  - Lovenox  - SCD, TEDs    #GOC  CODE STATUS: FULL CODE     Outpatient Follow-up (Specialty/Name of physician):    Marj Del Castillo)  Hematology; Hospice Palliative Medicine; Internal Medicine; Medical Oncology  [address] [place] [address]  Phone: [phone]  Fax: [phone]  Follow Up Time: 2 weeks    Bret Ibarra)  Radiation Oncology  [address] [place] [address]  Phone: [phone]  Fax: [phone]  Follow Up Time: 2 weeks    MEDICAL PROGNOSIS: fair           REHAB POTENTIAL: GOOD -            ESTIMATED DISPOSITION: HOME WITH HOME CARE            ELOS: 17-21 Days   EXPECTED THERAPY:     P.T. 1hr/day       O.T. 1hr/day      S.L.P. 1hr/day     P&O Unnecessary     EXP FREQUENCY: 5 days per 7 day period     PRESCREEN COMPARISON:   I have reviewed the prescreen information and I have found no relevant changes between the preadmission screening and my post admission evaluation     RATIONALE FOR INPATIENT ADMISSION - Patient demonstrates the following: (check all that apply)  [X] Medically appropriate for rehabilitation admission  [X] Has attainable rehab goals with an appropriate initial discharge plan  [X] Has rehabilitation potential (expected to make a significant improvement within a reasonable period of time)   [X] Requires close medical management by a rehab physician, rehab nursing care, Hospitalist and comprehensive interdisciplinary team (including PT, OT, & or SLP, Prosthetics and Orthotics)

## 2022-09-30 NOTE — PROGRESS NOTE ADULT - NUTRITIONAL ASSESSMENT
This patient has been assessed with a concern for Malnutrition and has been determined to have a diagnosis/diagnoses of Moderate protein-calorie malnutrition.    This patient is being managed with:   Diet Full Liquid-  Entered: Sep 23 2022  8:54AM    
This patient has been assessed with a concern for Malnutrition and has been determined to have a diagnosis/diagnoses of Moderate protein-calorie malnutrition.    This patient is being managed with:   Diet Pureed-  Entered: Sep 24 2022  2:40PM    
This patient has been assessed with a concern for Malnutrition and has been determined to have a diagnosis/diagnoses of Moderate protein-calorie malnutrition.    This patient is being managed with:   Diet Pureed-  Entered: Sep 24 2022  2:40PM    
This patient has been assessed with a concern for Malnutrition and has been determined to have a diagnosis/diagnoses of Moderate protein-calorie malnutrition.    This patient is being managed with:   Diet Regular-  Supplement Feeding Modality:  Oral  Ensure Enlive Cans or Servings Per Day:  1       Frequency:  Three Times a day  Entered: Sep 17 2022  9:57AM    
This patient has been assessed with a concern for Malnutrition and has been determined to have a diagnosis/diagnoses of Moderate protein-calorie malnutrition.    This patient is being managed with:   Diet NPO-  Entered: Sep 21 2022  7:30PM    
This patient has been assessed with a concern for Malnutrition and has been determined to have a diagnosis/diagnoses of Moderate protein-calorie malnutrition.    This patient is being managed with:   Diet Pureed-  Entered: Sep 24 2022  2:40PM    
This patient has been assessed with a concern for Malnutrition and has been determined to have a diagnosis/diagnoses of Moderate protein-calorie malnutrition.    This patient is being managed with:   Diet Regular-  Entered: Sep 10 2022  1:30AM    
This patient has been assessed with a concern for Malnutrition and has been determined to have a diagnosis/diagnoses of Moderate protein-calorie malnutrition.    This patient is being managed with:   Diet Regular-  Supplement Feeding Modality:  Oral  Ensure Enlive Cans or Servings Per Day:  1       Frequency:  Three Times a day  Entered: Sep 17 2022  9:57AM    
This patient has been assessed with a concern for Malnutrition and has been determined to have a diagnosis/diagnoses of Moderate protein-calorie malnutrition.    This patient is being managed with:   Diet Full Liquid-  Entered: Sep 23 2022  8:54AM    
This patient has been assessed with a concern for Malnutrition and has been determined to have a diagnosis/diagnoses of Moderate protein-calorie malnutrition.    This patient is being managed with:   Diet Regular-  Supplement Feeding Modality:  Oral  Ensure Enlive Cans or Servings Per Day:  1       Frequency:  Three Times a day  Entered: Sep 17 2022  9:57AM    
This patient has been assessed with a concern for Malnutrition and has been determined to have a diagnosis/diagnoses of Moderate protein-calorie malnutrition.    This patient is being managed with:   Diet Full Liquid-  Entered: Sep 23 2022  8:54AM    
This patient has been assessed with a concern for Malnutrition and has been determined to have a diagnosis/diagnoses of Moderate protein-calorie malnutrition.    This patient is being managed with:   Diet Full Liquid-  Entered: Sep 23 2022  8:54AM    
This patient has been assessed with a concern for Malnutrition and has been determined to have a diagnosis/diagnoses of Moderate protein-calorie malnutrition.    This patient is being managed with:   Diet Pureed-  Entered: Sep 24 2022  2:40PM    
This patient has been assessed with a concern for Malnutrition and has been determined to have a diagnosis/diagnoses of Moderate protein-calorie malnutrition.    This patient is being managed with:   Diet Regular-  Entered: Sep 10 2022  1:30AM    
This patient has been assessed with a concern for Malnutrition and has been determined to have a diagnosis/diagnoses of Moderate protein-calorie malnutrition.    This patient is being managed with:   Diet Regular-  Entered: Sep 10 2022  1:30AM    
This patient has been assessed with a concern for Malnutrition and has been determined to have a diagnosis/diagnoses of Moderate protein-calorie malnutrition.    This patient is being managed with:   Diet Regular-  Entered: Sep 10 2022  1:30AM    agree with above
This patient has been assessed with a concern for Malnutrition and has been determined to have a diagnosis/diagnoses of Moderate protein-calorie malnutrition.    This patient is being managed with:   Diet Regular-  Supplement Feeding Modality:  Oral  Ensure Enlive Cans or Servings Per Day:  1       Frequency:  Three Times a day  Entered: Sep 17 2022  9:57AM    
This patient has been assessed with a concern for Malnutrition and has been determined to have a diagnosis/diagnoses of Moderate protein-calorie malnutrition.    This patient is being managed with:   Diet Clear Liquid-  Entered: Sep 22 2022  6:36AM

## 2022-09-30 NOTE — PROGRESS NOTE ADULT - ASSESSMENT
67 yo female with no known pmhx was brought to the ED with progressive weakness to the point that she felt she could not get out of bed, she has some remote hx of should pain, decrease appetite, her last mammogram was in 2014. She reports they found 2 lumps in her right breast and recommended biopsy, she went for biopsy, she could not tolerate the pain.  CT scan of the Cervical spine showed Multiple lytic lesions of the cervical and visualized thoracic spine as well as right first and second ribs concerning for metastases versus multiple myeloma, she was admitted under medicine service for further workup and management, she was provided pain meds and her further imaging done showed Metastatic breast cancer with dominant right breast mass, Right chest wall, axillary and retropectoral involvement, Osseous, pulmonary, and likely hepatic metastatic disease. Metastatic adenopathy of the thorax, diffuse extensive osseous metastatic disease with multiple lucent and sclerotic lesions and compression fractures of T1, T9, T12, and L2. Severe T1 compression with epidural extension. Cord compression cannot be excluded. Additional neoplastic disease of the spine with epidural extension. Multifocal neoplastic lesions involving the pelvis and ribs as well, she was seen by Oncology, CT surgery and Neurosurgery team she underwent C7-T2 corpectomy & fusion, posterior C4-T5 fusion, managed in the ICU post operative and was extubated, Post op course uncomplicated, gomez removed pt requiring some straight cath, pt also with some subjective dysphagia - seen by speech therapy recommended pureed diet which patient continues to prefer. Patient with low grade temp and positive UA> Started oN IV rocephin for UTI. Blood cultures negative, Urine cutlure positive for GNR> Afebrile, leukocytosis and urinary symptoms improved. Changed to PO Vantin for 7 days. Discharge to Acute rehab     Assessment/plan:    1. Extensive osseous metastatic disease with pathologic compression fracture deformities at C7, T1, T9, T12 and L2, severe at T1  Secondary to  Primary metastatic Breast CA  -s/p  C7-T2 corpectomy & fusion, posterior C4-T5 fusion   - Robaxin 500mg TID  - Gabapentin 200mg TID increased to 600mg   - Dilaudid 2mg Q4 hours prn for mod pain; 4mg Q4 hours prn for severe pain  - Oxycodone ER 20mg Q12 hours   - Bowel regimen- Encouraged to USe miralax and senna daily on narcotics   - wound care as per Neurosurgery team     2. Fever likely secondary to UTI   - Leukocytosis resolved  - Blood cultures x 2 were negative   - Chest xray negative   - Afebrile  - UA positive on i V rocephin  - URine culture positive for GNR  - PO vnatin x 7 days     3. Post operative urinary retention   - Resolved    4. Large right breast mass:   - likely cancer with mets to spine, brain, liver  - s/p right breast biopsy shows  positive for invasive ductal carcinoma     - Oncology - Chemotherapy after neurosx wound heals 3-4 weeks  - Radiation oncology consult for brain mets to follow up as outpatient post rehab once surgical wounds have healed   = Patient will not require chemo/radiation while in rehab     5. Hypercalcemia due to bone mets malignancy   - s/p IVF , improved   - Monitor BMP     6. Moderate protein malia malnutrition   - Ensure    DVT prophylaxis: Lovenox Subcut    PT following     Discharge disposition: MEdically stable for discharge to Acute rehab

## 2022-09-30 NOTE — H&P ADULT - NSHPPHYSICALEXAM_GEN_ALL_CORE
PHYSICAL EXAM  VITALS  T(C): 36.8 (09-30-22 @ 18:00), Max: 37.3 (09-29-22 @ 20:33)  HR: 99 (09-30-22 @ 18:00) (88 - 105)  BP: 127/77 (09-30-22 @ 18:00) (122/80 - 140/72)  RR: 14 (09-30-22 @ 18:00) (14 - 19)  SpO2: 94% (09-30-22 @ 18:00) (92% - 94%)    Gen - NAD, Comfortable  HEENT - NCAT, EOMI, MMM  Neck - Supple, No limited ROM  Pulm - CTAB, No wheeze, No rhonchi, No crackles  Cardiovascular - RRR, S1S2, No murmurs  Chest - good chest expansion, good respiratory effort, + cervical collar  Abdomen - Soft, NT/ND, +BS  Extremities - No Cyanosis, no clubbing, no edema, no calf tenderness  Neuro-     Cognitive - awake, alert, oriented to person, place, date, year, and situation     Communication - Fluent, Comprehensible     Attention: Intact, able to state days of week chronologically and backwards.      Memory: Recall 3 objects immediate and 3 min later     Cranial Nerves - CN 2-12 intact.      Motor -                     LEFT    UE - ShAB 4/5, EF 4/5, EE 4/5,  4/5                    RIGHT UE - ShAB 4/5, EF 4/5, EE 4/5,   4/5                    LEFT    LE - HF 5/5, KE 5/5, DF 5/5, PF 5/5                    RIGHT LE - HF 5/5, KE 5/5, DF 5/5, PF 5/5        Sensory - Decreased to b/l upper extremities       Reflexes - DTR Intact     Coordination - FTN intact      Tone - normal  Psychiatric - Mood stable, Affect WNL  Skin:  surgical incision to anterior neck with steristrips, posterior cervical and thoracic spine incision with primary dressing

## 2022-09-30 NOTE — H&P ADULT - NSHPSOCIALHISTORY_GEN_ALL_CORE
Smoking - Denied  EtOH - Denied   Drugs - Denied     Marital status: Single    Patient does not have her own home. She is a live-in  for dementia patients.  Pt was staying in a hotel pending her next assignment.  PTA: Independent in ADLs and ambulation     CURRENT FUNCTIONAL STATUS  Date: 9/29  Bed Mobility: Max A, 1 person  Transfers: Unable to perform, pt declined due to pain, requested to lay back down. Mod A, 2 person assist on 9/26  Gait:  Mod A, 1 person, 3ft with RW on 9/26  Upper Body Dressing: Max A on 9/26  Lower Body Dressing: Max A on 9/26 Smoking - Denied  EtOH - Denied   Drugs - Denied     Marital status: Single    Patient does not have her own home. 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  PTA: Independent in ADLs and ambulation     CURRENT FUNCTIONAL STATUS  Date: 9/29  Bed Mobility: Max A, 1 person  Transfers: Unable to perform, pt declined due to pain, requested to lay back down. Mod A, 2 person assist on 9/26  Gait:  Mod A, 1 person, 3ft with RW on 9/26  Upper Body Dressing: Max A on 9/26  Lower Body Dressing: Max A on 9/26

## 2022-09-30 NOTE — PROGRESS NOTE ADULT - REASON FOR ADMISSION
Weakness, new breast mass with spinal lesions suspicious for metastasis
spine mets
Weakness, new breast mass with spinal lesions suspicious for metastasis

## 2022-09-30 NOTE — PATIENT PROFILE ADULT - FUNCTIONAL ASSESSMENT - BASIC MOBILITY 6.
1-calculated by average/Not able to assess (calculate score using [facility] averaging method)

## 2022-09-30 NOTE — PATIENT PROFILE ADULT - FALL HARM RISK - HARM RISK INTERVENTIONS

## 2022-09-30 NOTE — DISCHARGE NOTE NURSING/CASE MANAGEMENT/SOCIAL WORK - NSDCPEFALRISK_GEN_ALL_CORE
For information on Fall & Injury Prevention, visit: [email or web address]  [email or web address] OR  https://www.cdc.gov/steadi/patient.html

## 2022-09-30 NOTE — H&P ADULT - NSHPLABSRESULTS_GEN_ALL_CORE
RECENT LABS/IMAGING                        9.0    6.03  )-----------( 308      ( 30 Sep 2022 06:35 )             26.5     09-30    136  |  99  |  6.1<L>  ----------------------------<  125<H>  3.5   |  28.0  |  0.41<L>    Ca    9.9      30 Sep 2022 06:35    CT Thoracic Spine No Cont (09.22.22 @ 04:06)     IMPRESSION:    The patient is status post C7, T1, T2 corpectomy with cage device   placement. Anterior fixation hardware at the C6 & T3 levels with   vertebral body screws and anterior fixation plate. Bilateral posterior   fixation screws at the C4, C5, C6, T3, T4 levels and vertical stabilizing   rods.  Expected postoperative soft changes changes at the operative  sites. Hardware is in satisfactory position. Diffuse cervical, thoracic,   lumbar spine osseous metastasis.     CT Thoracic Spine No Cont (09.20.22 @ 12:02)     CT CERVICAL SPINE:  Osseous metastases and pathologic fractures as described    CT THORACIC SPINE:  Osseous metastases and pathologic fractures as described.    MR Cervical Spine w/wo IV Cont (09.11.22 @ 11:27)     IMPRESSION:  Extensive osseous metastatic disease with pathologic compression fracture deformities at C7, T1, T9, T12 and L2, severe at T1.    Abnormal ventral and dorsal epidural enhancement from C7 through T2. Severe canal stenosis at T1 and L2 without associated cord or cauda equina compression.    There is motion artifact present in the thoracic spine which limits evaluation. No abnormal leptomeningeal enhancement or definite cord signal abnormality.      MR Head w/wo IV Cont (09.11.22 @ 11:27)     IMPRESSION:  Subcentimeter enhancing parenchymal lesions as above compatible with intracranial metastatic disease. No associated vasogenic edema. No evidence of acute infarct or hemorrhage. Likely left parietal calvarial osseous metastatic lesion.      CT Abdomen and Pelvis w/ IV Cont (09.09.22 @ 21:42) >    IMPRESSION:  Metastatic breast cancer with dominant right breast mass. Inflammatory right breast cancer cannot be excluded. Right chest wall, axillary and retropectoral involvement.    Osseous, pulmonary, and likely hepatic metastatic disease. Metastatic adenopathy of the thorax.    Diffuse extensive osseous metastatic disease with multiple lucent and   sclerotic lesions and compression fractures of T1, T9, T12, and L2. Severe T1 compression with epidural extension. Cord compression cannot be excluded. Additional neoplastic disease of the spine with epidural   extension. Multifocal neoplastic lesions involving the pelvis and ribs as well. Correlate with pending spinal MRI to evaluate for cord compression.      CT Cervical Spine No Cont (09.09.22 @ 21:34)     IMPRESSION:    Head CT: No displaced calvarial fracture or acute intracranial hemorrhage.    Cervical spine CT: No acute fracture. Multiple lytic lesions of the cervical and visualized thoracic spine as well as right first and second ribs concerning for metastases versus multiple myeloma.

## 2022-10-01 LAB
-  AMIKACIN: SIGNIFICANT CHANGE UP
-  AMOXICILLIN/CLAVULANIC ACID: SIGNIFICANT CHANGE UP
-  AMPICILLIN/SULBACTAM: SIGNIFICANT CHANGE UP
-  AMPICILLIN: SIGNIFICANT CHANGE UP
-  AZTREONAM: SIGNIFICANT CHANGE UP
-  CEFAZOLIN: SIGNIFICANT CHANGE UP
-  CEFEPIME: SIGNIFICANT CHANGE UP
-  CEFOXITIN: SIGNIFICANT CHANGE UP
-  CEFTRIAXONE: SIGNIFICANT CHANGE UP
-  CIPROFLOXACIN: SIGNIFICANT CHANGE UP
-  ERTAPENEM: SIGNIFICANT CHANGE UP
-  GENTAMICIN: SIGNIFICANT CHANGE UP
-  IMIPENEM: SIGNIFICANT CHANGE UP
-  LEVOFLOXACIN: SIGNIFICANT CHANGE UP
-  MEROPENEM: SIGNIFICANT CHANGE UP
-  NITROFURANTOIN: SIGNIFICANT CHANGE UP
-  PIPERACILLIN/TAZOBACTAM: SIGNIFICANT CHANGE UP
-  TIGECYCLINE: SIGNIFICANT CHANGE UP
-  TOBRAMYCIN: SIGNIFICANT CHANGE UP
-  TRIMETHOPRIM/SULFAMETHOXAZOLE: SIGNIFICANT CHANGE UP
ALBUMIN SERPL ELPH-MCNC: 1.8 G/DL — LOW (ref 3.3–5)
ALP SERPL-CCNC: 157 U/L — HIGH (ref 40–120)
ALT FLD-CCNC: 43 U/L — SIGNIFICANT CHANGE UP (ref 10–45)
ANION GAP SERPL CALC-SCNC: 8 MMOL/L — SIGNIFICANT CHANGE UP (ref 5–17)
AST SERPL-CCNC: 59 U/L — HIGH (ref 10–40)
BASOPHILS # BLD AUTO: 0.05 K/UL — SIGNIFICANT CHANGE UP (ref 0–0.2)
BASOPHILS NFR BLD AUTO: 1 % — SIGNIFICANT CHANGE UP (ref 0–2)
BILIRUB SERPL-MCNC: 0.4 MG/DL — SIGNIFICANT CHANGE UP (ref 0.2–1.2)
BUN SERPL-MCNC: 6 MG/DL — LOW (ref 7–23)
CALCIUM SERPL-MCNC: 10.4 MG/DL — SIGNIFICANT CHANGE UP (ref 8.4–10.5)
CHLORIDE SERPL-SCNC: 106 MMOL/L — SIGNIFICANT CHANGE UP (ref 96–108)
CO2 SERPL-SCNC: 30 MMOL/L — SIGNIFICANT CHANGE UP (ref 22–31)
CREAT SERPL-MCNC: 0.57 MG/DL — SIGNIFICANT CHANGE UP (ref 0.5–1.3)
CULTURE RESULTS: SIGNIFICANT CHANGE UP
EGFR: 100 ML/MIN/1.73M2 — SIGNIFICANT CHANGE UP
EOSINOPHIL # BLD AUTO: 0.11 K/UL — SIGNIFICANT CHANGE UP (ref 0–0.5)
EOSINOPHIL NFR BLD AUTO: 2 % — SIGNIFICANT CHANGE UP (ref 0–6)
GLUCOSE SERPL-MCNC: 103 MG/DL — HIGH (ref 70–99)
HCT VFR BLD CALC: 26 % — LOW (ref 34.5–45)
HGB BLD-MCNC: 8.9 G/DL — LOW (ref 11.5–15.5)
LYMPHOCYTES # BLD AUTO: 0.69 K/UL — LOW (ref 1–3.3)
LYMPHOCYTES # BLD AUTO: 13 % — SIGNIFICANT CHANGE UP (ref 13–44)
MCHC RBC-ENTMCNC: 30.5 PG — SIGNIFICANT CHANGE UP (ref 27–34)
MCHC RBC-ENTMCNC: 34.2 GM/DL — SIGNIFICANT CHANGE UP (ref 32–36)
MCV RBC AUTO: 89 FL — SIGNIFICANT CHANGE UP (ref 80–100)
METHOD TYPE: SIGNIFICANT CHANGE UP
MONOCYTES # BLD AUTO: 0.27 K/UL — SIGNIFICANT CHANGE UP (ref 0–0.9)
MONOCYTES NFR BLD AUTO: 5 % — SIGNIFICANT CHANGE UP (ref 2–14)
NEUTROPHILS # BLD AUTO: 4.2 K/UL — SIGNIFICANT CHANGE UP (ref 1.8–7.4)
NEUTROPHILS NFR BLD AUTO: 79 % — HIGH (ref 43–77)
ORGANISM # SPEC MICROSCOPIC CNT: SIGNIFICANT CHANGE UP
ORGANISM # SPEC MICROSCOPIC CNT: SIGNIFICANT CHANGE UP
PLATELET # BLD AUTO: 300 K/UL — SIGNIFICANT CHANGE UP (ref 150–400)
POTASSIUM SERPL-MCNC: 3.5 MMOL/L — SIGNIFICANT CHANGE UP (ref 3.5–5.3)
POTASSIUM SERPL-SCNC: 3.5 MMOL/L — SIGNIFICANT CHANGE UP (ref 3.5–5.3)
PROT SERPL-MCNC: 5.4 G/DL — LOW (ref 6–8.3)
RBC # BLD: 2.92 M/UL — LOW (ref 3.8–5.2)
RBC # FLD: 13.5 % — SIGNIFICANT CHANGE UP (ref 10.3–14.5)
SODIUM SERPL-SCNC: 144 MMOL/L — SIGNIFICANT CHANGE UP (ref 135–145)
SPECIMEN SOURCE: SIGNIFICANT CHANGE UP
WBC # BLD: 5.32 K/UL — SIGNIFICANT CHANGE UP (ref 3.8–10.5)
WBC # FLD AUTO: 5.32 K/UL — SIGNIFICANT CHANGE UP (ref 3.8–10.5)

## 2022-10-01 PROCEDURE — 99223 1ST HOSP IP/OBS HIGH 75: CPT

## 2022-10-01 PROCEDURE — 99232 SBSQ HOSP IP/OBS MODERATE 35: CPT

## 2022-10-01 RX ORDER — ACETAMINOPHEN 500 MG
650 TABLET ORAL EVERY 6 HOURS
Refills: 0 | Status: DISCONTINUED | OUTPATIENT
Start: 2022-10-01 | End: 2022-10-02

## 2022-10-01 RX ORDER — ONDANSETRON 8 MG/1
4 TABLET, FILM COATED ORAL ONCE
Refills: 0 | Status: COMPLETED | OUTPATIENT
Start: 2022-10-01 | End: 2022-10-01

## 2022-10-01 RX ORDER — SIMETHICONE 80 MG/1
80 TABLET, CHEWABLE ORAL EVERY 6 HOURS
Refills: 0 | Status: DISCONTINUED | OUTPATIENT
Start: 2022-10-01 | End: 2022-10-27

## 2022-10-01 RX ADMIN — HYDROMORPHONE HYDROCHLORIDE 4 MILLIGRAM(S): 2 INJECTION INTRAMUSCULAR; INTRAVENOUS; SUBCUTANEOUS at 08:25

## 2022-10-01 RX ADMIN — GABAPENTIN 600 MILLIGRAM(S): 400 CAPSULE ORAL at 13:38

## 2022-10-01 RX ADMIN — SIMETHICONE 80 MILLIGRAM(S): 80 TABLET, CHEWABLE ORAL at 18:36

## 2022-10-01 RX ADMIN — ONDANSETRON 4 MILLIGRAM(S): 8 TABLET, FILM COATED ORAL at 18:35

## 2022-10-01 RX ADMIN — OXYCODONE HYDROCHLORIDE 20 MILLIGRAM(S): 5 TABLET ORAL at 05:26

## 2022-10-01 RX ADMIN — METHOCARBAMOL 750 MILLIGRAM(S): 500 TABLET, FILM COATED ORAL at 05:35

## 2022-10-01 RX ADMIN — OXYCODONE HYDROCHLORIDE 20 MILLIGRAM(S): 5 TABLET ORAL at 06:26

## 2022-10-01 RX ADMIN — Medication 200 MILLIGRAM(S): at 05:26

## 2022-10-01 RX ADMIN — ENOXAPARIN SODIUM 40 MILLIGRAM(S): 100 INJECTION SUBCUTANEOUS at 05:26

## 2022-10-01 RX ADMIN — METHOCARBAMOL 750 MILLIGRAM(S): 500 TABLET, FILM COATED ORAL at 13:38

## 2022-10-01 RX ADMIN — GABAPENTIN 600 MILLIGRAM(S): 400 CAPSULE ORAL at 05:25

## 2022-10-01 RX ADMIN — HYDROMORPHONE HYDROCHLORIDE 4 MILLIGRAM(S): 2 INJECTION INTRAMUSCULAR; INTRAVENOUS; SUBCUTANEOUS at 07:33

## 2022-10-01 NOTE — DIETITIAN INITIAL EVALUATION ADULT - OTHER INFO
65 yo female PMH left arm skin CA s/p excision, stomach ulcer who presented to [facility] 9/10/22 with progressive weakness, found to have multiple lytic lesions of the cervical and thoracic spine as well as right first and second ribs, lung, liver secondary to metastatic breast cancer with dominant right breast mass, right chest wall, axillary and retropectoral involvement. Compression fractures of T1, T9, T12, and L2. Severe T1 compression with epidural extension. She underwent C7-T2 corpectomy & fusion, posterior C4-T5 fusion, hospital course significant for urinary retention and dysphagia, UTI    Patient reports poor appetite this morning related to nausea and c/o "feeling gassy due to narcotics".  Patient noted with poor PO intakes (26-50% intakes as per flowsheets). Patient states that she only consumed her pureed oatmeal at breakfast. Patient confirms she is lactose intolerant. Cervical collar and TLSO brace noted. Aspiration precautions noted (as per H&P). Patient currently on a pureed diet w/ thin liquids. SLP eval noted on 9/23 at [facility] and recommended pureed diet w/ thin liquids. Surgical incision to anterior neck with steristrips, posterior cervical and thoracic spine incision with primary dressing noted. Last BM noted on 9/30. No edema noted. NFPE indicated severe malnutrition as evidenced by muscle & subcutaneous fat loss, and 13.33% unintentional weight loss within 7 months. Education provided on proper nutrition and need for supplementation. Patient receptive to receive Ensure Plus High Protein 8oz PO BID (Provides 700kcal & 40grams of Protein) and a homemade smoothie daily (provides almond milk, banana, and peanut butter) to optimize PO intake and nutritional status.

## 2022-10-01 NOTE — CONSULT NOTE ADULT - ASSESSMENT
67 yo female PMH stomach ulcer who presented to [facility] 9/10/22 with progressive weakness, discovered multiple lytic lesions of the cervical and thoracic spine along with lung and liver secondary to metastatic primary newly discovered breast cancer with axillary and retropectoral involvement. Imaging also showed Compression fractures of T1, T9, T12, and L2. Severe T1 compression with epidural extension.S/p C7-T2 corpectomy & fusion, posterior C4-T5 fusion, hospital course significant for urinary retention and dysphagia, UTI    Rehab: Functional and gait instability:  - C/w PT/OT per primary team     Onc: Metastatic Breast CA to lung, liver, ribs, brain with spine involvement and neurogenic bladder  - s/p right breast biopsy shows  positive for invasive ductal carcinoma  - Compression fractures of T1, T9, T12, and L2. Severe T1 compression with epidural extension.   - s/p C7-T2 corpectomy & fusion, posterior C4-T5 fusion  - Patient will not require chemo/radiation while in rehab. 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  - Precautions: CA, fall, aspiration, spinal, cervical collar, TLSO Brace  - Pain management per primary team    ID: UTI  - PO Vantin BID for 7 days  - monitor bladder scans  - SC for PVR >350 ml, toileting schedule    DVT ppx  - Lovenox  - SCD, TEDs

## 2022-10-01 NOTE — DIETITIAN INITIAL EVALUATION ADULT - PERTINENT LABORATORY DATA
10-01    144  |  106  |  6<L>  ----------------------------<  103<H>  3.5   |  30  |  0.57    Ca    10.4      01 Oct 2022 07:45    TPro  5.4<L>  /  Alb  1.8<L>  /  TBili  0.4  /  DBili  x   /  AST  59<H>  /  ALT  43  /  AlkPhos  157<H>  10-01

## 2022-10-01 NOTE — DIETITIAN INITIAL EVALUATION ADULT - FEEDING SKILL
February 6, 2019        Re: Eva Ribera  [address] [place] [address]        This is to certify that Eva Ribera has been under my care and can return to  physical education with the following restrictions:     RESTRICTIONS: Avoid repetitive bending and twisting activities                    SIGNATURE:___________________________________________,   2/06/2019  Dr. Claude Gibbs       minimal assistance/total assistance

## 2022-10-01 NOTE — DIETITIAN NUTRITION RISK NOTIFICATION - ADDITIONAL COMMENTS/DIETITIAN RECOMMENDATIONS
Ensure Plus High Protein 8oz PO BID (Provides 700kcal & 40grams of Protein) and a homemade smoothie daily (provides almond milk, banana, and peanut butter) to optimize PO intake and nutritional status.

## 2022-10-01 NOTE — PROGRESS NOTE ADULT - SUBJECTIVE AND OBJECTIVE BOX
Chief complaint: no acute events overnight, comfortable now, pain is better controlled     Patient is a 66y old  Female who presents with a chief complaint of metastatic breast CA to spine s/p  C7-T2 corpectomy & fusion, posterior C4-T5 fusion (01 Oct 2022 12:41)    PAST MEDICAL & SURGICAL HISTORY:  Stomach ulcer  20 years ago   no pain not taking any meds      Skin cancer of arm, left  pt was 16 year old      H/O excision of mass  left arm  when patient was 16 years ago      S/P hysterectomy  VITALS  Vital Signs Last 24 Hrs  T(C): 36.5 (01 Oct 2022 09:47), Max: 36.8 (30 Sep 2022 18:00)  T(F): 97.7 (01 Oct 2022 09:47), Max: 98.3 (30 Sep 2022 18:00)  HR: 95 (01 Oct 2022 09:47) (95 - 99)  BP: 138/80 (01 Oct 2022 09:47) (127/77 - 138/80)  BP(mean): --  RR: 15 (01 Oct 2022 09:47) (14 - 15)  SpO2: 94% (01 Oct 2022 09:47) (94% - 95%)    Parameters below as of 01 Oct 2022 09:47  Patient On (Oxygen Delivery Method): room air          PHYSICAL EXAM  Constitutional - NAD, Comfortable  HEENT - NCAT, EOMI  Chest - CTA bilaterally  Cardiovascular - RRR, S1S2  Abdomen -  Soft, NTND  Extremities - No C/C/E, No calf tenderness   Neurologic Exam -                    Cognitive - Awake, Alert, AAO X3     No new focal deficits                     RECENT LABS                        8.9    5.32  )-----------( 300      ( 01 Oct 2022 07:45 )             26.0     10-01    144  |  106  |  6<L>  ----------------------------<  103<H>  3.5   |  30  |  0.57    Ca    10.4      01 Oct 2022 07:45    TPro  5.4<L>  /  Alb  1.8<L>  /  TBili  0.4  /  DBili  x   /  AST  59<H>  /  ALT  43  /  AlkPhos  157<H>  10-01              CURRENT MEDICATIONS    MEDICATIONS  (STANDING):  cefpodoxime 200 milliGRAM(s) Oral every 12 hours  enoxaparin Injectable 40 milliGRAM(s) SubCutaneous every 24 hours  gabapentin 600 milliGRAM(s) Oral every 8 hours  methocarbamol 750 milliGRAM(s) Oral three times a day  oxyCODONE  ER Tablet 20 milliGRAM(s) Oral every 12 hours  polyethylene glycol 3350 17 Gram(s) Oral every 12 hours  senna 2 Tablet(s) Oral at bedtime    MEDICATIONS  (PRN):  acetaminophen     Tablet .. 650 milliGRAM(s) Oral every 6 hours PRN Temp greater or equal to 38C (100.4F), Mild Pain (1 - 3)  bisacodyl Suppository 10 milliGRAM(s) Rectal daily PRN Constipation  HYDROmorphone   Tablet 4 milliGRAM(s) Oral every 4 hours PRN Severe Pain (7 - 10)  HYDROmorphone   Tablet 2 milliGRAM(s) Oral every 4 hours PRN Moderate Pain (4 - 6)    ASSESSMENT & PLAN          GI/Bowel Management - Dulcolax PRN, Fleet PRN   Management - Toilet Q2  Skin - Turn Q2  Pain - Tylenol PRN  DVT PPX - Lovenox  Spinal precautions - will keep patient on bedrest until TLSO is located       Continue comprehensive acute rehab program consisting of 3hrs/day of OT/PT and SLP.

## 2022-10-01 NOTE — DIETITIAN INITIAL EVALUATION ADULT - REASON FOR ADMISSION
Pathological fracture in neoplastic disease, other specified site, initial encounter for fracture

## 2022-10-01 NOTE — DIETITIAN INITIAL EVALUATION ADULT - PERTINENT MEDS FT
MEDICATIONS  (STANDING):  cefpodoxime 200 milliGRAM(s) Oral every 12 hours  enoxaparin Injectable 40 milliGRAM(s) SubCutaneous every 24 hours  gabapentin 600 milliGRAM(s) Oral every 8 hours  methocarbamol 750 milliGRAM(s) Oral three times a day  oxyCODONE  ER Tablet 20 milliGRAM(s) Oral every 12 hours  polyethylene glycol 3350 17 Gram(s) Oral every 12 hours  senna 2 Tablet(s) Oral at bedtime    MEDICATIONS  (PRN):  acetaminophen     Tablet .. 650 milliGRAM(s) Oral every 6 hours PRN Temp greater or equal to 38C (100.4F), Mild Pain (1 - 3)  bisacodyl Suppository 10 milliGRAM(s) Rectal daily PRN Constipation  HYDROmorphone   Tablet 4 milliGRAM(s) Oral every 4 hours PRN Severe Pain (7 - 10)  HYDROmorphone   Tablet 2 milliGRAM(s) Oral every 4 hours PRN Moderate Pain (4 - 6)

## 2022-10-01 NOTE — DIETITIAN INITIAL EVALUATION ADULT - ADD RECOMMEND
1) Recommend Ensure Plus High Protein 8oz PO BID (Provides 700kcal & 40grams of Protein) and a homemade smoothie daily (provides almond milk, banana, and peanut butter) to optimize PO intake and nutritional status.  2) Follow up with SLP recommendations.  3) Recommend obtain daily PO intakes and weights   4) Encourage PO intake and honor patients daily food preferences as feasible with diet order.

## 2022-10-01 NOTE — DIETITIAN INITIAL EVALUATION ADULT - ORAL INTAKE PTA/DIET HISTORY
Diet History PTA (at home): Breakfast: Hard Boiled Egg w/ Collagen Shake w/ cinnamon and unsweetened coconut milk. Lunch: Bone broth shake, chicken or tuna, vegetables, coffee. Dinner: Chicken, tuna, salmon, vegetables. Patient reports that she does not consume much carbohydrates because it "puts weight on her". Per patient, UBW was 150 lb in March 2022. CBW noted to be 130.2 (9/30). SLP michael noted on 9/23 at [facility] and recommended pureed diet w/ thin liquids.

## 2022-10-02 LAB
ANION GAP SERPL CALC-SCNC: 12 MMOL/L — SIGNIFICANT CHANGE UP (ref 5–17)
APPEARANCE UR: ABNORMAL
BACTERIA # UR AUTO: ABNORMAL /HPF
BILIRUB UR-MCNC: NEGATIVE — SIGNIFICANT CHANGE UP
BUN SERPL-MCNC: 7 MG/DL — SIGNIFICANT CHANGE UP (ref 7–23)
CALCIUM SERPL-MCNC: 10.7 MG/DL — HIGH (ref 8.4–10.5)
CHLORIDE SERPL-SCNC: 103 MMOL/L — SIGNIFICANT CHANGE UP (ref 96–108)
CO2 SERPL-SCNC: 25 MMOL/L — SIGNIFICANT CHANGE UP (ref 22–31)
COLOR SPEC: YELLOW — SIGNIFICANT CHANGE UP
COMMENT - URINE: SIGNIFICANT CHANGE UP
CREAT SERPL-MCNC: 0.36 MG/DL — LOW (ref 0.5–1.3)
DIFF PNL FLD: ABNORMAL
EGFR: 112 ML/MIN/1.73M2 — SIGNIFICANT CHANGE UP
EPI CELLS # UR: SIGNIFICANT CHANGE UP
GLUCOSE SERPL-MCNC: 94 MG/DL — SIGNIFICANT CHANGE UP (ref 70–99)
GLUCOSE UR QL: NEGATIVE — SIGNIFICANT CHANGE UP
HCT VFR BLD CALC: 27.4 % — LOW (ref 34.5–45)
HGB BLD-MCNC: 9.3 G/DL — LOW (ref 11.5–15.5)
KETONES UR-MCNC: ABNORMAL
LEUKOCYTE ESTERASE UR-ACNC: ABNORMAL
MCHC RBC-ENTMCNC: 30.2 PG — SIGNIFICANT CHANGE UP (ref 27–34)
MCHC RBC-ENTMCNC: 33.9 GM/DL — SIGNIFICANT CHANGE UP (ref 32–36)
MCV RBC AUTO: 89 FL — SIGNIFICANT CHANGE UP (ref 80–100)
NITRITE UR-MCNC: NEGATIVE — SIGNIFICANT CHANGE UP
NRBC # BLD: 0 /100 WBCS — SIGNIFICANT CHANGE UP (ref 0–0)
PH UR: 6.5 — SIGNIFICANT CHANGE UP (ref 5–8)
PLATELET # BLD AUTO: 347 K/UL — SIGNIFICANT CHANGE UP (ref 150–400)
POTASSIUM SERPL-MCNC: 3.1 MMOL/L — LOW (ref 3.5–5.3)
POTASSIUM SERPL-SCNC: 3.1 MMOL/L — LOW (ref 3.5–5.3)
PROT UR-MCNC: 30 MG/DL
RBC # BLD: 3.08 M/UL — LOW (ref 3.8–5.2)
RBC # FLD: 13.4 % — SIGNIFICANT CHANGE UP (ref 10.3–14.5)
RBC CASTS # UR COMP ASSIST: ABNORMAL /HPF (ref 0–4)
SODIUM SERPL-SCNC: 140 MMOL/L — SIGNIFICANT CHANGE UP (ref 135–145)
SP GR SPEC: 1.01 — SIGNIFICANT CHANGE UP (ref 1.01–1.02)
UROBILINOGEN FLD QL: NEGATIVE — SIGNIFICANT CHANGE UP
WBC # BLD: 6.72 K/UL — SIGNIFICANT CHANGE UP (ref 3.8–10.5)
WBC # FLD AUTO: 6.72 K/UL — SIGNIFICANT CHANGE UP (ref 3.8–10.5)
WBC UR QL: ABNORMAL /HPF (ref 0–5)

## 2022-10-02 PROCEDURE — 99232 SBSQ HOSP IP/OBS MODERATE 35: CPT

## 2022-10-02 PROCEDURE — 74176 CT ABD & PELVIS W/O CONTRAST: CPT | Mod: 26

## 2022-10-02 RX ORDER — ACETAMINOPHEN 500 MG
650 TABLET ORAL EVERY 6 HOURS
Refills: 0 | Status: DISCONTINUED | OUTPATIENT
Start: 2022-10-02 | End: 2022-10-02

## 2022-10-02 RX ORDER — GABAPENTIN 400 MG/1
600 CAPSULE ORAL THREE TIMES A DAY
Refills: 0 | Status: DISCONTINUED | OUTPATIENT
Start: 2022-10-02 | End: 2022-10-07

## 2022-10-02 RX ORDER — SACCHAROMYCES BOULARDII 250 MG
250 POWDER IN PACKET (EA) ORAL
Refills: 0 | Status: DISCONTINUED | OUTPATIENT
Start: 2022-10-02 | End: 2022-10-27

## 2022-10-02 RX ORDER — POTASSIUM CHLORIDE 20 MEQ
40 PACKET (EA) ORAL EVERY 4 HOURS
Refills: 0 | Status: COMPLETED | OUTPATIENT
Start: 2022-10-02 | End: 2022-10-02

## 2022-10-02 RX ORDER — ACETAMINOPHEN 500 MG
650 TABLET ORAL EVERY 6 HOURS
Refills: 0 | Status: DISCONTINUED | OUTPATIENT
Start: 2022-10-02 | End: 2022-10-27

## 2022-10-02 RX ORDER — ONDANSETRON 8 MG/1
4 TABLET, FILM COATED ORAL ONCE
Refills: 0 | Status: COMPLETED | OUTPATIENT
Start: 2022-10-02 | End: 2022-10-02

## 2022-10-02 RX ADMIN — ENOXAPARIN SODIUM 40 MILLIGRAM(S): 100 INJECTION SUBCUTANEOUS at 12:41

## 2022-10-02 RX ADMIN — ONDANSETRON 4 MILLIGRAM(S): 8 TABLET, FILM COATED ORAL at 18:00

## 2022-10-02 RX ADMIN — ONDANSETRON 4 MILLIGRAM(S): 8 TABLET, FILM COATED ORAL at 10:17

## 2022-10-02 RX ADMIN — Medication 650 MILLIGRAM(S): at 15:19

## 2022-10-02 RX ADMIN — Medication 40 MILLIEQUIVALENT(S): at 15:19

## 2022-10-02 RX ADMIN — SIMETHICONE 80 MILLIGRAM(S): 80 TABLET, CHEWABLE ORAL at 12:54

## 2022-10-02 RX ADMIN — OXYCODONE HYDROCHLORIDE 20 MILLIGRAM(S): 5 TABLET ORAL at 19:18

## 2022-10-02 RX ADMIN — Medication 650 MILLIGRAM(S): at 16:19

## 2022-10-02 NOTE — PROGRESS NOTE ADULT - ASSESSMENT
67 yo female PMH stomach ulcer who presented to [facility] 9/10/22 with progressive weakness, discovered multiple lytic lesions of the cervical and thoracic spine along with lung and liver secondary to metastatic primary newly discovered breast cancer with axillary and retropectoral involvement. Imaging also showed Compression fractures of T1, T9, T12, and L2. Severe T1 compression with epidural extension.S/p C7-T2 corpectomy & fusion, posterior C4-T5 fusion, hospital course significant for urinary retention and dysphagia, UTI    MSK  Functional and gait instability:  - C/w PT/OT per primary team   Left groin/back/hip pain  - new onset, defer pain regimen to PMR  - monitor neurological exam closely  - check CT ab/pelvis w/o contrast to r/o nephrolithasis vs new lytic lesion  - check CBC/BMP    Hematology/Oncology  Metastatic Breast CA to lung, liver, ribs, brain with spine involvement and neurogenic bladder  - s/p right breast biopsy shows  positive for invasive ductal carcinoma  - Compression fractures of T1, T9, T12, and L2. Severe T1 compression with epidural extension.   - s/p C7-T2 corpectomy & fusion, posterior C4-T5 fusion  - Patient will not require chemo/radiation while in rehab. 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  - Precautions: CA, fall, aspiration, spinal, cervical collar, TLSO Brace  - Pain management per primary team    ID: UTI  - PO Vantin BID for 7 days  - monitor bladder scans  - SC for PVR >350 ml, toileting schedule    Gastroenterology  Diarrhea  - no laxatives  - if continues will need to check C. Diff    DVT ppx  - Lovenox  - SCD, TEDs    Case and recommendations discussed with rehab resident

## 2022-10-02 NOTE — PROGRESS NOTE ADULT - SUBJECTIVE AND OBJECTIVE BOX
Patient seen and examined at bedside. Patient notes that she had 4 loose bowel movements overnight. She also notes left groin pain that radiates to her left hip/back. She denies chest pain, palpitations, shortness of breath, cough, nausea, vomiting, dysuria or urinary frequency. She denies lower extremity weakness. She denies hematuria.     ALLERGIES:  lactose (Other)  tetanus toxoid (Hives)    MEDICATIONS  (STANDING):  cefpodoxime 200 milliGRAM(s) Oral every 12 hours  enoxaparin Injectable 40 milliGRAM(s) SubCutaneous every 24 hours  gabapentin 600 milliGRAM(s) Oral every 8 hours  methocarbamol 750 milliGRAM(s) Oral three times a day  ondansetron   Disintegrating Tablet 4 milliGRAM(s) Oral once  oxyCODONE  ER Tablet 20 milliGRAM(s) Oral every 12 hours  polyethylene glycol 3350 17 Gram(s) Oral every 12 hours  senna 2 Tablet(s) Oral at bedtime    MEDICATIONS  (PRN):  acetaminophen     Tablet .. 650 milliGRAM(s) Oral every 6 hours PRN Temp greater or equal to 38C (100.4F), Mild Pain (1 - 3)  bisacodyl Suppository 10 milliGRAM(s) Rectal daily PRN Constipation  HYDROmorphone   Tablet 4 milliGRAM(s) Oral every 4 hours PRN Severe Pain (7 - 10)  HYDROmorphone   Tablet 2 milliGRAM(s) Oral every 4 hours PRN Moderate Pain (4 - 6)  simethicone 80 milliGRAM(s) Chew every 6 hours PRN Gas    Vital Signs Last 24 Hrs  T(F): 97.8 (01 Oct 2022 23:02), Max: 97.8 (01 Oct 2022 23:02)  HR: 91 (01 Oct 2022 23:02) (82 - 91)  BP: 128/74 (01 Oct 2022 23:02) (128/74 - 132/78)  RR: 15 (01 Oct 2022 23:02) (15 - 16)  SpO2: 94% (01 Oct 2022 23:02) (94% - 95%)  I&O's Summary    BMI (kg/m2): 21.7 (09-30-22 @ 17:49)  PHYSICAL EXAM:  Gen: distressed 2/2 pain  Neuro: aaox3, no focal deficits, c-collar in place  Heent: eomi b/l, no jvd, no oral exudates  Pulm: cta b/l, no w/r/r  CV: +s1s2, no m/r/g  Ab: soft, nt/nd, normoactive bs x 4  Extrem: no edema, pulses intact and equal      LABS:                        9.3    6.72  )-----------( 347      ( 02 Oct 2022 09:25 )             27.4       10-02    140  |  103  |  7   ----------------------------<  94  3.1   |  25  |  0.36    Ca    10.7      02 Oct 2022 09:25    TPro  5.4  /  Alb  1.8  /  TBili  0.4  /  DBili  x   /  AST  59  /  ALT  43  /  AlkPhos  157  10-01                                      Culture - Urine (collected 28 Sep 2022 20:30)  Source: Catheterized Catheterized  Final Report (01 Oct 2022 10:17):    >100,000 CFU/ml Enterobacter cloacae complex  Organism: Enterobacter cloacae complex (01 Oct 2022 10:17)  Organism: Enterobacter cloacae complex (01 Oct 2022 10:17)      -  Amikacin: S <=16      -  Amoxicillin/Clavulanic Acid: R >16/8      -  Ampicillin: R >16 These ampicillin results predict results for amoxicillin      -  Ampicillin/Sulbactam: R >16/8 Enterobacter, Klebsiella aerogenes, Citrobacter, and Serratia may develop resistance during prolonged therapy (3-4 days)      -  Aztreonam: S <=4      -  Cefazolin: R >16      -  Cefepime: S <=2      -  Cefoxitin: R >16      -  Ceftriaxone: R 32 Enterobacter, Klebsiella aerogenes, Citrobacter, and Serratia may develop resistance during prolonged therapy      -  Ciprofloxacin: S <=0.25      -  Ertapenem: S <=0.5      -  Gentamicin: S <=2      -  Imipenem: S <=1      -  Levofloxacin: S <=0.5      -  Meropenem: S <=1      -  Nitrofurantoin: R >64 Should not be used to treat pyelonephritis      -  Piperacillin/Tazobactam: S <=8      -  Tigecycline: S <=2      -  Tobramycin: S <=2      -  Trimethoprim/Sulfamethoxazole: S <=0.5/9.5      Method Type: KERA    Culture - Blood (collected 26 Sep 2022 07:15)  Source: .Blood Blood  Final Report (01 Oct 2022 13:00):    No Growth Final    Culture - Blood (collected 26 Sep 2022 07:10)  Source: .Blood Blood  Final Report (01 Oct 2022 13:00):    No Growth Final      COVID-19 PCR: NotDetec (09-30-22 @ 20:50)  COVID-19 PCR: NotDetec (09-27-22 @ 13:00)  COVID-19 PCR: NotDetec (09-20-22 @ 06:00)  COVID-19 PCR: NotDetec (09-17-22 @ 03:30)      RADIOLOGY & ADDITIONAL TESTS:    Care Discussed with Consultants/Other Providers:

## 2022-10-02 NOTE — PROGRESS NOTE ADULT - SUBJECTIVE AND OBJECTIVE BOX
Chief complaint: multiple loose BMs - on Vantin, laxatives, reports abdominal discomfort as well     Patient is a 66y old  Female who presents with a chief complaint of metastatic breast CA to spine s/p  C7-T2 corpectomy & fusion, posterior C4-T5 fusion (02 Oct 2022 09:54)    PAST MEDICAL & SURGICAL HISTORY:  Stomach ulcer  20 years ago   no pain not taking any meds      Skin cancer of arm, left  pt was 16 year old      H/O excision of mass  left arm  when patient was 16 years ago      S/P hysterectomy          VITALS  Vital Signs Last 24 Hrs  T(C): 36.6 (02 Oct 2022 10:29), Max: 36.6 (01 Oct 2022 23:02)  T(F): 97.8 (02 Oct 2022 10:29), Max: 97.8 (01 Oct 2022 23:02)  HR: 104 (02 Oct 2022 10:29) (82 - 104)  BP: 144/90 (02 Oct 2022 10:29) (128/74 - 144/90)  BP(mean): --  RR: 16 (02 Oct 2022 10:29) (15 - 16)  SpO2: 97% (02 Oct 2022 10:29) (94% - 97%)    Parameters below as of 02 Oct 2022 10:29  Patient On (Oxygen Delivery Method): room air          PHYSICAL EXAM  Constitutional - NAD, Comfortable  HEENT - NCAT, EOMI  Neck - Supple, No limited ROM  Chest - CTA bilaterally  Cardiovascular - RRR, S1S2  Abdomen -  Soft  Extremities - No calf tenderness   Neurologic Exam -                    Cognitive - Awake, Alert, AAO X3     No new focal deficits                      RECENT LABS                        9.3    6.72  )-----------( 347      ( 02 Oct 2022 09:25 )             27.4     10-02    140  |  103  |  7   ----------------------------<  94  3.1<L>   |  25  |  0.36<L>    Ca    10.7<H>      02 Oct 2022 09:25    TPro  5.4<L>  /  Alb  1.8<L>  /  TBili  0.4  /  DBili  x   /  AST  59<H>  /  ALT  43  /  AlkPhos  157<H>  10-01      Urinalysis Basic - ( 02 Oct 2022 10:50 )    Color: Yellow / Appearance: Slightly Turbid / S.015 / pH: x  Gluc: x / Ketone: Large  / Bili: Negative / Urobili: Negative   Blood: x / Protein: 30 mg/dL / Nitrite: Negative   Leuk Esterase: Moderate / RBC: 5-10 /HPF / WBC 11-25 /HPF   Sq Epi: x / Non Sq Epi: Neg.-Few / Bacteria: Few /HPF          RADIOLOGY/OTHER RESULTS      ACC: [id number] EXAM:  CT ABDOMEN AND PELVIS                          PROCEDURE DATE:  10/02/2022          INTERPRETATION:  CLINICAL INFORMATION: Metastatic breast cancer with   abdominal pain.    COMPARISON: CT 2022    CONTRAST/COMPLICATIONS:  IV Contrast: NONE  Oral Contrast: NONE  Complications: None reported at time of study completion    PROCEDURE:  CT of the Abdomen and Pelvis was performed.  Sagittal and coronal reformats were performed.    FINDINGS:    Evaluation of the solid organs and vasculature is limited in the absence   of intravenous contrast.  LOWER CHEST: Small new bilateral pleural effusions with bibasilar   atelectasis. Ill-defined pulmonary nodules at both lung bases lung bases,   largest an 8 mm right lower lobe nodule near the fissure, unchanged.   Small pericardial effusion.    LIVER: Ill-defined hypodense bilobar hepatic lesions again noted,   suspicious for metastatic disease.  BILE DUCTS: Normal caliber.  GALLBLADDER: Within normal limits.  SPLEEN: Within normal limits.  PANCREAS: Within normal limits.  ADRENALS: Within normal limits.  KIDNEYS/URETERS: Horseshoe kidney. Small 2 mm nonobstructing left renal   calculus. No hydronephrosis.    BLADDER: Within normal limits.  REPRODUCTIVE ORGANS: Hysterectomy.    BOWEL: No bowel obstruction. Appendix is not visualized. No evidence of   inflammation in the pericecal region.  PERITONEUM: No ascites.  VESSELS: Within normal limits.  RETROPERITONEUM/LYMPH NODES: No lymphadenopathy.  ABDOMINAL WALL: Within normal limits.  BONES: Extensive osseous metastatic disease, as noted on prior studies   with lytic lesions involving the thoracic and lumbar spine and bony   pelvis. Pathologic compression fracture deformity of L2 again noted.   These findings are betterdelineated on recent spine CT and MR studies..    IMPRESSION:  No acute abdominal findings.    Pulmonary and hepatic metastases again noted. Small bilateral pleural   effusions are new since prior.        CURRENT MEDICATIONS    MEDICATIONS  (STANDING):  cefpodoxime 200 milliGRAM(s) Oral every 12 hours  enoxaparin Injectable 40 milliGRAM(s) SubCutaneous every 24 hours  gabapentin 600 milliGRAM(s) Oral every 8 hours  methocarbamol 750 milliGRAM(s) Oral three times a day  ondansetron   Disintegrating Tablet 4 milliGRAM(s) Oral once  oxyCODONE  ER Tablet 20 milliGRAM(s) Oral every 12 hours  potassium chloride   Powder 40 milliEquivalent(s) Oral every 4 hours    MEDICATIONS  (PRN):  acetaminophen     Tablet .. 650 milliGRAM(s) Oral every 6 hours PRN Temp greater or equal to 38C (100.4F), Mild Pain (1 - 3)  bisacodyl Suppository 10 milliGRAM(s) Rectal daily PRN Constipation  HYDROmorphone   Tablet 4 milliGRAM(s) Oral every 4 hours PRN Severe Pain (7 - 10)  HYDROmorphone   Tablet 2 milliGRAM(s) Oral every 4 hours PRN Moderate Pain (4 - 6)  simethicone 80 milliGRAM(s) Chew every 6 hours PRN Gas    ASSESSMENT & PLAN          GI/Bowel Management - Dulcolax PRN, Fleet PRN   Management - Toilet Q2  Skin - Turn Q2  Pain - Tylenol PRN  DVT PPX - Lovenox  Loose BMs, abdominal discomfort in the context of nonacute  Abdominal CT picture - discontinue laxatives, obtain C diff stool? if symptoms don't improve off Miralax and Senna       Continue comprehensive acute rehab program consisting of 3hrs/day of OT/PT and SLP. Chief complaint: multiple loose BMs - on Vantin, laxatives, reports abdominal discomfort as well     Patient is a 66y old  Female who presents with a chief complaint of metastatic breast CA to spine s/p  C7-T2 corpectomy & fusion, posterior C4-T5 fusion (02 Oct 2022 09:54)    PAST MEDICAL & SURGICAL HISTORY:  Stomach ulcer  20 years ago   no pain not taking any meds      Skin cancer of arm, left  pt was 16 year old      H/O excision of mass  left arm  when patient was 16 years ago      S/P hysterectomy          VITALS  Vital Signs Last 24 Hrs  T(C): 36.6 (02 Oct 2022 10:29), Max: 36.6 (01 Oct 2022 23:02)  T(F): 97.8 (02 Oct 2022 10:29), Max: 97.8 (01 Oct 2022 23:02)  HR: 104 (02 Oct 2022 10:29) (82 - 104)  BP: 144/90 (02 Oct 2022 10:29) (128/74 - 144/90)  BP(mean): --  RR: 16 (02 Oct 2022 10:29) (15 - 16)  SpO2: 97% (02 Oct 2022 10:29) (94% - 97%)    Parameters below as of 02 Oct 2022 10:29  Patient On (Oxygen Delivery Method): room air          PHYSICAL EXAM  Constitutional - NAD, Comfortable  HEENT - NCAT, EOMI  Neck - Supple, No limited ROM  Chest - CTA bilaterally  Cardiovascular - RRR, S1S2  Abdomen -  Soft  Extremities - No calf tenderness   Neurologic Exam -                    Cognitive - Awake, Alert, AAO X3     No new focal deficits                      RECENT LABS                        9.3    6.72  )-----------( 347      ( 02 Oct 2022 09:25 )             27.4     10-02    140  |  103  |  7   ----------------------------<  94  3.1<L>   |  25  |  0.36<L>    Ca    10.7<H>      02 Oct 2022 09:25    TPro  5.4<L>  /  Alb  1.8<L>  /  TBili  0.4  /  DBili  x   /  AST  59<H>  /  ALT  43  /  AlkPhos  157<H>  10-01      Urinalysis Basic - ( 02 Oct 2022 10:50 )    Color: Yellow / Appearance: Slightly Turbid / S.015 / pH: x  Gluc: x / Ketone: Large  / Bili: Negative / Urobili: Negative   Blood: x / Protein: 30 mg/dL / Nitrite: Negative   Leuk Esterase: Moderate / RBC: 5-10 /HPF / WBC 11-25 /HPF   Sq Epi: x / Non Sq Epi: Neg.-Few / Bacteria: Few /HPF          RADIOLOGY/OTHER RESULTS      ACC: [id number] EXAM:  CT ABDOMEN AND PELVIS                          PROCEDURE DATE:  10/02/2022          INTERPRETATION:  CLINICAL INFORMATION: Metastatic breast cancer with   abdominal pain.    COMPARISON: CT 2022    CONTRAST/COMPLICATIONS:  IV Contrast: NONE  Oral Contrast: NONE  Complications: None reported at time of study completion    PROCEDURE:  CT of the Abdomen and Pelvis was performed.  Sagittal and coronal reformats were performed.    FINDINGS:    Evaluation of the solid organs and vasculature is limited in the absence   of intravenous contrast.  LOWER CHEST: Small new bilateral pleural effusions with bibasilar   atelectasis. Ill-defined pulmonary nodules at both lung bases lung bases,   largest an 8 mm right lower lobe nodule near the fissure, unchanged.   Small pericardial effusion.    LIVER: Ill-defined hypodense bilobar hepatic lesions again noted,   suspicious for metastatic disease.  BILE DUCTS: Normal caliber.  GALLBLADDER: Within normal limits.  SPLEEN: Within normal limits.  PANCREAS: Within normal limits.  ADRENALS: Within normal limits.  KIDNEYS/URETERS: Horseshoe kidney. Small 2 mm nonobstructing left renal   calculus. No hydronephrosis.    BLADDER: Within normal limits.  REPRODUCTIVE ORGANS: Hysterectomy.    BOWEL: No bowel obstruction. Appendix is not visualized. No evidence of   inflammation in the pericecal region.  PERITONEUM: No ascites.  VESSELS: Within normal limits.  RETROPERITONEUM/LYMPH NODES: No lymphadenopathy.  ABDOMINAL WALL: Within normal limits.  BONES: Extensive osseous metastatic disease, as noted on prior studies   with lytic lesions involving the thoracic and lumbar spine and bony   pelvis. Pathologic compression fracture deformity of L2 again noted.   These findings are betterdelineated on recent spine CT and MR studies..    IMPRESSION:  No acute abdominal findings.    Pulmonary and hepatic metastases again noted. Small bilateral pleural   effusions are new since prior.        CURRENT MEDICATIONS    MEDICATIONS  (STANDING):  cefpodoxime 200 milliGRAM(s) Oral every 12 hours  enoxaparin Injectable 40 milliGRAM(s) SubCutaneous every 24 hours  gabapentin 600 milliGRAM(s) Oral every 8 hours  methocarbamol 750 milliGRAM(s) Oral three times a day  ondansetron   Disintegrating Tablet 4 milliGRAM(s) Oral once  oxyCODONE  ER Tablet 20 milliGRAM(s) Oral every 12 hours  potassium chloride   Powder 40 milliEquivalent(s) Oral every 4 hours    MEDICATIONS  (PRN):  acetaminophen     Tablet .. 650 milliGRAM(s) Oral every 6 hours PRN Temp greater or equal to 38C (100.4F), Mild Pain (1 - 3)  bisacodyl Suppository 10 milliGRAM(s) Rectal daily PRN Constipation  HYDROmorphone   Tablet 4 milliGRAM(s) Oral every 4 hours PRN Severe Pain (7 - 10)  HYDROmorphone   Tablet 2 milliGRAM(s) Oral every 4 hours PRN Moderate Pain (4 - 6)  simethicone 80 milliGRAM(s) Chew every 6 hours PRN Gas    ASSESSMENT & PLAN          GI/Bowel Management - Dulcolax PRN, Fleet PRN   Management - Toilet Q2  Skin - Turn Q2  Pain - Tylenol PRN  DVT PPX - Lovenox  Loose BMs, abdominal discomfort in the context of nonacute  Abdominal CT picture - discontinue laxatives, obtain C diff stool? if symptoms don't improve off Miralax and Senna. Add Florastor.   Bedrest until TLSO is available        Continue comprehensive acute rehab program consisting of 3hrs/day of OT/PT and SLP.

## 2022-10-02 NOTE — CHART NOTE - NSCHARTNOTEFT_GEN_A_CORE
REHABILITATION DIAGNOSIS/IMPAIRMENT GROUP CODE:  Metastatic breast cancer, s/p cervical and thoracic spine stabilization     COMORBIDITIES/COMPLICATING CONDITIONS IMPACTING REHABILITATION:  HEALTH ISSUES - PROBLEM Dx:        PAST MEDICAL & SURGICAL HISTORY:  Stomach ulcer  20 years ago   no pain not taking any meds      Skin cancer of arm, left  pt was 16 year old      H/O excision of mass  left arm  when patient was 16 years ago      S/P hysterectomy          Based upon consideration of the patient's impairments, functional status, complicating conditions and any other contributing factors and after information garnered from the assessments of all therapy disciplines involved in treating the patient and other pertinent clinicians:    INTERDISCIPLINARY REHABILITATION INTERVENTIONS:    [ X  ] Transfer Training  [X   ] Bed Mobility  [ X  ] Therapeutic Exercise  [ X  ] Balance/Coordination Exercises  [ X  ] Locomotion retraining  [ X  ] Stairs  [ X  ] Functional Transfer Training  [ X  ] Bowel/Bladder program  [  X ] Pain Management  [ X  ] Skin/Wound Care  [X   ] Visual/Perceptual Training  [X   ] Therapeutic Recreation Activities  [  X ] Neuromuscular Re-education  [  X ] Activities of Daily Living  [ X  ] Speech Exercise  [   ] Swallowing Exercises  [   ] Vital Stim  [   ] Dietary Supplements  [ X  ] Calorie Count  [X   ] Cognitive Exercises  [  X ] Congnitive/Linguistic Treatment  [   ] Behavior Program  [  X ] Neuropsych Therapy  [ X  ] Patient/Family Counseling  [ X  ] Family Training  [ X  ] Community Re-entry  [ X ] Orthotic Evaluation  [   ] Prosthetic Eval/Training    MEDICAL PROGNOSIS:  Good     REHAB POTENTIAL:  Good     EXPECTED DAILY THERAPY:         PT: 1hr/day       OT: 1hr/day       ST: 1hr/day        EXPECTED INTENSITY OF PROGRAM:  3 hrs/day    EXPECTED FREQUENCY OF PROGRAM:  5 days/week    ESTIMATED LOS:  10-14 days    ESTIMATED DISPOSITION:  home    INTERDISCIPLINARY FUNCTIONAL OUTCOMES/GOALS:           Gait/Mobility: Mod I       Transfers: Supervision       ADLs: Supervision       Functional Transfers: Min Assist       Medication Management: Supervision        Communication: Supervision       Cognitive: Min Assist       Dysphagia: Supervision       Bladder: Supervision       Bowel: Supervision

## 2022-10-03 LAB
ANION GAP SERPL CALC-SCNC: 10 MMOL/L — SIGNIFICANT CHANGE UP (ref 5–17)
BUN SERPL-MCNC: 7 MG/DL — SIGNIFICANT CHANGE UP (ref 7–23)
CALCIUM SERPL-MCNC: 10.7 MG/DL — HIGH (ref 8.4–10.5)
CHLORIDE SERPL-SCNC: 102 MMOL/L — SIGNIFICANT CHANGE UP (ref 96–108)
CO2 SERPL-SCNC: 28 MMOL/L — SIGNIFICANT CHANGE UP (ref 22–31)
CREAT SERPL-MCNC: 0.5 MG/DL — SIGNIFICANT CHANGE UP (ref 0.5–1.3)
EGFR: 104 ML/MIN/1.73M2 — SIGNIFICANT CHANGE UP
GLUCOSE SERPL-MCNC: 91 MG/DL — SIGNIFICANT CHANGE UP (ref 70–99)
HCT VFR BLD CALC: 26.4 % — LOW (ref 34.5–45)
HGB BLD-MCNC: 9 G/DL — LOW (ref 11.5–15.5)
MCHC RBC-ENTMCNC: 30 PG — SIGNIFICANT CHANGE UP (ref 27–34)
MCHC RBC-ENTMCNC: 34.1 GM/DL — SIGNIFICANT CHANGE UP (ref 32–36)
MCV RBC AUTO: 88 FL — SIGNIFICANT CHANGE UP (ref 80–100)
NRBC # BLD: 0 /100 WBCS — SIGNIFICANT CHANGE UP (ref 0–0)
PLATELET # BLD AUTO: 351 K/UL — SIGNIFICANT CHANGE UP (ref 150–400)
POTASSIUM SERPL-MCNC: 2.9 MMOL/L — CRITICAL LOW (ref 3.5–5.3)
POTASSIUM SERPL-SCNC: 2.9 MMOL/L — CRITICAL LOW (ref 3.5–5.3)
RBC # BLD: 3 M/UL — LOW (ref 3.8–5.2)
RBC # FLD: 13.6 % — SIGNIFICANT CHANGE UP (ref 10.3–14.5)
SODIUM SERPL-SCNC: 140 MMOL/L — SIGNIFICANT CHANGE UP (ref 135–145)
WBC # BLD: 7.06 K/UL — SIGNIFICANT CHANGE UP (ref 3.8–10.5)
WBC # FLD AUTO: 7.06 K/UL — SIGNIFICANT CHANGE UP (ref 3.8–10.5)

## 2022-10-03 PROCEDURE — 99232 SBSQ HOSP IP/OBS MODERATE 35: CPT

## 2022-10-03 PROCEDURE — 99233 SBSQ HOSP IP/OBS HIGH 50: CPT

## 2022-10-03 RX ORDER — ONDANSETRON 8 MG/1
4 TABLET, FILM COATED ORAL EVERY 6 HOURS
Refills: 0 | Status: DISCONTINUED | OUTPATIENT
Start: 2022-10-03 | End: 2022-10-07

## 2022-10-03 RX ORDER — POTASSIUM CHLORIDE 20 MEQ
40 PACKET (EA) ORAL EVERY 4 HOURS
Refills: 0 | Status: COMPLETED | OUTPATIENT
Start: 2022-10-03 | End: 2022-10-03

## 2022-10-03 RX ORDER — POTASSIUM CHLORIDE 20 MEQ
40 PACKET (EA) ORAL EVERY 4 HOURS
Refills: 0 | Status: DISCONTINUED | OUTPATIENT
Start: 2022-10-03 | End: 2022-10-03

## 2022-10-03 RX ADMIN — Medication 250 MILLIGRAM(S): at 17:53

## 2022-10-03 RX ADMIN — ENOXAPARIN SODIUM 40 MILLIGRAM(S): 100 INJECTION SUBCUTANEOUS at 09:40

## 2022-10-03 RX ADMIN — Medication 40 MILLIEQUIVALENT(S): at 13:40

## 2022-10-03 RX ADMIN — OXYCODONE HYDROCHLORIDE 20 MILLIGRAM(S): 5 TABLET ORAL at 09:40

## 2022-10-03 RX ADMIN — OXYCODONE HYDROCHLORIDE 20 MILLIGRAM(S): 5 TABLET ORAL at 17:55

## 2022-10-03 RX ADMIN — HYDROMORPHONE HYDROCHLORIDE 2 MILLIGRAM(S): 2 INJECTION INTRAMUSCULAR; INTRAVENOUS; SUBCUTANEOUS at 07:40

## 2022-10-03 RX ADMIN — Medication 40 MILLIEQUIVALENT(S): at 09:40

## 2022-10-03 RX ADMIN — Medication 200 MILLIGRAM(S): at 17:52

## 2022-10-03 RX ADMIN — METHOCARBAMOL 750 MILLIGRAM(S): 500 TABLET, FILM COATED ORAL at 13:40

## 2022-10-03 RX ADMIN — Medication 200 MILLIGRAM(S): at 09:40

## 2022-10-03 RX ADMIN — GABAPENTIN 600 MILLIGRAM(S): 400 CAPSULE ORAL at 13:40

## 2022-10-03 RX ADMIN — Medication 250 MILLIGRAM(S): at 09:40

## 2022-10-03 RX ADMIN — OXYCODONE HYDROCHLORIDE 20 MILLIGRAM(S): 5 TABLET ORAL at 17:52

## 2022-10-03 RX ADMIN — HYDROMORPHONE HYDROCHLORIDE 2 MILLIGRAM(S): 2 INJECTION INTRAMUSCULAR; INTRAVENOUS; SUBCUTANEOUS at 08:30

## 2022-10-03 NOTE — PROGRESS NOTE ADULT - CONSTITUTIONAL COMMENTS
Sitting in bed with cervical collar in place, answering questions appropriately, appears uncomfortable but not actively in troubling pain.

## 2022-10-03 NOTE — PROGRESS NOTE ADULT - ASSESSMENT
67 yo female PMH left arm skin CA s/p excision, stomach ulcer who presented to [facility] 9/10/22 with progressive weakness, found to have multiple lytic lesions of the cervical and thoracic spine as well as right first and second ribs, lung, liver secondary to metastatic breast cancer with dominant right breast mass, right chest wall, axillary and retropectoral involvement. Compression fractures of T1, T9, T12, and L2. Severe T1 compression with epidural extension. She underwent C7-T2 corpectomy & fusion, posterior C4-T5 fusion, hospital course significant for urinary retention UTI, and dysphagia.    # Metastatic Breast CA to lung, liver, ribs, with spine involvement and neurogenic bladder  - s/p right breast biopsy shows  positive for invasive ductal carcinoma  - Compression fractures of T1, T9, T12, and L2. Severe T1 compression with epidural extension.   - s/p C7-T2 corpectomy & fusion, posterior C4-T5 fusion  - Patient will not require chemo/radiation while in rehab. 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  - Precautions: CA, fall, aspiration, spinal, cervical collar, TLSO Brace    # Pain management  - Robaxin 500mg TID  - Gabapentin 200mg TID increased to 600mg   - Dilaudid 2mg Q4 hours prn for mod pain; 4mg Q4 hours prn for severe pain  - Oxycodone ER 20mg Q12 hours   - bowel program as below    # UTI  - Grew Enterobacter on 10/1  - PO Vantin BID for 7 days  - monitor bladder scans  - SC for PVR >350 ml, toileting schedule    # Bowel Regimen  - Dulcolax supp PRN    # FEN   - Diet: Pureed  - SLP and nutrition evaluation  - K+ 2.9 on 10/3; began repletion with KCl powder (unable to tolerate PO tabs)       # Skin:  - Skin on admission: surgical incision to anterior neck with steristrips, posterior cervical and thoracic spine incision with primary dressing  - Pressure injury/Skin: Turn Q2hrs while in bed, OOB to Chair, PT/OT     # Sleep:   - Maintain quiet hours and low stim environment.  - Melatonin PRN to maximize participation in therapy during the day.     # DVT ppx  - Lovenox  - SCD, TEDs    #GOC  CODE STATUS: FULL CODE     Outpatient Follow-up (Specialty/Name of physician):    Marj Del Castillo)  Hematology; Hospice Palliative Medicine; Internal Medicine; Medical Oncology  [address] [place] [address]  Phone: [phone]  Fax: [phone]  Follow Up Time: 2 weeks    Bret Ibarra)  Radiation Oncology  [address] [place] [address]  Phone: [phone]  Fax: [phone]  Follow Up Time: 2 weeks   67 yo female PMH left arm skin CA s/p excision, stomach ulcer who presented to [facility] 9/10/22 with progressive weakness, found to have multiple lytic lesions of the cervical and thoracic spine as well as right first and second ribs, lung, liver secondary to metastatic breast cancer with dominant right breast mass, right chest wall, axillary and retropectoral involvement. Compression fractures of T1, T9, T12, and L2. Severe T1 compression with epidural extension. She underwent C7-T2 corpectomy & fusion, posterior C4-T5 fusion, hospital course significant for urinary retention UTI, and dysphagia.    # Metastatic Breast CA to lung, liver, ribs, with spine involvement and neurogenic bladder  - s/p right breast biopsy shows  positive for invasive ductal carcinoma  - Compression fractures of T1, T9, T12, and L2. Severe T1 compression with epidural extension.   - s/p C7-T2 corpectomy & fusion, posterior C4-T5 fusion (Dr Bennett)  - patient did NOT arrive with TLSO. Will try to contact orthotist as braces were not reportedly at [facility] when team called. Patient states she was transferred from bed to stretcher without brace and brought to [facility]. Her brother Arun may have orthotist information [phone]  - continue Comprehensive Rehab Program: PT/OT/ST, 3hours daily and 5 days weekly. 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 Patient will not require chemo/radiation while in rehab. Chemotherapy after surgery wound heals   - Precautions: CA, fall, aspiration, spinal, cervical collar, TLSO Brace    # Pain management  - Robaxin 500mg TID  - Gabapentin 200mg TID increased to 600mg   - Dilaudid 2mg Q4 hours prn for mod pain; 4mg Q4 hours prn for severe pain  - Oxycodone ER 20mg Q12 hours   - bowel program as below  - patient resumed pain medications with improvement today 10/3    # enterobacter UTI  - PO Vantin BID for 7 days. Day #4/7 10/3  - monitor bladder scans  - SC for PVR >350 ml, toileting schedule    # Bowel Regimen  - Dulcolax supp PRN  - continue probiotic  - zofran 4 mg q6 PRN added    # FEN   - Diet: Pureed  - SLP and nutrition evaluation  - K+ 2.9 on 10/3; began repletion with KCl powder (unable to tolerate PO tabs)   - BMp 10/4    # Skin:  - Skin on admission: surgical incision to anterior neck with steristrips, posterior cervical and thoracic spine incision with primary dressing. f/u NSGY re: frequency of dressing changes, last changed on admission per patient, states was not having daily changes at [facility]    # DVT ppx  - Lovenox  - SCD, TEDs    # LABS  BMP 10/4  orthotist TLSO  NSGY re: dressing changes    CODE STATUS: FULL CODE     Outpatient Follow-up (Specialty/Name of physician):    Marj Del Castillo)  Hematology; Hospice Palliative Medicine; Internal Medicine; Medical Oncology  [address] [place] [address]  Phone: [phone]  Fax: [phone]  Follow Up Time: 2 weeks    Bret Ibarra)  Radiation Oncology  [address] [place] [address]  Phone: [phone]  Fax: [phone]  Follow Up Time: 2 weeks

## 2022-10-03 NOTE — PROGRESS NOTE ADULT - SUBJECTIVE AND OBJECTIVE BOX
Patient is a 66y old  Female who presents with a chief complaint of metastatic breast CA to spine s/p  C7-T2 corpectomy & fusion, posterior C4-T5 fusion (03 Oct 2022 09:33)      HPI:  This is a 67 yo female with PMH of left arm skin CA s/p excision at age 16, stomach ulcer, her last mammogram was in , who was brought to [facility] ED 9/10/22 with progressive weakness to the point that she felt she could not get out of bed. She also reported remote shoulder pain, decreased appetite. Was found to have two lumps in her right breast but could not tolerate recommended biopsy due to pain. CT scan of the Cervical spine performed which showed Multiple lytic lesions of the cervical and visualized thoracic spine as well as right first and second ribs concerning for metastases versus multiple myeloma.    She was admitted under medicine service for further workup and management, including of her pain. Further imaging done showed Metastatic breast cancer with dominant right breast mass, right chest wall, axillary and retropectoral involvement; osseous, pulmonary, and likely hepatic metastatic disease. She was also found to have metastatic adenopathy of the thorax, diffuse extensive osseous metastatic disease with multiple lucent and sclerotic lesions and compression fractures of T1, T9, T12, and L2. Severe T1 compression with epidural extension; cord compression cannot be excluded. +additional neoplastic disease of the spine with epidural extension. Multifocal neoplastic lesions involving the pelvis and ribs as well.    Patient was seen by Oncology, CT surgery and Neurosurgery team. She underwent C7-T2 corpectomy & fusion, posterior C4-T5 fusion. Post-op course uncomplicated.  Hamm removed, pt requiring some straight cath for retention. She also had subjective dysphagia - seen by speech therapy recommended pureed diet which patient continues to prefer. Patient with low grade temp and positive UA. Started on IV Rocephin for UTI. Blood cultures negative, Urine culture positive for GNR, changed to PO Vantin for 7 days.    Patient was medically optimized for discharge to [facility] IRF on 22. (30 Sep 2022 14:32)      PAST MEDICAL & SURGICAL HISTORY:  Stomach ulcer  20 years ago   no pain not taking any meds      Skin cancer of arm, left  pt was 16 year old      H/O excision of mass  left arm  when patient was 16 years ago      S/P hysterectomy          MEDICATIONS  (STANDING):  cefpodoxime 200 milliGRAM(s) Oral every 12 hours  enoxaparin Injectable 40 milliGRAM(s) SubCutaneous every 24 hours  gabapentin Solution 600 milliGRAM(s) Oral three times a day  methocarbamol 750 milliGRAM(s) Oral three times a day  oxyCODONE  ER Tablet 20 milliGRAM(s) Oral every 12 hours  potassium chloride   Powder 40 milliEquivalent(s) Oral every 4 hours  saccharomyces boulardii 250 milliGRAM(s) Oral two times a day    MEDICATIONS  (PRN):  acetaminophen    Suspension .. 650 milliGRAM(s) Oral every 6 hours PRN Mild Pain (1 - 3)  bisacodyl Suppository 10 milliGRAM(s) Rectal daily PRN Constipation  HYDROmorphone   Tablet 4 milliGRAM(s) Oral every 4 hours PRN Severe Pain (7 - 10)  HYDROmorphone   Tablet 2 milliGRAM(s) Oral every 4 hours PRN Moderate Pain (4 - 6)  simethicone 80 milliGRAM(s) Chew every 6 hours PRN Gas      Allergies    tetanus toxoid (Hives)    Intolerances    lactose (Other)        VITALS  66y  Vital Signs Last 24 Hrs  T(C): 36.5 (03 Oct 2022 07:38), Max: 36.5 (02 Oct 2022 20:53)  T(F): 97.7 (03 Oct 2022 07:38), Max: 97.7 (02 Oct 2022 20:53)  HR: 85 (03 Oct 2022 07:38) (85 - 89)  BP: 147/77 (03 Oct 2022 07:38) (134/82 - 147/77)  BP(mean): --  RR: 16 (03 Oct 2022 07:38) (15 - 16)  SpO2: 96% (03 Oct 2022 07:38) (96% - 98%)    Parameters below as of 03 Oct 2022 07:38  Patient On (Oxygen Delivery Method): room air      Daily     Daily Weight in k (02 Oct 2022 22:55)        RECENT LABS:                          9.0    7.06  )-----------( 351      ( 03 Oct 2022 06:50 )             26.4     10-03    140  |  102  |  7   ----------------------------<  91  2.9<LL>   |  28  |  0.50    Ca    10.7<H>      03 Oct 2022 06:50          Urinalysis Basic - ( 02 Oct 2022 10:50 )    Color: Yellow / Appearance: Slightly Turbid / S.015 / pH: x  Gluc: x / Ketone: Large  / Bili: Negative / Urobili: Negative   Blood: x / Protein: 30 mg/dL / Nitrite: Negative   Leuk Esterase: Moderate / RBC: 5-10 /HPF / WBC 11-25 /HPF   Sq Epi: x / Non Sq Epi: Neg.-Few / Bacteria: Few /HPF        Culture - Urine (collected 22 @ 20:30)  Source: Catheterized Catheterized  Final Report (10-01-22 @ 10:17):    >100,000 CFU/ml Enterobacter cloacae complex  Organism: Enterobacter cloacae complex (10-01-22 @ 10:17)  Organism: Enterobacter cloacae complex (10-01-22 @ 10:17)      -  Amikacin: S <=16      -  Amoxicillin/Clavulanic Acid: R >16/8      -  Ampicillin: R >16 These ampicillin results predict results for amoxicillin      -  Ampicillin/Sulbactam: R >16/8 Enterobacter, Klebsiella aerogenes, Citrobacter, and Serratia may develop resistance during prolonged therapy (3-4 days)      -  Aztreonam: S <=4      -  Cefazolin: R >16      -  Cefepime: S <=2      -  Cefoxitin: R >16      -  Ceftriaxone: R 32 Enterobacter, Klebsiella aerogenes, Citrobacter, and Serratia may develop resistance during prolonged therapy      -  Ciprofloxacin: S <=0.25      -  Ertapenem: S <=0.5      -  Gentamicin: S <=2      -  Imipenem: S <=1      -  Levofloxacin: S <=0.5      -  Meropenem: S <=1      -  Nitrofurantoin: R >64 Should not be used to treat pyelonephritis      -  Piperacillin/Tazobactam: S <=8      -  Tigecycline: S <=2      -  Tobramycin: S <=2      -  Trimethoprim/Sulfamethoxazole: S <=0.5/9.5      Method Type: KERA                 Patient is a 66y old  Female who presents with a chief complaint of metastatic breast CA to spine s/p  C7-T2 corpectomy & fusion, posterior C4-T5 fusion (03 Oct 2022 09:33)      HPI:  This is a 67 yo female with PMH of left arm skin CA s/p excision at age 16, stomach ulcer, her last mammogram was in , who was brought to [facility] ED 9/10/22 with progressive weakness to the point that she felt she could not get out of bed. She also reported remote shoulder pain, decreased appetite. Was found to have two lumps in her right breast but could not tolerate recommended biopsy due to pain. CT scan of the Cervical spine performed which showed Multiple lytic lesions of the cervical and visualized thoracic spine as well as right first and second ribs concerning for metastases versus multiple myeloma.    She was admitted under medicine service for further workup and management, including of her pain. Further imaging done showed Metastatic breast cancer with dominant right breast mass, right chest wall, axillary and retropectoral involvement; osseous, pulmonary, and likely hepatic metastatic disease. She was also found to have metastatic adenopathy of the thorax, diffuse extensive osseous metastatic disease with multiple lucent and sclerotic lesions and compression fractures of T1, T9, T12, and L2. Severe T1 compression with epidural extension; cord compression cannot be excluded. +additional neoplastic disease of the spine with epidural extension. Multifocal neoplastic lesions involving the pelvis and ribs as well.    Patient was seen by Oncology, CT surgery and Neurosurgery team. She underwent C7-T2 corpectomy & fusion, posterior C4-T5 fusion. Post-op course uncomplicated.  Hamm removed, pt requiring some straight cath for retention. She also had subjective dysphagia - seen by speech therapy recommended pureed diet which patient continues to prefer. Patient with low grade temp and positive UA. Started on IV Rocephin for UTI. Blood cultures negative, Urine culture positive for GNR, changed to PO Vantin for 7 days.    Patient was medically optimized for discharge to [facility] IRF on 22. (30 Sep 2022 14:32)      PAST MEDICAL & SURGICAL HISTORY:  Stomach ulcer  20 years ago   no pain not taking any meds      Skin cancer of arm, left  pt was 16 year old      H/O excision of mass  left arm  when patient was 16 years ago      S/P hysterectomy          MEDICATIONS  (STANDING):  cefpodoxime 200 milliGRAM(s) Oral every 12 hours  enoxaparin Injectable 40 milliGRAM(s) SubCutaneous every 24 hours  gabapentin Solution 600 milliGRAM(s) Oral three times a day  methocarbamol 750 milliGRAM(s) Oral three times a day  oxyCODONE  ER Tablet 20 milliGRAM(s) Oral every 12 hours  potassium chloride   Powder 40 milliEquivalent(s) Oral every 4 hours  saccharomyces boulardii 250 milliGRAM(s) Oral two times a day    MEDICATIONS  (PRN):  acetaminophen    Suspension .. 650 milliGRAM(s) Oral every 6 hours PRN Mild Pain (1 - 3)  bisacodyl Suppository 10 milliGRAM(s) Rectal daily PRN Constipation  HYDROmorphone   Tablet 4 milliGRAM(s) Oral every 4 hours PRN Severe Pain (7 - 10)  HYDROmorphone   Tablet 2 milliGRAM(s) Oral every 4 hours PRN Moderate Pain (4 - 6)  simethicone 80 milliGRAM(s) Chew every 6 hours PRN Gas      Allergies    tetanus toxoid (Hives)    Intolerances    lactose (Other)        VITALS  66y  Vital Signs Last 24 Hrs  T(C): 36.5 (03 Oct 2022 07:38), Max: 36.5 (02 Oct 2022 20:53)  T(F): 97.7 (03 Oct 2022 07:38), Max: 97.7 (02 Oct 2022 20:53)  HR: 85 (03 Oct 2022 07:38) (85 - 89)  BP: 147/77 (03 Oct 2022 07:38) (134/82 - 147/77)  BP(mean): --  RR: 16 (03 Oct 2022 07:38) (15 - 16)  SpO2: 96% (03 Oct 2022 07:38) (96% - 98%)    Parameters below as of 03 Oct 2022 07:38  Patient On (Oxygen Delivery Method): room air      Daily     Daily Weight in k (02 Oct 2022 22:55)        RECENT LABS:                          9.0    7.06  )-----------( 351      ( 03 Oct 2022 06:50 )             26.4     10-03    140  |  102  |  7   ----------------------------<  91  2.9<LL>   |  28  |  0.50    Ca    10.7<H>      03 Oct 2022 06:50          Urinalysis Basic - ( 02 Oct 2022 10:50 )    Color: Yellow / Appearance: Slightly Turbid / S.015 / pH: x  Gluc: x / Ketone: Large  / Bili: Negative / Urobili: Negative   Blood: x / Protein: 30 mg/dL / Nitrite: Negative   Leuk Esterase: Moderate / RBC: 5-10 /HPF / WBC 11-25 /HPF   Sq Epi: x / Non Sq Epi: Neg.-Few / Bacteria: Few /HPF        Culture - Urine (collected 22 @ 20:30)  Source: Catheterized Catheterized  Final Report (10-01-22 @ 10:17):    >100,000 CFU/ml Enterobacter cloacae complex  Organism: Enterobacter cloacae complex (10-01-22 @ 10:17)  Organism: Enterobacter cloacae complex (10-01-22 @ 10:17)      -  Amikacin: S <=16      -  Amoxicillin/Clavulanic Acid: R >16/8      -  Ampicillin: R >16 These ampicillin results predict results for amoxicillin      -  Ampicillin/Sulbactam: R >16/8 Enterobacter, Klebsiella aerogenes, Citrobacter, and Serratia may develop resistance during prolonged therapy (3-4 days)      -  Aztreonam: S <=4      -  Cefazolin: R >16      -  Cefepime: S <=2      -  Cefoxitin: R >16      -  Ceftriaxone: R 32 Enterobacter, Klebsiella aerogenes, Citrobacter, and Serratia may develop resistance during prolonged therapy      -  Ciprofloxacin: S <=0.25      -  Ertapenem: S <=0.5      -  Gentamicin: S <=2      -  Imipenem: S <=1      -  Levofloxacin: S <=0.5      -  Meropenem: S <=1      -  Nitrofurantoin: R >64 Should not be used to treat pyelonephritis      -  Piperacillin/Tazobactam: S <=8      -  Tigecycline: S <=2      -  Tobramycin: S <=2      -  Trimethoprim/Sulfamethoxazole: S <=0.5/9.5      Method Type: KERA

## 2022-10-03 NOTE — PROGRESS NOTE ADULT - ASSESSMENT
65 yo female PMH stomach ulcer who presented to [facility] 9/10/22 with progressive weakness, discovered multiple lytic lesions of the cervical and thoracic spine along with lung and liver secondary to metastatic primary newly discovered breast cancer with axillary and retropectoral involvement. Imaging also showed Compression fractures of T1, T9, T12, and L2. Severe T1 compression with epidural extension.S/p C7-T2 corpectomy & fusion, posterior C4-T5 fusion, hospital course significant for urinary retention and dysphagia, UTI      *Enterobacter cloacae UTI  - PO Vantin BID for 7 days  - monitor bladder scans  - SC for PVR >350 ml, toileting schedule    *Diarrhea, resolved  *hypokalemmia  - no laxatives  - Will replace K x two doses, check BMP and Mg in AM    *Normocytic anemia  - Monitor Hg/Hct, will check iron/B12/folate with next lab draw  - Transfuse if hG <7    *Functional and gait instability:  *Left groin/back/hip pain  - new onset, defer pain regimen to PMR; possibly related to metastatic disease found in bones  - Recommended lower dose of dilaudid with zofran/reglan to control nausea  - monitor neurological exam closely  - CTAP as above    *Metastatic Breast CA to lung, liver, ribs, brain with spine involvement and neurogenic bladder  - s/p right breast biopsy shows  positive for invasive ductal carcinoma  - Compression fractures of T1, T9, T12, and L2. Severe T1 compression with epidural extension.   - s/p C7-T2 corpectomy & fusion, posterior C4-T5 fusion  - Patient will not require chemo/radiation while in rehab. 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

## 2022-10-03 NOTE — PROGRESS NOTE ADULT - SUBJECTIVE AND OBJECTIVE BOX
Patient is a 66y old  Female who presents with a chief complaint of metastatic breast CA to spine s/p  C7-T2 corpectomy & fusion, posterior C4-T5 fusion (02 Oct 2022 13:51)    Over the weekend, started to have nausea with pain medications; so she stopped taking them  Saturday, had left hip pain radiating to groin, CTAP was done  She also had watery loose diarrhea but resolved as of  morning  She continues to have left hip pain this AM; took dilaudid 2mg this AM  Had dry mouth all night    Patient seen and examined at bedside.    ALLERGIES:  lactose (Other)  tetanus toxoid (Hives)    MEDICATIONS  (STANDING):  cefpodoxime 200 milliGRAM(s) Oral every 12 hours  enoxaparin Injectable 40 milliGRAM(s) SubCutaneous every 24 hours  gabapentin Solution 600 milliGRAM(s) Oral three times a day  methocarbamol 750 milliGRAM(s) Oral three times a day  oxyCODONE  ER Tablet 20 milliGRAM(s) Oral every 12 hours  potassium chloride   Powder 40 milliEquivalent(s) Oral every 4 hours  saccharomyces boulardii 250 milliGRAM(s) Oral two times a day    MEDICATIONS  (PRN):  acetaminophen    Suspension .. 650 milliGRAM(s) Oral every 6 hours PRN Mild Pain (1 - 3)  bisacodyl Suppository 10 milliGRAM(s) Rectal daily PRN Constipation  HYDROmorphone   Tablet 4 milliGRAM(s) Oral every 4 hours PRN Severe Pain (7 - 10)  HYDROmorphone   Tablet 2 milliGRAM(s) Oral every 4 hours PRN Moderate Pain (4 - 6)  simethicone 80 milliGRAM(s) Chew every 6 hours PRN Gas    Vital Signs Last 24 Hrs  T(F): 97.7 (03 Oct 2022 07:38), Max: 97.8 (02 Oct 2022 10:29)  HR: 85 (03 Oct 2022 07:38) (85 - 104)  BP: 147/77 (03 Oct 2022 07:38) (134/82 - 147/77)  RR: 16 (03 Oct 2022 07:38) (15 - 16)  SpO2: 96% (03 Oct 2022 07:38) (96% - 98%)  I&O's Summary    BMI (kg/m2): 21.7 (09-30-22 @ 17:49)  PHYSICAL EXAM:  General: NAD, A/O x 3, collar in place, speaking in full sentneces  ENT: MMM, no scleral icterus  Neck: Supple, No JVD, no thyroidomegaly  Lungs: Clear to auscultation bilaterally, no wheezes, no rales, no rhonchi, good inspiratory effort  Cardio: RRR, S1/S2, No murmurs  Abdomen: +tenderness over left hip joint and left groin, abdomen is Soft, Nontender, Nondistended; Bowel sounds present  Extremities: No calf tenderness, No pitting edema, no skin changes    LABS:                        9.0    7.06  )-----------( 351      ( 03 Oct 2022 06:50 )             26.4       10-    140  |  102  |  7   ----------------------------<  91  2.9   |  28  |  0.50    Ca    10.7      03 Oct 2022 06:50    TPro  5.4  /  Alb  1.8  /  TBili  0.4  /  DBili  x   /  AST  59  /  ALT  43  /  AlkPhos  157  10-     Urinalysis Basic - ( 02 Oct 2022 10:50 )    Color: Yellow / Appearance: Slightly Turbid / S.015 / pH: x  Gluc: x / Ketone: Large  / Bili: Negative / Urobili: Negative   Blood: x / Protein: 30 mg/dL / Nitrite: Negative   Leuk Esterase: Moderate / RBC: 5-10 /HPF / WBC 11-25 /HPF   Sq Epi: x / Non Sq Epi: Neg.-Few / Bacteria: Few /HPF    Culture - Urine (collected 28 Sep 2022 20:30)  Source: Catheterized Catheterized  Final Report (01 Oct 2022 10:17):    >100,000 CFU/ml Enterobacter cloacae complex  Organism: Enterobacter cloacae complex (01 Oct 2022 10:17)  Organism: Enterobacter cloacae complex (01 Oct 2022 10:17)      -  Amikacin: S <=16      -  Amoxicillin/Clavulanic Acid: R >16/8      -  Ampicillin: R >16 These ampicillin results predict results for amoxicillin      -  Ampicillin/Sulbactam: R >168 Enterobacter, Klebsiella aerogenes, Citrobacter, and Serratia may develop resistance during prolonged therapy (3-4 days)      -  Aztreonam: S <=4      -  Cefazolin: R >16      -  Cefepime: S <=2      -  Cefoxitin: R >16      -  Ceftriaxone: R 32 Enterobacter, Klebsiella aerogenes, Citrobacter, and Serratia may develop resistance during prolonged therapy      -  Ciprofloxacin: S <=0.25      -  Ertapenem: S <=0.5      -  Gentamicin: S <=2      -  Imipenem: S <=1      -  Levofloxacin: S <=0.5      -  Meropenem: S <=1      -  Nitrofurantoin: R >64 Should not be used to treat pyelonephritis      -  Piperacillin/Tazobactam: S <=8      -  Tigecycline: S <=2      -  Tobramycin: S <=2      -  Trimethoprim/Sulfamethoxazole: S <=0.5/9.5      Method Type: KERA      COVID-19 PCR: NotDetec (22 @ 20:50)  COVID-19 PCR: NotDetec (22 @ 13:00)  COVID-19 PCR: NotDetec (22 @ 06:00)  COVID-19 PCR: NotDetec (22 @ 03:30)  COVID-19 PCR: NotDetec (22 @ 20:50)  COVID-19 PCR: NotDetec (22 @ 13:00)  COVID-19 PCR: NotDetec (22 @ 06:00)  COVID-19 PCR: NotDetec (22 @ 03:30)      RADIOLOGY  CT Abdomen and Pelvis No Cont (10.02.22 @ 11:31) >  MPRESSION:  No acute abdominal findings.    Pulmonary and hepatic metastases again noted. Small bilateral pleural   effusions are new since prior.    Diffuse osseous metastatic disease involving the spine and bony pelvis   with L2 pathologic fracture, as above. Findings are better delineated on   recent spine CT and MRI studies.

## 2022-10-03 NOTE — PROGRESS NOTE ADULT - COMMENTS
Patient examined at bedside 20 minutes after Dilaudid administration. She is tolerating well, denies nausea/vomiting, and rates L hip/groin pain now at 5/10 from 10/10 prior to administration.   Patient states loose stools have resolved over last 24 hours since changing bowel regimen. She also denies chest pain, palpitations, shortness of breath, cough, nausea, vomiting, dysuria or urinary frequency or hematuria. She denies lower extremity weakness or worsening upper extremity weakness.

## 2022-10-03 NOTE — PROGRESS NOTE ADULT - ATTENDING COMMENTS
Progress note amended to include my discussions with patient, resident, hospitalist, RN, SW, PT and my findings    Patient in bed, limited OOB activity due to awaiting TLSO which did not arrive with patient. NSGY team contacted re: vendor name as well as frequency of dressing changes; for now, will order DSD daily with tegaderm pending feedback. Will also reach out to brother Arun who may have vendor information, otherwise will call our vendor here.     Patient had significant diarrhea over weekend which has since subsided. She was concerned about C diff, but is afebrile, no leukocytosis, no further diarrhea. On probiotic. Has hypokalemia, likely from volume loss from frequent watery/loose stool. On Klor supplementation, will check BMP in AM. Patient aware and agreeable with above. She also had abdominal pain/pelvic/left hip, however significantly improved today, Likely exacerbated as she had stopped her narcotic pain medication over weekend due to nausea, and resumed today. Will monitor, if recurs despite pain regimen, consider dedicated CT to hip, discussed in team rounds.    Cleared for ROm, bed mobility with spinal precautions, in bed activity with spinal precautions pending TLSO Progress note amended to include my discussions with patient, resident, hospitalist, RN, SW, PT and my findings    Patient in bed, limited OOB activity due to awaiting TLSO which did not arrive with patient. NSGY team contacted re: vendor name as well as frequency of dressing changes; for now, will order DSD daily with tegaderm pending feedback. Will also reach out to brother Arun who may have vendor information, otherwise will call our vendor here.     Patient had significant diarrhea over weekend which has since subsided. She was concerned about C diff, but is afebrile, no leukocytosis, no further diarrhea. On probiotic. Has hypokalemia, likely from volume loss from frequent watery/loose stool. On Klor supplementation, will check BMP in AM. Patient aware and agreeable with above. She also had abdominal pain/pelvic/left hip, however significantly improved today, Likely exacerbated as she had stopped her narcotic pain medication over weekend due to nausea, and resumed today. Will monitor, if recurs despite pain regimen, consider dedicated CT to hip, discussed in team rounds.    Cleared for ROm, bed mobility with spinal precautions, in bed activity with spinal precautions pending TLSO    Ct A/P 10/2:  *also showed small left renal non obstrutcing calculus  IMPRESSION:  No acute abdominal findings.    Pulmonary and hepatic metastases again noted. Small bilateral pleural   effusions are new since prior.    Diffuse osseous metastatic disease involving the spine and bony pelvis   with L2 pathologic fracture, as above. Findings are better delineated on   recent spine CT and MRI studies.    --- End of Report --      PAUL SEBASTIAN MD; Attending Radiologist  This document has been electronically signed. Oct  2 2022 12:35PM

## 2022-10-04 LAB
ANION GAP SERPL CALC-SCNC: 10 MMOL/L — SIGNIFICANT CHANGE UP (ref 5–17)
BUN SERPL-MCNC: 9 MG/DL — SIGNIFICANT CHANGE UP (ref 7–23)
CALCIUM SERPL-MCNC: 10.7 MG/DL — HIGH (ref 8.4–10.5)
CHLORIDE SERPL-SCNC: 104 MMOL/L — SIGNIFICANT CHANGE UP (ref 96–108)
CO2 SERPL-SCNC: 31 MMOL/L — SIGNIFICANT CHANGE UP (ref 22–31)
CREAT SERPL-MCNC: 0.46 MG/DL — LOW (ref 0.5–1.3)
EGFR: 105 ML/MIN/1.73M2 — SIGNIFICANT CHANGE UP
FOLATE SERPL-MCNC: 11.4 NG/ML — SIGNIFICANT CHANGE UP
GLUCOSE SERPL-MCNC: 84 MG/DL — SIGNIFICANT CHANGE UP (ref 70–99)
IRON SATN MFR SERPL: 18 % — SIGNIFICANT CHANGE UP (ref 14–50)
IRON SATN MFR SERPL: 38 UG/DL — SIGNIFICANT CHANGE UP (ref 30–160)
MAGNESIUM SERPL-MCNC: 1.6 MG/DL — SIGNIFICANT CHANGE UP (ref 1.6–2.6)
POTASSIUM SERPL-MCNC: 3.2 MMOL/L — LOW (ref 3.5–5.3)
POTASSIUM SERPL-SCNC: 3.2 MMOL/L — LOW (ref 3.5–5.3)
SODIUM SERPL-SCNC: 145 MMOL/L — SIGNIFICANT CHANGE UP (ref 135–145)
TIBC SERPL-MCNC: 211 UG/DL — LOW (ref 220–430)
UIBC SERPL-MCNC: 173 UG/DL — SIGNIFICANT CHANGE UP (ref 110–370)
VIT B12 SERPL-MCNC: >2000 PG/ML — HIGH (ref 232–1245)

## 2022-10-04 PROCEDURE — 99232 SBSQ HOSP IP/OBS MODERATE 35: CPT

## 2022-10-04 RX ORDER — CYCLOBENZAPRINE HYDROCHLORIDE 10 MG/1
5 TABLET, FILM COATED ORAL THREE TIMES A DAY
Refills: 0 | Status: DISCONTINUED | OUTPATIENT
Start: 2022-10-04 | End: 2022-10-18

## 2022-10-04 RX ORDER — POTASSIUM CHLORIDE 20 MEQ
40 PACKET (EA) ORAL EVERY 4 HOURS
Refills: 0 | Status: COMPLETED | OUTPATIENT
Start: 2022-10-04 | End: 2022-10-04

## 2022-10-04 RX ORDER — POTASSIUM CHLORIDE 20 MEQ
40 PACKET (EA) ORAL EVERY 4 HOURS
Refills: 0 | Status: DISCONTINUED | OUTPATIENT
Start: 2022-10-04 | End: 2022-10-04

## 2022-10-04 RX ADMIN — HYDROMORPHONE HYDROCHLORIDE 2 MILLIGRAM(S): 2 INJECTION INTRAMUSCULAR; INTRAVENOUS; SUBCUTANEOUS at 13:18

## 2022-10-04 RX ADMIN — HYDROMORPHONE HYDROCHLORIDE 2 MILLIGRAM(S): 2 INJECTION INTRAMUSCULAR; INTRAVENOUS; SUBCUTANEOUS at 14:18

## 2022-10-04 RX ADMIN — ENOXAPARIN SODIUM 40 MILLIGRAM(S): 100 INJECTION SUBCUTANEOUS at 06:02

## 2022-10-04 RX ADMIN — OXYCODONE HYDROCHLORIDE 20 MILLIGRAM(S): 5 TABLET ORAL at 09:21

## 2022-10-04 RX ADMIN — CYCLOBENZAPRINE HYDROCHLORIDE 5 MILLIGRAM(S): 10 TABLET, FILM COATED ORAL at 22:38

## 2022-10-04 RX ADMIN — OXYCODONE HYDROCHLORIDE 20 MILLIGRAM(S): 5 TABLET ORAL at 18:13

## 2022-10-04 RX ADMIN — Medication 650 MILLIGRAM(S): at 18:02

## 2022-10-04 RX ADMIN — OXYCODONE HYDROCHLORIDE 20 MILLIGRAM(S): 5 TABLET ORAL at 18:12

## 2022-10-04 RX ADMIN — Medication 40 MILLIEQUIVALENT(S): at 22:40

## 2022-10-04 RX ADMIN — Medication 40 MILLIEQUIVALENT(S): at 16:56

## 2022-10-04 RX ADMIN — ONDANSETRON 4 MILLIGRAM(S): 8 TABLET, FILM COATED ORAL at 09:09

## 2022-10-04 RX ADMIN — Medication 200 MILLIGRAM(S): at 18:12

## 2022-10-04 RX ADMIN — OXYCODONE HYDROCHLORIDE 20 MILLIGRAM(S): 5 TABLET ORAL at 10:21

## 2022-10-04 RX ADMIN — Medication 650 MILLIGRAM(S): at 17:02

## 2022-10-04 RX ADMIN — Medication 200 MILLIGRAM(S): at 09:21

## 2022-10-04 NOTE — PROGRESS NOTE ADULT - ATTENDING COMMENTS
Progress note amended to include my discussions with student, resident, hospitalist, RN, SW, PT and my findings. Patient looks much more comfortable, less distressed. She reports that diarrhea has subsided and abdominal pain signifiantly improved. REsults of A/P CT also reviewed with patient, presence of small stone (non obstructive) discussed. Will monitor for any symptoms. She is also currently being Rx for UTI. She does  complain of some nausea, which she feels is medication related. Currently on probiotic as she is on vantin; she also states she had nausea with robaxin and gabapentin in past. Has not tried flexeril; will start low dose 5 mg q8 and evaluate efficacy/SE.    Labs and vitals reviewed. K+ still low 3.2, will continue supplementation with 40 meq x 2 more today, BMP in AM. NSGY cleared dressings to be removied, incisions left open to air. Orthotist from [facility] to provide TLSO for patient, Rx in chart. Continue bedside program pending brace arrival

## 2022-10-04 NOTE — PROGRESS NOTE ADULT - COMMENTS
Patient examined at bedside and denies any acute distress or active complaints. She reports significant improvement of her L hip/groin pain over the last 48 hours. However, she reports some nausea with one episode of vomiting overnight that resolved spontaneously this morning just prior to taking her medications. She expressed concern about her medication regimen being the culprit and agreed to discontinue methocarbamol and trial cyclobenzaprine in its place. She had no other complaints overnight and denies any new symptoms including chest pain, palpitations, shortness of breath, cough, nausea, vomiting, dysuria, urinary frequency, or hematuria. She also denies lower extremity weakness or worsening upper extremity weakness. Patient examined at bedside and denies any acute distress or active complaints. She reports significant improvement of her L hip/groin pain over the last 48 hours. However, she reports some nausea with one episode of vomiting overnight that resolved spontaneously this morning just prior to taking her medications. She expressed concern about her medication regimen being the culprit and agreed to discontinue methocarbamol and trial cyclobenzaprine in its place (she states that she has had issues wit nausea with robaxin and gabapentin in the past. No previous use of flexeril().     She had no other complaints overnight and denies any new symptoms including chest pain, palpitations, shortness of breath, cough, dysuria, urinary frequency, or hematuria. She also denies lower extremity weakness or worsening upper extremity weakness.

## 2022-10-04 NOTE — PROGRESS NOTE ADULT - SUBJECTIVE AND OBJECTIVE BOX
Patient is a 66y old  Female who presents with a chief complaint of metastatic breast CA to spine s/p  C7-T2 corpectomy & fusion, posterior C4-T5 fusion       NO further episodes of diarrhea  Had vomiting last night with her medications  She continues to have left hip/left groin pain    Patient seen and examined at bedside.    ALLERGIES:  lactose (Other)  tetanus toxoid (Hives)    MEDICATIONS  (STANDING):  cefpodoxime 200 milliGRAM(s) Oral every 12 hours  enoxaparin Injectable 40 milliGRAM(s) SubCutaneous every 24 hours  gabapentin Solution 600 milliGRAM(s) Oral three times a day  methocarbamol 750 milliGRAM(s) Oral three times a day  oxyCODONE  ER Tablet 20 milliGRAM(s) Oral every 12 hours  potassium chloride   Powder 40 milliEquivalent(s) Oral every 4 hours  saccharomyces boulardii 250 milliGRAM(s) Oral two times a day    MEDICATIONS  (PRN):  acetaminophen    Suspension .. 650 milliGRAM(s) Oral every 6 hours PRN Mild Pain (1 - 3)  bisacodyl Suppository 10 milliGRAM(s) Rectal daily PRN Constipation  HYDROmorphone   Tablet 4 milliGRAM(s) Oral every 4 hours PRN Severe Pain (7 - 10)  HYDROmorphone   Tablet 2 milliGRAM(s) Oral every 4 hours PRN Moderate Pain (4 - 6)  simethicone 80 milliGRAM(s) Chew every 6 hours PRN Gas    Vital Signs Last 24 Hrs  T(C): 36.7 (04 Oct 2022 07:26), Max: 36.7 (03 Oct 2022 20:20)  T(F): 98 (04 Oct 2022 07:26), Max: 98.1 (03 Oct 2022 20:20)  HR: 89 (04 Oct 2022 07:26) (89 - 90)  BP: 154/85 (04 Oct 2022 07:26) (154/85 - 156/84)  RR: 16 (04 Oct 2022 07:26) (16 - 16)  SpO2: 96% (04 Oct 2022 07:26) (93% - 96%)    Parameters below as of 04 Oct 2022 07:26  Patient On (Oxygen Delivery Method): room air      BMI (kg/m2): 21.7  PHYSICAL EXAM:  General: NAD, A/O x 3, collar in place, speaking in full sentneces  ENT: MMM, no scleral icterus  Neck: Supple, No JVD, no thyroidomegaly  Lungs: Clear to auscultation bilaterally, no wheezes, no rales, no rhonchi, good inspiratory effort  Cardio: RRR, S1/S2, No murmurs  Abdomen: +tenderness over left hip joint and left groin, abdomen is Soft, Nontender, Nondistended; Bowel sounds present  Extremities: No calf tenderness, No pitting edema, no skin changes    LABS:                        9.0    7.06  )-----------( 351      ( 03 Oct 2022 06:50 )             26.4       10-    140  |  102  |  7   ----------------------------<  91  2.9   |  28  |  0.50    Ca    10.7      03 Oct 2022 06:50    TPro  5.4  /  Alb  1.8  /  TBili  0.4  /  DBili  x   /  AST  59  /  ALT  43  /  AlkPhos  157  10-     Urinalysis Basic - ( 02 Oct 2022 10:50 )    Color: Yellow / Appearance: Slightly Turbid / S.015 / pH: x  Gluc: x / Ketone: Large  / Bili: Negative / Urobili: Negative   Blood: x / Protein: 30 mg/dL / Nitrite: Negative   Leuk Esterase: Moderate / RBC: 5-10 /HPF / WBC 11-25 /HPF   Sq Epi: x / Non Sq Epi: Neg.-Few / Bacteria: Few /HPF    Culture - Urine (collected 28 Sep 2022 20:30)  Source: Catheterized Catheterized  Final Report (01 Oct 2022 10:17):    >100,000 CFU/ml Enterobacter cloacae complex  Organism: Enterobacter cloacae complex (01 Oct 2022 10:17)  Organism: Enterobacter cloacae complex (01 Oct 2022 10:17)      -  Amikacin: S <=16      -  Amoxicillin/Clavulanic Acid: R >16/8      -  Ampicillin: R >16 These ampicillin results predict results for amoxicillin      -  Ampicillin/Sulbactam: R >16/8 Enterobacter, Klebsiella aerogenes, Citrobacter, and Serratia may develop resistance during prolonged therapy (3-4 days)      -  Aztreonam: S <=4      -  Cefazolin: R >16      -  Cefepime: S <=2      -  Cefoxitin: R >16      -  Ceftriaxone: R 32 Enterobacter, Klebsiella aerogenes, Citrobacter, and Serratia may develop resistance during prolonged therapy      -  Ciprofloxacin: S <=0.25      -  Ertapenem: S <=0.5      -  Gentamicin: S <=2      -  Imipenem: S <=1      -  Levofloxacin: S <=0.5      -  Meropenem: S <=1      -  Nitrofurantoin: R >64 Should not be used to treat pyelonephritis      -  Piperacillin/Tazobactam: S <=8      -  Tigecycline: S <=2      -  Tobramycin: S <=2      -  Trimethoprim/Sulfamethoxazole: S <=0.5/9.5      Method Type: KERA      COVID-19 PCR: NotDetec (22 @ 20:50)  COVID-19 PCR: NotDetec (22 @ 13:00)  COVID-19 PCR: NotDetec (22 @ 06:00)  COVID-19 PCR: NotDetec (22 @ 03:30)  COVID-19 PCR: NotDetec (22 @ 20:50)  COVID-19 PCR: NotDetec (22 @ 13:00)  COVID-19 PCR: NotDetec (22 @ 06:00)  COVID-19 PCR: NotDetec (22 @ 03:30)      RADIOLOGY  CT Abdomen and Pelvis No Cont (10.02.22 @ 11:31) >  MPRESSION:  No acute abdominal findings.    Pulmonary and hepatic metastases again noted. Small bilateral pleural   effusions are new since prior.    Diffuse osseous metastatic disease involving the spine and bony pelvis   with L2 pathologic fracture, as above. Findings are better delineated on   recent spine CT and MRI studies.

## 2022-10-04 NOTE — PROGRESS NOTE ADULT - ASSESSMENT
65 yo female PMH left arm skin CA s/p excision, stomach ulcer who presented to [facility] 9/10/22 with progressive weakness, found to have multiple lytic lesions of the cervical and thoracic spine as well as right first and second ribs, lung, liver secondary to metastatic breast cancer with dominant right breast mass, right chest wall, axillary and retropectoral involvement. Compression fractures of T1, T9, T12, and L2. Severe T1 compression with epidural extension. She underwent C7-T2 corpectomy & fusion, posterior C4-T5 fusion, hospital course significant for urinary retention, UTI, nausea/vomiting, and dysphagia.    # Metastatic Breast CA to lung, liver, ribs, with spine involvement and neurogenic bladder  - s/p right breast biopsy shows  positive for invasive ductal carcinoma  - Compression fractures of T1, T9, T12, and L2. Severe T1 compression with epidural extension.   - s/p C7-T2 corpectomy & fusion, posterior C4-T5 fusion (Dr Bennett)  - patient did NOT arrive with TLSO. Will try to contact orthotist as braces were not reportedly at [facility] when team called. Patient states she was transferred from bed to stretcher without brace and brought to [facility]. Patient states her brother Arun will contact and discuss reordering new TLSO with orthotist today 10/4. (Phone: [phone])  - Continue Comprehensive Rehab Program: PT/OT/ST, 3hours daily and 5 days weekly. 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 Patient will not require chemo/radiation while in rehab. Chemotherapy after surgery wound heals   - Precautions: CA, fall, aspiration, spinal, cervical collar, TLSO Brace    # Pain management  - Discontinue Robaxin 500mg TID and begin Cyclobenzaprine due to concern for role in nausea/vomiting  - Gabapentin TID 600mg   - Dilaudid 2mg Q4 hours prn for mod pain; 4mg Q4 hours prn for severe pain  - Oxycodone ER 20mg Q12 hours   - bowel program as below  - patient resumed pain medications on 10/3 and reports improvement over last 24 hours    # enterobacter UTI  - PO Vantin BID for 7 days. Day #5/7 10/4  - monitor bladder scans  - SC for PVR >350 ml, toileting schedule    # Bowel Regimen  - Dulcolax supp PRN  - continue probiotic  - zofran 4 mg q6 PRN added    # FEN   - Diet: Minced and moist with crackers   - SLP and nutrition evaluation  - Continue repletion with KCl powder (unable to tolerate PO tabs)   - K+ up to 3.2 on 10/4 from 2.9 on 10/3  - BMP 10/5    # Skin:  - Skin on admission: surgical incision to anterior neck with steristrips, posterior cervical and thoracic spine incision with primary dressing. f/u NSGY re: frequency of dressing changes, last changed on admission per patient, states was not having daily changes at [facility]    # DVT ppx  - Lovenox  - SCD, TEDs    # LABS  BMP 10/5  orthotist TLSO  NSGY re: dressing changes    CODE STATUS: FULL CODE     Outpatient Follow-up (Specialty/Name of physician):    Marj Del Castillo)  Hematology; Hospice Palliative Medicine; Internal Medicine; Medical Oncology  [address] [place] [address]  Phone: [phone]  Fax: [phone]  Follow Up Time: 2 weeks    Bret Ibarra)  Radiation Oncology  [address] [place] [address]  Phone: [phone]  Fax: [phone]  Follow Up Time: 2 weeks   67 yo female PMH left arm skin CA s/p excision, stomach ulcer who presented to [facility] 9/10/22 with progressive weakness, found to have multiple lytic lesions of the cervical and thoracic spine as well as right first and second ribs, lung, liver secondary to metastatic breast cancer with dominant right breast mass, right chest wall, axillary and retropectoral involvement. Compression fractures of T1, T9, T12, and L2. Severe T1 compression with epidural extension. She underwent C7-T2 corpectomy & fusion, posterior C4-T5 fusion    # Metastatic Breast CA to lung, liver, ribs, with spine involvement and neurogenic bladder  - s/p right breast biopsy shows  positive for invasive ductal carcinoma  - s/p C7-T2 corpectomy & fusion, posterior C4-T5 fusion (Dr Bennett)  - Cuauhtemoc from [facility] was vendor. Rx in chart, patient to be reassessed for TLSO, family assisted in scheduling appointment. 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 Patient will not require chemo/radiation while in rehab. Chemotherapy after surgery wound heals   - Precautions: CA, fall, aspiration, spinal, cervical collar, TLSO Brace    # Pain management  - DC Robaxin 500mg TID and begin Cyclobenzaprine  due to concern for role in nausea/vomiting  - Gabapentin TID 600mg   - Dilaudid 2mg Q4 hours prn for mod pain; 4mg Q4 hours prn for severe pain  - Oxycodone ER 20mg Q12 hours   - patient resumed pain medications on 10/3 and reports improvement over last 24 hours    # enterobacter UTI  - PO Vantin BID for 7 days. Day #5/7 10/4  - monitor bladder scans    # Bowel Regimen  - Dulcolax supp PRN  - continue probiotic  - zofran 4 mg q6 PRN    # FEN   - Diet: Minced and moist with crackers   - K+ 3.2 on 10/4 from 2.9 on 10/3. Supplement 40 meq x 2 more doses 10/4  - BMP 10/5    # Skin:  - Skin on admission: surgical incision to anterior neck with steristrips, posterior cervical and thoracic spine incision with primary dressing.  - Discussed with NSGY, may dc dressings, leave open to air 10/3    # DVT ppx  - Lovenox  - SCD, TEDs    # LABS  BMP 10/5  orthotist TLSO    CODE STATUS: FULL CODE     Outpatient Follow-up (Specialty/Name of physician):    Marj Del Castillo)  Hematology; Hospice Palliative Medicine; Internal Medicine; Medical Oncology  [address] [place] [address]  Phone: [phone]  Fax: [phone]  Follow Up Time: 2 weeks    Bret Ibarra)  Radiation Oncology  [address] [place] [address]  Phone: [phone]  Fax: [phone]  Follow Up Time: 2 weeks

## 2022-10-04 NOTE — PROGRESS NOTE ADULT - NEGATIVE GASTROINTESTINAL SYMPTOMS
no nausea/no diarrhea/no constipation
no nausea/no vomiting/no diarrhea/no constipation/no abdominal pain

## 2022-10-04 NOTE — PROGRESS NOTE ADULT - SUBJECTIVE AND OBJECTIVE BOX
Patient is a 66y old  Female who presents with a chief complaint of metastatic breast CA to spine s/p  C7-T2 corpectomy & fusion, posterior C4-T5 fusion (03 Oct 2022 12:14)      HPI:  Thi is a 65 yo female with PMH of left arm skin CA s/p excision at age 16, stomach ulcer, her last mammogram was in , who was brought to [facility] ED 9/10/22 with progressive weakness to the point that she felt she could not get out of bed. She also reported remote shoulder pain, decreased appetite. Was found to have two lumps in her right breast but could not tolerate recommended biopsy due to pain. CT scan of the Cervical spine performed which showed Multiple lytic lesions of the cervical and visualized thoracic spine as well as right first and second ribs concerning for metastases versus multiple myeloma.    She was admitted under medicine service for further workup and management, including of her pain. Further imaging done showed Metastatic breast cancer with dominant right breast mass, right chest wall, axillary and retropectoral involvement; osseous, pulmonary, and likely hepatic metastatic disease. She was also found to have metastatic adenopathy of the thorax, diffuse extensive osseous metastatic disease with multiple lucent and sclerotic lesions and compression fractures of T1, T9, T12, and L2. Severe T1 compression with epidural extension; cord compression cannot be excluded. +additional neoplastic disease of the spine with epidural extension. Multifocal neoplastic lesions involving the pelvis and ribs as well.    Patient was seen by Oncology, CT surgery and Neurosurgery team. She underwent C7-T2 corpectomy & fusion, posterior C4-T5 fusion. Post-op course uncomplicated.  Hamm removed, pt requiring some straight cath for retention. She also had subjective dysphagia - seen by speech therapy recommended pureed diet which patient continues to prefer. Patient with low grade temp and positive UA. Started on IV Rocephin for UTI. Blood cultures negative, Urine culture positive for GNR, changed to PO Vantin for 7 days.    Patient was medically optimized for discharge to [facility] IRF on 22. (30 Sep 2022 14:32)      PAST MEDICAL & SURGICAL HISTORY:  Stomach ulcer  20 years ago   no pain not taking any meds      Skin cancer of arm, left  pt was 16 year old      H/O excision of mass  left arm  when patient was 16 years ago      S/P hysterectomy          MEDICATIONS  (STANDING):  cefpodoxime 200 milliGRAM(s) Oral every 12 hours  enoxaparin Injectable 40 milliGRAM(s) SubCutaneous every 24 hours  gabapentin Solution 600 milliGRAM(s) Oral three times a day  methocarbamol 750 milliGRAM(s) Oral three times a day  oxyCODONE  ER Tablet 20 milliGRAM(s) Oral every 12 hours  potassium chloride    Tablet ER 40 milliEquivalent(s) Oral every 4 hours  saccharomyces boulardii 250 milliGRAM(s) Oral two times a day    MEDICATIONS  (PRN):  acetaminophen    Suspension .. 650 milliGRAM(s) Oral every 6 hours PRN Mild Pain (1 - 3)  bisacodyl Suppository 10 milliGRAM(s) Rectal daily PRN Constipation  HYDROmorphone   Tablet 4 milliGRAM(s) Oral every 4 hours PRN Severe Pain (7 - 10)  HYDROmorphone   Tablet 2 milliGRAM(s) Oral every 4 hours PRN Moderate Pain (4 - 6)  ondansetron    Tablet 4 milliGRAM(s) Oral every 6 hours PRN Nausea and/or Vomiting  simethicone 80 milliGRAM(s) Chew every 6 hours PRN Gas      Allergies    tetanus toxoid (Hives)    Intolerances    lactose (Other)        VITALS  66y  Vital Signs Last 24 Hrs  T(C): 36.7 (04 Oct 2022 07:26), Max: 36.7 (03 Oct 2022 20:20)  T(F): 98 (04 Oct 2022 07:26), Max: 98.1 (03 Oct 2022 20:20)  HR: 89 (04 Oct 2022 07:26) (89 - 90)  BP: 154/85 (04 Oct 2022 07:26) (154/85 - 156/84)  BP(mean): --  RR: 16 (04 Oct 2022 07:26) (16 - 16)  SpO2: 96% (04 Oct 2022 07:26) (93% - 96%)    Parameters below as of 04 Oct 2022 07:26  Patient On (Oxygen Delivery Method): room air      Daily     Daily Weight in k (04 Oct 2022 06:03)        RECENT LABS:                          9.0    7.06  )-----------( 351      ( 03 Oct 2022 06:50 )             26.4     10-04    145  |  104  |  9   ----------------------------<  84  3.2<L>   |  31  |  0.46<L>    Ca    10.7<H>      04 Oct 2022 06:10  Mg     1.6     10-04                CAPILLARY BLOOD GLUCOSE                   Patient is a 66y old  Female who presents with a chief complaint of metastatic breast CA to spine s/p  C7-T2 corpectomy & fusion, posterior C4-T5 fusion (03 Oct 2022 12:14)      HPI:  Thi is a 67 yo female with PMH of left arm skin CA s/p excision at age 16, stomach ulcer, her last mammogram was in , who was brought to [facility] ED 9/10/22 with progressive weakness to the point that she felt she could not get out of bed. She also reported remote shoulder pain, decreased appetite. Was found to have two lumps in her right breast but could not tolerate recommended biopsy due to pain. CT scan of the Cervical spine performed which showed Multiple lytic lesions of the cervical and visualized thoracic spine as well as right first and second ribs concerning for metastases versus multiple myeloma.    She was admitted under medicine service for further workup and management, including of her pain. Further imaging done showed Metastatic breast cancer with dominant right breast mass, right chest wall, axillary and retropectoral involvement; osseous, pulmonary, and likely hepatic metastatic disease. She was also found to have metastatic adenopathy of the thorax, diffuse extensive osseous metastatic disease with multiple lucent and sclerotic lesions and compression fractures of T1, T9, T12, and L2. Severe T1 compression with epidural extension; cord compression cannot be excluded. +additional neoplastic disease of the spine with epidural extension. Multifocal neoplastic lesions involving the pelvis and ribs as well.    Patient was seen by Oncology, CT surgery and Neurosurgery team. She underwent C7-T2 corpectomy & fusion, posterior C4-T5 fusion. Post-op course uncomplicated.  Hamm removed, pt requiring some straight cath for retention. She also had subjective dysphagia - seen by speech therapy recommended pureed diet which patient continues to prefer. Patient with low grade temp and positive UA. Started on IV Rocephin for UTI. Blood cultures negative, Urine culture positive for GNR, changed to PO Vantin for 7 days.    Patient was medically optimized for discharge to [facility] IRF on 22. (30 Sep 2022 14:32)      PAST MEDICAL & SURGICAL HISTORY:  Stomach ulcer  20 years ago   no pain not taking any meds      Skin cancer of arm, left  pt was 16 year old      H/O excision of mass  left arm  when patient was 16 years ago      S/P hysterectomy          MEDICATIONS  (STANDING):  cefpodoxime 200 milliGRAM(s) Oral every 12 hours  enoxaparin Injectable 40 milliGRAM(s) SubCutaneous every 24 hours  gabapentin Solution 600 milliGRAM(s) Oral three times a day  methocarbamol 750 milliGRAM(s) Oral three times a day  oxyCODONE  ER Tablet 20 milliGRAM(s) Oral every 12 hours  potassium chloride    Tablet ER 40 milliEquivalent(s) Oral every 4 hours  saccharomyces boulardii 250 milliGRAM(s) Oral two times a day    MEDICATIONS  (PRN):  acetaminophen    Suspension .. 650 milliGRAM(s) Oral every 6 hours PRN Mild Pain (1 - 3)  bisacodyl Suppository 10 milliGRAM(s) Rectal daily PRN Constipation  HYDROmorphone   Tablet 4 milliGRAM(s) Oral every 4 hours PRN Severe Pain (7 - 10)  HYDROmorphone   Tablet 2 milliGRAM(s) Oral every 4 hours PRN Moderate Pain (4 - 6)  ondansetron    Tablet 4 milliGRAM(s) Oral every 6 hours PRN Nausea and/or Vomiting  simethicone 80 milliGRAM(s) Chew every 6 hours PRN Gas      Allergies    tetanus toxoid (Hives)    Intolerances    lactose (Other)        VITALS  66y  Vital Signs Last 24 Hrs  T(C): 36.7 (04 Oct 2022 07:26), Max: 36.7 (03 Oct 2022 20:20)  T(F): 98 (04 Oct 2022 07:26), Max: 98.1 (03 Oct 2022 20:20)  HR: 89 (04 Oct 2022 07:26) (89 - 90)  BP: 154/85 (04 Oct 2022 07:26) (154/85 - 156/84)  BP(mean): --  RR: 16 (04 Oct 2022 07:26) (16 - 16)  SpO2: 96% (04 Oct 2022 07:26) (93% - 96%)    Parameters below as of 04 Oct 2022 07:26  Patient On (Oxygen Delivery Method): room air      Daily     Daily Weight in k (04 Oct 2022 06:03)        RECENT LABS:                          9.0    7.06  )-----------( 351      ( 03 Oct 2022 06:50 )             26.4     10-04    145  |  104  |  9   ----------------------------<  84  3.2<L>   |  31  |  0.46<L>    Ca    10.7<H>      04 Oct 2022 06:10  Mg     1.6     10-04                CAPILLARY BLOOD GLUCOSE

## 2022-10-04 NOTE — PROGRESS NOTE ADULT - CONSTITUTIONAL COMMENTS
Patient was sitting in bed in supine position with cervical collar in place. She was answering questions appropriately, appears more comfortable and not in any significant pain or distress.

## 2022-10-04 NOTE — PROGRESS NOTE ADULT - ASSESSMENT
67 yo female PMH stomach ulcer who presented to [facility] 9/10/22 with progressive weakness, discovered multiple lytic lesions of the cervical and thoracic spine along with lung and liver secondary to metastatic primary newly discovered breast cancer with axillary and retropectoral involvement. Imaging also showed Compression fractures of T1, T9, T12, and L2. Severe T1 compression with epidural extension.S/p C7-T2 corpectomy & fusion, posterior C4-T5 fusion, hospital course significant for urinary retention and dysphagia, UTI      *Enterobacter cloacae UTI  - PO Vantin BID for 7 days  - monitor bladder scans  - SC for PVR >350 ml, toileting schedule    *Diarrhea, resolved  *hypokalemia  - no laxatives  - Will replace K     *Normocytic anemia  - Monitor Hg/Hct, iron levels, B12, folate WNL  - Transfuse if hG <7    *Functional and gait instability:  *Left groin/back/hip pain  - new onset, defer pain regimen to PMR; possibly related to metastatic disease found in bones  - Recommended lower dose of dilaudid with zofran/reglan to control nausea  - monitor neurological exam closely  - CTAP as above    *Metastatic Breast CA to lung, liver, ribs, brain with spine involvement and neurogenic bladder  - s/p right breast biopsy shows  positive for invasive ductal carcinoma  - Compression fractures of T1, T9, T12, and L2. Severe T1 compression with epidural extension.   - s/p C7-T2 corpectomy & fusion, posterior C4-T5 fusion  - Patient will not require chemo/radiation while in rehab. 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

## 2022-10-04 NOTE — PROGRESS NOTE ADULT - NEGATIVE NEUROLOGICAL SYMPTOMS
no paresthesias/no syncope/no tremors/no loss of sensation/no headache/no facial palsy
no weakness/no paresthesias/no tremors/no loss of sensation/no headache/no confusion/no facial palsy

## 2022-10-05 LAB
ANION GAP SERPL CALC-SCNC: 3 MMOL/L — LOW (ref 5–17)
BUN SERPL-MCNC: 8 MG/DL — SIGNIFICANT CHANGE UP (ref 7–23)
CALCIUM SERPL-MCNC: 10 MG/DL — SIGNIFICANT CHANGE UP (ref 8.4–10.5)
CHLORIDE SERPL-SCNC: 101 MMOL/L — SIGNIFICANT CHANGE UP (ref 96–108)
CO2 SERPL-SCNC: 32 MMOL/L — HIGH (ref 22–31)
CREAT SERPL-MCNC: 0.4 MG/DL — LOW (ref 0.5–1.3)
EGFR: 109 ML/MIN/1.73M2 — SIGNIFICANT CHANGE UP
GLUCOSE SERPL-MCNC: 103 MG/DL — HIGH (ref 70–99)
POTASSIUM SERPL-MCNC: 4.3 MMOL/L — SIGNIFICANT CHANGE UP (ref 3.5–5.3)
POTASSIUM SERPL-SCNC: 4.3 MMOL/L — SIGNIFICANT CHANGE UP (ref 3.5–5.3)
SODIUM SERPL-SCNC: 136 MMOL/L — SIGNIFICANT CHANGE UP (ref 135–145)

## 2022-10-05 PROCEDURE — 99232 SBSQ HOSP IP/OBS MODERATE 35: CPT

## 2022-10-05 RX ORDER — HYDROMORPHONE HYDROCHLORIDE 2 MG/ML
4 INJECTION INTRAMUSCULAR; INTRAVENOUS; SUBCUTANEOUS EVERY 4 HOURS
Refills: 0 | Status: DISCONTINUED | OUTPATIENT
Start: 2022-10-05 | End: 2022-10-10

## 2022-10-05 RX ORDER — OXYCODONE HYDROCHLORIDE 5 MG/1
20 TABLET ORAL EVERY 12 HOURS
Refills: 0 | Status: DISCONTINUED | OUTPATIENT
Start: 2022-10-05 | End: 2022-10-12

## 2022-10-05 RX ORDER — HYDROMORPHONE HYDROCHLORIDE 2 MG/ML
2 INJECTION INTRAMUSCULAR; INTRAVENOUS; SUBCUTANEOUS EVERY 4 HOURS
Refills: 0 | Status: DISCONTINUED | OUTPATIENT
Start: 2022-10-05 | End: 2022-10-10

## 2022-10-05 RX ADMIN — OXYCODONE HYDROCHLORIDE 20 MILLIGRAM(S): 5 TABLET ORAL at 08:40

## 2022-10-05 RX ADMIN — HYDROMORPHONE HYDROCHLORIDE 2 MILLIGRAM(S): 2 INJECTION INTRAMUSCULAR; INTRAVENOUS; SUBCUTANEOUS at 13:53

## 2022-10-05 RX ADMIN — Medication 200 MILLIGRAM(S): at 08:40

## 2022-10-05 RX ADMIN — HYDROMORPHONE HYDROCHLORIDE 2 MILLIGRAM(S): 2 INJECTION INTRAMUSCULAR; INTRAVENOUS; SUBCUTANEOUS at 14:53

## 2022-10-05 RX ADMIN — Medication 200 MILLIGRAM(S): at 17:18

## 2022-10-05 RX ADMIN — OXYCODONE HYDROCHLORIDE 20 MILLIGRAM(S): 5 TABLET ORAL at 17:18

## 2022-10-05 RX ADMIN — ENOXAPARIN SODIUM 40 MILLIGRAM(S): 100 INJECTION SUBCUTANEOUS at 06:12

## 2022-10-05 RX ADMIN — ONDANSETRON 4 MILLIGRAM(S): 8 TABLET, FILM COATED ORAL at 07:41

## 2022-10-05 RX ADMIN — GABAPENTIN 600 MILLIGRAM(S): 400 CAPSULE ORAL at 22:19

## 2022-10-05 RX ADMIN — CYCLOBENZAPRINE HYDROCHLORIDE 5 MILLIGRAM(S): 10 TABLET, FILM COATED ORAL at 22:19

## 2022-10-05 NOTE — PROGRESS NOTE ADULT - ASSESSMENT
67 yo female PMH stomach ulcer who presented to [facility] 9/10/22 with progressive weakness, discovered multiple lytic lesions of the cervical and thoracic spine along with lung and liver secondary to metastatic primary newly discovered breast cancer with axillary and retropectoral involvement. Imaging also showed Compression fractures of T1, T9, T12, and L2. Severe T1 compression with epidural extension.S/p C7-T2 corpectomy & fusion, posterior C4-T5 fusion, hospital course significant for urinary retention and dysphagia, UTI      *Enterobacter cloacae UTI  - PO Vantin BID for 7 days  - monitor bladder scans  - SC for PVR >350 ml, toileting schedule    *Diarrhea, resolved  *hypokalemia  - no laxatives  - Will replace K     *Normocytic anemia  - Monitor Hg/Hct, iron levels, B12, folate WNL  - Transfuse if hG <7    *Functional and gait instability:  *Left groin/back/hip pain  - new onset, defer pain regimen to PMR; possibly related to metastatic disease found in bones  - Recommended lower dose of dilaudid with zofran/reglan to control nausea  - monitor neurological exam closely  - CTAP as above    *Metastatic Breast CA to lung, liver, ribs, brain with spine involvement and neurogenic bladder  - s/p right breast biopsy shows  positive for invasive ductal carcinoma  - Compression fractures of T1, T9, T12, and L2. Severe T1 compression with epidural extension.   - s/p C7-T2 corpectomy & fusion, posterior C4-T5 fusion  - Patient will not require chemo/radiation while in rehab. 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

## 2022-10-05 NOTE — CHART NOTE - NSCHARTNOTEFT_GEN_A_CORE
Nutrition Follow Up Note  Hospital Course   (Per Electronic Medical Record)    Source:  Patient [X]  Medical Record [X]      Diet: Diet, Minced and Moist (10-04-22 @ 11:41) [Active]      Nutrition Follow Up: Patient noted with suboptimal PO intakes (0-50% as per flow sheets). Patient was currently on pureed diet but was advanced to minced & moist. Patient was seen by SLP today and requested pureed trial tray. Patient currently receiving a homemade smoothie at lunch (contains banana, almond milk, and peanut butter). Patient also receptive to receiving Ensure Plus High Protein 8oz PO BID (Provides 700kcal & 40grams of Protein) to optimize nutritional status. K+ 3.2 <L> (10/4) but is being addressed by providing high potassium foods. Recommend continue with oral nutrition supplements and homemade smoothie to optimize nutritional status.        START [id number]    136  |  101  |  8   ----------------------------<  103<H>  4.3   |  32<H>  |  0.40<L>    Ca    10.0      05 Oct 2022 09:11  Mg     1.6     10-04    END CHEMFISH  START MEDSACTIVEMEDICATIONS  (STANDING):  cefpodoxime 200 milliGRAM(s) Oral every 12 hours  cyclobenzaprine 5 milliGRAM(s) Oral three times a day  enoxaparin Injectable 40 milliGRAM(s) SubCutaneous every 24 hours  gabapentin Solution 600 milliGRAM(s) Oral three times a day  oxyCODONE  ER Tablet 20 milliGRAM(s) Oral every 12 hours  saccharomyces boulardii 250 milliGRAM(s) Oral two times a day    MEDICATIONS  (PRN):  acetaminophen    Suspension .. 650 milliGRAM(s) Oral every 6 hours PRN Mild Pain (1 - 3)  bisacodyl Suppository 10 milliGRAM(s) Rectal daily PRN Constipation  HYDROmorphone   Tablet 4 milliGRAM(s) Oral every 4 hours PRN Severe Pain (7 - 10)  HYDROmorphone   Tablet 2 milliGRAM(s) Oral every 4 hours PRN Moderate Pain (4 - 6)  ondansetron    Tablet 4 milliGRAM(s) Oral every 6 hours PRN Nausea and/or Vomiting  simethicone 80 milliGRAM(s) Chew every 6 hours PRN Gas  END MEDSACTIVE  START DIETORDEREND DIETORDER  START ADMITDXPathological fracture in neoplastic disease, other specified site, initial encounter for fracture    END ADMITDX  START IOFSEND IOFS  START SKINPUEND SKINPU        Pertinent Medications: MEDICATIONS  (STANDING):  cefpodoxime 200 milliGRAM(s) Oral every 12 hours  cyclobenzaprine 5 milliGRAM(s) Oral three times a day  enoxaparin Injectable 40 milliGRAM(s) SubCutaneous every 24 hours  gabapentin Solution 600 milliGRAM(s) Oral three times a day  oxyCODONE  ER Tablet 20 milliGRAM(s) Oral every 12 hours  saccharomyces boulardii 250 milliGRAM(s) Oral two times a day    MEDICATIONS  (PRN):  acetaminophen    Suspension .. 650 milliGRAM(s) Oral every 6 hours PRN Mild Pain (1 - 3)  bisacodyl Suppository 10 milliGRAM(s) Rectal daily PRN Constipation  HYDROmorphone   Tablet 4 milliGRAM(s) Oral every 4 hours PRN Severe Pain (7 - 10)  HYDROmorphone   Tablet 2 milliGRAM(s) Oral every 4 hours PRN Moderate Pain (4 - 6)  ondansetron    Tablet 4 milliGRAM(s) Oral every 6 hours PRN Nausea and/or Vomiting  simethicone 80 milliGRAM(s) Chew every 6 hours PRN Gas      Pertinent Labs:  10-05 Na136 mmol/L Glu 103 mg/dL<H> K+ 4.3 mmol/L Cr  0.40 mg/dL<L> BUN 8 mg/dL 10-01 Alb 1.8 g/dL<L>            Enteral/Parenteral Nutrition: Not Applicable    Current Weight (lbs):  132.7 lb on 10/5  Weight Trends (lbs):   132.2 (10/4), 130 (10/2), 130 (10/2)      Skin: surgical incision noted     Edema: none noted    Last Bowel Movement: on 10/3/22        Estimated Needs:   [X] No Change Since Previous Assessment    Previous Nutrition Diagnosis:   Severe Malnutrition    Nutrition Diagnosis is [X] Ongoing - addressed with oral nutritional supplements        Interventions:   1. Recommend Ensure Plus High Protein 8oz PO BID (Provides 700kcal & 40grams of Protein) and homemade smoothie daily  2. Recommend encourage PO intake  3. Obtain daily PO intakes and weights  4. Follow up with SLP recommendations    Monitoring & Evaluation:   [X] Weights   [X] PO Intake   [X] Skin Integrity   [X] Follow Up (Per Protocol)  [X] Tolerance to Diet Prescription   [X] Other: Labs & POCT    Registered Dietitian/Nutritionist Remains Available.  Nathan Beverly RD, CDN    Phone# [phone]

## 2022-10-05 NOTE — PROGRESS NOTE ADULT - CONSTITUTIONAL COMMENTS
alert, mood fair C collar in place. Dressing still present, to dc, discussed with patient and nursing per NSGY recs

## 2022-10-05 NOTE — PROGRESS NOTE ADULT - ASSESSMENT
67 yo female PMH left arm skin CA s/p excision, stomach ulcer who presented to [facility] 9/10/22 with progressive weakness, found to have multiple lytic lesions of the cervical and thoracic spine as well as right first and second ribs, lung, liver secondary to metastatic breast cancer with dominant right breast mass, right chest wall, axillary and retropectoral involvement. Compression fractures of T1, T9, T12, and L2. Severe T1 compression with epidural extension. She underwent C7-T2 corpectomy & fusion, posterior C4-T5 fusion    # Metastatic Breast CA to lung, liver, ribs, with spine involvement and neurogenic bladder  - s/p right breast biopsy shows  positive for invasive ductal carcinoma  - s/p C7-T2 corpectomy & fusion, posterior C4-T5 fusion (Dr Bennett)  - Cuauhtemoc from [facility]. PT discussed case with orthotist:  ·	9/10 aspen collar with thoracic extension  ·	9/22 regular aspen c collar and separate aspen TLSO  ·	9/24 aspen LSO. 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 Patient will not require chemo/radiation while in rehab. Chemotherapy after surgery wound heals   - Precautions: CA, fall, aspiration, spinal, cervical collar, LSO Brace    # Pain management  - Cyclobenzaprine 5 q8  - Gabapentin TID 600mg   - Dilaudid 2mg Q4 hours prn for mod pain; 4mg Q4 hours prn for severe pain  - Oxycodone ER 20mg Q12 hours   - Monitor orthostatics    # enterobacter UTI  - PO Vantin BID for 7 days. Day #6/7 10/5  - monitor bladder scans    # Bowel Regimen  - Dulcolax supp PRN  - continue probiotic  - zofran 4 mg q6 PRN    # FEN   - Diet: Minced and moist with crackers   - supplemented 40 meq x 2 on 10/3 and 10/4  - K+ 4.3 10/5  - BMP 10/6    # Skin:  - Skin on admission: surgical incision to anterior neck with steristrips, posterior cervical and thoracic spine incision with primary dressing.  - Discussed with NSGY, may dc dressings, leave open to air 10/3. Discussed with patient and staff    # DVT ppx  - Lovenox  - SCD, TEDs    # Case discussed in IDT rounds 10/5  - set up/supervision eating and grooming, min assist UB dressing, total assist LB dressing, max assist toileting. not tolerating minced/moist diet, downgraded back to pureed solids for comfort, mild deficits in working memory  - goals: mod independent transfers, ambulation with RW, negotiate 6-8 steps with mod independent, min assist bathing/shower transfer  - caregiver training  - target dc home 10/19 with caregiver support and home PT, OT, SLP      # LABS  orthotist LSO  CBC BMP 10/6      CODE STATUS: FULL CODE     Outpatient Follow-up (Specialty/Name of physician):    Marj Del Castillo)  Hematology; Hospice Palliative Medicine; Internal Medicine; Medical Oncology  [address] [place] [address]  Phone: [phone]  Fax: [phone]  Follow Up Time: 2 weeks    Bret Ibarra)  Radiation Oncology  [address] [place] [address]  Phone: [phone]  Fax: [phone]  Follow Up Time: 2 weeks

## 2022-10-05 NOTE — PROGRESS NOTE ADULT - SUBJECTIVE AND OBJECTIVE BOX
Patient is a 66y old  Female who presents with a chief complaint of metastatic breast CA to spine s/p  C7-T2 corpectomy & fusion, posterior C4-T5 fusion (05 Oct 2022 14:19)      HPI:  Thi is a 65 yo female with PMH of left arm skin CA s/p excision at age 16, stomach ulcer, her last mammogram was in , who was brought to [facility] ED 9/10/22 with progressive weakness to the point that she felt she could not get out of bed. She also reported remote shoulder pain, decreased appetite. Was found to have two lumps in her right breast but could not tolerate recommended biopsy due to pain. CT scan of the Cervical spine performed which showed Multiple lytic lesions of the cervical and visualized thoracic spine as well as right first and second ribs concerning for metastases versus multiple myeloma.    She was admitted under medicine service for further workup and management, including of her pain. Further imaging done showed Metastatic breast cancer with dominant right breast mass, right chest wall, axillary and retropectoral involvement; osseous, pulmonary, and likely hepatic metastatic disease. She was also found to have metastatic adenopathy of the thorax, diffuse extensive osseous metastatic disease with multiple lucent and sclerotic lesions and compression fractures of T1, T9, T12, and L2. Severe T1 compression with epidural extension; cord compression cannot be excluded. +additional neoplastic disease of the spine with epidural extension. Multifocal neoplastic lesions involving the pelvis and ribs as well.    Patient was seen by Oncology, CT surgery and Neurosurgery team. She underwent C7-T2 corpectomy & fusion, posterior C4-T5 fusion. Post-op course uncomplicated.  Hamm removed, pt requiring some straight cath for retention. She also had subjective dysphagia - seen by speech therapy recommended pureed diet which patient continues to prefer. Patient with low grade temp and positive UA. Started on IV Rocephin for UTI. Blood cultures negative, Urine culture positive for GNR, changed to PO Vantin for 7 days.    Patient was medically optimized for discharge to [facility] IRF on 22. (30 Sep 2022 14:32)      PAST MEDICAL & SURGICAL HISTORY:  Stomach ulcer  20 years ago   no pain not taking any meds      Skin cancer of arm, left  pt was 16 year old      H/O excision of mass  left arm  when patient was 16 years ago      S/P hysterectomy          MEDICATIONS  (STANDING):  cefpodoxime 200 milliGRAM(s) Oral every 12 hours  cyclobenzaprine 5 milliGRAM(s) Oral three times a day  enoxaparin Injectable 40 milliGRAM(s) SubCutaneous every 24 hours  gabapentin Solution 600 milliGRAM(s) Oral three times a day  oxyCODONE  ER Tablet 20 milliGRAM(s) Oral every 12 hours  saccharomyces boulardii 250 milliGRAM(s) Oral two times a day    MEDICATIONS  (PRN):  acetaminophen    Suspension .. 650 milliGRAM(s) Oral every 6 hours PRN Mild Pain (1 - 3)  bisacodyl Suppository 10 milliGRAM(s) Rectal daily PRN Constipation  HYDROmorphone   Tablet 4 milliGRAM(s) Oral every 4 hours PRN Severe Pain (7 - 10)  HYDROmorphone   Tablet 2 milliGRAM(s) Oral every 4 hours PRN Moderate Pain (4 - 6)  ondansetron    Tablet 4 milliGRAM(s) Oral every 6 hours PRN Nausea and/or Vomiting  simethicone 80 milliGRAM(s) Chew every 6 hours PRN Gas      Allergies    tetanus toxoid (Hives)    Intolerances    lactose (Other)        VITALS  66y  Vital Signs Last 24 Hrs  T(C): 37.1 (05 Oct 2022 07:37), Max: 37.1 (05 Oct 2022 07:37)  T(F): 98.7 (05 Oct 2022 07:37), Max: 98.7 (05 Oct 2022 07:37)  HR: 88 (05 Oct 2022 07:37) (88 - 93)  BP: 124/79 (05 Oct 2022 07:37) (124/79 - 142/87)  BP(mean): --  RR: 16 (05 Oct 2022 07:37) (16 - 16)  SpO2: 94% (05 Oct 2022 07:37) (93% - 94%)    Parameters below as of 05 Oct 2022 07:37  Patient On (Oxygen Delivery Method): room air      Daily     Daily Weight in k.2 (05 Oct 2022 04:28)        RECENT LABS:      10-05    136  |  101  |  8   ----------------------------<  103<H>  4.3   |  32<H>  |  0.40<L>    Ca    10.0      05 Oct 2022 09:11  Mg     1.6     10-04                CAPILLARY BLOOD GLUCOSE

## 2022-10-05 NOTE — PROGRESS NOTE ADULT - COMMENTS
Patient reports that she still has occasional lower abdominal discomfort, mostly in left lower quadrant. No dysuria, no noted hematuria. No significant flank pain although left lower abdominal pain radiates occasionally from back and also from LLQ into groin. No further diarrhea.    she reports taking flexeril last night with good relief pain and slept well. However, had nausea again this AM and refused med. Unlikely SE so many hours after medication; also patient reports nausea happens with position changes with AM care. Discussed with team in huddle, possible orthostasis given prolonged supine position as well as narcotic meds, gabapentin, etc. Will check orthostatic vitals to see    diet downgraded back to pureed solids and thins as patient had discomfort with minced.io/moist solids

## 2022-10-05 NOTE — PROGRESS NOTE ADULT - SUBJECTIVE AND OBJECTIVE BOX
Patient is a 66y old  Female who presents with a chief complaint of metastatic breast CA to spine s/p  C7-T2 corpectomy & fusion, posterior C4-T5 fusion       NO further episodes of diarrhea    Patient seen and examined at bedside.    ALLERGIES:  lactose (Other)  tetanus toxoid (Hives)    MEDICATIONS  (STANDING):  cefpodoxime 200 milliGRAM(s) Oral every 12 hours  enoxaparin Injectable 40 milliGRAM(s) SubCutaneous every 24 hours  gabapentin Solution 600 milliGRAM(s) Oral three times a day  methocarbamol 750 milliGRAM(s) Oral three times a day  oxyCODONE  ER Tablet 20 milliGRAM(s) Oral every 12 hours  potassium chloride   Powder 40 milliEquivalent(s) Oral every 4 hours  saccharomyces boulardii 250 milliGRAM(s) Oral two times a day    MEDICATIONS  (PRN):  acetaminophen    Suspension .. 650 milliGRAM(s) Oral every 6 hours PRN Mild Pain (1 - 3)  bisacodyl Suppository 10 milliGRAM(s) Rectal daily PRN Constipation  HYDROmorphone   Tablet 4 milliGRAM(s) Oral every 4 hours PRN Severe Pain (7 - 10)  HYDROmorphone   Tablet 2 milliGRAM(s) Oral every 4 hours PRN Moderate Pain (4 - 6)  simethicone 80 milliGRAM(s) Chew every 6 hours PRN Gas    Vital Signs Last 24 Hrs  T(C): 37.1 (05 Oct 2022 07:37), Max: 37.1 (05 Oct 2022 07:37)  T(F): 98.7 (05 Oct 2022 07:37), Max: 98.7 (05 Oct 2022 07:37)  HR: 88 (05 Oct 2022 07:37) (88 - 93)  BP: 124/79 (05 Oct 2022 07:37) (124/79 - 142/87)  RR: 16 (05 Oct 2022 07:37) (16 - 16)  SpO2: 94% (05 Oct 2022 07:37) (93% - 94%)    Parameters below as of 05 Oct 2022 07:37  Patient On (Oxygen Delivery Method): room air    BMI (kg/m2): 21.7  PHYSICAL EXAM:  General: NAD, A/O x 3, collar in place, speaking in full sentneces  ENT: MMM, no scleral icterus  Neck: Supple, No JVD, no thyroidomegaly  Lungs: Clear to auscultation bilaterally, no wheezes, no rales, no rhonchi, good inspiratory effort  Cardio: RRR, S1/S2, No murmurs  Abdomen: +tenderness over left hip joint and left groin, abdomen is Soft, Nontender, Nondistended; Bowel sounds present  Extremities: No calf tenderness, No pitting edema, no skin changes    LABS:                        9.0    7.06  )-----------( 351      ( 03 Oct 2022 06:50 )             26.4       10-    140  |  102  |  7   ----------------------------<  91  2.9   |  28  |  0.50    Ca    10.7      03 Oct 2022 06:50    TPro  5.4  /  Alb  1.8  /  TBili  0.4  /  DBili  x   /  AST  59  /  ALT  43  /  AlkPhos  157  10-01     Urinalysis Basic - ( 02 Oct 2022 10:50 )    Color: Yellow / Appearance: Slightly Turbid / S.015 / pH: x  Gluc: x / Ketone: Large  / Bili: Negative / Urobili: Negative   Blood: x / Protein: 30 mg/dL / Nitrite: Negative   Leuk Esterase: Moderate / RBC: 5-10 /HPF / WBC 11-25 /HPF   Sq Epi: x / Non Sq Epi: Neg.-Few / Bacteria: Few /HPF    Culture - Urine (collected 28 Sep 2022 20:30)  Source: Catheterized Catheterized  Final Report (01 Oct 2022 10:17):    >100,000 CFU/ml Enterobacter cloacae complex  Organism: Enterobacter cloacae complex (01 Oct 2022 10:17)  Organism: Enterobacter cloacae complex (01 Oct 2022 10:17)      -  Amikacin: S <=16      -  Amoxicillin/Clavulanic Acid: R >16/8      -  Ampicillin: R >16 These ampicillin results predict results for amoxicillin      -  Ampicillin/Sulbactam: R >16/8 Enterobacter, Klebsiella aerogenes, Citrobacter, and Serratia may develop resistance during prolonged therapy (3-4 days)      -  Aztreonam: S <=4      -  Cefazolin: R >16      -  Cefepime: S <=2      -  Cefoxitin: R >16      -  Ceftriaxone: R 32 Enterobacter, Klebsiella aerogenes, Citrobacter, and Serratia may develop resistance during prolonged therapy      -  Ciprofloxacin: S <=0.25      -  Ertapenem: S <=0.5      -  Gentamicin: S <=2      -  Imipenem: S <=1      -  Levofloxacin: S <=0.5      -  Meropenem: S <=1      -  Nitrofurantoin: R >64 Should not be used to treat pyelonephritis      -  Piperacillin/Tazobactam: S <=8      -  Tigecycline: S <=2      -  Tobramycin: S <=2      -  Trimethoprim/Sulfamethoxazole: S <=0.5/9.5      Method Type: KERA      COVID-19 PCR: NotDetec (22 @ 20:50)  COVID-19 PCR: NotDetec (22 @ 13:00)  COVID-19 PCR: NotDetec (22 @ 06:00)  COVID-19 PCR: NotDetec (22 @ 03:30)  COVID-19 PCR: NotDetec (22 @ 20:50)  COVID-19 PCR: NotDetec (22 @ 13:00)  COVID-19 PCR: NotDetec (22 @ 06:00)  COVID-19 PCR: NotDetec (22 @ 03:30)      RADIOLOGY  CT Abdomen and Pelvis No Cont (10.02.22 @ 11:31) >  MPRESSION:  No acute abdominal findings.    Pulmonary and hepatic metastases again noted. Small bilateral pleural   effusions are new since prior.    Diffuse osseous metastatic disease involving the spine and bony pelvis   with L2 pathologic fracture, as above. Findings are better delineated on   recent spine CT and MRI studies.

## 2022-10-06 LAB
ANION GAP SERPL CALC-SCNC: 9 MMOL/L — SIGNIFICANT CHANGE UP (ref 5–17)
BUN SERPL-MCNC: 10 MG/DL — SIGNIFICANT CHANGE UP (ref 7–23)
CALCIUM SERPL-MCNC: 10.4 MG/DL — SIGNIFICANT CHANGE UP (ref 8.4–10.5)
CHLORIDE SERPL-SCNC: 102 MMOL/L — SIGNIFICANT CHANGE UP (ref 96–108)
CO2 SERPL-SCNC: 26 MMOL/L — SIGNIFICANT CHANGE UP (ref 22–31)
CREAT SERPL-MCNC: 0.49 MG/DL — LOW (ref 0.5–1.3)
EGFR: 104 ML/MIN/1.73M2 — SIGNIFICANT CHANGE UP
GLUCOSE SERPL-MCNC: 85 MG/DL — SIGNIFICANT CHANGE UP (ref 70–99)
HCT VFR BLD CALC: 28.5 % — LOW (ref 34.5–45)
HGB BLD-MCNC: 9.2 G/DL — LOW (ref 11.5–15.5)
MCHC RBC-ENTMCNC: 29.6 PG — SIGNIFICANT CHANGE UP (ref 27–34)
MCHC RBC-ENTMCNC: 32.3 GM/DL — SIGNIFICANT CHANGE UP (ref 32–36)
MCV RBC AUTO: 91.6 FL — SIGNIFICANT CHANGE UP (ref 80–100)
NRBC # BLD: 0 /100 WBCS — SIGNIFICANT CHANGE UP (ref 0–0)
PLATELET # BLD AUTO: 401 K/UL — HIGH (ref 150–400)
POTASSIUM SERPL-MCNC: 4.1 MMOL/L — SIGNIFICANT CHANGE UP (ref 3.5–5.3)
POTASSIUM SERPL-SCNC: 4.1 MMOL/L — SIGNIFICANT CHANGE UP (ref 3.5–5.3)
RBC # BLD: 3.11 M/UL — LOW (ref 3.8–5.2)
RBC # FLD: 14 % — SIGNIFICANT CHANGE UP (ref 10.3–14.5)
SODIUM SERPL-SCNC: 137 MMOL/L — SIGNIFICANT CHANGE UP (ref 135–145)
WBC # BLD: 7.1 K/UL — SIGNIFICANT CHANGE UP (ref 3.8–10.5)
WBC # FLD AUTO: 7.1 K/UL — SIGNIFICANT CHANGE UP (ref 3.8–10.5)

## 2022-10-06 PROCEDURE — 99233 SBSQ HOSP IP/OBS HIGH 50: CPT

## 2022-10-06 RX ADMIN — CYCLOBENZAPRINE HYDROCHLORIDE 5 MILLIGRAM(S): 10 TABLET, FILM COATED ORAL at 21:53

## 2022-10-06 RX ADMIN — Medication 250 MILLIGRAM(S): at 18:59

## 2022-10-06 RX ADMIN — OXYCODONE HYDROCHLORIDE 20 MILLIGRAM(S): 5 TABLET ORAL at 08:40

## 2022-10-06 RX ADMIN — OXYCODONE HYDROCHLORIDE 20 MILLIGRAM(S): 5 TABLET ORAL at 18:59

## 2022-10-06 RX ADMIN — OXYCODONE HYDROCHLORIDE 20 MILLIGRAM(S): 5 TABLET ORAL at 09:30

## 2022-10-06 RX ADMIN — GABAPENTIN 600 MILLIGRAM(S): 400 CAPSULE ORAL at 08:40

## 2022-10-06 RX ADMIN — CYCLOBENZAPRINE HYDROCHLORIDE 5 MILLIGRAM(S): 10 TABLET, FILM COATED ORAL at 15:30

## 2022-10-06 RX ADMIN — Medication 200 MILLIGRAM(S): at 18:59

## 2022-10-06 RX ADMIN — Medication 250 MILLIGRAM(S): at 08:40

## 2022-10-06 RX ADMIN — Medication 200 MILLIGRAM(S): at 08:47

## 2022-10-06 RX ADMIN — ENOXAPARIN SODIUM 40 MILLIGRAM(S): 100 INJECTION SUBCUTANEOUS at 05:26

## 2022-10-06 RX ADMIN — CYCLOBENZAPRINE HYDROCHLORIDE 5 MILLIGRAM(S): 10 TABLET, FILM COATED ORAL at 08:40

## 2022-10-06 RX ADMIN — HYDROMORPHONE HYDROCHLORIDE 2 MILLIGRAM(S): 2 INJECTION INTRAMUSCULAR; INTRAVENOUS; SUBCUTANEOUS at 15:31

## 2022-10-06 RX ADMIN — HYDROMORPHONE HYDROCHLORIDE 2 MILLIGRAM(S): 2 INJECTION INTRAMUSCULAR; INTRAVENOUS; SUBCUTANEOUS at 16:20

## 2022-10-06 NOTE — PROGRESS NOTE ADULT - SKIN COMMENTS
Dressing still intact over site on posterior c-spine. cervicothoracic incision with dried xeroform +staples, intact. anterior cervical incision +steri strips C/d/I

## 2022-10-06 NOTE — PROGRESS NOTE ADULT - ATTENDING COMMENTS
Progress note amended to include my discussions with patient, resident, hospitalist,, NP, PT, SW    Patient stable overnight, no further diarrhea. Voided well, reports episode dysuria which resolved, no fever, no N.v this morning. She complains of discomfort neck with log rolling; C collar was too loose and readjusted, with improvement. Patient informed that based on type of collar and continuous use needs to be properly positioned to provide adequate immobilization and support, verbalized understanding. anterior cervical incision healing well, steri strips in place, intact. Posterior incision with staples, dried xeroform, to be removed. NSGY contacted re: staple removal estimate    Patient complains of some anterior thigh radiating pain while in bed; thought may be due to increased PT sessions yesterday. no soft tissue TTP along ITB, quad, ROM also evaluated hip, no pain with Ir, ER or hip flexion/compression bilaterally. +TTP along L2 PS, reproducible but no radiating pain. If persists, will image to evaluate any change in morphology of vertebrae. Motor exam and sensory exam currently stable as above. negative SLR, no sensory deficits LT LE.    Pt reached out to vendor, for delivery brace today or tomorrow. Will reduce SLP session and increase PT once brace obtained Progress note amended to include my discussions with patient, resident, hospitalist,, NP, PT, SW    Patient stable overnight, no further diarrhea. Voided well, reports episode dysuria which resolved, no fever, no N.v this morning. She complains of discomfort neck with log rolling; C collar was too loose and readjusted, with improvement. Patient informed that based on type of collar and continuous use needs to be properly positioned to provide adequate immobilization and support, verbalized understanding. anterior cervical incision healing well, steri strips in place, intact. Posterior incision with staples, dried xeroform, to be removed. NSGY contacted re: staple removal estimate    Patient complains of some anterior thigh radiating pain while in bed; thought may be due to increased PT sessions yesterday. no soft tissue TTP along ITB, quad, ROM also evaluated hip, no pain with Ir, ER or hip flexion/compression bilaterally. +TTP along L2 PS, reproducible but no radiating pain. If persists, will image to evaluate any change in morphology of vertebrae. Motor exam and sensory exam currently stable as above. negative SLR, no sensory deficits LT LE.    Pt reached out to vendor, for delivery brace today or tomorrow. Will reduce SLP session and increase PT once brace obtained. Labs reviewed,  K+ stable, BUn/Cr stable, no leukocytosis

## 2022-10-06 NOTE — PROGRESS NOTE ADULT - EYES
PERRL/EOMI/conjunctiva clear/normal

## 2022-10-06 NOTE — PROGRESS NOTE ADULT - NEUROLOGICAL
TTP at L2 with radiating pain to bl legs, reproducing the bl leg pain experienced over last 24 hours./normal/cranial nerves II-XII intact/sensation intact/responds to verbal commands/no spontaneous movement

## 2022-10-06 NOTE — PROGRESS NOTE ADULT - CONSTITUTIONAL COMMENTS
Patient was supine in bed, alert, and cooperative with C-collar in place. C-collar was tightened to further limit cervical mobility. Patient was supine in bed, alert, and cooperative with C-collar in place. C-collar was adjusted to appropriately limit cervical mobility.

## 2022-10-06 NOTE — PROGRESS NOTE ADULT - SUBJECTIVE AND OBJECTIVE BOX
Cardiac   Patient is a 66y old  Female who presents with a chief complaint of metastatic breast CA to spine s/p  C7-T2 corpectomy & fusion, posterior C4-T5 fusion (05 Oct 2022 16:30)      HPI:  This is a 67 yo female with PMH of left arm skin CA s/p excision at age 16, stomach ulcer, her last mammogram was in , who was brought to [facility] ED 9/10/22 with progressive weakness to the point that she felt she could not get out of bed. She also reported remote shoulder pain, decreased appetite. Was found to have two lumps in her right breast but could not tolerate recommended biopsy due to pain. CT scan of the Cervical spine performed which showed Multiple lytic lesions of the cervical and visualized thoracic spine as well as right first and second ribs concerning for metastases versus multiple myeloma.    She was admitted under medicine service for further workup and management, including of her pain. Further imaging done showed Metastatic breast cancer with dominant right breast mass, right chest wall, axillary and retropectoral involvement; osseous, pulmonary, and likely hepatic metastatic disease. She was also found to have metastatic adenopathy of the thorax, diffuse extensive osseous metastatic disease with multiple lucent and sclerotic lesions and compression fractures of T1, T9, T12, and L2. Severe T1 compression with epidural extension; cord compression cannot be excluded. +additional neoplastic disease of the spine with epidural extension. Multifocal neoplastic lesions involving the pelvis and ribs as well.    Patient was seen by Oncology, CT surgery and Neurosurgery team. She underwent C7-T2 corpectomy & fusion, posterior C4-T5 fusion. Post-op course uncomplicated.  Hamm removed, pt requiring some straight cath for retention. She also had subjective dysphagia - seen by speech therapy recommended pureed diet which patient continues to prefer. Patient with low grade temp and positive UA. Started on IV Rocephin for UTI. Blood cultures negative, Urine culture positive for GNR, changed to PO Vantin for 7 days.    Patient was medically optimized for discharge to [facility] IRF on 22. (30 Sep 2022 14:32)      PAST MEDICAL & SURGICAL HISTORY:  Stomach ulcer  20 years ago   no pain not taking any meds      Skin cancer of arm, left  pt was 16 year old      H/O excision of mass  left arm  when patient was 16 years ago      S/P hysterectomy          MEDICATIONS  (STANDING):  cefpodoxime 200 milliGRAM(s) Oral every 12 hours  cyclobenzaprine 5 milliGRAM(s) Oral three times a day  enoxaparin Injectable 40 milliGRAM(s) SubCutaneous every 24 hours  gabapentin Solution 600 milliGRAM(s) Oral three times a day  oxyCODONE  ER Tablet 20 milliGRAM(s) Oral every 12 hours  saccharomyces boulardii 250 milliGRAM(s) Oral two times a day    MEDICATIONS  (PRN):  acetaminophen    Suspension .. 650 milliGRAM(s) Oral every 6 hours PRN Mild Pain (1 - 3)  bisacodyl Suppository 10 milliGRAM(s) Rectal daily PRN Constipation  HYDROmorphone   Tablet 4 milliGRAM(s) Oral every 4 hours PRN Severe Pain (7 - 10)  HYDROmorphone   Tablet 2 milliGRAM(s) Oral every 4 hours PRN Moderate Pain (4 - 6)  ondansetron    Tablet 4 milliGRAM(s) Oral every 6 hours PRN Nausea and/or Vomiting  simethicone 80 milliGRAM(s) Chew every 6 hours PRN Gas      Allergies    tetanus toxoid (Hives)    Intolerances    lactose (Other)        VITALS  66y  Vital Signs Last 24 Hrs  T(C): 36.7 (06 Oct 2022 08:50), Max: 36.9 (05 Oct 2022 20:49)  T(F): 98.1 (06 Oct 2022 08:50), Max: 98.4 (05 Oct 2022 20:49)  HR: 92 (06 Oct 2022 08:50) (92 - 104)  BP: 117/74 (06 Oct 2022 08:50) (117/74 - 131/78)  BP(mean): --  RR: 16 (06 Oct 2022 08:50) (16 - 16)  SpO2: 92% (06 Oct 2022 08:50) (92% - 92%)    Parameters below as of 06 Oct 2022 08:50  Patient On (Oxygen Delivery Method): room air      Daily     Daily Weight in k.9 (06 Oct 2022 05:15)        RECENT LABS:                          9.2    7.10  )-----------( 401      ( 06 Oct 2022 06:39 )             28.5     10-06    137  |  102  |  10  ----------------------------<  85  4.1   |  26  |  0.49<L>    Ca    10.4      06 Oct 2022 06:39                CAPILLARY BLOOD GLUCOSE

## 2022-10-06 NOTE — PROGRESS NOTE ADULT - COMMENTS
Patient reports some thigh pain bilaterally that she describes as shooting and burning sensations radiating from her knee to hip. The pain is somewhat relieved with hip flexion. Patient reports that she still has occasional lower abdominal discomfort, mostly in left lower quadrant that has been improving since admission.     Patient also endorse an episode of a burning with urination overnight. No difficultly voiding or concern for retention. No noted hematuria. No significant flank pain although left lower abdominal pain radiates occasionally from back and also from LLQ into groin. No further diarrhea and last BM last night.    She reports sleeping well, however, had some nausea without vomiting again this AM. Due to this, she has delayed taking her meds until it passes. Patient reports some thigh pain bilaterally that she describes as shooting and burning sensations radiating from her knee to hip. The pain is somewhat relieved with hip flexion and ROm knee. Patient reports that she still has occasional lower abdominal discomfort, mostly in left lower quadrant that has been improving since admission.     Patient also endorse an episode of a burning with urination overnight. Resolved this morning. Afebrile, No difficultly voiding or concern for retention. No noted hematuria. No significant flank pain although left lower abdominal pain radiates occasionally from back and also from LLQ into groin. No further diarrhea and last BM last night.    She reports sleeping well, however, had some nausea without vomiting again this AM. Due to this, she has delayed taking her meds until it passes. Noted to have C collar in place but too loose

## 2022-10-06 NOTE — PROGRESS NOTE ADULT - ASSESSMENT
67 yo female PMH left arm skin CA s/p excision, stomach ulcer who presented to [facility] 9/10/22 with progressive weakness, found to have multiple lytic lesions of the cervical and thoracic spine as well as right first and second ribs, lung, liver secondary to metastatic breast cancer with dominant right breast mass, right chest wall, axillary and retropectoral involvement. Compression fractures of T1, T9, T12, and L2. Severe T1 compression with epidural extension. She underwent C7-T2 corpectomy & fusion, posterior C4-T5 fusion    # Metastatic Breast CA to lung, liver, ribs, with spine involvement and neurogenic bladder  - s/p right breast biopsy shows  positive for invasive ductal carcinoma  - s/p C7-T2 corpectomy & fusion, posterior C4-T5 fusion (Dr Bennett)  - Cuauhtemoc from [facility]. PT discussed case with orthotist:  ·	9/10 aspen collar with thoracic extension  ·	9/22 regular aspen c collar and separate aspen TLSO  ·	9/24 aspen LSO. 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 Patient will not require chemo/radiation while in rehab. Chemotherapy after surgery wound heals   - Precautions: CA, fall, aspiration, spinal, cervical collar, LSO Brace    # Pain management  - Cyclobenzaprine 5 q8  - Gabapentin TID 600mg   - Dilaudid 2mg Q4 hours prn for mod pain; 4mg Q4 hours prn for severe pain  - Oxycodone ER 20mg Q12 hours   - Monitor orthostatics    # enterobacter UTI  - PO Vantin BID for 7 days. Day #7/7 10/6  - monitor bladder scans    # Bowel Regimen  - Dulcolax supp PRN  - continue probiotic  - zofran 4 mg q6 PRN    # FEN   - Diet downgraded back to pureed solids and thins as patient had discomfort with minced/moist solids  - supplemented 40 meq x 2 on 10/3 and 10/4  - K+ 4.1 10/6    # Skin:  - Skin on admission: surgical incision to anterior neck with steristrips, posterior cervical and thoracic spine incision with primary dressing.  - Discussed with NSGY, may dc dressings, leave open to air 10/3. Discussed with patient and staff    # DVT ppx  - Lovenox  - SCD, TEDs    # Case discussed in IDT rounds 10/5  - set up/supervision eating and grooming, min assist UB dressing, total assist LB dressing, max assist toileting. not tolerating minced/moist diet, downgraded back to pureed solids for comfort, mild deficits in working memory  - goals: mod independent transfers, ambulation with RW, negotiate 6-8 steps with mod independent, min assist bathing/shower transfer  - caregiver training  - target dc home 10/19 with caregiver support and home PT, OT, SLP      # LABS  orthotist LSO        CODE STATUS: FULL CODE     Outpatient Follow-up (Specialty/Name of physician):    Marj Del Castillo)  Hematology; Hospice Palliative Medicine; Internal Medicine; Medical Oncology  [address] [place] [address]  Phone: [phone]  Fax: [phone]  Follow Up Time: 2 weeks    Bret Ibarra)  Radiation Oncology  [address] [place] [address]  Phone: [phone]  Fax: [phone]  Follow Up Time: 2 weeks   67 yo female PMH left arm skin CA s/p excision, stomach ulcer who presented to [facility] 9/10/22 with progressive weakness, found to have multiple lytic lesions of the cervical and thoracic spine as well as right first and second ribs, lung, liver secondary to metastatic breast cancer with dominant right breast mass, right chest wall, axillary and retropectoral involvement. Compression fractures of T1, T9, T12, and L2. Severe T1 compression with epidural extension. She underwent C7-T2 corpectomy & fusion, posterior C4-T5 fusion    # Metastatic Breast CA to lung, liver, ribs, with spine involvement and neurogenic bladder  - s/p right breast biopsy shows  positive for invasive ductal carcinoma  - s/p C7-T2 corpectomy & fusion, posterior C4-T5 fusion (Dr Bennett)  - cleared by NSGY to remove posterior incision ivana  - vendor: Cuauhtemoc from [facility]. PT discussed case with orthotist, appreciated. Awaiting delivery of [place] LSO, Rx in chart  - Continue Comprehensive Rehab Program: PT/OT/ST, 3hours daily and 5 days weekly. Will reduce SLP and increase PT once brace obtained  - OP f/u with radiation oncology. Patient will not require chemo/radiation while in rehab   - Precautions: CA, fall, aspiration, spinal, cervical collar, LSO Brace    # Pain management  - Cyclobenzaprine 5 q8  - Gabapentin TID 600mg   - Dilaudid 2mg Q4 hours prn for mod pain; 4mg Q4 hours prn for severe pain  - Oxycodone ER 20mg Q12 hours   - Monitor orthostatics    # enterobacter UTI  - completing Vantin BID for 7 days. Day #7/7 10/6    # Bowel Regimen  - Dulcolax supp PRN  - florastor  - zofran 4 mg q6 PRN    # FEN   - Diet downgraded back to pureed solids and thins as patient had discomfort with minced/moist solids  - supplemented 40 meq x 2 on 10/3 and 10/4  - K+ 4.1 10/6    # Skin:  - Skin on admission: surgical incision to anterior neck with steristrips, posterior cervical and thoracic spine incision with staples  - Discussed with NSGY, may dc dressings, leave open to air 10/3.    # DVT ppx  - Lovenox  - SCD, TEDs    # Case discussed in IDT rounds 10/5  - set up/supervision eating and grooming, min assist UB dressing, total assist LB dressing, max assist toileting. not tolerating minced/moist diet, downgraded back to pureed solids for comfort, mild deficits in working memory  - goals: mod independent transfers, ambulation with RW, negotiate 6-8 steps with mod independent, min assist bathing/shower transfer  - caregiver training  - target dc home 10/19 with caregiver support and home PT, OT, SLP      # LABS  orthotist LSO  staple removal  CBC CMP 10/10      CODE STATUS: FULL CODE     Outpatient Follow-up (Specialty/Name of physician):    Marj Del Castillo)  Hematology; Hospice Palliative Medicine; Internal Medicine; Medical Oncology  [address] [place] [address]  Phone: [phone]  Fax: [phone]  Follow Up Time: 2 weeks    Bret Ibarra)  Radiation Oncology  [address] [place] [address]  Phone: [phone]  Fax: [phone]  Follow Up Time: 2 weeks   67 yo female PMH left arm skin CA s/p excision, stomach ulcer who presented to [facility] 9/10/22 with progressive weakness, found to have multiple lytic lesions of the cervical and thoracic spine as well as right first and second ribs, lung, liver secondary to metastatic breast cancer with dominant right breast mass, right chest wall, axillary and retropectoral involvement. Compression fractures of T1, T9, T12, and L2. Severe T1 compression with epidural extension. She underwent C7-T2 corpectomy & fusion, posterior C4-T5 fusion    # Metastatic Breast CA to lung, liver, ribs, with spine involvement and neurogenic bladder  - s/p right breast biopsy shows  positive for invasive ductal carcinoma  - s/p C7-T2 corpectomy & fusion, posterior C4-T5 fusion (Dr Bennett)  - cleared by NSGY to remove posterior incision ivana  - vendor: Cuauhtemoc from [facility]. PT discussed case with orthotist, appreciated. Awaiting delivery of [place] LSO, Rx in chart  - Continue Comprehensive Rehab Program: PT/OT/ST, 3hours daily and 5 days weekly. Will reduce SLP 30 min and increase PT 90 once brace obtained  - OP f/u with radiation oncology. Patient will not require chemo/radiation while in rehab   - Precautions: CA, fall, aspiration, spinal, cervical collar, LSO Brace    # Pain management  - Cyclobenzaprine 5 q8  - Gabapentin TID 600mg   - Dilaudid 2mg Q4 hours prn for mod pain; 4mg Q4 hours prn for severe pain  - Oxycodone ER 20mg Q12 hours   - Monitor orthostatics    # enterobacter UTI  - completing Vantin BID for 7 days. Day #7/7 10/6    # Bowel Regimen  - Dulcolax supp PRN  - florastor  - zofran 4 mg q6 PRN    # FEN   - Diet downgraded back to pureed solids and thins as patient had discomfort with minced/moist solids  - supplemented 40 meq x 2 on 10/3 and 10/4  - K+ 4.1 10/6    # Skin:  - Skin on admission: surgical incision to anterior neck with steristrips, posterior cervical and thoracic spine incision with staples  - Discussed with NSGY, may dc dressings, leave open to air 10/3.    # DVT ppx  - Lovenox  - SCD, TEDs    # Case discussed in IDT rounds 10/5  - set up/supervision eating and grooming, min assist UB dressing, total assist LB dressing, max assist toileting. not tolerating minced/moist diet, downgraded back to pureed solids for comfort, mild deficits in working memory  - goals: mod independent transfers, ambulation with RW, negotiate 6-8 steps with mod independent, min assist bathing/shower transfer  - caregiver training  - target dc home 10/19 with caregiver support and home PT, OT, SLP      # LABS  orthotist LSO  staple removal  CBC CMP 10/10      CODE STATUS: FULL CODE     Outpatient Follow-up (Specialty/Name of physician):    Marj Del Castillo)  Hematology; Hospice Palliative Medicine; Internal Medicine; Medical Oncology  [address] [place] [address]  Phone: [phone]  Fax: [phone]  Follow Up Time: 2 weeks    Bret Ibarra)  Radiation Oncology  [address] [place] [address]  Phone: [phone]  Fax: [phone]  Follow Up Time: 2 weeks

## 2022-10-06 NOTE — PROGRESS NOTE ADULT - RESPIRATORY
normal/clear to auscultation bilaterally/no wheezes/no rales/no rhonchi/no respiratory distress
clear to auscultation bilaterally/no respiratory distress
normal/clear to auscultation bilaterally/no wheezes/no rales/no rhonchi
normal/clear to auscultation bilaterally/no wheezes/no rales/no rhonchi

## 2022-10-06 NOTE — PROGRESS NOTE ADULT - MOTOR
left HF 3+/5 quad 4+/5 ankle PF and DF 5/5 guarding  right HF 5-/5 quad 5/5 ankle PF and DF 5/5  BUE motor 5/5  calves soft, no TTP
RUE and LUE 4/5 strength throughout.  Elsewhere extremities are 5/5.
left HF 4/5 quad 4+/5 ankle PF and DF 5/5   right HF 5-/5 quad 5/5 ankle PF and DF 5/5  BUE motor 5/5 throughout   calves soft, no TTP
JUANITA and MILDRED 4/5 strength throughout   Elsewhere extremities are 5/5

## 2022-10-07 LAB — SARS-COV-2 RNA SPEC QL NAA+PROBE: SIGNIFICANT CHANGE UP

## 2022-10-07 PROCEDURE — 93010 ELECTROCARDIOGRAM REPORT: CPT

## 2022-10-07 PROCEDURE — 71045 X-RAY EXAM CHEST 1 VIEW: CPT | Mod: 26

## 2022-10-07 PROCEDURE — 99233 SBSQ HOSP IP/OBS HIGH 50: CPT

## 2022-10-07 PROCEDURE — 99232 SBSQ HOSP IP/OBS MODERATE 35: CPT

## 2022-10-07 PROCEDURE — 96116 NUBHVL XM PHYS/QHP 1ST HR: CPT

## 2022-10-07 RX ORDER — ONDANSETRON 8 MG/1
8 TABLET, FILM COATED ORAL EVERY 8 HOURS
Refills: 0 | Status: DISCONTINUED | OUTPATIENT
Start: 2022-10-07 | End: 2022-10-09

## 2022-10-07 RX ADMIN — CYCLOBENZAPRINE HYDROCHLORIDE 5 MILLIGRAM(S): 10 TABLET, FILM COATED ORAL at 12:52

## 2022-10-07 RX ADMIN — Medication 25 MILLIGRAM(S): at 21:30

## 2022-10-07 RX ADMIN — OXYCODONE HYDROCHLORIDE 20 MILLIGRAM(S): 5 TABLET ORAL at 09:17

## 2022-10-07 RX ADMIN — HYDROMORPHONE HYDROCHLORIDE 2 MILLIGRAM(S): 2 INJECTION INTRAMUSCULAR; INTRAVENOUS; SUBCUTANEOUS at 12:52

## 2022-10-07 RX ADMIN — HYDROMORPHONE HYDROCHLORIDE 2 MILLIGRAM(S): 2 INJECTION INTRAMUSCULAR; INTRAVENOUS; SUBCUTANEOUS at 13:45

## 2022-10-07 RX ADMIN — OXYCODONE HYDROCHLORIDE 20 MILLIGRAM(S): 5 TABLET ORAL at 18:40

## 2022-10-07 RX ADMIN — ENOXAPARIN SODIUM 40 MILLIGRAM(S): 100 INJECTION SUBCUTANEOUS at 05:22

## 2022-10-07 RX ADMIN — Medication 200 MILLIGRAM(S): at 09:18

## 2022-10-07 RX ADMIN — Medication 250 MILLIGRAM(S): at 09:18

## 2022-10-07 RX ADMIN — Medication 25 MILLIGRAM(S): at 12:53

## 2022-10-07 RX ADMIN — CYCLOBENZAPRINE HYDROCHLORIDE 5 MILLIGRAM(S): 10 TABLET, FILM COATED ORAL at 21:30

## 2022-10-07 RX ADMIN — Medication 250 MILLIGRAM(S): at 18:40

## 2022-10-07 RX ADMIN — OXYCODONE HYDROCHLORIDE 20 MILLIGRAM(S): 5 TABLET ORAL at 10:05

## 2022-10-07 NOTE — CONSULT NOTE ADULT - ASSESSMENT
Assessment: Pt presents with intact cognitive functioning. Her affect is depressed and tearful at times, but she responds to humor changes during the session. She is currently experiencing several adjustment symptoms, including anxiety and depression symptoms, in response to her current medical condition. Pt is concerned about her ability to recover and hopes to have survival time longer than expected given her condition. FIM scores: Social Interaction 5; Problem Solving 6; Memory 7.  Plan: Individual supportive psychotherapy to monitor cognition, affect/mood, and behavior. Participation in recreation/art therapy in order to have pleasure and mastery experiences and regain/reestablish a sense of routine.  Time spent with Pt, 50 minutes.

## 2022-10-07 NOTE — PROGRESS NOTE ADULT - SUBJECTIVE AND OBJECTIVE BOX
Patient is a 66y old  Female who presents with a chief complaint of metastatic breast CA to spine s/p  C7-T2 corpectomy & fusion, posterior C4-T5 fusion (06 Oct 2022 11:12)      HPI:  Thi is a 67 yo female with PMH of left arm skin CA s/p excision at age 16, stomach ulcer, her last mammogram was in , who was brought to [facility] ED 9/10/22 with progressive weakness to the point that she felt she could not get out of bed. She also reported remote shoulder pain, decreased appetite. Was found to have two lumps in her right breast but could not tolerate recommended biopsy due to pain. CT scan of the Cervical spine performed which showed Multiple lytic lesions of the cervical and visualized thoracic spine as well as right first and second ribs concerning for metastases versus multiple myeloma.    She was admitted under medicine service for further workup and management, including of her pain. Further imaging done showed Metastatic breast cancer with dominant right breast mass, right chest wall, axillary and retropectoral involvement; osseous, pulmonary, and likely hepatic metastatic disease. She was also found to have metastatic adenopathy of the thorax, diffuse extensive osseous metastatic disease with multiple lucent and sclerotic lesions and compression fractures of T1, T9, T12, and L2. Severe T1 compression with epidural extension; cord compression cannot be excluded. +additional neoplastic disease of the spine with epidural extension. Multifocal neoplastic lesions involving the pelvis and ribs as well.    Patient was seen by Oncology, CT surgery and Neurosurgery team. She underwent C7-T2 corpectomy & fusion, posterior C4-T5 fusion. Post-op course uncomplicated.  Hamm removed, pt requiring some straight cath for retention. She also had subjective dysphagia - seen by speech therapy recommended pureed diet which patient continues to prefer. Patient with low grade temp and positive UA. Started on IV Rocephin for UTI. Blood cultures negative, Urine culture positive for GNR, changed to PO Vantin for 7 days.    Patient was medically optimized for discharge to [facility] IRF on 22. (30 Sep 2022 14:32)      PAST MEDICAL & SURGICAL HISTORY:  Stomach ulcer  20 years ago   no pain not taking any meds      Skin cancer of arm, left  pt was 16 year old      H/O excision of mass  left arm  when patient was 16 years ago      S/P hysterectomy          MEDICATIONS  (STANDING):  cyclobenzaprine 5 milliGRAM(s) Oral three times a day  enoxaparin Injectable 40 milliGRAM(s) SubCutaneous every 24 hours  gabapentin Solution 600 milliGRAM(s) Oral three times a day  oxyCODONE  ER Tablet 20 milliGRAM(s) Oral every 12 hours  saccharomyces boulardii 250 milliGRAM(s) Oral two times a day    MEDICATIONS  (PRN):  acetaminophen    Suspension .. 650 milliGRAM(s) Oral every 6 hours PRN Mild Pain (1 - 3)  bisacodyl Suppository 10 milliGRAM(s) Rectal daily PRN Constipation  HYDROmorphone   Tablet 4 milliGRAM(s) Oral every 4 hours PRN Severe Pain (7 - 10)  HYDROmorphone   Tablet 2 milliGRAM(s) Oral every 4 hours PRN Moderate Pain (4 - 6)  ondansetron    Tablet 4 milliGRAM(s) Oral every 6 hours PRN Nausea and/or Vomiting  simethicone 80 milliGRAM(s) Chew every 6 hours PRN Gas      Allergies    tetanus toxoid (Hives)    Intolerances    lactose (Other)        VITALS  66y  Vital Signs Last 24 Hrs  T(C): 36.8 (06 Oct 2022 20:00), Max: 36.8 (06 Oct 2022 20:00)  T(F): 98.3 (06 Oct 2022 20:00), Max: 98.3 (06 Oct 2022 20:00)  HR: 94 (06 Oct 2022 20:00) (94 - 94)  BP: 121/76 (06 Oct 2022 20:00) (121/76 - 121/76)  BP(mean): --  RR: 16 (06 Oct 2022 20:00) (16 - 16)  SpO2: 95% (06 Oct 2022 20:00) (95% - 95%)    Parameters below as of 06 Oct 2022 20:00  Patient On (Oxygen Delivery Method): room air      Daily     Daily Weight in k.9 (07 Oct 2022 04:10)        RECENT LABS:                          9.2    7.10  )-----------( 401      ( 06 Oct 2022 06:39 )             28.5     10-06    137  |  102  |  10  ----------------------------<  85  4.1   |  26  |  0.49<L>    Ca    10.4      06 Oct 2022 06:39                CAPILLARY BLOOD GLUCOSE          Review of Systems:   · Negative General Symptoms	no fever; no chills  · Additional ROS	Patient has some discomfort while eating from cervical collar. Collar evaluated; proper fit. Purpose of collar and difficulty with jaw movement /prohibition flexion due to limitations discussed; will reach out to NSGY to see if collar can be loosened at all during meals, Patient agreeable    Continues to have radiating pain in left lateral leg. options for Rx discussed; per nursing, has NOT been compliant with pain medications especially gabapentin, LAST dose taken was this weekend.      Physical Exam:   · Constitutional Comments	c collar in place, properlyl positioned. No skin breakdown around pinnae ear. Steri strips intact anterior cervical incision  	  · Respiratory	clear to auscultation bilaterally; no wheezes; no rales; no rhonchi  · Cardiovascular	regular rate and rhythm; S1 S2 present; no gallops; no rub; no murmur  · Gastrointestinal	soft; nondistended; normal active bowel sounds; no guarding; no rigidity  · Gastrointestinal Comments	Mild TTP in LLQ.  	  · Motor	left HF 4/5 quad 4+/5 ankle PF and DF 5/5   right HF 5-/5 quad 5/5 ankle PF and DF 5/5  BUE motor 5/5 throughout   calves soft, no TTP    +reduced sensation left L3  	  · Skin Comments	cervicothoracic posterior incision with dried xeroform +staples, intact

## 2022-10-07 NOTE — CHART NOTE - NSCHARTNOTEFT_GEN_A_CORE
Patient was fit and delivered a LSO with rigid anterior posterior and lateral panels. With the assistance of ANJU the patient was log rolled and LSO was donned. Sherley was educated on the care use and function of the LSO. Contact info was given to patient. All went without incident.   Bret Lepe [facility]  [phone]

## 2022-10-07 NOTE — CONSULT NOTE ADULT - SUBJECTIVE AND OBJECTIVE BOX
Pt is a 65 y/o left-handed female with PMHx of left arm skin CA s/p excision at age 16, stomach ulcer, her last mammogram was in 2014, who was brought to [facility] ED 9/10/22 with progressive weakness to the point that she felt she could not get out of bed. She also reported remote shoulder pain, decreased appetite. Was found to have two lumps in her right breast but could not tolerate recommended biopsy due to pain. CT scan of the Cervical spine performed which showed Multiple lytic lesions of the cervical and visualized thoracic spine as well as right first and second ribs concerning for metastases versus multiple myeloma. She was admitted under medicine service for further workup and management, including of her pain. Further imaging done showed Metastatic breast cancer with dominant right breast mass, right chest wall, axillary and retropectoral involvement; osseous, pulmonary, and likely hepatic metastatic disease. She was also found to have metastatic adenopathy of the thorax, diffuse extensive osseous metastatic disease with multiple lucent and sclerotic lesions and compression fractures of T1, T9, T12, and L2. Severe T1 compression with epidural extension; cord compression cannot be excluded. +additional neoplastic disease of the spine with epidural extension. Multifocal neoplastic lesions involving the pelvis and ribs as well. Pt was seen by Oncology, CT surgery and Neurosurgery team. She underwent C7-T2 corpectomy & fusion, posterior C4-T5 fusion. Post-op course uncomplicated.  Hamm removed, Pt requiring some straight cath for retention. She also had subjective dysphagia - seen by speech therapy recommended pureed diet which Pt continues to prefer. Patient with low grade temp and positive UA. Started on IV Rocephin for UTI. Blood cultures negative, Urine culture positive for GNR, changed to PO Vantin for 7 days. Pt was medically optimized for discharge to [facility] IRF on 9/30/22. PMHx: As noted above. Current meds: Please see list in Pt’s chart. Social Hx: Pt is single and does not have children. She graduated from high school and completed 2 years of college, and attended design and secretarial school. 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 She saw a psychotherapist many years ago for hypnotherapy. Pt quit smoking cigarettes and drinking alcohol in her 20s. Pt is Adventist. She enjoys walking   Findings: Pt was seen for an initial assessment of her cognitive and emotional functioning. The Modified MMSE (3MS) was administered; Pt’s results (100/100) were in the Normal range. Her scores in geriatric mood measures suggested severe levels of anxiety and mild of depression (GAS = 10/30; GDS = 4/15). Pt was alert, fully Ox3, and cooperative. Attn/Conc: Simple auditory attention - intact.  Concentration/Working memory for reversed counting and spelling – intact (7/7). Language: Pt’s speech was of normal volume, pitch and pace. Naming - intact. Sentence repetition - intact. Auditory Comprehension - intact. Reading - intact. Writing - intact. Memory: Encoding of 3 words was intact (3/3); short- and long-delayed verbal recall – both intact (3/3). LTM was intact for [place] presidents (4/4). Visual memory – intact. Visuospatial: Visuomotor integration – intact for copy of a 2D figure, mild sloppiness was noted. Executive Functions: Motor Planning - intact. Organizational skills - intact. Abstract reasoning - intact. Initiation/Phonemic Fluency - intact. Verbal problem solving – closely intact. Emotional functioning: Affect - mildly depressed, tearful at times, responsive to humor. Mood - euthymic ("okay"); Pt reported experiencing hopelessness, helplessness, decreased appetite, low energy and fatigue. On mood measures she additionally reported anhedonia, helplessness, low self-esteem,  low energy, irritability, feelings of unreality, worry, fatigue, tension and decreased sense of control over her life.  Thought processes were goal-directed.  No abnormal thought contents were observed.  Pt denied any AH/VH. Pt also denied SI/HI/I/P. Insight - WFL. Judgment - closely WFL.

## 2022-10-07 NOTE — PROGRESS NOTE ADULT - ASSESSMENT
67 yo female PMH left arm skin CA s/p excision, stomach ulcer who presented to [facility] 9/10/22 with progressive weakness, found to have multiple lytic lesions of the cervical and thoracic spine as well as right first and second ribs, lung, liver secondary to metastatic breast cancer with dominant right breast mass, right chest wall, axillary and retropectoral involvement. Compression fractures of T1, T9, T12, and L2. Severe T1 compression with epidural extension. She underwent C7-T2 corpectomy & fusion, posterior C4-T5 fusion    # Metastatic Breast CA to lung, liver, ribs, with spine involvement and neurogenic bladder  - s/p right breast biopsy shows  positive for invasive ductal carcinoma  - s/p C7-T2 corpectomy & fusion, posterior C4-T5 fusion (Dr Bennett)  - cleared by NSGY to remove posterior incision staples. To remove today (patient deferred this morning during AM rounds)  - vendor: Cuauhtemoc from [facility]. LSO delivered today 10/7  - Continue Comprehensive Rehab Program: PT/OT/ST, 3hours daily and 5 days weekly. Reduce SLP 30 min and increase PT 90 10/7  - OP f/u with radiation oncology. Patient will not require chemo/radiation while in rehab   - Precautions: CA, fall, aspiration, spinal, cervical collar, LSO Brace/ confirmed with NSGY, may loosen C collar for MEALS ONLY    # Pain management  - Cyclobenzaprine 5 q8  - Gabapentin TID 600mg. Patient has been refusing, last dose this past weekend. Will dc and trial lyrica 25 q8 to start 10/7   - Dilaudid 2mg Q4 hours prn for mod pain; 4mg Q4 hours prn for severe pain  - Oxycodone ER 20mg Q12 hours   - Monitor orthostatics    # enterobacter UTI  - s/p Vantin BID x 7 days  - dysuria resolved    # Bowel Regimen  - Dulcolax supp PRN  - florastor  - zofran 4 mg q6 PRN    # FEN   - pureed solids and thins   - supplemented 40 meq x 2 on 10/3 and 10/4  - K+ 4.1 10/6  - BMP 10/10    # Skin:  - surgical incision to anterior neck with steristrips, posterior cervical and thoracic spine incision with staples. For staple removal today  - Discussed with NSGY, may dc dressings, leave open to air 10/3.    # DVT ppx  - Lovenox  - SCD, TEDs    # Case discussed in IDT rounds 10/5  - set up/supervision eating and grooming, min assist UB dressing, total assist LB dressing, max assist toileting. not tolerating minced/moist diet, downgraded back to pureed solids for comfort, mild deficits in working memory  - goals: mod independent transfers, ambulation with RW, negotiate 6-8 steps with mod independent, min assist bathing/shower transfer  - caregiver training  - target dc home 10/19 with caregiver support and home PT, OT, SLP      # LABS  staple removal  CBC CMP 10/10      CODE STATUS: FULL CODE     Outpatient Follow-up (Specialty/Name of physician):    Marj Del Castillo)  Hematology; Hospice Palliative Medicine; Internal Medicine; Medical Oncology  [address] [place] [address]  Phone: [phone]  Fax: [phone]  Follow Up Time: 2 weeks    Bret Ibarra)  Radiation Oncology  [address] [place] [address]  Phone: [phone]  Fax: [phone]  Follow Up Time: 2 weeks   67 yo female PMH left arm skin CA s/p excision, stomach ulcer who presented to [facility] 9/10/22 with progressive weakness, found to have multiple lytic lesions of the cervical and thoracic spine as well as right first and second ribs, lung, liver secondary to metastatic breast cancer with dominant right breast mass, right chest wall, axillary and retropectoral involvement. Compression fractures of T1, T9, T12, and L2. Severe T1 compression with epidural extension. She underwent C7-T2 corpectomy & fusion, posterior C4-T5 fusion    # Metastatic Breast CA to lung, liver, ribs, with spine involvement and neurogenic bladder  - s/p right breast biopsy shows  positive for invasive ductal carcinoma  - s/p C7-T2 corpectomy & fusion, posterior C4-T5 fusion (Dr Bennett)  - cleared by NSGY to remove posterior incision staples. To remove today (patient deferred this morning during AM rounds)  - vendor: Cuauhtemoc from [facility]. LSO delivered today 10/7  - Continue Comprehensive Rehab Program: PT/OT/ST, 3hours daily and 5 days weekly. Reduce SLP 30 min and increase PT 90 10/7  - OP f/u with radiation oncology. Patient will not require chemo/radiation while in rehab   - Precautions: CA, fall, aspiration, spinal, cervical collar, LSO Brace/ confirmed with NSGY, may loosen C collar for MEALS ONLY    # Pain management  - Cyclobenzaprine 5 q8  - Gabapentin TID 600mg. Patient has been refusing, last dose this past weekend. Will dc and trial lyrica 25 q8 to start 10/7   - Dilaudid 2mg Q4 hours prn for mod pain; 4mg Q4 hours prn for severe pain  - Oxycodone ER 20mg Q12 hours   - Monitor orthostatics    # enterobacter UTI  - s/p Vantin BID x 7 days  - dysuria resolved    # Bowel Regimen  - Dulcolax supp PRN  - florastor  - zofran 4 mg q6 PRN    # FEN   - pureed solids and thins   - supplemented 40 meq x 2 on 10/3 and 10/4  - K+ 4.1 10/6  - BMP 10/10    # Skin:  - surgical incision to anterior neck with steristrips, posterior cervical and thoracic spine incision with staples. For staple removal today  - Discussed with NSGY, may dc dressings, leave open to air 10/3.    # DVT ppx  - Lovenox  - SCD, TEDs    # Case discussed in IDT rounds 10/5  - set up/supervision eating and grooming, min assist UB dressing, total assist LB dressing, max assist toileting. not tolerating minced/moist diet, downgraded back to pureed solids for comfort, mild deficits in working memory  - goals: mod independent transfers, ambulation with RW, negotiate 6-8 steps with mod independent, min assist bathing/shower transfer  - caregiver training  - target dc home 10/19 with caregiver support and home PT, OT, SLP  - disability form completed 10/7      # LABS  staple removal  CBC CMP 10/10      CODE STATUS: FULL CODE     Outpatient Follow-up (Specialty/Name of physician):    Marj Del Castillo)  Hematology; Hospice Palliative Medicine; Internal Medicine; Medical Oncology  [address] [place] [address]  Phone: [phone]  Fax: [phone]  Follow Up Time: 2 weeks    Bret Ibarra)  Radiation Oncology  [address] [place] [address]  Phone: [phone]  Fax: [phone]  Follow Up Time: 2 weeks   65 yo female PMH left arm skin CA s/p excision, stomach ulcer who presented to [facility] 9/10/22 with progressive weakness, found to have multiple lytic lesions of the cervical and thoracic spine as well as right first and second ribs, lung, liver secondary to metastatic breast cancer with dominant right breast mass, right chest wall, axillary and retropectoral involvement. Compression fractures of T1, T9, T12, and L2. Severe T1 compression with epidural extension. She underwent C7-T2 corpectomy & fusion, posterior C4-T5 fusion    # Metastatic Breast CA to lung, liver, ribs, with spine involvement and neurogenic bladder  - s/p right breast biopsy shows  positive for invasive ductal carcinoma  - s/p C7-T2 corpectomy & fusion, posterior C4-T5 fusion (Dr Bennett)  - cleared by NSGY to remove posterior incision staples. To remove today (patient deferred this morning during AM rounds)  - vendor: Cuauhtemoc from [facility]. LSO delivered today 10/7  - Continue Comprehensive Rehab Program: PT/OT/ST, 3hours daily and 5 days weekly. Reduce SLP 30 min and increase PT 90 10/7  - OP f/u with radiation oncology. Patient will not require chemo/radiation while in rehab   - Precautions: CA, fall, aspiration, spinal, cervical collar, LSO Brace/ confirmed with NSGY, may loosen C collar for MEALS ONLY    # Pain management  - Cyclobenzaprine 5 q8  - Gabapentin TID 600mg. Patient has been refusing, last dose this past weekend. Will dc and trial lyrica 25 q8 to start 10/7   - Dilaudid 2mg Q4 hours prn for mod pain; 4mg Q4 hours prn for severe pain  - Oxycodone ER 20mg Q12 hours   - Monitor orthostatics    # enterobacter UTI  - s/p Vantin BID x 7 days  - dysuria resolved    # Bowel Regimen  - Dulcolax supp PRN  - florastor  - zofran 4 mg q6 PRN--> increase to zofran 8 mg 10/7    # FEN   - pureed solids and thins   - supplemented 40 meq x 2 on 10/3 and 10/4  - K+ 4.1 10/6  - BMP 10/10    # Skin:  - surgical incision to anterior neck with steristrips, posterior cervical and thoracic spine incision with staples. For staple removal today  - Discussed with NSGY, may dc dressings, leave open to air 10/3.    # DVT ppx  - Lovenox  - SCD, TEDs    # Case discussed in IDT rounds 10/5  - set up/supervision eating and grooming, min assist UB dressing, total assist LB dressing, max assist toileting. not tolerating minced/moist diet, downgraded back to pureed solids for comfort, mild deficits in working memory  - goals: mod independent transfers, ambulation with RW, negotiate 6-8 steps with mod independent, min assist bathing/shower transfer  - caregiver training  - target dc home 10/19 with caregiver support and home PT, OT, SLP  - disability form completed 10/7      # LABS  staple removal  CBC CMP 10/10      CODE STATUS: FULL CODE     Outpatient Follow-up (Specialty/Name of physician):    Marj Del Castillo)  Hematology; Hospice Palliative Medicine; Internal Medicine; Medical Oncology  [address] [place] [address]  Phone: [phone]  Fax: [phone]  Follow Up Time: 2 weeks    Bret Ibarra)  Radiation Oncology  [address] [place] [address]  Phone: [phone]  Fax: [phone]  Follow Up Time: 2 weeks   65 yo female PMH left arm skin CA s/p excision, stomach ulcer who presented to [facility] 9/10/22 with progressive weakness, found to have multiple lytic lesions of the cervical and thoracic spine as well as right first and second ribs, lung, liver secondary to metastatic breast cancer with dominant right breast mass, right chest wall, axillary and retropectoral involvement. Compression fractures of T1, T9, T12, and L2. Severe T1 compression with epidural extension. She underwent C7-T2 corpectomy & fusion, posterior C4-T5 fusion    # Metastatic Breast CA to lung, liver, ribs, with spine involvement and neurogenic bladder  - s/p right breast biopsy shows  positive for invasive ductal carcinoma  - s/p C7-T2 corpectomy & fusion, posterior C4-T5 fusion (Dr Bennett)  - cleared by NSGY to remove posterior incision staples. To remove today (patient deferred this morning during AM rounds)  - vendor: Cuauhtemoc from [facility]. LSO delivered today 10/7  - Continue Comprehensive Rehab Program: PT/OT/ST, 3hours daily and 5 days weekly. Reduce SLP 30 min and increase PT 90 10/7  - OP f/u with radiation oncology. Patient will not require chemo/radiation while in rehab   - Precautions: CA, fall, aspiration, spinal, cervical collar, LSO Brace/ confirmed with NSGY, may loosen C collar for MEALS ONLY    # Pain management  - Cyclobenzaprine 5 q8  - Gabapentin TID 600mg. Patient has been refusing, last dose this past weekend. Will dc and trial lyrica 25 q8 to start 10/7   - Dilaudid 2mg Q4 hours prn for mod pain; 4mg Q4 hours prn for severe pain  - Oxycodone ER 20mg Q12 hours   - Monitor orthostatics    # enterobacter UTI  - s/p Vantin BID x 7 days  - dysuria resolved    # Bowel Regimen  - Dulcolax supp PRN  - florastor  - zofran 4 mg q6 PRN--> increase to zofran 8 mg 10/7    # FEN   - pureed solids and thins   - supplemented 40 meq x 2 on 10/3 and 10/4  - K+ 4.1 10/6  - BMP 10/10    # Skin:  - surgical incision to anterior neck with steristrips, posterior cervical and thoracic spine incision with staples. For staple removal today  - Discussed with NSGY, may dc dressings, leave open to air 10/3.    # DVT ppx  - Lovenox  - SCD, TEDs    # Case discussed in IDT rounds 10/5  - set up/supervision eating and grooming, min assist UB dressing, total assist LB dressing, max assist toileting. not tolerating minced/moist diet, downgraded back to pureed solids for comfort, mild deficits in working memory  - goals: mod independent transfers, ambulation with RW, negotiate 6-8 steps with mod independent, min assist bathing/shower transfer  - caregiver training  - target dc home 10/19 with caregiver support and home PT, OT, SLP  - disability form completed 10/7      # LABS  staple removal  CBC CMP 10/10    addendum 5:39 PM: all but 2 staples removed; last two were at an ngle/position that had difficult time gaining traction for removal. Picture taken and sent to NSGY, to see if wait for office visit for removal of consult plastics      CODE STATUS: FULL CODE     Outpatient Follow-up (Specialty/Name of physician):    Marj Del Castillo)  Hematology; Hospice Palliative Medicine; Internal Medicine; Medical Oncology  [address] [place] [address]  Phone: [phone]  Fax: [phone]  Follow Up Time: 2 weeks    Bret Ibarra)  Radiation Oncology  [address] [place] [address]  Phone: [phone]  Fax: [phone]  Follow Up Time: 2 weeks

## 2022-10-07 NOTE — PROGRESS NOTE ADULT - SUBJECTIVE AND OBJECTIVE BOX
Patient is a 66y old  Female who presents with a chief complaint of metastatic breast CA to spine s/p  C7-T2 corpectomy & fusion, posterior C4-T5 fusion     Reports still having pain needing pain  medications  NO further episodes of diarrhea    Patient seen and examined at bedside.    ALLERGIES:  lactose (Other)  tetanus toxoid (Hives)    MEDICATIONS  (STANDING):  cefpodoxime 200 milliGRAM(s) Oral every 12 hours  enoxaparin Injectable 40 milliGRAM(s) SubCutaneous every 24 hours  gabapentin Solution 600 milliGRAM(s) Oral three times a day  methocarbamol 750 milliGRAM(s) Oral three times a day  oxyCODONE  ER Tablet 20 milliGRAM(s) Oral every 12 hours  potassium chloride   Powder 40 milliEquivalent(s) Oral every 4 hours  saccharomyces boulardii 250 milliGRAM(s) Oral two times a day    MEDICATIONS  (PRN):  acetaminophen    Suspension .. 650 milliGRAM(s) Oral every 6 hours PRN Mild Pain (1 - 3)  bisacodyl Suppository 10 milliGRAM(s) Rectal daily PRN Constipation  HYDROmorphone   Tablet 4 milliGRAM(s) Oral every 4 hours PRN Severe Pain (7 - 10)  HYDROmorphone   Tablet 2 milliGRAM(s) Oral every 4 hours PRN Moderate Pain (4 - 6)  simethicone 80 milliGRAM(s) Chew every 6 hours PRN Gas    Vital Signs Last 24 Hrs  T(C): 36.8 (06 Oct 2022 20:00), Max: 36.8 (06 Oct 2022 20:00)  T(F): 98.3 (06 Oct 2022 20:00), Max: 98.3 (06 Oct 2022 20:00)  HR: 94 (06 Oct 2022 20:00) (94 - 94)  BP: 121/76 (06 Oct 2022 20:00) (121/76 - 121/76)  RR: 16 (06 Oct 2022 20:00) (16 - 16)  SpO2: 95% (06 Oct 2022 20:00) (95% - 95%)    Parameters below as of 06 Oct 2022 20:00  Patient On (Oxygen Delivery Method): room air    Patient On (Oxygen Delivery Method): room air    BMI (kg/m2): 21.7  PHYSICAL EXAM:  General: NAD, A/O x 3, collar in place, speaking in full sentneces  ENT: MMM, no scleral icterus  Neck: Supple, No JVD, no thyroidomegaly  Lungs: Clear to auscultation bilaterally, no wheezes, no rales, no rhonchi, good inspiratory effort  Cardio: RRR, S1/S2, No murmurs  Abdomen: +tenderness over left hip joint and left groin, abdomen is Soft, Nontender, Nondistended; Bowel sounds present  Extremities: No calf tenderness, No pitting edema, no skin changes    LABS:                        9.0    7.06  )-----------( 351      ( 03 Oct 2022 06:50 )             26.4       10-    140  |  102  |  7   ----------------------------<  91  2.9   |  28  |  0.50    Ca    10.7      03 Oct 2022 06:50    TPro  5.4  /  Alb  1.8  /  TBili  0.4  /  DBili  x   /  AST  59  /  ALT  43  /  AlkPhos  157  10-     Urinalysis Basic - ( 02 Oct 2022 10:50 )    Color: Yellow / Appearance: Slightly Turbid / S.015 / pH: x  Gluc: x / Ketone: Large  / Bili: Negative / Urobili: Negative   Blood: x / Protein: 30 mg/dL / Nitrite: Negative   Leuk Esterase: Moderate / RBC: 5-10 /HPF / WBC 11-25 /HPF   Sq Epi: x / Non Sq Epi: Neg.-Few / Bacteria: Few /HPF    Culture - Urine (collected 28 Sep 2022 20:30)  Source: Catheterized Catheterized  Final Report (01 Oct 2022 10:17):    >100,000 CFU/ml Enterobacter cloacae complex  Organism: Enterobacter cloacae complex (01 Oct 2022 10:17)  Organism: Enterobacter cloacae complex (01 Oct 2022 10:17)      -  Amikacin: S <=16      -  Amoxicillin/Clavulanic Acid: R >16/8      -  Ampicillin: R >16 These ampicillin results predict results for amoxicillin      -  Ampicillin/Sulbactam: R >16/8 Enterobacter, Klebsiella aerogenes, Citrobacter, and Serratia may develop resistance during prolonged therapy (3-4 days)      -  Aztreonam: S <=4      -  Cefazolin: R >16      -  Cefepime: S <=2      -  Cefoxitin: R >16      -  Ceftriaxone: R 32 Enterobacter, Klebsiella aerogenes, Citrobacter, and Serratia may develop resistance during prolonged therapy      -  Ciprofloxacin: S <=0.25      -  Ertapenem: S <=0.5      -  Gentamicin: S <=2      -  Imipenem: S <=1      -  Levofloxacin: S <=0.5      -  Meropenem: S <=1      -  Nitrofurantoin: R >64 Should not be used to treat pyelonephritis      -  Piperacillin/Tazobactam: S <=8      -  Tigecycline: S <=2      -  Tobramycin: S <=2      -  Trimethoprim/Sulfamethoxazole: S <=0.5/9.5      Method Type: KERA      COVID-19 PCR: NotDetec (22 @ 20:50)  COVID-19 PCR: NotDetec (22 @ 13:00)  COVID-19 PCR: NotDetec (22 @ 06:00)  COVID-19 PCR: NotDetec (22 @ 03:30)  COVID-19 PCR: NotDetec (22 @ 20:50)  COVID-19 PCR: NotDetec (22 @ 13:00)  COVID-19 PCR: NotDetec (22 @ 06:00)  COVID-19 PCR: NotDetec (22 @ 03:30)      RADIOLOGY  CT Abdomen and Pelvis No Cont (10.02.22 @ 11:31) >  MPRESSION:  No acute abdominal findings.    Pulmonary and hepatic metastases again noted. Small bilateral pleural   effusions are new since prior.    Diffuse osseous metastatic disease involving the spine and bony pelvis   with L2 pathologic fracture, as above. Findings are better delineated on   recent spine CT and MRI studies.

## 2022-10-07 NOTE — PROGRESS NOTE ADULT - ASSESSMENT
67 yo female PMH stomach ulcer who presented to [facility] 9/10/22 with progressive weakness, discovered multiple lytic lesions of the cervical and thoracic spine along with lung and liver secondary to metastatic primary newly discovered breast cancer with axillary and retropectoral involvement. Imaging also showed Compression fractures of T1, T9, T12, and L2. Severe T1 compression with epidural extension.S/p C7-T2 corpectomy & fusion, posterior C4-T5 fusion, hospital course significant for urinary retention and dysphagia, UTI      *Enterobacter cloacae UTI  - s/p Vantin BID for 7 days  - monitor bladder scans  - SC for PVR >350 ml, toileting schedule    *Diarrhea, resolved  *hypokalemia  - no laxatives      *Normocytic anemia  - Monitor Hg/Hct, iron levels, B12, folate WNL  - Transfuse if hG <7    *Functional and gait instability:  *Left groin/back/hip pain  - new onset, defer pain regimen to PMR; possibly related to metastatic disease found in bones  - Patient has been refusing gabapentin; will start pregabalin  - Recommended lower dose of dilaudid with zofran/reglan to control nausea  - monitor neurological exam closely  - CTAP as above    *Metastatic Breast CA to lung, liver, ribs, brain with spine involvement and neurogenic bladder  - s/p right breast biopsy shows  positive for invasive ductal carcinoma  - Compression fractures of T1, T9, T12, and L2. Severe T1 compression with epidural extension.   - s/p C7-T2 corpectomy & fusion, posterior C4-T5 fusion  - Patient will not require chemo/radiation while in rehab. 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

## 2022-10-07 NOTE — CHART NOTE - NSCHARTNOTEFT_GEN_A_CORE
Interval events:     Review of Systems:  Constitutional: No fever, chills, fatigue  Neuro: No headache, numbness, weakness  Resp: No cough, wheezing, shortness of breath  CVS: No chest pain, palpitations, leg swelling  GI: No abdominal pain, nausea, vomiting, diarrhea   : No dysuria, frequency, incontinence  Skin: No itching, burning, rashes, or lesions   Msk: No joint pain or swelling  Psych: No depression, anxiety, mood swings    T(F): 97.2 (10-07-22 @ 20:06), Max: 97.2 (10-07-22 @ 20:06)  HR: 114 (10-07-22 @ 20:06) (114 - 114)  BP: 123/83 (10-07-22 @ 20:06) (123/83 - 123/83)  RR: 16 (10-07-22 @ 20:06) (16 - 16)  SpO2: 94% (10-07-22 @ 20:06) (94% - 94%)  Wt(kg): --        CAPILLARY BLOOD GLUCOSE          I&O's Summary      Physical Exam:   Gen:  Neuro:  HEENT:  Resp:  CVS:  Abd:  Ext:  Skin:    Meds:          acetaminophen    Suspension .. Oral PRN  cyclobenzaprine Oral  HYDROmorphone   Tablet Oral PRN  HYDROmorphone   Tablet Oral PRN  ondansetron    Tablet Oral PRN  oxyCODONE  ER Tablet Oral  pregabalin Oral      enoxaparin Injectable SubCutaneous    bisacodyl Suppository Rectal PRN  simethicone Chew PRN            saccharomyces boulardii Oral                            9.2    7.10  )-----------( 401      ( 06 Oct 2022 06:39 )             28.5       10-06    137  |  102  |  10  ----------------------------<  85  4.1   |  26  |  0.49<L>    Ca    10.4      06 Oct 2022 06:39                            Radiology:    A&P: Interval events: Called by RN to patient bedside after vitals showed -120s and O2: 89% on RA. Patient admitted to rehab and has hx of breast cancer with mets to lung, liver, brain and bone. Patient currently laying in bed in NAD, states she has been feeling sob but described it as getting winded after speaking for short period of time. Only complaint is pain to her L hip area but states this is not new and is being managed by pain meds. Patient denies HA, dizziness, CP, abdominal pain.     Review of Systems:  Constitutional: No fever, chills, fatigue  Neuro: No headache, numbness, weakness  Resp: No cough, wheezing,   CVS: No chest pain, palpitations, leg swelling  GI: No abdominal pain, nausea, vomiting, diarrhea   : No dysuria, frequency  Skin: No itching, burning, rashes, or lesions     T(F): 97.2 (10-07-22 @ 20:06), Max: 97.2 (10-07-22 @ 20:06)  HR: 114 (10-07-22 @ 20:06) (114 - 114)  BP: 123/83 (10-07-22 @ 20:06) (123/83 - 123/83)  RR: 16 (10-07-22 @ 20:06) (16 - 16)  SpO2: 94% (10-07-22 @ 20:06) (94% - 94%)  Wt(kg): --    CAPILLARY BLOOD GLUCOSE    I&O's Summary    Physical Exam:   Gen: NAD, collar in place, speaking in full sentneces  Neuro: A/O x 3,   HEENT: MMM  Resp: clear to ausculation b/l, no wheezes, rales or rhonchi   CVS: tachy, no murmurs   Abd: +tenderness over left hip joint and left groin, abdomen soft nontender nondistended, BS present  Ext: no calf tenderness, no pitting edema  Skin: warm     Meds:  acetaminophen    Suspension .. Oral PRN  cyclobenzaprine Oral  HYDROmorphone   Tablet Oral PRN  HYDROmorphone   Tablet Oral PRN  ondansetron    Tablet Oral PRN  oxyCODONE  ER Tablet Oral  pregabalin Oral  enoxaparin Injectable SubCutaneous  bisacodyl Suppository Rectal PRN  simethicone Chew PRN  saccharomyces boulardii Oral                        9.2    7.10  )-----------( 401      ( 06 Oct 2022 06:39 )             28.5       10-06    137  |  102  |  10  ----------------------------<  85  4.1   |  26  |  0.49<L>    Ca    10.4      06 Oct 2022 06:39    Radiology: < from: CT Abdomen and Pelvis No Cont (10.02.22 @ 11:31) >  INTERPRETATION:  CLINICAL INFORMATION: Metastatic breast cancer with   abdominal pain.  COMPARISON: CT 9/9/2022  CONTRAST/COMPLICATIONS:  IV Contrast: NONE  Oral Contrast: NONE  Complications: None reported at time of study completion  PROCEDURE:  CT of the Abdomen and Pelvis was performed.  Sagittal and coronal reformats were performed.  FINDINGS:  Evaluation of the solid organs and vasculature is limited in the absence   of intravenous contrast.  LOWER CHEST: Small new bilateral pleural effusions with bibasilar   atelectasis. Ill-defined pulmonary nodules at both lung bases lung bases,   largest an 8 mm right lower lobe nodule near the fissure, unchanged.   Small pericardial effusion.  LIVER: Ill-defined hypodense bilobar hepatic lesions again noted,   suspicious for metastatic disease.  BILE DUCTS: Normal caliber.  GALLBLADDER: Within normal limits.  SPLEEN: Within normal limits.  PANCREAS: Within normal limits.  ADRENALS: Within normal limits.  KIDNEYS/URETERS: Horseshoe kidney. Small 2 mm nonobstructing left renal   calculus. No hydronephrosis.  BLADDER: Within normal limits.  REPRODUCTIVE ORGANS: Hysterectomy.  BOWEL: No bowel obstruction. Appendix is not visualized. No evidence of   inflammation in the pericecal region.  PERITONEUM: No ascites.  VESSELS: Within normal limits.  RETROPERITONEUM/LYMPH NODES: No lymphadenopathy.  ABDOMINAL WALL: Within normal limits.  BONES: Extensive osseous metastatic disease, as noted on prior studies   with lytic lesions involving the thoracic and lumbar spine and bony   pelvis. Pathologic compression fracture deformity of L2 again noted.   These findings are betterdelineated on recent spine CT and MR studies..  IMPRESSION:  No acute abdominal findings.  Pulmonary and hepatic metastases again noted. Small bilateral pleural   effusions are new since prior.  Diffuse osseous metastatic disease involving the spine and bony pelvis   with L2 pathologic fracture, as above. Findings are better delineated on   recent spine CT and MRI studies.  --- End of Report ---  < end of copied text >    A&P:  67 yo female PMH stomach ulcer who presented to [facility] 9/10/22 with progressive weakness, discovered multiple lytic lesions of the cervical and thoracic spine along with lung and liver secondary to metastatic primary newly discovered breast cancer with axillary and retropectoral involvement. Imaging also showed Compression fractures of T1, T9, T12, and L2. Severe T1 compression with epidural extension.S/p C7-T2 corpectomy & fusion, posterior C4-T5 fusion, hospital course significant for urinary retention and dysphagia, UTI    tachycardia   hypoxia   - stat ecg  -stat chest xray  - stat CBC, CMP, ddimer   - CTA with IV contrast r/o PE- as per [facility] radiologist, "NO PE, trace left effusion, partially visualized R breast mass, R axillary lymphadenopathy, redemonstration of pulmonary nodules." f/u with final reads    - place 2L NC, make sure O2> 93%, wean as tolerated   - control patient pain with prn pain meds   - Routine vitals   - Monitor closely for signs of desaturation or increased SOB   - rest of care per primary team   - Discussed with Silviaurnmaggie Navas, agrees to plan.

## 2022-10-08 LAB
BASOPHILS # BLD AUTO: 0.04 K/UL — SIGNIFICANT CHANGE UP (ref 0–0.2)
BASOPHILS NFR BLD AUTO: 0.6 % — SIGNIFICANT CHANGE UP (ref 0–2)
D DIMER BLD IA.RAPID-MCNC: 1739 NG/ML DDU — HIGH
EOSINOPHIL # BLD AUTO: 0.19 K/UL — SIGNIFICANT CHANGE UP (ref 0–0.5)
EOSINOPHIL NFR BLD AUTO: 2.8 % — SIGNIFICANT CHANGE UP (ref 0–6)
HCT VFR BLD CALC: 29.3 % — LOW (ref 34.5–45)
HGB BLD-MCNC: 9.7 G/DL — LOW (ref 11.5–15.5)
IMM GRANULOCYTES NFR BLD AUTO: 0.9 % — SIGNIFICANT CHANGE UP (ref 0–0.9)
LYMPHOCYTES # BLD AUTO: 1.13 K/UL — SIGNIFICANT CHANGE UP (ref 1–3.3)
LYMPHOCYTES # BLD AUTO: 16.8 % — SIGNIFICANT CHANGE UP (ref 13–44)
MCHC RBC-ENTMCNC: 29.6 PG — SIGNIFICANT CHANGE UP (ref 27–34)
MCHC RBC-ENTMCNC: 33.1 GM/DL — SIGNIFICANT CHANGE UP (ref 32–36)
MCV RBC AUTO: 89.3 FL — SIGNIFICANT CHANGE UP (ref 80–100)
MONOCYTES # BLD AUTO: 0.49 K/UL — SIGNIFICANT CHANGE UP (ref 0–0.9)
MONOCYTES NFR BLD AUTO: 7.3 % — SIGNIFICANT CHANGE UP (ref 2–14)
NEUTROPHILS # BLD AUTO: 4.8 K/UL — SIGNIFICANT CHANGE UP (ref 1.8–7.4)
NEUTROPHILS NFR BLD AUTO: 71.6 % — SIGNIFICANT CHANGE UP (ref 43–77)
NRBC # BLD: 0 /100 WBCS — SIGNIFICANT CHANGE UP (ref 0–0)
PLATELET # BLD AUTO: 462 K/UL — HIGH (ref 150–400)
RBC # BLD: 3.28 M/UL — LOW (ref 3.8–5.2)
RBC # FLD: 13.6 % — SIGNIFICANT CHANGE UP (ref 10.3–14.5)
WBC # BLD: 6.71 K/UL — SIGNIFICANT CHANGE UP (ref 3.8–10.5)
WBC # FLD AUTO: 6.71 K/UL — SIGNIFICANT CHANGE UP (ref 3.8–10.5)

## 2022-10-08 PROCEDURE — 99232 SBSQ HOSP IP/OBS MODERATE 35: CPT

## 2022-10-08 PROCEDURE — 71275 CT ANGIOGRAPHY CHEST: CPT | Mod: 26

## 2022-10-08 RX ORDER — ACETYLCYSTEINE 200 MG/ML
4 VIAL (ML) MISCELLANEOUS
Refills: 0 | Status: DISCONTINUED | OUTPATIENT
Start: 2022-10-08 | End: 2022-10-08

## 2022-10-08 RX ORDER — ALBUTEROL 90 UG/1
2.5 AEROSOL, METERED ORAL EVERY 6 HOURS
Refills: 0 | Status: DISCONTINUED | OUTPATIENT
Start: 2022-10-08 | End: 2022-10-27

## 2022-10-08 RX ADMIN — Medication 25 MILLIGRAM(S): at 22:08

## 2022-10-08 RX ADMIN — OXYCODONE HYDROCHLORIDE 20 MILLIGRAM(S): 5 TABLET ORAL at 18:05

## 2022-10-08 RX ADMIN — HYDROMORPHONE HYDROCHLORIDE 2 MILLIGRAM(S): 2 INJECTION INTRAMUSCULAR; INTRAVENOUS; SUBCUTANEOUS at 12:36

## 2022-10-08 RX ADMIN — ONDANSETRON 8 MILLIGRAM(S): 8 TABLET, FILM COATED ORAL at 17:20

## 2022-10-08 RX ADMIN — Medication 250 MILLIGRAM(S): at 17:16

## 2022-10-08 RX ADMIN — HYDROMORPHONE HYDROCHLORIDE 2 MILLIGRAM(S): 2 INJECTION INTRAMUSCULAR; INTRAVENOUS; SUBCUTANEOUS at 13:30

## 2022-10-08 RX ADMIN — HYDROMORPHONE HYDROCHLORIDE 4 MILLIGRAM(S): 2 INJECTION INTRAMUSCULAR; INTRAVENOUS; SUBCUTANEOUS at 22:20

## 2022-10-08 RX ADMIN — CYCLOBENZAPRINE HYDROCHLORIDE 5 MILLIGRAM(S): 10 TABLET, FILM COATED ORAL at 16:10

## 2022-10-08 RX ADMIN — CYCLOBENZAPRINE HYDROCHLORIDE 5 MILLIGRAM(S): 10 TABLET, FILM COATED ORAL at 22:08

## 2022-10-08 RX ADMIN — OXYCODONE HYDROCHLORIDE 20 MILLIGRAM(S): 5 TABLET ORAL at 17:16

## 2022-10-08 RX ADMIN — HYDROMORPHONE HYDROCHLORIDE 4 MILLIGRAM(S): 2 INJECTION INTRAMUSCULAR; INTRAVENOUS; SUBCUTANEOUS at 22:50

## 2022-10-08 RX ADMIN — Medication 25 MILLIGRAM(S): at 16:10

## 2022-10-08 RX ADMIN — ENOXAPARIN SODIUM 40 MILLIGRAM(S): 100 INJECTION SUBCUTANEOUS at 12:36

## 2022-10-08 NOTE — PROGRESS NOTE ADULT - ASSESSMENT
A/P:  65 yo female PMH left arm skin CA s/p excision, stomach ulcer who presented to [facility] 9/10/22 with progressive weakness, found to have multiple lytic lesions of the cervical and thoracic spine as well as right first and second ribs, lung, liver secondary to metastatic breast cancer with dominant right breast mass, right chest wall, axillary and retropectoral involvement. Compression fractures of T1, T9, T12, and L2. Severe T1 compression with epidural extension. She underwent C7-T2 corpectomy & fusion, posterior C4-T5 fusion    Plan:  - Breast CA with mets to lung, liver, ribs, and spine: continue therapies and pain management  - Episode of desaturation overnight: CTA neg for PE, started on 2L NC. Pulm consult appreciated, will monitor off NC for now and monitor off diuretics  - DVT prophylaxis  - Skin- Turn q2h, check skin daily  - Continue current medications; patient medically stable.   - Patient is stable to continue current rehabilitation program.

## 2022-10-08 NOTE — PROGRESS NOTE ADULT - SUBJECTIVE AND OBJECTIVE BOX
Patient is a 66y old  Female who presents with a chief complaint of metastatic breast CA to spine s/p  C7-T2 corpectomy & fusion, posterior C4-T5 fusion (07 Oct 2022 12:26)    8/8: Overnight, had hypoxia with tachycardia, felt SOB, CTA negative for PE/effusion/infiltrate, placed on oxygen    This AM, reports feeling well, denies chest pain, SOB, cough, fever  Tolerating diet, urinating well  Patient seen and examined at bedside.    ALLERGIES:  lactose (Other)  tetanus toxoid (Hives)    MEDICATIONS  (STANDING):  acetylcysteine 20%  Inhalation 4 milliLiter(s) Inhalation two times a day  cyclobenzaprine 5 milliGRAM(s) Oral three times a day  enoxaparin Injectable 40 milliGRAM(s) SubCutaneous every 24 hours  oxyCODONE  ER Tablet 20 milliGRAM(s) Oral every 12 hours  pregabalin 25 milliGRAM(s) Oral every 8 hours  saccharomyces boulardii 250 milliGRAM(s) Oral two times a day    MEDICATIONS  (PRN):  acetaminophen    Suspension .. 650 milliGRAM(s) Oral every 6 hours PRN Mild Pain (1 - 3)  bisacodyl Suppository 10 milliGRAM(s) Rectal daily PRN Constipation  HYDROmorphone   Tablet 4 milliGRAM(s) Oral every 4 hours PRN Severe Pain (7 - 10)  HYDROmorphone   Tablet 2 milliGRAM(s) Oral every 4 hours PRN Moderate Pain (4 - 6)  ondansetron    Tablet 8 milliGRAM(s) Oral every 8 hours PRN Nausea and/or Vomiting  simethicone 80 milliGRAM(s) Chew every 6 hours PRN Gas    Vital Signs Last 24 Hrs  T(F): 97.9 (08 Oct 2022 09:03), Max: 99.9 (07 Oct 2022 23:45)  HR: 102 (08 Oct 2022 09:03) (102 - 114)  BP: 136/81 (08 Oct 2022 09:03) (112/77 - 136/81)  RR: 15 (08 Oct 2022 09:03) (15 - 16)  SpO2: 96% (08 Oct 2022 09:03) (89% - 97%)  I&O's Summary      PHYSICAL EXAM:  General: NAD, A/O x 3, collar in place  ENT: MMM, no scleral icterus  Neck: Supple, No JVD, no thyroidomegaly  Lungs: Clear to auscultation bilaterally, no wheezes, no rales, no rhonchi, good inspiratory effort  Cardio: RRR, S1/S2, No murmurs  Abdomen: Soft, Nontender, Nondistended; Bowel sounds present  Extremities: No calf tenderness, No pitting edema, no skin changes    LABS:                        9.7    6.71  )-----------( 462      ( 08 Oct 2022 00:15 )             29.3       10-06    137  |  102  |  10  ----------------------------<  85  4.1   |  26  |  0.49    Ca    10.4      06 Oct 2022 06:39       COVID-19 PCR: NotDetec (10-07-22 @ 06:10)  COVID-19 PCR: NotDetec (09-30-22 @ 20:50)  COVID-19 PCR: NotDetec (09-27-22 @ 13:00)  COVID-19 PCR: NotDetec (09-20-22 @ 06:00)  COVID-19 PCR: NotDetec (09-17-22 @ 03:30)  COVID-19 PCR: NotDetec (10-07-22 @ 06:10)  COVID-19 PCR: NotDetec (09-30-22 @ 20:50)  COVID-19 PCR: NotDetec (09-27-22 @ 13:00)  COVID-19 PCR: NotDetec (09-20-22 @ 06:00)  COVID-19 PCR: NotDetec (09-17-22 @ 03:30)        RADIOLOGY & ADDITIONAL TESTS:  CT Angio Chest PE Protocol w/ IV Cont (10.08.22 @ 01:17) >    IMPRESSION:    No pulmonary embolus.    Interlobular septal thickening and bilateral groundglass opacities are   new from prior exam and may represent edema, lymphangitic carcinomatosis   and/or pneumonitis.    Bilateral metastatic lung nodules measuring up to 9 mm within right lower   lobe are unchanged. Trace bilateral pleural effusions and mild bibasilar   atelectasis.    Metastatic disease within the mediastinum and rohit as well as bone   metastases are grossly unchanged.    Care Discussed with Consultants/Other Providers:

## 2022-10-08 NOTE — CONSULT NOTE ADULT - SUBJECTIVE AND OBJECTIVE BOX
PULMONARY CONSULT  Location of Patient : [facility] [address] ([facility])  Attending requesting Consult:Claire Carrizales  Chief Complaint : Rehab  Reason For consult : Abnormal CT chest      Initial HPI on admission:  HPI:  66 year old female with PMH of left arm skin CA s/p excision at age 16, stomach ulcer, her last mammogram was in 2014, who was brought to [facility] ED 9/10/22 with progressive weakness to the point that she felt she could not get out of bed. She also reported remote shoulder pain, decreased appetite. Was found to have two lumps in her right breast but could not tolerate recommended biopsy due to pain. CT scan of the Cervical spine performed which showed Multiple lytic lesions of the cervical and visualized thoracic spine as well as right first and second ribs concerning for metastases versus multiple myeloma.    She was admitted under medicine service for further workup and management, including of her pain. Further imaging done showed Metastatic breast cancer with dominant right breast mass, right chest wall, axillary and retropectoral involvement; osseous, pulmonary, and likely hepatic metastatic disease. She was also found to have metastatic adenopathy of the thorax, diffuse extensive osseous metastatic disease with multiple lucent and sclerotic lesions and compression fractures of T1, T9, T12, and L2. Severe T1 compression with epidural extension; cord compression cannot be excluded. +additional neoplastic disease of the spine with epidural extension. Multifocal neoplastic lesions involving the pelvis and ribs as well.    Patient was seen by Oncology, CT surgery and Neurosurgery team. She underwent C7-T2 corpectomy & fusion, posterior C4-T5 fusion. Post-op course uncomplicated.  Hamm removed, pt requiring some straight cath for retention. She also had subjective dysphagia - seen by speech therapy recommended pureed diet which patient continues to prefer. Patient with low grade temp and positive UA. Started on IV Rocephin for UTI. Blood cultures negative, Urine culture positive for GNR, changed to PO Vantin for 7 days.    Patient was medically optimized for discharge to [facility] IRF on 9/30/22. (30 Sep 2022 14:32)      BRIEF HOSPITAL COURSE: ***  8/8: Overnight, had hypoxia with tachycardia, felt SOB, CTA negative for PE/effusion/infiltrate, placed on oxygen    This AM, reports feeling well, denies chest pain, SOB, cough, fever  Tolerating diet, urinating well  Patient seen and examined at bedside.        PAST MEDICAL & SURGICAL HISTORY:  Stomach ulcer  20 years ago  no pain not taking any meds  Skin cancer of arm, left pt was 16 year old  H/O excision of mass left arm when patient was 16 years ago  S/P hysterectomy        Allergies  tetanus toxoid (Hives)  Intolerances  lactose (Other)    FAMILY HISTORY:  Family history of dementia (Mother)  Family history of colon cancer (Father)      Social history: Social History:  Smoking - Denied  EtOH - Denied   Drugs - Denied     Marital status: Single    Patient does not have her own home. 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  PTA: Independent in ADLs and ambulation        Review of Systems: as stated above    CONSTITUTIONAL: No fever, No chills, No fatigue  EYES: No eye pain, No visual disturbances, No discharge  ENMT:  No difficulty hearing, No tinnitus, No vertigo; No sinus or throat pain  NECK: No pain, No stiffness  RESPIRATORY: No Cough, No SOB, No Secretions  CARDIOVASCULAR: No chest pain, No palpitations, No dizziness, or No leg swelling  GASTROINTESTINAL: No abdominal or epigastric pain. No nausea, No vomiting, No hematemesis; No diarrhea, No constipation. No melena, No hematochezia.  GENITOURINARY: No dysuria, No frequency, No hematuria, No incontinence  NEUROLOGICAL: No headaches, No memory loss, No loss of strength, No numbness, No tremors  SKIN: No itching, No burning, No rashes, No lesions   MUSCULOSKELETAL: No joint pain or swelling; No muscle, back, No extremity pain  PSYCHIATRIC: No depression, No anxiety, No mood swings, No difficulty sleeping      Medications:  MEDICATIONS  (STANDING):  acetylcysteine 20%  Inhalation 4 milliLiter(s) Inhalation two times a day  cyclobenzaprine 5 milliGRAM(s) Oral three times a day  enoxaparin Injectable 40 milliGRAM(s) SubCutaneous every 24 hours  oxyCODONE  ER Tablet 20 milliGRAM(s) Oral every 12 hours  pregabalin 25 milliGRAM(s) Oral every 8 hours  saccharomyces boulardii 250 milliGRAM(s) Oral two times a day    MEDICATIONS  (PRN):  acetaminophen    Suspension .. 650 milliGRAM(s) Oral every 6 hours PRN Mild Pain (1 - 3)  bisacodyl Suppository 10 milliGRAM(s) Rectal daily PRN Constipation  HYDROmorphone   Tablet 4 milliGRAM(s) Oral every 4 hours PRN Severe Pain (7 - 10)  HYDROmorphone   Tablet 2 milliGRAM(s) Oral every 4 hours PRN Moderate Pain (4 - 6)  ondansetron    Tablet 8 milliGRAM(s) Oral every 8 hours PRN Nausea and/or Vomiting  simethicone 80 milliGRAM(s) Chew every 6 hours PRN Gas      Antibiotics History  cefpodoxime 200 milliGRAM(s) Oral every 12 hours, 09-30-22 @ 17:19, Stop order after: 7 Days      Heme Medications   enoxaparin Injectable 40 milliGRAM(s) SubCutaneous every 24 hours, 10-01-22 @ 00:00      GI Medications  bisacodyl Suppository 10 milliGRAM(s) Rectal daily, 09-30-22 @ 17:18, Routine PRN  simethicone 80 milliGRAM(s) Chew every 6 hours, 10-01-22 @ 17:41, Routine PRN        Home Medications:  Last Order Reconciliation Date: 09-30-22 @ 15:15 (Admission Reconciliation)  bisacodyl 10 mg rectal suppository: 1 suppository(ies) rectal once a day, As needed, Constipation (09-30-22 @ 08:33)  cefpodoxime 200 mg oral tablet: 1 tab(s) orally every 12 hours (09-30-22 @ 08:33)  gabapentin 300 mg oral capsule: 2 cap(s) orally every 8 hours (09-30-22 @ 08:33)  HYDROmorphone 2 mg oral tablet: 1 tab(s) orally every 4 hours, As needed, Moderate Pain (4 - 6) (09-30-22 @ 08:33)  HYDROmorphone 4 mg oral tablet: 1 tab(s) orally every 4 hours, As needed, Severe Pain (7 - 10) (09-30-22 @ 08:33)  methocarbamol 750 mg oral tablet: 1 tab(s) orally 3 times a day (09-30-22 @ 08:33)  oxyCODONE 20 mg oral tablet, extended release: 1 tab(s) orally every 12 hours (09-30-22 @ 08:33)  polyethylene glycol 3350 oral powder for reconstitution: 17 gram(s) orally every 12 hours (09-30-22 @ 08:33)  senna leaf extract oral tablet: 2 tab(s) orally once a day (at bedtime) (09-30-22 @ 08:33)      LABS:                        9.7    6.71  )-----------( 462      ( 08 Oct 2022 00:15 )             29.3      HIT ab -- 10-08 @ 00:15  HIT ab EIA --  D Dimer -[id number]         CULTURES: (if applicable)    Culture - Urine (collected 09-28-22 @ 20:30)  Source: Catheterized Catheterized  Final Report (10-01-22 @ 10:17):    >100,000 CFU/ml Enterobacter cloacae complex  Organism: Enterobacter cloacae complex (10-01-22 @ 10:17)  Organism: Enterobacter cloacae complex (10-01-22 @ 10:17)      -  Amikacin: S <=16      -  Amoxicillin/Clavulanic Acid: R >16/8      -  Ampicillin: R >16 These ampicillin results predict results for amoxicillin      -  Ampicillin/Sulbactam: R >16/8 Enterobacter, Klebsiella aerogenes, Citrobacter, and Serratia may develop resistance during prolonged therapy (3-4 days)      -  Aztreonam: S <=4      -  Cefazolin: R >16      -  Cefepime: S <=2      -  Cefoxitin: R >16      -  Ceftriaxone: R 32 Enterobacter, Klebsiella aerogenes, Citrobacter, and Serratia may develop resistance during prolonged therapy      -  Ciprofloxacin: S <=0.25      -  Ertapenem: S <=0.5      -  Gentamicin: S <=2      -  Imipenem: S <=1      -  Levofloxacin: S <=0.5      -  Meropenem: S <=1      -  Nitrofurantoin: R >64 Should not be used to treat pyelonephritis      -  Piperacillin/Tazobactam: S <=8      -  Tigecycline: S <=2      -  Tobramycin: S <=2      -  Trimethoprim/Sulfamethoxazole: S <=0.5/9.5      Method Type: KERA    Culture - Blood (collected 09-26-22 @ 07:15)  Source: .Blood Blood  Final Report (10-01-22 @ 13:00):    No Growth Final    Culture - Blood (collected 09-26-22 @ 07:10)  Source: .Blood Blood  Final Report (10-01-22 @ 13:00):    No Growth Final       RADIOLOGY  CXR:      CT:  < from: CT Angio Chest PE Protocol w/ IV Cont (10.08.22 @ 01:17) >  ACC: [id number] EXAM:  CT ANGIO CHEST PULM ERIKA MALLOY                          PROCEDURE DATE:  10/08/2022      INTERPRETATION:  ACC: [id number]  INDICATION, CLINICAL INFORMATION: hypoxia    FINDINGS:    PULMONARY VESSELS: No pulmonary embolus. Main pulmonary artery normal in diameter.  HEART AND VASCULATURE: Heart size is normal. No pericardial effusion. Left aortic arch with aberrant right subclavian artery. No aortic aneurysm or dissection.  LUNGS, AIRWAYS, PLEURA: Patent central airways. Interlobular septal thickening and bilateral groundglass opacities are new from prior exam and may represent edema, lymphangitic carcinomatosis and/or pneumonitis. Bilateral metastatic lung nodules measuring up to 9 mm within right lower   lobe are unchanged. Trace bilateral pleural effusions and mild bibasilar atelectasis. No pneumothorax.  MEDIASTINUM: Soft tissue infiltrate/enlarged lymph nodes within the mediastinum and rohit are unchanged..  UPPER ABDOMEN: Within normal limits.  BONES AND SOFT TISSUES: Diffuse bone metastases are grossly unchanged. Right breast mass and enlarged right axillary lymph nodes are again noted incompletely evaluated. Is status post fusion of the lower cervical/upper thoracic spine.  LOWER NECK: Within normal limits.  IMPRESSION:  No pulmonary embolus.  Interlobular septal thickening and bilateral groundglass opacities are new from prior exam and may represent edema, lymphangitic carcinomatosis and/or pneumonitis.  Bilateral metastatic lung nodules measuring up to 9 mm within right lower lobe are unchanged. Trace bilateral pleural effusions and mild bibasilar atelectasis.  Metastatic disease within the mediastinum and rohit as well as bone  metastases are grossly unchanged.    --- End of Report ---    < end of copied text >    ECHO:      VITALS:  T(C): 36.6 (10-08-22 @ 09:03), Max: 37.7 (10-07-22 @ 23:45)  T(F): 97.9 (10-08-22 @ 09:03), Max: 99.9 (10-07-22 @ 23:45)  HR: 102 (10-08-22 @ 09:03) (102 - 114)  BP: 136/81 (10-08-22 @ 09:03) (112/77 - 136/81)  BP(mean): --  ABP: --  ABP(mean): --  RR: 16 (10-08-22 @ 12:56) (15 - 16)  SpO2: 93% (10-08-22 @ 12:56) (89% - 97%)  CVP(mm Hg): --  CVP(cm H2O): --    Ins and Outs               I&O's Detail      Physical Examination:  GENERAL:               Alert, Oriented, No acute distress.    HEENT:                    Pupils equal, reactive to light.  Symmetric. No JVD, Moist MM  PULM:                     Bilateral air entry, Clear to auscultation bilaterally, no significant sputum production, No Rales, No Rhonchi, No Wheezing  CVS:                         S1, S2,  No Murmur  ABD:                        Soft, nondistended, nontender, normoactive bowel sounds,   EXT:                         No edema, nontender, No Cyanosis or Clubbing   Vascular:                Warm Extremities, Normal Capillary refill, Normal Distal Pulses  SKIN:                       Warm and well perfused, no rashes noted.   NEURO:                  Alert, oriented, interactive, nonfocal, follows commands  PSYC:                      Calm, + Insight.     PULMONARY CONSULT  Location of Patient : [facility] [address] ([facility])  Attending requesting Consult:Claire Carrizales  Chief Complaint : Rehab  Reason For consult : Abnormal CT chest      Initial HPI on admission:  HPI:  66 year old female with PMH of left arm skin CA s/p excision at age 16, stomach ulcer, her last mammogram was in 2014, who was brought to [facility] ED 9/10/22 with progressive weakness to the point that she felt she could not get out of bed. She also reported remote shoulder pain, decreased appetite. Was found to have two lumps in her right breast but could not tolerate recommended biopsy due to pain. CT scan of the Cervical spine performed which showed Multiple lytic lesions of the cervical and visualized thoracic spine as well as right first and second ribs concerning for metastases versus multiple myeloma.    She was admitted under medicine service for further workup and management, including of her pain. Further imaging done showed Metastatic breast cancer with dominant right breast mass, right chest wall, axillary and retropectoral involvement; osseous, pulmonary, and likely hepatic metastatic disease. She was also found to have metastatic adenopathy of the thorax, diffuse extensive osseous metastatic disease with multiple lucent and sclerotic lesions and compression fractures of T1, T9, T12, and L2. Severe T1 compression with epidural extension; cord compression cannot be excluded. +additional neoplastic disease of the spine with epidural extension. Multifocal neoplastic lesions involving the pelvis and ribs as well.    Patient was seen by Oncology, CT surgery and Neurosurgery team. She underwent C7-T2 corpectomy & fusion, posterior C4-T5 fusion. Post-op course uncomplicated.  Hamm removed, pt requiring some straight cath for retention. She also had subjective dysphagia - seen by speech therapy recommended pureed diet which patient continues to prefer. Patient with low grade temp and positive UA. Started on IV Rocephin for UTI. Blood cultures negative, Urine culture positive for GNR, changed to PO Vantin for 7 days.    Patient was medically optimized for discharge to [facility] IRF on 9/30/22. (30 Sep 2022 14:32)      BRIEF HOSPITAL COURSE:   8/8: Overnight, had hypoxia with tachycardia, felt SOB, CTA done was negative for PE/effusion/infiltrate but did show new b/l pl effusions and new b/l GGO   This AM, reports feeling well, denies chest pain, SOB, cough, fever   complain of decreased PO intake and Right Upper Extremity numbness.   complain of frequent BM with out abd pain , denies palp, n/v        PAST MEDICAL & SURGICAL HISTORY:  Stomach ulcer  20 years ago  no pain not taking any meds  Skin cancer of arm, left pt was 16 year old  H/O excision of mass left arm when patient was 16 years ago  S/P hysterectomy        Allergies  tetanus toxoid (Hives)  Intolerances  lactose (Other)    FAMILY HISTORY:  Family history of dementia (Mother)  Family history of colon cancer (Father)      Social history: Social History:  Smoking - Denied  EtOH - Denied   Drugs - Denied     Marital status: Single    Patient does not have her own home. 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  PTA: Independent in ADLs and ambulation        Review of Systems: as stated above    CONSTITUTIONAL: No fever, No chills, + fatigue  EYES: No eye pain, No visual disturbances, No discharge  ENMT:  No difficulty hearing, No tinnitus, No vertigo; No sinus or throat pain  NECK: No pain, No stiffness  RESPIRATORY: No Cough, No SOB, No Secretions  CARDIOVASCULAR: No chest pain, No palpitations, No dizziness, or No leg swelling  GASTROINTESTINAL: No abdominal or epigastric pain. No nausea, No vomiting, No hematemesis; +diarrhea, No constipation. No melena, No hematochezia.  GENITOURINARY: No dysuria, No frequency, No hematuria, No incontinence  NEUROLOGICAL: +headaches, No memory loss, +loss of strength, No numbness, No tremors  SKIN: No itching, No burning, No rashes, No lesions   MUSCULOSKELETAL: No joint pain or swelling; No muscle, back, No extremity pain  PSYCHIATRIC: No depression, No anxiety, No mood swings, No difficulty sleeping      Medications:  MEDICATIONS  (STANDING):  acetylcysteine 20%  Inhalation 4 milliLiter(s) Inhalation two times a day  cyclobenzaprine 5 milliGRAM(s) Oral three times a day  enoxaparin Injectable 40 milliGRAM(s) SubCutaneous every 24 hours  oxyCODONE  ER Tablet 20 milliGRAM(s) Oral every 12 hours  pregabalin 25 milliGRAM(s) Oral every 8 hours  saccharomyces boulardii 250 milliGRAM(s) Oral two times a day    MEDICATIONS  (PRN):  acetaminophen    Suspension .. 650 milliGRAM(s) Oral every 6 hours PRN Mild Pain (1 - 3)  bisacodyl Suppository 10 milliGRAM(s) Rectal daily PRN Constipation  HYDROmorphone   Tablet 4 milliGRAM(s) Oral every 4 hours PRN Severe Pain (7 - 10)  HYDROmorphone   Tablet 2 milliGRAM(s) Oral every 4 hours PRN Moderate Pain (4 - 6)  ondansetron    Tablet 8 milliGRAM(s) Oral every 8 hours PRN Nausea and/or Vomiting  simethicone 80 milliGRAM(s) Chew every 6 hours PRN Gas      Antibiotics History  cefpodoxime 200 milliGRAM(s) Oral every 12 hours, 09-30-22 @ 17:19, Stop order after: 7 Days      Heme Medications   enoxaparin Injectable 40 milliGRAM(s) SubCutaneous every 24 hours, 10-01-22 @ 00:00      GI Medications  bisacodyl Suppository 10 milliGRAM(s) Rectal daily, 09-30-22 @ 17:18, Routine PRN  simethicone 80 milliGRAM(s) Chew every 6 hours, 10-01-22 @ 17:41, Routine PRN        Home Medications:  Last Order Reconciliation Date: 09-30-22 @ 15:15 (Admission Reconciliation)  bisacodyl 10 mg rectal suppository: 1 suppository(ies) rectal once a day, As needed, Constipation (09-30-22 @ 08:33)  cefpodoxime 200 mg oral tablet: 1 tab(s) orally every 12 hours (09-30-22 @ 08:33)  gabapentin 300 mg oral capsule: 2 cap(s) orally every 8 hours (09-30-22 @ 08:33)  HYDROmorphone 2 mg oral tablet: 1 tab(s) orally every 4 hours, As needed, Moderate Pain (4 - 6) (09-30-22 @ 08:33)  HYDROmorphone 4 mg oral tablet: 1 tab(s) orally every 4 hours, As needed, Severe Pain (7 - 10) (09-30-22 @ 08:33)  methocarbamol 750 mg oral tablet: 1 tab(s) orally 3 times a day (09-30-22 @ 08:33)  oxyCODONE 20 mg oral tablet, extended release: 1 tab(s) orally every 12 hours (09-30-22 @ 08:33)  polyethylene glycol 3350 oral powder for reconstitution: 17 gram(s) orally every 12 hours (09-30-22 @ 08:33)  senna leaf extract oral tablet: 2 tab(s) orally once a day (at bedtime) (09-30-22 @ 08:33)      LABS:                        9.7    6.71  )-----------( 462      ( 08 Oct 2022 00:15 )             29.3      HIT ab -- 10-08 @ 00:15  HIT ab EIA --  D Dimer -1739    Albumin Trend  10-01-22 @ 07:45   -   1.8<L>  09-27-22 @ 21:43   -   2.4<L>  09-24-22 @ 10:10   -   2.6<L>  09-13-22 @ 04:58   -   3.6  09-09-22 @ 16:50   -   4.1         CULTURES: (if applicable)    Culture - Urine (collected 09-28-22 @ 20:30)  Source: Catheterized Catheterized  Final Report (10-01-22 @ 10:17):    >100,000 CFU/ml Enterobacter cloacae complex  Organism: Enterobacter cloacae complex (10-01-22 @ 10:17)  Organism: Enterobacter cloacae complex (10-01-22 @ 10:17)      -  Amikacin: S <=16      -  Amoxicillin/Clavulanic Acid: R >16/8      -  Ampicillin: R >16 These ampicillin results predict results for amoxicillin      -  Ampicillin/Sulbactam: R >16/8 Enterobacter, Klebsiella aerogenes, Citrobacter, and Serratia may develop resistance during prolonged therapy (3-4 days)      -  Aztreonam: S <=4      -  Cefazolin: R >16      -  Cefepime: S <=2      -  Cefoxitin: R >16      -  Ceftriaxone: R 32 Enterobacter, Klebsiella aerogenes, Citrobacter, and Serratia may develop resistance during prolonged therapy      -  Ciprofloxacin: S <=0.25      -  Ertapenem: S <=0.5      -  Gentamicin: S <=2      -  Imipenem: S <=1      -  Levofloxacin: S <=0.5      -  Meropenem: S <=1      -  Nitrofurantoin: R >64 Should not be used to treat pyelonephritis      -  Piperacillin/Tazobactam: S <=8      -  Tigecycline: S <=2      -  Tobramycin: S <=2      -  Trimethoprim/Sulfamethoxazole: S <=0.5/9.5      Method Type: KERA    Culture - Blood (collected 09-26-22 @ 07:15)  Source: .Blood Blood  Final Report (10-01-22 @ 13:00):    No Growth Final    Culture - Blood (collected 09-26-22 @ 07:10)  Source: .Blood Blood  Final Report (10-01-22 @ 13:00):    No Growth Final       RADIOLOGY    CT:  < from: CT Angio Chest PE Protocol w/ IV Cont (10.08.22 @ 01:17) >  ACC: [id number] EXAM:  CT ANGIO CHEST PULM [facility]                          PROCEDURE DATE:  10/08/2022      INTERPRETATION:  ACC: [id number]  INDICATION, CLINICAL INFORMATION: hypoxia    FINDINGS:    PULMONARY VESSELS: No pulmonary embolus. Main pulmonary artery normal in diameter.  HEART AND VASCULATURE: Heart size is normal. No pericardial effusion. Left aortic arch with aberrant right subclavian artery. No aortic aneurysm or dissection.  LUNGS, AIRWAYS, PLEURA: Patent central airways. Interlobular septal thickening and bilateral groundglass opacities are new from prior exam and may represent edema, lymphangitic carcinomatosis and/or pneumonitis. Bilateral metastatic lung nodules measuring up to 9 mm within right lower   lobe are unchanged. Trace bilateral pleural effusions and mild bibasilar atelectasis. No pneumothorax.  MEDIASTINUM: Soft tissue infiltrate/enlarged lymph nodes within the mediastinum and rohit are unchanged..  UPPER ABDOMEN: Within normal limits.  BONES AND SOFT TISSUES: Diffuse bone metastases are grossly unchanged. Right breast mass and enlarged right axillary lymph nodes are again noted incompletely evaluated. Is status post fusion of the lower cervical/upper thoracic spine.  LOWER NECK: Within normal limits.  IMPRESSION:  No pulmonary embolus.  Interlobular septal thickening and bilateral groundglass opacities are new from prior exam and may represent edema, lymphangitic carcinomatosis and/or pneumonitis.  Bilateral metastatic lung nodules measuring up to 9 mm within right lower lobe are unchanged. Trace bilateral pleural effusions and mild bibasilar atelectasis.  Metastatic disease within the mediastinum and rohit as well as bone  metastases are grossly unchanged.    --- End of Report ---    < end of copied text >     < from: CT Chest w/ IV Cont (09.09.22 @ 21:42) >   PROCEDURE:  CT of the Chest, Abdomen and Pelvis was performed.Sagittal and coronal reformats were performed.Preliminary read provided by Dr. Arturo Vinson from [facility].    FINDINGS:    CHEST:  LUNGS AND LARGE AIRWAYS: Central airways are patent. Bibasilar atelectasis. Pulmonary nodules concerning for metastatic disease. For reference, there is a right upper lobe nodule measuring 6 mm, image 56   series 3 and there is a 7 mm lingular nodule, image 82 series 3.  PLEURA: No pleural effusion or pneumothorax  VESSELS: Normal caliber of the thoracic aorta and main pulmonary artery. No central pulmonary embolus. Aberrant right subclavian artery.  HEART: Heart size within normal limits. Trace pericardial fluid.  MEDIASTINUM AND ROHIT: Bulky mediastinal and hilar adenopathy. For reference, there is a right lower paratracheal lymph node conglomerate measuring 2.6 x 1.8 cm, image 44 series 3. Subcarinal geni conglomerate   measures 3.2 x 1.7 cm, image 66 series 3.  CHEST WALL AND LOWER NECK: Dominant right breast mass measures 6 x 6 cm, image 66 series 3. Multicentric tumor deposits of the right breast noted. Additional necrotic tumor deposits/adenopathy extend to the chest wall, right axilla, and right retropectoral region. For reference, right axillary confluent lesion measures 4.1 x 2.1 cm, image 44 series 3. There is right breast skin thickening. Inflammatory carcinoma cannotbe   excluded. Osseous metastatic disease with possible epidural extension associated with spinal disease. Correlate with pending MRI. Subcentimeter left thyroid nodule.    ABDOMEN AND PELVIS:  LIVER: Multiple hepatic hypodensities suspicious formetastatic disease. For reference, there is a left hepatic lobe 1.1 cm lesion on image 132 series 3 and right hepatic lobe 9 mm lesion, image 126 series 3.   Additional subcentimeter hypodensities too small to characterize. Correlate with abdominal MRI. Main portal vein and hepatic veins are patent  BILE DUCTS: No biliary distention  GALLBLADDER: Unremarkable  SPLEEN: Normal size  PANCREAS: No acute peripancreatic inflammation  ADRENALS: Unremarkable  KIDNEYS/URETERS: Horseshoe kidney. Nohydronephrosis. 2 mm nonobstructing calculus of the left-sided moiety. Subcentimeter hypodensities too small to characterize.    BLADDER: Distended  REPRODUCTIVE ORGANS: Hysterectomy.    BOWEL: Stomach is underdistended. No small bowel distention. Appendix is nondistended and not inflamed. Mild stool burden of the colon limits evaluation of the colonic mucosa.  PERITONEUM: No ascites  VESSELS: No abdominal aortic aneurysm. Atheromatous changes.  RETROPERITONEUM/LYMPH NODES: Small volume lymph nodes of the abdomen/pelvis.  ABDOMINAL WALL: Postsurgical changes.  BONES: Diffuse extensive osseous metastatic disease with multiple lucent and sclerotic lesions and compression fractures of T1, T9, T12, and L2. Severe T1 compression withepidural extension. Cord compression cannot be   excluded. Additional multifocal neoplastic disease of the spine with epidural extension. Multifocal neoplastic lesions involving the pelvis and ribs as well. For reference, pathologic lesion of left fifth rib, image 57 series 3. Correlate with pending spinal MRI to evaluate for cord compression.    IMPRESSION:    Metastatic breast cancer with dominant right breast mass. Inflammatory right breast cancer cannot be excluded. Right chest wall, axillary and retropectoral involvement.    Osseous, pulmonary, and likely hepatic metastatic disease. Metastatic adenopathy of the thorax.    Diffuse extensive osseous metastatic disease with multiple lucent and sclerotic lesions and compression fractures of T1, T9, T12, and L2. Severe T1 compression with epidural extension. Cord compression cannot be   excluded. Additional neoplastic disease of the spine with epidural extension. Multifocal neoplastic lesions involving the pelvis and ribs as well. Correlate with pending spinal MRI to evaluate for cord compression.    Dr. Huang discussed these above findings with Dr. Rowe on 9/10/2022 at 12:47PM, with read back.    Distended urinary bladder.    --- End of Report ---    < end of copied text >        VITALS:  T(C): 36.6 (10-08-22 @ 09:03), Max: 37.7 (10-07-22 @ 23:45)  T(F): 97.9 (10-08-22 @ 09:03), Max: 99.9 (10-07-22 @ 23:45)  HR: 102 (10-08-22 @ 09:03) (102 - 114)  BP: 136/81 (10-08-22 @ 09:03) (112/77 - 136/81)  BP(mean): --  ABP: --  ABP(mean): --  RR: 16 (10-08-22 @ 12:56) (15 - 16)  SpO2: 93% (10-08-22 @ 12:56) (89% - 97%)  CVP(mm Hg): --  CVP(cm H2O): --    Ins and Outs         Physical Examination:  GENERAL:               Alert, Oriented, No acute distress.    HEENT:                     Symmetric. No JVD, dry MM  PULM:                     Bilateral air entry, Clear to auscultation bilaterally, no significant sputum production, No Rales, No Rhonchi, No Wheezing  CVS:                         S1, S2,  No Murmur  ABD:                        Soft, nondistended, nontender, normoactive bowel sounds,   EXT:                         No edema, nontender, No Cyanosis or Clubbing   Vascular:                Warm Extremities, Normal Capillary refill, Normal Distal Pulses  SKIN:                       Warm and well perfused, no rashes noted.   NEURO:                  Alert, oriented, interactive, nonfocal, follows commands  PSYC:                      Calm, + Insight.

## 2022-10-08 NOTE — PROGRESS NOTE ADULT - SUBJECTIVE AND OBJECTIVE BOX
Interval events:  Patient seen and assessed at bedside. Patient desatted to 89% on RA last night, PE w/u neg. This AM, patient feels well, denies any SOB or difficulty breathing but notes when she has a conversation with family, she feels more SOB. Pain is under control.       MEDICATIONS  (STANDING):  cyclobenzaprine 5 milliGRAM(s) Oral three times a day  enoxaparin Injectable 40 milliGRAM(s) SubCutaneous every 24 hours  oxyCODONE  ER Tablet 20 milliGRAM(s) Oral every 12 hours  pregabalin 25 milliGRAM(s) Oral every 8 hours  saccharomyces boulardii 250 milliGRAM(s) Oral two times a day    MEDICATIONS  (PRN):  acetaminophen    Suspension .. 650 milliGRAM(s) Oral every 6 hours PRN Mild Pain (1 - 3)  ALBUTerol    0.083% 2.5 milliGRAM(s) Nebulizer every 6 hours PRN Shortness of Breath and/or Wheezing  bisacodyl Suppository 10 milliGRAM(s) Rectal daily PRN Constipation  HYDROmorphone   Tablet 4 milliGRAM(s) Oral every 4 hours PRN Severe Pain (7 - 10)  HYDROmorphone   Tablet 2 milliGRAM(s) Oral every 4 hours PRN Moderate Pain (4 - 6)  ondansetron    Tablet 8 milliGRAM(s) Oral every 8 hours PRN Nausea and/or Vomiting  simethicone 80 milliGRAM(s) Chew every 6 hours PRN Gas      Vital Signs Last 24 Hrs  T(C): 36.6 (08 Oct 2022 09:03), Max: 37.7 (07 Oct 2022 23:45)  T(F): 97.9 (08 Oct 2022 09:03), Max: 99.9 (07 Oct 2022 23:45)  HR: 102 (08 Oct 2022 09:03) (102 - 114)  BP: 136/81 (08 Oct 2022 09:03) (112/77 - 136/81)  BP(mean): --  RR: 16 (08 Oct 2022 12:56) (15 - 16)  SpO2: 93% (08 Oct 2022 12:56) (89% - 97%)    Parameters below as of 08 Oct 2022 12:56  Patient On (Oxygen Delivery Method): room air      PHYSICAL EXAM  In NAD  HEENT- EOMI, +C-collar  Heart- Well Perfused  Lungs- No resp distress, no use of accessory resp muscles  Abd- NTND  Ext- No calf pain  Neuro- Exam unchanged  Psych- Affect wnl

## 2022-10-08 NOTE — PROGRESS NOTE ADULT - ATTENDING COMMENTS
Pt. seen with resident.  Agree with documentation above as per resident. Patient medically stable. Making progress towards rehab goals.       Compression fractures of T1, T9, T12, and L2. Severe T1 compression with epidural extension due to metastatic breast cancer. She underwent C7-T2 corpectomy & fusion, posterior C4-T5 fusion   desaturation and SOB when speaking for some time.  r/o for PE  Monitor off O2

## 2022-10-08 NOTE — CONSULT NOTE ADULT - ASSESSMENT
Patent central airways. Interlobular septal thickening and bilateral groundglass opacities are new from prior exam and may represent edema, lymphangitic carcinomatosis and/or pneumonitis. Bilateral metastatic lung nodules measuring up to 9 mm within right lower   lobe are unchanged.  Assessment  66 year old female with PMH of left arm skin CA s/p excision at age 16, stomach ulcer, her last mammogram was in 2014, who was brought to [facility] ED 9/10/22 with progressive weakness to the point that she felt she could not get out of be  CT scan of the Cervical spine performed which showed Multiple lytic lesions of the cervical and visualized thoracic spine as well as right first and second ribs concerning for metastases versus multiple myeloma. She was also found to have metastatic adenopathy of the thorax, diffuse extensive osseous metastatic disease with multiple lucent and sclerotic lesions and compression fractures of T1, T9, T12, and L2. Severe T1 compression with epidural extension; cord compression cannot be excluded. +additional neoplastic disease of the spine with epidural extension. Multifocal neoplastic lesions involving the pelvis and ribs as well.  She underwent C7-T2 corpectomy & fusion, posterior C4-T5 fusion. Post-op course uncomplicated.  Hamm removed, pt requiring some straight cath for retention. She also had subjective dysphagia - seen by speech therapy recommended pureed diet which patient continues to prefer. Patient with low grade temp and positive UA. Started on IV Rocephin for UTI. Blood cultures negative, Urine culture positive for GNR, changed to PO Vantin for 7 days.      Problem List  1. Abnormal CT chest with -"Interlobular septal thickening and bilateral ground-glass opacities are new from prior exam and may represent edema, lymphangitic carcinomatosis and/or pneumonitis. Bilateral metastatic lung nodules measuring up to 9 mm within right lower lobe are unchanged"    The Differential diagnosis can include:     - Pulmonary Edema- Cardiogenic  vs non cardiogenic pulmonary edema (most likely) - in patient with hypoalonemia and minimally mobile state leading     - Air trapping - (doubt) as patient has no history of Copd/asthma or smoking     - Progression of lung cancer (possible)  2. Metastatic Breast cancer  3. Diarrhea      Plan   Check Echo, repeat albumin, BNP  incentive spirometry  albuterol Prn  will hold of diuretics as patient appears hypovolumic and doubt cardiogenic cause  Nutrition f/u for diet and encourage increased oral intake  PT, OOB as tolerated  Incentive spirometry  consider heme / onc eval  monitor on RA as not hypoxic at this time, keep sat >92%  can d/c Mucomyst  case d/w Dr. Bruner.     rest of care as per primary team -  management of diarrhea as per primary team

## 2022-10-08 NOTE — PROGRESS NOTE ADULT - ASSESSMENT
67 yo female PMH stomach ulcer who presented to [facility] 9/10/22 with progressive weakness, discovered multiple lytic lesions of the cervical and thoracic spine along with lung and liver secondary to metastatic primary newly discovered breast cancer with axillary and retropectoral involvement. Imaging also showed Compression fractures of T1, T9, T12, and L2. Severe T1 compression with epidural extension.S/p C7-T2 corpectomy & fusion, posterior C4-T5 fusion, hospital course significant for urinary retention and dysphagia, UTI    #Acute hypoxic respiratory failure in need of supplemental oxygenation likely multifactorial including:  *Metastatic disease within mediastinum and rohit  *Metastatic disease within ribs  *Bilateral lung nodules RLL  *Bilateral GGO  *Bilateral atelectasis  -PE ruled out  -Will order mucomyst neb treatments q 12hrs x 2 days  -Incentive spirometer, OOB to chair, mobilize  -Might need oxygen due to increase in cancer burden in lungs  -Will consult pulmonary Dr Herndon at this time     *Enterobacter cloacae UTI  - s/p Vantin BID for 7 days  - monitor bladder scans  - SC for PVR >350 ml, toileting schedule    *Diarrhea, resolved  *hypokalemia  - no laxatives    *Normocytic anemia  - Monitor Hg/Hct, iron levels, B12, folate WNL  - Transfuse if hG <7    *Functional and gait instability:  *Left groin/back/hip pain  - new onset, defer pain regimen to PMR; possibly related to metastatic disease found in bones  - Patient has been refusing gabapentin; will start pregabalin  - Recommended lower dose of dilaudid with zofran/reglan to control nausea  - monitor neurological exam closely  - CTAP as above    *Metastatic Breast CA to lung, liver, ribs, brain with spine involvement and neurogenic bladder  - s/p right breast biopsy shows  positive for invasive ductal carcinoma  - Compression fractures of T1, T9, T12, and L2. Severe T1 compression with epidural extension.   - s/p C7-T2 corpectomy & fusion, posterior C4-T5 fusion  - Patient will not require chemo/radiation while in rehab. 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

## 2022-10-09 LAB
ALBUMIN SERPL ELPH-MCNC: 2 G/DL — LOW (ref 3.3–5)
ALP SERPL-CCNC: 161 U/L — HIGH (ref 40–120)
ALT FLD-CCNC: 14 U/L — SIGNIFICANT CHANGE UP (ref 10–45)
ANION GAP SERPL CALC-SCNC: 11 MMOL/L — SIGNIFICANT CHANGE UP (ref 5–17)
AST SERPL-CCNC: 46 U/L — HIGH (ref 10–40)
BILIRUB SERPL-MCNC: 0.3 MG/DL — SIGNIFICANT CHANGE UP (ref 0.2–1.2)
BUN SERPL-MCNC: 9 MG/DL — SIGNIFICANT CHANGE UP (ref 7–23)
CALCIUM SERPL-MCNC: 10.8 MG/DL — HIGH (ref 8.4–10.5)
CHLORIDE SERPL-SCNC: 101 MMOL/L — SIGNIFICANT CHANGE UP (ref 96–108)
CO2 SERPL-SCNC: 27 MMOL/L — SIGNIFICANT CHANGE UP (ref 22–31)
CREAT SERPL-MCNC: 0.47 MG/DL — LOW (ref 0.5–1.3)
EGFR: 105 ML/MIN/1.73M2 — SIGNIFICANT CHANGE UP
GLUCOSE SERPL-MCNC: 96 MG/DL — SIGNIFICANT CHANGE UP (ref 70–99)
NT-PROBNP SERPL-SCNC: 71 PG/ML — SIGNIFICANT CHANGE UP (ref 0–300)
POTASSIUM SERPL-MCNC: 3.7 MMOL/L — SIGNIFICANT CHANGE UP (ref 3.5–5.3)
POTASSIUM SERPL-SCNC: 3.7 MMOL/L — SIGNIFICANT CHANGE UP (ref 3.5–5.3)
PROT SERPL-MCNC: 5.5 G/DL — LOW (ref 6–8.3)
SODIUM SERPL-SCNC: 139 MMOL/L — SIGNIFICANT CHANGE UP (ref 135–145)

## 2022-10-09 PROCEDURE — 99232 SBSQ HOSP IP/OBS MODERATE 35: CPT

## 2022-10-09 PROCEDURE — 93306 TTE W/DOPPLER COMPLETE: CPT | Mod: 26

## 2022-10-09 RX ORDER — METOPROLOL TARTRATE 50 MG
12.5 TABLET ORAL
Refills: 0 | Status: DISCONTINUED | OUTPATIENT
Start: 2022-10-09 | End: 2022-10-10

## 2022-10-09 RX ORDER — ONDANSETRON 8 MG/1
8 TABLET, FILM COATED ORAL EVERY 8 HOURS
Refills: 0 | Status: DISCONTINUED | OUTPATIENT
Start: 2022-10-09 | End: 2022-10-11

## 2022-10-09 RX ORDER — SALIVA SUBSTITUTE COMB NO.11 351 MG
5 POWDER IN PACKET (EA) MUCOUS MEMBRANE THREE TIMES A DAY
Refills: 0 | Status: DISCONTINUED | OUTPATIENT
Start: 2022-10-09 | End: 2022-10-27

## 2022-10-09 RX ADMIN — OXYCODONE HYDROCHLORIDE 20 MILLIGRAM(S): 5 TABLET ORAL at 07:00

## 2022-10-09 RX ADMIN — HYDROMORPHONE HYDROCHLORIDE 2 MILLIGRAM(S): 2 INJECTION INTRAMUSCULAR; INTRAVENOUS; SUBCUTANEOUS at 18:44

## 2022-10-09 RX ADMIN — Medication 12.5 MILLIGRAM(S): at 18:44

## 2022-10-09 RX ADMIN — Medication 25 MILLIGRAM(S): at 13:43

## 2022-10-09 RX ADMIN — HYDROMORPHONE HYDROCHLORIDE 2 MILLIGRAM(S): 2 INJECTION INTRAMUSCULAR; INTRAVENOUS; SUBCUTANEOUS at 11:02

## 2022-10-09 RX ADMIN — Medication 25 MILLIGRAM(S): at 10:05

## 2022-10-09 RX ADMIN — OXYCODONE HYDROCHLORIDE 20 MILLIGRAM(S): 5 TABLET ORAL at 05:44

## 2022-10-09 RX ADMIN — HYDROMORPHONE HYDROCHLORIDE 2 MILLIGRAM(S): 2 INJECTION INTRAMUSCULAR; INTRAVENOUS; SUBCUTANEOUS at 10:05

## 2022-10-09 RX ADMIN — ENOXAPARIN SODIUM 40 MILLIGRAM(S): 100 INJECTION SUBCUTANEOUS at 10:05

## 2022-10-09 RX ADMIN — ONDANSETRON 8 MILLIGRAM(S): 8 TABLET, FILM COATED ORAL at 10:04

## 2022-10-09 NOTE — PROGRESS NOTE ADULT - ASSESSMENT
A/P:  67 yo female PMH left arm skin CA s/p excision, stomach ulcer who presented to [facility] 9/10/22 with progressive weakness, found to have multiple lytic lesions of the cervical and thoracic spine as well as right first and second ribs, lung, liver secondary to metastatic breast cancer with dominant right breast mass, right chest wall, axillary and retropectoral involvement. Compression fractures of T1, T9, T12, and L2. Severe T1 compression with epidural extension. She underwent C7-T2 corpectomy & fusion, posterior C4-T5 fusion    Plan:  - Breast CA with mets to lung, liver, ribs, and spine: continue therapies and pain management  - Episode of desaturation overnight 10/7-8: CTA neg for PE, started on 2L NC. Pulm consult appreciated, will monitor off NC for now and monitor off diuretics. Breathing improved this AM; SpO2 93% on RA  - Tachycardia: started on Metoprolol tartrate 12.5mg BID by hospitalist; check TSH and free T4 in the AM. TTE pending  - DVT prophylaxis  - Skin- Turn q2h, check skin daily  - Continue current medications; patient medically stable.   - Patient is stable to continue current rehabilitation program.

## 2022-10-09 NOTE — PROGRESS NOTE ADULT - ASSESSMENT
67 yo female PMH stomach ulcer who presented to [facility] 9/10/22 with progressive weakness, discovered multiple lytic lesions of the cervical and thoracic spine along with lung and liver secondary to metastatic primary newly discovered breast cancer with axillary and retropectoral involvement. Imaging also showed Compression fractures of T1, T9, T12, and L2. Severe T1 compression with epidural extension.S/p C7-T2 corpectomy & fusion, posterior C4-T5 fusion, hospital course significant for urinary retention and dysphagia, UTI    #Acute hypoxic respiratory failure in need of supplemental oxygenation likely multifactorial including:  *Metastatic disease within mediastinum and rohit  *Metastatic disease within ribs  *Bilateral lung nodules RLL  *Bilateral GGO  *Bilateral atelectasis  -PE ruled out  -C/w alb neb treatement q 6hrs prn  -Incentive spirometer, OOB to chair, mobilize  -Might need oxygen due to increase in cancer burden in lungs  -TTE pending  -Appreciate pulmonary recommendations    *Sinus tachycardia  *New onset HTN  - Noted increase in HR  - Will check TSH/FT4 in AM  - Noted elevated blood pressures  - Will start metoprolol 12.5mg q 12hrs; monitor SBP on metoprolol    *Enterobacter cloacae UTI  - s/p Vantin BID for 7 days  - monitor bladder scans  - SC for PVR >350 ml, toileting schedule    *Diarrhea, resolved  *hypokalemia  - no laxatives    *Normocytic anemia  - Monitor Hg/Hct, iron levels, B12, folate WNL  - Transfuse if hG <7    *Functional and gait instability:  *Left groin/back/hip pain  - new onset, defer pain regimen to PMR; possibly related to metastatic disease found in bones  - Patient has been refusing gabapentin; will start pregabalin  - Recommended lower dose of dilaudid with zofran/reglan to control nausea  - monitor neurological exam closely  - CTAP as above    *Metastatic Breast CA to lung, liver, ribs, brain with spine involvement and neurogenic bladder  - s/p right breast biopsy shows  positive for invasive ductal carcinoma  - Compression fractures of T1, T9, T12, and L2. Severe T1 compression with epidural extension.   - s/p C7-T2 corpectomy & fusion, posterior C4-T5 fusion  - Patient will not require chemo/radiation while in rehab. 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

## 2022-10-09 NOTE — PROGRESS NOTE ADULT - SUBJECTIVE AND OBJECTIVE BOX
Subjective:  Patient was seen and examined at the bedside this AM. No acute overnight events. Breathing has improved today; appears more comfortable. Reports difficulty swallowing both solids and liquids, since yesterday. States she has having to take smaller sips. In particular, feels as if the Right side of throat is closing. Advised to continue using incentive spirometry, at least 10 reps/hr while awake.     ROS:  Breathing feels improved. Did not report fever, chills, nausea, vomiting, CP, palpitations, or abdominal pain.       Physical Exam:  Vital Signs Last 24 Hrs  T(C): 36.4 (09 Oct 2022 07:36), Max: 36.7 (08 Oct 2022 20:00)  T(F): 97.5 (09 Oct 2022 07:36), Max: 98.1 (08 Oct 2022 20:00)  HR: 106 (09 Oct 2022 07:36) (106 - 114)  BP: 137/82 (09 Oct 2022 07:36) (137/82 - 140/88)  BP(mean): --  RR: 16 (09 Oct 2022 07:36) (16 - 16)  SpO2: 93% (09 Oct 2022 07:36) (93% - 93%)    Parameters below as of 09 Oct 2022 07:36  Patient On (Oxygen Delivery Method): room air      Constitutional - NAD, Comfortable  Chest - Good chest expansion. Diminished breath sounds at the bases bilaterally; clear to mid and upper lung lobes bilaterally  Cardiovascular - Warm and well perfused; tachycardic  Abdomen - Soft, NTND  Extremities - No calf tenderness, no LE edema noted  Neurologic Exam - Awake and alert; answering questions appropriately; following commands  Psychiatric - Mood stable, Affect WNL      Recent Labs/Imagin.7    6.71  )-----------( 462      ( 08 Oct 2022 00:15 )             29.3     10-09    139  |  101  |  9   ----------------------------<  96  3.7   |  27  |  0.47<L>    Ca    10.8<H>      09 Oct 2022 06:45  TPro  5.5<L>  /  Alb  2.0<L>  /  TBili  0.3  /  DBili  x   /  AST  46<H>  /  ALT  14  /  AlkPhos  161<H>  10-09      Medications:  acetaminophen    Suspension .. 650 milliGRAM(s) Oral every 6 hours PRN  ALBUTerol    0.083% 2.5 milliGRAM(s) Nebulizer every 6 hours PRN  bisacodyl Suppository 10 milliGRAM(s) Rectal daily PRN  cyclobenzaprine 5 milliGRAM(s) Oral three times a day  enoxaparin Injectable 40 milliGRAM(s) SubCutaneous every 24 hours  HYDROmorphone   Tablet 4 milliGRAM(s) Oral every 4 hours PRN  HYDROmorphone   Tablet 2 milliGRAM(s) Oral every 4 hours PRN  metoprolol tartrate 12.5 milliGRAM(s) Oral two times a day  ondansetron   Disintegrating Tablet 8 milliGRAM(s) Oral every 8 hours PRN  oxyCODONE  ER Tablet 20 milliGRAM(s) Oral every 12 hours  pregabalin 25 milliGRAM(s) Oral every 8 hours  saccharomyces boulardii 250 milliGRAM(s) Oral two times a day  simethicone 80 milliGRAM(s) Chew every 6 hours PRN

## 2022-10-09 NOTE — PROGRESS NOTE ADULT - SUBJECTIVE AND OBJECTIVE BOX
Patient is a 66y old  Female who presents with a chief complaint of metastatic breast CA to spine s/p  C7-T2 corpectomy & fusion, posterior C4-T5 fusion    8/8: Overnight, had hypoxia with tachycardia, felt SOB, CTA negative for PE/effusion/infiltrate, placed on oxygen  8/9: No events overnight, had tachycardia     This AM, reports feeling well, denies chest pain, SOB, cough, fever  Tolerating diet, urinating well  Patient seen and examined at bedside.    ALLERGIES:  lactose (Other)  tetanus toxoid (Hives)    MEDICATIONS  (STANDING):  acetylcysteine 20%  Inhalation 4 milliLiter(s) Inhalation two times a day  cyclobenzaprine 5 milliGRAM(s) Oral three times a day  enoxaparin Injectable 40 milliGRAM(s) SubCutaneous every 24 hours  oxyCODONE  ER Tablet 20 milliGRAM(s) Oral every 12 hours  pregabalin 25 milliGRAM(s) Oral every 8 hours  saccharomyces boulardii 250 milliGRAM(s) Oral two times a day    MEDICATIONS  (PRN):  acetaminophen    Suspension .. 650 milliGRAM(s) Oral every 6 hours PRN Mild Pain (1 - 3)  bisacodyl Suppository 10 milliGRAM(s) Rectal daily PRN Constipation  HYDROmorphone   Tablet 4 milliGRAM(s) Oral every 4 hours PRN Severe Pain (7 - 10)  HYDROmorphone   Tablet 2 milliGRAM(s) Oral every 4 hours PRN Moderate Pain (4 - 6)  ondansetron    Tablet 8 milliGRAM(s) Oral every 8 hours PRN Nausea and/or Vomiting  simethicone 80 milliGRAM(s) Chew every 6 hours PRN Gas    Vital Signs Last 24 Hrs  T(F): 97.9 (08 Oct 2022 09:03), Max: 99.9 (07 Oct 2022 23:45)  HR: 102 (08 Oct 2022 09:03) (102 - 114)  BP: 136/81 (08 Oct 2022 09:03) (112/77 - 136/81)  RR: 15 (08 Oct 2022 09:03) (15 - 16)  SpO2: 96% (08 Oct 2022 09:03) (89% - 97%)  I&O's Summary      PHYSICAL EXAM:  General: NAD, A/O x 3, collar in place, oxygen via NC  ENT: MMM, no scleral icterus  Neck: Supple, No JVD, no thyroidomegaly  Lungs: Clear to auscultation bilaterally, no wheezes, no rales, no rhonchi, good inspiratory effort  Cardio: RRR, S1/S2, No murmurs  Abdomen: Soft, Nontender, Nondistended; Bowel sounds present  Extremities: No calf tenderness, No pitting edema, no skin changes    LABS:                        9.7    6.71  )-----------( 462      ( 08 Oct 2022 00:15 )             29.3       10-06    137  |  102  |  10  ----------------------------<  85  4.1   |  26  |  0.49    Ca    10.4      06 Oct 2022 06:39       COVID-19 PCR: NotDetec (10-07-22 @ 06:10)  COVID-19 PCR: NotDetec (09-30-22 @ 20:50)  COVID-19 PCR: NotDetec (09-27-22 @ 13:00)  COVID-19 PCR: NotDetec (09-20-22 @ 06:00)  COVID-19 PCR: NotDetec (09-17-22 @ 03:30)  COVID-19 PCR: NotDetec (10-07-22 @ 06:10)  COVID-19 PCR: NotDetec (09-30-22 @ 20:50)  COVID-19 PCR: NotDetec (09-27-22 @ 13:00)  COVID-19 PCR: NotDetec (09-20-22 @ 06:00)  COVID-19 PCR: NotDetec (09-17-22 @ 03:30)    RADIOLOGY & ADDITIONAL TESTS:  CT Angio Chest PE Protocol w/ IV Cont (10.08.22 @ 01:17) >    IMPRESSION:    No pulmonary embolus.    Interlobular septal thickening and bilateral groundglass opacities are   new from prior exam and may represent edema, lymphangitic carcinomatosis   and/or pneumonitis.    Bilateral metastatic lung nodules measuring up to 9 mm within right lower   lobe are unchanged. Trace bilateral pleural effusions and mild bibasilar   atelectasis.    Metastatic disease within the mediastinum and rohit as well as bone   metastases are grossly unchanged.    Care Discussed with Consultants/Other Providers:   Pulm: Alb prn, TTE

## 2022-10-09 NOTE — PROGRESS NOTE ADULT - SUBJECTIVE AND OBJECTIVE BOX
Time of Visit:  Patient seen and examined. pat is laying in bed . c/c of pain    MEDICATIONS  (STANDING):  cyclobenzaprine 5 milliGRAM(s) Oral three times a day  enoxaparin Injectable 40 milliGRAM(s) SubCutaneous every 24 hours  metoprolol tartrate 12.5 milliGRAM(s) Oral two times a day  oxyCODONE  ER Tablet 20 milliGRAM(s) Oral every 12 hours  pregabalin 25 milliGRAM(s) Oral every 8 hours  saccharomyces boulardii 250 milliGRAM(s) Oral two times a day      MEDICATIONS  (PRN):  acetaminophen    Suspension .. 650 milliGRAM(s) Oral every 6 hours PRN Mild Pain (1 - 3)  ALBUTerol    0.083% 2.5 milliGRAM(s) Nebulizer every 6 hours PRN Shortness of Breath and/or Wheezing  bisacodyl Suppository 10 milliGRAM(s) Rectal daily PRN Constipation  HYDROmorphone   Tablet 4 milliGRAM(s) Oral every 4 hours PRN Severe Pain (7 - 10)  HYDROmorphone   Tablet 2 milliGRAM(s) Oral every 4 hours PRN Moderate Pain (4 - 6)  ondansetron   Disintegrating Tablet 8 milliGRAM(s) Oral every 8 hours PRN Nausea and/or Vomiting  simethicone 80 milliGRAM(s) Chew every 6 hours PRN Gas       Medications up to date at time of exam.      PHYSICAL EXAMINATION:  Patient has no new complaints.  GENERAL: The patient is a well-developed, well-nourished, in no apparent distress.     Vital Signs Last 24 Hrs  T(C): 36.4 (09 Oct 2022 07:36), Max: 36.7 (08 Oct 2022 20:00)  T(F): 97.5 (09 Oct 2022 07:36), Max: 98.1 (08 Oct 2022 20:00)  HR: 106 (09 Oct 2022 07:36) (106 - 114)  BP: 137/82 (09 Oct 2022 07:36) (137/82 - 140/88)  BP(mean): --  RR: 16 (09 Oct 2022 07:36) (16 - 16)  SpO2: 93% (09 Oct 2022 07:36) (93% - 93%)    Parameters below as of 09 Oct 2022 07:36  Patient On (Oxygen Delivery Method): room air       (if applicable)    Chest Tube (if applicable)    HEENT: Head is normocephalic and atraumatic. Extraocular muscles are intact. Mucous membranes are moist.     NECK: Supple, no palpable adenopathy.    LUNGS: Clear to auscultation, no wheezing, rales, or rhonchi.    HEART: Regular rate and rhythm without murmur.    ABDOMEN: Soft, nontender, and nondistended.  No hepatosplenomegaly is noted.    EXTREMITIES: Without any cyanosis, clubbing, rash, lesions or edema.    NEUROLOGIC: Awake, alert, oriented, grossly intact    SKIN: Warm, dry, good turgor.      LABS:                        9.7    6.71  )-----------( 462      ( 08 Oct 2022 00:15 )             29.3     10-09    139  |  101  |  9   ----------------------------<  96  3.7   |  27  |  0.47<L>    Ca    10.8<H>      09 Oct 2022 06:45    TPro  5.5<L>  /  Alb  2.0<L>  /  TBili  0.3  /  DBili  x   /  AST  46<H>  /  ALT  14  /  AlkPhos  161<H>  10-09                Serum Pro-Brain Natriuretic Peptide: 71 pg/mL (10-09-22 @ 06:45)          MICROBIOLOGY: (if applicable)    RADIOLOGY & ADDITIONAL STUDIES:  EKG:   CT ;< from: CT Angio Chest PE Protocol w/ IV Cont (10.08.22 @ 01:17) >    ACC: [id number] EXAM:  CT ANGIO CHEST PULM [facility]                          PROCEDURE DATE:  10/08/2022          INTERPRETATION:  ACC: [id number]  INDICATION, CLINICAL INFORMATION: hypoxia  TECHNIQUE: CT pulmonary angiogram of the chest . Uneventful   administration of 75 cc Omnipaque 350. Coronal, sagittal and 3-D/MIP   images were reconstructed/reviewed.  COMPARISON: 9/9/2022 chest CT.    FINDINGS:    PULMONARY VESSELS: No pulmonary embolus. Main pulmonary artery normal in   diameter.    HEART AND VASCULATURE: Heart size is normal. No pericardial effusion.   Left aortic arch with aberrant right subclavian artery. No aortic   aneurysm or dissection.    LUNGS, AIRWAYS, PLEURA: Patent central airways. Interlobular septal   thickening and bilateral groundglass opacities are new from prior exam   and may represent edema, lymphangitic carcinomatosis and/or pneumonitis.   Bilateral metastatic lung nodules measuring up to 9 mm within right lower   lobe are unchanged. Trace bilateral pleural effusions and mild bibasilar   atelectasis. No pneumothorax.    MEDIASTINUM: Soft tissue infiltrate/enlarged lymph nodes within the   mediastinum and rohit are unchanged..    UPPER ABDOMEN: Within normal limits.    BONES AND SOFT TISSUES: Diffuse bone metastases are grossly unchanged.   Right breast mass and enlarged right axillary lymph nodes are again noted   incompletely evaluated. Is status post fusion of the lower cervical/upper   thoracic spine.    LOWER NECK: Within normal limits.    IMPRESSION:    No pulmonary embolus.    Interlobular septal thickening and bilateral groundglass opacities are   new from prior exam and may represent edema, lymphangitic carcinomatosis   and/or pneumonitis.    Bilateral metastatic lung nodules measuring up to 9 mm within right lower   lobe are unchanged. Trace bilateral pleural effusions and mild bibasilar   atelectasis.    Metastatic disease within the mediastinum and rohit as well as bone   metastases are grossly unchanged.    --- End of Report ---            LUIS MANUEL LAKE MD; Attending Radiologist  This document has been electronically signed. Oct  8 2022 10:49AM    < end of copied text >    ECHO:    IMPRESSION: 66y Female PAST MEDICAL & SURGICAL HISTORY:  Stomach ulcer  20 years ago   no pain not taking any meds      Skin cancer of arm, left  pt was 16 year old      H/O excision of mass  left arm  when patient was 16 years ago      S/P hysterectomy       p/w       IMP: This is a 66 year old woman  with f left arm skin CA s/p excision at age 16, stomach ulcer, her last mammogram was in 2014, who was brought to [facility] ED 9/10/22 with progressive weakness to the point that she felt she could not get out of be  CT scan of the Cervical spine performed which showed Multiple lytic lesions of the cervical and visualized thoracic spine as well as right first and second ribs concerning for metastases versus multiple myeloma. She was also found to have metastatic adenopathy of the thorax, diffuse extensive osseous metastatic disease with multiple lucent and sclerotic lesions and compression fractures of T1, T9, T12, and L2. Severe T1 compression with epidural extension; cord compression cannot be excluded. +additional neoplastic disease of the spine with epidural extension. Multifocal neoplastic lesions involving the pelvis and ribs as well.  She underwent C7-T2 corpectomy & fusion, posterior C4-T5 fusion. Post-op course uncomplicated.  Hamm removed, pt requiring some straight cath for retention. She also had subjective dysphagia - seen by speech therapy recommended pureed diet which patient continues to prefer. Patient with low grade temp and positive UA. Started on IV Rocephin for UTI. Blood cultures negative, Urine culture positive for GNR, changed to PO Vantin for 7 days.    CTPA as above : metastatic disease with lymphangitic spread .     Sugg    - O2 supp as needed   - Continue pain control   - Pain management  - Duonabs q6h   - Diet as tolerated   - DVT GI prophy

## 2022-10-09 NOTE — PROGRESS NOTE ADULT - ATTENDING COMMENTS
Pt. seen with resident.  Agree with documentation above as per resident. Patient medically stable. Making progress towards rehab goals.       Compression fractures of T1, T9, T12, and L2. Severe T1 compression with epidural extension due to metastatic breast cancer. She underwent C7-T2 corpectomy & fusion, posterior C4-T5 fusion   breathing is better today.  Cont. incentive spirometer  c/o dry mouth (SE of dilaudid)-- will see if artificial saliva available in pharmacy -- may help improve sx.

## 2022-10-10 LAB
ANION GAP SERPL CALC-SCNC: 18 MMOL/L — HIGH (ref 5–17)
BUN SERPL-MCNC: 9 MG/DL — SIGNIFICANT CHANGE UP (ref 7–23)
CALCIUM SERPL-MCNC: 10.6 MG/DL — HIGH (ref 8.4–10.5)
CHLORIDE SERPL-SCNC: 98 MMOL/L — SIGNIFICANT CHANGE UP (ref 96–108)
CO2 SERPL-SCNC: 21 MMOL/L — LOW (ref 22–31)
CREAT SERPL-MCNC: 0.44 MG/DL — LOW (ref 0.5–1.3)
CRP SERPL-MCNC: 139 MG/L — HIGH
EGFR: 107 ML/MIN/1.73M2 — SIGNIFICANT CHANGE UP
GLUCOSE SERPL-MCNC: 115 MG/DL — HIGH (ref 70–99)
HCT VFR BLD CALC: 31.8 % — LOW (ref 34.5–45)
HGB BLD-MCNC: 10.6 G/DL — LOW (ref 11.5–15.5)
MAGNESIUM SERPL-MCNC: 1.4 MG/DL — LOW (ref 1.6–2.6)
MCHC RBC-ENTMCNC: 29.2 PG — SIGNIFICANT CHANGE UP (ref 27–34)
MCHC RBC-ENTMCNC: 33.3 GM/DL — SIGNIFICANT CHANGE UP (ref 32–36)
MCV RBC AUTO: 87.6 FL — SIGNIFICANT CHANGE UP (ref 80–100)
NRBC # BLD: 1 /100 WBCS — HIGH (ref 0–0)
PLATELET # BLD AUTO: 658 K/UL — HIGH (ref 150–400)
POTASSIUM SERPL-MCNC: 3.4 MMOL/L — LOW (ref 3.5–5.3)
POTASSIUM SERPL-SCNC: 3.4 MMOL/L — LOW (ref 3.5–5.3)
PROCALCITONIN SERPL-MCNC: 0.2 NG/ML — HIGH
RBC # BLD: 3.63 M/UL — LOW (ref 3.8–5.2)
RBC # FLD: 13.7 % — SIGNIFICANT CHANGE UP (ref 10.3–14.5)
SODIUM SERPL-SCNC: 137 MMOL/L — SIGNIFICANT CHANGE UP (ref 135–145)
T4 FREE SERPL-MCNC: 1.2 NG/DL — SIGNIFICANT CHANGE UP (ref 0.9–1.8)
T4 FREE+ TSH PNL SERPL: 1.62 UIU/ML — SIGNIFICANT CHANGE UP (ref 0.27–4.2)
TSH SERPL-MCNC: 1.31 UIU/ML — SIGNIFICANT CHANGE UP (ref 0.36–3.74)
WBC # BLD: 11.16 K/UL — HIGH (ref 3.8–10.5)
WBC # FLD AUTO: 11.16 K/UL — HIGH (ref 3.8–10.5)

## 2022-10-10 PROCEDURE — 93010 ELECTROCARDIOGRAM REPORT: CPT

## 2022-10-10 PROCEDURE — 99233 SBSQ HOSP IP/OBS HIGH 50: CPT

## 2022-10-10 RX ORDER — MAGNESIUM SULFATE 500 MG/ML
2 VIAL (ML) INJECTION ONCE
Refills: 0 | Status: COMPLETED | OUTPATIENT
Start: 2022-10-10 | End: 2022-10-10

## 2022-10-10 RX ORDER — SODIUM CHLORIDE 9 MG/ML
1000 INJECTION, SOLUTION INTRAVENOUS
Refills: 0 | Status: COMPLETED | OUTPATIENT
Start: 2022-10-10 | End: 2022-10-11

## 2022-10-10 RX ORDER — METOPROLOL TARTRATE 50 MG
12.5 TABLET ORAL
Refills: 0 | Status: DISCONTINUED | OUTPATIENT
Start: 2022-10-10 | End: 2022-10-10

## 2022-10-10 RX ORDER — POTASSIUM CHLORIDE 20 MEQ
40 PACKET (EA) ORAL ONCE
Refills: 0 | Status: COMPLETED | OUTPATIENT
Start: 2022-10-10 | End: 2022-10-10

## 2022-10-10 RX ORDER — HYDROMORPHONE HYDROCHLORIDE 2 MG/ML
1 INJECTION INTRAMUSCULAR; INTRAVENOUS; SUBCUTANEOUS EVERY 6 HOURS
Refills: 0 | Status: DISCONTINUED | OUTPATIENT
Start: 2022-10-10 | End: 2022-10-13

## 2022-10-10 RX ORDER — METOPROLOL TARTRATE 50 MG
12.5 TABLET ORAL ONCE
Refills: 0 | Status: COMPLETED | OUTPATIENT
Start: 2022-10-10 | End: 2022-10-10

## 2022-10-10 RX ORDER — POTASSIUM CHLORIDE 20 MEQ
10 PACKET (EA) ORAL
Refills: 0 | Status: COMPLETED | OUTPATIENT
Start: 2022-10-10 | End: 2022-10-10

## 2022-10-10 RX ORDER — MAGNESIUM OXIDE 400 MG ORAL TABLET 241.3 MG
400 TABLET ORAL
Refills: 0 | Status: DISCONTINUED | OUTPATIENT
Start: 2022-10-10 | End: 2022-10-27

## 2022-10-10 RX ORDER — HYDROMORPHONE HYDROCHLORIDE 2 MG/ML
0.5 INJECTION INTRAMUSCULAR; INTRAVENOUS; SUBCUTANEOUS EVERY 6 HOURS
Refills: 0 | Status: DISCONTINUED | OUTPATIENT
Start: 2022-10-10 | End: 2022-10-13

## 2022-10-10 RX ORDER — METOPROLOL TARTRATE 50 MG
25 TABLET ORAL ONCE
Refills: 0 | Status: DISCONTINUED | OUTPATIENT
Start: 2022-10-10 | End: 2022-10-10

## 2022-10-10 RX ORDER — METOPROLOL TARTRATE 50 MG
25 TABLET ORAL
Refills: 0 | Status: DISCONTINUED | OUTPATIENT
Start: 2022-10-10 | End: 2022-10-27

## 2022-10-10 RX ORDER — ACETAMINOPHEN 500 MG
1000 TABLET ORAL ONCE
Refills: 0 | Status: COMPLETED | OUTPATIENT
Start: 2022-10-10 | End: 2022-10-10

## 2022-10-10 RX ADMIN — Medication 100 MILLIEQUIVALENT(S): at 15:40

## 2022-10-10 RX ADMIN — Medication 12.5 MILLIGRAM(S): at 12:07

## 2022-10-10 RX ADMIN — Medication 25 GRAM(S): at 12:05

## 2022-10-10 RX ADMIN — ONDANSETRON 8 MILLIGRAM(S): 8 TABLET, FILM COATED ORAL at 07:41

## 2022-10-10 RX ADMIN — Medication 100 MILLIEQUIVALENT(S): at 13:32

## 2022-10-10 RX ADMIN — Medication 1000 MILLIGRAM(S): at 12:00

## 2022-10-10 RX ADMIN — ENOXAPARIN SODIUM 40 MILLIGRAM(S): 100 INJECTION SUBCUTANEOUS at 12:07

## 2022-10-10 RX ADMIN — ONDANSETRON 8 MILLIGRAM(S): 8 TABLET, FILM COATED ORAL at 16:19

## 2022-10-10 RX ADMIN — Medication 400 MILLIGRAM(S): at 11:32

## 2022-10-10 NOTE — PROGRESS NOTE ADULT - SUBJECTIVE AND OBJECTIVE BOX
Medicine Progress Note    Patient is a 66y old  Female who presents with a chief complaint of metastatic breast CA to spine s/p  C7-T2 corpectomy & fusion, posterior C4-T5 fusion (10 Oct 2022 14:02)      SUBJECTIVE / OVERNIGHT EVENTS:  seen and examined  Chart reviewed  Limb weakness improving with therapy  BM+  No pain  No complaints  overnight . this am 130. RN reported patient has been refusing meds and diet time to time. refused metoprolol 12.5 this am then agreed to take later in am. denied feeling palpitation. occasional dizziness since she is in hospital.   reported BM 3-4 times today. stool  loose but not water. on C-collar. pain controlled with meds but she has been refusing PO meds time to time    ADDITIONAL REVIEW OF SYSTEMS:  no fever/chills/CP/sob/palpitation/dizziness/ abd pain/nausea/vomiting/constipation/headaches. all other ROS neg      MEDICATIONS  (STANDING):  Biotene Dry Mouth Oral Rinse 5 milliLiter(s) Swish and Spit three times a day  cyclobenzaprine 5 milliGRAM(s) Oral three times a day  enoxaparin Injectable 40 milliGRAM(s) SubCutaneous every 24 hours  magnesium oxide 400 milliGRAM(s) Oral three times a day with meals  metoprolol tartrate 12.5 milliGRAM(s) Oral two times a day  oxyCODONE  ER Tablet 20 milliGRAM(s) Oral every 12 hours  pregabalin 25 milliGRAM(s) Oral every 8 hours  saccharomyces boulardii 250 milliGRAM(s) Oral two times a day  sodium chloride 0.45%. 1000 milliLiter(s) (60 mL/Hr) IV Continuous <Continuous>    MEDICATIONS  (PRN):  acetaminophen    Suspension .. 650 milliGRAM(s) Oral every 6 hours PRN Mild Pain (1 - 3)  ALBUTerol    0.083% 2.5 milliGRAM(s) Nebulizer every 6 hours PRN Shortness of Breath and/or Wheezing  bisacodyl Suppository 10 milliGRAM(s) Rectal daily PRN Constipation  HYDROmorphone  Injectable 0.5 milliGRAM(s) IntraMuscular every 6 hours PRN Moderate Pain (4 - 6)  HYDROmorphone  Injectable 1 milliGRAM(s) IntraMuscular every 6 hours PRN Severe Pain (7 - 10)  ondansetron   Disintegrating Tablet 8 milliGRAM(s) Oral every 8 hours PRN Nausea and/or Vomiting  simethicone 80 milliGRAM(s) Chew every 6 hours PRN Gas    CAPILLARY BLOOD GLUCOSE        I&O's Summary    10 Oct 2022 07:01  -  10 Oct 2022 16:01  --------------------------------------------------------  IN: 600 mL / OUT: 0 mL / NET: 600 mL        PHYSICAL EXAM:  Vital Signs Last 24 Hrs  T(C): 36.6 (10 Oct 2022 07:30), Max: 36.7 (09 Oct 2022 21:06)  T(F): 97.9 (10 Oct 2022 07:30), Max: 98 (09 Oct 2022 21:06)  HR: 130 (10 Oct 2022 07:30) (103 - 137)  BP: 140/89 (10 Oct 2022 07:30) (132/87 - 147/77)  BP(mean): --  RR: 16 (10 Oct 2022 07:30) (16 - 16)  SpO2: 96% (10 Oct 2022 07:30) (94% - 96%)    Parameters below as of 10 Oct 2022 07:30  Patient On (Oxygen Delivery Method): room air    GENERAL: Not in distress. Alert    HEENT: clear conjuctiva, MMM dry no pallor or icterus. C-collar placed  CARDIOVASCULAR: RRR S1, S2. No murmur/rubs/gallop  LUNGS: BLAE+, no rales, no wheezing, no rhonchi.    ABDOMEN: ND. Soft,  NT, no guarding / rebound / rigidity. BS normoactive  BACK: No spine tenderness.  EXTREMITIES: no edema. no leg or calf TP.  SKIN: warm and dry/ slow skin turger  PSYCHIATRIC: Calm.  No agitation.  CNS: AAO83. moving limbs    LABS:                        10.6   11.16 )-----------( 658      ( 10 Oct 2022 06:27 )             31.8     10-10    137  |  98  |  9   ----------------------------<  115<H>  3.4<L>   |  21<L>  |  0.44<L>    Ca    10.6<H>      10 Oct 2022 06:27  Mg     1.4     10-10    TPro  5.5<L>  /  Alb  2.0<L>  /  TBili  0.3  /  DBili  x   /  AST  46<H>  /  ALT  14  /  AlkPhos  161<H>  10-09              COVID-19 PCR: NotDetec (07 Oct 2022 06:10)  COVID-19 PCR: NotDetec (30 Sep 2022 20:50)  COVID-19 PCR: NotDetec (27 Sep 2022 13:00)  COVID-19 PCR: NotDetec (20 Sep 2022 06:00)  COVID-19 PCR: NotDetec (17 Sep 2022 03:30)  SARS-CoV-2: NotDetec (10 Sep 2022 02:11)      RADIOLOGY & ADDITIONAL TESTS:    < from: TTE Echo Complete w/o Contrast w/ Doppler (10.09.22 @ 08:30) >  Summary:   1. The heart rate is tachycardic   110s BPM throughout the study.   2. Hyperdynamic global left ventricular systolic function.   3. Left ventricular ejection fraction, by visual estimation, is 70 to   75%.   4. Decreased left ventricular internal cavity size.   5. Increased LV wall thickness.   6. Mildly increased left ventricle intracavitary gradient which may be   due to hyperdynamic left ventricle systolic function.   7. The right ventricle is not well visualized, appears normal in size   with normal systolic function in parasternal long axis view.   8. The left atrium appears normal in size.   9. Mild tricuspid regurgitation.  10. No aortic valve stenosis.  11. Trivial pericardial effusion.  12. Subcostal window not well visualized.    [id number] Ashley Poon MD Electronically signed on   10/9/2022 at 11:56:13 AM    < end of copied text >    Imaging from Last 24 Hours:    Electrocardiogram/QTc Interval:    COORDINATION OF CARE:  Care Discussed with Consultants/Other Providers:

## 2022-10-10 NOTE — PROGRESS NOTE ADULT - SUBJECTIVE AND OBJECTIVE BOX
Patient is a 66y old  Female who presents with a chief complaint of metastatic breast CA to spine s/p  C7-T2 corpectomy & fusion, posterior C4-T5 fusion (09 Oct 2022 15:04)      HPI:  Thi is a 65 yo female with PMH of left arm skin CA s/p excision at age 16, stomach ulcer, her last mammogram was in , who was brought to [facility] ED 9/10/22 with progressive weakness to the point that she felt she could not get out of bed. She also reported remote shoulder pain, decreased appetite. Was found to have two lumps in her right breast but could not tolerate recommended biopsy due to pain. CT scan of the Cervical spine performed which showed Multiple lytic lesions of the cervical and visualized thoracic spine as well as right first and second ribs concerning for metastases versus multiple myeloma.    She was admitted under medicine service for further workup and management, including of her pain. Further imaging done showed Metastatic breast cancer with dominant right breast mass, right chest wall, axillary and retropectoral involvement; osseous, pulmonary, and likely hepatic metastatic disease. She was also found to have metastatic adenopathy of the thorax, diffuse extensive osseous metastatic disease with multiple lucent and sclerotic lesions and compression fractures of T1, T9, T12, and L2. Severe T1 compression with epidural extension; cord compression cannot be excluded. +additional neoplastic disease of the spine with epidural extension. Multifocal neoplastic lesions involving the pelvis and ribs as well.    Patient was seen by Oncology, CT surgery and Neurosurgery team. She underwent C7-T2 corpectomy & fusion, posterior C4-T5 fusion. Post-op course uncomplicated.  Hamm removed, pt requiring some straight cath for retention. She also had subjective dysphagia - seen by speech therapy recommended pureed diet which patient continues to prefer. Patient with low grade temp and positive UA. Started on IV Rocephin for UTI. Blood cultures negative, Urine culture positive for GNR, changed to PO Vantin for 7 days.    Patient was medically optimized for discharge to [facility] IRF on 22. (30 Sep 2022 14:32)      PAST MEDICAL & SURGICAL HISTORY:  Stomach ulcer  20 years ago   no pain not taking any meds      Skin cancer of arm, left  pt was 16 year old      H/O excision of mass  left arm  when patient was 16 years ago      S/P hysterectomy          MEDICATIONS  (STANDING):  Biotene Dry Mouth Oral Rinse 5 milliLiter(s) Swish and Spit three times a day  cyclobenzaprine 5 milliGRAM(s) Oral three times a day  enoxaparin Injectable 40 milliGRAM(s) SubCutaneous every 24 hours  magnesium oxide 400 milliGRAM(s) Oral three times a day with meals  metoprolol tartrate 12.5 milliGRAM(s) Oral two times a day  oxyCODONE  ER Tablet 20 milliGRAM(s) Oral every 12 hours  potassium chloride  10 mEq/100 mL IVPB 10 milliEquivalent(s) IV Intermittent every 1 hour  pregabalin 25 milliGRAM(s) Oral every 8 hours  saccharomyces boulardii 250 milliGRAM(s) Oral two times a day  sodium chloride 0.45%. 1000 milliLiter(s) (60 mL/Hr) IV Continuous <Continuous>    MEDICATIONS  (PRN):  acetaminophen    Suspension .. 650 milliGRAM(s) Oral every 6 hours PRN Mild Pain (1 - 3)  ALBUTerol    0.083% 2.5 milliGRAM(s) Nebulizer every 6 hours PRN Shortness of Breath and/or Wheezing  bisacodyl Suppository 10 milliGRAM(s) Rectal daily PRN Constipation  HYDROmorphone  Injectable 0.5 milliGRAM(s) IntraMuscular every 6 hours PRN Moderate Pain (4 - 6)  HYDROmorphone  Injectable 1 milliGRAM(s) IntraMuscular every 6 hours PRN Severe Pain (7 - 10)  ondansetron   Disintegrating Tablet 8 milliGRAM(s) Oral every 8 hours PRN Nausea and/or Vomiting  simethicone 80 milliGRAM(s) Chew every 6 hours PRN Gas      Allergies    tetanus toxoid (Hives)    Intolerances    lactose (Other)        VITALS  66y  Vital Signs Last 24 Hrs  T(C): 36.6 (10 Oct 2022 07:30), Max: 36.7 (09 Oct 2022 21:06)  T(F): 97.9 (10 Oct 2022 07:30), Max: 98 (09 Oct 2022 21:06)  HR: 130 (10 Oct 2022 07:30) (103 - 137)  BP: 140/89 (10 Oct 2022 07:30) (132/87 - 147/77)  BP(mean): --  RR: 16 (10 Oct 2022 07:30) (16 - 16)  SpO2: 96% (10 Oct 2022 07:30) (94% - 96%)    Parameters below as of 10 Oct 2022 07:30  Patient On (Oxygen Delivery Method): room air      Daily     Daily Weight in k.4 (10 Oct 2022 06:14)        RECENT LABS:                          10.6   11.16 )-----------( 658      ( 10 Oct 2022 06:27 )             31.8     1010    137  |  98  |  9   ----------------------------<  115<H>  3.4<L>   |  21<L>  |  0.44<L>    Ca    10.6<H>      10 Oct 2022 06:27  Mg     1.4     1010    TPro  5.5<L>  /  Alb  2.0<L>  /  TBili  0.3  /  DBili  x   /  AST  46<H>  /  ALT  14  /  AlkPhos  161<H>  10    LIVER FUNCTIONS - ( 09 Oct 2022 06:45 )  Alb: 2.0 g/dL / Pro: 5.5 g/dL / ALK PHOS: 161 U/L / ALT: 14 U/L / AST: 46 U/L / GGT: x                   CAPILLARY BLOOD GLUCOSE        Review of Systems:   	  · Additional ROS	Events of weekend noted, reviewed in huddle and with patient. Results of CTPA and ECHo also reviewed with patient    Patient reports difficulty in swallowing medications as well as solids, although she tolerates ginger ale and water. Tried to take oxycodone this morning, however reports it feeling getting stuck in throat and then spit it up. Requests IV or IM form of medication right now and has also been refusing metoprolol/other meds due to difficulty swallowing    Evaluated by SLP, no noted issues with liquids, tongue movement different for solids. Recommend MBS, possible cognitive effect, possible anxiety/behavior component as well    NSGY recs for staple removal discussed. Patietn prefers to defer to another day given other ongoing issues and discomfort    Physical Exam:   · Constitutional Comments	c collar in place. Mildly anxious, distressed. Anterior cervical incision with steri strips in place, C/d/I  	  · Respiratory	clear to auscultation bilaterally; no wheezes; no rales; no rhonchi  · Cardiovascular	regular rate and rhythm; S1 S2 present; no gallops; no rub; no murmur  · Gastrointestinal	soft; nondistended; normal active bowel sounds; no guarding; no rigidity  	  	  · Motor	bilateral calves soft, no TTP  no pedal edema  	  · Skin Comments	cervicothoracic posterior incision with dried xeroform +staples (2 remaining- most superiror and 1 inch lower), intact

## 2022-10-10 NOTE — PROGRESS NOTE ADULT - ASSESSMENT
65 yo female PMH left arm skin CA s/p excision, stomach ulcer who presented to [facility] 9/10/22 with progressive weakness, found to have multiple lytic lesions of the cervical and thoracic spine as well as right first and second ribs, lung, liver secondary to metastatic breast cancer with dominant right breast mass, right chest wall, axillary and retropectoral involvement. Compression fractures of T1, T9, T12, and L2. Severe T1 compression with epidural extension. She underwent C7-T2 corpectomy & fusion, posterior C4-T5 fusion    # Metastatic Breast CA to lung, liver, ribs, with spine involvement and neurogenic bladder  - path: invasive ductal carcinoma  - s/p C7-T2 corpectomy & fusion, posterior C4-T5 fusion (Dr Bennett)  - All staples but 2 removed 10/7. Discussed with NSGY, would like plastics eval for removal remaining two, will place consult. Patient requests not today due to other ongoing issues  - vendor: Cuauhtemoc from [facility]. LSO delivered 10/7  - Continue Comprehensive Rehab Program: PT/OT/ST, 3hours daily and 5 days weekly  - continue psychological supportive counseling. Consider psychiatry referral for anxiety, however patient denies and has been non compliant with even current medications due to concerns over SE/nausea/difficulty swallowing  - OP f/u with radiation oncology. Patient will not require chemo/radiation while in rehab   - Precautions: CA, fall, aspiration, spinal, cervical collar, LSO Brace/ confirmed with NSGY, may loosen C collar for MEALS ONLY    # Pain management  - Cyclobenzaprine 5 q8  - dc Gabapentin TID 600mg.--> trial lyrica 25 q8 to start 10/7   - Dilaudid 2mg Q4 hours prn for mod pain; 4mg Q4 hours prn for severe pain--> switched to 0.5 mg IM q6 PRn mod pain and 1 mg q6 PRN severe pain 10/10  - Oxycodone ER 20mg Q12 hours   - tylenol 1g IV x1 dose 10/10    # tachycardia  - denies SOB, palpitations, anxiety, CP  - ECHO 10/9: LB wall thickening, hyperdynamic LV with EF 70-75%  - CTPA 10/8; No pulmonary embolus. Interlobular septal thickening and bilateral groundglass opacities are new from prior exam and may represent edema, lymphangitic carcinomatosis and/or pneumonitis.Bilateral metastatic lung nodules measuring up to 9 mm within right lower lobe are unchanged. Trace bilateral pleural effusions and mild bibasilar atelectasis.Metastatic disease within the mediastinum and rohit as well as bone metastases are grossly unchanged. Reviewed with patient 10/10  - WBC 11.16 10/10. No fever, no symptoms. Bladder scan performed 8 ml this morning. Will order UA as patient reports "holding urine in" no dysuria, just completed vantin for UTI/  - TSH WNL  - O2 via NC 2lpm to keep sat >90% ordered  - low rate IVF due to reduced po intake  - patient encouraged to be compliant with metoprolol to lower heart rate, education performed, results of workup to date performed  - CBC and BMP 10/11    # enterobacter UTI  - s/p Vantin BID x 7 days    # Bowel Regimen  - Dulcolax supp PRN  - florastor  -  zofran 8 mg PRN    # FEN   - pureed solids and thins   - MBS  - supplemented 40 meq x 2 on 10/3 and 10/4  - K+ 4.1 10/6--> 3.4 10/10. Will replete K+ and Mg++ today with IVF, recheck in AM     # DVT ppx  - Lovenox  - SCD, TEDs    # Case discussed in IDT rounds 10/5  - set up/supervision eating and grooming, min assist UB dressing, total assist LB dressing, max assist toileting. not tolerating minced/moist diet, downgraded back to pureed solids for comfort, mild deficits in working memory  - goals: mod independent transfers, ambulation with RW, negotiate 6-8 steps with mod independent, min assist bathing/shower transfer  - caregiver training  - target dc home 10/19 with caregiver support and home PT, OT, SLP  - disability form completed 10/7      # LABS  plastics for staple removal  MBS  CBC BMP 10/11      CODE STATUS: FULL CODE     Outpatient Follow-up (Specialty/Name of physician):    Marj Del Castillo)  Hematology; Hospice Palliative Medicine; Internal Medicine; Medical Oncology  [address] [place] [address]  Phone: [phone]  Fax: [phone]  Follow Up Time: 2 weeks    Bret Ibarra)  Radiation Oncology  [address] [place] [address]  Phone: [phone]  Fax: [phone]  Follow Up Time: 2 weeks   65 yo female PMH left arm skin CA s/p excision, stomach ulcer who presented to [facility] 9/10/22 with progressive weakness, found to have multiple lytic lesions of the cervical and thoracic spine as well as right first and second ribs, lung, liver secondary to metastatic breast cancer with dominant right breast mass, right chest wall, axillary and retropectoral involvement. Compression fractures of T1, T9, T12, and L2. Severe T1 compression with epidural extension. She underwent C7-T2 corpectomy & fusion, posterior C4-T5 fusion    # Metastatic Breast CA to lung, liver, ribs, with spine involvement and neurogenic bladder  - path: invasive ductal carcinoma  - s/p C7-T2 corpectomy & fusion, posterior C4-T5 fusion (Dr Bennett)  - All staples but 2 removed 10/7. Discussed with NSGY, would like plastics eval for removal remaining two, will place consult. Patient requests not today due to other ongoing issues  - vendor: Cuauhtemoc from [facility]. LSO delivered 10/7  - Continue Comprehensive Rehab Program: PT/OT/ST, 3hours daily and 5 days weekly  - continue psychological supportive counseling. Consider psychiatry referral for anxiety, however patient denies and has been non compliant with even current medications due to concerns over SE/nausea/difficulty swallowing  - OP f/u with radiation oncology. Patient will not require chemo/radiation while in rehab   - Precautions: CA, fall, aspiration, spinal, cervical collar, LSO Brace/ confirmed with NSGY, may loosen C collar for MEALS ONLY    # Pain management  - Cyclobenzaprine 5 q8  - dc Gabapentin TID 600mg.--> trial lyrica 25 q8 to start 10/7   - Dilaudid 2mg Q4 hours prn for mod pain; 4mg Q4 hours prn for severe pain--> switched to 0.5 mg IM q6 PRn mod pain and 1 mg q6 PRN severe pain 10/10  - Oxycodone ER 20mg Q12 hours   - tylenol 1g IV x1 dose 10/10    # tachycardia  - denies SOB, palpitations, anxiety, CP  - ECHO 10/9: LB wall thickening, hyperdynamic LV with EF 70-75%  - CTPA 10/8; No pulmonary embolus. Interlobular septal thickening and bilateral groundglass opacities are new from prior exam and may represent edema, lymphangitic carcinomatosis and/or pneumonitis.Bilateral metastatic lung nodules measuring up to 9 mm within right lower lobe are unchanged. Trace bilateral pleural effusions and mild bibasilar atelectasis.Metastatic disease within the mediastinum and rohit as well as bone metastases are grossly unchanged. Reviewed with patient 10/10  - WBC 11.16 10/10. No fever, no symptoms. Bladder scan performed 8 ml this morning. Will order UA as patient reports "holding urine in" no dysuria, just completed vantin for UTI/  - TSH WNL  - O2 via NC 2lpm to keep sat >90% ordered  - low rate IVF due to reduced po intake  - patient encouraged to be compliant with metoprolol to lower heart rate, education performed, results of workup to date performed  - CBC and BMP 10/11    # enterobacter UTI  - s/p Vantin BID x 7 days    # Bowel Regimen  - Dulcolax supp PRN  - florastor  -  zofran 8 mg PRN    # FEN   - pureed solids and thins   - MBS  - supplemented 40 meq x 2 on 10/3 and 10/4  - K+ 4.1 10/6--> 3.4 10/10. Will replete K+ and Mg++ today with IVF, recheck in AM     # DVT ppx  - Lovenox  - SCD, TEDs    # Case discussed in IDT rounds 10/5  - set up/supervision eating and grooming, min assist UB dressing, total assist LB dressing, max assist toileting. not tolerating minced/moist diet, downgraded back to pureed solids for comfort, mild deficits in working memory  - goals: mod independent transfers, ambulation with RW, negotiate 6-8 steps with mod independent, min assist bathing/shower transfer  - caregiver training  - target dc home 10/19 with caregiver support and home PT, OT, SLP  - disability form completed 10/7      # LABS  plastics for staple removal  MBS  CBC BMP 10/11    addendum 5: 02 PM: HR still elevated 130s, discussed with hosptialist. Will give 25 mg metoprolol and increase dose to 25 bid. If persists, will consider 50 mg dosing.      CODE STATUS: FULL CODE     Outpatient Follow-up (Specialty/Name of physician):    Marj Del Castillo)  Hematology; Hospice Palliative Medicine; Internal Medicine; Medical Oncology  [address] [place] [address]  Phone: [phone]  Fax: [phone]  Follow Up Time: 2 weeks    Bret Ibarra)  Radiation Oncology  [address] [place] [address]  Phone: [phone]  Fax: [phone]  Follow Up Time: 2 weeks

## 2022-10-10 NOTE — PROGRESS NOTE ADULT - ASSESSMENT
65 yo female PMH stomach ulcer who presented to [facility] 9/10/22 with progressive weakness, discovered multiple lytic lesions of the cervical and thoracic spine along with lung and liver secondary to metastatic primary newly discovered breast cancer with axillary and retropectoral involvement. Imaging also showed Compression fractures of T1, T9, T12, and L2. Severe T1 compression with epidural extension.S/p C7-T2 corpectomy & fusion, posterior C4-T5 fusion, hospital course significant for urinary retention and dysphagia, UTI    #Acute hypoxic respiratory failure in need of supplemental oxygenation likely multifactorial including:  *Metastatic disease within mediastinum and rohit  *Metastatic disease within ribs  *Bilateral lung nodules RLL  *Bilateral GGO  *Bilateral atelectasis  -PE ruled out with neg CTA  -C/w alb neb treatement q 6hrs prn  -Incentive spirometer, OOB to chair, mobilize  -Might need oxygen due to increase in cancer burden in lungs  -TTE : EF 70-75%. tachy, hyperdynamic LVF  -Appreciate pulmonary recommendations    *Sinus tachycardia: likley multifactorial: dehydration, pain, anxiety  *New onset HTN  - normal TSH  - noted dehydration  - on metoprolol 12.5mg q 12hrs; per RN HR now after metoprolol dose in am 130s.   - EKG sinus tach @130 bpm  - increase metoprolol to 25 mg BID with holding. increase 50 mg BID in am if HR not target of <100 bpm  - monitor SBP and orthostasis on metoprolol  - hydrate with IVF. ordered 1L 1/2NS.  - encouraged PO hydration  - control pain. s/p tylenol IV  - consider psych eval to control anxiety      *Enterobacter cloacae UTI  - s/p Vantin BID for 7 days  - monitor bladder scans  - SC for PVR >350 ml, toileting schedule    *Diarrhea  *hypokalemia  # hypomg  - mg 1.4. s/p IV mg now on PO. may need to dc if diarrhoea wrosens  - no laxatives  - send stool w/u if diarrhoea persist.  - IVF 1L ordered. need to continue IVF if diarrhoea persist  - repeat BMP, mg in am    # Hypercalcemia  - likely related to bone mets  - IVF  - repeat BMP and albumin ordered    *Normocytic anemia  - Monitor Hg/Hct, iron levels, B12, folate WNL  - Transfuse if hG <7    *Functional and gait instability:  *Left groin/back/hip pain  - new onset, defer pain regimen to PMR; possibly related to metastatic disease found in bones  - Patient has been refusing gabapentin; will start pregabalin  - Recommended lower dose of dilaudid with zofran/reglan to control nausea  - monitor neurological exam closely  - CTAP as above    *Metastatic Breast CA to lung, liver, ribs, brain with spine involvement and neurogenic bladder  - s/p right breast biopsy shows  positive for invasive ductal carcinoma  - Compression fractures of T1, T9, T12, and L2. Severe T1 compression with epidural extension.   - s/p C7-T2 corpectomy & fusion, posterior C4-T5 fusion  - Patient will not require chemo/radiation while in rehab. 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  Hypoalbuminemia  - nutrition eval noted    DVT ppx: Lovenox

## 2022-10-11 DIAGNOSIS — K51.00 ULCERATIVE (CHRONIC) PANCOLITIS WITHOUT COMPLICATIONS: ICD-10-CM

## 2022-10-11 LAB
ALBUMIN SERPL ELPH-MCNC: 1.9 G/DL — LOW (ref 3.3–5)
ALP SERPL-CCNC: 148 U/L — HIGH (ref 40–120)
ALT FLD-CCNC: 22 U/L — SIGNIFICANT CHANGE UP (ref 10–45)
ANION GAP SERPL CALC-SCNC: 19 MMOL/L — HIGH (ref 5–17)
AST SERPL-CCNC: 40 U/L — SIGNIFICANT CHANGE UP (ref 10–40)
BILIRUB SERPL-MCNC: 0.5 MG/DL — SIGNIFICANT CHANGE UP (ref 0.2–1.2)
BUN SERPL-MCNC: 11 MG/DL — SIGNIFICANT CHANGE UP (ref 7–23)
CALCIUM SERPL-MCNC: 9.8 MG/DL — SIGNIFICANT CHANGE UP (ref 8.4–10.5)
CHLORIDE SERPL-SCNC: 96 MMOL/L — SIGNIFICANT CHANGE UP (ref 96–108)
CO2 SERPL-SCNC: 16 MMOL/L — LOW (ref 22–31)
CREAT SERPL-MCNC: 0.39 MG/DL — LOW (ref 0.5–1.3)
EGFR: 110 ML/MIN/1.73M2 — SIGNIFICANT CHANGE UP
GLUCOSE SERPL-MCNC: 96 MG/DL — SIGNIFICANT CHANGE UP (ref 70–99)
HCT VFR BLD CALC: 29.2 % — LOW (ref 34.5–45)
HGB BLD-MCNC: 10.1 G/DL — LOW (ref 11.5–15.5)
MAGNESIUM SERPL-MCNC: 1.6 MG/DL — SIGNIFICANT CHANGE UP (ref 1.6–2.6)
MCHC RBC-ENTMCNC: 29.7 PG — SIGNIFICANT CHANGE UP (ref 27–34)
MCHC RBC-ENTMCNC: 34.6 GM/DL — SIGNIFICANT CHANGE UP (ref 32–36)
MCV RBC AUTO: 85.9 FL — SIGNIFICANT CHANGE UP (ref 80–100)
NRBC # BLD: 1 /100 WBCS — HIGH (ref 0–0)
PLATELET # BLD AUTO: 599 K/UL — HIGH (ref 150–400)
POTASSIUM SERPL-MCNC: 3.1 MMOL/L — LOW (ref 3.5–5.3)
POTASSIUM SERPL-SCNC: 3.1 MMOL/L — LOW (ref 3.5–5.3)
PROT SERPL-MCNC: 5.2 G/DL — LOW (ref 6–8.3)
RBC # BLD: 3.4 M/UL — LOW (ref 3.8–5.2)
RBC # FLD: 13.9 % — SIGNIFICANT CHANGE UP (ref 10.3–14.5)
SODIUM SERPL-SCNC: 131 MMOL/L — LOW (ref 135–145)
WBC # BLD: 16.97 K/UL — HIGH (ref 3.8–10.5)
WBC # FLD AUTO: 16.97 K/UL — HIGH (ref 3.8–10.5)

## 2022-10-11 PROCEDURE — 71045 X-RAY EXAM CHEST 1 VIEW: CPT | Mod: 26

## 2022-10-11 PROCEDURE — 74176 CT ABD & PELVIS W/O CONTRAST: CPT | Mod: 26

## 2022-10-11 PROCEDURE — 90832 PSYTX W PT 30 MINUTES: CPT

## 2022-10-11 PROCEDURE — 99223 1ST HOSP IP/OBS HIGH 75: CPT

## 2022-10-11 PROCEDURE — 99233 SBSQ HOSP IP/OBS HIGH 50: CPT

## 2022-10-11 PROCEDURE — 72127 CT NECK SPINE W/O & W/DYE: CPT | Mod: 26

## 2022-10-11 PROCEDURE — 72129 CT CHEST SPINE W/DYE: CPT | Mod: 26

## 2022-10-11 RX ORDER — SODIUM CHLORIDE 9 MG/ML
1000 INJECTION, SOLUTION INTRAVENOUS
Refills: 0 | Status: DISCONTINUED | OUTPATIENT
Start: 2022-10-11 | End: 2022-10-12

## 2022-10-11 RX ORDER — SODIUM CHLORIDE 9 MG/ML
1000 INJECTION, SOLUTION INTRAVENOUS
Refills: 0 | Status: DISCONTINUED | OUTPATIENT
Start: 2022-10-11 | End: 2022-10-11

## 2022-10-11 RX ORDER — VANCOMYCIN HCL 1 G
125 VIAL (EA) INTRAVENOUS EVERY 6 HOURS
Refills: 0 | Status: DISCONTINUED | OUTPATIENT
Start: 2022-10-11 | End: 2022-10-27

## 2022-10-11 RX ORDER — SODIUM CHLORIDE 9 MG/ML
1000 INJECTION, SOLUTION INTRAVENOUS
Refills: 0 | Status: COMPLETED | OUTPATIENT
Start: 2022-10-11 | End: 2022-10-11

## 2022-10-11 RX ORDER — ONDANSETRON 8 MG/1
8 TABLET, FILM COATED ORAL EVERY 6 HOURS
Refills: 0 | Status: DISCONTINUED | OUTPATIENT
Start: 2022-10-11 | End: 2022-10-18

## 2022-10-11 RX ORDER — POTASSIUM CHLORIDE 20 MEQ
10 PACKET (EA) ORAL
Refills: 0 | Status: COMPLETED | OUTPATIENT
Start: 2022-10-11 | End: 2022-10-11

## 2022-10-11 RX ORDER — PANTOPRAZOLE SODIUM 20 MG/1
40 TABLET, DELAYED RELEASE ORAL DAILY
Refills: 0 | Status: DISCONTINUED | OUTPATIENT
Start: 2022-10-11 | End: 2022-10-18

## 2022-10-11 RX ADMIN — Medication 100 MILLIEQUIVALENT(S): at 12:01

## 2022-10-11 RX ADMIN — Medication 125 MILLIGRAM(S): at 21:18

## 2022-10-11 RX ADMIN — Medication 25 MILLIGRAM(S): at 18:36

## 2022-10-11 RX ADMIN — HYDROMORPHONE HYDROCHLORIDE 1 MILLIGRAM(S): 2 INJECTION INTRAMUSCULAR; INTRAVENOUS; SUBCUTANEOUS at 21:52

## 2022-10-11 RX ADMIN — SODIUM CHLORIDE 60 MILLILITER(S): 9 INJECTION, SOLUTION INTRAVENOUS at 04:15

## 2022-10-11 RX ADMIN — HYDROMORPHONE HYDROCHLORIDE 1 MILLIGRAM(S): 2 INJECTION INTRAMUSCULAR; INTRAVENOUS; SUBCUTANEOUS at 10:51

## 2022-10-11 RX ADMIN — HYDROMORPHONE HYDROCHLORIDE 1 MILLIGRAM(S): 2 INJECTION INTRAMUSCULAR; INTRAVENOUS; SUBCUTANEOUS at 22:52

## 2022-10-11 RX ADMIN — Medication 125 MILLIGRAM(S): at 14:27

## 2022-10-11 RX ADMIN — Medication 100 MILLIEQUIVALENT(S): at 14:28

## 2022-10-11 RX ADMIN — SODIUM CHLORIDE 100 MILLILITER(S): 9 INJECTION, SOLUTION INTRAVENOUS at 16:08

## 2022-10-11 RX ADMIN — PANTOPRAZOLE SODIUM 40 MILLIGRAM(S): 20 TABLET, DELAYED RELEASE ORAL at 16:09

## 2022-10-11 RX ADMIN — SODIUM CHLORIDE 75 MILLILITER(S): 9 INJECTION, SOLUTION INTRAVENOUS at 10:29

## 2022-10-11 RX ADMIN — ONDANSETRON 8 MILLIGRAM(S): 8 TABLET, FILM COATED ORAL at 18:53

## 2022-10-11 RX ADMIN — Medication 100 MILLIEQUIVALENT(S): at 10:29

## 2022-10-11 RX ADMIN — HYDROMORPHONE HYDROCHLORIDE 1 MILLIGRAM(S): 2 INJECTION INTRAMUSCULAR; INTRAVENOUS; SUBCUTANEOUS at 09:52

## 2022-10-11 RX ADMIN — SODIUM CHLORIDE 60 MILLILITER(S): 9 INJECTION, SOLUTION INTRAVENOUS at 08:58

## 2022-10-11 NOTE — PROGRESS NOTE ADULT - ASSESSMENT
67 yo female PMH stomach ulcer who presented to [facility] 9/10/22 with progressive weakness, discovered multiple lytic lesions of the cervical and thoracic spine along with lung and liver secondary to metastatic primary newly discovered breast cancer with axillary and retropectoral involvement. Imaging also showed Compression fractures of T1, T9, T12, and L2. Severe T1 compression with epidural extension.S/p C7-T2 corpectomy & fusion, posterior C4-T5 fusion, hospital course significant for urinary retention and dysphagia, UTI, now admitted for rehab- pt/ot/dvt ppx    #Acute hypoxic respiratory failure in need of supplemental oxygenation likely multifactorial including:  *Metastatic disease within mediastinum and rohit  *Metastatic disease within ribs  *Bilateral lung nodules RLL  *Bilateral GGO  *Bilateral atelectasis  -PE ruled out with neg CTA  -C/w alb neb treatement q 6hrs prn  -Incentive spirometer, OOB to chair, mobilize  -Might need oxygen due to increase in cancer burden in lungs  -TTE : EF 70-75%. tachy, hyperdynamic LVF  -Appreciate pulmonary recommendations    *Metastatic Breast CA to lung, liver, ribs, brain with spine involvement and neurogenic bladder  - s/p right breast biopsy shows  positive for invasive ductal carcinoma  - Compression fractures of T1, T9, T12, and L2. Severe T1 compression with epidural extension.   - s/p C7-T2 corpectomy & fusion, posterior C4-T5 fusion  - Patient will not require chemo/radiation while in rehab. 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  - oncology consult called today      *n/v/d/ LEUKOCYTOSIS/ hypokalemia / hypernatremia-   likely due to colitis, high suspicion for c diff  po vanco STARTED TODAY pending c diff results.  iv fluids  liquid diet for now  zofran prn  ppi  ID recc noted  BC X 2 SENT  recheck ua  am labs  replete K   ct c and t spine - results noted, rehab team will reach out to neuro team, for any new reccs  GI consulted today  reassess patient in am      * HTN  - metoprolol   - EKG sinus tach- likely due to colitis  - cardio recc noted  - encouraged PO hydration      *Enterobacter cloacae UTI  - s/p Vantin BID for 7 days 9/30-10/7/22  - monitor bladder scans  - SC for PVR >350 ml, toileting schedule    * Hypercalcemia- improved with fluids  - likely related to bone mets  - c/w IVF  - repeat BMP and albumin ordered    *Normocytic anemia  - Monitor Hg/Hct, iron levels, B12, folate WNL  - Transfuse if hG <7      Severe protein-calorie malnutrition.  Hypoalbuminemia  - nutrition eval noted    DVT ppx: Lovenox      will follow  d/w dr. boggs  67 yo female PMH stomach ulcer who presented to [facility] 9/10/22 with progressive weakness, discovered multiple lytic lesions of the cervical and thoracic spine along with lung and liver secondary to metastatic primary newly discovered breast cancer with axillary and retropectoral involvement. Imaging also showed Compression fractures of T1, T9, T12, and L2. Severe T1 compression with epidural extension.S/p C7-T2 corpectomy & fusion, posterior C4-T5 fusion, hospital course significant for urinary retention and dysphagia, UTI, now admitted for rehab- pt/ot/dvt ppx    #Acute hypoxic respiratory failure in need of supplemental oxygenation likely multifactorial including:  *Metastatic disease within mediastinum and rohit  *Metastatic disease within ribs  *Bilateral lung nodules RLL  *Bilateral GGO  *Bilateral atelectasis  -PE ruled out with neg CTA  -C/w alb neb treatement q 6hrs prn  -Incentive spirometer, OOB to chair, mobilize  -Might need oxygen due to increase in cancer burden in lungs  -TTE : EF 70-75%. tachy, hyperdynamic LVF  -Appreciate pulmonary recommendations    *Metastatic Breast CA to lung, liver, ribs, brain with spine involvement and neurogenic bladder  - s/p right breast biopsy shows  positive for invasive ductal carcinoma  - Compression fractures of T1, T9, T12, and L2. Severe T1 compression with epidural extension.   - s/p C7-T2 corpectomy & fusion, posterior C4-T5 fusion  - Patient will not require chemo/radiation while in rehab. 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  iv fluids  liquid diet for now  zofran prn  ppi  ID recc noted  BC X 2 SENT  recheck ua  am labs  replete K   ct c and t spine - results noted, rehab team will reach out to neuro team, for any new reccs  GI consulted today  reassess patient in am      * HTN  - metoprolol   - EKG sinus tach- likely due to colitis  - cardio recc noted  - encouraged PO hydration      *Enterobacter cloacae UTI  - s/p Vantin BID for 7 days 9/30-10/7/22  - monitor bladder scans  - SC for PVR >350 ml, toileting schedule    * Hypercalcemia- improved with fluids  - likely related to bone mets  - c/w IVF  - repeat BMP and albumin ordered    *Normocytic anemia  - Monitor Hg/Hct, iron levels, B12, folate WNL  - Transfuse if hG <7      Severe protein-calorie malnutrition.  Hypoalbuminemia  - nutrition eval noted    DVT ppx: Lovenox      will follow  d/w dr. boggs

## 2022-10-11 NOTE — CONSULT NOTE ADULT - SUBJECTIVE AND OBJECTIVE BOX
INTERVAL HPI/OVERNIGHT EVENTS:  HPI:  Neha is a 67 yo female with PMH of left arm skin CA s/p excision at age 16, stomach ulcer, her last mammogram was in , who was brought to [facility] ED 9/10/22 with progressive weakness to the point that she felt she could not get out of bed. She also reported remote shoulder pain, decreased appetite. Was found to have two lumps in her right breast but could not tolerate recommended biopsy due to pain. CT scan of the Cervical spine performed which showed Multiple lytic lesions of the cervical and visualized thoracic spine as well as right first and second ribs concerning for metastases versus multiple myeloma.    She was admitted under medicine service for further workup and management, including of her pain. Further imaging done showed Metastatic breast cancer with dominant right breast mass, right chest wall, axillary and retropectoral involvement; osseous, pulmonary, and likely hepatic metastatic disease. She was also found to have metastatic adenopathy of the thorax, diffuse extensive osseous metastatic disease with multiple lucent and sclerotic lesions and compression fractures of T1, T9, T12, and L2. Severe T1 compression with epidural extension; cord compression cannot be excluded. +additional neoplastic disease of the spine with epidural extension. Multifocal neoplastic lesions involving the pelvis and ribs as well.    Patient was seen by Oncology, CT surgery and Neurosurgery team. She underwent C7-T2 corpectomy & fusion, posterior C4-T5 fusion. Post-op course uncomplicated.  Hamm removed, pt requiring some straight cath for retention. She also had subjective dysphagia - seen by speech therapy recommended pureed diet which patient continues to prefer. Patient with low grade temp and positive UA. Started on IV Rocephin for UTI. Blood cultures negative, Urine culture positive for GNR, changed to PO Vantin for 7 days.    Patient was medically optimized for discharge to [facility] IRF on 22. (30 Sep 2022 14:32)    GI consulted to see patient due to persistent diarrhea and pancolitis on CT imaging. Patient seen and examined at bedside. She notes diarrhea began  about 6 days ago, subsided slightly and then go worse. She is able to tolerate some liquids. Minimal nausea. She reports some abdominal pain. No BRBPR or melena noted.    MEDICATIONS  (STANDING):  Biotene Dry Mouth Oral Rinse 5 milliLiter(s) Swish and Spit three times a day  cyclobenzaprine 5 milliGRAM(s) Oral three times a day  dextrose 5% + sodium chloride 0.45%. 1000 milliLiter(s) (100 mL/Hr) IV Continuous <Continuous>  enoxaparin Injectable 40 milliGRAM(s) SubCutaneous every 24 hours  magnesium oxide 400 milliGRAM(s) Oral three times a day with meals  metoprolol tartrate 25 milliGRAM(s) Oral two times a day  oxyCODONE  ER Tablet 20 milliGRAM(s) Oral every 12 hours  pantoprazole  Injectable 40 milliGRAM(s) IV Push daily  pregabalin 25 milliGRAM(s) Oral every 8 hours  saccharomyces boulardii 250 milliGRAM(s) Oral two times a day  vancomycin    Solution 125 milliGRAM(s) Oral every 6 hours    MEDICATIONS  (PRN):  acetaminophen    Suspension .. 650 milliGRAM(s) Oral every 6 hours PRN Mild Pain (1 - 3)  ALBUTerol    0.083% 2.5 milliGRAM(s) Nebulizer every 6 hours PRN Shortness of Breath and/or Wheezing  bisacodyl Suppository 10 milliGRAM(s) Rectal daily PRN Constipation  HYDROmorphone  Injectable 0.5 milliGRAM(s) IntraMuscular every 6 hours PRN Moderate Pain (4 - 6)  HYDROmorphone  Injectable 1 milliGRAM(s) IntraMuscular every 6 hours PRN Severe Pain (7 - 10)  ondansetron Injectable 8 milliGRAM(s) IV Push every 6 hours PRN Nausea and/or Vomiting  simethicone 80 milliGRAM(s) Chew every 6 hours PRN Gas      Allergies    tetanus toxoid (Hives)    Intolerances    lactose (Other)      PAST MEDICAL & SURGICAL HISTORY:  Stomach ulcer  20 years ago   no pain not taking any meds      Skin cancer of arm, left  pt was 16 year old      H/O excision of mass  left arm  when patient was 16 years ago      S/P hysterectomy          REVIEW OF SYSTEMS: negative unless indicated in HPI    non smoker  no etoh abuse     PHYSICAL EXAM:   Vital Signs:  Vital Signs Last 24 Hrs  T(C): 36.8 (11 Oct 2022 08:30), Max: 36.8 (11 Oct 2022 08:30)  T(F): 98.2 (11 Oct 2022 08:30), Max: 98.2 (11 Oct 2022 08:30)  HR: 125 (11 Oct 2022 08:30) (125 - 133)  BP: 135/87 (11 Oct 2022 08:30) (135/87 - 140/80)  BP(mean): --  RR: 16 (11 Oct 2022 08:30) (16 - 16)  SpO2: 95% (11 Oct 2022 08:30) (95% - 98%)    Parameters below as of 11 Oct 2022 08:30  Patient On (Oxygen Delivery Method): room air      Daily     Daily Weight in k.4 (11 Oct 2022 05:33)I&O's Summary    10 Oct 2022 07:01  -  11 Oct 2022 07:00  --------------------------------------------------------  IN: 600 mL / OUT: 0 mL / NET: 600 mL        GENERAL:  Appears stated age,   HEENT:  NC/AT,  conjunctivae clear and pink, no thyromegaly, neck collar in place  CHEST:  Full & symmetric excursion, no increased effort, breath sounds clear  HEART:  Regular rhythm, S1, S2, no murmur  ABDOMEN:  Soft, non-tender, non-distended, normoactive bowel sounds  EXTEREMITIES:  no edema  SKIN:  No rash/warm/dry  NEURO:  Alert, oriented,     LABS:                        10.1   16.97 )-----------( 599      ( 11 Oct 2022 06:00 )             29.2     10-11    131<L>  |  96  |  11  ----------------------------<  96  3.1<L>   |  16<L>  |  0.39<L>    Ca    9.8      11 Oct 2022 06:00  Mg     1.6     10-11    TPro  5.2<L>  /  Alb  1.9<L>  /  TBili  0.5  /  DBili  x   /  AST  40  /  ALT  22  /  AlkPhos  148<H>  10-11        amylase   lipase  RADIOLOGY & ADDITIONAL TESTS:

## 2022-10-11 NOTE — CONSULT NOTE ADULT - ASSESSMENT
GI consulted to see patient due to persistent diarrhea and pancolitis on CT imaging. Patient seen and examined at bedside. She notes diarrhea began  about 6 days ago, subsided slightly and then got worse. She is able to tolerate some liquids. Minimal nausea. She reports some abdominal pain.     WBC elevated    CT abdomen shows Pancolitis, correlate clinically for inflammatory/infectious etiology,   including pseudomembranous colitis.    Partially visualized right breast mass.    Pulmonary and hepatic metastatic disease as noted on prior exam.    Permeative, osteolytic metastatic disease, including L2 compression   fracture with possible epidural involvement.  This can be further characterized by MRI as clinically indicated.    Other findings as discussed above.

## 2022-10-11 NOTE — PROGRESS NOTE ADULT - SUBJECTIVE AND OBJECTIVE BOX
Pt was seen for 20 min. Pt c/o fatigue, anxiety, pain. Pt reported not feeling today and in the past 5 days. She reported having diarrhea, due to C. Diff, and nausea. She expressed concern about not being able to eat even soft food and eventually asking to have liquid food instead due to difficulty swallowing. She acknowledged declining oral medications for the same reason. Pt expressed also concern about being unable to participate in her txs, and feeling bad partly for feeling that she has been pushed to do tx when she has not been feeling well and partly for not having stood for herself more affirmatively to let others in the team know. Pt admitted that she also felt bad about having her expectations of getting better uneventfully not being met, and wondered if she was in the right place due to her difficulty to participate. She reported having pain in her lower abdomen, which she thinks is due to her various problems at this time. She has not been sleeping well, her appetite is minimal, and does not feel she has the energy to do much. She is aware of having IV hydration today and hopes it will help her to feel better. Pt was unable to report any other concerns or issues at this time, finding herself at times unable to put her thought and feelings into words. Support and encouragement were provided.

## 2022-10-11 NOTE — PROGRESS NOTE ADULT - SUBJECTIVE AND OBJECTIVE BOX
Patient is a 66y old  Female who presents with a chief complaint of metastatic breast CA to spine s/p  C7-T2 corpectomy & fusion, posterior C4-T5 fusion (10 Oct 2022 16:01)      HPI:  Thi is a 67 yo female with PMH of left arm skin CA s/p excision at age 16, stomach ulcer, her last mammogram was in , who was brought to [facility] ED 9/10/22 with progressive weakness to the point that she felt she could not get out of bed. She also reported remote shoulder pain, decreased appetite. Was found to have two lumps in her right breast but could not tolerate recommended biopsy due to pain. CT scan of the Cervical spine performed which showed Multiple lytic lesions of the cervical and visualized thoracic spine as well as right first and second ribs concerning for metastases versus multiple myeloma.    She was admitted under medicine service for further workup and management, including of her pain. Further imaging done showed Metastatic breast cancer with dominant right breast mass, right chest wall, axillary and retropectoral involvement; osseous, pulmonary, and likely hepatic metastatic disease. She was also found to have metastatic adenopathy of the thorax, diffuse extensive osseous metastatic disease with multiple lucent and sclerotic lesions and compression fractures of T1, T9, T12, and L2. Severe T1 compression with epidural extension; cord compression cannot be excluded. +additional neoplastic disease of the spine with epidural extension. Multifocal neoplastic lesions involving the pelvis and ribs as well.    Patient was seen by Oncology, CT surgery and Neurosurgery team. She underwent C7-T2 corpectomy & fusion, posterior C4-T5 fusion. Post-op course uncomplicated.  Hamm removed, pt requiring some straight cath for retention. She also had subjective dysphagia - seen by speech therapy recommended pureed diet which patient continues to prefer. Patient with low grade temp and positive UA. Started on IV Rocephin for UTI. Blood cultures negative, Urine culture positive for GNR, changed to PO Vantin for 7 days.    Patient was medically optimized for discharge to [facility] IRF on 22. (30 Sep 2022 14:32)      PAST MEDICAL & SURGICAL HISTORY:  Stomach ulcer  20 years ago   no pain not taking any meds      Skin cancer of arm, left  pt was 16 year old      H/O excision of mass  left arm  when patient was 16 years ago      S/P hysterectomy          MEDICATIONS  (STANDING):  Biotene Dry Mouth Oral Rinse 5 milliLiter(s) Swish and Spit three times a day  cyclobenzaprine 5 milliGRAM(s) Oral three times a day  dextrose 5% + sodium chloride 0.45%. 1000 milliLiter(s) (75 mL/Hr) IV Continuous <Continuous>  enoxaparin Injectable 40 milliGRAM(s) SubCutaneous every 24 hours  magnesium oxide 400 milliGRAM(s) Oral three times a day with meals  metoprolol tartrate 25 milliGRAM(s) Oral two times a day  oxyCODONE  ER Tablet 20 milliGRAM(s) Oral every 12 hours  potassium chloride  10 mEq/100 mL IVPB 10 milliEquivalent(s) IV Intermittent every 1 hour  pregabalin 25 milliGRAM(s) Oral every 8 hours  saccharomyces boulardii 250 milliGRAM(s) Oral two times a day    MEDICATIONS  (PRN):  acetaminophen    Suspension .. 650 milliGRAM(s) Oral every 6 hours PRN Mild Pain (1 - 3)  ALBUTerol    0.083% 2.5 milliGRAM(s) Nebulizer every 6 hours PRN Shortness of Breath and/or Wheezing  bisacodyl Suppository 10 milliGRAM(s) Rectal daily PRN Constipation  HYDROmorphone  Injectable 0.5 milliGRAM(s) IntraMuscular every 6 hours PRN Moderate Pain (4 - 6)  HYDROmorphone  Injectable 1 milliGRAM(s) IntraMuscular every 6 hours PRN Severe Pain (7 - 10)  ondansetron   Disintegrating Tablet 8 milliGRAM(s) Oral every 8 hours PRN Nausea and/or Vomiting  simethicone 80 milliGRAM(s) Chew every 6 hours PRN Gas      Allergies    tetanus toxoid (Hives)    Intolerances    lactose (Other)        VITALS  66y  Vital Signs Last 24 Hrs  T(C): 36.8 (11 Oct 2022 08:30), Max: 36.8 (11 Oct 2022 08:30)  T(F): 98.2 (11 Oct 2022 08:30), Max: 98.2 (11 Oct 2022 08:30)  HR: 125 (11 Oct 2022 08:30) (115 - 133)  BP: 135/87 (11 Oct 2022 08:30) (135/87 - 140/80)  BP(mean): --  RR: 16 (11 Oct 2022 08:30) (16 - 16)  SpO2: 95% (11 Oct 2022 08:30) (95% - 98%)    Parameters below as of 11 Oct 2022 08:30  Patient On (Oxygen Delivery Method): room air      Daily     Daily Weight in k.4 (11 Oct 2022 05:33)        RECENT LABS:                          10.1   16.97 )-----------( 599      ( 11 Oct 2022 06:00 )             29.2     10-11    131<L>  |  96  |  11  ----------------------------<  96  3.1<L>   |  16<L>  |  0.39<L>    Ca    9.8      11 Oct 2022 06:00  Mg     1.6     10-11    TPro  5.2<L>  /  Alb  1.9<L>  /  TBili  0.5  /  DBili  x   /  AST  40  /  ALT  22  /  AlkPhos  148<H>  10-11    LIVER FUNCTIONS - ( 11 Oct 2022 06:00 )  Alb: 1.9 g/dL / Pro: 5.2 g/dL / ALK PHOS: 148 U/L / ALT: 22 U/L / AST: 40 U/L / GGT: x                   CAPILLARY BLOOD GLUCOSE      Review of Systems:   	  · Additional ROS	Events of weekend noted, reviewed in huddle and with patient. Results of CTPA and ECHo also reviewed with patient    Patient reports difficulty in swallowing medications as well as solids, although she tolerates ginger ale and water. Tried to take oxycodone this morning, however reports it feeling getting stuck in throat and then spit it up. Requests IV or IM form of medication right now and has also been refusing metoprolol/other meds due to difficulty swallowing    Evaluated by SLP, no noted issues with liquids, tongue movement different for solids. Recommend MBS, possible cognitive effect, possible anxiety/behavior component as well    NSGY recs for staple removal discussed. Patietn prefers to defer to another day given other ongoing issues and discomfort    Physical Exam:   · Constitutional Comments	c collar in place. Mildly anxious, distressed. Anterior cervical incision with steri strips in place, C/d/I  	  · Respiratory	clear to auscultation bilaterally; no wheezes; no rales; no rhonchi  · Cardiovascular	regular rate and rhythm; S1 S2 present; no gallops; no rub; no murmur  · Gastrointestinal	soft; nondistended; normal active bowel sounds; no guarding; no rigidity  	  	  · Motor	bilateral calves soft, no TTP  no pedal edema  	  · Skin Comments	cervicothoracic posterior incision with dried xeroform +staples (2 remaining- most superiror and 1 inch lower), intact               Patient is a 66y old  Female who presents with a chief complaint of metastatic breast CA to spine s/p  C7-T2 corpectomy & fusion, posterior C4-T5 fusion (10 Oct 2022 16:01)      HPI:  Thi is a 65 yo female with PMH of left arm skin CA s/p excision at age 16, stomach ulcer, her last mammogram was in , who was brought to [facility] ED 9/10/22 with progressive weakness to the point that she felt she could not get out of bed. She also reported remote shoulder pain, decreased appetite. Was found to have two lumps in her right breast but could not tolerate recommended biopsy due to pain. CT scan of the Cervical spine performed which showed Multiple lytic lesions of the cervical and visualized thoracic spine as well as right first and second ribs concerning for metastases versus multiple myeloma.    She was admitted under medicine service for further workup and management, including of her pain. Further imaging done showed Metastatic breast cancer with dominant right breast mass, right chest wall, axillary and retropectoral involvement; osseous, pulmonary, and likely hepatic metastatic disease. She was also found to have metastatic adenopathy of the thorax, diffuse extensive osseous metastatic disease with multiple lucent and sclerotic lesions and compression fractures of T1, T9, T12, and L2. Severe T1 compression with epidural extension; cord compression cannot be excluded. +additional neoplastic disease of the spine with epidural extension. Multifocal neoplastic lesions involving the pelvis and ribs as well.    Patient was seen by Oncology, CT surgery and Neurosurgery team. She underwent C7-T2 corpectomy & fusion, posterior C4-T5 fusion. Post-op course uncomplicated.  Hamm removed, pt requiring some straight cath for retention. She also had subjective dysphagia - seen by speech therapy recommended pureed diet which patient continues to prefer. Patient with low grade temp and positive UA. Started on IV Rocephin for UTI. Blood cultures negative, Urine culture positive for GNR, changed to PO Vantin for 7 days.    Patient was medically optimized for discharge to [facility] IRF on 22. (30 Sep 2022 14:32)      PAST MEDICAL & SURGICAL HISTORY:  Stomach ulcer  20 years ago   no pain not taking any meds      Skin cancer of arm, left  pt was 16 year old      H/O excision of mass  left arm  when patient was 16 years ago      S/P hysterectomy          MEDICATIONS  (STANDING):  Biotene Dry Mouth Oral Rinse 5 milliLiter(s) Swish and Spit three times a day  cyclobenzaprine 5 milliGRAM(s) Oral three times a day  dextrose 5% + sodium chloride 0.45%. 1000 milliLiter(s) (75 mL/Hr) IV Continuous <Continuous>  enoxaparin Injectable 40 milliGRAM(s) SubCutaneous every 24 hours  magnesium oxide 400 milliGRAM(s) Oral three times a day with meals  metoprolol tartrate 25 milliGRAM(s) Oral two times a day  oxyCODONE  ER Tablet 20 milliGRAM(s) Oral every 12 hours  potassium chloride  10 mEq/100 mL IVPB 10 milliEquivalent(s) IV Intermittent every 1 hour  pregabalin 25 milliGRAM(s) Oral every 8 hours  saccharomyces boulardii 250 milliGRAM(s) Oral two times a day    MEDICATIONS  (PRN):  acetaminophen    Suspension .. 650 milliGRAM(s) Oral every 6 hours PRN Mild Pain (1 - 3)  ALBUTerol    0.083% 2.5 milliGRAM(s) Nebulizer every 6 hours PRN Shortness of Breath and/or Wheezing  bisacodyl Suppository 10 milliGRAM(s) Rectal daily PRN Constipation  HYDROmorphone  Injectable 0.5 milliGRAM(s) IntraMuscular every 6 hours PRN Moderate Pain (4 - 6)  HYDROmorphone  Injectable 1 milliGRAM(s) IntraMuscular every 6 hours PRN Severe Pain (7 - 10)  ondansetron   Disintegrating Tablet 8 milliGRAM(s) Oral every 8 hours PRN Nausea and/or Vomiting  simethicone 80 milliGRAM(s) Chew every 6 hours PRN Gas      Allergies    tetanus toxoid (Hives)    Intolerances    lactose (Other)        VITALS  66y  Vital Signs Last 24 Hrs  T(C): 36.8 (11 Oct 2022 08:30), Max: 36.8 (11 Oct 2022 08:30)  T(F): 98.2 (11 Oct 2022 08:30), Max: 98.2 (11 Oct 2022 08:30)  HR: 125 (11 Oct 2022 08:30) (115 - 133)  BP: 135/87 (11 Oct 2022 08:30) (135/87 - 140/80)  BP(mean): --  RR: 16 (11 Oct 2022 08:30) (16 - 16)  SpO2: 95% (11 Oct 2022 08:30) (95% - 98%)    Parameters below as of 11 Oct 2022 08:30  Patient On (Oxygen Delivery Method): room air      Daily     Daily Weight in k.4 (11 Oct 2022 05:33)        RECENT LABS:                          10.1   16.97 )-----------( 599      ( 11 Oct 2022 06:00 )             29.2     10-11    131<L>  |  96  |  11  ----------------------------<  96  3.1<L>   |  16<L>  |  0.39<L>    Ca    9.8      11 Oct 2022 06:00  Mg     1.6     10-11    TPro  5.2<L>  /  Alb  1.9<L>  /  TBili  0.5  /  DBili  x   /  AST  40  /  ALT  22  /  AlkPhos  148<H>  10-11    LIVER FUNCTIONS - ( 11 Oct 2022 06:00 )  Alb: 1.9 g/dL / Pro: 5.2 g/dL / ALK PHOS: 148 U/L / ALT: 22 U/L / AST: 40 U/L / GGT: x                   CAPILLARY BLOOD GLUCOSE      Review of Systems:   	  · Additional ROS	Patient has not been able to tolerate po, reports difficulty chewing and does not want pureed, requrests liquid diet. Diet changed to full liquids    no fever, however continues to feel unwell and unable to participate in therapy. Remains tachycardic, although unable to tolerate oral meds including metoprolol. Has worsening leukocytosis 16.97 today and persistent loose stool, K+ 3.1 Cl 96 CO2 16. Discussed with hospitalist and patient , patient did not allow SC yesterday but unknown source of likely infection. Will repeat CXR, follow UA, and CT cervical spine and A.P. Will likely require transfer off unit, however back to [facility] NSGY or medical side pending results of workup.    Patietn denies worsening surgical incision pain    Physical Exam:   · Constitutional Comments	c collar in place. Inicisions evaluated, posterior incision still with 2 staples remaining and suture but not embedded, no drainage or erythema, no fluctuance +steri strips in place intact  anterior incision with steri strips intact  	  · Respiratory	clear to auscultation bilaterally; no wheezes; no rales; no rhonchi  · Cardiovascular	regular rate and rhythm; S1 S2 present; no gallops; no rub; no murmur  · Gastrointestinal	soft; no R/G +mild distension +BS  	  	  · Motor	bilateral calves soft, no TTP  no pedal edema

## 2022-10-11 NOTE — PROGRESS NOTE ADULT - ASSESSMENT
Physical Examination:  GENERAL:               Alert, Oriented, No acute distress.    HEENT:                     Symmetric. No JVD, dry MM  PULM:                     Bilateral air entry, Clear to auscultation bilaterally, No Rales, No Rhonchi, No Wheezing  CVS:                         S1, S2,  No Murmur  ABD:                        Soft, nondistended, nontender, normoactive bowel sounds,   EXT:                         No edema, nontender, No Cyanosis or Clubbing   Vascular:                Warm Extremities, Normal Capillary refill, Normal Distal Pulses  SKIN:                       Warm and well perfused, no rashes noted.   NEURO:                  Alert, oriented, interactive, nonfocal, follows commands  PSYC:                      Calm, + Insight.    Assessment  66 year old female with PMH of left arm skin CA s/p excision at age 16, stomach ulcer, her last mammogram was in 2014, who was brought to [facility] ED 9/10/22 with progressive weakness to the point that she felt she could not get out of be  CT scan of the Cervical spine performed which showed Multiple lytic lesions of the cervical and visualized thoracic spine as well as right first and second ribs concerning for metastases versus multiple myeloma. She was also found to have metastatic adenopathy of the thorax, diffuse extensive osseous metastatic disease with multiple lucent and sclerotic lesions and compression fractures of T1, T9, T12, and L2. Severe T1 compression with epidural extension; cord compression cannot be excluded. +additional neoplastic disease of the spine with epidural extension. Multifocal neoplastic lesions involving the pelvis and ribs as well.  She underwent C7-T2 corpectomy & fusion, posterior C4-T5 fusion. Post-op course uncomplicated.  Hamm removed, pt requiring some straight cath for retention. She also had subjective dysphagia - seen by speech therapy recommended pureed diet which patient continues to prefer. Patient with low grade temp and positive UA. Started on IV Rocephin for UTI. Blood cultures negative, Urine culture positive for GNR, changed to PO Vantin for 7 days.      Problem List  1. Abnormal CT chest with -"Interlobular septal thickening and bilateral ground-glass opacities are new from prior exam and may represent edema, lymphangitic carcinomatosis and/or pneumonitis. Bilateral metastatic lung nodules measuring up to 9 mm within right lower lobe are unchanged"    The Differential diagnosis can include:     - Pulmonary Edema- Cardiogenic  vs non cardiogenic pulmonary edema (most likely) - in patient with hypoalbuminnemia and minimally mobile state leading     - Air trapping - (doubt) as patient has no history of Copd/asthma or smoking     - Progression of lung cancer (possible)  2. Metastatic Breast cancer  3. Diarrhea      Plan   Echo noted   incentive spirometry  albuterol Prn  Cont. to maintain euvolemic volume status   Nutrition f/u for diet   PT, OOB as tolerated  Incentive spirometry  monitor on RA as not hypoxic at this time, keep sat >92%  Should repeat CT scan in 4 - 6 weeks to document evolution  rest of care as per primary team -

## 2022-10-11 NOTE — PROGRESS NOTE ADULT - SUBJECTIVE AND OBJECTIVE BOX
Follow-up Pulmonary Progress Note  Chief Complaint : Pathological fracture in neoplastic disease, other specified site, initial encounter for fracture          No new events overnight.  Denies SOB/CP.       Allergies :lactose (Other)  tetanus toxoid (Hives)      PAST MEDICAL & SURGICAL HISTORY:  Stomach ulcer  20 years ago   no pain not taking any meds    Skin cancer of arm, left  pt was 16 year old    H/O excision of mass  left arm  when patient was 16 years ago    S/P hysterectomy        Medications:  MEDICATIONS  (STANDING):  Biotene Dry Mouth Oral Rinse 5 milliLiter(s) Swish and Spit three times a day  cyclobenzaprine 5 milliGRAM(s) Oral three times a day  dextrose 5% + sodium chloride 0.45%. 1000 milliLiter(s) (75 mL/Hr) IV Continuous <Continuous>  enoxaparin Injectable 40 milliGRAM(s) SubCutaneous every 24 hours  magnesium oxide 400 milliGRAM(s) Oral three times a day with meals  metoprolol tartrate 25 milliGRAM(s) Oral two times a day  oxyCODONE  ER Tablet 20 milliGRAM(s) Oral every 12 hours  potassium chloride  10 mEq/100 mL IVPB 10 milliEquivalent(s) IV Intermittent every 1 hour  pregabalin 25 milliGRAM(s) Oral every 8 hours  saccharomyces boulardii 250 milliGRAM(s) Oral two times a day    MEDICATIONS  (PRN):  acetaminophen    Suspension .. 650 milliGRAM(s) Oral every 6 hours PRN Mild Pain (1 - 3)  ALBUTerol    0.083% 2.5 milliGRAM(s) Nebulizer every 6 hours PRN Shortness of Breath and/or Wheezing  bisacodyl Suppository 10 milliGRAM(s) Rectal daily PRN Constipation  HYDROmorphone  Injectable 0.5 milliGRAM(s) IntraMuscular every 6 hours PRN Moderate Pain (4 - 6)  HYDROmorphone  Injectable 1 milliGRAM(s) IntraMuscular every 6 hours PRN Severe Pain (7 - 10)  ondansetron   Disintegrating Tablet 8 milliGRAM(s) Oral every 8 hours PRN Nausea and/or Vomiting  simethicone 80 milliGRAM(s) Chew every 6 hours PRN Gas      Antibiotics History  cefpodoxime 200 milliGRAM(s) Oral every 12 hours, 09-30-22 @ 17:19, Stop order after: 7 Days      Heme Medications   enoxaparin Injectable 40 milliGRAM(s) SubCutaneous every 24 hours, 10-01-22 @ 00:00      GI Medications  bisacodyl Suppository 10 milliGRAM(s) Rectal daily, 09-30-22 @ 17:18, Routine PRN  simethicone 80 milliGRAM(s) Chew every 6 hours, 10-01-22 @ 17:41, Routine PRN        LABS:                        10.1   16.97 )-----------( 599      ( 11 Oct 2022 06:00 )             29.2     10-11    131<L>  |  96  |  11  ----------------------------<  96  3.1<L>   |  16<L>  |  0.39<L>    Ca    9.8      11 Oct 2022 06:00  Mg     1.6     10-11    TPro  5.2<L>  /  Alb  1.9<L>  /  TBili  0.5  /  DBili  x   /  AST  40  /  ALT  22  /  AlkPhos  148<H>  10-11    HIT ab -- 10-08 @ 00:15  HIT ab EIA --  D Dimer -[id number]              Procalcitonin, Serum: 0.20 ng/mL (10-10-22 @ 06:27)    Serum Pro-Brain Natriuretic Peptide: 71 pg/mL (10-09-22 @ 06:45)        CULTURES: (if applicable)    Culture - Urine (collected 09-28-22 @ 20:30)  Source: Catheterized Catheterized  Final Report (10-01-22 @ 10:17):    >100,000 CFU/ml Enterobacter cloacae complex  Organism: Enterobacter cloacae complex (10-01-22 @ 10:17)  Organism: Enterobacter cloacae complex (10-01-22 @ 10:17)      -  Amikacin: S <=16      -  Amoxicillin/Clavulanic Acid: R >16/8      -  Ampicillin: R >16 These ampicillin results predict results for amoxicillin      -  Ampicillin/Sulbactam: R >16/8 Enterobacter, Klebsiella aerogenes, Citrobacter, and Serratia may develop resistance during prolonged therapy (3-4 days)      -  Aztreonam: S <=4      -  Cefazolin: R >16      -  Cefepime: S <=2      -  Cefoxitin: R >16      -  Ceftriaxone: R 32 Enterobacter, Klebsiella aerogenes, Citrobacter, and Serratia may develop resistance during prolonged therapy      -  Ciprofloxacin: S <=0.25      -  Ertapenem: S <=0.5      -  Gentamicin: S <=2      -  Imipenem: S <=1      -  Levofloxacin: S <=0.5      -  Meropenem: S <=1      -  Nitrofurantoin: R >64 Should not be used to treat pyelonephritis      -  Piperacillin/Tazobactam: S <=8      -  Tigecycline: S <=2      -  Tobramycin: S <=2      -  Trimethoprim/Sulfamethoxazole: S <=0.5/9.5      Method Type: KERA    VITALS:  T(C): 36.8 (10-11-22 @ 08:30), Max: 36.8 (10-11-22 @ 08:30)  T(F): 98.2 (10-11-22 @ 08:30), Max: 98.2 (10-11-22 @ 08:30)  HR: 125 (10-11-22 @ 08:30) (115 - 133)  BP: 135/87 (10-11-22 @ 08:30) (135/87 - 140/80)  BP(mean): --  ABP: --  ABP(mean): --  RR: 16 (10-11-22 @ 08:30) (16 - 16)  SpO2: 95% (10-11-22 @ 08:30) (95% - 98%)  CVP(mm Hg): --  CVP(cm H2O): --    Ins and Outs     10-10-22 @ 07:01  -  10-11-22 @ 07:00  --------------------------------------------------------  IN: 600 mL / OUT: 0 mL / NET: 600 mL    I&O's Detail    10 Oct 2022 07:01  -  11 Oct 2022 07:00  --------------------------------------------------------  IN:    Oral Fluid: 600 mL  Total IN: 600 mL    OUT:  Total OUT: 0 mL    Total NET: 600 mL

## 2022-10-11 NOTE — CHART NOTE - NSCHARTNOTEFT_GEN_A_CORE
Nutrition Follow Up Note  Hospital Course (Per Electronic Medical Record):   Source: Medical Record [X] Patient [X]  Nursing Staff [X]     Diet: Full Fluids     Patient reports no po intake x 5 days due to difficulty swallowing & nausea ,Zofran order noted , Patient requesting "cold liquids " patient reports she is  ingesting some of the  Ensure supplements. Loose stools continue . patient is also receiving protein smoothie (almond milk , peanut butter & banana ) . MBS pending as per MD note, patient noted with no issue with po fluids , diet downgraded to full fluids ,     Current Weight: (10/11) 130.9/59.4kg                          (10/10) 130.9/59.4kg                          (10/8) 132.7/60.2kg     Pertinent Medications: MEDICATIONS  (STANDING):  Biotene Dry Mouth Oral Rinse 5 milliLiter(s) Swish and Spit three times a day  cyclobenzaprine 5 milliGRAM(s) Oral three times a day  dextrose 5% + sodium chloride 0.45%. 1000 milliLiter(s) (75 mL/Hr) IV Continuous <Continuous>  enoxaparin Injectable 40 milliGRAM(s) SubCutaneous every 24 hours  magnesium oxide 400 milliGRAM(s) Oral three times a day with meals  metoprolol tartrate 25 milliGRAM(s) Oral two times a day  oxyCODONE  ER Tablet 20 milliGRAM(s) Oral every 12 hours  potassium chloride  10 mEq/100 mL IVPB 10 milliEquivalent(s) IV Intermittent every 1 hour  pregabalin 25 milliGRAM(s) Oral every 8 hours  saccharomyces boulardii 250 milliGRAM(s) Oral two times a day    MEDICATIONS  (PRN):  acetaminophen    Suspension .. 650 milliGRAM(s) Oral every 6 hours PRN Mild Pain (1 - 3)  ALBUTerol    0.083% 2.5 milliGRAM(s) Nebulizer every 6 hours PRN Shortness of Breath and/or Wheezing  bisacodyl Suppository 10 milliGRAM(s) Rectal daily PRN Constipation  HYDROmorphone  Injectable 0.5 milliGRAM(s) IntraMuscular every 6 hours PRN Moderate Pain (4 - 6)  HYDROmorphone  Injectable 1 milliGRAM(s) IntraMuscular every 6 hours PRN Severe Pain (7 - 10)  ondansetron   Disintegrating Tablet 8 milliGRAM(s) Oral every 8 hours PRN Nausea and/or Vomiting  simethicone 80 milliGRAM(s) Chew every 6 hours PRN Gas      Pertinent Labs:  10-11 Na131 mmol/L<L> Glu 96 mg/dL K+ 3.1 mmol/L<L> Cr  0.39 mg/dL<L> BUN 11 mg/dL 10-11 Alb 1.9 g/dL<L>        Skin: surgical incision     Edema: none    Last BM: (10//1) loose stools noted     Estimated Needs:   [X] No Change since Previous Assessment      Previous Nutrition Diagnosis: Severe Protein Malnutrition     Nutrition Diagnosis is [X] Ongoing         New Nutrition Diagnosis: [X] Not Applicable      Interventions:   1. continue current diet , will follow clinical course  2. await MBS results     Monitoring & Evaluation: will monitor:  [X] Weights   [X] PO Intake   [X] Follow Up (Per Protocol)  [X] Tolerance to Diet Prescription       RD to follow as per Nutrition protocol  Antonieta Clifford RDN

## 2022-10-11 NOTE — CONSULT NOTE ADULT - ASSESSMENT
Patient is a 66y female with a past history of a gastric ulcer who was recently admitted to [facility] with overall failure to thrive and found to have widely metastatic breast cancer with lung, liver, and bone mets.  She is s/p C-T spine surgery with anterior corpectomy and fusion of C7-T2 and posterior C4-T5 fusion.She was treated for a psot op UTI with CTX and cefpodoxime and sent to rehab on 9/30.  He has has a poor appetite, has c/o N/V and has c/o abdominal cramps.She has had some diarrhea and a CT scan today has shown a pancolitis.  A stool for C Diff has been ordered and po vanco started.  I am concerned about C Diff. Her CT of A/P on 10/2 did not show any colonic thickening.she is taking narcotics which are antiperistaltics and can limit diarrhea.  She received CTX and cefpodoxime, and while her organism was resistant to these agents I would like to limit antibiotics until GI issues controlled.  Suggest:  1. C Diff toxin  2.Po vanco 125 4x/day  3.Blood cultures x 2 sets  4.f CDT is negative I would send stool culture and GI PCR panel.  5.Can send another UA and culture but I may be reluctant to treat a positive result.

## 2022-10-11 NOTE — PROGRESS NOTE ADULT - ASSESSMENT
Pt alert, fair attention, intact language, thought processes goal-directed, no abnormal thought contents noted, depressed anxious affect and mood, denied AH/VH, denied SI/HI/I/P, calm behavior, experiencing difficulty coping with her physical symptoms and slow recovery. Plan: Continue supportive tx.

## 2022-10-11 NOTE — PROGRESS NOTE ADULT - ASSESSMENT
65 yo female PMH left arm skin CA s/p excision, stomach ulcer who presented to [facility] 9/10/22 with progressive weakness, found to have multiple lytic lesions of the cervical and thoracic spine as well as right first and second ribs, lung, liver secondary to metastatic breast cancer with dominant right breast mass, right chest wall, axillary and retropectoral involvement. Compression fractures of T1, T9, T12, and L2. Severe T1 compression with epidural extension. She underwent C7-T2 corpectomy & fusion, posterior C4-T5 fusion    # Metastatic Breast CA to lung, liver, ribs, with spine involvement and neurogenic bladder  - path: invasive ductal carcinoma  - s/p C7-T2 corpectomy & fusion, posterior C4-T5 fusion (Dr Bennett)  - All staples but 2 removed 10/7. Discussed with NSGY, would like plastics eval for removal remaining two, will place consult. Patient requests not today due to other ongoing issues  - vendor: Cuauhtemoc from [facility]. LSO delivered 10/7  - Continue Comprehensive Rehab Program: PT/OT/ST, 3hours daily and 5 days weekly  - continue psychological supportive counseling. Consider psychiatry referral for anxiety, however patient denies and has been non compliant with even current medications due to concerns over SE/nausea/difficulty swallowing  - OP f/u with radiation oncology. Patient will not require chemo/radiation while in rehab   - Precautions: CA, fall, aspiration, spinal, cervical collar, LSO Brace/ confirmed with NSGY, may loosen C collar for MEALS ONLY    # Pain management  - Cyclobenzaprine 5 q8  - dc Gabapentin TID 600mg.--> trial lyrica 25 q8 to start 10/7   - Dilaudid 2mg Q4 hours prn for mod pain; 4mg Q4 hours prn for severe pain--> switched to 0.5 mg IM q6 PRn mod pain and 1 mg q6 PRN severe pain 10/10  - Oxycodone ER 20mg Q12 hours   - tylenol 1g IV x1 dose 10/10    # tachycardia, leukocytosis  - BP (135/87 - 140/80) -133 10/11  - ECHO 10/9: LB wall thickening, hyperdynamic LV with EF 70-75%  - CTPA 10/8; No pulmonary embolus. Interlobular septal thickening and bilateral groundglass opacities are new from prior exam and may represent edema, lymphangitic carcinomatosis and/or pneumonitis.Bilateral metastatic lung nodules measuring up to 9 mm within right lower lobe are unchanged. Trace bilateral pleural effusions and mild bibasilar atelectasis.Metastatic disease within the mediastinum and rohit as well as bone metastases are grossly unchanged. Reviewed with patient 10/10  - WBC 11.16 10/10. No fever, no symptoms.   - Bladder scan performed 8 ml this morning. Will order UA as patient reports "holding urine in" no dysuria, just completed vantin for UTI/  - TSH WNL  - O2 via NC 2lpm to keep sat >90% ordered  - low rate IVF due to reduced po intake  - patient encouraged to be compliant with metoprolol to lower heart rate, education performed, results of workup to date performed  - CBC and BMP 10/11    # enterobacter UTI  - s/p Vantin BID x 7 days    # Bowel Regimen  - Dulcolax supp PRN  - florastor  -  zofran 8 mg PRN    # FEN   - pureed solids and thins   - MBS  - supplemented 40 meq x 2 on 10/3 and 10/4  - K+ 4.1 10/6--> 3.4 10/10. Will replete K+ and Mg++ today with IVF, recheck in AM     # DVT ppx  - Lovenox  - SCD, TEDs    # Case discussed in IDT rounds 10/5  - set up/supervision eating and grooming, min assist UB dressing, total assist LB dressing, max assist toileting. not tolerating minced/moist diet, downgraded back to pureed solids for comfort, mild deficits in working memory  - goals: mod independent transfers, ambulation with RW, negotiate 6-8 steps with mod independent, min assist bathing/shower transfer  - caregiver training  - target dc home 10/19 with caregiver support and home PT, OT, SLP  - disability form completed 10/7      # LABS  plastics for staple removal  MBS  CBC BMP 10/11    addendum 5: 02 PM: HR still elevated 130s, discussed with hosptialist. Will give 25 mg metoprolol and increase dose to 25 bid. If persists, will consider 50 mg dosing.      CODE STATUS: FULL CODE     Outpatient Follow-up (Specialty/Name of physician):    Marj Del Castillo)  Hematology; Hospice Palliative Medicine; Internal Medicine; Medical Oncology  [address] [id number]  Phone: [phone]  Fax: [phone]  Follow Up Time: 2 weeks    Bret Ibarra)  Radiation Oncology  [address] [place] [address]  Phone: [phone]  Fax: [phone]  Follow Up Time: 2 weeks   65 yo female PMH left arm skin CA s/p excision, stomach ulcer who presented to [facility] 9/10/22 with progressive weakness, found to have multiple lytic lesions of the cervical and thoracic spine as well as right first and second ribs, lung, liver secondary to metastatic breast cancer with dominant right breast mass, right chest wall, axillary and retropectoral involvement. Compression fractures of T1, T9, T12, and L2. Severe T1 compression with epidural extension. She underwent C7-T2 corpectomy & fusion, posterior C4-T5 fusion    # Metastatic Breast CA to lung, liver, ribs, with spine involvement and neurogenic bladder  - path: invasive ductal carcinoma  - s/p C7-T2 corpectomy & fusion, posterior C4-T5 fusion (Dr Bennett)  - All staples but 2 removed 10/7. Plastics consult placed, patient defers removal last 2 staples for now until physically feeling better. Incision evaluated, no redness, not embedded currently 10/11  - Medical hold for therapies 10/11  - oncology consult for f/u  - Precautions: CA, fall, aspiration, spinal, cervical collar, LSO Brace (Cuauhtemoc; [facility]), contact isolation    # Pain management  - Cyclobenzaprine 5 q8  - dc Gabapentin TID 600mg.--> lyrica 25 q8 to start 10/7   - Dilaudid switched to 0.5 mg IM q6 PRn mod pain and 1 mg q6 PRN severe pain 10/10 to to oral med intolerance  - Oxycodone ER 20mg Q12 hours   - tylenol 1g IV x1 dose 10/10    # ID  - WBC 11.16 10/10-->16.97 10/11  - BP (135/87 - 140/80) -133 10/11  - ECHO 10/9: LB wall thickening, hyperdynamic LV with EF 70-75%  - CTPA 10/8; No pulmonary embolus. Interlobular septal thickening and bilateral groundglass opacities are new from prior exam and may represent edema, lymphangitic carcinomatosis and/or pneumonitis .Bilateral metastatic lung nodules measuring up to 9 mm within right lower lobe are unchanged. Trace bilateral pleural effusions and mild bibasilar atelectasis. Metastatic disease within the mediastinum and rohit as well as bone metastases are grossly unchanged.  - TSH WNL  - O2 via NC 2lpm to keep sat >90% ordered  - Bladder scan negative for retention. UA pending  - continue low rate IVF due to reduced po intake  - Blood Cx ordered 10/11  - CXR 10/11 official read pending. Preliminary without infiltrate/effusion  - Ct C-T spine 10/11 performed, official report pending. Preliminary without evidence significant fluid collection  - CT A/P 10/11: Limited, noncontrast exam. Pancolitis, correlate clinically for inflammatory/infectious etiology, including pseudomembranous colitis. Partially visualized right breast mass. Pulmonary and hepatic metastatic disease as noted on prior exam. Permeative, osteolytic metastatic disease, including L2 compression fracture with possible epidural involvement.  - Patient placed on isolation, C diff stool ordered  - ID consult ordered, start on po vanco  - CBC 10/12    # enterobacter UTI  - s/p Vantin BID x 7 days    # Bowel Regimen  - Dulcolax supp PRN  - florastor  - zofran 8 mg PRN    # FEN   - pureed solids and thins   - MBS  - supplemented 40 meq x 2 on 10/3 and 10/4  - K+ 4.1 10/6--> 3.4 10/10--> 3.1 10/11  - Kcl 10 meq IV x 3 doses ordered  - BMP 10/12    # DVT ppx  - Lovenox  - SCD, TEDs    # Case discussed in IDT rounds 10/5  - set up/supervision eating and grooming, min assist UB dressing, total assist LB dressing, max assist toileting. not tolerating minced/moist diet, downgraded back to pureed solids for comfort, mild deficits in working memory  - goals: mod independent transfers, ambulation with RW, negotiate 6-8 steps with mod independent, min assist bathing/shower transfer  - caregiver training  - target dc home 10/19 with caregiver support and home PT, OT, SLP  - disability form completed 10/7      # LABS  plastics for staple removal  MBS  C diff PCR  Blood Cx  UA  ID consult  oncology consult  CBC [facility] 10/12          CODE STATUS: FULL CODE     Outpatient Follow-up (Specialty/Name of physician):    Marj Del Castillo)  Hematology; Hospice Palliative Medicine; Internal Medicine; Medical Oncology  [address] [place] [address]  Phone: [phone]  Fax: [phone]  Follow Up Time: 2 weeks    Bret Ibarra)  Radiation Oncology  [address] [place] [address]  Phone: [phone]  Fax: [phone]  Follow Up Time: 2 weeks   65 yo female PMH left arm skin CA s/p excision, stomach ulcer who presented to [facility] 9/10/22 with progressive weakness, found to have multiple lytic lesions of the cervical and thoracic spine as well as right first and second ribs, lung, liver secondary to metastatic breast cancer with dominant right breast mass, right chest wall, axillary and retropectoral involvement. Compression fractures of T1, T9, T12, and L2. Severe T1 compression with epidural extension. She underwent C7-T2 corpectomy & fusion, posterior C4-T5 fusion    # Metastatic Breast CA to lung, liver, ribs, with spine involvement and neurogenic bladder  - path: invasive ductal carcinoma  - s/p C7-T2 corpectomy & fusion, posterior C4-T5 fusion (Dr Bennett)  - All staples but 2 removed 10/7. Plastics consult placed, patient defers removal last 2 staples for now until physically feeling better. Incision evaluated, no redness, not embedded currently 10/11  - Medical hold for therapies 10/11  - oncology consult for f/u  - Precautions: CA, fall, aspiration, spinal, cervical collar, LSO Brace (Cuauhtemoc; [facility]), contact isolation    # Pain management  - Cyclobenzaprine 5 q8  - dc Gabapentin TID 600mg.--> lyrica 25 q8 to start 10/7   - Dilaudid switched to 0.5 mg IM q6 PRn mod pain and 1 mg q6 PRN severe pain 10/10 to to oral med intolerance  - Oxycodone ER 20mg Q12 hours   - tylenol 1g IV x1 dose 10/10    # ID  - WBC 11.16 10/10-->16.97 10/11  - BP (135/87 - 140/80) -133 10/11  - ECHO 10/9: LB wall thickening, hyperdynamic LV with EF 70-75%  - CTPA 10/8; No pulmonary embolus. Interlobular septal thickening and bilateral groundglass opacities are new from prior exam and may represent edema, lymphangitic carcinomatosis and/or pneumonitis .Bilateral metastatic lung nodules measuring up to 9 mm within right lower lobe are unchanged. Trace bilateral pleural effusions and mild bibasilar atelectasis. Metastatic disease within the mediastinum and rohit as well as bone metastases are grossly unchanged.  - TSH WNL  - O2 via NC 2lpm to keep sat >90% ordered  - Bladder scan negative for retention. UA pending  - continue low rate IVF due to reduced po intake  - Blood Cx ordered 10/11  - CXR 10/11 official read pending. Preliminary without infiltrate/effusion  - Ct C-T spine 10/11 performed, official report pending. Preliminary without evidence significant fluid collection  - CT A/P 10/11: Limited, noncontrast exam. Pancolitis, correlate clinically for inflammatory/infectious etiology, including pseudomembranous colitis. Partially visualized right breast mass. Pulmonary and hepatic metastatic disease as noted on prior exam. Permeative, osteolytic metastatic disease, including L2 compression fracture with possible epidural involvement.  - Patient placed on isolation, C diff stool ordered  - ID consult ordered, start on po vanco  - CBC 10/12    # enterobacter UTI  - s/p Vantin BID x 7 days    # Bowel Regimen  - Dulcolax supp PRN  - florastor  - zofran 8 mg PRN    # FEN   - pureed solids and thins   - MBS  - supplemented 40 meq x 2 on 10/3 and 10/4  - K+ 4.1 10/6--> 3.4 10/10--> 3.1 10/11  - Kcl 10 meq IV x 3 doses ordered  - BMP 10/12    # DVT ppx  - Lovenox  - SCD, TEDs    # Case discussed in IDT rounds 10/5  - set up/supervision eating and grooming, min assist UB dressing, total assist LB dressing, max assist toileting. not tolerating minced/moist diet, downgraded back to pureed solids for comfort, mild deficits in working memory  - goals: mod independent transfers, ambulation with RW, negotiate 6-8 steps with mod independent, min assist bathing/shower transfer  - caregiver training  - target dc home 10/19 with caregiver support and home PT, OT, SLP  - disability form completed 10/7      # LABS  plastics for staple removal  MBS  C diff PCR  Blood Cx  UA  official CXR and CT C-T spine read  ID consult  oncology consult  CBC [facility] 10/12          CODE STATUS: FULL CODE     Outpatient Follow-up (Specialty/Name of physician):    Marj Del Castillo)  Hematology; Hospice Palliative Medicine; Internal Medicine; Medical Oncology  [address] [place] [address]  Phone: [phone]  Fax: [phone]  Follow Up Time: 2 weeks    Bret Ibarra)  Radiation Oncology  [address] [place] [address]  Phone: [phone]  Fax: [phone]  Follow Up Time: 2 weeks   65 yo female PMH left arm skin CA s/p excision, stomach ulcer who presented to [facility] 9/10/22 with progressive weakness, found to have multiple lytic lesions of the cervical and thoracic spine as well as right first and second ribs, lung, liver secondary to metastatic breast cancer with dominant right breast mass, right chest wall, axillary and retropectoral involvement. Compression fractures of T1, T9, T12, and L2. Severe T1 compression with epidural extension. She underwent C7-T2 corpectomy & fusion, posterior C4-T5 fusion    # Metastatic Breast CA to lung, liver, ribs, with spine involvement and neurogenic bladder  - path: invasive ductal carcinoma  - s/p C7-T2 corpectomy & fusion, posterior C4-T5 fusion (Dr Bennett)  - All staples but 2 removed 10/7. Plastics consult placed, patient defers removal last 2 staples for now until physically feeling better. Incision evaluated, no redness, not embedded currently 10/11  - Medical hold for therapies 10/11  - oncology consult for f/u  - Precautions: CA, fall, aspiration, spinal, cervical collar, LSO Brace (Cuauhtemoc; [facility]), contact isolation    # Pain management  - Cyclobenzaprine 5 q8  - dc Gabapentin TID 600mg.--> lyrica 25 q8 to start 10/7   - Dilaudid switched to 0.5 mg IM q6 PRn mod pain and 1 mg q6 PRN severe pain 10/10 to to oral med intolerance  - Oxycodone ER 20mg Q12 hours   - tylenol 1g IV x1 dose 10/10    # ID  - WBC 11.16 10/10-->16.97 10/11  - BP (135/87 - 140/80) -133 10/11  - ECHO 10/9: LB wall thickening, hyperdynamic LV with EF 70-75%  - CTPA 10/8; No pulmonary embolus. Interlobular septal thickening and bilateral groundglass opacities are new from prior exam and may represent edema, lymphangitic carcinomatosis and/or pneumonitis .Bilateral metastatic lung nodules measuring up to 9 mm within right lower lobe are unchanged. Trace bilateral pleural effusions and mild bibasilar atelectasis. Metastatic disease within the mediastinum and rohit as well as bone metastases are grossly unchanged.  - TSH WNL  - O2 via NC 2lpm to keep sat >90% ordered  - Bladder scan negative for retention. UA pending  - continue low rate IVF due to reduced po intake  - Blood Cx ordered 10/11  - CXR 10/11 official read pending. Preliminary without infiltrate/effusion  - Ct C-T spine 10/11 performed, official report pending. Preliminary without evidence significant fluid collection  - CT A/P 10/11: Limited, noncontrast exam. Pancolitis, correlate clinically for inflammatory/infectious etiology, including pseudomembranous colitis. Partially visualized right breast mass. Pulmonary and hepatic metastatic disease as noted on prior exam. Permeative, osteolytic metastatic disease, including L2 compression fracture with possible epidural involvement.  - Patient placed on isolation, C diff stool ordered  - ID consult ordered, start on po vanco  - CBC 10/12    # enterobacter UTI  - s/p Vantin BID x 7 days    # Bowel Regimen  - Dulcolax supp PRN  - florastor  - zofran 8 mg PRN    # FEN   - pureed solids and thins --> changed to full liquids 10/11 per patient preference  - MBS  - supplemented 40 meq x 2 on 10/3 and 10/4  - K+ 4.1 10/6--> 3.4 10/10--> 3.1 10/11  - Kcl 10 meq IV x 3 doses ordered  - BMP 10/12    # DVT ppx  - Lovenox  - SCD, TEDs    # Case discussed in IDT rounds 10/5  - set up/supervision eating and grooming, min assist UB dressing, total assist LB dressing, max assist toileting. not tolerating minced/moist diet, downgraded back to pureed solids for comfort, mild deficits in working memory  - goals: mod independent transfers, ambulation with RW, negotiate 6-8 steps with mod independent, min assist bathing/shower transfer  - caregiver training  - target dc home 10/19 with caregiver support and home PT, OT, SLP  - disability form completed 10/7      # LABS  plastics for staple removal  MBS  C diff PCR  Blood Cx  UA  official CXR and CT C-T spine read  ID consult  oncology consult  CBC [facility] 10/12          CODE STATUS: FULL CODE     Outpatient Follow-up (Specialty/Name of physician):    Marj Del Castillo)  Hematology; Hospice Palliative Medicine; Internal Medicine; Medical Oncology  [address] [place] [address]  Phone: [phone]  Fax: [phone]  Follow Up Time: 2 weeks    Bret Ibarra)  Radiation Oncology  [address] [place] [address]  Phone: [phone]  Fax: [phone]  Follow Up Time: 2 weeks   67 yo female PMH left arm skin CA s/p excision, stomach ulcer who presented to [facility] 9/10/22 with progressive weakness, found to have multiple lytic lesions of the cervical and thoracic spine as well as right first and second ribs, lung, liver secondary to metastatic breast cancer with dominant right breast mass, right chest wall, axillary and retropectoral involvement. Compression fractures of T1, T9, T12, and L2. Severe T1 compression with epidural extension. She underwent C7-T2 corpectomy & fusion, posterior C4-T5 fusion    # Metastatic Breast CA to lung, liver, ribs, with spine involvement and neurogenic bladder  - path: invasive ductal carcinoma  - s/p C7-T2 corpectomy & fusion, posterior C4-T5 fusion (Dr Bennett)  - All staples but 2 removed 10/7. Plastics consult placed, patient defers removal last 2 staples for now until physically feeling better. Incision evaluated, no redness, not embedded currently 10/11  - Medical hold for therapies 10/11  - oncology consult for f/u  - Precautions: CA, fall, aspiration, spinal, cervical collar, LSO Brace (Cuauhtemoc; [facility]), contact isolation    # Pain management  - Cyclobenzaprine 5 q8  - dc Gabapentin TID 600mg.--> lyrica 25 q8 to start 10/7   - Dilaudid switched to 0.5 mg IM q6 PRn mod pain and 1 mg q6 PRN severe pain 10/10 to to oral med intolerance  - Oxycodone ER 20mg Q12 hours   - tylenol 1g IV x1 dose 10/10    # ID  - WBC 11.16 10/10-->16.97 10/11  - BP (135/87 - 140/80) -133 10/11  - ECHO 10/9: LB wall thickening, hyperdynamic LV with EF 70-75%  - CTPA 10/8; No pulmonary embolus. Interlobular septal thickening and bilateral groundglass opacities are new from prior exam and may represent edema, lymphangitic carcinomatosis and/or pneumonitis .Bilateral metastatic lung nodules measuring up to 9 mm within right lower lobe are unchanged. Trace bilateral pleural effusions and mild bibasilar atelectasis. Metastatic disease within the mediastinum and rohit as well as bone metastases are grossly unchanged.  - TSH WNL  - O2 via NC 2lpm to keep sat >90% ordered  - Bladder scan negative for retention. UA pending  - continue low rate IVF due to reduced po intake  - Blood Cx ordered 10/11  - CXR 10/11 official read pending. Preliminary without infiltrate/effusion  - Ct C-T spine 10/11 performed, official report pending. Preliminary without evidence significant fluid collection  - CT A/P 10/11: Limited, noncontrast exam. Pancolitis, correlate clinically for inflammatory/infectious etiology, including pseudomembranous colitis. Partially visualized right breast mass. Pulmonary and hepatic metastatic disease as noted on prior exam. Permeative, osteolytic metastatic disease, including L2 compression fracture with possible epidural involvement.  - Patient placed on isolation, C diff stool ordered  - ID consult ordered, start on po vanco  - CBC 10/12    # enterobacter UTI  - s/p Vantin BID x 7 days    # Bowel Regimen  - Dulcolax supp PRN  - florastor  - zofran 8 mg PRN    # FEN   - pureed solids and thins --> changed to full liquids 10/11 per patient preference  - MBS  - supplemented 40 meq x 2 on 10/3 and 10/4  - K+ 4.1 10/6--> 3.4 10/10--> 3.1 10/11  - Kcl 10 meq IV x 3 doses ordered  - [facility] 10/12    # DVT ppx  - Lovenox  - SCD, TEDs    # Case discussed in IDT rounds 10/5  - set up/supervision eating and grooming, min assist UB dressing, total assist LB dressing, max assist toileting. not tolerating minced/moist diet, downgraded back to pureed solids for comfort, mild deficits in working memory  - goals: mod independent transfers, ambulation with RW, negotiate 6-8 steps with mod independent, min assist bathing/shower transfer  - caregiver training  - target dc home 10/19 with caregiver support and home PT, OT, SLP  - disability form completed 10/7      # LABS  plastics for staple removal  MBS  C diff PCR  Blood Cx  UA  official CXR and CT C-T spine read  ID consult  oncology consult  CBC [facility] 10/12    addendum 3:23 PM  CT C/T spine below. Appears to be stable post op changes, but will also reach out to NSGY for review. CXR as below, persistent left basal infiltrate no change from 10/7. ID appreciated, continue Po vanco 125 4x/day for now pending results    ACC: [id number] EXAM:  CT THORACIC SPINE IC                        ACC: [id number] EXAM:  CT CERVICAL SPINE WAW IC                          PROCEDURE DATE:  10/11/2022          INTERPRETATION:  Clinical indication: Metastatic breast cancer. Status   post cervical spine surgery. Evaluate for abscess.    Multiple axial sections were performed through the cervical and thoracic   spines following injection of approximately 100 cc of Omnipaque 350 IV.   Coronal and sagittal destructions were performed.    This exam is compared with prior noncontrast CT scan the cervical and   thoracic spine performed on September 22, 2022.    Cervical spine:    Postoperative changes compatible with a C7, T1 and T2 corpectomy is again   seen with anterior spinal fusion seen extending from C6 to T3. There is   posterior spinal fusion seen extending from C4 to T5. Evaluation of the   metallic hardware and osseous structures appear adequately placed.    Evaluation of the paraspinal soft tissues appears unremarkable    No abnormal masses or collections are seen    The visualized portions of the brain appear normal    The visualized portions of both lung apices appear clear.    Thoracic spine:    Disc space narrowing endplate sclerotic changes and osteophytes are seen   throughout the thoracic spine region. These findings are secondary to   chronic degenerative changes.    Compression deformity is seen involving T12 and L2 with sclerotic   changes. Sclerotic changes also seen involving the posterior elements at   these levels. These findings are suspicious for underlying metastasis   given patient's history. Mild retropulsed fragment is seen at the T12   level without significant commas of the spinal canal. Moderate   retropulsed fragment is seen at the L2 level which does cause moderate   narrowing of the spinal canal with spinal cord compression suspected.   Contrast enhanced MRI of the thoracic spine can be done for further   evaluation if there are no contraindications.    Evaluation of the paraspinal soft tissues appear normal    The visualized portions of the lungs appear clear.    IMPRESSION: Postop changes again seen and unchanged    Compression deformity with sclerotic changes again seen involving the T12   and L2 vertebral body as described above.    --- End of Report ---            JOSE CHUN MD; Attending Radiologist  This document has been electronically signed. Oct 11 2022  2:50PM      ACC: [id number] EXAM:  XR CHEST PORTABLE URGENT 1V                          PROCEDURE DATE:  10/11/2022          INTERPRETATION:  AP chest on October 11, 2022 at 9:22 AM. Patient has   metastatic breast cancer and a lung nodule. Patient also has leukocytosis.    Heart size normal.    Extensive cervicothoracic hardware again noted.    There is a persistent small left base infiltrate similar to October 7.    IMPRESSION: Persistent left base infiltrate.    --- End of Report ---            SHANIA PORTILLO MD; Attending Radiologist  This document has been electronically signed. Oct 11 2022  2:53PM          CODE STATUS: FULL CODE     Outpatient Follow-up (Specialty/Name of physician):    Marj Del Castillo)  Hematology; Hospice Palliative Medicine; Internal Medicine; Medical Oncology  [address] [place] [address]  Phone: [phone]  Fax: [phone]  Follow Up Time: 2 weeks    Bret Ibarra)  Radiation Oncology  [address] [place] [address]  Phone: [phone]  Fax: [phone]  Follow Up Time: 2 weeks

## 2022-10-11 NOTE — CONSULT NOTE ADULT - PROBLEM SELECTOR RECOMMENDATION 9
Isolation precautions  Stool for Cdiff pending   Monitor BMP  Continue IV fluids  Oral Vanco ordered Isolation precautions  Stool for Cdiff pending   Monitor BMP  Continue IV fluids  Oral Vanco ordered  Add Florastor

## 2022-10-11 NOTE — CONSULT NOTE ADULT - SUBJECTIVE AND OBJECTIVE BOX
HPI:   Patient is a 66y female with a past history of a gastric ulcer who was recently admitted to [facility] with overall failure to thrive and found to have widely metastatic breast cancer with lung, liver, and bone mets.  She is s/p C-T spine surgery with anterior corpectomy and fusion of C7-T2 and posterior C4-T5 fusion.She was treated for a psot op UTI with CTX and cefpodoxime and sent to rehab on 9/30.  He has has a poor appetite, has c/o N/V and has c/o abdominal cramps.She has had some diarrhea and a CT scan today has shown a pancolitis.  A stool for C Diff has been ordered and po vanco started.    REVIEW OF SYSTEMS:  All other review of systems negative (Comprehensive ROS):  mostly GI    PAST MEDICAL & SURGICAL HISTORY:  Stomach ulcer  20 years ago   no pain not taking any meds      Skin cancer of arm, left  pt was 16 year old      H/O excision of mass  left arm  when patient was 16 years ago      S/P hysterectomy          Allergies    tetanus toxoid (Hives)    Intolerances    lactose (Other)      Antimicrobials Day #  :day 1  vancomycin    Solution 125 milliGRAM(s) Oral every 6 hours    Other Medications:  acetaminophen    Suspension .. 650 milliGRAM(s) Oral every 6 hours PRN  ALBUTerol    0.083% 2.5 milliGRAM(s) Nebulizer every 6 hours PRN  Biotene Dry Mouth Oral Rinse 5 milliLiter(s) Swish and Spit three times a day  bisacodyl Suppository 10 milliGRAM(s) Rectal daily PRN  cyclobenzaprine 5 milliGRAM(s) Oral three times a day  dextrose 5% + sodium chloride 0.45%. 1000 milliLiter(s) IV Continuous <Continuous>  enoxaparin Injectable 40 milliGRAM(s) SubCutaneous every 24 hours  HYDROmorphone  Injectable 0.5 milliGRAM(s) IntraMuscular every 6 hours PRN  HYDROmorphone  Injectable 1 milliGRAM(s) IntraMuscular every 6 hours PRN  magnesium oxide 400 milliGRAM(s) Oral three times a day with meals  metoprolol tartrate 25 milliGRAM(s) Oral two times a day  ondansetron   Disintegrating Tablet 8 milliGRAM(s) Oral every 8 hours PRN  oxyCODONE  ER Tablet 20 milliGRAM(s) Oral every 12 hours  potassium chloride  10 mEq/100 mL IVPB 10 milliEquivalent(s) IV Intermittent every 1 hour  pregabalin 25 milliGRAM(s) Oral every 8 hours  saccharomyces boulardii 250 milliGRAM(s) Oral two times a day  simethicone 80 milliGRAM(s) Chew every 6 hours PRN      FAMILY HISTORY:  Family history of dementia (Mother)    Family history of colon cancer (Father)        SOCIAL HISTORY:  Smoking:  x  ETOH:x    Drug Use: x   Single     T(F): 98.2 (10-11-22 @ 08:30), Max: 98.2 (10-11-22 @ 08:30)  HR: 125 (10-11-22 @ 08:30)  BP: 135/87 (10-11-22 @ 08:30)  RR: 16 (10-11-22 @ 08:30)  SpO2: 95% (10-11-22 @ 08:30)  Wt(kg): --    PHYSICAL EXAM:  General: alert, no acute distress, uncomfortable, neck collar in place, anterior and posterior incision C/D/I  Eyes:  anicteric, no conjunctival injection, no discharge  Oropharynx: no lesions or injection, dry mucosa  Neck: supple, without adenopathy  Lungs: clear to auscultation  Heart: regular rate and rhythm; no murmur, rubs or gallops  Abdomen: soft, nondistended, mild nonspecific tenderness,  Skin: no lesions  Extremities: no clubbing, cyanosis, or edema  Neurologic: alert, oriented, moves all extremities    LAB RESULTS:                        10.1   16.97 )-----------( 599      ( 11 Oct 2022 06:00 )             29.2     10-11    131<L>  |  96  |  11  ----------------------------<  96  3.1<L>   |  16<L>  |  0.39<L>    Ca    9.8      11 Oct 2022 06:00  Mg     1.6     10-11    TPro  5.2<L>  /  Alb  1.9<L>  /  TBili  0.5  /  DBili  x   /  AST  40  /  ALT  22  /  AlkPhos  148<H>  10-11    LIVER FUNCTIONS - ( 11 Oct 2022 06:00 )  Alb: 1.9 g/dL / Pro: 5.2 g/dL / ALK PHOS: 148 U/L / ALT: 22 U/L / AST: 40 U/L / GGT: x               MICROBIOLOGY:  RECENT CULTURES:  9/26 blood cultures negative  9/28 urine culture : enterobacter      RADIOLOGY REVIEWED:  < from: CT Abdomen and Pelvis No Cont (10.11.22 @ 11:41) >  IMPRESSION:    Limited, noncontrast exam.    Pancolitis, correlate clinically for inflammatory/infectious etiology,   including pseudomembranous colitis.    Partially visualized right breast mass.    Pulmonary and hepatic metastatic disease as noted on prior exam.    Permeative, osteolytic metastatic disease, including L2 compression   fracture with possible epidural involvement.  This can be further characterized by MRI as clinically indicated.    Other findings as discussed above.    < end of copied text >  < from: CT Angio Chest PE Protocol w/ IV Cont (10.08.22 @ 01:17) >  IMPRESSION:    No pulmonary embolus.    Interlobular septal thickening and bilateral groundglass opacities are   new from prior exam and may represent edema, lymphangitic carcinomatosis   and/or pneumonitis.    Bilateral metastatic lung nodules measuring up to 9 mm within right lower   lobe are unchanged. Trace bilateral pleural effusions and mild bibasilar   atelectasis.    Metastatic disease within the mediastinum and rohit as well as bone   metastases are grossly unchanged.    --- End of Report ---    < end of copied text >

## 2022-10-11 NOTE — PROGRESS NOTE ADULT - SUBJECTIVE AND OBJECTIVE BOX
66y old  Female who presents with a chief complaint of metastatic breast CA to spine s/p  C7-T2 corpectomy & fusion, posterior C4-T5 fusion     seen and examined at the bedside, c/o n/v/loose stool x few fays, not improving.   refusing to eat.  ctap today suggestive of colitis, ?c diff likely      Vital Signs Last 24 Hrs  T(C): 36.8 (11 Oct 2022 08:30), Max: 36.8 (11 Oct 2022 08:30)  T(F): 98.2 (11 Oct 2022 08:30), Max: 98.2 (11 Oct 2022 08:30)  HR: 125 (11 Oct 2022 08:30) (125 - 133)  BP: 135/87 (11 Oct 2022 08:30) (135/87 - 140/80)  BP(mean): --  RR: 16 (11 Oct 2022 08:30) (16 - 16)  SpO2: 95% (11 Oct 2022 08:30) (95% - 98%)    Parameters below as of 11 Oct 2022 08:30  Patient On (Oxygen Delivery Method): room air    GENERAL- NAD  EAR/NOSE/MOUTH/THROAT - no pharyngeal exudates, no oral leisions,  MMM  EYES- ALEC, conjunctiva and Sclera clear  NECK- supple  RESPIRATORY-  clear to auscultation bilaterally, non laboured breathing  CARDIOVASCULAR - SIS2, RRR  GI - soft NT BS present  EXTREMITIES- no pedal edema  NEUROLOGY- no gross focal deficits  PSYCHIATRY- AAO X 3                    10.1                 131  | 16   | 11           16.97 >-----------< 599     ------------------------< 96                    29.2                 3.1  | 96   | 0.39                                         Ca 9.8   Mg 1.6   Ph x          < from: CT Abdomen and Pelvis No Cont (10.11.22 @ 11:41) >  IMPRESSION:    Limited, noncontrast exam.    Pancolitis, correlate clinically for inflammatory/infectious etiology,   including pseudomembranous colitis.    Partially visualized right breast mass.    Pulmonary and hepatic metastatic disease as noted on prior exam.    Permeative, osteolytic metastatic disease, including L2 compression   fracture with possible epidural involvement.  This can be further characterized by MRI as clinically indicated.    Other findings as discussed above.      < from: CT Thoracic Spine w/ IV Cont (10.11.22 @ 11:41) >    IMPRESSION: Postop changes again seen and unchanged  Compression deformity with sclerotic changes again seen involving the T12   and L2 vertebral body as described above.  Evaluation of the paraspinal soft tissues appears unremarkable  No abnormal masses or collections are seen  Compression deformity is seen involving T12 and L2 with sclerotic   changes. Sclerotic changes also seen involving the posterior elements at   these levels. These findings are suspicious for underlying metastasis   given patient's history. Mild retropulsed fragment is seen at the T12   level without significant commas of the spinal canal. Moderate   retropulsed fragment is seen at the [id number] which does cause moderate   narrowing of the spinal canal with spinal cord compression suspected.     Contrast enhanced MRI of the thoracic spine can be done for further   evaluation if there are no contraindications.    < from: TTE Echo Complete w/o Contrast w/ Doppler (10.09.22 @ 08:30) >  Summary:   1. The heart rate is tachycardic   110s BPM throughout the study.   2. Hyperdynamic global left ventricular systolic function.   3. Left ventricular ejection fraction, by visual estimation, is 70 to   75%.   4. Decreased left ventricular internal cavity size.   5. Increased LV wall thickness.   6. Mildly increased left ventricle intracavitary gradient which may be   due to hyperdynamic left ventricle systolic function.   7. The right ventricle is not well visualized, appears normal in size   with normal systolic function in parasternal long axis view.   8. The left atrium appears normal in size.   9. Mild tricuspid regurgitation.  10. No aortic valve stenosis.  11. Trivial pericardial effusion.  12. Subcostal window not well visualized.       MEDICATIONS  (STANDING):  Biotene Dry Mouth Oral Rinse 5 milliLiter(s) Swish and Spit three times a day  cyclobenzaprine 5 milliGRAM(s) Oral three times a day  dextrose 5% + sodium chloride 0.45%. 1000 milliLiter(s) (75 mL/Hr) IV Continuous <Continuous>  enoxaparin Injectable 40 milliGRAM(s) SubCutaneous every 24 hours  magnesium oxide 400 milliGRAM(s) Oral three times a day with meals  metoprolol tartrate 25 milliGRAM(s) Oral two times a day  oxyCODONE  ER Tablet 20 milliGRAM(s) Oral every 12 hours  pregabalin 25 milliGRAM(s) Oral every 8 hours  saccharomyces boulardii 250 milliGRAM(s) Oral two times a day  vancomycin    Solution 125 milliGRAM(s) Oral every 6 hours    MEDICATIONS  (PRN):  acetaminophen    Suspension .. 650 milliGRAM(s) Oral every 6 hours PRN Mild Pain (1 - 3)  ALBUTerol    0.083% 2.5 milliGRAM(s) Nebulizer every 6 hours PRN Shortness of Breath and/or Wheezing  bisacodyl Suppository 10 milliGRAM(s) Rectal daily PRN Constipation  HYDROmorphone  Injectable 0.5 milliGRAM(s) IntraMuscular every 6 hours PRN Moderate Pain (4 - 6)  HYDROmorphone  Injectable 1 milliGRAM(s) IntraMuscular every 6 hours PRN Severe Pain (7 - 10)  ondansetron   Disintegrating Tablet 8 milliGRAM(s) Oral every 8 hours PRN Nausea and/or Vomiting  simethicone 80 milliGRAM(s) Chew every 6 hours PRN Gas           66y old  Female who presents with a chief complaint of metastatic breast CA to spine s/p  C7-T2 corpectomy & fusion, posterior C4-T5 fusion     seen and examined at the bedside, c/o n/v/loose stool x few fays, not improving.   refusing to eat.  ctap today suggestive of colitis, ?c diff likely      Vital Signs Last 24 Hrs  T(C): 36.8 (11 Oct 2022 08:30), Max: 36.8 (11 Oct 2022 08:30)  T(F): 98.2 (11 Oct 2022 08:30), Max: 98.2 (11 Oct 2022 08:30)  HR: 125 (11 Oct 2022 08:30) (125 - 133)  BP: 135/87 (11 Oct 2022 08:30) (135/87 - 140/80)  BP(mean): --  RR: 16 (11 Oct 2022 08:30) (16 - 16)  SpO2: 95% (11 Oct 2022 08:30) (95% - 98%)    Parameters below as of 11 Oct 2022 08:30  Patient On (Oxygen Delivery Method): room air    GENERAL- NAD  EAR/NOSE/MOUTH/THROAT - no pharyngeal exudates, no oral leisions,  MMM  EYES- ALEC, conjunctiva and Sclera clear  NECK- supple  RESPIRATORY-  clear to auscultation bilaterally, non laboured breathing  CARDIOVASCULAR - SIS2, RRR  GI - soft NT BS present  EXTREMITIES- no pedal edema  NEUROLOGY-  LE weakness  PSYCHIATRY- AAO X 3                    10.1                 131  | 16   | 11           16.97 >-----------< 599     ------------------------< 96                    29.2                 3.1  | 96   | 0.39                                         Ca 9.8   Mg 1.6   Ph x          < from: CT Abdomen and Pelvis No Cont (10.11.22 @ 11:41) >  IMPRESSION:    Limited, noncontrast exam.    Pancolitis, correlate clinically for inflammatory/infectious etiology,   including pseudomembranous colitis.    Partially visualized right breast mass.    Pulmonary and hepatic metastatic disease as noted on prior exam.    Permeative, osteolytic metastatic disease, including L2 compression   fracture with possible epidural involvement.  This can be further characterized by MRI as clinically indicated.    Other findings as discussed above.      < from: CT Thoracic Spine w/ IV Cont (10.11.22 @ 11:41) >    IMPRESSION: Postop changes again seen and unchanged  Compression deformity with sclerotic changes again seen involving the T12   and L2 vertebral body as described above.  Evaluation of the paraspinal soft tissues appears unremarkable  No abnormal masses or collections are seen  Compression deformity is seen involving T12 and L2 with sclerotic   changes. Sclerotic changes also seen involving the posterior elements at   these levels. These findings are suspicious for underlying metastasis   given patient's history. Mild retropulsed fragment is seen at the T12   level without significant commas of the spinal canal. Moderate   retropulsed fragment is seen at the [id number] which does cause moderate   narrowing of the spinal canal with spinal cord compression suspected.     Contrast enhanced MRI of the thoracic spine can be done for further   evaluation if there are no contraindications.    < from: TTE Echo Complete w/o Contrast w/ Doppler (10.09.22 @ 08:30) >  Summary:   1. The heart rate is tachycardic   110s BPM throughout the study.   2. Hyperdynamic global left ventricular systolic function.   3. Left ventricular ejection fraction, by visual estimation, is 70 to   75%.   4. Decreased left ventricular internal cavity size.   5. Increased LV wall thickness.   6. Mildly increased left ventricle intracavitary gradient which may be   due to hyperdynamic left ventricle systolic function.   7. The right ventricle is not well visualized, appears normal in size   with normal systolic function in parasternal long axis view.   8. The left atrium appears normal in size.   9. Mild tricuspid regurgitation.  10. No aortic valve stenosis.  11. Trivial pericardial effusion.  12. Subcostal window not well visualized.       MEDICATIONS  (STANDING):  Biotene Dry Mouth Oral Rinse 5 milliLiter(s) Swish and Spit three times a day  cyclobenzaprine 5 milliGRAM(s) Oral three times a day  dextrose 5% + sodium chloride 0.45%. 1000 milliLiter(s) (75 mL/Hr) IV Continuous <Continuous>  enoxaparin Injectable 40 milliGRAM(s) SubCutaneous every 24 hours  magnesium oxide 400 milliGRAM(s) Oral three times a day with meals  metoprolol tartrate 25 milliGRAM(s) Oral two times a day  oxyCODONE  ER Tablet 20 milliGRAM(s) Oral every 12 hours  pregabalin 25 milliGRAM(s) Oral every 8 hours  saccharomyces boulardii 250 milliGRAM(s) Oral two times a day  vancomycin    Solution 125 milliGRAM(s) Oral every 6 hours    MEDICATIONS  (PRN):  acetaminophen    Suspension .. 650 milliGRAM(s) Oral every 6 hours PRN Mild Pain (1 - 3)  ALBUTerol    0.083% 2.5 milliGRAM(s) Nebulizer every 6 hours PRN Shortness of Breath and/or Wheezing  bisacodyl Suppository 10 milliGRAM(s) Rectal daily PRN Constipation  HYDROmorphone  Injectable 0.5 milliGRAM(s) IntraMuscular every 6 hours PRN Moderate Pain (4 - 6)  HYDROmorphone  Injectable 1 milliGRAM(s) IntraMuscular every 6 hours PRN Severe Pain (7 - 10)  ondansetron   Disintegrating Tablet 8 milliGRAM(s) Oral every 8 hours PRN Nausea and/or Vomiting  simethicone 80 milliGRAM(s) Chew every 6 hours PRN Gas

## 2022-10-12 DIAGNOSIS — C50.919 MALIGNANT NEOPLASM OF UNSPECIFIED SITE OF UNSPECIFIED FEMALE BREAST: ICD-10-CM

## 2022-10-12 LAB
ALBUMIN SERPL ELPH-MCNC: 1.7 G/DL — LOW (ref 3.3–5)
ALP SERPL-CCNC: 132 U/L — HIGH (ref 40–120)
ALT FLD-CCNC: 21 U/L — SIGNIFICANT CHANGE UP (ref 10–45)
ANION GAP SERPL CALC-SCNC: 14 MMOL/L — SIGNIFICANT CHANGE UP (ref 5–17)
AST SERPL-CCNC: 41 U/L — HIGH (ref 10–40)
BILIRUB SERPL-MCNC: 0.3 MG/DL — SIGNIFICANT CHANGE UP (ref 0.2–1.2)
BUN SERPL-MCNC: 12 MG/DL — SIGNIFICANT CHANGE UP (ref 7–23)
CALCIUM SERPL-MCNC: 9.9 MG/DL — SIGNIFICANT CHANGE UP (ref 8.4–10.5)
CHLORIDE SERPL-SCNC: 99 MMOL/L — SIGNIFICANT CHANGE UP (ref 96–108)
CO2 SERPL-SCNC: 20 MMOL/L — LOW (ref 22–31)
CREAT SERPL-MCNC: 0.39 MG/DL — LOW (ref 0.5–1.3)
EGFR: 110 ML/MIN/1.73M2 — SIGNIFICANT CHANGE UP
GLUCOSE SERPL-MCNC: 143 MG/DL — HIGH (ref 70–99)
HCT VFR BLD CALC: 28.2 % — LOW (ref 34.5–45)
HGB BLD-MCNC: 9.7 G/DL — LOW (ref 11.5–15.5)
MCHC RBC-ENTMCNC: 29 PG — SIGNIFICANT CHANGE UP (ref 27–34)
MCHC RBC-ENTMCNC: 34.4 GM/DL — SIGNIFICANT CHANGE UP (ref 32–36)
MCV RBC AUTO: 84.4 FL — SIGNIFICANT CHANGE UP (ref 80–100)
NRBC # BLD: 1 /100 WBCS — HIGH (ref 0–0)
PLATELET # BLD AUTO: 607 K/UL — HIGH (ref 150–400)
POTASSIUM SERPL-MCNC: 3.2 MMOL/L — LOW (ref 3.5–5.3)
POTASSIUM SERPL-SCNC: 3.2 MMOL/L — LOW (ref 3.5–5.3)
PROT SERPL-MCNC: 4.8 G/DL — LOW (ref 6–8.3)
RBC # BLD: 3.34 M/UL — LOW (ref 3.8–5.2)
RBC # FLD: 14.1 % — SIGNIFICANT CHANGE UP (ref 10.3–14.5)
SODIUM SERPL-SCNC: 133 MMOL/L — LOW (ref 135–145)
WBC # BLD: 19.42 K/UL — HIGH (ref 3.8–10.5)
WBC # FLD AUTO: 19.42 K/UL — HIGH (ref 3.8–10.5)

## 2022-10-12 PROCEDURE — 90832 PSYTX W PT 30 MINUTES: CPT

## 2022-10-12 PROCEDURE — 99233 SBSQ HOSP IP/OBS HIGH 50: CPT

## 2022-10-12 PROCEDURE — 99223 1ST HOSP IP/OBS HIGH 75: CPT

## 2022-10-12 RX ORDER — HYDROMORPHONE HYDROCHLORIDE 2 MG/ML
0.5 INJECTION INTRAMUSCULAR; INTRAVENOUS; SUBCUTANEOUS ONCE
Refills: 0 | Status: DISCONTINUED | OUTPATIENT
Start: 2022-10-12 | End: 2022-10-12

## 2022-10-12 RX ORDER — SODIUM CHLORIDE 9 MG/ML
1000 INJECTION, SOLUTION INTRAVENOUS
Refills: 0 | Status: DISCONTINUED | OUTPATIENT
Start: 2022-10-12 | End: 2022-10-13

## 2022-10-12 RX ORDER — OXYCODONE HYDROCHLORIDE 5 MG/1
20 TABLET ORAL EVERY 12 HOURS
Refills: 0 | Status: DISCONTINUED | OUTPATIENT
Start: 2022-10-12 | End: 2022-10-14

## 2022-10-12 RX ORDER — POTASSIUM CHLORIDE 20 MEQ
10 PACKET (EA) ORAL
Refills: 0 | Status: COMPLETED | OUTPATIENT
Start: 2022-10-12 | End: 2022-10-12

## 2022-10-12 RX ADMIN — HYDROMORPHONE HYDROCHLORIDE 0.5 MILLIGRAM(S): 2 INJECTION INTRAMUSCULAR; INTRAVENOUS; SUBCUTANEOUS at 12:10

## 2022-10-12 RX ADMIN — HYDROMORPHONE HYDROCHLORIDE 0.5 MILLIGRAM(S): 2 INJECTION INTRAMUSCULAR; INTRAVENOUS; SUBCUTANEOUS at 11:14

## 2022-10-12 RX ADMIN — ENOXAPARIN SODIUM 40 MILLIGRAM(S): 100 INJECTION SUBCUTANEOUS at 09:18

## 2022-10-12 RX ADMIN — Medication 125 MILLIGRAM(S): at 02:58

## 2022-10-12 RX ADMIN — Medication 125 MILLIGRAM(S): at 12:58

## 2022-10-12 RX ADMIN — HYDROMORPHONE HYDROCHLORIDE 1 MILLIGRAM(S): 2 INJECTION INTRAMUSCULAR; INTRAVENOUS; SUBCUTANEOUS at 19:33

## 2022-10-12 RX ADMIN — Medication 100 MILLIEQUIVALENT(S): at 11:14

## 2022-10-12 RX ADMIN — HYDROMORPHONE HYDROCHLORIDE 0.5 MILLIGRAM(S): 2 INJECTION INTRAMUSCULAR; INTRAVENOUS; SUBCUTANEOUS at 09:17

## 2022-10-12 RX ADMIN — PANTOPRAZOLE SODIUM 40 MILLIGRAM(S): 20 TABLET, DELAYED RELEASE ORAL at 15:57

## 2022-10-12 RX ADMIN — Medication 100 MILLIEQUIVALENT(S): at 12:57

## 2022-10-12 RX ADMIN — HYDROMORPHONE HYDROCHLORIDE 1 MILLIGRAM(S): 2 INJECTION INTRAMUSCULAR; INTRAVENOUS; SUBCUTANEOUS at 18:58

## 2022-10-12 RX ADMIN — SODIUM CHLORIDE 100 MILLILITER(S): 9 INJECTION, SOLUTION INTRAVENOUS at 15:56

## 2022-10-12 RX ADMIN — HYDROMORPHONE HYDROCHLORIDE 0.5 MILLIGRAM(S): 2 INJECTION INTRAMUSCULAR; INTRAVENOUS; SUBCUTANEOUS at 10:02

## 2022-10-12 RX ADMIN — SODIUM CHLORIDE 100 MILLILITER(S): 9 INJECTION, SOLUTION INTRAVENOUS at 09:17

## 2022-10-12 RX ADMIN — Medication 125 MILLIGRAM(S): at 21:45

## 2022-10-12 RX ADMIN — Medication 100 MILLIEQUIVALENT(S): at 09:18

## 2022-10-12 NOTE — PROGRESS NOTE ADULT - ASSESSMENT
65 yo female persistent diarrhea and pancolitis on CT imaging. Patient seen and examined at bedside. She still has watery diarrhea. She is able to tolerate some liquids. Minimal nausea. She reports some abdominal pain.     WBC elevated Stool test pending result.     Other findings as discussed above.  65 yo female persistent diarrhea and pancolitis on CT imaging. Patient seen and examined at bedside. She still has watery diarrhea. She is able to tolerate some liquids. Minimal nausea. She reports some abdominal pain.     WBC elevated Stool test pending result, particularly c. diff.     Other findings as discussed above.

## 2022-10-12 NOTE — PROGRESS NOTE ADULT - SUBJECTIVE AND OBJECTIVE BOX
Patient is a 66y old  Female who presents with a chief complaint of metastatic breast CA to spine s/p  C7-T2 corpectomy & fusion, posterior C4-T5 fusion (12 Oct 2022 11:02)      HPI:  Thi is a 67 yo female with PMH of left arm skin CA s/p excision at age 16, stomach ulcer, her last mammogram was in , who was brought to [facility] ED 9/10/22 with progressive weakness to the point that she felt she could not get out of bed. She also reported remote shoulder pain, decreased appetite. Was found to have two lumps in her right breast but could not tolerate recommended biopsy due to pain. CT scan of the Cervical spine performed which showed Multiple lytic lesions of the cervical and visualized thoracic spine as well as right first and second ribs concerning for metastases versus multiple myeloma.    She was admitted under medicine service for further workup and management, including of her pain. Further imaging done showed Metastatic breast cancer with dominant right breast mass, right chest wall, axillary and retropectoral involvement; osseous, pulmonary, and likely hepatic metastatic disease. She was also found to have metastatic adenopathy of the thorax, diffuse extensive osseous metastatic disease with multiple lucent and sclerotic lesions and compression fractures of T1, T9, T12, and L2. Severe T1 compression with epidural extension; cord compression cannot be excluded. +additional neoplastic disease of the spine with epidural extension. Multifocal neoplastic lesions involving the pelvis and ribs as well.    Patient was seen by Oncology, CT surgery and Neurosurgery team. She underwent C7-T2 corpectomy & fusion, posterior C4-T5 fusion. Post-op course uncomplicated.  Hamm removed, pt requiring some straight cath for retention. She also had subjective dysphagia - seen by speech therapy recommended pureed diet which patient continues to prefer. Patient with low grade temp and positive UA. Started on IV Rocephin for UTI. Blood cultures negative, Urine culture positive for GNR, changed to PO Vantin for 7 days.    Patient was medically optimized for discharge to [facility] IRF on 22. (30 Sep 2022 14:32)      PAST MEDICAL & SURGICAL HISTORY:  Stomach ulcer  20 years ago   no pain not taking any meds      Skin cancer of arm, left  pt was 16 year old      H/O excision of mass  left arm  when patient was 16 years ago      S/P hysterectomy          MEDICATIONS  (STANDING):  Biotene Dry Mouth Oral Rinse 5 milliLiter(s) Swish and Spit three times a day  cyclobenzaprine 5 milliGRAM(s) Oral three times a day  dextrose 5% + sodium chloride 0.45%. 1000 milliLiter(s) (100 mL/Hr) IV Continuous <Continuous>  enoxaparin Injectable 40 milliGRAM(s) SubCutaneous every 24 hours  magnesium oxide 400 milliGRAM(s) Oral three times a day with meals  metoprolol tartrate 25 milliGRAM(s) Oral two times a day  oxyCODONE  ER Tablet 20 milliGRAM(s) Oral every 12 hours  pantoprazole  Injectable 40 milliGRAM(s) IV Push daily  potassium chloride  10 mEq/100 mL IVPB 10 milliEquivalent(s) IV Intermittent every 1 hour  pregabalin 25 milliGRAM(s) Oral every 8 hours  saccharomyces boulardii 250 milliGRAM(s) Oral two times a day  vancomycin    Solution 125 milliGRAM(s) Oral every 6 hours    MEDICATIONS  (PRN):  acetaminophen    Suspension .. 650 milliGRAM(s) Oral every 6 hours PRN Mild Pain (1 - 3)  ALBUTerol    0.083% 2.5 milliGRAM(s) Nebulizer every 6 hours PRN Shortness of Breath and/or Wheezing  bisacodyl Suppository 10 milliGRAM(s) Rectal daily PRN Constipation  HYDROmorphone  Injectable 0.5 milliGRAM(s) IntraMuscular every 6 hours PRN Moderate Pain (4 - 6)  HYDROmorphone  Injectable 1 milliGRAM(s) IntraMuscular every 6 hours PRN Severe Pain (7 - 10)  ondansetron Injectable 8 milliGRAM(s) IV Push every 6 hours PRN Nausea and/or Vomiting  simethicone 80 milliGRAM(s) Chew every 6 hours PRN Gas      Allergies    tetanus toxoid (Hives)    Intolerances    lactose (Other)        VITALS  66y  Vital Signs Last 24 Hrs  T(C): 36.2 (12 Oct 2022 09:35), Max: 36.8 (11 Oct 2022 22:00)  T(F): 97.2 (12 Oct 2022 09:35), Max: 98.2 (11 Oct 2022 22:00)  HR: 128 (12 Oct 2022 09:35) (106 - 138)  BP: 132/81 (12 Oct 2022 09:35) (124/78 - 141/86)  BP(mean): --  RR: 16 (12 Oct 2022 09:35) (16 - 16)  SpO2: 97% (12 Oct 2022 09:35) (96% - 97%)    Parameters below as of 12 Oct 2022 09:35  Patient On (Oxygen Delivery Method): room air      Daily     Daily Weight in k (12 Oct 2022 04:24)        RECENT LABS:                          9.7    19.42 )-----------( 607      ( 12 Oct 2022 06:50 )             28.2     10-12    133<L>  |  99  |  12  ----------------------------<  143<H>  3.2<L>   |  20<L>  |  0.39<L>    Ca    9.9      12 Oct 2022 06:50  Mg     1.6     10-11    TPro  4.8<L>  /  Alb  1.7<L>  /  TBili  0.3  /  DBili  x   /  AST  41<H>  /  ALT  21  /  AlkPhos  132<H>  1012    LIVER FUNCTIONS - ( 12 Oct 2022 06:50 )  Alb: 1.7 g/dL / Pro: 4.8 g/dL / ALK PHOS: 132 U/L / ALT: 21 U/L / AST: 41 U/L / GGT: x                 Culture - Stool (collected 10-11-22 @ 03:30)  Source: .Stool Feces  Preliminary Report (10-12-22 @ 11:47):    No enteric pathogens to date: Final culture pending        CAPILLARY BLOOD GLUCOSE      Review of Systems:   	  · ROS	Patient afebrile but continues to have loose stool, nausea, difficulty tolerating po. she reports spitting up medications as soon as she takes them and has continued to have poor compliance/refusal many meds including beta blockers. Had also refused vanco--importance of medication for treatment symtpoms discussed.    She asks if oxy can be given IVl; current pain meds and IM dilaudid dosing discussed. Option for reduced oxycontin ER to see if SE more tolerable discussed but patietn declines at this time    Later on, was able to tolerate some oral fluids. Appears to have combination of anxiety and reported discomfort from collar, although postiion checked and seems appropriate. Was also evaluated by orthotist who confirmed appropriate size    results of blood work and CTs reviewed with patient. therapists at bedside, resuming bedside prgoram. On contact isolation    Physical Exam:   · Constitutional Comments	c collar in place.Fit checked, appropriate  anterior cervical incision intact, no oozing +steri strips, no neck swelling or redness  	  · Respiratory	clear to auscultation bilaterally; no wheezes; no rales; no rhonchi  · Cardiovascular	regular rate and rhythm; S1 S2 present; no gallops; no rub; no murmur  · Gastrointestinal	soft; no R/G +mild distension no R/G  	  	  · Motor	bilateral calves soft, no TTP  no pedal edema

## 2022-10-12 NOTE — PROGRESS NOTE ADULT - ASSESSMENT
Pt inconsistently alert, fair attention, intact language, thought processes goal-directed, no abnormal thought contents noted, anxious/depressed affect and mood, denied AH/VH, denied SI/HI/I/P, calm behavior, experiencing difficulty coping with her physical symptoms. Plan: Continue supportive tx.

## 2022-10-12 NOTE — PROGRESS NOTE ADULT - PROBLEM SELECTOR PLAN 1
Isolation precautions  Stool for C diff result pending   Monitor BMP  Continue IV fluids  Continue Oral Vanco ordered  Continue  Florastor.

## 2022-10-12 NOTE — PROGRESS NOTE ADULT - ASSESSMENT
65 yo female PMH stomach ulcer who presented to [facility] 9/10/22 with progressive weakness, discovered multiple lytic lesions of the cervical and thoracic spine along with lung and liver secondary to metastatic primary newly discovered breast cancer with axillary and retropectoral involvement. Imaging also showed Compression fractures of T1, T9, T12, and L2. Severe T1 compression with epidural extension.S/p C7-T2 corpectomy & fusion, posterior C4-T5 fusion, hospital course significant for urinary retention and dysphagia, UTI, now admitted for rehab- pt/ot/dvt ppx    #Acute hypoxic respiratory failure in need of supplemental oxygenation likely multifactorial including:  *Metastatic disease within mediastinum and rohit  *Metastatic disease within ribs  *Bilateral lung nodules RLL  *Bilateral GGO  *Bilateral atelectasis  -PE ruled out with neg CTA  -C/w alb neb treatement q 6hrs prn  -Incentive spirometer, OOB to chair, mobilize  -Might need oxygen due to increase in cancer burden in lungs  -TTE : EF 70-75%. tachy, hyperdynamic LVF  -Appreciate pulmonary recommendations    *Metastatic Breast CA to lung, liver, ribs, brain with spine involvement and neurogenic bladder  - s/p right breast biopsy shows  positive for invasive ductal carcinoma  - Compression fractures of T1, T9, T12, and L2. Severe T1 compression with epidural extension.   - s/p C7-T2 corpectomy & fusion, posterior C4-T5 fusion  - Patient will not require chemo/radiation while in rehab. 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  c/w iv fluids  full liquid diet for now  Zofran prn  ppi  BC X 2 SENT  recheck ua  am labs  replete K   ct c and t spine - results noted, rehab team will reach out to neuro team, for any new reccs  GI consulted - recc noted  ID recc noted  reassess patient in am  encouraged compliance with po vanco and meds.     * HTN  - metoprolol   - EKG sinus tach- likely due to colitis  - cardio recc noted  - encouraged PO hydration      *Enterobacter cloacae UTI  - s/p Vantin BID for 7 days 9/30-10/7/22  - monitor bladder scans  - SC for PVR >350 ml, toileting schedule    * Hypercalcemia- improved with fluids  - likely related to bone mets  - c/w IVF  - repeat BMP and albumin ordered    *Normocytic anemia  - Monitor Hg/Hct, iron levels, B12, folate WNL  - Transfuse if hG <7      Severe protein-calorie malnutrition.  Hypoalbuminemia  - nutrition eval noted    DVT ppx: Lovenox      will follow  d/w dr. boggs  65 yo female PMH stomach ulcer who presented to [facility] 9/10/22 with progressive weakness, discovered multiple lytic lesions of the cervical and thoracic spine along with lung and liver secondary to metastatic primary newly discovered breast cancer with axillary and retropectoral involvement. Imaging also showed Compression fractures of T1, T9, T12, and L2. Severe T1 compression with epidural extension.S/p C7-T2 corpectomy & fusion, posterior C4-T5 fusion, hospital course significant for urinary retention and dysphagia, UTI, now admitted for rehab- pt/ot/dvt ppx    #Acute hypoxic respiratory failure in need of supplemental oxygenation likely multifactorial including:  *Metastatic disease within mediastinum and rohit  *Metastatic disease within ribs  *Bilateral lung nodules RLL  *Bilateral GGO  *Bilateral atelectasis  -PE ruled out with neg CTA  -C/w alb neb treatement q 6hrs prn  -Incentive spirometer, OOB to chair, mobilize  -Might need oxygen due to increase in cancer burden in lungs  -TTE : EF 70-75%. tachy, hyperdynamic LVF  -Appreciate pulmonary recommendations    *Metastatic Breast CA to lung, liver, ribs, brain with spine involvement and neurogenic bladder  - s/p right breast biopsy shows  positive for invasive ductal carcinoma  - Compression fractures of T1, T9, T12, and L2. Severe T1 compression with epidural extension.   - s/p C7-T2 corpectomy & fusion, posterior C4-T5 fusion  - Patient will not require chemo/radiation while in rehab. 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  - oncology consult called today      *n/v/d/ LEUKOCYTOSIS/ hypokalemia / hyponatremia- likely due to colitis, high suspicion for c diff  po vanco STARTED 10/11/22, pending c diff results.  c/w iv fluids  full liquid diet for now  Zofran prn  ppi  BC X 2 SENT  recheck ua  am labs  replete K   ct c and t spine - results noted, rehab team will reach out to neuro team, for any new reccs  GI consulted - recc noted  ID recc noted  reassess patient in am  encouraged compliance with po vanco and meds.     * HTN  - metoprolol   - EKG sinus tach- likely due to colitis  - cardio recc noted  - encouraged PO hydration      *Enterobacter cloacae UTI  - s/p Vantin BID for 7 days 9/30-10/7/22  - monitor bladder scans  - SC for PVR >350 ml, toileting schedule    * Hypercalcemia- improved with fluids  - likely related to bone mets  - c/w IVF  - repeat BMP and albumin ordered    *Normocytic anemia  - Monitor Hg/Hct, iron levels, B12, folate WNL  - Transfuse if hG <7      Severe protein-calorie malnutrition.  Hypoalbuminemia  - nutrition eval noted    DVT ppx: Lovenox      will follow  d/w dr. boggs

## 2022-10-12 NOTE — PROGRESS NOTE ADULT - SUBJECTIVE AND OBJECTIVE BOX
66y old  Female who presents with a chief complaint of metastatic breast CA to spine s/p  C7-T2 corpectomy & fusion, posterior C4-T5 fusion     seen and examined at the bedside, c/o 2 loose stool x this am, associated with nausea, feels midly improved today.    sctap today suggestive of colitis, ?c diff likely - results pending      Vital Signs Last 24 Hrs  T(C): 36.2 (12 Oct 2022 09:35), Max: 36.8 (11 Oct 2022 22:00)  T(F): 97.2 (12 Oct 2022 09:35), Max: 98.2 (11 Oct 2022 22:00)  HR: 128 (12 Oct 2022 09:35) (106 - 138)  BP: 132/81 (12 Oct 2022 09:35) (124/78 - 141/86)  BP(mean): --  RR: 16 (12 Oct 2022 09:35) (16 - 16)  SpO2: 97% (12 Oct 2022 09:35) (96% - 97%)    Parameters below as of 12 Oct 2022 09:35  Patient On (Oxygen Delivery Method): room air        GENERAL- NAD  EAR/NOSE/MOUTH/THROAT - no pharyngeal exudates, no oral lesions  MMM  EYES- ALEC, conjunctiva and Sclera clear  NECK- supple  RESPIRATORY-  clear to auscultation bilaterally, non laboured breathing  CARDIOVASCULAR - SIS2, RRR  GI - soft NT BS present  EXTREMITIES- no pedal edema  NEUROLOGY- no gross focal deficits  PSYCHIATRY- AAO X 3                  9.7                  133  | 20   | 12           19.42 >-----------< 607     ------------------------< 143                   28.2                 3.2  | 99   | 0.39                                         Ca 9.9   Mg x     Ph x            MEDICATIONS  (STANDING):  Biotene Dry Mouth Oral Rinse 5 milliLiter(s) Swish and Spit three times a day  cyclobenzaprine 5 milliGRAM(s) Oral three times a day  dextrose 5% + sodium chloride 0.45%. 1000 milliLiter(s) (100 mL/Hr) IV Continuous <Continuous>  enoxaparin Injectable 40 milliGRAM(s) SubCutaneous every 24 hours  magnesium oxide 400 milliGRAM(s) Oral three times a day with meals  metoprolol tartrate 25 milliGRAM(s) Oral two times a day  oxyCODONE  ER Tablet 20 milliGRAM(s) Oral every 12 hours  pantoprazole  Injectable 40 milliGRAM(s) IV Push daily  potassium chloride  10 mEq/100 mL IVPB 10 milliEquivalent(s) IV Intermittent every 1 hour  pregabalin 25 milliGRAM(s) Oral every 8 hours  saccharomyces boulardii 250 milliGRAM(s) Oral two times a day  vancomycin    Solution 125 milliGRAM(s) Oral every 6 hours    MEDICATIONS  (PRN):  acetaminophen    Suspension .. 650 milliGRAM(s) Oral every 6 hours PRN Mild Pain (1 - 3)  ALBUTerol    0.083% 2.5 milliGRAM(s) Nebulizer every 6 hours PRN Shortness of Breath and/or Wheezing  HYDROmorphone  Injectable 0.5 milliGRAM(s) IntraMuscular every 6 hours PRN Moderate Pain (4 - 6)  HYDROmorphone  Injectable 1 milliGRAM(s) IntraMuscular every 6 hours PRN Severe Pain (7 - 10)  ondansetron Injectable 8 milliGRAM(s) IV Push every 6 hours PRN Nausea and/or Vomiting  simethicone 80 milliGRAM(s) Chew every 6 hours PRN Gas   66y old  Female who presents with a chief complaint of metastatic breast CA to spine s/p  C7-T2 corpectomy & fusion, posterior C4-T5 fusion     seen and examined at the bedside, c/o 2 loose stool x this am, associated with nausea, feels midly improved today.        Vital Signs Last 24 Hrs  T(C): 36.2 (12 Oct 2022 09:35), Max: 36.8 (11 Oct 2022 22:00)  T(F): 97.2 (12 Oct 2022 09:35), Max: 98.2 (11 Oct 2022 22:00)  HR: 128 (12 Oct 2022 09:35) (106 - 138)  BP: 132/81 (12 Oct 2022 09:35) (124/78 - 141/86)  BP(mean): --  RR: 16 (12 Oct 2022 09:35) (16 - 16)  SpO2: 97% (12 Oct 2022 09:35) (96% - 97%)    Parameters below as of 12 Oct 2022 09:35  Patient On (Oxygen Delivery Method): room air        GENERAL- NAD  EAR/NOSE/MOUTH/THROAT - no pharyngeal exudates, no oral lesions  MMM  EYES- ALEC, conjunctiva and Sclera clear  NECK- supple  RESPIRATORY-  clear to auscultation bilaterally, non laboured breathing  CARDIOVASCULAR - SIS2, RRR  GI - soft NT BS present  EXTREMITIES- no pedal edema  NEUROLOGY- no gross focal deficits  PSYCHIATRY- AAO X 3                  9.7                  133  | 20   | 12           19.42 >-----------< 607     ------------------------< 143                   28.2                 3.2  | 99   | 0.39                                         Ca 9.9   Mg x     Ph x            MEDICATIONS  (STANDING):  Biotene Dry Mouth Oral Rinse 5 milliLiter(s) Swish and Spit three times a day  cyclobenzaprine 5 milliGRAM(s) Oral three times a day  dextrose 5% + sodium chloride 0.45%. 1000 milliLiter(s) (100 mL/Hr) IV Continuous <Continuous>  enoxaparin Injectable 40 milliGRAM(s) SubCutaneous every 24 hours  magnesium oxide 400 milliGRAM(s) Oral three times a day with meals  metoprolol tartrate 25 milliGRAM(s) Oral two times a day  oxyCODONE  ER Tablet 20 milliGRAM(s) Oral every 12 hours  pantoprazole  Injectable 40 milliGRAM(s) IV Push daily  potassium chloride  10 mEq/100 mL IVPB 10 milliEquivalent(s) IV Intermittent every 1 hour  pregabalin 25 milliGRAM(s) Oral every 8 hours  saccharomyces boulardii 250 milliGRAM(s) Oral two times a day  vancomycin    Solution 125 milliGRAM(s) Oral every 6 hours    MEDICATIONS  (PRN):  acetaminophen    Suspension .. 650 milliGRAM(s) Oral every 6 hours PRN Mild Pain (1 - 3)  ALBUTerol    0.083% 2.5 milliGRAM(s) Nebulizer every 6 hours PRN Shortness of Breath and/or Wheezing  HYDROmorphone  Injectable 0.5 milliGRAM(s) IntraMuscular every 6 hours PRN Moderate Pain (4 - 6)  HYDROmorphone  Injectable 1 milliGRAM(s) IntraMuscular every 6 hours PRN Severe Pain (7 - 10)  ondansetron Injectable 8 milliGRAM(s) IV Push every 6 hours PRN Nausea and/or Vomiting  simethicone 80 milliGRAM(s) Chew every 6 hours PRN Gas   66y old  Female who presents with a chief complaint of metastatic breast CA to spine s/p  C7-T2 corpectomy & fusion, posterior C4-T5 fusion     seen and examined at the bedside, c/o 2 loose stool x this am, associated with nausea, feels midly improved today.        Vital Signs Last 24 Hrs  T(C): 36.2 (12 Oct 2022 09:35), Max: 36.8 (11 Oct 2022 22:00)  T(F): 97.2 (12 Oct 2022 09:35), Max: 98.2 (11 Oct 2022 22:00)  HR: 128 (12 Oct 2022 09:35) (106 - 138)  BP: 132/81 (12 Oct 2022 09:35) (124/78 - 141/86)  BP(mean): --  RR: 16 (12 Oct 2022 09:35) (16 - 16)  SpO2: 97% (12 Oct 2022 09:35) (96% - 97%)    Parameters below as of 12 Oct 2022 09:35  Patient On (Oxygen Delivery Method): room air        GENERAL- NAD  EAR/NOSE/MOUTH/THROAT - no pharyngeal exudates, no oral lesions  MMM  EYES- ALEC, conjunctiva and Sclera clear  NECK- supple  RESPIRATORY-  clear to auscultation bilaterally, non laboured breathing  CARDIOVASCULAR - SIS2, RRR  GI - soft NT BS present  EXTREMITIES- no pedal edema  NEUROLOGY-  LE weakness  PSYCHIATRY- AAO X 3                  9.7                  133  | 20   | 12           19.42 >-----------< 607     ------------------------< 143                   28.2                 3.2  | 99   | 0.39                                         Ca 9.9   Mg x     Ph x            MEDICATIONS  (STANDING):  Biotene Dry Mouth Oral Rinse 5 milliLiter(s) Swish and Spit three times a day  cyclobenzaprine 5 milliGRAM(s) Oral three times a day  dextrose 5% + sodium chloride 0.45%. 1000 milliLiter(s) (100 mL/Hr) IV Continuous <Continuous>  enoxaparin Injectable 40 milliGRAM(s) SubCutaneous every 24 hours  magnesium oxide 400 milliGRAM(s) Oral three times a day with meals  metoprolol tartrate 25 milliGRAM(s) Oral two times a day  oxyCODONE  ER Tablet 20 milliGRAM(s) Oral every 12 hours  pantoprazole  Injectable 40 milliGRAM(s) IV Push daily  potassium chloride  10 mEq/100 mL IVPB 10 milliEquivalent(s) IV Intermittent every 1 hour  pregabalin 25 milliGRAM(s) Oral every 8 hours  saccharomyces boulardii 250 milliGRAM(s) Oral two times a day  vancomycin    Solution 125 milliGRAM(s) Oral every 6 hours    MEDICATIONS  (PRN):  acetaminophen    Suspension .. 650 milliGRAM(s) Oral every 6 hours PRN Mild Pain (1 - 3)  ALBUTerol    0.083% 2.5 milliGRAM(s) Nebulizer every 6 hours PRN Shortness of Breath and/or Wheezing  HYDROmorphone  Injectable 0.5 milliGRAM(s) IntraMuscular every 6 hours PRN Moderate Pain (4 - 6)  HYDROmorphone  Injectable 1 milliGRAM(s) IntraMuscular every 6 hours PRN Severe Pain (7 - 10)  ondansetron Injectable 8 milliGRAM(s) IV Push every 6 hours PRN Nausea and/or Vomiting  simethicone 80 milliGRAM(s) Chew every 6 hours PRN Gas

## 2022-10-12 NOTE — PROGRESS NOTE ADULT - ASSESSMENT
Patient is a 66y female with a past history of a gastric ulcer who was recently admitted to [facility] with overall failure to thrive and found to have widely metastatic breast cancer with lung, liver,brain  and bone mets.  She is s/p C-T spine surgery with anterior corpectomy and fusion of C7-T2 and posterior C4-T5 fusion.She was treated for a psot op UTI with CTX and cefpodoxime and sent to rehab on 9/30.  He has has a poor appetite, has c/o N/V and has c/o abdominal cramps.She has had some diarrhea and a CT scan today has shown a pancolitis.  A stool for C Diff has been ordered and po vanco started.  I am concerned about C Diff. Her CT of A/P on 10/2 did not show any colonic thickening.she is taking narcotics which are antiperistaltics and can limit diarrhea.He CT scan yesterday showed a new pancolitis.  She received CTX and cefpodoxime, and while her organism was resistant to these agents I would like to limit antibiotics until GI issues controlled.  Suggest:  1. C Diff toxin-sent 10/11-pending  2.Po vanco 125 4x/day, day 2  3.Blood cultures x 2 sets-testing in progress  4.f CDT is negative I would send stool culture and GI PCR panel.  5.Can send another UA and culture but I may be reluctant to treat a positive result.  6.Monitor exam and labs

## 2022-10-12 NOTE — PROGRESS NOTE ADULT - ASSESSMENT
65 yo female PMH left arm skin CA s/p excision, stomach ulcer who presented to [facility] 9/10/22 with progressive weakness, found to have multiple lytic lesions of the cervical and thoracic spine as well as right first and second ribs, lung, liver secondary to metastatic breast cancer with dominant right breast mass, right chest wall, axillary and retropectoral involvement. Compression fractures of T1, T9, T12, and L2. Severe T1 compression with epidural extension. She underwent C7-T2 corpectomy & fusion, posterior C4-T5 fusion    # Metastatic Breast CA to lung, liver, ribs, with spine involvement and neurogenic bladder  - path: invasive ductal carcinoma  - s/p C7-T2 corpectomy & fusion, posterior C4-T5 fusion (Dr Bennett)  - All staples but 2 removed 10/7. request plastics for staple removal  - Cleared to resume comprehensive rehab program OT, PT, SLP as tolerated  - psychology follow up for supportive counseling  - oncology consult appreciated 10/12. Patient has medical and rads oncology in community, may also be candidate for systemic therapy on dc, to f/u as outpatient after rehab  - Precautions: CA, fall, aspiration, spinal, cervical collar, LSO Brace (Cuauhtemoc; [facility]), contact isolation    # Pain management  - Cyclobenzaprine 5 q8  - dc Gabapentin TID 600mg due to patient's report of intolerance/nausea  - lyrica 25 q8 to start 10/7   - Dilaudid switched to 0.5 mg IM q6 PRn mod pain and 1 mg q6 PRN severe pain 10/10 to to oral med intolerance  - Oxycodone ER 20mg Q12 hours   - tylenol 1g IV x1 dose 10/10    # ID  - WBC 11.16 10/10-->16.97 10/11--> 19.42 10/12  - ECHO 10/9: LB wall thickening, hyperdynamic LV with EF 70-75%  - CTPA 10/8; No pulmonary embolus. Interlobular septal thickening and bilateral groundglass opacities are new from prior exam and may represent edema, lymphangitic carcinomatosis and/or pneumonitis .Bilateral metastatic lung nodules measuring up to 9 mm within right lower lobe are unchanged. Trace bilateral pleural effusions and mild bibasilar atelectasis. Metastatic disease within the mediastinum and rohit as well as bone metastases are grossly unchanged.  - TSH WNL  - O2 via NC 2lpm to keep sat >90% ordered  - Bladder scan negative for retention. UA pending, patient allowing SC for sample 10/12  - continue IVF D51/2 NS due to poort po intake  - blood Cx NGTD  - CXR 10/11: Persistent left base infiltrate.  - CT C-T spine 10/11: Postop changes again seen and unchanged. Compression deformity with sclerotic changes again seen involving the T12 and L2 vertebral body as described above. Recent CT and medical status relayed to NSGY 10/11  - CT A/P 10/11: Limited, noncontrast exam. Pancolitis, correlate clinically for inflammatory/infectious etiology, including pseudomembranous colitis. Partially visualized right breast mass. Pulmonary and hepatic metastatic disease as noted on prior exam. Permeative, osteolytic metastatic disease, including L2 compression fracture with possible epidural involvement.  - Patient placed on isolation, C diff stool ordered. Results of tests to date reviewed with patient 10/12  - ID and GI read and appreciated. Continue vanco 125 4x/day, day 2 10/12. If CDT is negative, send stool culture and GI PCR panel.  - CBC 10/13    # enterobacter UTI  - s/p Vantin BID x 7 days    # Bowel Regimen  - florastor  - zofran 8 mg PRN    # FEN   - pureed solids and thins --> changed to full liquids 10/11 per patient preference  - MBS  - supplemented 40 meq x 2 on 10/3 and 10/4  - K+ 3.2 10/12  - Kcl 10 meq IV x 3 doses ordered again 10/12  - BMP 10/13    # DVT ppx  - Lovenox  - SCD, TEDs    # Case discussed in IDT rounds 10/12:  - CG bed mobility, mod assist sit to supine transfers, reduce motivation/endurance and participation in therapy sessions due to physical complaints and some anxiety, total assist bADLs currently  - goals: mod independent transfers, ambulation with RW, negotiate 6-8 steps with mod independent, min assist bathing/shower transfer  - caregiver training  - target DE home 10/19 with caregiver support and home PT, OT, SLP. If physical symptoms persist that make it difficult for her to participate in program, may warrant transfer to acute medical floor. If resolves but adversely affects endurance and progress, may need to consider Kelly.   - disability form completed 10/7      # LABS  plastics for staple removal  MBS  C diff PCR  Blood Cx  UA  CBC BMP 10/13          CODE STATUS: FULL CODE     Outpatient Follow-up (Specialty/Name of physician):    Marj Del Castillo)  Hematology; Hospice Palliative Medicine; Internal Medicine; Medical Oncology  [address] [place] [address]  Phone: [phone]  Fax: [phone]  Follow Up Time: 2 weeks    Bret Ibarra)  Radiation Oncology  [address] [place] [address]  Phone: [phone]  Fax: [phone]  Follow Up Time: 2 weeks
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
## 2022-10-12 NOTE — CONSULT NOTE ADULT - ASSESSMENT
Neha is a 67 yo female with PMH of left arm skin CA s/p excision at age 16, stomach ulcer, her last mammogram was in 2014, who was brought to [facility] ED 9/10/22 with progressive weakness to the point that she felt she could not get out of bed. She also reported shoulder pain, decreased appetite. Was found to have two lumps in her right breast but could not tolerate recommended biopsy due to pain. CT scan of the Cervical spine performed which showed Multiple lytic lesions of the cervical and visualized thoracic spine as well as right first and second ribs concerning for metastases.    She was admitted under medicine service for further workup and management, including of her pain. Further imaging done showed Metastatic breast cancer with dominant right breast mass, right chest wall, axillary and retropectoral involvement; osseous, pulmonary, and likely hepatic metastatic disease. She was also found to have metastatic adenopathy of the thorax, diffuse extensive osseous metastatic disease with multiple lucent and sclerotic lesions and compression fractures of T1, T9, T12, and L2. Severe T1 compression with epidural extension; cord compression cannot be excluded. +additional neoplastic disease of the spine with epidural extension. Multifocal neoplastic lesions involving the pelvis and ribs as well.    Patient was seen by Oncology, CT surgery and Neurosurgery team. She underwent C7-T2 corpectomy & fusion, posterior C4-T5 fusion. Post-op course uncomplicated.  Hamm removed, pt requiring some straight cath for retention. She also had subjective dysphagia - seen by speech therapy recommended pureed diet which patient continues to prefer. Started on IV Rocephin for UTI. Blood cultures negative, Urine culture positive for GNR, changed to PO Vantin for 7 days.    Patient was medically optimized for discharge to [facility] IRF on 9/30/22.

## 2022-10-12 NOTE — CONSULT NOTE ADULT - PROBLEM SELECTOR RECOMMENDATION 9
Patient with newly diagnosed Stage IV breast cancer with clinically mets to bone, liver, lungs, brain.  Status post C7-T2 corpectomy & fusion, posterior C4-T5 fusion.  Patient has been seen by radiation oncology, medical oncology at outside hospital.  Plans are for radiation to spine/brain following acute rehab.  Discussed with patient that systemic therapy would be given as well (based on immunohistochemical profile)–patient to decide where she wishes for her follow-up oncologic care (considering cancer center closer to her home).  Patient's questions answered at this time.

## 2022-10-12 NOTE — CONSULT NOTE ADULT - SUBJECTIVE AND OBJECTIVE BOX
SHELLEY DE LEON  66y  Female  Admitting: MARIA EUGENIA Carrizales    HPI:  Neha is a 65 yo female with PMH of left arm skin CA s/p excision at age 16, stomach ulcer, her last mammogram was in 2014, who was brought to [facility] ED 9/10/22 with progressive weakness to the point that she felt she could not get out of bed. She also reported shoulder pain, decreased appetite. Was found to have two lumps in her right breast but could not tolerate recommended biopsy due to pain. CT scan of the Cervical spine performed which showed Multiple lytic lesions of the cervical and visualized thoracic spine as well as right first and second ribs concerning for metastases.    She was admitted under medicine service for further workup and management, including of her pain. Further imaging done showed Metastatic breast cancer with dominant right breast mass, right chest wall, axillary and retropectoral involvement; osseous, pulmonary, and likely hepatic metastatic disease. She was also found to have metastatic adenopathy of the thorax, diffuse extensive osseous metastatic disease with multiple lucent and sclerotic lesions and compression fractures of T1, T9, T12, and L2. Severe T1 compression with epidural extension; cord compression cannot be excluded. +additional neoplastic disease of the spine with epidural extension. Multifocal neoplastic lesions involving the pelvis and ribs as well.    Patient was seen by Oncology, CT surgery and Neurosurgery team. She underwent C7-T2 corpectomy & fusion, posterior C4-T5 fusion. Post-op course uncomplicated.  Hamm removed, pt requiring some straight cath for retention. She also had subjective dysphagia - seen by speech therapy recommended pureed diet which patient continues to prefer. Started on IV Rocephin for UTI. Blood cultures negative, Urine culture positive for GNR, changed to PO Vantin for 7 days.    Patient was medically optimized for discharge to [facility] IRF on 9/30/22.     PAST MEDICAL & SURGICAL HISTORY:  Stomach ulcer  20 years ago       Skin cancer of arm, left  pt was 16 year old      H/O excision of mass  left arm  when patient was 16 years ago      S/P hysterectomy        HEALTH ISSUES - PROBLEM Dx:  Pancolitis      MEDICATIONS  (STANDING):  Biotene Dry Mouth Oral Rinse 5 milliLiter(s) Swish and Spit three times a day  cyclobenzaprine 5 milliGRAM(s) Oral three times a day  dextrose 5% + sodium chloride 0.45%. 1000 milliLiter(s) (100 mL/Hr) IV Continuous <Continuous>  enoxaparin Injectable 40 milliGRAM(s) SubCutaneous every 24 hours  magnesium oxide 400 milliGRAM(s) Oral three times a day with meals  metoprolol tartrate 25 milliGRAM(s) Oral two times a day  oxyCODONE  ER Tablet 20 milliGRAM(s) Oral every 12 hours  pantoprazole  Injectable 40 milliGRAM(s) IV Push daily  pregabalin 25 milliGRAM(s) Oral every 8 hours  saccharomyces boulardii 250 milliGRAM(s) Oral two times a day  vancomycin    Solution 125 milliGRAM(s) Oral every 6 hours    MEDICATIONS  (PRN):  acetaminophen    Suspension .. 650 milliGRAM(s) Oral every 6 hours PRN Mild Pain (1 - 3)  ALBUTerol    0.083% 2.5 milliGRAM(s) Nebulizer every 6 hours PRN Shortness of Breath and/or Wheezing  bisacodyl Suppository 10 milliGRAM(s) Rectal daily PRN Constipation  HYDROmorphone  Injectable 0.5 milliGRAM(s) IntraMuscular every 6 hours PRN Moderate Pain (4 - 6)  HYDROmorphone  Injectable 1 milliGRAM(s) IntraMuscular every 6 hours PRN Severe Pain (7 - 10)  ondansetron Injectable 8 milliGRAM(s) IV Push every 6 hours PRN Nausea and/or Vomiting  simethicone 80 milliGRAM(s) Chew every 6 hours PRN Gas    Allergies    tetanus toxoid (Hives)    Intolerances    lactose (Other)      FAMILY HISTORY:  Family history of dementia (Mother)    Family history of colon cancer (Father)  No FH of breast cancer.      SOCIAL HISTORY: No EtOH, no tobacco    REVIEW OF SYSTEMS:    CONSTITUTIONAL: No fevers   EYES/ENT: No visual changes;  No vertigo    RESPIRATORY: No hemoptysis; No shortness of breath  CARDIOVASCULAR: No chest pain or palpitations  GASTROINTESTINAL: No hematemesis; No melena or hematochezia.  GENITOURINARY: No hematuria  NEUROLOGICAL: +weakness  SKIN: No itching   All other review of systems is negative unless indicated above.      T(F): 98.2 (10-11-22 @ 22:00), Max: 98.2 (10-11-22 @ 08:30)  HR: 106 (10-11-22 @ 22:00)  BP: 124/78 (10-11-22 @ 22:00)  RR: 16 (10-11-22 @ 22:00)  SpO2: 96% (10-11-22 @ 22:00)      GENERAL: NAD, well-developed  EYES: EOMI, PERRLA, conjunctiva and sclera clear  NECK: brace in place  CHEST/LUNG: decreased BS bases ant.; No wheeze  HEART: Regular rate and rhythm; No murmurs, rubs, or gallops  ABDOMEN: Soft; Bowel sounds present  EXTREMITIES:  no calf tenderness  NEUROLOGY: awake, alert  large right breast mass, edema    Labs:             9.7    19.42 )-----------( 607      ( 10-12 @ 06:50 )             28.2                10.1   16.97 )-----------( 599      ( 10-11 @ 06:00 )             29.2                10.6   11.16 )-----------( 658      ( 10-10 @ 06:27 )             31.8       10-11    Ca    9.8      11 Oct 2022 06:00  Mg     1.6     10-11    TPro  5.2<L>  /  Alb  1.9<L>  /  TBili  0.5  /  DBili  x   /  AST  40  /  ALT  22  /  AlkPhos  148<H>  10-11    Consultant notes reviewed : YES [x ] ; NO [ ]    Radiology and additional tests:  < from: CT Abdomen and Pelvis No Cont (10.11.22 @ 11:41) >  IMPRESSION:    Limited, noncontrast exam.    Pancolitis, correlate clinically for inflammatory/infectious etiology,   including pseudomembranous colitis.    Partially visualized right breast mass.    Pulmonary and hepatic metastatic disease as noted on prior exam.    Permeative, osteolytic metastatic disease, including L2 compression   fracture with possible epidural involvement.  This can be further characterized by MRI as clinically indicated.    Other findings as discussed above.    < end of copied text >    < from: CT Angio Chest PE Protocol w/ IV Cont (10.08.22 @ 01:17) >  IMPRESSION:    No pulmonary embolus.    Interlobular septal thickening and bilateral groundglass opacities are   new from prior exam and may represent edema, lymphangitic carcinomatosis   and/or pneumonitis.    Bilateral metastatic lung nodules measuring up to 9 mm within right lower   lobe are unchanged. Trace bilateral pleural effusions and mild bibasilar   atelectasis.    Metastatic disease within the mediastinum and rohit as well as bone   metastases are grossly unchanged.    < end of copied text >  < from: MR Head w/wo IV Cont (09.11.22 @ 11:27) >    IMPRESSION:  Subcentimeter enhancing parenchymal lesions as above compatible with   intracranial metastatic disease. No associated vasogenic edema. No   evidence of acute infarct or hemorrhage.    Likely left parietal calvarial osseous metastatic lesion.      < end of copied text >    Final Diagnosis   Mass, cervical spine, biopsy:   - Metastatic breast carcinoma   Immunostains were performed at [facility] and interpreted at [facility]. Controls   are appropriate   GATA3: Positive   Mammoglobin: Positive   1 Right breast biopsy   Final Diagnosis   1. Right breast, biopsy:   - Invasive ductal carcinoma, Alan grade 2 (T3, N2, M1)   - No DCIS seen   - No lymphovascular invasion seen

## 2022-10-12 NOTE — PROGRESS NOTE ADULT - SUBJECTIVE AND OBJECTIVE BOX
CC: f/u for colitis and possible C Diff    Patient reports: she c/o abdominal discomfort and diarrhea    REVIEW OF SYSTEMS:  All other review of systems negative (Comprehensive ROS): weak inn general    Antimicrobials Day #  :day 2  vancomycin    Solution 125 milliGRAM(s) Oral every 6 hours    Other Medications Reviewed  MEDICATIONS  (STANDING):  Biotene Dry Mouth Oral Rinse 5 milliLiter(s) Swish and Spit three times a day  cyclobenzaprine 5 milliGRAM(s) Oral three times a day  dextrose 5% + sodium chloride 0.45%. 1000 milliLiter(s) (100 mL/Hr) IV Continuous <Continuous>  enoxaparin Injectable 40 milliGRAM(s) SubCutaneous every 24 hours  HYDROmorphone  Injectable 0.5 milliGRAM(s) IntraMuscular once  magnesium oxide 400 milliGRAM(s) Oral three times a day with meals  metoprolol tartrate 25 milliGRAM(s) Oral two times a day  oxyCODONE  ER Tablet 20 milliGRAM(s) Oral every 12 hours  pantoprazole  Injectable 40 milliGRAM(s) IV Push daily  potassium chloride  10 mEq/100 mL IVPB 10 milliEquivalent(s) IV Intermittent every 1 hour  pregabalin 25 milliGRAM(s) Oral every 8 hours  saccharomyces boulardii 250 milliGRAM(s) Oral two times a day  vancomycin    Solution 125 milliGRAM(s) Oral every 6 hours    T(F): 97.2 (10-12-22 @ 09:35), Max: 98.2 (10-11-22 @ 22:00)  HR: 128 (10-12-22 @ 09:35)  BP: 132/81 (10-12-22 @ 09:35)  RR: 16 (10-12-22 @ 09:35)  SpO2: 97% (10-12-22 @ 09:35)  Wt(kg): --    PHYSICAL EXAM:  General: alert, no acute distress, uncomfortable  Eyes:  anicteric, no conjunctival injection, no discharge  Oropharynx: no lesions or injection 	  Neck: supple, collar in place  Lungs: clear to auscultation  Heart: regular rate and rhythm; no murmur, rubs or gallops  Abdomen: soft, nondistended, nonspecific tenderness,, without mass or organomegaly  Skin: anterior/posterior neck incision intact  Extremities: no clubbing, cyanosis, or edema  Neurologic: alert, oriented, moves all extremities    LAB RESULTS:                        9.7    19.42 )-----------( 607      ( 12 Oct 2022 06:50 )             28.2     10-12    133<L>  |  99  |  12  ----------------------------<  143<H>  3.2<L>   |  20<L>  |  0.39<L>    Ca    9.9      12 Oct 2022 06:50  Mg     1.6     10-11    TPro  4.8<L>  /  Alb  1.7<L>  /  TBili  0.3  /  DBili  x   /  AST  41<H>  /  ALT  21  /  AlkPhos  132<H>  10-12    LIVER FUNCTIONS - ( 12 Oct 2022 06:50 )  Alb: 1.7 g/dL / Pro: 4.8 g/dL / ALK PHOS: 132 U/L / ALT: 21 U/L / AST: 41 U/L / GGT: x             MICROBIOLOGY:  RECENT CULTURES:      RADIOLOGY REVIEWED:  < from: CT Abdomen and Pelvis No Cont (10.11.22 @ 11:41) >  IMPRESSION:    Limited, noncontrast exam.    Pancolitis, correlate clinically for inflammatory/infectious etiology,   including pseudomembranous colitis.    Partially visualized right breast mass.    Pulmonary and hepatic metastatic disease as noted on prior exam.    Permeative, osteolytic metastatic disease, including L2 compression   fracture with possible epidural involvement.  This can be further characterized by MRI as clinically indicated.    Other findings as discussed above.    --- End of Report ---    < end of copied text >  < from: MR Head w/wo IV Cont (09.11.22 @ 11:27) >  IMPRESSION:  Subcentimeter enhancing parenchymal lesions as above compatible with   intracranial metastatic disease. No associated vasogenic edema. No   evidence of acute infarct or hemorrhage.    Likely left parietal calvarial osseous metastatic lesion.    < end of copied text >

## 2022-10-13 LAB
ANION GAP SERPL CALC-SCNC: 11 MMOL/L — SIGNIFICANT CHANGE UP (ref 5–17)
BUN SERPL-MCNC: 10 MG/DL — SIGNIFICANT CHANGE UP (ref 7–23)
CALCIUM SERPL-MCNC: 9.9 MG/DL — SIGNIFICANT CHANGE UP (ref 8.4–10.5)
CHLORIDE SERPL-SCNC: 99 MMOL/L — SIGNIFICANT CHANGE UP (ref 96–108)
CO2 SERPL-SCNC: 21 MMOL/L — LOW (ref 22–31)
CREAT SERPL-MCNC: 0.39 MG/DL — LOW (ref 0.5–1.3)
CULTURE RESULTS: SIGNIFICANT CHANGE UP
EGFR: 109 ML/MIN/1.73M2 — SIGNIFICANT CHANGE UP
GLUCOSE SERPL-MCNC: 105 MG/DL — HIGH (ref 70–99)
HCT VFR BLD CALC: 27.2 % — LOW (ref 34.5–45)
HGB BLD-MCNC: 9.2 G/DL — LOW (ref 11.5–15.5)
MCHC RBC-ENTMCNC: 28.8 PG — SIGNIFICANT CHANGE UP (ref 27–34)
MCHC RBC-ENTMCNC: 33.8 GM/DL — SIGNIFICANT CHANGE UP (ref 32–36)
MCV RBC AUTO: 85 FL — SIGNIFICANT CHANGE UP (ref 80–100)
NRBC # BLD: 2 /100 WBCS — HIGH (ref 0–0)
PLATELET # BLD AUTO: 554 K/UL — HIGH (ref 150–400)
POTASSIUM SERPL-MCNC: 3.6 MMOL/L — SIGNIFICANT CHANGE UP (ref 3.5–5.3)
POTASSIUM SERPL-SCNC: 3.6 MMOL/L — SIGNIFICANT CHANGE UP (ref 3.5–5.3)
RBC # BLD: 3.2 M/UL — LOW (ref 3.8–5.2)
RBC # FLD: 14.2 % — SIGNIFICANT CHANGE UP (ref 10.3–14.5)
SARS-COV-2 RNA SPEC QL NAA+PROBE: SIGNIFICANT CHANGE UP
SODIUM SERPL-SCNC: 131 MMOL/L — LOW (ref 135–145)
SPECIMEN SOURCE: SIGNIFICANT CHANGE UP
WBC # BLD: 19.91 K/UL — HIGH (ref 3.8–10.5)
WBC # FLD AUTO: 19.91 K/UL — HIGH (ref 3.8–10.5)

## 2022-10-13 PROCEDURE — 99233 SBSQ HOSP IP/OBS HIGH 50: CPT

## 2022-10-13 PROCEDURE — 74230 X-RAY XM SWLNG FUNCJ C+: CPT | Mod: 26

## 2022-10-13 RX ORDER — HYDROMORPHONE HYDROCHLORIDE 2 MG/ML
1 INJECTION INTRAMUSCULAR; INTRAVENOUS; SUBCUTANEOUS EVERY 6 HOURS
Refills: 0 | Status: DISCONTINUED | OUTPATIENT
Start: 2022-10-13 | End: 2022-10-14

## 2022-10-13 RX ORDER — SODIUM CHLORIDE 9 MG/ML
1000 INJECTION, SOLUTION INTRAVENOUS
Refills: 0 | Status: DISCONTINUED | OUTPATIENT
Start: 2022-10-13 | End: 2022-10-14

## 2022-10-13 RX ORDER — HYDROMORPHONE HYDROCHLORIDE 2 MG/ML
0.5 INJECTION INTRAMUSCULAR; INTRAVENOUS; SUBCUTANEOUS EVERY 6 HOURS
Refills: 0 | Status: DISCONTINUED | OUTPATIENT
Start: 2022-10-13 | End: 2022-10-14

## 2022-10-13 RX ADMIN — HYDROMORPHONE HYDROCHLORIDE 0.5 MILLIGRAM(S): 2 INJECTION INTRAMUSCULAR; INTRAVENOUS; SUBCUTANEOUS at 09:15

## 2022-10-13 RX ADMIN — HYDROMORPHONE HYDROCHLORIDE 0.5 MILLIGRAM(S): 2 INJECTION INTRAMUSCULAR; INTRAVENOUS; SUBCUTANEOUS at 10:05

## 2022-10-13 RX ADMIN — HYDROMORPHONE HYDROCHLORIDE 1 MILLIGRAM(S): 2 INJECTION INTRAMUSCULAR; INTRAVENOUS; SUBCUTANEOUS at 06:40

## 2022-10-13 RX ADMIN — SODIUM CHLORIDE 100 MILLILITER(S): 9 INJECTION, SOLUTION INTRAVENOUS at 07:53

## 2022-10-13 RX ADMIN — Medication 125 MILLIGRAM(S): at 16:31

## 2022-10-13 RX ADMIN — ENOXAPARIN SODIUM 40 MILLIGRAM(S): 100 INJECTION SUBCUTANEOUS at 04:32

## 2022-10-13 RX ADMIN — Medication 125 MILLIGRAM(S): at 04:32

## 2022-10-13 RX ADMIN — SODIUM CHLORIDE 100 MILLILITER(S): 9 INJECTION, SOLUTION INTRAVENOUS at 22:30

## 2022-10-13 RX ADMIN — Medication 125 MILLIGRAM(S): at 09:14

## 2022-10-13 RX ADMIN — HYDROMORPHONE HYDROCHLORIDE 0.5 MILLIGRAM(S): 2 INJECTION INTRAMUSCULAR; INTRAVENOUS; SUBCUTANEOUS at 00:15

## 2022-10-13 RX ADMIN — HYDROMORPHONE HYDROCHLORIDE 0.5 MILLIGRAM(S): 2 INJECTION INTRAMUSCULAR; INTRAVENOUS; SUBCUTANEOUS at 00:45

## 2022-10-13 RX ADMIN — PANTOPRAZOLE SODIUM 40 MILLIGRAM(S): 20 TABLET, DELAYED RELEASE ORAL at 16:30

## 2022-10-13 RX ADMIN — HYDROMORPHONE HYDROCHLORIDE 1 MILLIGRAM(S): 2 INJECTION INTRAMUSCULAR; INTRAVENOUS; SUBCUTANEOUS at 15:45

## 2022-10-13 RX ADMIN — Medication 125 MILLIGRAM(S): at 20:20

## 2022-10-13 RX ADMIN — HYDROMORPHONE HYDROCHLORIDE 1 MILLIGRAM(S): 2 INJECTION INTRAMUSCULAR; INTRAVENOUS; SUBCUTANEOUS at 14:53

## 2022-10-13 RX ADMIN — HYDROMORPHONE HYDROCHLORIDE 1 MILLIGRAM(S): 2 INJECTION INTRAMUSCULAR; INTRAVENOUS; SUBCUTANEOUS at 06:10

## 2022-10-13 NOTE — PROGRESS NOTE ADULT - SUBJECTIVE AND OBJECTIVE BOX
INTERVAL HPI/OVERNIGHT EVENTS:  HPI:  This is a 65 yo female persistent diarrhea and pancolitis on CT imaging. Patient seen and examined at bedside. She notes diarrhea , subsided slightly and then go worse. She is able to tolerate some liquids. Minimal nausea. She reports some abdominal pain. No BRBPR or melena noted.  Patient seen and examined at bed side. Denies nausea, vomiting, diarrhea , or hematochezia. Abdominal discomfort persist     MEDICATIONS  (STANDING):  Biotene Dry Mouth Oral Rinse 5 milliLiter(s) Swish and Spit three times a day  cyclobenzaprine 5 milliGRAM(s) Oral three times a day  dextrose 5% + sodium chloride 0.45%. 1000 milliLiter(s) (100 mL/Hr) IV Continuous <Continuous>  enoxaparin Injectable 40 milliGRAM(s) SubCutaneous every 24 hours  magnesium oxide 400 milliGRAM(s) Oral three times a day with meals  metoprolol tartrate 25 milliGRAM(s) Oral two times a day  oxyCODONE  ER Tablet 20 milliGRAM(s) Oral every 12 hours  pantoprazole  Injectable 40 milliGRAM(s) IV Push daily  pregabalin 25 milliGRAM(s) Oral every 8 hours  saccharomyces boulardii 250 milliGRAM(s) Oral two times a day  vancomycin    Solution 125 milliGRAM(s) Oral every 6 hours    MEDICATIONS  (PRN):  acetaminophen    Suspension .. 650 milliGRAM(s) Oral every 6 hours PRN Mild Pain (1 - 3)  ALBUTerol    0.083% 2.5 milliGRAM(s) Nebulizer every 6 hours PRN Shortness of Breath and/or Wheezing  bisacodyl Suppository 10 milliGRAM(s) Rectal daily PRN Constipation  HYDROmorphone  Injectable 0.5 milliGRAM(s) IntraMuscular every 6 hours PRN Moderate Pain (4 - 6)  HYDROmorphone  Injectable 1 milliGRAM(s) IntraMuscular every 6 hours PRN Severe Pain (7 - 10)  ondansetron Injectable 8 milliGRAM(s) IV Push every 6 hours PRN Nausea and/or Vomiting  simethicone 80 milliGRAM(s) Chew every 6 hours PRN Gas      Allergies    tetanus toxoid (Hives)    Intolerances    lactose (Other)      PAST MEDICAL & SURGICAL HISTORY:  Stomach ulcer  20 years ago   no pain not taking any meds      Skin cancer of arm, left  pt was 16 year old      H/O excision of mass  left arm  when patient was 16 years ago      S/P hysterectomy          REVIEW OF SYSTEMS: negative unless indicated in HPI    non smoker  no etoh abuse     PHYSICAL EXAM:   Vital Signs:  Vital Signs Last 24 Hrs  T(C): 36.8 (11 Oct 2022 08:30), Max: 36.8 (11 Oct 2022 08:30)  T(F): 98.2 (11 Oct 2022 08:30), Max: 98.2 (11 Oct 2022 08:30)  HR: 125 (11 Oct 2022 08:30) (125 - 133)  BP: 135/87 (11 Oct 2022 08:30) (135/87 - 140/80)  BP(mean): --  RR: 16 (11 Oct 2022 08:30) (16 - 16)  SpO2: 95% (11 Oct 2022 08:30) (95% - 98%)    Parameters below as of 11 Oct 2022 08:30  Patient On (Oxygen Delivery Method): room air      Daily     Daily Weight in k.4 (11 Oct 2022 05:33)I&O's Summary    10 Oct 2022 07:01  -  11 Oct 2022 07:00  --------------------------------------------------------  IN: 600 mL / OUT: 0 mL / NET: 600 mL        GENERAL:  Appears stated age,   HEENT:  NC/AT,  conjunctivae clear and pink, no thyromegaly, neck collar in place  CHEST:  Full & symmetric excursion, no increased effort, breath sounds clear  HEART:  Regular rhythm, S1, S2, no murmur  ABDOMEN:  Soft, non-tender, non-distended, normoactive bowel sounds  EXTEREMITIES:  no edema  SKIN:  No rash/warm/dry  NEURO:  Alert, oriented,     LABS:                        10.1   16.97 )-----------( 599      ( 11 Oct 2022 06:00 )             29.2     10    131<L>  |  96  |  11  ----------------------------<  96  3.1<L>   |  16<L>  |  0.39<L>    Ca    9.8      11 Oct 2022 06:00  Mg     1.6     10-11    TPro  5.2<L>  /  Alb  1.9<L>  /  TBili  0.5  /  DBili  x   /  AST  40  /  ALT  22  /  AlkPhos  148<H>  10-11        amylase   lipase  RADIOLOGY & ADDITIONAL TESTS:

## 2022-10-13 NOTE — PROGRESS NOTE ADULT - ASSESSMENT
67 yo female PMH left arm skin CA s/p excision, stomach ulcer who presented to [facility] 9/10/22 with progressive weakness, found to have multiple lytic lesions of the cervical and thoracic spine as well as right first and second ribs, lung, liver secondary to metastatic breast cancer with dominant right breast mass, right chest wall, axillary and retropectoral involvement. Compression fractures of T1, T9, T12, and L2. Severe T1 compression with epidural extension. She underwent C7-T2 corpectomy & fusion, posterior C4-T5 fusion    # Metastatic Breast CA to lung, liver, ribs, with spine involvement and neurogenic bladder  - Path: invasive ductal carcinoma  - s/p C7-T2 corpectomy & fusion, posterior C4-T5 fusion (Dr Bennett)  - All staples but 2 removed 10/7. Dr. Lea to see today for staple removal   - Continue comprehensive rehab program OT, PT, SLP as tolerated  - Psychology consult: Continue supportive care   - Oncology 10/12: Pt has medical and rads oncology in community, may also be candidate for systemic therapy on dc, to f/u as outpatient after rehab  - Precautions: CA, fall, aspiration, spinal, cervical collar, LSO Brace (Cuauhtemoc; [facility]), contact isolation    # Pain management  - Dilaudid 0.5 mg IM q6 PRN mod pain , 1 mg q6 PRN severe pain  - Cyclobenzaprine 5 q8  - Lyrica 25 q8  - dc'd Gabapentin TID 600mg due to patient's report of intolerance/nausea  - Oxycodone ER 20mg Q12 hours     # ID  # Diarrhea, improving   - WBC 11.16 10/10-->16.97 10/11--> 19.42 10/12 -> 19.91 10/13   - CTPA 10/8: No pulmonary embolus. Interlobular septal thickening and bilateral groundglass opacities are new from prior exam and may represent edema, lymphangitic carcinomatosis and/or pneumonitis .Bilateral metastatic lung nodules measuring up to 9 mm within right lower lobe are unchanged. Trace bilateral pleural effusions and mild bibasilar atelectasis. Metastatic disease within the mediastinum and rohit as well as bone metastases are grossly unchanged.  - CT A/P 10/11: Pancolitis, correlate clinically for inflammatory/infectious etiology, including pseudomembranous colitis. Partially visualized right breast mass. Pulmonary and hepatic metastatic disease as noted on prior exam. Permeative, osteolytic metastatic disease, including L2 compression fracture with possible epidural involvement.  - Continue PO Vanc, 125 mg q6h, day 3 10/13   - Continue IVF D51/2 NS for poor PO intake    - BCx NGTD  - C Diff Toxin was canceled by lab - will reorder - if negative, will send GI PCR panel   - F/up UA   - O2 via NC 2lpm to keep sat >90% ordered  - Recent CT and medical status relayed to NSGY 10/11    # enterobacter UTI  - s/p Vantin BID x 7 days    # Bowel Regimen  - Florastor  - Zofran 8 mg PRN    # FEN   - Pureed solids and thins --> changed to full liquids 10/11 per patient preference  - MBS 10/13   - supplemented 40 meq x 2 on 10/3 and 10/4  - K+ 3.2 10/12 - Kcl 10 meq IV x 3 doses ordered again 10/12 - 10/13, to 3.6  - Sodium 131 10/13 - per hospitalist, ok to continue w/ supplemental fluid   - BMP 10/14    # DVT ppx  - Lovenox  - SCD, TEDs    # Case discussed in IDT rounds 10/12:  - CG bed mobility, mod assist sit to supine transfers, reduce motivation/endurance and participation in therapy sessions due to physical complaints and some anxiety, total assist bADLs currently  - goals: mod independent transfers, ambulation with RW, negotiate 6-8 steps with mod independent, min assist bathing/shower transfer  - caregiver training  - target dc home 10/19 with caregiver support and home PT, OT, SLP. If physical symptoms persist that make it difficult for her to participate in program, may warrant transfer to acute medical floor. If resolves but adversely affects endurance and progress, may need to consider Kelly.   - disability form completed 10/7      # LABS  Plastics for staple removal  MBS 10/13  C diff PCR  Blood Cx  UA  CBC BMP 10/14    CODE STATUS: FULL CODE     Outpatient Follow-up (Specialty/Name of physician):    Marj Del Castillo)  Hematology; Hospice Palliative Medicine; Internal Medicine; Medical Oncology  [address] [place] [address]  Phone: [phone]  Fax: [phone]  Follow Up Time: 2 weeks    Bret Ibarra)  Radiation Oncology  [address] [place] [address]  Phone: [phone]  Fax: [phone]  Follow Up Time: 2 weeks 65 yo female PMH left arm skin CA s/p excision, stomach ulcer who presented to [facility] 9/10/22 with progressive weakness, found to have multiple lytic lesions of the cervical and thoracic spine as well as right first and second ribs, lung, liver secondary to metastatic breast cancer with dominant right breast mass, right chest wall, axillary and retropectoral involvement. Compression fractures of T1, T9, T12, and L2. Severe T1 compression with epidural extension. She underwent C7-T2 corpectomy & fusion, posterior C4-T5 fusion    # Metastatic Breast CA to lung, liver, ribs, with spine involvement and neurogenic bladder  - Path: invasive ductal carcinoma  - s/p C7-T2 corpectomy & fusion, posterior C4-T5 fusion (Dr Bennett)  - All staples but 2 removed 10/7. Dr. Lea to see today for staple removal, discussed with patient  - Continue comprehensive rehab program OT, PT, SLP as tolerated  - Psychology consult: Continue supportive care   - Oncology 10/12: Pt has medical and rads oncology in community, may also be candidate for systemic therapy on dc, to f/u as outpatient after rehab  - Precautions: CA, fall, aspiration, spinal, cervical collar, LSO Brace (Cuauhtemoc; [facility]), contact isolation    # Pain management  - Dilaudid 0.5 mg IM q6 PRN mod pain , 1 mg q6 PRN severe pain. Currently using IM dilaudid primarily for pain control  - Cyclobenzaprine 5 q8  - Lyrica 25 q8  - dc'd Gabapentin TID 600mg due to patient's report of intolerance/nausea  - Oxycodone ER 20mg Q12 hours     # ID  # Diarrhea, improving   - WBC 11.16 10/10-->16.97 10/11--> 19.42 10/12 -> 19.91 10/13   - CTPA 10/8: No pulmonary embolus. Interlobular septal thickening and bilateral ground glass opacities are new from prior exam and may represent edema, lymphangitic carcinomatosis and/or pneumonitis .Bilateral metastatic lung nodules measuring up to 9 mm within right lower lobe are unchanged. Trace bilateral pleural effusions and mild bibasilar atelectasis. Metastatic disease within the mediastinum and rohit as well as bone metastases are grossly unchanged.  - CT A/P 10/11: Pancolitis, correlate clinically for inflammatory/infectious etiology, including pseudomembranous colitis. Partially visualized right breast mass. Pulmonary and hepatic metastatic disease as noted on prior exam. Permeative, osteolytic metastatic disease, including L2 compression fracture with possible epidural involvement.  - Continue PO Vanc, 125 mg q6h, day 3 10/13   - Continue IVF D51/2 NS for poor PO intake    - BCx NGTD  - C Diff Toxin was canceled by lab due to leaky sample - will reorder - if negative, will send GI PCR panel   - UA pending  - O2 via NC 2lpm to keep sat >90% ordered    # enterobacter UTI  - s/p Vantin BID x 7 days    # Bowel Regimen  - Florastor  - Zofran 8 mg PRN    # FEN   - Pureed solids and thins --> changed to full liquids 10/11 per patient preference  - MBS 10/13: difficulty with propelling pureed solids to back of throat, some anxiety although pharyngeal swallow appears grossly intact. Will increase SLP time to work on sensory techniques, recommend pureed solid and thin liquid trays with increased liquid options but not holding solids due to nutritional concerns   - supplemented 40 meq x 2 on 10/3 and 10/4  - hypokalemia: resolved K+ 3.6 10/13  - 10/13: Na 131 10/13 - per hospitalist, ok to continue w/ supplemental fluid   - BMP 10/14    # DVT ppx  - Lovenox  - SCD, TEDs    # Case discussed in IDT rounds 10/12:  - CG bed mobility, mod assist sit to supine transfers, reduce motivation/endurance and participation in therapy sessions due to physical complaints and some anxiety, total assist bADLs currently  - goals: mod independent transfers, ambulation with RW, negotiate 6-8 steps with mod independent, min assist bathing/shower transfer  - caregiver training  - target dc home 10/19 with caregiver support and home PT, OT, SLP.  - disability form completed 10/7    Call returned to brother Quique [phone] 10/13, awaiting call back      # LABS  Plastics for staple removal  C diff PCR  Blood Cx  UA  CBC [facility] 10/14    CODE STATUS: FULL CODE     Outpatient Follow-up (Specialty/Name of physician):    Marj Del Castillo (MD)  Hematology; Hospice Palliative Medicine; Internal Medicine; Medical Oncology  [address] [place] [address]  Phone: [phone]  Fax: [phone]  Follow Up Time: 2 weeks    Bret Ibarra)  Radiation Oncology  [address] [place] [address]  Phone: [phone]  Fax: [phone]  Follow Up Time: 2 weeks

## 2022-10-13 NOTE — CONSULT NOTE ADULT - ASSESSMENT
Patient is a 66y old  Female who presents with a chief complaint of metastatic breast CA to spine s/p  C7-T2 corpectomy & fusion, posterior C4-T5 fusion  september 21, 2022.  Per team, neurosurgery has cleared removal of staples.  -will attempt staple removal in a.m.

## 2022-10-13 NOTE — CONSULT NOTE ADULT - SUBJECTIVE AND OBJECTIVE BOX
CC:  Patient is a 66y old  Female who presents with a chief complaint of metastatic breast CA to spine s/p  C7-T2 corpectomy & fusion, posterior C4-T5 fusion  september 21, 2022.  Per team, neurosurgery has cleared removal of staples      HPI:  Thi is a 67 yo female with PMH of left arm skin CA s/p excision at age 16, stomach ulcer, her last mammogram was in 2014, who was brought to [facility] ED 9/10/22 with progressive weakness to the point that she felt she could not get out of bed. She also reported remote shoulder pain, decreased appetite. Was found to have two lumps in her right breast but could not tolerate recommended biopsy due to pain. CT scan of the Cervical spine performed which showed Multiple lytic lesions of the cervical and visualized thoracic spine as well as right first and second ribs concerning for metastases versus multiple myeloma.    She was admitted under medicine service for further workup and management, including of her pain. Further imaging done showed Metastatic breast cancer with dominant right breast mass, right chest wall, axillary and retropectoral involvement; osseous, pulmonary, and likely hepatic metastatic disease. She was also found to have metastatic adenopathy of the thorax, diffuse extensive osseous metastatic disease with multiple lucent and sclerotic lesions and compression fractures of T1, T9, T12, and L2. Severe T1 compression with epidural extension; cord compression cannot be excluded. +additional neoplastic disease of the spine with epidural extension. Multifocal neoplastic lesions involving the pelvis and ribs as well.    Patient was seen by Oncology, CT surgery and Neurosurgery team. She underwent C7-T2 corpectomy & fusion, posterior C4-T5 fusion. Post-op course uncomplicated.  Hamm removed, pt requiring some straight cath for retention. She also had subjective dysphagia - seen by speech therapy recommended pureed diet which patient continues to prefer. Patient with low grade temp and positive UA. Started on IV Rocephin for UTI. Blood cultures negative, Urine culture positive for GNR, changed to PO Vantin for 7 days.         PAST MEDICAL & SURGICAL HISTORY:  Stomach ulcer  20 years ago   no pain not taking any meds      Skin cancer of arm, left  pt was 16 year old      H/O excision of mass  left arm  when patient was 16 years ago      S/P hysterectomy          Allergies    tetanus toxoid (Hives)    Intolerances    lactose (Other)      SOCIAL HISTORY          Smoking: Yes [ ]  No [ ]   ______pk yrs          ETOH  Yes [ ]  No [ ]  Social [ ]          DRUGS:  Yes [ ]  No [ ]  if so what______________    FAMILY HISTORY:  Family history of dementia (Mother)    Family history of colon cancer (Father)        MEDICATIONS  (STANDING):  Biotene Dry Mouth Oral Rinse 5 milliLiter(s) Swish and Spit three times a day  cyclobenzaprine 5 milliGRAM(s) Oral three times a day  dextrose 5% + sodium chloride 0.45%. 1000 milliLiter(s) (100 mL/Hr) IV Continuous <Continuous>  enoxaparin Injectable 40 milliGRAM(s) SubCutaneous every 24 hours  magnesium oxide 400 milliGRAM(s) Oral three times a day with meals  metoprolol tartrate 25 milliGRAM(s) Oral two times a day  oxyCODONE  ER Tablet 20 milliGRAM(s) Oral every 12 hours  pantoprazole  Injectable 40 milliGRAM(s) IV Push daily  pregabalin 25 milliGRAM(s) Oral every 8 hours  saccharomyces boulardii 250 milliGRAM(s) Oral two times a day  vancomycin    Solution 125 milliGRAM(s) Oral every 6 hours    MEDICATIONS  (PRN):  acetaminophen    Suspension .. 650 milliGRAM(s) Oral every 6 hours PRN Mild Pain (1 - 3)  ALBUTerol    0.083% 2.5 milliGRAM(s) Nebulizer every 6 hours PRN Shortness of Breath and/or Wheezing  HYDROmorphone  Injectable 1 milliGRAM(s) IV Push every 6 hours PRN Severe Pain (7 - 10)  HYDROmorphone  Injectable 0.5 milliGRAM(s) IV Push every 6 hours PRN Moderate Pain (4 - 6)  ondansetron Injectable 8 milliGRAM(s) IV Push every 6 hours PRN Nausea and/or Vomiting  simethicone 80 milliGRAM(s) Chew every 6 hours PRN Gas         Review of systems:  General:  no fever or chills  Pulmonary:  no SOB  Cardiac:  denies chest pain, palpitations  GI:  no nausea, vomitting, or abddominal pain  :  no increase frequency, or burning  Heme:  no easy bruising with minor trauma  Musculoskeletal:  No history of back pain or trauma  Skin:  no history of rashes, injury, trauma  Neuro:   weakness         Vital Signs Last 24 Hrs  T(C): 36.3 (13 Oct 2022 08:08), Max: 36.3 (13 Oct 2022 08:08)  T(F): 97.4 (13 Oct 2022 08:08), Max: 97.4 (13 Oct 2022 08:08)  HR: 108 (13 Oct 2022 08:08) (108 - 108)  BP: 123/81 (13 Oct 2022 08:08) (123/81 - 123/81)  BP(mean): --  RR: 15 (13 Oct 2022 08:08) (15 - 15)  SpO2: 95% (13 Oct 2022 08:08) (95% - 95%)    Parameters below as of 13 Oct 2022 08:08  Patient On (Oxygen Delivery Method): room air      PHYSICAL EXAM:  General: alert, mild distress from left lower abdominal pain  Eyes:  anicteric, no conjunctival injection, no discharge  Neck: supple, collar in place, visible incision intact with steri strips   Lungs: clear to auscultation  Heart: regular rate and rhythm; no murmurs  Abdomen: soft, nondistended, nonspecific tenderness, bowel sounds +.  Skin: no rash  Extremities: no edema  Neurologic: alert, oriented, moves all extremities  skin: superior portion of post. neck incision with at least 3 staples remaining.  staples appear malpositioned.  Pt refusing removal of staples at this time due to abdominal pain         LABS:                        9.2    19.91 )-----------( 554      ( 13 Oct 2022 06:31 )             27.2     10-13    131<L>  |  99  |  10  ----------------------------<  105<H>  3.6   |  21<L>  |  0.39<L>    Ca    9.9      13 Oct 2022 06:31    TPro  4.8<L>  /  Alb  1.7<L>  /  TBili  0.3  /  DBili  x   /  AST  41<H>  /  ALT  21  /  AlkPhos  132<H>  10-12          RADIOLOGY & ADDITIONAL STUDIES:    Risks, benefits, and alternatives to treatment discussed. All questions answered with understanding.    Procedure Performed:  (  )Yes     ( x )No  Name of Procedure:      [  ]Debridement     [  ]I&D    [  ]Laceration Repair     [  ]Other:  (  )partial thickness     (  )full thickness     (  )subcutaneous     (  )muscle/tendon     (  )bone  (  )sharp     (  )surgical

## 2022-10-13 NOTE — PROGRESS NOTE ADULT - SUBJECTIVE AND OBJECTIVE BOX
66y old  Female who presents with a chief complaint of metastatic breast CA to spine s/p  C7-T2 corpectomy & fusion, posterior C4-T5 fusion     seen and examined at the bedside, c/o 1  loose stool early  this am but says she thinks it wasn't as loose as yesterday, no vomiting since last night, nausea improving, no fever,, feels little more  improved than yesterday.     ctap today suggestive of colitis, ?c diff likely - results pending      Vital Signs Last 24 Hrs  T(C): 36.3 (13 Oct 2022 08:08), Max: 36.6 (12 Oct 2022 20:20)  T(F): 97.4 (13 Oct 2022 08:08), Max: 97.9 (12 Oct 2022 20:20)  HR: 108 (13 Oct 2022 08:08) (108 - 128)  BP: 123/81 (13 Oct 2022 08:08) (123/78 - 132/81)  BP(mean): --  RR: 15 (13 Oct 2022 08:08) (15 - 16)  SpO2: 95% (13 Oct 2022 08:08) (95% - 97%)    Parameters below as of 13 Oct 2022 08:08  Patient On (Oxygen Delivery Method): room air        GENERAL- NAD  EAR/NOSE/MOUTH/THROAT - no pharyngeal exudates, no oral lesions  MMM  EYES- ALEC, conjunctiva and Sclera clear  NECK- supple  RESPIRATORY-  clear to auscultation bilaterally, non laboured breathing  CARDIOVASCULAR - SIS2, RRR  GI - soft NT BS present  EXTREMITIES- no pedal edema  NEUROLOGY- no gross focal deficits  PSYCHIATRY- AAO X 3                9.2                  131  | 21   | 10           19.91 >-----------< 554     ------------------------< 105                   27.2                 3.6  | 99   | 0.39                                         Ca 9.9   Mg x     Ph x        Culture Results:   No growth to date. (10.11.22 @ 10:10)      MEDICATIONS  (STANDING):  Biotene Dry Mouth Oral Rinse 5 milliLiter(s) Swish and Spit three times a day  cyclobenzaprine 5 milliGRAM(s) Oral three times a day  dextrose 5% + sodium chloride 0.45%. 1000 milliLiter(s) (100 mL/Hr) IV Continuous <Continuous>  enoxaparin Injectable 40 milliGRAM(s) SubCutaneous every 24 hours  magnesium oxide 400 milliGRAM(s) Oral three times a day with meals  metoprolol tartrate 25 milliGRAM(s) Oral two times a day  oxyCODONE  ER Tablet 20 milliGRAM(s) Oral every 12 hours  pantoprazole  Injectable 40 milliGRAM(s) IV Push daily  pregabalin 25 milliGRAM(s) Oral every 8 hours  saccharomyces boulardii 250 milliGRAM(s) Oral two times a day  vancomycin    Solution 125 milliGRAM(s) Oral every 6 hours    MEDICATIONS  (PRN):  acetaminophen    Suspension .. 650 milliGRAM(s) Oral every 6 hours PRN Mild Pain (1 - 3)  ALBUTerol    0.083% 2.5 milliGRAM(s) Nebulizer every 6 hours PRN Shortness of Breath and/or Wheezing  HYDROmorphone  Injectable 0.5 milliGRAM(s) IntraMuscular every 6 hours PRN Moderate Pain (4 - 6)  HYDROmorphone  Injectable 1 milliGRAM(s) IntraMuscular every 6 hours PRN Severe Pain (7 - 10)  ondansetron Injectable 8 milliGRAM(s) IV Push every 6 hours PRN Nausea and/or Vomiting  simethicone 80 milliGRAM(s) Chew every 6 hours PRN Gas   66y old  Female who presents with a chief complaint of metastatic breast CA to spine s/p  C7-T2 corpectomy & fusion, posterior C4-T5 fusion     seen and examined at the bedside, c/o 1  loose stool early  this am but says she thinks it wasn't as loose as yesterday, no vomiting since last night, nausea improving, no fever,, feels little more  improved than yesterday.         Vital Signs Last 24 Hrs  T(C): 36.3 (13 Oct 2022 08:08), Max: 36.6 (12 Oct 2022 20:20)  T(F): 97.4 (13 Oct 2022 08:08), Max: 97.9 (12 Oct 2022 20:20)  HR: 108 (13 Oct 2022 08:08) (108 - 128)  BP: 123/81 (13 Oct 2022 08:08) (123/78 - 132/81)  BP(mean): --  RR: 15 (13 Oct 2022 08:08) (15 - 16)  SpO2: 95% (13 Oct 2022 08:08) (95% - 97%)    Parameters below as of 13 Oct 2022 08:08  Patient On (Oxygen Delivery Method): room air        GENERAL- NAD  EAR/NOSE/MOUTH/THROAT - no pharyngeal exudates, no oral lesions  MMM  EYES- ALEC, conjunctiva and Sclera clear  NECK- supple  RESPIRATORY-  clear to auscultation bilaterally, non laboured breathing  CARDIOVASCULAR - SIS2, RRR  GI - soft NT BS present  EXTREMITIES- no pedal edema  NEUROLOGY- no gross focal deficits  PSYCHIATRY- AAO X 3                9.2                  131  | 21   | 10           19.91 >-----------< 554     ------------------------< 105                   27.2                 3.6  | 99   | 0.39                                         Ca 9.9   Mg x     Ph x        Culture Results:   No growth to date. (10.11.22 @ 10:10)      MEDICATIONS  (STANDING):  Biotene Dry Mouth Oral Rinse 5 milliLiter(s) Swish and Spit three times a day  cyclobenzaprine 5 milliGRAM(s) Oral three times a day  dextrose 5% + sodium chloride 0.45%. 1000 milliLiter(s) (100 mL/Hr) IV Continuous <Continuous>  enoxaparin Injectable 40 milliGRAM(s) SubCutaneous every 24 hours  magnesium oxide 400 milliGRAM(s) Oral three times a day with meals  metoprolol tartrate 25 milliGRAM(s) Oral two times a day  oxyCODONE  ER Tablet 20 milliGRAM(s) Oral every 12 hours  pantoprazole  Injectable 40 milliGRAM(s) IV Push daily  pregabalin 25 milliGRAM(s) Oral every 8 hours  saccharomyces boulardii 250 milliGRAM(s) Oral two times a day  vancomycin    Solution 125 milliGRAM(s) Oral every 6 hours    MEDICATIONS  (PRN):  acetaminophen    Suspension .. 650 milliGRAM(s) Oral every 6 hours PRN Mild Pain (1 - 3)  ALBUTerol    0.083% 2.5 milliGRAM(s) Nebulizer every 6 hours PRN Shortness of Breath and/or Wheezing  HYDROmorphone  Injectable 0.5 milliGRAM(s) IntraMuscular every 6 hours PRN Moderate Pain (4 - 6)  HYDROmorphone  Injectable 1 milliGRAM(s) IntraMuscular every 6 hours PRN Severe Pain (7 - 10)  ondansetron Injectable 8 milliGRAM(s) IV Push every 6 hours PRN Nausea and/or Vomiting  simethicone 80 milliGRAM(s) Chew every 6 hours PRN Gas   66y old  Female who presents with a chief complaint of metastatic breast CA to spine s/p  C7-T2 corpectomy & fusion, posterior C4-T5 fusion     seen and examined at the bedside, c/o 1  loose stool early  this am but says she thinks it wasn't as loose as yesterday, no vomiting since last night, nausea improving, no fever,, feels little more  improved than yesterday.         Vital Signs Last 24 Hrs  T(C): 36.3 (13 Oct 2022 08:08), Max: 36.6 (12 Oct 2022 20:20)  T(F): 97.4 (13 Oct 2022 08:08), Max: 97.9 (12 Oct 2022 20:20)  HR: 108 (13 Oct 2022 08:08) (108 - 128)  BP: 123/81 (13 Oct 2022 08:08) (123/78 - 132/81)  BP(mean): --  RR: 15 (13 Oct 2022 08:08) (15 - 16)  SpO2: 95% (13 Oct 2022 08:08) (95% - 97%)    Parameters below as of 13 Oct 2022 08:08  Patient On (Oxygen Delivery Method): room air        GENERAL- NAD  EAR/NOSE/MOUTH/THROAT - no pharyngeal exudates, no oral lesions  MMM  EYES- ALEC, conjunctiva and Sclera clear  NECK- supple  RESPIRATORY-  clear to auscultation bilaterally, non laboured breathing  CARDIOVASCULAR - SIS2, RRR  GI - soft NT BS present  EXTREMITIES- no pedal edema  NEUROLOGY-  LE weakness  PSYCHIATRY- AAO X 3                9.2                  131  | 21   | 10           19.91 >-----------< 554     ------------------------< 105                   27.2                 3.6  | 99   | 0.39                                         Ca 9.9   Mg x     Ph x        Culture Results:   No growth to date. (10.11.22 @ 10:10)      MEDICATIONS  (STANDING):  Biotene Dry Mouth Oral Rinse 5 milliLiter(s) Swish and Spit three times a day  cyclobenzaprine 5 milliGRAM(s) Oral three times a day  dextrose 5% + sodium chloride 0.45%. 1000 milliLiter(s) (100 mL/Hr) IV Continuous <Continuous>  enoxaparin Injectable 40 milliGRAM(s) SubCutaneous every 24 hours  magnesium oxide 400 milliGRAM(s) Oral three times a day with meals  metoprolol tartrate 25 milliGRAM(s) Oral two times a day  oxyCODONE  ER Tablet 20 milliGRAM(s) Oral every 12 hours  pantoprazole  Injectable 40 milliGRAM(s) IV Push daily  pregabalin 25 milliGRAM(s) Oral every 8 hours  saccharomyces boulardii 250 milliGRAM(s) Oral two times a day  vancomycin    Solution 125 milliGRAM(s) Oral every 6 hours    MEDICATIONS  (PRN):  acetaminophen    Suspension .. 650 milliGRAM(s) Oral every 6 hours PRN Mild Pain (1 - 3)  ALBUTerol    0.083% 2.5 milliGRAM(s) Nebulizer every 6 hours PRN Shortness of Breath and/or Wheezing  HYDROmorphone  Injectable 0.5 milliGRAM(s) IntraMuscular every 6 hours PRN Moderate Pain (4 - 6)  HYDROmorphone  Injectable 1 milliGRAM(s) IntraMuscular every 6 hours PRN Severe Pain (7 - 10)  ondansetron Injectable 8 milliGRAM(s) IV Push every 6 hours PRN Nausea and/or Vomiting  simethicone 80 milliGRAM(s) Chew every 6 hours PRN Gas

## 2022-10-13 NOTE — PROGRESS NOTE ADULT - SUBJECTIVE AND OBJECTIVE BOX
CC: f/u for colitis & possible C diff     Patient reports that she had no bowel mvmt today, but abd remains uncomfortable    REVIEW OF SYSTEMS:  All other review of systems negative except as above    Antimicrobials Day #  : 3  vancomycin    Solution 125 milliGRAM(s) Oral every 6 hours    Other Medications Reviewed    T(F): 97.4 (10-13-22 @ 08:08), Max: 97.9 (10-12-22 @ 20:20)  HR: 108 (10-13-22 @ 08:08)  BP: 123/81 (10-13-22 @ 08:08)  RR: 15 (10-13-22 @ 08:08)  SpO2: 95% (10-13-22 @ 08:08)  Wt(kg): --    PHYSICAL EXAM:  General: alert, no acute distress, uncomfortable  Eyes:  anicteric, no conjunctival injection, no discharge  Neck: supple, collar in place, visible incision intact with steri strips   Lungs: clear to auscultation  Heart: regular rate and rhythm; no murmurs  Abdomen: soft, nondistended, nonspecific tenderness, bowel sounds +.  Skin: no rash  Extremities: no edema  Neurologic: alert, oriented, moves all extremities    LAB RESULTS:                        9.2    19.91 )-----------( 554      ( 13 Oct 2022 06:31 )             27.2     10-13    131<L>  |  99  |  10  ----------------------------<  105<H>  3.6   |  21<L>  |  0.39<L>    Ca    9.9      13 Oct 2022 06:31    TPro  4.8<L>  /  Alb  1.7<L>  /  TBili  0.3  /  DBili  x   /  AST  41<H>  /  ALT  21  /  AlkPhos  132<H>  10-12    LIVER FUNCTIONS - ( 12 Oct 2022 06:50 )  Alb: 1.7 g/dL / Pro: 4.8 g/dL / ALK PHOS: 132 U/L / ALT: 21 U/L / AST: 41 U/L / GGT: x             MICROBIOLOGY:  RECENT CULTURES:  10-11 @ 10:10 .Blood Blood     No growth to date.      10-11 @ 03:30 .Stool Feces     No enteric pathogens isolated.  (Stool culture examined for Salmonella,  Shigella, Campylobacter, Aeromonas, Plesiomonas,  Vibrio, E.coli O157 and Yersinia)      RADIOLOGY REVIEWED:

## 2022-10-13 NOTE — PROGRESS NOTE ADULT - ASSESSMENT
65 yo female persistent diarrhea and pancolitis on CT imaging. Patient seen and examined at bedside. Denies diarrhea. She is able to tolerate some liquids. Minimal nausea. She reports some abdominal pain.     WBC elevated Stool test pending result, particularly c. diff.     Other findings as discussed above.

## 2022-10-13 NOTE — PROGRESS NOTE ADULT - ASSESSMENT
67 yo female PMH stomach ulcer who presented to [facility] 9/10/22 with progressive weakness, discovered multiple lytic lesions of the cervical and thoracic spine along with lung and liver secondary to metastatic primary newly discovered breast cancer with axillary and retropectoral involvement. Imaging also showed Compression fractures of T1, T9, T12, and L2. Severe T1 compression with epidural extension.S/p C7-T2 corpectomy & fusion, posterior C4-T5 fusion, hospital course significant for urinary retention and dysphagia, UTI, now admitted for rehab- pt/ot/dvt ppx    #Acute hypoxic respiratory failure in need of supplemental oxygenation likely multifactorial including:  *Metastatic disease within mediastinum and rohit  *Metastatic disease within ribs  *Bilateral lung nodules RLL  *Bilateral GGO  *Bilateral atelectasis  -PE ruled out with neg CTA  -C/w alb neb treatement q 6hrs prn  -Incentive spirometer, OOB to chair, mobilize  -Might need oxygen due to increase in cancer burden in lungs  -TTE : EF 70-75%. tachy, hyperdynamic LVF  -Appreciate pulmonary recommendations    *Metastatic Breast CA to lung, liver, ribs, brain with spine involvement and neurogenic bladder  - s/p right breast biopsy shows  positive for invasive ductal carcinoma  - Compression fractures of T1, T9, T12, and L2. Severe T1 compression with epidural extension.   - s/p C7-T2 corpectomy & fusion, posterior C4-T5 fusion  - Patient will not require chemo/radiation while in rehab. 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  - oncology consult called today      *n/v/d/ LEUKOCYTOSIS/ hypokalemia / hypernatremia- likely due to colitis, high suspicion for c diff- clinically feels a little better  po vanco STARTED 10/11/22,   pending c diff results.  hypokalemia - improved today  c/w iv fluids  full liquid diet for now- tolerating better now.  Zofran prn  ppi  BC X 2 negative 10/11/22  recheck ua- unable to get sample due to contamination from stool and difficult straight cath, but ID reluctant to treat with abtx even if positive.  am labs  replete K   ct c and t spine - results noted, rehab team will reach out to neuro team, for any new reccs  GI consulted - recc noted  ID recc noted  reassess patient in am  encouraged compliance with po vanco and meds.     * HTN  - metoprolol   - EKG sinus tach- likely due to colitis  - cardio recc noted  - encouraged PO hydration      *Enterobacter cloacae UTI  - s/p Vantin BID for 7 days 9/30-10/7/22  - monitor bladder scans  - SC for PVR >350 ml, toileting schedule    * Hypercalcemia- improved with fluids  - likely related to bone mets  - c/w IVF  - repeat BMP and albumin ordered    *Normocytic anemia  - Monitor Hg/Hct, iron levels, B12, folate WNL  - Transfuse if hG <7      Severe protein-calorie malnutrition.  Hypoalbuminemia  - nutrition eval noted    DVT ppx: Lovenox      will follow  d/w dr. boggs  65 yo female PMH stomach ulcer who presented to [facility] 9/10/22 with progressive weakness, discovered multiple lytic lesions of the cervical and thoracic spine along with lung and liver secondary to metastatic primary newly discovered breast cancer with axillary and retropectoral involvement. Imaging also showed Compression fractures of T1, T9, T12, and L2. Severe T1 compression with epidural extension.S/p C7-T2 corpectomy & fusion, posterior C4-T5 fusion, hospital course significant for urinary retention and dysphagia, UTI, now admitted for rehab- pt/ot/dvt ppx    #Acute hypoxic respiratory failure in need of supplemental oxygenation likely multifactorial including:  *Metastatic disease within mediastinum and rohit  *Metastatic disease within ribs  *Bilateral lung nodules RLL  *Bilateral GGO  *Bilateral atelectasis  -PE ruled out with neg CTA  -C/w alb neb treatement q 6hrs prn  -Incentive spirometer, OOB to chair, mobilize  -Might need oxygen due to increase in cancer burden in lungs  -TTE : EF 70-75%. tachy, hyperdynamic LVF  -Appreciate pulmonary recommendations    *Metastatic Breast CA to lung, liver, ribs, brain with spine involvement and neurogenic bladder  - s/p right breast biopsy shows  positive for invasive ductal carcinoma  - Compression fractures of T1, T9, T12, and L2. Severe T1 compression with epidural extension.   - s/p C7-T2 corpectomy & fusion, posterior C4-T5 fusion  - Patient will not require chemo/radiation while in rehab. 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  - oncology consult called today      *n/v/d/ LEUKOCYTOSIS/ hypokalemia / hyponatremia- likely due to colitis, high suspicion for c diff- clinically feels a little better  po vanco STARTED 10/11/22,   pending c diff results.  hypokalemia - improved today  c/w iv fluids  full liquid diet for now- tolerating better now.  Zofran prn  ppi  BC X 2 negative 10/11/22  recheck ua- unable to get sample due to contamination from stool and difficult straight cath, but ID reluctant to treat with abtx even if positive.  am labs  replete K   ct c and t spine - results noted, rehab team will reach out to neuro team, for any new reccs  GI consulted - recc noted  ID recc noted  reassess patient in am  encouraged compliance with po vanco and meds.     * HTN  - metoprolol   - EKG sinus tach- likely due to colitis  - cardio recc noted  - encouraged PO hydration      *Enterobacter cloacae UTI  - s/p Vantin BID for 7 days 9/30-10/7/22  - monitor bladder scans  - SC for PVR >350 ml, toileting schedule    * Hypercalcemia- improved with fluids  - likely related to bone mets  - c/w IVF  - repeat BMP and albumin ordered    *Normocytic anemia  - Monitor Hg/Hct, iron levels, B12, folate WNL  - Transfuse if hG <7      Severe protein-calorie malnutrition.  Hypoalbuminemia  - nutrition eval noted    DVT ppx: Lovenox      will follow  d/w dr. boggs

## 2022-10-13 NOTE — PROGRESS NOTE ADULT - ASSESSMENT
66y female with a PMH of a gastric ulcer who was recently admitted to [facility] with overall failure to thrive and found to have widely metastatic breast cancer with lung, liver, brain and bone mets.  S/p C-T spine surgery with anterior corpectomy and fusion of C7-T2 and posterior C4-T5 fusion. Post op UTI treated with CTX and cefpodoxime and sent to rehab on 9/30.  She has had a poor appetite, has c/o N/V and has c/o abdominal cramps. Had some diarrhea and a CT scan showed pancolitis.  Her CT of A/P on 10/2 did not show any colonic thickening. She is taking narcotics which are antiperistaltics and can limit diarrhea.  A stool for C Diff was ordered and PO vanco started.  She received CTX and cefpodoxime, and while her organism was resistant to these agents I would like to limit antibiotics until GI issues controlled.  Her C diff assay was cancelled by the lab, specimen was found leaky   She has persistent leukocytosis & appears very uncomfortable    Suggest:  Repeat C Diff toxin - when she gets a BM  PO vanco 125 4x/day, day # 3  Blood cultures NGTD  If CDT is negative I would send stool culture and GI PCR panel.  Can send another UA and culture but I may be reluctant to treat a positive result, especially now with pancolitis & suspicion of C diff   Monitor exam and labs

## 2022-10-13 NOTE — PROGRESS NOTE ADULT - ATTENDING COMMENTS
Progress note amended to include my discussions with patient, resident, hospitalist, RN, SW, SLP, PT. Patient looks sl improved, still anxious and fatigued, but pain sl better controlled and able to tolerate some po. Last BM 12 hours ago and loose but starting to be slightly formed. Afebrile, but still has elevated WBC  19.91. Continuing vanco, day #3/14 day course. She reports trying to utilize some breathing strategies for anxiety and symptomatic SOB with improvement this AM. MBS performed, no pharyngeal issues on swallow but has difficul;t time with initiating propulsion solid bolus, SLP to increase to 60 min for sensory training and swallow training with pureed solid and thin liquid trays; PT reduced to 60, continue OT 60 minutes.    Plastics to remove remaining 2 staples in posterior cervical incision, discussed with patient who is agreeable.  K+ stable, Na low but continue current fluids as discussed with hospitalist. HR slightly improved this morning 108. Placed call to brother Quique to discuss medical status

## 2022-10-13 NOTE — PROGRESS NOTE ADULT - SUBJECTIVE AND OBJECTIVE BOX
Patient is a 66y old  Female who presents with a chief complaint of metastatic breast CA to spine s/p  C7-T2 corpectomy & fusion, posterior C4-T5 fusion (13 Oct 2022 08:54)      HPI:  Thi is a 67 yo female with PMH of left arm skin CA s/p excision at age 16, stomach ulcer, her last mammogram was in 2014, who was brought to [facility] ED 9/10/22 with progressive weakness to the point that she felt she could not get out of bed. She also reported remote shoulder pain, decreased appetite. Was found to have two lumps in her right breast but could not tolerate recommended biopsy due to pain. CT scan of the Cervical spine performed which showed Multiple lytic lesions of the cervical and visualized thoracic spine as well as right first and second ribs concerning for metastases versus multiple myeloma.    She was admitted under medicine service for further workup and management, including of her pain. Further imaging done showed Metastatic breast cancer with dominant right breast mass, right chest wall, axillary and retropectoral involvement; osseous, pulmonary, and likely hepatic metastatic disease. She was also found to have metastatic adenopathy of the thorax, diffuse extensive osseous metastatic disease with multiple lucent and sclerotic lesions and compression fractures of T1, T9, T12, and L2. Severe T1 compression with epidural extension; cord compression cannot be excluded. +additional neoplastic disease of the spine with epidural extension. Multifocal neoplastic lesions involving the pelvis and ribs as well.    Patient was seen by Oncology, CT surgery and Neurosurgery team. She underwent C7-T2 corpectomy & fusion, posterior C4-T5 fusion. Post-op course uncomplicated.  Hamm removed, pt requiring some straight cath for retention. She also had subjective dysphagia - seen by speech therapy recommended pureed diet which patient continues to prefer. Patient with low grade temp and positive UA. Started on IV Rocephin for UTI. Blood cultures negative, Urine culture positive for GNR, changed to PO Vantin for 7 days.    Patient was medically optimized for discharge to [facility] IRF on 9/30/22. (30 Sep 2022 14:32)      PAST MEDICAL & SURGICAL HISTORY:  Stomach ulcer  20 years ago   no pain not taking any meds      Skin cancer of arm, left  pt was 16 year old      H/O excision of mass  left arm  when patient was 16 years ago      S/P hysterectomy          MEDICATIONS  (STANDING):  Biotene Dry Mouth Oral Rinse 5 milliLiter(s) Swish and Spit three times a day  cyclobenzaprine 5 milliGRAM(s) Oral three times a day  dextrose 5% + sodium chloride 0.45%. 1000 milliLiter(s) (100 mL/Hr) IV Continuous <Continuous>  enoxaparin Injectable 40 milliGRAM(s) SubCutaneous every 24 hours  magnesium oxide 400 milliGRAM(s) Oral three times a day with meals  metoprolol tartrate 25 milliGRAM(s) Oral two times a day  oxyCODONE  ER Tablet 20 milliGRAM(s) Oral every 12 hours  pantoprazole  Injectable 40 milliGRAM(s) IV Push daily  pregabalin 25 milliGRAM(s) Oral every 8 hours  saccharomyces boulardii 250 milliGRAM(s) Oral two times a day  vancomycin    Solution 125 milliGRAM(s) Oral every 6 hours    MEDICATIONS  (PRN):  acetaminophen    Suspension .. 650 milliGRAM(s) Oral every 6 hours PRN Mild Pain (1 - 3)  ALBUTerol    0.083% 2.5 milliGRAM(s) Nebulizer every 6 hours PRN Shortness of Breath and/or Wheezing  HYDROmorphone  Injectable 0.5 milliGRAM(s) IntraMuscular every 6 hours PRN Moderate Pain (4 - 6)  HYDROmorphone  Injectable 1 milliGRAM(s) IntraMuscular every 6 hours PRN Severe Pain (7 - 10)  ondansetron Injectable 8 milliGRAM(s) IV Push every 6 hours PRN Nausea and/or Vomiting  simethicone 80 milliGRAM(s) Chew every 6 hours PRN Gas      Allergies    tetanus toxoid (Hives)    Intolerances    lactose (Other)        VITALS  66y  Vital Signs Last 24 Hrs  T(C): 36.3 (13 Oct 2022 08:08), Max: 36.6 (12 Oct 2022 20:20)  T(F): 97.4 (13 Oct 2022 08:08), Max: 97.9 (12 Oct 2022 20:20)  HR: 108 (13 Oct 2022 08:08) (108 - 128)  BP: 123/81 (13 Oct 2022 08:08) (123/78 - 123/84)  BP(mean): --  RR: 15 (13 Oct 2022 08:08) (15 - 16)  SpO2: 95% (13 Oct 2022 08:08) (95% - 97%)    Parameters below as of 13 Oct 2022 08:08  Patient On (Oxygen Delivery Method): room air      Daily     Daily         RECENT LABS:                          9.2    19.91 )-----------( 554      ( 13 Oct 2022 06:31 )             27.2     10-13    131<L>  |  99  |  10  ----------------------------<  105<H>  3.6   |  21<L>  |  0.39<L>    Ca    9.9      13 Oct 2022 06:31    TPro  4.8<L>  /  Alb  1.7<L>  /  TBili  0.3  /  DBili  x   /  AST  41<H>  /  ALT  21  /  AlkPhos  132<H>  10-12    LIVER FUNCTIONS - ( 12 Oct 2022 06:50 )  Alb: 1.7 g/dL / Pro: 4.8 g/dL / ALK PHOS: 132 U/L / ALT: 21 U/L / AST: 41 U/L / GGT: x                 Culture - Blood (collected 10-11-22 @ 10:10)  Source: .Blood Blood  Preliminary Report (10-12-22 @ 13:02):    No growth to date.    Culture - Blood (collected 10-11-22 @ 10:10)  Source: .Blood Blood  Preliminary Report (10-12-22 @ 13:02):    No growth to date.    Culture - Stool (collected 10-11-22 @ 03:30)  Source: .Stool Feces  Final Report (10-13-22 @ 10:40):    No enteric pathogens isolated.    (Stool culture examined for Salmonella,    Shigella, Campylobacter, Aeromonas, Plesiomonas,    Vibrio, E.coli O157 and Yersinia)      Review of Systems:   	  · ROS	Patient afebrile, reports no BM in 12 hours. Dilaudid still helping her pain and she is still having trouble tolerating PO intake. Her most acute pain is located on the left side of her neck, posteriorly, it is sharp and non-radiating. She has managed to take a few oral meds, but remains uncomfortable. She again asked if oxy can be given non-orally, prompting discussion over the different pharmaceutical options at hand. Aware of the swallow study today, that we will reach out to her brother, and that plastic surgery will be coming by later in the day to assess her remaining staples. On contact isolation     Physical Exam:   · Constitutional Comments	c-collar in place appropriately   anterior cervical incision intact, no oozing +steri strips, no neck swelling or redness  	  · Respiratory	clear to auscultation bilaterally; no wheezes; no rales; no rhonchi  · Cardiovascular	regular rate and rhythm; S1 S2 present; no gallops; no rub; no murmur  · Gastrointestinal	soft; no R/G +mild distension no R/G  	  	  · Motor	bilateral calves soft, no TTP  no pedal edema             Patient is a 66y old  Female who presents with a chief complaint of metastatic breast CA to spine s/p  C7-T2 corpectomy & fusion, posterior C4-T5 fusion (13 Oct 2022 08:54)      HPI:  Thi is a 65 yo female with PMH of left arm skin CA s/p excision at age 16, stomach ulcer, her last mammogram was in 2014, who was brought to [facility] ED 9/10/22 with progressive weakness to the point that she felt she could not get out of bed. She also reported remote shoulder pain, decreased appetite. Was found to have two lumps in her right breast but could not tolerate recommended biopsy due to pain. CT scan of the Cervical spine performed which showed Multiple lytic lesions of the cervical and visualized thoracic spine as well as right first and second ribs concerning for metastases versus multiple myeloma.    She was admitted under medicine service for further workup and management, including of her pain. Further imaging done showed Metastatic breast cancer with dominant right breast mass, right chest wall, axillary and retropectoral involvement; osseous, pulmonary, and likely hepatic metastatic disease. She was also found to have metastatic adenopathy of the thorax, diffuse extensive osseous metastatic disease with multiple lucent and sclerotic lesions and compression fractures of T1, T9, T12, and L2. Severe T1 compression with epidural extension; cord compression cannot be excluded. +additional neoplastic disease of the spine with epidural extension. Multifocal neoplastic lesions involving the pelvis and ribs as well.    Patient was seen by Oncology, CT surgery and Neurosurgery team. She underwent C7-T2 corpectomy & fusion, posterior C4-T5 fusion. Post-op course uncomplicated.  Hamm removed, pt requiring some straight cath for retention. She also had subjective dysphagia - seen by speech therapy recommended pureed diet which patient continues to prefer. Patient with low grade temp and positive UA. Started on IV Rocephin for UTI. Blood cultures negative, Urine culture positive for GNR, changed to PO Vantin for 7 days.    Patient was medically optimized for discharge to [facility] IRF on 9/30/22. (30 Sep 2022 14:32)      PAST MEDICAL & SURGICAL HISTORY:  Stomach ulcer  20 years ago   no pain not taking any meds      Skin cancer of arm, left  pt was 16 year old      H/O excision of mass  left arm  when patient was 16 years ago      S/P hysterectomy          MEDICATIONS  (STANDING):  Biotene Dry Mouth Oral Rinse 5 milliLiter(s) Swish and Spit three times a day  cyclobenzaprine 5 milliGRAM(s) Oral three times a day  dextrose 5% + sodium chloride 0.45%. 1000 milliLiter(s) (100 mL/Hr) IV Continuous <Continuous>  enoxaparin Injectable 40 milliGRAM(s) SubCutaneous every 24 hours  magnesium oxide 400 milliGRAM(s) Oral three times a day with meals  metoprolol tartrate 25 milliGRAM(s) Oral two times a day  oxyCODONE  ER Tablet 20 milliGRAM(s) Oral every 12 hours  pantoprazole  Injectable 40 milliGRAM(s) IV Push daily  pregabalin 25 milliGRAM(s) Oral every 8 hours  saccharomyces boulardii 250 milliGRAM(s) Oral two times a day  vancomycin    Solution 125 milliGRAM(s) Oral every 6 hours    MEDICATIONS  (PRN):  acetaminophen    Suspension .. 650 milliGRAM(s) Oral every 6 hours PRN Mild Pain (1 - 3)  ALBUTerol    0.083% 2.5 milliGRAM(s) Nebulizer every 6 hours PRN Shortness of Breath and/or Wheezing  HYDROmorphone  Injectable 0.5 milliGRAM(s) IntraMuscular every 6 hours PRN Moderate Pain (4 - 6)  HYDROmorphone  Injectable 1 milliGRAM(s) IntraMuscular every 6 hours PRN Severe Pain (7 - 10)  ondansetron Injectable 8 milliGRAM(s) IV Push every 6 hours PRN Nausea and/or Vomiting  simethicone 80 milliGRAM(s) Chew every 6 hours PRN Gas      Allergies    tetanus toxoid (Hives)    Intolerances    lactose (Other)        VITALS  66y  Vital Signs Last 24 Hrs  T(C): 36.3 (13 Oct 2022 08:08), Max: 36.6 (12 Oct 2022 20:20)  T(F): 97.4 (13 Oct 2022 08:08), Max: 97.9 (12 Oct 2022 20:20)  HR: 108 (13 Oct 2022 08:08) (108 - 128)  BP: 123/81 (13 Oct 2022 08:08) (123/78 - 123/84)  BP(mean): --  RR: 15 (13 Oct 2022 08:08) (15 - 16)  SpO2: 95% (13 Oct 2022 08:08) (95% - 97%)    Parameters below as of 13 Oct 2022 08:08  Patient On (Oxygen Delivery Method): room air      Daily     Daily         RECENT LABS:                          9.2    19.91 )-----------( 554      ( 13 Oct 2022 06:31 )             27.2     10-13    131<L>  |  99  |  10  ----------------------------<  105<H>  3.6   |  21<L>  |  0.39<L>    Ca    9.9      13 Oct 2022 06:31    TPro  4.8<L>  /  Alb  1.7<L>  /  TBili  0.3  /  DBili  x   /  AST  41<H>  /  ALT  21  /  AlkPhos  132<H>  10-12    LIVER FUNCTIONS - ( 12 Oct 2022 06:50 )  Alb: 1.7 g/dL / Pro: 4.8 g/dL / ALK PHOS: 132 U/L / ALT: 21 U/L / AST: 41 U/L / GGT: x                 Culture - Blood (collected 10-11-22 @ 10:10)  Source: .Blood Blood  Preliminary Report (10-12-22 @ 13:02):    No growth to date.    Culture - Blood (collected 10-11-22 @ 10:10)  Source: .Blood Blood  Preliminary Report (10-12-22 @ 13:02):    No growth to date.    Culture - Stool (collected 10-11-22 @ 03:30)  Source: .Stool Feces  Final Report (10-13-22 @ 10:40):    No enteric pathogens isolated.    (Stool culture examined for Salmonella,    Shigella, Campylobacter, Aeromonas, Plesiomonas,    Vibrio, E.coli O157 and Yersinia)      Review of Systems:   	  · ROS	Patient afebrile, reports no BM in 12 hours. Able to tolerate liquid diet slightly more although still has not taken oral pain medication. Dilaudid still helping her pain and she is still having trouble tolerating PO intake. Her most acute pain is located on the left side of her neck, posteriorly, it is sharp and non-radiating. She has managed to take a few oral meds, but remains uncomfortable. She again asked if oxy can be given non-orally, prompting discussion over the different pharmaceutical options at hand.     Aware of the swallow study today, that we will reach out to her brother, and that plastic surgery will be coming by later in the day to assess her remaining staples. On contact isolation     Physical Exam:   · Constitutional Comments	c-collar in place appropriately   anterior cervical incision intact, +steri strips    hypophonic, still anxious but appears less so, and utilizing breathing strategies  	  · Respiratory	clear to auscultation bilaterally; no wheezes; no rales; no rhonchi  fair effort reduced BS bases  · Cardiovascular	regular rate and rhythm; S1 S2 present; no gallops; no rub; no murmur  · Gastrointestinal	soft; no R/G +mild distension no R/G  normoactive BS  	  	  · Motor	bilateral calves soft, no TTP  no pedal edema

## 2022-10-14 LAB
ALBUMIN SERPL ELPH-MCNC: 1.5 G/DL — LOW (ref 3.3–5)
ALP SERPL-CCNC: 129 U/L — HIGH (ref 40–120)
ALT FLD-CCNC: 17 U/L — SIGNIFICANT CHANGE UP (ref 10–45)
ANION GAP SERPL CALC-SCNC: 8 MMOL/L — SIGNIFICANT CHANGE UP (ref 5–17)
AST SERPL-CCNC: 44 U/L — HIGH (ref 10–40)
BILIRUB SERPL-MCNC: 0.2 MG/DL — SIGNIFICANT CHANGE UP (ref 0.2–1.2)
BUN SERPL-MCNC: 7 MG/DL — SIGNIFICANT CHANGE UP (ref 7–23)
C DIFF BY PCR RESULT: DETECTED
C DIFF TOX GENS STL QL NAA+PROBE: SIGNIFICANT CHANGE UP
CALCIUM SERPL-MCNC: 9.1 MG/DL — SIGNIFICANT CHANGE UP (ref 8.4–10.5)
CHLORIDE SERPL-SCNC: 99 MMOL/L — SIGNIFICANT CHANGE UP (ref 96–108)
CO2 SERPL-SCNC: 22 MMOL/L — SIGNIFICANT CHANGE UP (ref 22–31)
CREAT SERPL-MCNC: 0.3 MG/DL — LOW (ref 0.5–1.3)
EGFR: 117 ML/MIN/1.73M2 — SIGNIFICANT CHANGE UP
GI PCR PANEL: SIGNIFICANT CHANGE UP
GLUCOSE SERPL-MCNC: 116 MG/DL — HIGH (ref 70–99)
HCT VFR BLD CALC: 25.9 % — LOW (ref 34.5–45)
HGB BLD-MCNC: 8.9 G/DL — LOW (ref 11.5–15.5)
MCHC RBC-ENTMCNC: 29.1 PG — SIGNIFICANT CHANGE UP (ref 27–34)
MCHC RBC-ENTMCNC: 34.4 GM/DL — SIGNIFICANT CHANGE UP (ref 32–36)
MCV RBC AUTO: 84.6 FL — SIGNIFICANT CHANGE UP (ref 80–100)
NRBC # BLD: 4 /100 WBCS — HIGH (ref 0–0)
PLATELET # BLD AUTO: 489 K/UL — HIGH (ref 150–400)
POTASSIUM SERPL-MCNC: 3.3 MMOL/L — LOW (ref 3.5–5.3)
POTASSIUM SERPL-SCNC: 3.3 MMOL/L — LOW (ref 3.5–5.3)
PROT SERPL-MCNC: 4.1 G/DL — LOW (ref 6–8.3)
RBC # BLD: 3.06 M/UL — LOW (ref 3.8–5.2)
RBC # FLD: 14.5 % — SIGNIFICANT CHANGE UP (ref 10.3–14.5)
SODIUM SERPL-SCNC: 129 MMOL/L — LOW (ref 135–145)
WBC # BLD: 11.99 K/UL — HIGH (ref 3.8–10.5)
WBC # FLD AUTO: 11.99 K/UL — HIGH (ref 3.8–10.5)

## 2022-10-14 PROCEDURE — 99232 SBSQ HOSP IP/OBS MODERATE 35: CPT

## 2022-10-14 PROCEDURE — 99233 SBSQ HOSP IP/OBS HIGH 50: CPT

## 2022-10-14 PROCEDURE — 99223 1ST HOSP IP/OBS HIGH 75: CPT

## 2022-10-14 RX ORDER — HYDROMORPHONE HYDROCHLORIDE 2 MG/ML
1 INJECTION INTRAMUSCULAR; INTRAVENOUS; SUBCUTANEOUS EVERY 4 HOURS
Refills: 0 | Status: DISCONTINUED | OUTPATIENT
Start: 2022-10-14 | End: 2022-10-14

## 2022-10-14 RX ORDER — SODIUM CHLORIDE 9 MG/ML
1000 INJECTION INTRAMUSCULAR; INTRAVENOUS; SUBCUTANEOUS
Refills: 0 | Status: DISCONTINUED | OUTPATIENT
Start: 2022-10-14 | End: 2022-10-18

## 2022-10-14 RX ORDER — SODIUM CHLORIDE 9 MG/ML
1 INJECTION INTRAMUSCULAR; INTRAVENOUS; SUBCUTANEOUS ONCE
Refills: 0 | Status: COMPLETED | OUTPATIENT
Start: 2022-10-14 | End: 2022-10-14

## 2022-10-14 RX ORDER — SODIUM CHLORIDE 9 MG/ML
1000 INJECTION INTRAMUSCULAR; INTRAVENOUS; SUBCUTANEOUS
Refills: 0 | Status: DISCONTINUED | OUTPATIENT
Start: 2022-10-14 | End: 2022-10-14

## 2022-10-14 RX ORDER — FENTANYL CITRATE 50 UG/ML
1 INJECTION INTRAVENOUS
Refills: 0 | Status: DISCONTINUED | OUTPATIENT
Start: 2022-10-14 | End: 2022-10-17

## 2022-10-14 RX ORDER — OXYCODONE HYDROCHLORIDE 5 MG/1
20 TABLET ORAL ONCE
Refills: 0 | Status: DISCONTINUED | OUTPATIENT
Start: 2022-10-14 | End: 2022-10-14

## 2022-10-14 RX ORDER — HYDROMORPHONE HYDROCHLORIDE 2 MG/ML
1 INJECTION INTRAMUSCULAR; INTRAVENOUS; SUBCUTANEOUS EVERY 4 HOURS
Refills: 0 | Status: DISCONTINUED | OUTPATIENT
Start: 2022-10-14 | End: 2022-10-18

## 2022-10-14 RX ORDER — HYDROMORPHONE HYDROCHLORIDE 2 MG/ML
0.5 INJECTION INTRAMUSCULAR; INTRAVENOUS; SUBCUTANEOUS EVERY 4 HOURS
Refills: 0 | Status: DISCONTINUED | OUTPATIENT
Start: 2022-10-14 | End: 2022-10-14

## 2022-10-14 RX ORDER — POTASSIUM CHLORIDE 20 MEQ
10 PACKET (EA) ORAL
Refills: 0 | Status: COMPLETED | OUTPATIENT
Start: 2022-10-14 | End: 2022-10-14

## 2022-10-14 RX ADMIN — Medication 100 MILLIEQUIVALENT(S): at 11:02

## 2022-10-14 RX ADMIN — Medication 125 MILLIGRAM(S): at 14:18

## 2022-10-14 RX ADMIN — Medication 100 MILLIEQUIVALENT(S): at 09:30

## 2022-10-14 RX ADMIN — FENTANYL CITRATE 1 PATCH: 50 INJECTION INTRAVENOUS at 19:00

## 2022-10-14 RX ADMIN — HYDROMORPHONE HYDROCHLORIDE 0.5 MILLIGRAM(S): 2 INJECTION INTRAMUSCULAR; INTRAVENOUS; SUBCUTANEOUS at 06:30

## 2022-10-14 RX ADMIN — OXYCODONE HYDROCHLORIDE 20 MILLIGRAM(S): 5 TABLET ORAL at 09:21

## 2022-10-14 RX ADMIN — ENOXAPARIN SODIUM 40 MILLIGRAM(S): 100 INJECTION SUBCUTANEOUS at 06:07

## 2022-10-14 RX ADMIN — Medication 125 MILLIGRAM(S): at 18:03

## 2022-10-14 RX ADMIN — HYDROMORPHONE HYDROCHLORIDE 1 MILLIGRAM(S): 2 INJECTION INTRAMUSCULAR; INTRAVENOUS; SUBCUTANEOUS at 22:30

## 2022-10-14 RX ADMIN — HYDROMORPHONE HYDROCHLORIDE 0.5 MILLIGRAM(S): 2 INJECTION INTRAMUSCULAR; INTRAVENOUS; SUBCUTANEOUS at 06:00

## 2022-10-14 RX ADMIN — HYDROMORPHONE HYDROCHLORIDE 1 MILLIGRAM(S): 2 INJECTION INTRAMUSCULAR; INTRAVENOUS; SUBCUTANEOUS at 14:26

## 2022-10-14 RX ADMIN — Medication 125 MILLIGRAM(S): at 01:54

## 2022-10-14 RX ADMIN — HYDROMORPHONE HYDROCHLORIDE 1 MILLIGRAM(S): 2 INJECTION INTRAMUSCULAR; INTRAVENOUS; SUBCUTANEOUS at 02:20

## 2022-10-14 RX ADMIN — Medication 100 MILLIEQUIVALENT(S): at 09:41

## 2022-10-14 RX ADMIN — SODIUM CHLORIDE 100 MILLILITER(S): 9 INJECTION, SOLUTION INTRAVENOUS at 06:06

## 2022-10-14 RX ADMIN — OXYCODONE HYDROCHLORIDE 20 MILLIGRAM(S): 5 TABLET ORAL at 15:13

## 2022-10-14 RX ADMIN — PANTOPRAZOLE SODIUM 40 MILLIGRAM(S): 20 TABLET, DELAYED RELEASE ORAL at 14:18

## 2022-10-14 RX ADMIN — HYDROMORPHONE HYDROCHLORIDE 1 MILLIGRAM(S): 2 INJECTION INTRAMUSCULAR; INTRAVENOUS; SUBCUTANEOUS at 16:29

## 2022-10-14 RX ADMIN — SODIUM CHLORIDE 70 MILLILITER(S): 9 INJECTION INTRAMUSCULAR; INTRAVENOUS; SUBCUTANEOUS at 21:39

## 2022-10-14 RX ADMIN — HYDROMORPHONE HYDROCHLORIDE 1 MILLIGRAM(S): 2 INJECTION INTRAMUSCULAR; INTRAVENOUS; SUBCUTANEOUS at 09:41

## 2022-10-14 RX ADMIN — FENTANYL CITRATE 1 PATCH: 50 INJECTION INTRAVENOUS at 11:17

## 2022-10-14 RX ADMIN — HYDROMORPHONE HYDROCHLORIDE 1 MILLIGRAM(S): 2 INJECTION INTRAMUSCULAR; INTRAVENOUS; SUBCUTANEOUS at 01:50

## 2022-10-14 RX ADMIN — SODIUM CHLORIDE 70 MILLILITER(S): 9 INJECTION INTRAMUSCULAR; INTRAVENOUS; SUBCUTANEOUS at 09:41

## 2022-10-14 RX ADMIN — HYDROMORPHONE HYDROCHLORIDE 1 MILLIGRAM(S): 2 INJECTION INTRAMUSCULAR; INTRAVENOUS; SUBCUTANEOUS at 23:00

## 2022-10-14 NOTE — CHART NOTE - NSCHARTNOTEFT_GEN_A_CORE
Attempted to f/u with Pt. Pt excused herself for declining due to just having BM. However, she reported feeling better today, having more control of pain thanks to having patched placed, resulting in improved mood and better sleep. Pt reported practicing the breathing exercise that we discussed and practiced in our last meeting. Agreed to meet within the next business day.

## 2022-10-14 NOTE — CONSULT NOTE ADULT - CONSULT REASON
Goc discussion , support,
Newly diagnosed metastatic CA
Diarrhea
Abnormal CT chest
metastatic breast CA
post cervical incision evaluation
leukocytosis and pancolitis
Breast cancer

## 2022-10-14 NOTE — PROGRESS NOTE ADULT - SUBJECTIVE AND OBJECTIVE BOX
SHELLEY DE LEON   66y   Female    Admitting: MARIA EUGENIA Carrizales  HPI:  Neha is a 65 yo female with PMH of left arm skin CA s/p excision at age 16, stomach ulcer, her last mammogram was in 2014, who was brought to [facility] ED 9/10/22 with progressive weakness to the point that she felt she could not get out of bed. She also reported shoulder pain, decreased appetite. Was found to have two lumps in her right breast but could not tolerate recommended biopsy due to pain. CT scan of the Cervical spine performed which showed Multiple lytic lesions of the cervical and visualized thoracic spine as well as right first and second ribs concerning for metastases.    She was admitted under medicine service for further workup and management, including of her pain. Further imaging done showed Metastatic breast cancer with dominant right breast mass, right chest wall, axillary and retropectoral involvement; osseous, pulmonary, and likely hepatic metastatic disease. She was also found to have metastatic adenopathy of the thorax, diffuse extensive osseous metastatic disease with multiple lucent and sclerotic lesions and compression fractures of T1, T9, T12, and L2. Severe T1 compression with epidural extension; cord compression cannot be excluded. +additional neoplastic disease of the spine with epidural extension. Multifocal neoplastic lesions involving the pelvis and ribs as well.    Patient was seen by Oncology, CT surgery and Neurosurgery team. She underwent C7-T2 corpectomy & fusion, posterior C4-T5 fusion. Post-op course uncomplicated.  Hamm removed, pt requiring some straight cath for retention. She also had subjective dysphagia - seen by speech therapy recommended pureed diet which patient continues to prefer. Started on IV Rocephin for UTI. Blood cultures negative, Urine culture positive for GNR, changed to PO Vantin for 7 days.    Patient was medically optimized for discharge to [facility] IRF on 9/30/22.     PAST MEDICAL & SURGICAL HISTORY:  Stomach ulcer  20 years ago       Skin cancer of arm, left  pt was 16 year old      H/O excision of mass  left arm  when patient was 16 years ago      S/P hysterectomy        HEALTH ISSUES - PROBLEM Dx:  Pancolitis    Metastatic breast cancer      MEDICATIONS  (STANDING):  Biotene Dry Mouth Oral Rinse 5 milliLiter(s) Swish and Spit three times a day  cyclobenzaprine 5 milliGRAM(s) Oral three times a day  enoxaparin Injectable 40 milliGRAM(s) SubCutaneous every 24 hours  magnesium oxide 400 milliGRAM(s) Oral three times a day with meals  metoprolol tartrate 25 milliGRAM(s) Oral two times a day  oxyCODONE  ER Tablet 20 milliGRAM(s) Oral every 12 hours  pantoprazole  Injectable 40 milliGRAM(s) IV Push daily  potassium chloride  10 mEq/100 mL IVPB 10 milliEquivalent(s) IV Intermittent every 1 hour  pregabalin 25 milliGRAM(s) Oral every 8 hours  saccharomyces boulardii 250 milliGRAM(s) Oral two times a day  sodium chloride 1 Gram(s) Oral once  sodium chloride 0.9%. 1000 milliLiter(s) (70 mL/Hr) IV Continuous <Continuous>  vancomycin    Solution 125 milliGRAM(s) Oral every 6 hours    MEDICATIONS  (PRN):  acetaminophen    Suspension .. 650 milliGRAM(s) Oral every 6 hours PRN Mild Pain (1 - 3)  ALBUTerol    0.083% 2.5 milliGRAM(s) Nebulizer every 6 hours PRN Shortness of Breath and/or Wheezing  HYDROmorphone  Injectable 1 milliGRAM(s) IV Push every 6 hours PRN Severe Pain (7 - 10)  HYDROmorphone  Injectable 0.5 milliGRAM(s) IV Push every 6 hours PRN Moderate Pain (4 - 6)  ondansetron Injectable 8 milliGRAM(s) IV Push every 6 hours PRN Nausea and/or Vomiting  simethicone 80 milliGRAM(s) Chew every 6 hours PRN Gas    Allergies    tetanus toxoid (Hives)    Intolerances    lactose (Other)      INTERVAL HPI/OVERNIGHT EVENTS:  Patient S&E at bedside. +Abdominal pain     VITAL SIGNS:  T(F): 98 (10-14-22 @ 08:35)  HR: 104 (10-14-22 @ 08:35)  BP: 125/81 (10-14-22 @ 08:35)  RR: 16 (10-14-22 @ 08:35)  SpO2: 96% (10-14-22 @ 08:35)    PHYSICAL EXAM:  Constitutional: uncomfortable from abdominal discomfort  Eyes: sclera non-icteric  Neck: no JVD  Respiratory: decreased BS bases ant.  Cardiovascular: RRR, no M/R/G  Gastrointestinal: soft, tender to palpation left lower abdomen, no masses palpable  Extremities: no calf tenderness  Neurological: Awake, alert.    Labs:             8.9    11.99 )-----------( 489      ( 10-14 @ 06:50 )             25.9                9.2    19.91 )-----------( 554      ( 10-13 @ 06:31 )             27.2                9.7    19.42 )-----------( 607      ( 10-12 @ 06:50 )             28.2       10-14    Ca    9.1      14 Oct 2022 06:50    TPro  4.1<L>  /  Alb  1.5<L>  /  TBili  0.2  /  DBili  x   /  AST  44<H>  /  ALT  17  /  AlkPhos  129<H>  10-14      Culture - Blood (collected 11 Oct 2022 10:10)  Source: .Blood Blood  Preliminary Report (12 Oct 2022 13:02):    No growth to date.    Culture - Blood (collected 11 Oct 2022 10:10)  Source: .Blood Blood  Preliminary Report (12 Oct 2022 13:02):    No growth to date.      Consultant notes reviewed : YES [ x] ; NO [ ]

## 2022-10-14 NOTE — PROGRESS NOTE ADULT - PROBLEM SELECTOR PLAN 1
Patient with newly diagnosed Stage IV breast cancer with clinically mets to bone, liver, lungs, brain.  Status post C7-T2 corpectomy & fusion, posterior C4-T5 fusion.  Patient has been seen by radiation oncology, medical oncology at outside hospital.  Plans are for radiation to spine/brain following acute rehab.  Discussed with patient that systemic therapy would be given as well (based on immunohistochemical profile. Current course complicated by GI issues/colitis. Supportive H/O care while on acute rehab.

## 2022-10-14 NOTE — CONSULT NOTE ADULT - CONSULT REQUESTED DATE/TIME
07-Oct-2022 12:00
11-Oct-2022 15:59
13-Oct-2022 10:00
14-Oct-2022 15:25
01-Oct-2022 12:41
08-Oct-2022 13:00
11-Oct-2022 13:48
12-Oct-2022 07:57

## 2022-10-14 NOTE — CONSULT NOTE ADULT - CONSULT REQUESTED BY NAME
Primary team
Dr Carrizales
Dr. Carrizales
Claire Carrizales M.D.
Dr Carrizales
Dr. Carrizales
Dr. De Leon
Dr. Carrizales

## 2022-10-14 NOTE — PROGRESS NOTE ADULT - ASSESSMENT
Neha is a 67 yo female with PMH of left arm skin CA s/p excision at age 16, stomach ulcer, her last mammogram was in 2014, who was brought to [facility] ED 9/10/22 with progressive weakness to the point that she felt she could not get out of bed. She also reported shoulder pain, decreased appetite. Was found to have two lumps in her right breast but could not tolerate recommended biopsy due to pain. CT scan of the Cervical spine performed which showed Multiple lytic lesions of the cervical and visualized thoracic spine as well as right first and second ribs concerning for metastases.    She was admitted under medicine service for further workup and management, including of her pain. Further imaging done showed Metastatic breast cancer with dominant right breast mass, right chest wall, axillary and retropectoral involvement; osseous, pulmonary, and likely hepatic metastatic disease. She was also found to have metastatic adenopathy of the thorax, diffuse extensive osseous metastatic disease with multiple lucent and sclerotic lesions and compression fractures of T1, T9, T12, and L2. Severe T1 compression with epidural extension; cord compression cannot be excluded. +additional neoplastic disease of the spine with epidural extension. Multifocal neoplastic lesions involving the pelvis and ribs as well.    Patient was seen by Oncology, CT surgery and Neurosurgery team. She underwent C7-T2 corpectomy & fusion, posterior C4-T5 fusion. Post-op course uncomplicated.  Hamm removed, pt requiring some straight cath for retention. She also had subjective dysphagia - seen by speech therapy recommended pureed diet which patient continues to prefer. Started on IV Rocephin for UTI. Blood cultures negative, Urine culture positive for GNR, changed to PO Vantin for 7 days.    Patient was medically optimized for discharge to [facility] IRF on 9/30/22.

## 2022-10-14 NOTE — PROGRESS NOTE ADULT - ASSESSMENT
65 yo female persistent diarrhea and pancolitis on CT imaging. Patient seen and examined at bedside. Denies diarrhea. She is able to tolerate some liquids. Minimal nausea. She reports some abdominal pain.     WBC trending down,  Stool C&S and GI PCR resulted negative, Stool c. diff result pending    Other findings as discussed above.

## 2022-10-14 NOTE — PROGRESS NOTE ADULT - SUBJECTIVE AND OBJECTIVE BOX
INTERVAL HPI/OVERNIGHT EVENTS:  HPI:  This is a 67 yo female persistent diarrhea and pancolitis on CT imaging. Patient seen and examined at bedside. She notes diarrhea , subsided slightly and then go worse. She is able to tolerate some liquids. Minimal nausea. She reports some abdominal discomfort. No BRBPR or melena noted.   MEDICATIONS  (STANDING):  Biotene Dry Mouth Oral Rinse 5 milliLiter(s) Swish and Spit three times a day  cyclobenzaprine 5 milliGRAM(s) Oral three times a day  dextrose 5% + sodium chloride 0.45%. 1000 milliLiter(s) (100 mL/Hr) IV Continuous <Continuous>  enoxaparin Injectable 40 milliGRAM(s) SubCutaneous every 24 hours  magnesium oxide 400 milliGRAM(s) Oral three times a day with meals  metoprolol tartrate 25 milliGRAM(s) Oral two times a day  oxyCODONE  ER Tablet 20 milliGRAM(s) Oral every 12 hours  pantoprazole  Injectable 40 milliGRAM(s) IV Push daily  pregabalin 25 milliGRAM(s) Oral every 8 hours  saccharomyces boulardii 250 milliGRAM(s) Oral two times a day  vancomycin    Solution 125 milliGRAM(s) Oral every 6 hours    MEDICATIONS  (PRN):  acetaminophen    Suspension .. 650 milliGRAM(s) Oral every 6 hours PRN Mild Pain (1 - 3)  ALBUTerol    0.083% 2.5 milliGRAM(s) Nebulizer every 6 hours PRN Shortness of Breath and/or Wheezing  bisacodyl Suppository 10 milliGRAM(s) Rectal daily PRN Constipation  HYDROmorphone  Injectable 0.5 milliGRAM(s) IntraMuscular every 6 hours PRN Moderate Pain (4 - 6)  HYDROmorphone  Injectable 1 milliGRAM(s) IntraMuscular every 6 hours PRN Severe Pain (7 - 10)  ondansetron Injectable 8 milliGRAM(s) IV Push every 6 hours PRN Nausea and/or Vomiting  simethicone 80 milliGRAM(s) Chew every 6 hours PRN Gas      Allergies    tetanus toxoid (Hives)    Intolerances    lactose (Other)      PAST MEDICAL & SURGICAL HISTORY:  Stomach ulcer  20 years ago   no pain not taking any meds      Skin cancer of arm, left  pt was 16 year old      H/O excision of mass  left arm  when patient was 16 years ago      S/P hysterectomy          REVIEW OF SYSTEMS: negative unless indicated in HPI    non smoker  no etoh abuse     PHYSICAL EXAM:   Vital Signs:  Vital Signs Last 24 Hrs  T(C): 36.8 (11 Oct 2022 08:30), Max: 36.8 (11 Oct 2022 08:30)  T(F): 98.2 (11 Oct 2022 08:30), Max: 98.2 (11 Oct 2022 08:30)  HR: 125 (11 Oct 2022 08:30) (125 - 133)  BP: 135/87 (11 Oct 2022 08:30) (135/87 - 140/80)  BP(mean): --  RR: 16 (11 Oct 2022 08:30) (16 - 16)  SpO2: 95% (11 Oct 2022 08:30) (95% - 98%)    Parameters below as of 11 Oct 2022 08:30  Patient On (Oxygen Delivery Method): room air      Daily     Daily Weight in k.4 (11 Oct 2022 05:33)I&O's Summary    10 Oct 2022 07:01  -  11 Oct 2022 07:00  --------------------------------------------------------  IN: 600 mL / OUT: 0 mL / NET: 600 mL        GENERAL:  Appears stated age,   HEENT:  NC/AT,  conjunctivae clear and pink, no thyromegaly, neck collar in place  CHEST:  Full & symmetric excursion, no increased effort, breath sounds clear  HEART:  Regular rhythm, S1, S2, no murmur  ABDOMEN:  Soft, non-tender, non-distended, normoactive bowel sounds  EXTEREMITIES:  no edema  SKIN:  No rash/warm/dry  NEURO:  Alert, oriented,     LABS:                        10.1   16.97 )-----------( 599      ( 11 Oct 2022 06:00 )             29.2     1011    131<L>  |  96  |  11  ----------------------------<  96  3.1<L>   |  16<L>  |  0.39<L>    Ca    9.8      11 Oct 2022 06:00  Mg     1.6     10-11    TPro  5.2<L>  /  Alb  1.9<L>  /  TBili  0.5  /  DBili  x   /  AST  40  /  ALT  22  /  AlkPhos  148<H>  10-11        amylase   lipase  RADIOLOGY & ADDITIONAL TESTS:

## 2022-10-14 NOTE — PROGRESS NOTE ADULT - ASSESSMENT
Impression: 66y Female admitted with Pathological fracture in neoplastic disease, other specified site, s/p fusion.  Tolerated removal of staples  PMH Abdominal mass  Stomach ulcer  Skin cancer of arm, left  Concussion        Plan:  -continue VTE prophylaxis    -continue local incision care  -discussed with attending

## 2022-10-14 NOTE — PROGRESS NOTE ADULT - SUBJECTIVE AND OBJECTIVE BOX
66y old  Female who presents with a chief complaint of metastatic breast CA to spine s/p  C7-T2 corpectomy & fusion, posterior C4-T5 fusion     seen and examined at the bedside, c/o severe pain in the neck and hips, and noted to have 1 loose stool last evening, nausea has resolved ans is tolerating po diet better,   no LOC, NO CP, NO SOB.      Vital Signs Last 24 Hrs  T(C): 36.7 (14 Oct 2022 08:35), Max: 36.7 (13 Oct 2022 20:30)  T(F): 98 (14 Oct 2022 08:35), Max: 98.1 (13 Oct 2022 20:30)  HR: 104 (14 Oct 2022 08:35) (104 - 122)  BP: 125/81 (14 Oct 2022 08:35) (123/78 - 125/81)  BP(mean): --  RR: 16 (14 Oct 2022 08:35) (16 - 16)  SpO2: 96% (14 Oct 2022 08:35) (96% - 96%)    Parameters below as of 14 Oct 2022 08:35  Patient On (Oxygen Delivery Method): room air      GENERAL- NAD  EAR/NOSE/MOUTH/THROAT - no pharyngeal exudates, no oral lesions  MMM  EYES- ALEC, conjunctiva and Sclera clear  NECK- supple  RESPIRATORY-  clear to auscultation bilaterally, non laboured breathing  CARDIOVASCULAR - SIS2, RRR  GI - soft NT BS present  EXTREMITIES- no pedal edema  NEUROLOGY- no gross focal deficits  PSYCHIATRY- AAO X 3                   8.9                  129  | 22   | 7            11.99 >-----------< 489     ------------------------< 116                   25.9                 3.3  | 99   | 0.30                                         Ca 9.1   Mg x     Ph x            MEDICATIONS  (STANDING):  Biotene Dry Mouth Oral Rinse 5 milliLiter(s) Swish and Spit three times a day  cyclobenzaprine 5 milliGRAM(s) Oral three times a day  enoxaparin Injectable 40 milliGRAM(s) SubCutaneous every 24 hours  magnesium oxide 400 milliGRAM(s) Oral three times a day with meals  metoprolol tartrate 25 milliGRAM(s) Oral two times a day  oxyCODONE  ER Tablet 20 milliGRAM(s) Oral every 12 hours  pantoprazole  Injectable 40 milliGRAM(s) IV Push daily  potassium chloride  10 mEq/100 mL IVPB 10 milliEquivalent(s) IV Intermittent every 1 hour  pregabalin 25 milliGRAM(s) Oral every 8 hours  saccharomyces boulardii 250 milliGRAM(s) Oral two times a day  sodium chloride 1 Gram(s) Oral once  sodium chloride 0.9%. 1000 milliLiter(s) (70 mL/Hr) IV Continuous <Continuous>  vancomycin  Solution 125 milliGRAM(s) Oral every 6 hours    MEDICATIONS  (PRN):  acetaminophen    Suspension .. 650 milliGRAM(s) Oral every 6 hours PRN Mild Pain (1 - 3)  ALBUTerol    0.083% 2.5 milliGRAM(s) Nebulizer every 6 hours PRN Shortness of Breath and/or Wheezing  HYDROmorphone  Injectable 1 milliGRAM(s) IV Push every 6 hours PRN Severe Pain (7 - 10)  HYDROmorphone  Injectable 0.5 milliGRAM(s) IV Push every 6 hours PRN Moderate Pain (4 - 6)  ondansetron Injectable 8 milliGRAM(s) IV Push every 6 hours PRN Nausea and/or Vomiting  simethicone 80 milliGRAM(s) Chew every 6 hours PRN Gas   66y old  Female who presents with a chief complaint of metastatic breast CA to spine s/p  C7-T2 corpectomy & fusion, posterior C4-T5 fusion     seen and examined at the bedside, c/o severe pain in the neck and hips, and noted to have 1 loose stool last evening, nausea has resolved ans is tolerating po diet better,   no LOC, NO CP, NO SOB.      Vital Signs Last 24 Hrs  T(C): 36.7 (14 Oct 2022 08:35), Max: 36.7 (13 Oct 2022 20:30)  T(F): 98 (14 Oct 2022 08:35), Max: 98.1 (13 Oct 2022 20:30)  HR: 104 (14 Oct 2022 08:35) (104 - 122)  BP: 125/81 (14 Oct 2022 08:35) (123/78 - 125/81)  BP(mean): --  RR: 16 (14 Oct 2022 08:35) (16 - 16)  SpO2: 96% (14 Oct 2022 08:35) (96% - 96%)    Parameters below as of 14 Oct 2022 08:35  Patient On (Oxygen Delivery Method): room air      GENERAL- NAD  EAR/NOSE/MOUTH/THROAT - no pharyngeal exudates, no oral lesions  MMM  EYES- ALEC, conjunctiva and Sclera clear  NECK- supple  RESPIRATORY-  clear to auscultation bilaterally, non laboured breathing  CARDIOVASCULAR - SIS2, RRR  GI - soft NT BS present  EXTREMITIES- no pedal edema  NEUROLOGY- LE weakness  PSYCHIATRY- AAO X 3                   8.9                  129  | 22   | 7            11.99 >-----------< 489     ------------------------< 116                   25.9                 3.3  | 99   | 0.30                                         Ca 9.1   Mg x     Ph x            MEDICATIONS  (STANDING):  Biotene Dry Mouth Oral Rinse 5 milliLiter(s) Swish and Spit three times a day  cyclobenzaprine 5 milliGRAM(s) Oral three times a day  enoxaparin Injectable 40 milliGRAM(s) SubCutaneous every 24 hours  magnesium oxide 400 milliGRAM(s) Oral three times a day with meals  metoprolol tartrate 25 milliGRAM(s) Oral two times a day  oxyCODONE  ER Tablet 20 milliGRAM(s) Oral every 12 hours  pantoprazole  Injectable 40 milliGRAM(s) IV Push daily  potassium chloride  10 mEq/100 mL IVPB 10 milliEquivalent(s) IV Intermittent every 1 hour  pregabalin 25 milliGRAM(s) Oral every 8 hours  saccharomyces boulardii 250 milliGRAM(s) Oral two times a day  sodium chloride 1 Gram(s) Oral once  sodium chloride 0.9%. 1000 milliLiter(s) (70 mL/Hr) IV Continuous <Continuous>  vancomycin  Solution 125 milliGRAM(s) Oral every 6 hours    MEDICATIONS  (PRN):  acetaminophen    Suspension .. 650 milliGRAM(s) Oral every 6 hours PRN Mild Pain (1 - 3)  ALBUTerol    0.083% 2.5 milliGRAM(s) Nebulizer every 6 hours PRN Shortness of Breath and/or Wheezing  HYDROmorphone  Injectable 1 milliGRAM(s) IV Push every 6 hours PRN Severe Pain (7 - 10)  HYDROmorphone  Injectable 0.5 milliGRAM(s) IV Push every 6 hours PRN Moderate Pain (4 - 6)  ondansetron Injectable 8 milliGRAM(s) IV Push every 6 hours PRN Nausea and/or Vomiting  simethicone 80 milliGRAM(s) Chew every 6 hours PRN Gas

## 2022-10-14 NOTE — CONSULT NOTE ADULT - PROVIDER SPECIALTY LIST ADULT
Pulmonology
Heme/Onc
Hospitalist
Infectious Disease
Neuropsychology
Palliative Care
Plastic Surgery
Gastroenterology

## 2022-10-14 NOTE — CONSULT NOTE ADULT - ASSESSMENT
A/P 66y old  Female who presents with a chief complaint of metastatic breast CA to spine s/p  C7-T2 corpectomy & fusion, posterior C4-T5 fusion multiple lytic lesions of the cervical and thoracic spine along with lung and liver secondary to metastatic primary newly discovered breast cancer with axillary and retropectoral involvement. Imaging also showed Compression fractures of T1, T9, T12, and L2. Severe T1 compression with epidural extension.  hospital course significant for urinary retention and dysphagia, UTI, now admitted for rehab at       Metastatic Breast CA to lung, liver, ribs, brain with spine involvement and neurogenic bladder  - s/p right breast biopsy shows  positive for invasive ductal carcinoma  - Compression fractures of T1, T9, T12, and L2. Severe T1 compression with epidural extension.   - s/p C7-T2 corpectomy & fusion, posterior C4-T5 fusion  - Patient will not require chemo/radiation while in rehab. 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  - oncology consult called - noted - discussed possible systemic therapy when d/c   pain due to mets-   patient is unable to tolerate PT/OT at this point due to pain  on Dilaudid Oxycontin LA which she has been refusing,    Oxycontin was d'cd and  changed to fentanyl patch today 12 mcg , and c/w Dilaudid for breakthrough pain q4 hrs.  leukocytosis and hypokalemia - improved today  c diff resent - results pending,  improving form colitis stand point  change iv fluids to NS due to hyponatremia likely due to gi losses  Zofran prn  ppi  po vanco STARTED 10/11/22,   GI consulted - recc noted  ID recc noted  HTN  - metoprolol   Hypercalcemia- improved with fluids  - likely related to bone mets  Severe protein-calorie malnutrition.  Hypoalbuminemia  - nutrition eval notedc      Per chart- Caregiver training with    [facility] dc home 10/19 with caregiver support and home PT, OT, SLP.      Palliative Consulted today for re- address Goc, and support. Case reviewed w/ physiatry md , chart reviewed. History noted, recent hospital course noted.   Saw pt bedside this afternoon, she was sleeping, quietly resting, but did wake to name. She was calm and looked comfortable, soft spoken, pleasant.  She said she had just fallen asleep and said she had a lot of people in to see her today.  She let me introduce myself , and briefly explain role of palliative care . Confirmed she has support of family brother   Arun and a sister, who are her HCP's.    I asked about her pain and pt said they started a patch today and it is starting to help if she is still. Asked her if the IV pain meds help for inc pain and she confirmed.   Explained the patch will be a constant form of pain med delivery and will take a few hours to fully kick in.   Pt had been sl drowsy and closing eyes t/o my visit. She asked if I could return another day.  I left my card for any questions. Will attempt follow up visit next week Monday.   Current pain meds and new Fentanyl patch seem effective at this time .  Pt would be hospice eligible If pt decides not to pursue treatment.    Pt should also f/u w/ palliative/pain management as out pt,  potential d/c  10/19 noted.

## 2022-10-14 NOTE — CONSULT NOTE ADULT - REASON FOR ADMISSION
metastatic breast CA to spine s/p  C7-T2 corpectomy & fusion, posterior C4-T5 fusion
None

## 2022-10-14 NOTE — PROGRESS NOTE ADULT - SUBJECTIVE AND OBJECTIVE BOX
CC: f/u for colitis & possible C diff     Patient reports that she had one loose BM last night. Pain is better controlled this AM, but she does not want any physical therapy    REVIEW OF SYSTEMS:  All other review of systems negative (Comprehensive ROS)    Antimicrobials Day #  : 4  vancomycin    Solution 125 milliGRAM(s) Oral every 6 hours    Other Medications Reviewed    T(F): 98 (10-14-22 @ 08:35), Max: 98.1 (10-13-22 @ 20:30)  HR: 104 (10-14-22 @ 08:35)  BP: 125/81 (10-14-22 @ 08:35)  RR: 16 (10-14-22 @ 08:35)  SpO2: 96% (10-14-22 @ 08:35)  Wt(kg): --    PHYSICAL EXAM:  General: alert, no acute distress  Eyes:  anicteric, no conjunctival injection, no discharge  Oropharynx: no lesions or injection 	  Neck: supple, collar in place, visible incision intact with steri strips  Lungs: clear to auscultation  Heart: regular rate and rhythm; no murmurs  Abdomen: soft, nondistended, nontender, bowel sounds +  Skin: no lesions  Extremities: no clubbing, cyanosis, or edema  Neurologic: alert, oriented, moves all extremities    LAB RESULTS:                        8.9    11.99 )-----------( 489      ( 14 Oct 2022 06:50 )             25.9     10-14    129<L>  |  99  |  7   ----------------------------<  116<H>  3.3<L>   |  22  |  0.30<L>    Ca    9.1      14 Oct 2022 06:50    TPro  4.1<L>  /  Alb  1.5<L>  /  TBili  0.2  /  DBili  x   /  AST  44<H>  /  ALT  17  /  AlkPhos  129<H>  10-14    LIVER FUNCTIONS - ( 14 Oct 2022 06:50 )  Alb: 1.5 g/dL / Pro: 4.1 g/dL / ALK PHOS: 129 U/L / ALT: 17 U/L / AST: 44 U/L / GGT: x             MICROBIOLOGY:  RECENT CULTURES:  10-11 @ 10:10 .Blood Blood     No growth to date.      10-11 @ 03:30 .Stool Feces     No enteric pathogens isolated.  (Stool culture examined for Salmonella,  Shigella, Campylobacter, Aeromonas, Plesiomonas,  Vibrio, E.coli O157 and Yersinia)        RADIOLOGY REVIEWED:

## 2022-10-14 NOTE — PROGRESS NOTE ADULT - SUBJECTIVE AND OBJECTIVE BOX
66y Female admitted with Pathological fracture in neoplastic disease, other specified site,      Patient seen and examined bedside resting comfortably.  No complaints offered.     T(F): 98 (10-14-22 @ 08:35), Max: 98.1 (10-13-22 @ 20:30)  HR: 104 (10-14-22 @ 08:35) (104 - 122)  BP: 125/81 (10-14-22 @ 08:35) (123/78 - 125/81)  RR: 16 (10-14-22 @ 08:35) (16 - 16)  SpO2: 96% (10-14-22 @ 08:35) (96% - 96%)  Wt(kg): --  CAPILLARY BLOOD GLUCOSE          PHYSICAL EXAM:  General: NAD, WDWN.   Neuro:  Alert & oriented x 3  HEENT: NCAT, EOMI, conjunctiva clear. 4 staples and 1 suture removed post. neck incision.  Neck brace replaced.  CV: +S1+S2 regular rate and rhythm  Lung: clear to ausculation bilaterally, respirations nonlabored, good inspiratory effort  Abdomen: soft, NTND. Normactive BS  Extremities: no pedal edema or calf tenderness noted      LABS:                        8.9    11.99 )-----------( 489      ( 14 Oct 2022 06:50 )             25.9     10-14    129<L>  |  99  |  7   ----------------------------<  116<H>  3.3<L>   |  22  |  0.30<L>    Ca    9.1      14 Oct 2022 06:50    TPro  4.1<L>  /  Alb  1.5<L>  /  TBili  0.2  /  DBili  x   /  AST  44<H>  /  ALT  17  /  AlkPhos  129<H>  10-14      I&O's Detail

## 2022-10-14 NOTE — CONSULT NOTE ADULT - SUBJECTIVE AND OBJECTIVE BOX
A/P HPI:  65 yo female with PMH of left arm skin CA s/p excision at age 16, stomach ulcer, her last mammogram was in 2014, who was brought to [facility] ED 9/10/22 with progressive weakness to the point that she felt she could not get out of bed. She also reported remote shoulder pain, decreased appetite. Was found to have two lumps in her right breast but could not tolerate recommended biopsy due to pain. CT scan of the Cervical spine performed which showed Multiple lytic lesions of the cervical and visualized thoracic spine as well as right first and second ribs concerning for metastases versus multiple myeloma.    She was admitted under medicine service for further workup and management, including of her pain. Further imaging done showed Metastatic breast cancer with dominant right breast mass, right chest wall, axillary and retropectoral involvement; osseous, pulmonary, and likely hepatic metastatic disease. She was also found to have metastatic adenopathy of the thorax, diffuse extensive osseous metastatic disease with multiple lucent and sclerotic lesions and compression fractures of T1, T9, T12, and L2. Severe T1 compression with epidural extension; cord compression cannot be excluded. +additional neoplastic disease of the spine with epidural extension. Multifocal neoplastic lesions involving the pelvis and ribs as well.    Patient was seen by Oncology, CT surgery and Neurosurgery team. She underwent C7-T2 corpectomy & fusion, posterior C4-T5 fusion. Post-op course uncomplicated.  Hamm removed, pt requiring some straight cath for retention. She also had subjective dysphagia - seen by speech therapy recommended pureed diet which patient continues to prefer. Patient with low grade temp and positive UA. Started on IV Rocephin for UTI. Blood cultures negative, Urine culture positive for GNR, changed to PO Vantin for 7 days.    Patient was medically optimized for discharge to [facility] IRF on 9/30/22.     PAST MEDICAL & SURGICAL HISTORY:  Stomach ulcer  20 years ago   no pain not taking any meds      Skin cancer of arm, left  pt was 16 year old      H/O excision of mass  left arm  when patient was 16 years ago      S/P hysterectomy          SOCIAL HISTORY:    Admitted from:  [facility]   Substance abuse history:              Tobacco hx:                  Alcohol hx:              Home Opioid hx:  P[employer]:                                    Preferred Language:    Surrogate/HCP/ brother Arun             Phone#: [phone]    FAMILY HISTORY:  Family history of dementia (Mother)    Family history of colon cancer (Father)      Baseline ADLs (prior to admission):    Allergies    tetanus toxoid (Hives)    Intolerances    lactose (Other)    Present Symptoms:   Dyspnea: no  Nausea/Vomiting: no  Anxiety:  Depressed   Fatigue: yes  Loss of appetite:  yes  Pain:       not now                          location:          Review of Systems: [All others negative   MEDICATIONS  (STANDING):  Biotene Dry Mouth Oral Rinse 5 milliLiter(s) Swish and Spit three times a day  cyclobenzaprine 5 milliGRAM(s) Oral three times a day  enoxaparin Injectable 40 milliGRAM(s) SubCutaneous every 24 hours  fentaNYL   Patch  12 MICROgram(s)/Hr 1 Patch Transdermal every 72 hours  magnesium oxide 400 milliGRAM(s) Oral three times a day with meals  metoprolol tartrate 25 milliGRAM(s) Oral two times a day  pantoprazole  Injectable 40 milliGRAM(s) IV Push daily  pregabalin 25 milliGRAM(s) Oral every 8 hours  saccharomyces boulardii 250 milliGRAM(s) Oral two times a day  sodium chloride 1 Gram(s) Oral once  sodium chloride 0.9%. 1000 milliLiter(s) (70 mL/Hr) IV Continuous <Continuous>  vancomycin    Solution 125 milliGRAM(s) Oral every 6 hours    MEDICATIONS  (PRN):  acetaminophen    Suspension .. 650 milliGRAM(s) Oral every 6 hours PRN Mild Pain (1 - 3)  ALBUTerol    0.083% 2.5 milliGRAM(s) Nebulizer every 6 hours PRN Shortness of Breath and/or Wheezing  HYDROmorphone  Injectable 1 milliGRAM(s) IV Push every 4 hours PRN mod to severe pain 4-10  ondansetron Injectable 8 milliGRAM(s) IV Push every 6 hours PRN Nausea and/or Vomiting  simethicone 80 milliGRAM(s) Chew every 6 hours PRN Gas      PHYSICAL EXAM:    Vital Signs Last 24 Hrs  T(C): 36.7 (14 Oct 2022 08:35), Max: 36.7 (13 Oct 2022 20:30)  T(F): 98 (14 Oct 2022 08:35), Max: 98.1 (13 Oct 2022 20:30)  HR: 104 (14 Oct 2022 08:35) (104 - 122)  BP: 125/81 (14 Oct 2022 08:35) (123/78 - 125/81)  BP(mean): --  RR: 16 (14 Oct 2022 08:35) (16 - 16)  SpO2: 96% (14 Oct 2022 08:35) (96% - 96%)    Parameters below as of 14 Oct 2022 08:35  Patient On (Oxygen Delivery Method): room air        General: alert  oriented x 3, converses, was calm, resting in bed   Karnofsky Performance Score/Palliative Performance Status Version2:  40   %  PPSV: 40%  HEENT: n/c, dry mouth/lips, neck brace in place     Lungs: breathing comfortably   CV: tachy   GI: normal abd flat, soft     Musculoskeletal: normal  bruce's ,w/weakness    Skin: pale, w/d    Neuro: awake, alert, oriented, converses   Oral intake ability: minimal moderate   Diet: pureed/soft- per speech     LABS:                        8.9    11.99 )-----------( 489      ( 14 Oct 2022 06:50 )             25.9     10-14    129<L>  |  99  |  7   ----------------------------<  116<H>  3.3<L>   |  22  |  0.30<L>    Ca    9.1      14 Oct 2022 06:50    TPro  4.1<L>  /  Alb  1.5<L>  /  TBili  0.2  /  DBili  x   /  AST  44<H>  /  ALT  17  /  AlkPhos  129<H>  10-14        RADIOLOGY & ADDITIONAL STUDIES: < from: CT Thoracic Spine w/ IV Cont (10.11.22 @ 11:41) >  ACC: [id number] EXAM:  CT THORACIC SPINE IC                        ACC: [id number] EXAM:  CT CERVICAL SPINE WAW IC                          PROCEDURE DATE:  10/11/2022          INTERPRETATION:  Clinical indication: Metastatic breast cancer. Status   post cervical spine surgery. Evaluate for abscess.    Multiple axial sections were performed through the cervical and thoracic   spines following injection of approximately 100 cc of Omnipaque 350 IV.   Coronal and sagittal destructions were performed.    This exam is compared with prior noncontrast CT scan the cervical and   thoracic spine performed on September 22, 2022.    Cervical spine:    Postoperative changes compatible with a C7, T1 and T2 corpectomy is again   seen with anterior spinal fusion seen extending from C6 to T3. There is   posterior spinal fusion seen extending from C4 to T5. Evaluation of the   metallic hardware and osseous structures appear adequately placed.    Evaluation of the paraspinal soft tissues appears unremarkable    No abnormal masses or collections are seen    The visualized portions of the brain appear normal    The visualized portions of both lung apices appear clear.    Thoracic spine:    Disc space narrowing endplate sclerotic changes and osteophytes are seen   throughout the thoracic spine region. These findings are secondary to   chronic degenerative changes.    Compression deformity is seen involving T12 and L2 with sclerotic   changes. Sclerotic changes also seen involving the posterior elements at   these levels. These findings are suspicious for underlying metastasis   given patient's history. Mild retropulsed fragment is seen at the T12   level without significant commas of the spinal canal. Moderate   retropulsed fragment is seen at the Y4agfyr which does cause moderate   narrowing of the spinal canal with spinal cord compression suspected.   Contrast enhanced MRI of the thoracic spine can be done for further   evaluation if there are no contraindications.    Evaluation of the paraspinal soft tissues appear normal    The visualized portions of the lungs appear clear.    IMPRESSION: Postop changes again seen and unchanged    Compression deformity with sclerotic changes again seen involving the T12   and L2 vertebral body as described above.        < from: CT Abdomen and Pelvis No Cont (10.11.22 @ 11:41) >    ACC: [id number] EXAM:  CT ABDOMEN AND PELVIS                          PROCEDURE DATE:  10/11/2022          INTERPRETATION:  CLINICAL INFORMATION: Abdominal pain. Metastatic breast   cancer.    COMPARISON: CT scan abdomen pelvis 10/2/2022.    CONTRAST/COMPLICATIONS:  IV Contrast: NONE  Oral Contrast: NONE  Complications: None reported at time of study completion    PROCEDURE:  CT of the Abdomen and Pelvis was performed.  Sagittal and coronal reformats were performed.    FINDINGS:    LOWER CHEST:  Partially imaged heterogeneous soft tissue mass right breast and skin   thickening.    Again noted are few pulmonary nodules at the visualized bilateral   lung bases.  Dependent bibasilar atelectatic changes.  Trace left-sided pleural effusion.    Streak artifact degrades image quality.  The evaluation of the solid organ parenchyma is limited without   intravenous contrast.    LIVER: Bilobar low-density lesions again noted, suspicious for metastatic   disease.  BILE DUCTS: Normal caliber.  GALLBLADDER: Intraluminal high density, may reflect biliary sludge or   carious excretion.  SPLEEN: Within normal limits.  PANCREAS: Within normal limits.  ADRENALS: Within normal limits.  KIDNEYS/URETERS:  Horseshoe kidney.  Punctate nonobstructing intrarenal calcification lower pole left kidney.    BLADDER: Dependent, layering calcifications left bladder.  REPRODUCTIVE ORGANS: Hysterectomy.    BOWEL:  The evaluation of the stomach and gastrointestinal tract is limited   without distention.  Evaluation of the bowel wall is limited without intravenous contrast.  There is suggestion of diffuse colonic bowel wall thickening, including   the rectum.  There is right-sided pericolic inflammatory stranding.  No bowel obstruction.  The appendix is notclearly demonstrated on this exam.  PERITONEUM: No ascites.    VESSELS: Atherosclerotic changes.  RETROPERITONEUM/LYMPH NODES: No lymphadenopathy.    ABDOMINAL WALL:  Tiny fat-containing umbilical hernia.    BONES:  Again noted is extensive permeative osteolytic metastatic disease   visualized axial and appendicular skeleton, including compression   deformities T12 and L2 vertebral bodies.  There is suggestion of epidural involvement at L2, where there is central   canal involvement.    IMPRESSION:    Limited, noncontrast exam.    Pancolitis, correlate clinically for inflammatory/infectious etiology,   including pseudomembranous colitis.    Partially visualized right breast mass.    Pulmonary and hepatic metastatic disease as noted on prior exam.    Permeative, osteolytic metastatic disease, including L2 compression   fracture with possible epidural involvement.  This can be further characterized by MRI as clinically indicated.      < from: MR Head w/wo IV Cont (09.11.22 @ 11:27) >  ACC: [id number] EXAM:  MR BRAIN WAW IC                          PROCEDURE DATE:  09/11/2022        IMPRESSION:  Subcentimeter enhancing parenchymal lesions as above compatible with   intracranial metastatic disease. No associated vasogenic edema. No   evidence of acute infarct or hemorrhage.    Likely left parietal calvarial osseous metastatic lesion.            ADVANCE DIRECTIVES: HCP    Advanced Care Planning discussion total time spent:

## 2022-10-14 NOTE — PROGRESS NOTE ADULT - ASSESSMENT
67 yo female PMH stomach ulcer who presented to [facility] 9/10/22 with progressive weakness, discovered multiple lytic lesions of the cervical and thoracic spine along with lung and liver secondary to metastatic primary newly discovered breast cancer with axillary and retropectoral involvement. Imaging also showed Compression fractures of T1, T9, T12, and L2. Severe T1 compression with epidural extension.S/p C7-T2 corpectomy & fusion, posterior C4-T5 fusion, hospital course significant for urinary retention and dysphagia, UTI, now admitted for rehab- pt/ot/dvt ppx    #Acute hypoxic respiratory failure in need of supplemental oxygenation likely multifactorial including:  *Metastatic disease within mediastinum and rohit  *Metastatic disease within ribs  *Bilateral lung nodules RLL  *Bilateral GGO  *Bilateral atelectasis  -PE ruled out with neg CTA  -C/w alb neb treatement q 6hrs prn  -Incentive spirometer, OOB to chair, mobilize  -Might need oxygen due to increase in cancer burden in lungs  -TTE : EF 70-75%. tachy, hyperdynamic LVF  -Appreciate pulmonary recommendations    *Metastatic Breast CA to lung, liver, ribs, brain with spine involvement and neurogenic bladder  - s/p right breast biopsy shows  positive for invasive ductal carcinoma  - Compression fractures of T1, T9, T12, and L2. Severe T1 compression with epidural extension.   - s/p C7-T2 corpectomy & fusion, posterior C4-T5 fusion  - Patient will not require chemo/radiation while in rehab. 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  change iv fluids to NS due to hyponatremia likely due to gi losses   tolerating po better now.  Zofran prn  ppi  BC X 2 negative 10/11/22  ua- unable to get sample due to contamination from stool and difficult straight cath, but ID reluctant to treat with abtx even if positive.  am labs  replete K   GI consulted - recc noted  ID recc noted  reassess patient in am  encouraged compliance with po vanco and meds.   tachycardia- likely multifactorial- pain / debility due to cancer with mets- c/w IV fluids, monitor    * HTN  - metoprolol   - EKG sinus tach- likely due to colitis  - cardio recc noted  - encouraged PO hydration      *Enterobacter cloacae UTI  - s/p Vantin BID for 7 days 9/30-10/7/22  - monitor bladder scans  - SC for PVR >350 ml, toileting schedule    * Hypercalcemia- improved with fluids  - likely related to bone mets  - c/w IVF  - repeat BMP and albumin ordered    *Normocytic anemia  - Monitor Hg/Hct, iron levels, B12, folate WNL  - Transfuse if hG <7      Severe protein-calorie malnutrition.  Hypoalbuminemia  - nutrition eval noted    DVT ppx: Lovenox      will follow  d/w dr. boggs  67 yo female PMH stomach ulcer who presented to [facility] 9/10/22 with progressive weakness, discovered multiple lytic lesions of the cervical and thoracic spine along with lung and liver secondary to metastatic primary newly discovered breast cancer with axillary and retropectoral involvement. Imaging also showed Compression fractures of T1, T9, T12, and L2. Severe T1 compression with epidural extension.S/p C7-T2 corpectomy & fusion, posterior C4-T5 fusion, hospital course significant for urinary retention and dysphagia, UTI, now admitted for rehab- pt/ot/dvt ppx    #Acute hypoxic respiratory failure in need of supplemental oxygenation likely multifactorial including:  *Metastatic disease within mediastinum and rohit  *Metastatic disease within ribs  *Bilateral lung nodules RLL  *Bilateral GGO  *Bilateral atelectasis  -PE ruled out with neg CTA  -C/w alb neb treatement q 6hrs prn  -Incentive spirometer, OOB to chair, mobilize  -Might need oxygen due to increase in cancer burden in lungs  -TTE : EF 70-75%. tachy, hyperdynamic LVF  -Appreciate pulmonary recommendations    *Metastatic Breast CA to lung, liver, ribs, brain with spine involvement and neurogenic bladder  - s/p right breast biopsy shows  positive for invasive ductal carcinoma  - Compression fractures of T1, T9, T12, and L2. Severe T1 compression with epidural extension.   - s/p C7-T2 corpectomy & fusion, posterior C4-T5 fusion  - Patient will not require chemo/radiation while in rehab. 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  consult palliative to readdress GOC, and oncology to assert realistic goals for cancer treatment, as wel as family meeting   d/w rehab team    *n/v/d/ LEUKOCYTOSIS/ hypokalemia / hyponatremia- likely due to colitis, high suspicion for c diff- clinically feels a little better  po vanco STARTED 10/11/22,   leukocytosis and hypokalemia - improved today  c diff resent - results pending, however patient is improving form colitis stand point  change iv fluids to NS due to hyponatremia likely due to gi losses  Zofran prn  ppi  BC X 2 negative 10/11/22  ua- unable to get sample due to contamination from stool and difficult straight cath, but ID reluctant to treat with abtx even if positive.  am labs  replete K   GI consulted - recc noted  ID recc noted  reassess patient in am  encouraged compliance with po vanco and meds.   tachycardia- likely multifactorial- pain / debility due to cancer with mets- c/w IV fluids, monitor    * HTN  - metoprolol   - EKG sinus tach- likely due to colitis  - cardio recc noted  - encouraged PO hydration      *Enterobacter cloacae UTI  - s/p Vantin BID for 7 days 9/30-10/7/22  - monitor bladder scans  - SC for PVR >350 ml, toileting schedule    * Hypercalcemia- improved with fluids  - likely related to bone mets  - c/w IVF  - repeat BMP and albumin ordered    *Normocytic anemia  - Monitor Hg/Hct, iron levels, B12, folate WNL  - Transfuse if hG <7      Severe protein-calorie malnutrition.  Hypoalbuminemia  - nutrition eval noted    DVT ppx: Lovenox      will follow  d/w dr. boggs

## 2022-10-14 NOTE — CHART NOTE - NSCHARTNOTEFT_GEN_A_CORE
Nutrition Follow Up Note  Hospital Course   (Per Electronic Medical Record)    Source:  Patient [X]  Medical Record [X]      Diet: Diet, Pureed:   Supplement Feeding Modality:  Oral  Ensure Clear Cans or Servings Per Day:  1       Frequency:  Three Times a day (10-14-22 @ 12:44) [Pending Verification By Attending]  Diet, Pureed:   Supplement Feeding Modality:  Oral  Ensure Enlive Cans or Servings Per Day:  1       Frequency:  Two Times a day (10-13-22 @ 14:29) [Active]        Nutrition Follow Up: Patient with suboptimal PO intakes, consuming variable intakes at meals (as per flowsheets). Patient states certain foods and liquids are to thick for her to swallow. Will discontinue Ensure supplement and homemade smoothie due to patient complaining that it is to thick. Will trial Ensure Clear 8oz PO TID (Provides 720kcal & 24grams of Protein) instead to optimize nutritional status. Patient receptive to receiving water, ginger ale, cranberry juice, lemon Italian ice and Jello (if at room temperature). Patient complains of diarrhea and mucous. Confirms she is lactose intolerance. Recommend Ensure Plus High Protein 8oz PO TID (Provides 1,050kcal & 60grams of Protein) and will add patients food preferences to her daily meals to enhance PO intake and nutritional status.         START [id number]    129<L>  |  99  |  7   ----------------------------<  116<H>  3.3<L>   |  22  |  0.30<L>    Ca    9.1      14 Oct 2022 06:50    TPro  4.1<L>  /  Alb  1.5<L>  /  TBili  0.2  /  DBili  x   /  AST  44<H>  /  ALT  17  /  AlkPhos  129<H>  10-14  END CHEMFISH  START MEDSACTIVEMEDICATIONS  (STANDING):  Biotene Dry Mouth Oral Rinse 5 milliLiter(s) Swish and Spit three times a day  cyclobenzaprine 5 milliGRAM(s) Oral three times a day  enoxaparin Injectable 40 milliGRAM(s) SubCutaneous every 24 hours  fentaNYL   Patch  12 MICROgram(s)/Hr 1 Patch Transdermal every 72 hours  magnesium oxide 400 milliGRAM(s) Oral three times a day with meals  metoprolol tartrate 25 milliGRAM(s) Oral two times a day  oxyCODONE  ER Tablet 20 milliGRAM(s) Oral every 12 hours  pantoprazole  Injectable 40 milliGRAM(s) IV Push daily  potassium chloride  10 mEq/100 mL IVPB 10 milliEquivalent(s) IV Intermittent every 1 hour  pregabalin 25 milliGRAM(s) Oral every 8 hours  saccharomyces boulardii 250 milliGRAM(s) Oral two times a day  sodium chloride 1 Gram(s) Oral once  sodium chloride 0.9%. 1000 milliLiter(s) (70 mL/Hr) IV Continuous <Continuous>  vancomycin    Solution 125 milliGRAM(s) Oral every 6 hours    MEDICATIONS  (PRN):  acetaminophen    Suspension .. 650 milliGRAM(s) Oral every 6 hours PRN Mild Pain (1 - 3)  ALBUTerol    0.083% 2.5 milliGRAM(s) Nebulizer every 6 hours PRN Shortness of Breath and/or Wheezing  HYDROmorphone  Injectable 1 milliGRAM(s) IV Push every 4 hours PRN Severe Pain (7 - 10)  HYDROmorphone  Injectable 0.5 milliGRAM(s) IV Push every 4 hours PRN Moderate Pain (4 - 6)  ondansetron Injectable 8 milliGRAM(s) IV Push every 6 hours PRN Nausea and/or Vomiting  simethicone 80 milliGRAM(s) Chew every 6 hours PRN Gas  END MEDSACTIVE  START DIETORDERDiet, Pureed:   Supplement Feeding Modality:  Oral  Ensure Enlive Cans or Servings Per Day:  1       Frequency:  Two Times a day (10-13-22 @ 14:29)  END DIETORDER  START ADMITDXPathological fracture in neoplastic disease, other specified site, initial encounter for fracture    END ADMITDX  START IOFSEND IOFS  START SKINPUEND SKINPU        Pertinent Medications: MEDICATIONS  (STANDING):  Biotene Dry Mouth Oral Rinse 5 milliLiter(s) Swish and Spit three times a day  cyclobenzaprine 5 milliGRAM(s) Oral three times a day  enoxaparin Injectable 40 milliGRAM(s) SubCutaneous every 24 hours  fentaNYL   Patch  12 MICROgram(s)/Hr 1 Patch Transdermal every 72 hours  magnesium oxide 400 milliGRAM(s) Oral three times a day with meals  metoprolol tartrate 25 milliGRAM(s) Oral two times a day  oxyCODONE  ER Tablet 20 milliGRAM(s) Oral every 12 hours  pantoprazole  Injectable 40 milliGRAM(s) IV Push daily  potassium chloride  10 mEq/100 mL IVPB 10 milliEquivalent(s) IV Intermittent every 1 hour  pregabalin 25 milliGRAM(s) Oral every 8 hours  saccharomyces boulardii 250 milliGRAM(s) Oral two times a day  sodium chloride 1 Gram(s) Oral once  sodium chloride 0.9%. 1000 milliLiter(s) (70 mL/Hr) IV Continuous <Continuous>  vancomycin    Solution 125 milliGRAM(s) Oral every 6 hours    MEDICATIONS  (PRN):  acetaminophen    Suspension .. 650 milliGRAM(s) Oral every 6 hours PRN Mild Pain (1 - 3)  ALBUTerol    0.083% 2.5 milliGRAM(s) Nebulizer every 6 hours PRN Shortness of Breath and/or Wheezing  HYDROmorphone  Injectable 1 milliGRAM(s) IV Push every 4 hours PRN Severe Pain (7 - 10)  HYDROmorphone  Injectable 0.5 milliGRAM(s) IV Push every 4 hours PRN Moderate Pain (4 - 6)  ondansetron Injectable 8 milliGRAM(s) IV Push every 6 hours PRN Nausea and/or Vomiting  simethicone 80 milliGRAM(s) Chew every 6 hours PRN Gas      Pertinent Labs:  10-14 Na129 mmol/L<L> Glu 116 mg/dL<H> K+ 3.3 mmol/L<L> Cr  0.30 mg/dL<L> BUN 7 mg/dL 10-14 Alb 1.5 g/dL<L>            Enteral/Parenteral Nutrition: Not Applicable    Current Weight (lbs):  130 lb on 10/12  Weight Trends (lbs):   130.9 (10/11), 130.9 (10/10), 137.7 (10/8), 134.2 (10/7)      Skin: Ecchymotic     Edema: None noted    Last Bowel Movement: diarrhea - last BM noted on 10/13        Estimated Needs:   [X] No Change Since Previous Assessment    Previous Nutrition Diagnosis:   Severe Protein Calorie Malnutrition    Nutrition Diagnosis is [X] Ongoing -     Interventions:   1. Recommend Ensure Clear 8oz PO TID (Provides 720kcal & 24grams of Protein) to optimize PO intake and nutritional status  2. Recommend will dd food preferences to patients daily meals as tolerated  3. Monitor daily PO intakes, BM regularity and weights  4. Follow up with SLP recommendations    Monitoring & Evaluation:   [X] Weights   [X] PO Intake   [X] Skin Integrity   [X] Follow Up (Per Protocol)  [X] Tolerance to Diet Prescription   [X] Other: Labs & POCT    Registered Dietitian/Nutritionist Remains Available.  Nathan Beverly RD, CDN    Phone# [phone] Nutrition Follow Up Note  Hospital Course   (Per Electronic Medical Record)    Source:  Patient [X]  Medical Record [X]      Diet: Diet, Pureed:   Supplement Feeding Modality:  Oral  Ensure Clear Cans or Servings Per Day:  1       Frequency:  Three Times a day (10-14-22 @ 12:44) [Pending Verification By Attending]  Diet, Pureed:   Supplement Feeding Modality:  Oral  Ensure Enlive Cans or Servings Per Day:  1       Frequency:  Two Times a day (10-13-22 @ 14:29) [Active]        Nutrition Follow Up: Patient with suboptimal PO intakes, consuming variable intakes at meals (as per flowsheets). Patient states certain foods and liquids are to thick for her to swallow. Will discontinue Ensure supplement and homemade smoothie due to patient complaining that consistency is to thick. Will trial Ensure Clear 8oz PO TID (Provides 720kcal & 24grams of Protein) instead to optimize nutritional status. Patient receptive to receiving water, ginger ale, cranberry juice, lemon Italian ice and Jello (if at room temperature) at meals. Patient complains of diarrhea and mucous. Confirms she is lactose intolerance.  Will add patients food preferences to her daily meals to enhance PO intake and nutritional status. Recommend Banatrol TID to aid with loose stools.        START [id number]    129<L>  |  99  |  7   ----------------------------<  116<H>  3.3<L>   |  22  |  0.30<L>    Ca    9.1      14 Oct 2022 06:50    TPro  4.1<L>  /  Alb  1.5<L>  /  TBili  0.2  /  DBili  x   /  AST  44<H>  /  ALT  17  /  AlkPhos  129<H>  10-14  END CHEMFISH  START MEDSACTIVEMEDICATIONS  (STANDING):  Biotene Dry Mouth Oral Rinse 5 milliLiter(s) Swish and Spit three times a day  cyclobenzaprine 5 milliGRAM(s) Oral three times a day  enoxaparin Injectable 40 milliGRAM(s) SubCutaneous every 24 hours  fentaNYL   Patch  12 MICROgram(s)/Hr 1 Patch Transdermal every 72 hours  magnesium oxide 400 milliGRAM(s) Oral three times a day with meals  metoprolol tartrate 25 milliGRAM(s) Oral two times a day  oxyCODONE  ER Tablet 20 milliGRAM(s) Oral every 12 hours  pantoprazole  Injectable 40 milliGRAM(s) IV Push daily  potassium chloride  10 mEq/100 mL IVPB 10 milliEquivalent(s) IV Intermittent every 1 hour  pregabalin 25 milliGRAM(s) Oral every 8 hours  saccharomyces boulardii 250 milliGRAM(s) Oral two times a day  sodium chloride 1 Gram(s) Oral once  sodium chloride 0.9%. 1000 milliLiter(s) (70 mL/Hr) IV Continuous <Continuous>  vancomycin    Solution 125 milliGRAM(s) Oral every 6 hours    MEDICATIONS  (PRN):  acetaminophen    Suspension .. 650 milliGRAM(s) Oral every 6 hours PRN Mild Pain (1 - 3)  ALBUTerol    0.083% 2.5 milliGRAM(s) Nebulizer every 6 hours PRN Shortness of Breath and/or Wheezing  HYDROmorphone  Injectable 1 milliGRAM(s) IV Push every 4 hours PRN Severe Pain (7 - 10)  HYDROmorphone  Injectable 0.5 milliGRAM(s) IV Push every 4 hours PRN Moderate Pain (4 - 6)  ondansetron Injectable 8 milliGRAM(s) IV Push every 6 hours PRN Nausea and/or Vomiting  simethicone 80 milliGRAM(s) Chew every 6 hours PRN Gas  END MEDSACTIVE  START DIETORDERDiet, Pureed:   Supplement Feeding Modality:  Oral  Ensure Enlive Cans or Servings Per Day:  1       Frequency:  Two Times a day (10-13-22 @ 14:29)  END DIETORDER  START ADMITDXPathological fracture in neoplastic disease, other specified site, initial encounter for fracture    END ADMITDX  START IOFSEND IOFS  START SKINPUEND SKINPU        Pertinent Medications: MEDICATIONS  (STANDING):  Biotene Dry Mouth Oral Rinse 5 milliLiter(s) Swish and Spit three times a day  cyclobenzaprine 5 milliGRAM(s) Oral three times a day  enoxaparin Injectable 40 milliGRAM(s) SubCutaneous every 24 hours  fentaNYL   Patch  12 MICROgram(s)/Hr 1 Patch Transdermal every 72 hours  magnesium oxide 400 milliGRAM(s) Oral three times a day with meals  metoprolol tartrate 25 milliGRAM(s) Oral two times a day  oxyCODONE  ER Tablet 20 milliGRAM(s) Oral every 12 hours  pantoprazole  Injectable 40 milliGRAM(s) IV Push daily  potassium chloride  10 mEq/100 mL IVPB 10 milliEquivalent(s) IV Intermittent every 1 hour  pregabalin 25 milliGRAM(s) Oral every 8 hours  saccharomyces boulardii 250 milliGRAM(s) Oral two times a day  sodium chloride 1 Gram(s) Oral once  sodium chloride 0.9%. 1000 milliLiter(s) (70 mL/Hr) IV Continuous <Continuous>  vancomycin    Solution 125 milliGRAM(s) Oral every 6 hours    MEDICATIONS  (PRN):  acetaminophen    Suspension .. 650 milliGRAM(s) Oral every 6 hours PRN Mild Pain (1 - 3)  ALBUTerol    0.083% 2.5 milliGRAM(s) Nebulizer every 6 hours PRN Shortness of Breath and/or Wheezing  HYDROmorphone  Injectable 1 milliGRAM(s) IV Push every 4 hours PRN Severe Pain (7 - 10)  HYDROmorphone  Injectable 0.5 milliGRAM(s) IV Push every 4 hours PRN Moderate Pain (4 - 6)  ondansetron Injectable 8 milliGRAM(s) IV Push every 6 hours PRN Nausea and/or Vomiting  simethicone 80 milliGRAM(s) Chew every 6 hours PRN Gas      Pertinent Labs:  10-14 Na129 mmol/L<L> Glu 116 mg/dL<H> K+ 3.3 mmol/L<L> Cr  0.30 mg/dL<L> BUN 7 mg/dL 10-14 Alb 1.5 g/dL<L>            Enteral/Parenteral Nutrition: Not Applicable    Current Weight (lbs):  130 lb on 10/12  Weight Trends (lbs):   130.9 (10/11), 130.9 (10/10), 137.7 (10/8), 134.2 (10/7)      Skin: Ecchymotic     Edema: None noted    Last Bowel Movement: diarrhea - last BM noted on 10/13        Estimated Needs:   [X] No Change Since Previous Assessment    Previous Nutrition Diagnosis:   Severe Protein Calorie Malnutrition    Nutrition Diagnosis is [X] Ongoing -     Interventions:   1. Recommend Ensure Clear 8oz PO TID (Provides 720kcal & 24grams of Protein) to optimize PO intake and nutritional status  2. Will add food preferences to patients daily meals as tolerated  3. Recommend Banatrol TID to aid with loose stools  4. Monitor daily PO intakes, BM regularity and weights  5. Follow up with SLP recommendations    Monitoring & Evaluation:   [X] Weights   [X] PO Intake   [X] Skin Integrity   [X] Follow Up (Per Protocol)  [X] Tolerance to Diet Prescription   [X] Other: Labs & POCT    Registered Dietitian/Nutritionist Remains Available.  Nathan Beverly, RD, CDN    Phone# [phone]

## 2022-10-14 NOTE — PROGRESS NOTE ADULT - SUBJECTIVE AND OBJECTIVE BOX
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 She also reported remote shoulder pain, decreased appetite. Was found to have two lumps in her right breast but could not tolerate recommended biopsy due to pain. CT scan of the Cervical spine performed which showed Multiple lytic lesions of the cervical and visualized thoracic spine as well as right first and second ribs concerning for metastases versus multiple myeloma.    She was admitted under medicine service for further workup and management, including of her pain. Further imaging done showed Metastatic breast cancer with dominant right breast mass, right chest wall, axillary and retropectoral involvement; osseous, pulmonary, and likely hepatic metastatic disease. She was also found to have metastatic adenopathy of the thorax, diffuse extensive osseous metastatic disease with multiple lucent and sclerotic lesions and compression fractures of T1, T9, T12, and L2. Severe T1 compression with epidural extension; cord compression cannot be excluded. +additional neoplastic disease of the spine with epidural extension. Multifocal neoplastic lesions involving the pelvis and ribs as well.    Patient was seen by Oncology, CT surgery and Neurosurgery team. She underwent C7-T2 corpectomy & fusion, posterior C4-T5 fusion. Post-op course uncomplicated.  Hamm removed, pt requiring some straight cath for retention. She also had subjective dysphagia - seen by speech therapy recommended pureed diet which patient continues to prefer. Patient with low grade temp and positive UA. Started on IV Rocephin for UTI. Blood cultures negative, Urine culture positive for GNR, changed to PO Vantin for 7 days.    Patient was medically optimized for discharge to [facility] on 9/30/22. (30 Sep 2022 14:32)      PAST MEDICAL & SURGICAL HISTORY:  Stomach ulcer  20 years ago   no pain not taking any meds      Skin cancer of arm, left  pt was 16 year old      H/O excision of mass  left arm  when patient was 16 years ago      S/P hysterectomy          MEDICATIONS  (STANDING):  Biotene Dry Mouth Oral Rinse 5 milliLiter(s) Swish and Spit three times a day  cyclobenzaprine 5 milliGRAM(s) Oral three times a day  enoxaparin Injectable 40 milliGRAM(s) SubCutaneous every 24 hours  fentaNYL   Patch  12 MICROgram(s)/Hr 1 Patch Transdermal every 72 hours  magnesium oxide 400 milliGRAM(s) Oral three times a day with meals  metoprolol tartrate 25 milliGRAM(s) Oral two times a day  oxyCODONE  ER Tablet 20 milliGRAM(s) Oral every 12 hours  pantoprazole  Injectable 40 milliGRAM(s) IV Push daily  potassium chloride  10 mEq/100 mL IVPB 10 milliEquivalent(s) IV Intermittent every 1 hour  pregabalin 25 milliGRAM(s) Oral every 8 hours  saccharomyces boulardii 250 milliGRAM(s) Oral two times a day  sodium chloride 1 Gram(s) Oral once  sodium chloride 0.9%. 1000 milliLiter(s) (70 mL/Hr) IV Continuous <Continuous>  vancomycin    Solution 125 milliGRAM(s) Oral every 6 hours    MEDICATIONS  (PRN):  acetaminophen    Suspension .. 650 milliGRAM(s) Oral every 6 hours PRN Mild Pain (1 - 3)  ALBUTerol    0.083% 2.5 milliGRAM(s) Nebulizer every 6 hours PRN Shortness of Breath and/or Wheezing  HYDROmorphone  Injectable 1 milliGRAM(s) IV Push every 4 hours PRN Severe Pain (7 - 10)  HYDROmorphone  Injectable 0.5 milliGRAM(s) IV Push every 4 hours PRN Moderate Pain (4 - 6)  ondansetron Injectable 8 milliGRAM(s) IV Push every 6 hours PRN Nausea and/or Vomiting  simethicone 80 milliGRAM(s) Chew every 6 hours PRN Gas      Allergies    tetanus toxoid (Hives)    Intolerances    lactose (Other)        VITALS  66y  Vital Signs Last 24 Hrs  T(C): 36.7 (14 Oct 2022 08:35), Max: 36.7 (13 Oct 2022 20:30)  T(F): 98 (14 Oct 2022 08:35), Max: 98.1 (13 Oct 2022 20:30)  HR: 104 (14 Oct 2022 08:35) (104 - 122)  BP: 125/81 (14 Oct 2022 08:35) (123/78 - 125/81)  BP(mean): --  RR: 16 (14 Oct 2022 08:35) (16 - 16)  SpO2: 96% (14 Oct 2022 08:35) (96% - 96%)    Parameters below as of 14 Oct 2022 08:35  Patient On (Oxygen Delivery Method): room air      Daily     Daily         RECENT LABS:                          8.9    11.99 )-----------( 489      ( 14 Oct 2022 06:50 )             25.9     10-14    129<L>  |  99  |  7   ----------------------------<  116<H>  3.3<L>   |  22  |  0.30<L>    Ca    9.1      14 Oct 2022 06:50    TPro  4.1<L>  /  Alb  1.5<L>  /  TBili  0.2  /  DBili  x   /  AST  44<H>  /  ALT  17  /  AlkPhos  129<H>  10-14    LIVER FUNCTIONS - ( 14 Oct 2022 06:50 )  Alb: 1.5 g/dL / Pro: 4.1 g/dL / ALK PHOS: 129 U/L / ALT: 17 U/L / AST: 44 U/L / GGT: x                 Culture - Blood (collected 10-11-22 @ 10:10)  Source: .Blood Blood  Preliminary Report (10-12-22 @ 13:02):    No growth to date.    Culture - Blood (collected 10-11-22 @ 10:10)  Source: .Blood Blood  Preliminary Report (10-12-22 @ 13:02):    No growth to date.    Culture - Stool (collected 10-11-22 @ 03:30)  Source: .Stool Feces  Final Report (10-13-22 @ 10:40):    No enteric pathogens isolated.    (Stool culture examined for Salmonella,    Shigella, Campylobacter, Aeromonas, Plesiomonas,    Vibrio, E.coli O157 and Yersinia)        CAPILLARY BLOOD GLUCOSE        Review of Systems:   	  · ROS	Patient reports feeling poorly today. had no BM so far this morning, however had multiple loose and watery stool last night, which was reported as mucoid. No N/V, and trying to improve po intake. Afebrile, and WBC has significantly improved. She attempted to take oxycodone ER, felt pain not adequately controlled with dilaudid. Discussed in huddle, will switch to fentayl patch to start and dc oxy    will also request palliative consult for evaluation of pain and review GOC given poor po intake. Reviewed with patient. Will also set up family meeting for discussion of progress and next steps/options. Plastics greatly appreciated, remaining staples and suture removed without incident    Physical Exam:   · Constitutional Comments	c-collar in place appropriately +steri strips in place  anxious, hypophonic, reduced sustained eye opening (appears behavioral due to not feeling wel and fatigue, mood)  	  · Respiratory	clear to auscultation bilaterally; no wheezes; no rales; no rhonchi  fair effort reduced BS bases  · Cardiovascular	regular rate and rhythm; S1 S2 present; no gallops; no rub; no murmur  · Gastrointestinal	soft; no R/G +mild distension     	  	  · Motor	bilateral calves soft, no TTP  no pedal edema  Hf at least 3-/5 quad 3/5 ankle PF 4/5

## 2022-10-14 NOTE — PROGRESS NOTE ADULT - ASSESSMENT
67 yo female PMH left arm skin CA s/p excision, stomach ulcer who presented to [facility] 9/10/22 with progressive weakness, found to have multiple lytic lesions of the cervical and thoracic spine as well as right first and second ribs, lung, liver secondary to metastatic breast cancer with dominant right breast mass, right chest wall, axillary and retropectoral involvement. Compression fractures of T1, T9, T12, and L2. Severe T1 compression with epidural extension. She underwent C7-T2 corpectomy & fusion, posterior C4-T5 fusion    # Metastatic Breast CA to brain, lung, liver, ribs, with spine involvement  - Path: invasive ductal carcinoma  - s/p C7-T2 corpectomy & fusion, posterior C4-T5 fusion (Dr Bennett)  - Plastic surgery consult greatly appreciated. Remaining staples and suture removed 10/14  - Continue comprehensive rehab program OT, PT, SLP as tolerated  - Oncology 10/12: Pt has medical and rads oncology in community, may also be candidate for systemic therapy on dc, to f/u as outpatient after rehab  - Palliative care consult to review pain management and GOC discussion, including feeds as patient may be progressing to FTT picture  - Precautions: CA, fall, aspiration, spinal, cervical collar, LSO Brace (Cuauhtemoc; [facility]), contact isolation    # Pain management  - Dilaudid 0.5 mg IM q6 PRN mod pain , 1 mg q6 PRN severe pain.--> switch to 1 mg q4 hour PRN mod-severe breakthrough pain 10/14  - Cyclobenzaprine 5 q8  - Lyrica 25 q8  - dc'd Gabapentin TID 600mg due to intolerance/nausea  - Oxycodone ER 20mg Q12 hours --> dc due to inability to tolerate po 10/14  - start fentanyl patch 12 mcg q 72 hours 10/14  - palliative care consult for pain management recs    # r/o C diff  - CTPA 10/8: No pulmonary embolus. Interlobular septal thickening and bilateral ground glass opacities are new from prior exam and may represent edema, lymphangitic carcinomatosis and/or pneumonitis .Bilateral metastatic lung nodules measuring up to 9 mm within right lower lobe are unchanged. Trace bilateral pleural effusions and mild bibasilar atelectasis. Metastatic disease within the mediastinum and rohit as well as bone metastases are grossly unchanged.  - CT A/P 10/11: Pancolitis, correlate clinically for inflammatory/infectious etiology, including pseudomembranous colitis. Partially visualized right breast mass. Pulmonary and hepatic metastatic disease as noted on prior exam. Permeative, osteolytic metastatic disease, including L2 compression fracture with possible epidural involvement  - WBC 11.16 10/10-->16.97 10/11--> 19.42 10/12 -> 19.91 10/13 --> 11.99 10/14  - Continue PO Vanc, 125 mg q6h, day 4 10/14   - Continue IVF. Switched from D51/2 NS to NS due to hyponatremia 10/14. Discussed with patient  - BCx NGTD 10/14  - C Diff Toxin pending  - Difficulty obtaining urine via SC. As WBC improved and GI likely source, will dc for now  - O2 via NC 2lpm to keep sat >90% ordered    # hypokalemia  - K+ 3.3 10/14  - Will replete IV K+ 10 meq x 3 doses  - daily BMP    # hyponatremia  - Na reduced to 129 10/14  - likely from reduced po intake and hypotonic saline   - switch D51/2 NS to NS IVF 10/14. Reviewed with patient  - daily BMP    # enterobacter UTI  - s/p Vantin BID x 7 days    # Bowel Regimen  - Florastor  - Zofran 8 mg PRN    # FEN   - Pureed solids and thins --> changed to full liquids 10/11 per patient preference  - MBS 10/13: difficulty with propelling pureed solids to back of throat, some anxiety although pharyngeal swallow appears grossly intact. Will increase SLP time to work on sensory techniques, recommend pureed solid and thin liquid trays with increased liquid options but not holding solids due to nutritional concerns     # DVT ppx  - Lovenox  - SCD, TEDs    # Case discussed in IDT rounds 10/12:  - CG bed mobility, mod assist sit to supine transfers, reduce motivation/endurance and participation in therapy sessions due to physical complaints and some anxiety, total assist bADLs currently  - goals: mod independent transfers, ambulation with RW, negotiate 6-8 steps with mod independent, min assist bathing/shower transfer  - caregiver training  - target dc home 10/19 with caregiver support and home PT, OT, SLP.  - disability form completed 10/7    Case discussed in detail with brother Quique [phone] 10/13.  Team setting up family meeting next Wednesday 10/19 at 2 PM      # LABS  C diff PCR  palliative care consult  psychiatry consult  daily CBC BMP    CODE STATUS: FULL CODE     Outpatient Follow-up (Specialty/Name of physician):    Marj Del Castillo)  Hematology; Hospice Palliative Medicine; Internal Medicine; Medical Oncology  [address] [place] [address]  Phone: [phone]  Fax: [phone]  Follow Up Time: 2 weeks    Bret Ibarra)  Radiation Oncology  [address] [place] [address]  Phone: [phone]  Fax: [phone]  Follow Up Time: 2 weeks 65 yo female PMH left arm skin CA s/p excision, stomach ulcer who presented to [facility] 9/10/22 with progressive weakness, found to have multiple lytic lesions of the cervical and thoracic spine as well as right first and second ribs, lung, liver secondary to metastatic breast cancer with dominant right breast mass, right chest wall, axillary and retropectoral involvement. Compression fractures of T1, T9, T12, and L2. Severe T1 compression with epidural extension. She underwent C7-T2 corpectomy & fusion, posterior C4-T5 fusion    # Metastatic Breast CA to brain, lung, liver, ribs, with spine involvement  - Path: invasive ductal carcinoma  - s/p C7-T2 corpectomy & fusion, posterior C4-T5 fusion (Dr Bennett)  - Plastic surgery consult greatly appreciated. Remaining staples and suture removed 10/14  - Continue comprehensive rehab program OT, PT, SLP as tolerated  - Oncology 10/12: Pt has medical and rads oncology in community, may also be candidate for systemic therapy on dc, to f/u as outpatient after rehab  - Palliative care consult to review pain management and GOC discussion, including feeds as patient may be progressing to FTT picture  - Precautions: CA, fall, aspiration, spinal, cervical collar, LSO Brace (Cuauhtemoc; [facility]), contact isolation    # Pain management  - Dilaudid 0.5 mg IM q6 PRN mod pain , 1 mg q6 PRN severe pain.--> switch to 1 mg q4 hour PRN mod-severe breakthrough pain 10/14  - Cyclobenzaprine 5 q8  - Lyrica 25 q8  - dc'd Gabapentin TID 600mg due to intolerance/nausea  - Oxycodone ER 20mg Q12 hours --> dc due to inability to tolerate po 10/14  - start fentanyl patch 12 mcg q 72 hours 10/14  - palliative care consult for pain management recs    # r/o C diff  - CTPA 10/8: No pulmonary embolus. Interlobular septal thickening and bilateral ground glass opacities are new from prior exam and may represent edema, lymphangitic carcinomatosis and/or pneumonitis .Bilateral metastatic lung nodules measuring up to 9 mm within right lower lobe are unchanged. Trace bilateral pleural effusions and mild bibasilar atelectasis. Metastatic disease within the mediastinum and rohit as well as bone metastases are grossly unchanged.  - CT A/P 10/11: Pancolitis, correlate clinically for inflammatory/infectious etiology, including pseudomembranous colitis. Partially visualized right breast mass. Pulmonary and hepatic metastatic disease as noted on prior exam. Permeative, osteolytic metastatic disease, including L2 compression fracture with possible epidural involvement  - WBC 11.16 10/10-->16.97 10/11--> 19.42 10/12 -> 19.91 10/13 --> 11.99 10/14  - Continue PO Vanc, 125 mg q6h, day 4 10/14   - Continue IVF. Switched from D51/2 NS to NS due to hyponatremia 10/14. Discussed with patient  - BCx NGTD 10/14  - C Diff Toxin pending  - Difficulty obtaining urine via SC. As WBC improved and GI likely source, will dc for now  - O2 via NC 2lpm to keep sat >90% ordered    # hypokalemia  - K+ 3.3 10/14  - Will replete IV K+ 10 meq x 3 doses  - daily BMP    # hyponatremia  - Na reduced to 129 10/14  - likely from reduced po intake and hypotonic saline   - switch D51/2 NS to NS IVF 10/14. Reviewed with patient  - NaCl 1 g x 1 dose  - daily BMP    # enterobacter UTI  - s/p Vantin BID x 7 days    # adjustment symptoms with anxiety and depression  - working on behavioral techniques with psychology, support  - psychiatry consult for possible medication management. Barriers include N/V, reduced po tolerance    # Bowel Regimen  - Florastor  - Zofran 8 mg PRN    # FEN   - Pureed solids and thins --> changed to full liquids 10/11 per patient preference  - MBS 10/13: difficulty with propelling pureed solids to back of throat, some anxiety although pharyngeal swallow appears grossly intact. Will increase SLP time to work on sensory techniques, recommend pureed solid and thin liquid trays with increased liquid options but not holding solids due to nutritional concerns     # DVT ppx  - Lovenox  - SCD, TEDs    # Case discussed in IDT rounds 10/12:  - CG bed mobility, mod assist sit to supine transfers, reduce motivation/endurance and participation in therapy sessions due to physical complaints and some anxiety, total assist bADLs currently  - goals: mod independent transfers, ambulation with RW, negotiate 6-8 steps with mod independent, min assist bathing/shower transfer  - caregiver training  - target dc home 10/19 with caregiver support and home PT, OT, SLP.  - disability form completed 10/7    Case discussed in detail with brother Quique [phone] 10/13.  Team setting up family meeting next Wednesday 10/19 at 2 PM      # LABS  C diff PCR  palliative care consult  psychiatry consult  daily CBC BMP    CODE STATUS: FULL CODE     Outpatient Follow-up (Specialty/Name of physician):    Marj Del Castillo)  Hematology; Hospice Palliative Medicine; Internal Medicine; Medical Oncology  [address] [place] [address]  Phone: [phone]  Fax: [phone]  Follow Up Time: 2 weeks    Bret Ibarra)  Radiation Oncology  [address] [place] [address]  Phone: [phone]  Fax: [phone]  Follow Up Time: 2 weeks 65 yo female PMH left arm skin CA s/p excision, stomach ulcer who presented to [facility] 9/10/22 with progressive weakness, found to have multiple lytic lesions of the cervical and thoracic spine as well as right first and second ribs, lung, liver secondary to metastatic breast cancer with dominant right breast mass, right chest wall, axillary and retropectoral involvement. Compression fractures of T1, T9, T12, and L2. Severe T1 compression with epidural extension. She underwent C7-T2 corpectomy & fusion, posterior C4-T5 fusion    # Metastatic Breast CA to brain, lung, liver, ribs, with spine involvement  - Path: invasive ductal carcinoma  - s/p C7-T2 corpectomy & fusion, posterior C4-T5 fusion (Dr Bennett)  - Plastic surgery consult greatly appreciated. Remaining staples and suture removed 10/14  - Continue comprehensive rehab program OT, PT, SLP as tolerated  - Oncology 10/12: Pt has medical and rads oncology in community, may also be candidate for systemic therapy on dc, to f/u as outpatient after rehab  - Palliative care consult to review pain management and GOC discussion, including feeds as patient may be progressing to FTT picture  - Precautions: CA, fall, aspiration, spinal, cervical collar, LSO Brace (Cuauhtemoc; [facility]), contact isolation    # Pain management  - Dilaudid 0.5 mg IM q6 PRN mod pain , 1 mg q6 PRN severe pain.--> switch to 1 mg q4 hour PRN mod-severe breakthrough pain 10/14  - Cyclobenzaprine 5 q8  - Lyrica 25 q8  - dc'd Gabapentin TID 600mg due to intolerance/nausea  - Oxycodone ER 20mg Q12 hours --> dc due to inability to tolerate po 10/14  - start fentanyl patch 12 mcg q 72 hours 10/14  - palliative care consult for pain management recs    # r/o C diff  - CTPA 10/8: No pulmonary embolus. Interlobular septal thickening and bilateral ground glass opacities are new from prior exam and may represent edema, lymphangitic carcinomatosis and/or pneumonitis .Bilateral metastatic lung nodules measuring up to 9 mm within right lower lobe are unchanged. Trace bilateral pleural effusions and mild bibasilar atelectasis. Metastatic disease within the mediastinum and rohit as well as bone metastases are grossly unchanged.  - CT A/P 10/11: Pancolitis, correlate clinically for inflammatory/infectious etiology, including pseudomembranous colitis. Partially visualized right breast mass. Pulmonary and hepatic metastatic disease as noted on prior exam. Permeative, osteolytic metastatic disease, including L2 compression fracture with possible epidural involvement  - WBC 11.16 10/10-->16.97 10/11--> 19.42 10/12 -> 19.91 10/13 --> 11.99 10/14  - Continue PO Vanc, 125 mg q6h, day 4 10/14   - Continue IVF. Switched from D51/2 NS to NS due to hyponatremia 10/14. Discussed with patient  - BCx NGTD 10/14  - C Diff Toxin pending. If negative, send PCR  - Difficulty obtaining urine via SC. As WBC improved and GI likely source, will dc for now  - ID and GI consults 10/14 read and appreciated    # hypokalemia  - K+ 3.3 10/14  - Will replete IV K+ 10 meq x 3 doses  - daily BMP    # hyponatremia  - Na reduced to 129 10/14  - likely from reduced po intake and hypotonic saline   - switch D51/2 NS to NS IVF 10/14. Reviewed with patient  - NaCl 1 g x 1 dose  - daily BMP    # enterobacter UTI  - s/p Vantin BID x 7 days    # adjustment symptoms with anxiety and depression  - working on behavioral techniques with psychology, support  - psychiatry consult for possible medication management. Barriers include N/V, reduced po tolerance    # Bowel Regimen  - Florastor  - Zofran 8 mg PRN    # FEN   - Pureed solids and thins --> changed to full liquids 10/11 per patient preference  - MBS 10/13: difficulty with propelling pureed solids to back of throat, some anxiety although pharyngeal swallow appears grossly intact. Will increase SLP time to work on sensory techniques, recommend pureed solid and thin liquid trays with increased liquid options but not holding solids due to nutritional concerns     # DVT ppx  - Lovenox  - SCD, TEDs    # Case discussed in IDT rounds 10/12:  - CG bed mobility, mod assist sit to supine transfers, reduce motivation/endurance and participation in therapy sessions due to physical complaints and some anxiety, total assist bADLs currently  - goals: mod independent transfers, ambulation with RW, negotiate 6-8 steps with mod independent, min assist bathing/shower transfer  - caregiver training  - target dc home 10/19 with caregiver support and home PT, OT, SLP.  - disability form completed 10/7    Case discussed in detail with brother Quique [phone] 10/13.  Team setting up family meeting next Wednesday 10/19 at 2 PM      # LABS  C diff PCR  palliative care consult  psychiatry consult  daily CBC BMP    CODE STATUS: FULL CODE     Outpatient Follow-up (Specialty/Name of physician):    Marj Del Castillo)  Hematology; Hospice Palliative Medicine; Internal Medicine; Medical Oncology  [address] [place] [address]  Phone: [phone]  Fax: [phone]  Follow Up Time: 2 weeks    Bret Ibarra)  Radiation Oncology  [address] [place] [address]  Phone: [phone]  Fax: [phone]  Follow Up Time: 2 weeks

## 2022-10-14 NOTE — PROGRESS NOTE ADULT - ASSESSMENT
66y female with a PMH of a gastric ulcer who was recently admitted to [facility] with overall failure to thrive and found to have widely metastatic breast cancer with lung, liver, brain and bone mets.  S/p C-T spine surgery with anterior corpectomy and fusion of C7-T2 and posterior C4-T5 fusion. Post op UTI treated with CTX and cefpodoxime and sent to rehab on 9/30.  She has had a poor appetite, has c/o N/V and has c/o abdominal cramps. Had some diarrhea and a CT scan showed pancolitis.  Her CT of A/P on 10/2 did not show any colonic thickening. She is taking narcotics which are antiperistaltics and can limit diarrhea.  A stool for C Diff was ordered and PO vanco started.  She received CTX and cefpodoxime, and while her organism was resistant to these agents I would like to limit antibiotics until GI issues controlled.  Her C diff assay was cancelled by the lab on 10/11/22, specimen was found leaky in the bag.  Sample apparently has been submitted again & pending  Leukocytosis now moderating    Suggest:  Repeat C Diff toxin - when she gets a BM  PO vanco 125 4x/day, day # 4  Blood cultures NGTD  If CDT is negative I would send stool culture and GI PCR panel.  Hold off on empiric systemic antibiotics, especially now with pancolitis & suspicion of C diff   Case reviewed with Dr Navas

## 2022-10-15 DIAGNOSIS — E87.1 HYPO-OSMOLALITY AND HYPONATREMIA: ICD-10-CM

## 2022-10-15 DIAGNOSIS — E87.6 HYPOKALEMIA: ICD-10-CM

## 2022-10-15 DIAGNOSIS — E46 UNSPECIFIED PROTEIN-CALORIE MALNUTRITION: ICD-10-CM

## 2022-10-15 DIAGNOSIS — C79.51 SECONDARY MALIGNANT NEOPLASM OF BONE: ICD-10-CM

## 2022-10-15 LAB
ALBUMIN SERPL ELPH-MCNC: 1.5 G/DL — LOW (ref 3.3–5)
ALP SERPL-CCNC: 132 U/L — HIGH (ref 40–120)
ALT FLD-CCNC: 12 U/L — SIGNIFICANT CHANGE UP (ref 10–45)
ANION GAP SERPL CALC-SCNC: 9 MMOL/L — SIGNIFICANT CHANGE UP (ref 5–17)
ANION GAP SERPL CALC-SCNC: 9 MMOL/L — SIGNIFICANT CHANGE UP (ref 5–17)
AST SERPL-CCNC: 51 U/L — HIGH (ref 10–40)
BILIRUB SERPL-MCNC: 0.3 MG/DL — SIGNIFICANT CHANGE UP (ref 0.2–1.2)
BUN SERPL-MCNC: 6 MG/DL — LOW (ref 7–23)
BUN SERPL-MCNC: 7 MG/DL — SIGNIFICANT CHANGE UP (ref 7–23)
CALCIUM SERPL-MCNC: 9.2 MG/DL — SIGNIFICANT CHANGE UP (ref 8.4–10.5)
CALCIUM SERPL-MCNC: 9.2 MG/DL — SIGNIFICANT CHANGE UP (ref 8.4–10.5)
CHLORIDE SERPL-SCNC: 101 MMOL/L — SIGNIFICANT CHANGE UP (ref 96–108)
CHLORIDE SERPL-SCNC: 101 MMOL/L — SIGNIFICANT CHANGE UP (ref 96–108)
CO2 SERPL-SCNC: 21 MMOL/L — LOW (ref 22–31)
CO2 SERPL-SCNC: 21 MMOL/L — LOW (ref 22–31)
CREAT SERPL-MCNC: 0.27 MG/DL — LOW (ref 0.5–1.3)
CREAT SERPL-MCNC: 0.28 MG/DL — LOW (ref 0.5–1.3)
EGFR: 118 ML/MIN/1.73M2 — SIGNIFICANT CHANGE UP
EGFR: 120 ML/MIN/1.73M2 — SIGNIFICANT CHANGE UP
GLUCOSE SERPL-MCNC: 98 MG/DL — SIGNIFICANT CHANGE UP (ref 70–99)
GLUCOSE SERPL-MCNC: 98 MG/DL — SIGNIFICANT CHANGE UP (ref 70–99)
HCT VFR BLD CALC: 26.9 % — LOW (ref 34.5–45)
HGB BLD-MCNC: 9.3 G/DL — LOW (ref 11.5–15.5)
MCHC RBC-ENTMCNC: 29.5 PG — SIGNIFICANT CHANGE UP (ref 27–34)
MCHC RBC-ENTMCNC: 34.6 GM/DL — SIGNIFICANT CHANGE UP (ref 32–36)
MCV RBC AUTO: 85.4 FL — SIGNIFICANT CHANGE UP (ref 80–100)
NRBC # BLD: 4 /100 WBCS — HIGH (ref 0–0)
PLATELET # BLD AUTO: 439 K/UL — HIGH (ref 150–400)
POTASSIUM SERPL-MCNC: 3.6 MMOL/L — SIGNIFICANT CHANGE UP (ref 3.5–5.3)
POTASSIUM SERPL-MCNC: 3.6 MMOL/L — SIGNIFICANT CHANGE UP (ref 3.5–5.3)
POTASSIUM SERPL-SCNC: 3.6 MMOL/L — SIGNIFICANT CHANGE UP (ref 3.5–5.3)
POTASSIUM SERPL-SCNC: 3.6 MMOL/L — SIGNIFICANT CHANGE UP (ref 3.5–5.3)
PROT SERPL-MCNC: 4.1 G/DL — LOW (ref 6–8.3)
RBC # BLD: 3.15 M/UL — LOW (ref 3.8–5.2)
RBC # FLD: 14.7 % — HIGH (ref 10.3–14.5)
SODIUM SERPL-SCNC: 131 MMOL/L — LOW (ref 135–145)
SODIUM SERPL-SCNC: 131 MMOL/L — LOW (ref 135–145)
WBC # BLD: 11.69 K/UL — HIGH (ref 3.8–10.5)
WBC # FLD AUTO: 11.69 K/UL — HIGH (ref 3.8–10.5)

## 2022-10-15 PROCEDURE — 99232 SBSQ HOSP IP/OBS MODERATE 35: CPT

## 2022-10-15 PROCEDURE — 99233 SBSQ HOSP IP/OBS HIGH 50: CPT

## 2022-10-15 RX ORDER — ALPRAZOLAM 0.25 MG
0.25 TABLET ORAL EVERY 6 HOURS
Refills: 0 | Status: DISCONTINUED | OUTPATIENT
Start: 2022-10-15 | End: 2022-10-21

## 2022-10-15 RX ADMIN — SODIUM CHLORIDE 70 MILLILITER(S): 9 INJECTION INTRAMUSCULAR; INTRAVENOUS; SUBCUTANEOUS at 16:50

## 2022-10-15 RX ADMIN — Medication 0.25 MILLIGRAM(S): at 12:53

## 2022-10-15 RX ADMIN — HYDROMORPHONE HYDROCHLORIDE 1 MILLIGRAM(S): 2 INJECTION INTRAMUSCULAR; INTRAVENOUS; SUBCUTANEOUS at 06:20

## 2022-10-15 RX ADMIN — Medication 125 MILLIGRAM(S): at 12:52

## 2022-10-15 RX ADMIN — Medication 25 MILLIGRAM(S): at 14:15

## 2022-10-15 RX ADMIN — Medication 125 MILLIGRAM(S): at 06:00

## 2022-10-15 RX ADMIN — ENOXAPARIN SODIUM 40 MILLIGRAM(S): 100 INJECTION SUBCUTANEOUS at 06:03

## 2022-10-15 RX ADMIN — HYDROMORPHONE HYDROCHLORIDE 1 MILLIGRAM(S): 2 INJECTION INTRAMUSCULAR; INTRAVENOUS; SUBCUTANEOUS at 07:20

## 2022-10-15 RX ADMIN — HYDROMORPHONE HYDROCHLORIDE 1 MILLIGRAM(S): 2 INJECTION INTRAMUSCULAR; INTRAVENOUS; SUBCUTANEOUS at 11:38

## 2022-10-15 RX ADMIN — HYDROMORPHONE HYDROCHLORIDE 1 MILLIGRAM(S): 2 INJECTION INTRAMUSCULAR; INTRAVENOUS; SUBCUTANEOUS at 16:32

## 2022-10-15 RX ADMIN — SODIUM CHLORIDE 70 MILLILITER(S): 9 INJECTION INTRAMUSCULAR; INTRAVENOUS; SUBCUTANEOUS at 00:20

## 2022-10-15 RX ADMIN — Medication 25 MILLIGRAM(S): at 18:04

## 2022-10-15 RX ADMIN — Medication 125 MILLIGRAM(S): at 18:03

## 2022-10-15 RX ADMIN — HYDROMORPHONE HYDROCHLORIDE 1 MILLIGRAM(S): 2 INJECTION INTRAMUSCULAR; INTRAVENOUS; SUBCUTANEOUS at 22:32

## 2022-10-15 RX ADMIN — FENTANYL CITRATE 1 PATCH: 50 INJECTION INTRAVENOUS at 08:39

## 2022-10-15 RX ADMIN — Medication 125 MILLIGRAM(S): at 22:49

## 2022-10-15 RX ADMIN — PANTOPRAZOLE SODIUM 40 MILLIGRAM(S): 20 TABLET, DELAYED RELEASE ORAL at 12:33

## 2022-10-15 RX ADMIN — HYDROMORPHONE HYDROCHLORIDE 1 MILLIGRAM(S): 2 INJECTION INTRAMUSCULAR; INTRAVENOUS; SUBCUTANEOUS at 21:32

## 2022-10-15 RX ADMIN — MAGNESIUM OXIDE 400 MG ORAL TABLET 400 MILLIGRAM(S): 241.3 TABLET ORAL at 18:04

## 2022-10-15 RX ADMIN — FENTANYL CITRATE 1 PATCH: 50 INJECTION INTRAVENOUS at 19:30

## 2022-10-15 RX ADMIN — MAGNESIUM OXIDE 400 MG ORAL TABLET 400 MILLIGRAM(S): 241.3 TABLET ORAL at 12:56

## 2022-10-15 RX ADMIN — HYDROMORPHONE HYDROCHLORIDE 1 MILLIGRAM(S): 2 INJECTION INTRAMUSCULAR; INTRAVENOUS; SUBCUTANEOUS at 17:32

## 2022-10-15 RX ADMIN — HYDROMORPHONE HYDROCHLORIDE 1 MILLIGRAM(S): 2 INJECTION INTRAMUSCULAR; INTRAVENOUS; SUBCUTANEOUS at 12:38

## 2022-10-15 NOTE — BH CONSULTATION LIAISON ASSESSMENT NOTE - CURRENT MEDICATION
MEDICATIONS  (STANDING):  Biotene Dry Mouth Oral Rinse 5 milliLiter(s) Swish and Spit three times a day  cyclobenzaprine 5 milliGRAM(s) Oral three times a day  enoxaparin Injectable 40 milliGRAM(s) SubCutaneous every 24 hours  fentaNYL   Patch  12 MICROgram(s)/Hr 1 Patch Transdermal every 72 hours  magnesium oxide 400 milliGRAM(s) Oral three times a day with meals  metoprolol tartrate 25 milliGRAM(s) Oral two times a day  pantoprazole  Injectable 40 milliGRAM(s) IV Push daily  pregabalin 25 milliGRAM(s) Oral every 8 hours  saccharomyces boulardii 250 milliGRAM(s) Oral two times a day  sodium chloride 0.9%. 1000 milliLiter(s) (70 mL/Hr) IV Continuous <Continuous>  vancomycin    Solution 125 milliGRAM(s) Oral every 6 hours    MEDICATIONS  (PRN):  acetaminophen    Suspension .. 650 milliGRAM(s) Oral every 6 hours PRN Mild Pain (1 - 3)  ALBUTerol    0.083% 2.5 milliGRAM(s) Nebulizer every 6 hours PRN Shortness of Breath and/or Wheezing  HYDROmorphone  Injectable 1 milliGRAM(s) IV Push every 4 hours PRN mod to severe pain 4-10  ondansetron Injectable 8 milliGRAM(s) IV Push every 6 hours PRN Nausea and/or Vomiting  simethicone 80 milliGRAM(s) Chew every 6 hours PRN Gas

## 2022-10-15 NOTE — PROGRESS NOTE ADULT - ASSESSMENT
65 yo female PMH left arm skin CA s/p excision, stomach ulcer who presented to [facility] 9/10/22 with progressive weakness, found to have multiple lytic lesions of the cervical and thoracic spine as well as right first and second ribs, lung, liver secondary to metastatic breast cancer with dominant right breast mass, right chest wall, axillary and retropectoral involvement. Compression fractures of T1, T9, T12, and L2. Severe T1 compression with epidural extension. She underwent C7-T2 corpectomy & fusion, posterior C4-T5 fusion    # Metastatic Breast CA to brain, lung, liver, ribs, with spine involvement  - Path: invasive ductal carcinoma  - s/p C7-T2 corpectomy & fusion, posterior C4-T5 fusion (Dr Bennett)  - Plastic note appreciated. Remaining staples and suture removed 10/14  - Continue comprehensive rehab program OT, PT, SLP as tolerated  - Oncology 10/12: Pt has medical and rads oncology in community, may also be candidate for systemic therapy on dc, to f/u as outpatient after rehab  - Palliative care consult appreciated.   - Precautions: CA, fall, aspiration, spinal, cervical collar, LSO Brace (Cuauhtemoc; [facility]), contact isolation    # Pain management  - Dilaudid 0.5 mg IM q6 PRN mod pain , 1 mg q6 PRN severe pain.--> switch to 1 mg q4 hour PRN mod-severe breakthrough pain 10/14  - Cyclobenzaprine 5 q8  - Lyrica 25 q8  - dc'd Gabapentin due to intolerance/nausea  - Oxycodone ER 20mg Q12 hours --> dc due to inability to tolerate po 10/14  - fentanyl patch 12 mcg q 72 hours 10/14    # r/o C diff  - CTPA 10/8: No pulmonary embolus. Interlobular septal thickening and bilateral ground glass opacities are new from prior exam and may represent edema, lymphangitic carcinomatosis and/or pneumonitis .Bilateral metastatic lung nodules measuring up to 9 mm within right lower lobe are unchanged. Trace bilateral pleural effusions and mild bibasilar atelectasis. Metastatic disease within the mediastinum and rohit as well as bone metastases are grossly unchanged.  - CT A/P 10/11: Pancolitis, correlate clinically for inflammatory/infectious etiology, including pseudomembranous colitis. Partially visualized right breast mass. Pulmonary and hepatic metastatic disease as noted on prior exam. Permeative, osteolytic metastatic disease, including L2 compression fracture with possible epidural involvement  - WBC 11.16 10/10-->16.97 10/11--> 19.42 10/12 -> 19.91 10/13 --> 11.99 10/14; 11.69 10/15  - Continue PO Vanc, 125 mg q6h, day 5 10/15   - Continue IVF. Switched from D51/2 NS to NS due to hyponatremia 10/14.   - BCx NGTD 10/14  - C Diff Toxin pending. If negative, send PCR  - Difficulty obtaining urine via SC. As WBC improved and GI likely source, will dc for now  - ID and GI consults 10/14 read and appreciated    # hypokalemia  - K+ 3.6 10/15  - replete IV K  - daily BMP    # hyponatremia  - Na reduced to 129 10/14; 131 10/15  - likely from reduced po intake and hypotonic saline   - switch D51/2 NS to NS IVF 10/14.   - daily BMP    # enterobacter UTI  - s/p Vantin BID x 7 days    # adjustment symptoms with anxiety and depression  - working on behavioral techniques with psychology, support  - psychiatry consult for possible medication management. Barriers include N/V, reduced po tolerance    # Bowel Regimen  - Florastor  - Zofran 8 mg PRN    # FEN   - Pureed solids and thins --> changed to full liquids 10/11 per patient preference  - MBS 10/13: difficulty with propelling pureed solids to back of throat, some anxiety although pharyngeal swallow appears grossly intact. Will increase SLP time to work on sensory techniques, recommend pureed solid and thin liquid trays with increased liquid options but not holding solids due to nutritional concerns     # DVT ppx  - Lovenox  - SCD, TEDs      # LABS  C diff PCR  psychiatry consult  daily CBC BMP    CODE STATUS: FULL CODE         Outpatient Follow-up (Specialty/Name of physician):    Marj Del Castillo)  Hematology; Hospice Palliative Medicine; Internal Medicine; Medical Oncology  [address] [place] [address]  Phone: [phone]  Fax: [phone]  Follow Up Time: 2 weeks    Bret Ibarra)  Radiation Oncology  [address] [place] [address]  Phone: [phone]  Fax: [phone]  Follow Up Time: 2 weeks

## 2022-10-15 NOTE — PROGRESS NOTE ADULT - SUBJECTIVE AND OBJECTIVE BOX
Patient is a 66y old  Female who presents with a chief complaint of metastatic breast CA to spine s/p  C7-T2 corpectomy & fusion, posterior C4-T5 fusion (14 Oct 2022 15:24)      History, interim events and clinically pertinent issues reviewed; patient interviewed and examined. Her neck pain is controlled at rest.  She has improvement in her loose stools.  She seems slightly frustrated about her progress in therapies which she intimated is slower than she would like   Had some awakening during sleep was worried about breathing but O2 sats were >90s  REVIEW OF SYMPTOMS: patient denies HA's, CP, palpitations, shortness of breath or upper respiratory symptoms, nausea, vomiting, dysuria, bruising/bleeding and all other systems were reviewed as negative      ALLERGIES:  lactose (Other)  tetanus toxoid (Hives)    MEDICATIONS  (STANDING):  Biotene Dry Mouth Oral Rinse 5 milliLiter(s) Swish and Spit three times a day  cyclobenzaprine 5 milliGRAM(s) Oral three times a day  enoxaparin Injectable 40 milliGRAM(s) SubCutaneous every 24 hours  fentaNYL   Patch  12 MICROgram(s)/Hr 1 Patch Transdermal every 72 hours  magnesium oxide 400 milliGRAM(s) Oral three times a day with meals  metoprolol tartrate 25 milliGRAM(s) Oral two times a day  pantoprazole  Injectable 40 milliGRAM(s) IV Push daily  pregabalin 25 milliGRAM(s) Oral every 8 hours  saccharomyces boulardii 250 milliGRAM(s) Oral two times a day  sodium chloride 0.9%. 1000 milliLiter(s) (70 mL/Hr) IV Continuous <Continuous>  vancomycin    Solution 125 milliGRAM(s) Oral every 6 hours    MEDICATIONS  (PRN):  acetaminophen    Suspension .. 650 milliGRAM(s) Oral every 6 hours PRN Mild Pain (1 - 3)  ALBUTerol    0.083% 2.5 milliGRAM(s) Nebulizer every 6 hours PRN Shortness of Breath and/or Wheezing  HYDROmorphone  Injectable 1 milliGRAM(s) IV Push every 4 hours PRN mod to severe pain 4-10  ondansetron Injectable 8 milliGRAM(s) IV Push every 6 hours PRN Nausea and/or Vomiting  simethicone 80 milliGRAM(s) Chew every 6 hours PRN Gas    Vital Signs Last 24 Hrs  T(F): 97.5 (15 Oct 2022 08:38), Max: 97.9 (14 Oct 2022 19:50)  HR: 117 (15 Oct 2022 08:38) (117 - 117)  BP: 135/77 (15 Oct 2022 08:38) (135/77 - 136/81)  RR: 16 (15 Oct 2022 08:38) (16 - 16)  SpO2: 96% (15 Oct 2022 08:38) (95% - 96%)  I&O's Summary    PHYSICAL EXAM:  General: thin borderline cacchectic WF NAD, A/O x 3  ENT: MMM Neck brace on  Neck: Supple, No JVD  Lungs: Clear to auscultation bilaterally  Cardio: RRR, S1/S2, No murmurs  Abdomen: Soft, Nontender, Nondistended; Bowel sounds present  Extremities: No calf tenderness, No  edema      LABS:                        9.3    11.69 )-----------( 439      ( 15 Oct 2022 06:30 )             26.9       10-15    131  |  101  |  6   ----------------------------<  98  3.6   |  21  |  0.28    Ca    9.2      15 Oct 2022 06:30    TPro  4.1  /  Alb  1.5  /  TBili  0.3  /  DBili  x   /  AST  51  /  ALT  12  /  AlkPhos  132  10-15                                Culture - Blood (collected 11 Oct 2022 10:10)  Source: .Blood Blood  Preliminary Report (12 Oct 2022 13:02):    No growth to date.    Culture - Blood (collected 11 Oct 2022 10:10)  Source: .Blood Blood  Preliminary Report (12 Oct 2022 13:02):    No growth to date.    Culture - Stool (collected 11 Oct 2022 03:30)  Source: .Stool Feces  Final Report (13 Oct 2022 10:40):    No enteric pathogens isolated.    (Stool culture examined for Salmonella,    Shigella, Campylobacter, Aeromonas, Plesiomonas,    Vibrio, E.coli O157 and Yersinia)      COVID-19 PCR: NotDetec (10-13-22 @ 05:37)  COVID-19 PCR: NotDetec (10-07-22 @ 06:10)  COVID-19 PCR: NotDetec (09-30-22 @ 20:50)  COVID-19 PCR: NotDetec (09-27-22 @ 13:00)  COVID-19 PCR: NotDetec (09-20-22 @ 06:00)

## 2022-10-15 NOTE — PROGRESS NOTE ADULT - ASSESSMENT
67 yo female PMH stomach ulcer who presented to [facility] 9/10/22 with progressive weakness, discovered multiple lytic lesions of the cervical and thoracic spine along with lung and liver secondary to metastatic primary newly discovered breast cancer with axillary and retropectoral involvement. Imaging also showed Compression fractures of T1, T9, T12, and L2. Severe T1 compression with epidural extension.S/p C7-T2 corpectomy & fusion, posterior C4-T5 fusion, hospital course significant for urinary retention and dysphagia, UTI, now admitted for rehab- pt/ot/dvt ppx    #Acute hypoxic respiratory failure in need of supplemental oxygenation likely multifactorial including:  *Metastatic disease within mediastinum and rohit  *Metastatic disease within ribs  *Bilateral lung nodules RLL  *Bilateral GGO  *Bilateral atelectasis  -PE ruled out with neg CTA  -C/w alb neb treatement q 6hrs prn  -Incentive spirometer, OOB to chair, mobilize  -Might need oxygen due to increase in cancer burden in lungs  -TTE : EF 70-75%. tachy, hyperdynamic LVF  -Appreciate pulmonary recommendations    *Metastatic Breast CA to lung, liver, ribs, brain with spine involvement and neurogenic bladder  - s/p right breast biopsy shows  positive for invasive ductal carcinoma  - Compression fractures of T1, T9, T12, and L2. Severe T1 compression with epidural extension.   - s/p C7-T2 corpectomy & fusion, posterior C4-T5 fusion  - Patient will not require chemo/radiation while in rehab. 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  - oncology consult called today    # pain due to mets-   palliative consulted  oncology to assert realistic goals for cancer treatment, as wel as family meeting   pain meds per PMR team    *c diff  improved since po vanco STARTED 10/11/22,     *hyponatremia  *hypokalemia  likely due to gi losses  Zofran prn  ppi  replete K   GI appreciated  ID f/u appreciated    * HTN  *sinus tacchycardia  - metoprolol   - cardio recc noted  - continue IV hydration and encouraged PO hydration    *Enterobacter cloacae UTI  - s/p Vantin BID for 7 days 9/30-10/7/22  - monitor bladder scans  - SC for PVR >350 ml, toileting schedule    * Hypercalcemia of malignancy  - improved with fluids  - c/w IVF  - follow labs periodically    *Normocytic anemia  - Monitor Hg/Hct, iron levels, B12, folate WNL  - Transfuse if hG <7      *Severe protein-calorie malnutrition.  Hypoalbuminemia  - nutrition eval noted continue supplements encourage PO    VTE ppx:   - Lovenox

## 2022-10-15 NOTE — PROGRESS NOTE ADULT - SUBJECTIVE AND OBJECTIVE BOX
No events.   Resting in bed.   Less frequent BM's.          Review of Systems:    General:  No wt loss, fevers, chills, night sweats,fatigue,   CV:  No pain, palpitatioins, hypo/hypertension  Resp:  No dyspnea, cough, tachypnea, wheezing  GI:  No pain, No nausea, No vomiting, No diarrhea, No constipatiion, No weight loss, No fever, No pruritis, No rectal bleeding, No tarry stools, No dysphagia,  :  No pain, bleeding, incontinence, nocturia  Muscle:  No pain, weakness  Neuro:  No weakness, tingling, memory problems  Psych:  No fatigue, insomnia, mood problems, depression  Endocrine:  No polyuria, polydypsia, cold/heat intolerance  Heme:  No petechiae, ecchymosis, easy bruisability  Skin:  No rash, tattoos, scars, edema      Vital Signs Last 24 Hrs  T(C): 36.4 (15 Oct 2022 08:38), Max: 36.6 (14 Oct 2022 19:50)  T(F): 97.5 (15 Oct 2022 08:38), Max: 97.9 (14 Oct 2022 19:50)  HR: 117 (15 Oct 2022 08:38) (117 - 117)  BP: 135/77 (15 Oct 2022 08:38) (135/77 - 136/81)  BP(mean): --  RR: 16 (15 Oct 2022 08:38) (16 - 16)  SpO2: 96% (15 Oct 2022 08:38) (95% - 96%)    Parameters below as of 15 Oct 2022 08:38  Patient On (Oxygen Delivery Method): room air        PHYSICAL EXAM:    Constitutional: NAD, well-developed  Neck: No LAD, supple  Respiratory: CTA and P  Cardiovascular: S1 and S2, RRR, no M  Gastrointestinal: BS+, soft, NT/ND, neg HSM,  Extremities: No peripheral edema, neg clubing, cyanosis  Vascular: 2+ peripheral pulses  Neurological: A/O x 3, no focal deficits  Psychiatric: Normal mood, normal affect  Skin: No rashes    MEDICATIONS  (STANDING):  Biotene Dry Mouth Oral Rinse 5 milliLiter(s) Swish and Spit three times a day  cyclobenzaprine 5 milliGRAM(s) Oral three times a day  enoxaparin Injectable 40 milliGRAM(s) SubCutaneous every 24 hours  fentaNYL   Patch  12 MICROgram(s)/Hr 1 Patch Transdermal every 72 hours  magnesium oxide 400 milliGRAM(s) Oral three times a day with meals  metoprolol tartrate 25 milliGRAM(s) Oral two times a day  pantoprazole  Injectable 40 milliGRAM(s) IV Push daily  pregabalin 25 milliGRAM(s) Oral every 8 hours  saccharomyces boulardii 250 milliGRAM(s) Oral two times a day  sodium chloride 0.9%. 1000 milliLiter(s) (70 mL/Hr) IV Continuous <Continuous>  vancomycin    Solution 125 milliGRAM(s) Oral every 6 hours    MEDICATIONS  (PRN):  acetaminophen    Suspension .. 650 milliGRAM(s) Oral every 6 hours PRN Mild Pain (1 - 3)  ALBUTerol    0.083% 2.5 milliGRAM(s) Nebulizer every 6 hours PRN Shortness of Breath and/or Wheezing  ALPRAZolam 0.25 milliGRAM(s) Oral every 6 hours PRN anxiety  HYDROmorphone  Injectable 1 milliGRAM(s) IV Push every 4 hours PRN mod to severe pain 4-10  ondansetron Injectable 8 milliGRAM(s) IV Push every 6 hours PRN Nausea and/or Vomiting  simethicone 80 milliGRAM(s) Chew every 6 hours PRN Gas      Allergies    tetanus toxoid (Hives)    Intolerances    lactose (Other)        LABS:                        9.3    11.69 )-----------( 439      ( 15 Oct 2022 06:30 )             26.9                         8.9    11.99 )-----------( 489      ( 14 Oct 2022 06:50 )             25.9                         9.2    19.91 )-----------( 554      ( 13 Oct 2022 06:31 )             27.2     10-15    131<L>  |  101  |  6<L>  ----------------------------<  98  3.6   |  21<L>  |  0.28<L>    Ca    9.2      15 Oct 2022 06:30    TPro  4.1<L>  /  Alb  1.5<L>  /  TBili  0.3  /  DBili  x   /  AST  51<H>  /  ALT  12  /  AlkPhos  132<H>  10-15        LIVER FUNCTIONS - ( 15 Oct 2022 06:30 )  Alb: 1.5 g/dL / Pro: 4.1 g/dL / ALK PHOS: 132 U/L / ALT: 12 U/L / AST: 51 U/L / GGT: x         LIVER FUNCTIONS - ( 14 Oct 2022 06:50 )  Alb: 1.5 g/dL / Pro: 4.1 g/dL / ALK PHOS: 129 U/L / ALT: 17 U/L / AST: 44 U/L / GGT: x               RADIOLOGY & ADDITIONAL TESTS:

## 2022-10-15 NOTE — PROGRESS NOTE ADULT - SUBJECTIVE AND OBJECTIVE BOX
Patient is a 66y old  Female who presents with a chief complaint of metastatic breast CA to spine s/p  C7-T2 corpectomy & fusion, posterior C4-T5 fusion (14 Oct 2022 13:12)      HPI:  Thi is a 67 yo female with PMH of left arm skin CA s/p excision at age 16, stomach ulcer, her last mammogram was in 2014, who was brought to [facility] ED 9/10/22 with progressive weakness to the point that she felt she could not get out of bed. She also reported remote shoulder pain, decreased appetite. Was found to have two lumps in her right breast but could not tolerate recommended biopsy due to pain. CT scan of the Cervical spine performed which showed Multiple lytic lesions of the cervical and visualized thoracic spine as well as right first and second ribs concerning for metastases versus multiple myeloma.    She was admitted under medicine service for further workup and management, including of her pain. Further imaging done showed Metastatic breast cancer with dominant right breast mass, right chest wall, axillary and retropectoral involvement; osseous, pulmonary, and likely hepatic metastatic disease. She was also found to have metastatic adenopathy of the thorax, diffuse extensive osseous metastatic disease with multiple lucent and sclerotic lesions and compression fractures of T1, T9, T12, and L2. Severe T1 compression with epidural extension; cord compression cannot be excluded. +additional neoplastic disease of the spine with epidural extension. Multifocal neoplastic lesions involving the pelvis and ribs as well.    Patient was seen by Oncology, CT surgery and Neurosurgery team. She underwent C7-T2 corpectomy & fusion, posterior C4-T5 fusion. Post-op course uncomplicated.  Hamm removed, pt requiring some straight cath for retention. She also had subjective dysphagia - seen by speech therapy recommended pureed diet which patient continues to prefer. Patient with low grade temp and positive UA. Started on IV Rocephin for UTI. Blood cultures negative, Urine culture positive for GNR, changed to PO Vantin for 7 days.    Patient was medically optimized for discharge to [facility] IRF on 9/30/22. (30 Sep 2022 14:32)      PAST MEDICAL & SURGICAL HISTORY:  Stomach ulcer  20 years ago   no pain not taking any meds      Skin cancer of arm, left  pt was 16 year old      H/O excision of mass  left arm  when patient was 16 years ago      S/P hysterectomy          MEDICATIONS  (STANDING):  Biotene Dry Mouth Oral Rinse 5 milliLiter(s) Swish and Spit three times a day  cyclobenzaprine 5 milliGRAM(s) Oral three times a day  enoxaparin Injectable 40 milliGRAM(s) SubCutaneous every 24 hours  fentaNYL   Patch  12 MICROgram(s)/Hr 1 Patch Transdermal every 72 hours  magnesium oxide 400 milliGRAM(s) Oral three times a day with meals  metoprolol tartrate 25 milliGRAM(s) Oral two times a day  oxyCODONE  ER Tablet 20 milliGRAM(s) Oral every 12 hours  pantoprazole  Injectable 40 milliGRAM(s) IV Push daily  potassium chloride  10 mEq/100 mL IVPB 10 milliEquivalent(s) IV Intermittent every 1 hour  pregabalin 25 milliGRAM(s) Oral every 8 hours  saccharomyces boulardii 250 milliGRAM(s) Oral two times a day  sodium chloride 1 Gram(s) Oral once  sodium chloride 0.9%. 1000 milliLiter(s) (70 mL/Hr) IV Continuous <Continuous>  vancomycin    Solution 125 milliGRAM(s) Oral every 6 hours    MEDICATIONS  (PRN):  acetaminophen    Suspension .. 650 milliGRAM(s) Oral every 6 hours PRN Mild Pain (1 - 3)  ALBUTerol    0.083% 2.5 milliGRAM(s) Nebulizer every 6 hours PRN Shortness of Breath and/or Wheezing  HYDROmorphone  Injectable 1 milliGRAM(s) IV Push every 4 hours PRN Severe Pain (7 - 10)  HYDROmorphone  Injectable 0.5 milliGRAM(s) IV Push every 4 hours PRN Moderate Pain (4 - 6)  ondansetron Injectable 8 milliGRAM(s) IV Push every 6 hours PRN Nausea and/or Vomiting  simethicone 80 milliGRAM(s) Chew every 6 hours PRN Gas      Allergies    tetanus toxoid (Hives)    Intolerances    lactose (Other)    ROS/Subjective: Patient seen and examined this morning at bedside. Patient in bed in NAD, no SOB, no n/v, pain controlled. Patient with [place] collar.    VITALS  Vital Signs Last 24 Hrs  T(C): 36.4 (15 Oct 2022 08:38), Max: 36.6 (14 Oct 2022 19:50)  T(F): 97.5 (15 Oct 2022 08:38), Max: 97.9 (14 Oct 2022 19:50)  HR: 117 (15 Oct 2022 08:38) (117 - 117)  BP: 135/77 (15 Oct 2022 08:38) (135/77 - 136/81)  BP(mean): --  RR: 16 (15 Oct 2022 08:38) (16 - 16)  SpO2: 96% (15 Oct 2022 08:38) (95% - 96%)    Parameters below as of 15 Oct 2022 08:38  Patient On (Oxygen Delivery Method): room air      PHYSICAL EXAM:  General: thin borderline cachexic WF NAD, A/O x 3  ENT: MMM, [place] collar. Hypophonic  Neck: Supple  Lungs: Clear to auscultation bilaterally  Cardio: RRR, S1/S2  Abdomen: Soft, Nontender, Nondistended; Bowel sounds present  Extremities: No calf tenderness, (+) pedal edema    Motor:  BUE: shoulder flexion <3/5mp, elbow flexion 4-4+/5mp, right hand  3-4-/5mp, left hand  4-4+/5mp  BLE: hip flexion 3-/5mp, knee extension 4/5mp, ankle 4/5mp            RECENT LABS:  LABS:                        9.3    11.69 )-----------( 439      ( 15 Oct 2022 06:30 )             26.9     15 Oct 2022 06:30    131    |  101    |  6      ----------------------------<  98     3.6     |  21     |  0.28     Ca    9.2        15 Oct 2022 06:30    TPro  4.1    /  Alb  1.5    /  TBili  0.3    /  DBili  x      /  AST  51     /  ALT  12     /  AlkPhos  132    15 Oct 2022 06:30                              8.9    11.99 )-----------( 489      ( 14 Oct 2022 06:50 )             25.9     10-14    129<L>  |  99  |  7   ----------------------------<  116<H>  3.3<L>   |  22  |  0.30<L>    Ca    9.1      14 Oct 2022 06:50    TPro  4.1<L>  /  Alb  1.5<L>  /  TBili  0.2  /  DBili  x   /  AST  44<H>  /  ALT  17  /  AlkPhos  129<H>  10-14    LIVER FUNCTIONS - ( 14 Oct 2022 06:50 )  Alb: 1.5 g/dL / Pro: 4.1 g/dL / ALK PHOS: 129 U/L / ALT: 17 U/L / AST: 44 U/L / GGT: x                 Culture - Blood (collected 10-11-22 @ 10:10)  Source: .Blood Blood  Preliminary Report (10-12-22 @ 13:02):    No growth to date.    Culture - Blood (collected 10-11-22 @ 10:10)  Source: .Blood Blood  Preliminary Report (10-12-22 @ 13:02):    No growth to date.    Culture - Stool (collected 10-11-22 @ 03:30)  Source: .Stool Feces  Final Report (10-13-22 @ 10:40):    No enteric pathogens isolated.    (Stool culture examined for Salmonella,    Shigella, Campylobacter, Aeromonas, Plesiomonas,    Vibrio, E.coli O157 and Yersinia)        CAPILLARY BLOOD GLUCOSE

## 2022-10-15 NOTE — BH CONSULTATION LIAISON ASSESSMENT NOTE - SUMMARY
Pt is a 67 y/o female with PMHx of left arm skin CA s/p excision at age 16, stomach ulcer, her last mammogram was in 2014, who was brought to [facility] ED 9/10/22 with progressive weakness to the point that she felt she could not get out of bed. Psychiatry asked to see patient for Depression and anxiety.   Pt is single and does not have children. She graduated from high school and completed 2 years of college, and attended design and secretarial school. 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 She saw a psychotherapist many years ago for hypnotherapy. Pt quit smoking cigarettes and drinking alcohol in her 20s. Pt is Oriental orthodox. She enjoys walking . She has 5 brothers and 3 sisters . She will be living with brother and sister after D/C. Patient looks anxious and depressed but when asked denies both.

## 2022-10-15 NOTE — BH CONSULTATION LIAISON ASSESSMENT NOTE - HPI (INCLUDE ILLNESS QUALITY, SEVERITY, DURATION, TIMING, CONTEXT, MODIFYING FACTORS, ASSOCIATED SIGNS AND SYMPTOMS)
Available to Patient    Consult Note:    Provider Specialty:  Neuropsychology.    Referral/Consultation:    Initial Consult:  · Requested by Name:	Claire Carrizales M.D.  · Date/Time:	07-Oct-2022 12:00  · Reason for Referral/Consultation:	Newly diagnosed metastatic CA  · Reason for Admission	metastatic breast CA to spine s/p  C7-T2 corpectomy & fusion, posterior C4-T5 fusion      · Subjective and Objective:   Pt is a 65 y/o left-handed female with PMHx of left arm skin CA s/p excision at age 16, stomach ulcer, her last mammogram was in 2014, who was brought to [facility] ED 9/10/22 with progressive weakness to the point that she felt she could not get out of bed. She also reported remote shoulder pain, decreased appetite. Was found to have two lumps in her right breast but could not tolerate recommended biopsy due to pain. CT scan of the Cervical spine performed which showed Multiple lytic lesions of the cervical and visualized thoracic spine as well as right first and second ribs concerning for metastases versus multiple myeloma. She was admitted under medicine service for further workup and management, including of her pain. Further imaging done showed Metastatic breast cancer with dominant right breast mass, right chest wall, axillary and retropectoral involvement; osseous, pulmonary, and likely hepatic metastatic disease. She was also found to have metastatic adenopathy of the thorax, diffuse extensive osseous metastatic disease with multiple lucent and sclerotic lesions and compression fractures of T1, T9, T12, and L2. Severe T1 compression with epidural extension; cord compression cannot be excluded. +additional neoplastic disease of the spine with epidural extension. Multifocal neoplastic lesions involving the pelvis and ribs as well. Pt was seen by Oncology, CT surgery and Neurosurgery team. She underwent C7-T2 corpectomy & fusion, posterior C4-T5 fusion. Post-op course uncomplicated.  Hamm removed, Pt requiring some straight cath for retention. She also had subjective dysphagia - seen by speech therapy recommended pureed diet which Pt continues to prefer. Patient with low grade temp and positive UA. Started on IV Rocephin for UTI. Blood cultures negative, Urine culture positive for GNR, changed to PO Vantin for 7 days. Pt was medically optimized for discharge to [facility] IRF on 9/30/22. PMHx: As noted above. Current meds: Please see list in Pt’s chart. Social Hx: Pt is single and does not have children. She graduated from high school and completed 2 years of college, and attended design and secretarial school. 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 She saw a psychotherapist many years ago for hypnotherapy. Pt quit smoking cigarettes and drinking alcohol in her 20s. Pt is Bahai. She enjoys walking    Pt is a 67 y/o female with PMHx of left arm skin CA s/p excision at age 16, stomach ulcer, her last mammogram was in 2014, who was brought to [facility] ED 9/10/22 with progressive weakness to the point that she felt she could not get out of bed. She also reported remote shoulder pain, decreased appetite. Was found to have two lumps in her right breast but could not tolerate recommended biopsy due to pain. CT scan of the Cervical spine performed which showed Multiple lytic lesions of the cervical and visualized thoracic spine as well as right first and second ribs concerning for metastases versus multiple myeloma. She was admitted under medicine service for further workup and management, including of her pain. Further imaging done showed Metastatic breast cancer with dominant right breast mass, right chest wall, axillary and retropectoral involvement; osseous, pulmonary, and likely hepatic metastatic disease. She was also found to have metastatic adenopathy of the thorax, diffuse extensive osseous metastatic disease with multiple lucent and sclerotic lesions and compression fractures of T1, T9, T12, and L2. Severe T1 compression with epidural extension; cord compression cannot be excluded. +additional neoplastic disease of the spine with epidural extension. Multifocal neoplastic lesions involving the pelvis and ribs as well. Pt was seen by Oncology, CT surgery and Neurosurgery team. She underwent C7-T2 corpectomy & fusion, posterior C4-T5 fusion. Post-op course uncomplicated.  Hamm removed, Pt requiring some straight cath for retention. She also had subjective dysphagia - seen by speech therapy recommended pureed diet which Pt continues to prefer. Patient with low grade temp and positive UA. Started on IV Rocephin for UTI. Blood cultures negative, Urine culture positive for GNR, changed to PO Vantin for 7 days. Pt was  discharge to [facility] IRF on 9/30/22. Psychiatry asked to see patient for Depression and anxiety.   Pt is single and does not have children. She graduated from high school and completed 2 years of college, and attended design and secretarial school. 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 She saw a psychotherapist many years ago for hypnotherapy. Pt quit smoking cigarettes and drinking alcohol in her 20s. Pt is Yazidism. She enjoys walking . She has 5 brothers and 3 sisters . She will be living with brother and sister after D/C. Patient looks anxious and depressed but when asked denies both.

## 2022-10-15 NOTE — BH CONSULTATION LIAISON ASSESSMENT NOTE - NSBHCHARTREVIEWVS_PSY_A_CORE FT
Vital Signs Last 24 Hrs  T(C): 36.4 (15 Oct 2022 08:38), Max: 36.6 (14 Oct 2022 19:50)  T(F): 97.5 (15 Oct 2022 08:38), Max: 97.9 (14 Oct 2022 19:50)  HR: 117 (15 Oct 2022 08:38) (117 - 117)  BP: 135/77 (15 Oct 2022 08:38) (135/77 - 136/81)  BP(mean): --  RR: 16 (15 Oct 2022 08:38) (16 - 16)  SpO2: 96% (15 Oct 2022 08:38) (95% - 96%)    Parameters below as of 15 Oct 2022 08:38  Patient On (Oxygen Delivery Method): room air

## 2022-10-16 LAB
ALBUMIN SERPL ELPH-MCNC: 1.4 G/DL — LOW (ref 3.3–5)
ALP SERPL-CCNC: 131 U/L — HIGH (ref 40–120)
ALT FLD-CCNC: 16 U/L — SIGNIFICANT CHANGE UP (ref 10–45)
ANION GAP SERPL CALC-SCNC: 10 MMOL/L — SIGNIFICANT CHANGE UP (ref 5–17)
AST SERPL-CCNC: 48 U/L — HIGH (ref 10–40)
BILIRUB SERPL-MCNC: 0.4 MG/DL — SIGNIFICANT CHANGE UP (ref 0.2–1.2)
BUN SERPL-MCNC: 6 MG/DL — LOW (ref 7–23)
CALCIUM SERPL-MCNC: 9.3 MG/DL — SIGNIFICANT CHANGE UP (ref 8.4–10.5)
CHLORIDE SERPL-SCNC: 101 MMOL/L — SIGNIFICANT CHANGE UP (ref 96–108)
CO2 SERPL-SCNC: 22 MMOL/L — SIGNIFICANT CHANGE UP (ref 22–31)
CREAT SERPL-MCNC: 0.29 MG/DL — LOW (ref 0.5–1.3)
CULTURE RESULTS: SIGNIFICANT CHANGE UP
CULTURE RESULTS: SIGNIFICANT CHANGE UP
EGFR: 118 ML/MIN/1.73M2 — SIGNIFICANT CHANGE UP
GLUCOSE SERPL-MCNC: 97 MG/DL — SIGNIFICANT CHANGE UP (ref 70–99)
HCT VFR BLD CALC: 27.5 % — LOW (ref 34.5–45)
HGB BLD-MCNC: 9.1 G/DL — LOW (ref 11.5–15.5)
MCHC RBC-ENTMCNC: 28.8 PG — SIGNIFICANT CHANGE UP (ref 27–34)
MCHC RBC-ENTMCNC: 33.1 GM/DL — SIGNIFICANT CHANGE UP (ref 32–36)
MCV RBC AUTO: 87 FL — SIGNIFICANT CHANGE UP (ref 80–100)
NRBC # BLD: 4 /100 WBCS — HIGH (ref 0–0)
PLATELET # BLD AUTO: 395 K/UL — SIGNIFICANT CHANGE UP (ref 150–400)
POTASSIUM SERPL-MCNC: 3.6 MMOL/L — SIGNIFICANT CHANGE UP (ref 3.5–5.3)
POTASSIUM SERPL-SCNC: 3.6 MMOL/L — SIGNIFICANT CHANGE UP (ref 3.5–5.3)
PROT SERPL-MCNC: 4.1 G/DL — LOW (ref 6–8.3)
RBC # BLD: 3.16 M/UL — LOW (ref 3.8–5.2)
RBC # FLD: 14.9 % — HIGH (ref 10.3–14.5)
SODIUM SERPL-SCNC: 133 MMOL/L — LOW (ref 135–145)
SPECIMEN SOURCE: SIGNIFICANT CHANGE UP
SPECIMEN SOURCE: SIGNIFICANT CHANGE UP
WBC # BLD: 11.77 K/UL — HIGH (ref 3.8–10.5)
WBC # FLD AUTO: 11.77 K/UL — HIGH (ref 3.8–10.5)

## 2022-10-16 PROCEDURE — 99232 SBSQ HOSP IP/OBS MODERATE 35: CPT

## 2022-10-16 RX ADMIN — Medication 25 MILLIGRAM(S): at 14:27

## 2022-10-16 RX ADMIN — HYDROMORPHONE HYDROCHLORIDE 1 MILLIGRAM(S): 2 INJECTION INTRAMUSCULAR; INTRAVENOUS; SUBCUTANEOUS at 10:09

## 2022-10-16 RX ADMIN — Medication 125 MILLIGRAM(S): at 22:40

## 2022-10-16 RX ADMIN — HYDROMORPHONE HYDROCHLORIDE 1 MILLIGRAM(S): 2 INJECTION INTRAMUSCULAR; INTRAVENOUS; SUBCUTANEOUS at 11:09

## 2022-10-16 RX ADMIN — Medication 125 MILLIGRAM(S): at 12:18

## 2022-10-16 RX ADMIN — MAGNESIUM OXIDE 400 MG ORAL TABLET 400 MILLIGRAM(S): 241.3 TABLET ORAL at 09:36

## 2022-10-16 RX ADMIN — HYDROMORPHONE HYDROCHLORIDE 1 MILLIGRAM(S): 2 INJECTION INTRAMUSCULAR; INTRAVENOUS; SUBCUTANEOUS at 16:53

## 2022-10-16 RX ADMIN — Medication 125 MILLIGRAM(S): at 09:35

## 2022-10-16 RX ADMIN — SODIUM CHLORIDE 70 MILLILITER(S): 9 INJECTION INTRAMUSCULAR; INTRAVENOUS; SUBCUTANEOUS at 08:05

## 2022-10-16 RX ADMIN — MAGNESIUM OXIDE 400 MG ORAL TABLET 400 MILLIGRAM(S): 241.3 TABLET ORAL at 18:18

## 2022-10-16 RX ADMIN — Medication 25 MILLIGRAM(S): at 09:35

## 2022-10-16 RX ADMIN — HYDROMORPHONE HYDROCHLORIDE 1 MILLIGRAM(S): 2 INJECTION INTRAMUSCULAR; INTRAVENOUS; SUBCUTANEOUS at 17:53

## 2022-10-16 RX ADMIN — Medication 125 MILLIGRAM(S): at 18:18

## 2022-10-16 RX ADMIN — MAGNESIUM OXIDE 400 MG ORAL TABLET 400 MILLIGRAM(S): 241.3 TABLET ORAL at 12:19

## 2022-10-16 RX ADMIN — PANTOPRAZOLE SODIUM 40 MILLIGRAM(S): 20 TABLET, DELAYED RELEASE ORAL at 12:18

## 2022-10-16 RX ADMIN — Medication 25 MILLIGRAM(S): at 18:18

## 2022-10-16 RX ADMIN — HYDROMORPHONE HYDROCHLORIDE 1 MILLIGRAM(S): 2 INJECTION INTRAMUSCULAR; INTRAVENOUS; SUBCUTANEOUS at 06:53

## 2022-10-16 RX ADMIN — HYDROMORPHONE HYDROCHLORIDE 1 MILLIGRAM(S): 2 INJECTION INTRAMUSCULAR; INTRAVENOUS; SUBCUTANEOUS at 05:53

## 2022-10-16 RX ADMIN — FENTANYL CITRATE 1 PATCH: 50 INJECTION INTRAVENOUS at 07:27

## 2022-10-16 RX ADMIN — FENTANYL CITRATE 1 PATCH: 50 INJECTION INTRAVENOUS at 19:30

## 2022-10-16 NOTE — PROGRESS NOTE ADULT - ASSESSMENT
67 yo female PMH left arm skin CA s/p excision, stomach ulcer who presented to [facility] 9/10/22 with progressive weakness, found to have multiple lytic lesions of the cervical and thoracic spine as well as right first and second ribs, lung, liver secondary to metastatic breast cancer with dominant right breast mass, right chest wall, axillary and retropectoral involvement. Compression fractures of T1, T9, T12, and L2. Severe T1 compression with epidural extension. She underwent C7-T2 corpectomy & fusion, posterior C4-T5 fusion    # Metastatic Breast CA to brain, lung, liver, ribs, with spine involvement  - Path: invasive ductal carcinoma  - s/p C7-T2 corpectomy & fusion, posterior C4-T5 fusion (Dr Bennett)  - Plastic note appreciated. Remaining staples and suture removed 10/14  - Continue comprehensive rehab program OT, PT, SLP as tolerated  - Oncology 10/12: Pt has medical and rads oncology in community, may also be candidate for systemic therapy on dc, to f/u as outpatient after rehab  - Palliative care consult appreciated.   - Precautions: CA, fall, aspiration, spinal, cervical collar, LSO Brace (Cuauhtemoc; [facility]), contact isolation    # Pain management  - Dilaudid 0.5 mg IM q6 PRN mod pain , 1 mg q6 PRN severe pain.--> switch to 1 mg q4 hour PRN mod-severe breakthrough pain 10/14  - Cyclobenzaprine 5 q8  - Lyrica 25 q8  - dc'd Gabapentin due to intolerance/nausea  - Oxycodone ER 20mg Q12 hours --> dc due to inability to tolerate po 10/14  - fentanyl patch 12 mcg q 72 hours 10/14    # r/o C diff  - CTPA 10/8: No pulmonary embolus. Interlobular septal thickening and bilateral ground glass opacities are new from prior exam and may represent edema, lymphangitic carcinomatosis and/or pneumonitis .Bilateral metastatic lung nodules measuring up to 9 mm within right lower lobe are unchanged. Trace bilateral pleural effusions and mild bibasilar atelectasis. Metastatic disease within the mediastinum and rohit as well as bone metastases are grossly unchanged.  - CT A/P 10/11: Pancolitis, correlate clinically for inflammatory/infectious etiology, including pseudomembranous colitis. Partially visualized right breast mass. Pulmonary and hepatic metastatic disease as noted on prior exam. Permeative, osteolytic metastatic disease, including L2 compression fracture with possible epidural involvement  - WBC 11.16 10/10-->16.97 10/11--> 19.42 10/12 -> 19.91 10/13 --> 11.99 10/14; 11.69 10/15  - Continue PO Vanc, 125 mg q6h, day 5 10/15   - Continue IVF. Switched from D51/2 NS to NS due to hyponatremia 10/14.   - BCx NGTD 10/14  - C Diff Toxin pending. If negative, send PCR  - Difficulty obtaining urine via SC. As WBC improved and GI likely source, will dc for now  - GI note appreciated- improving    # hypokalemia  - K+ 3.6 10/16  - replete IV K  - daily BMP    # hyponatremia  - Na reduced to 129 10/14; 131 10/15; 133 10/16  - switch D51/2 NS to NS IVF 10/14.   - daily BMP    # enterobacter UTI  - s/p Vantin BID x 7 days    # adjustment symptoms with anxiety and depression  - working on behavioral techniques with psychology, support  - psychiatry consult for possible medication management. Barriers include N/V, reduced po tolerance    # Bowel Regimen  - Florastor  - Zofran 8 mg PRN    # FEN   - Pureed solids and thins --> changed to full liquids 10/11 per patient preference  - MBS 10/13: difficulty with propelling pureed solids to back of throat, some anxiety although pharyngeal swallow appears grossly intact. Will increase SLP time to work on sensory techniques, recommend pureed solid and thin liquid trays with increased liquid options but not holding solids due to nutritional concerns     # DVT ppx  - Lovenox  - SCD, TEDs      # LABS  C diff PCR  psychiatry consult  daily CBC BMP    CODE STATUS: FULL CODE         Outpatient Follow-up (Specialty/Name of physician):    Marj Del Castillo)  Hematology; Hospice Palliative Medicine; Internal Medicine; Medical Oncology  [address] [place] [id number]  Phone: [phone]  Fax: [phone]  Follow Up Time: 2 weeks    Bret Ibarra)  Radiation Oncology  [address] [place] [address]  Phone: [phone]  Fax: [phone]  Follow Up Time: 2 weeks

## 2022-10-16 NOTE — PROGRESS NOTE ADULT - ASSESSMENT
67 yo female PMH stomach ulcer who presented to [facility] 9/10/22 with progressive weakness, discovered multiple lytic lesions of the cervical and thoracic spine along with lung and liver secondary to metastatic primary newly discovered breast cancer with axillary and retropectoral involvement. Imaging also showed Compression fractures of T1, T9, T12, and L2. Severe T1 compression with epidural extension.S/p C7-T2 corpectomy & fusion, posterior C4-T5 fusion, hospital course significant for urinary retention and dysphagia, UTI, now admitted for rehab- pt/ot/dvt ppx    #Acute hypoxic respiratory failure in need of supplemental oxygenation likely multifactorial including:  *Metastatic disease within mediastinum and rohit  *Metastatic disease within ribs  *Bilateral lung nodules RLL  *Bilateral GGO  *Bilateral atelectasis  -PE ruled out with neg CTA  -C/w alb neb treatement q 6hrs prn  -Incentive spirometer, OOB to chair, mobilize  -Might need oxygen due to increase in cancer burden in lungs  -TTE : EF 70-75%. tachy, hyperdynamic LVF  -Appreciate pulmonary recommendations    *Metastatic Breast CA to lung, liver, ribs, brain with spine involvement and neurogenic bladder  - s/p right breast biopsy shows  positive for invasive ductal carcinoma  - Compression fractures of T1, T9, T12, and L2. Severe T1 compression with epidural extension.   - s/p C7-T2 corpectomy & fusion, posterior C4-T5 fusion  - Patient will not require chemo/radiation while in rehab. 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  - oncology consult called today    # pain due to mets-   palliative consulted  oncology to assert realistic goals for cancer treatment, as wel as family meeting   pain meds per PMR team    *c diff  improved since po vanco STARTED 10/11/22,     *hyponatremia  *hypokalemia  likely due to gi losses  Zofran prn  ppi  replete K   GI appreciated  ID f/u appreciated    * HTN  *sinus tacchycardia  - metoprolol   - cardio recc noted  - continue IV hydration and encouraged PO hydration    *Enterobacter cloacae UTI  - s/p Vantin BID for 7 days 9/30-10/7/22  - monitor bladder scans  - SC for PVR >350 ml, toileting schedule    * Hypercalcemia of malignancy  - improved with fluids  - c/w IVF  - follow labs periodically    *Normocytic anemia  - Monitor Hg/Hct, iron levels, B12, folate WNL  - Transfuse if hG <7      *Severe protein-calorie malnutrition.  Hypoalbuminemia  - nutrition eval noted continue supplements encourage PO    VTE ppx:   - Lovenox

## 2022-10-16 NOTE — PROGRESS NOTE ADULT - SUBJECTIVE AND OBJECTIVE BOX
Patient is a 66y old  Female who presents with a chief complaint of metastatic breast CA to spine s/p  C7-T2 corpectomy & fusion, posterior C4-T5 fusion (14 Oct 2022 13:12)      HPI:  Thi is a 67 yo female with PMH of left arm skin CA s/p excision at age 16, stomach ulcer, her last mammogram was in 2014, who was brought to [facility] ED 9/10/22 with progressive weakness to the point that she felt she could not get out of bed. She also reported remote shoulder pain, decreased appetite. Was found to have two lumps in her right breast but could not tolerate recommended biopsy due to pain. CT scan of the Cervical spine performed which showed Multiple lytic lesions of the cervical and visualized thoracic spine as well as right first and second ribs concerning for metastases versus multiple myeloma.    She was admitted under medicine service for further workup and management, including of her pain. Further imaging done showed Metastatic breast cancer with dominant right breast mass, right chest wall, axillary and retropectoral involvement; osseous, pulmonary, and likely hepatic metastatic disease. She was also found to have metastatic adenopathy of the thorax, diffuse extensive osseous metastatic disease with multiple lucent and sclerotic lesions and compression fractures of T1, T9, T12, and L2. Severe T1 compression with epidural extension; cord compression cannot be excluded. +additional neoplastic disease of the spine with epidural extension. Multifocal neoplastic lesions involving the pelvis and ribs as well.    Patient was seen by Oncology, CT surgery and Neurosurgery team. She underwent C7-T2 corpectomy & fusion, posterior C4-T5 fusion. Post-op course uncomplicated.  Hamm removed, pt requiring some straight cath for retention. She also had subjective dysphagia - seen by speech therapy recommended pureed diet which patient continues to prefer. Patient with low grade temp and positive UA. Started on IV Rocephin for UTI. Blood cultures negative, Urine culture positive for GNR, changed to PO Vantin for 7 days.    Patient was medically optimized for discharge to [facility] on 9/30/22. (30 Sep 2022 14:32)      PAST MEDICAL & SURGICAL HISTORY:  Stomach ulcer  20 years ago   no pain not taking any meds      Skin cancer of arm, left  pt was 16 year old      H/O excision of mass  left arm  when patient was 16 years ago      S/P hysterectomy          MEDICATIONS  (STANDING):  Biotene Dry Mouth Oral Rinse 5 milliLiter(s) Swish and Spit three times a day  cyclobenzaprine 5 milliGRAM(s) Oral three times a day  enoxaparin Injectable 40 milliGRAM(s) SubCutaneous every 24 hours  fentaNYL   Patch  12 MICROgram(s)/Hr 1 Patch Transdermal every 72 hours  magnesium oxide 400 milliGRAM(s) Oral three times a day with meals  metoprolol tartrate 25 milliGRAM(s) Oral two times a day  oxyCODONE  ER Tablet 20 milliGRAM(s) Oral every 12 hours  pantoprazole  Injectable 40 milliGRAM(s) IV Push daily  potassium chloride  10 mEq/100 mL IVPB 10 milliEquivalent(s) IV Intermittent every 1 hour  pregabalin 25 milliGRAM(s) Oral every 8 hours  saccharomyces boulardii 250 milliGRAM(s) Oral two times a day  sodium chloride 1 Gram(s) Oral once  sodium chloride 0.9%. 1000 milliLiter(s) (70 mL/Hr) IV Continuous <Continuous>  vancomycin    Solution 125 milliGRAM(s) Oral every 6 hours    MEDICATIONS  (PRN):  acetaminophen    Suspension .. 650 milliGRAM(s) Oral every 6 hours PRN Mild Pain (1 - 3)  ALBUTerol    0.083% 2.5 milliGRAM(s) Nebulizer every 6 hours PRN Shortness of Breath and/or Wheezing  HYDROmorphone  Injectable 1 milliGRAM(s) IV Push every 4 hours PRN Severe Pain (7 - 10)  HYDROmorphone  Injectable 0.5 milliGRAM(s) IV Push every 4 hours PRN Moderate Pain (4 - 6)  ondansetron Injectable 8 milliGRAM(s) IV Push every 6 hours PRN Nausea and/or Vomiting  simethicone 80 milliGRAM(s) Chew every 6 hours PRN Gas      Allergies    tetanus toxoid (Hives)    Intolerances    lactose (Other)    ROS/Subjective: Patient seen and examined this morning at bedside. Patient in bed in NAD, no SOB, no n/v, pain controlled. Patient with Cuddy collar.        VITALS  Vital Signs Last 24 Hrs  T(C): 36.4 (16 Oct 2022 08:00), Max: 36.6 (15 Oct 2022 21:30)  T(F): 97.5 (16 Oct 2022 08:00), Max: 97.8 (15 Oct 2022 21:30)  HR: 107 (16 Oct 2022 08:00) (99 - 133)  BP: 133/75 (16 Oct 2022 08:00) (115/78 - 136/83)  BP(mean): --  RR: 15 (16 Oct 2022 08:00) (15 - 16)  SpO2: 95% (16 Oct 2022 08:00) (95% - 98%)    Parameters below as of 16 Oct 2022 08:00  Patient On (Oxygen Delivery Method): room air      PHYSICAL EXAM:  General: thin borderline cachexic WF NAD, A/O x 3  ENT: MMM, Aspen collar.   Neck: Supple  Lungs: Clear to auscultation bilaterally  Cardio: RRR, S1/S2  Abdomen: Soft, Nontender, Nondistended; Bowel sounds present  Extremities: No calf tenderness, (+) pedal edema    Motor:  BUE: shoulder flexion <3/5mp, elbow flexion 4-4+/5mp, right hand  3-4-/5mp, left hand  4-4+/5mp  BLE: hip flexion 3-/5mp, knee extension 4/5mp, ankle 4/5mp            RECENT LABS:  LABS:  LABS:                        9.1    11.77 )-----------( 395      ( 16 Oct 2022 06:45 )             27.5     16 Oct 2022 06:45    133    |  101    |  6      ----------------------------<  97     3.6     |  22     |  0.29     Ca    9.3        16 Oct 2022 06:45    TPro  4.1    /  Alb  1.4    /  TBili  0.4    /  DBili  x      /  AST  48     /  ALT  16     /  AlkPhos  131    16 Oct 2022 06:45                            9.3    11.69 )-----------( 439      ( 15 Oct 2022 06:30 )             26.9     15 Oct 2022 06:30    131    |  101    |  6      ----------------------------<  98     3.6     |  21     |  0.28     Ca    9.2        15 Oct 2022 06:30    TPro  4.1    /  Alb  1.5    /  TBili  0.3    /  DBili  x      /  AST  51     /  ALT  12     /  AlkPhos  132    15 Oct 2022 06:30                              8.9    11.99 )-----------( 489      ( 14 Oct 2022 06:50 )             25.9     10-14    129<L>  |  99  |  7   ----------------------------<  116<H>  3.3<L>   |  22  |  0.30<L>    Ca    9.1      14 Oct 2022 06:50    TPro  4.1<L>  /  Alb  1.5<L>  /  TBili  0.2  /  DBili  x   /  AST  44<H>  /  ALT  17  /  AlkPhos  129<H>  10-14    LIVER FUNCTIONS - ( 14 Oct 2022 06:50 )  Alb: 1.5 g/dL / Pro: 4.1 g/dL / ALK PHOS: 129 U/L / ALT: 17 U/L / AST: 44 U/L / GGT: x                 Culture - Blood (collected 10-11-22 @ 10:10)  Source: .Blood Blood  Preliminary Report (10-12-22 @ 13:02):    No growth to date.    Culture - Blood (collected 10-11-22 @ 10:10)  Source: .Blood Blood  Preliminary Report (10-12-22 @ 13:02):    No growth to date.    Culture - Stool (collected 10-11-22 @ 03:30)  Source: .Stool Feces  Final Report (10-13-22 @ 10:40):    No enteric pathogens isolated.    (Stool culture examined for Salmonella,    Shigella, Campylobacter, Aeromonas, Plesiomonas,    Vibrio, E.coli O157 and Yersinia)        CAPILLARY BLOOD GLUCOSE

## 2022-10-16 NOTE — PROGRESS NOTE ADULT - SUBJECTIVE AND OBJECTIVE BOX
Patient is a 66y old  Female who presents with a chief complaint of metastatic breast CA to spine s/p  C7-T2 corpectomy & fusion, posterior C4-T5 fusion (16 Oct 2022 09:44)      Patient examined and interviewed. There were no acute medical events yesterday or overnight and patient is without new complaints this morning. Although she continues w/ soft mucus stools, her appetite is improved so overall she feels she's getting better from the C.Diff  Neck pain is controlled    REVIEW OF SYMPTOMS: patient denies HA's, CP, palpitations, shortness of breath or upper respiratory symptoms, nausea, vomiting, dysuria, easy bruising/bleeding and all other systems were reviewed as negative        ALLERGIES:  lactose (Other)  tetanus toxoid (Hives)    MEDICATIONS  (STANDING):  Biotene Dry Mouth Oral Rinse 5 milliLiter(s) Swish and Spit three times a day  cyclobenzaprine 5 milliGRAM(s) Oral three times a day  enoxaparin Injectable 40 milliGRAM(s) SubCutaneous every 24 hours  fentaNYL   Patch  12 MICROgram(s)/Hr 1 Patch Transdermal every 72 hours  magnesium oxide 400 milliGRAM(s) Oral three times a day with meals  metoprolol tartrate 25 milliGRAM(s) Oral two times a day  pantoprazole  Injectable 40 milliGRAM(s) IV Push daily  pregabalin 25 milliGRAM(s) Oral every 8 hours  saccharomyces boulardii 250 milliGRAM(s) Oral two times a day  sodium chloride 0.9%. 1000 milliLiter(s) (70 mL/Hr) IV Continuous <Continuous>  vancomycin    Solution 125 milliGRAM(s) Oral every 6 hours    MEDICATIONS  (PRN):  acetaminophen    Suspension .. 650 milliGRAM(s) Oral every 6 hours PRN Mild Pain (1 - 3)  ALBUTerol    0.083% 2.5 milliGRAM(s) Nebulizer every 6 hours PRN Shortness of Breath and/or Wheezing  ALPRAZolam 0.25 milliGRAM(s) Oral every 6 hours PRN anxiety  HYDROmorphone  Injectable 1 milliGRAM(s) IV Push every 4 hours PRN mod to severe pain 4-10  ondansetron Injectable 8 milliGRAM(s) IV Push every 6 hours PRN Nausea and/or Vomiting  simethicone 80 milliGRAM(s) Chew every 6 hours PRN Gas    Vital Signs Last 24 Hrs  T(F): 97.5 (16 Oct 2022 08:00), Max: 97.8 (15 Oct 2022 21:30)  HR: 107 (16 Oct 2022 08:00) (99 - 133)  BP: 133/75 (16 Oct 2022 08:00) (115/78 - 136/83)  RR: 15 (16 Oct 2022 08:00) (15 - 16)  SpO2: 95% (16 Oct 2022 08:00) (95% - 98%)  I&O's Summary              PHYSICAL EXAM:  General: cacchectic W VNAD, A/O x 3  ENT: MMM   Neck: Supple, No JVD Hard cervical collar in place and visible anterior inc is c/d/i  Lungs: Clear to auscultation bilaterally  Cardio: RRR, S1/S2, No murmurs  Abdomen: Soft, Nontender, Nondistended; Bowel sounds present  Extremities: No calf tenderness, No pitting edema      LABS:                        9.1    11.77 )-----------( 395      ( 16 Oct 2022 06:45 )             27.5       10-16    133  |  101  |  6   ----------------------------<  97  3.6   |  22  |  0.29    Ca    9.3      16 Oct 2022 06:45    TPro  4.1  /  Alb  1.4  /  TBili  0.4  /  DBili  x   /  AST  48  /  ALT  16  /  AlkPhos  131  10-16                                Culture - Blood (collected 11 Oct 2022 10:10)  Source: .Blood Blood  Preliminary Report (12 Oct 2022 13:02):    No growth to date.    Culture - Blood (collected 11 Oct 2022 10:10)  Source: .Blood Blood  Preliminary Report (12 Oct 2022 13:02):    No growth to date.    Culture - Stool (collected 11 Oct 2022 03:30)  Source: .Stool Feces  Final Report (13 Oct 2022 10:40):    No enteric pathogens isolated.    (Stool culture examined for Salmonella,    Shigella, Campylobacter, Aeromonas, Plesiomonas,    Vibrio, E.coli O157 and Yersinia)      COVID-19 PCR: NotDetec (10-13-22 @ 05:37)  COVID-19 PCR: NotDetec (10-07-22 @ 06:10)  COVID-19 PCR: NotDetec (09-30-22 @ 20:50)  COVID-19 PCR: NotDetec (09-27-22 @ 13:00)  COVID-19 PCR: NotDetec (09-20-22 @ 06:00)

## 2022-10-17 LAB
ANION GAP SERPL CALC-SCNC: 13 MMOL/L — SIGNIFICANT CHANGE UP (ref 5–17)
BUN SERPL-MCNC: 7 MG/DL — SIGNIFICANT CHANGE UP (ref 7–23)
CALCIUM SERPL-MCNC: 9.7 MG/DL — SIGNIFICANT CHANGE UP (ref 8.4–10.5)
CHLORIDE SERPL-SCNC: 101 MMOL/L — SIGNIFICANT CHANGE UP (ref 96–108)
CO2 SERPL-SCNC: 19 MMOL/L — LOW (ref 22–31)
CREAT SERPL-MCNC: 0.27 MG/DL — LOW (ref 0.5–1.3)
EGFR: 120 ML/MIN/1.73M2 — SIGNIFICANT CHANGE UP
GLUCOSE SERPL-MCNC: 97 MG/DL — SIGNIFICANT CHANGE UP (ref 70–99)
HCT VFR BLD CALC: 29.4 % — LOW (ref 34.5–45)
HGB BLD-MCNC: 9.5 G/DL — LOW (ref 11.5–15.5)
MCHC RBC-ENTMCNC: 28.8 PG — SIGNIFICANT CHANGE UP (ref 27–34)
MCHC RBC-ENTMCNC: 32.3 GM/DL — SIGNIFICANT CHANGE UP (ref 32–36)
MCV RBC AUTO: 89.1 FL — SIGNIFICANT CHANGE UP (ref 80–100)
NRBC # BLD: 3 /100 WBCS — HIGH (ref 0–0)
PLATELET # BLD AUTO: 374 K/UL — SIGNIFICANT CHANGE UP (ref 150–400)
POTASSIUM SERPL-MCNC: 3.6 MMOL/L — SIGNIFICANT CHANGE UP (ref 3.5–5.3)
POTASSIUM SERPL-SCNC: 3.6 MMOL/L — SIGNIFICANT CHANGE UP (ref 3.5–5.3)
RBC # BLD: 3.3 M/UL — LOW (ref 3.8–5.2)
RBC # FLD: 15.7 % — HIGH (ref 10.3–14.5)
SODIUM SERPL-SCNC: 133 MMOL/L — LOW (ref 135–145)
WBC # BLD: 12.12 K/UL — HIGH (ref 3.8–10.5)
WBC # FLD AUTO: 12.12 K/UL — HIGH (ref 3.8–10.5)

## 2022-10-17 PROCEDURE — 90832 PSYTX W PT 30 MINUTES: CPT

## 2022-10-17 PROCEDURE — 99232 SBSQ HOSP IP/OBS MODERATE 35: CPT

## 2022-10-17 PROCEDURE — 93971 EXTREMITY STUDY: CPT | Mod: 26,RT

## 2022-10-17 PROCEDURE — 99233 SBSQ HOSP IP/OBS HIGH 50: CPT

## 2022-10-17 RX ORDER — FENTANYL CITRATE 50 UG/ML
1 INJECTION INTRAVENOUS
Refills: 0 | Status: DISCONTINUED | OUTPATIENT
Start: 2022-10-17 | End: 2022-10-17

## 2022-10-17 RX ORDER — FENTANYL CITRATE 50 UG/ML
1 INJECTION INTRAVENOUS
Refills: 0 | Status: DISCONTINUED | OUTPATIENT
Start: 2022-10-17 | End: 2022-10-23

## 2022-10-17 RX ADMIN — HYDROMORPHONE HYDROCHLORIDE 1 MILLIGRAM(S): 2 INJECTION INTRAMUSCULAR; INTRAVENOUS; SUBCUTANEOUS at 17:49

## 2022-10-17 RX ADMIN — Medication 125 MILLIGRAM(S): at 18:26

## 2022-10-17 RX ADMIN — MAGNESIUM OXIDE 400 MG ORAL TABLET 400 MILLIGRAM(S): 241.3 TABLET ORAL at 08:05

## 2022-10-17 RX ADMIN — HYDROMORPHONE HYDROCHLORIDE 1 MILLIGRAM(S): 2 INJECTION INTRAMUSCULAR; INTRAVENOUS; SUBCUTANEOUS at 11:55

## 2022-10-17 RX ADMIN — HYDROMORPHONE HYDROCHLORIDE 1 MILLIGRAM(S): 2 INJECTION INTRAMUSCULAR; INTRAVENOUS; SUBCUTANEOUS at 00:06

## 2022-10-17 RX ADMIN — MAGNESIUM OXIDE 400 MG ORAL TABLET 400 MILLIGRAM(S): 241.3 TABLET ORAL at 12:56

## 2022-10-17 RX ADMIN — Medication 25 MILLIGRAM(S): at 08:06

## 2022-10-17 RX ADMIN — MAGNESIUM OXIDE 400 MG ORAL TABLET 400 MILLIGRAM(S): 241.3 TABLET ORAL at 18:26

## 2022-10-17 RX ADMIN — HYDROMORPHONE HYDROCHLORIDE 1 MILLIGRAM(S): 2 INJECTION INTRAMUSCULAR; INTRAVENOUS; SUBCUTANEOUS at 06:36

## 2022-10-17 RX ADMIN — HYDROMORPHONE HYDROCHLORIDE 1 MILLIGRAM(S): 2 INJECTION INTRAMUSCULAR; INTRAVENOUS; SUBCUTANEOUS at 16:49

## 2022-10-17 RX ADMIN — HYDROMORPHONE HYDROCHLORIDE 1 MILLIGRAM(S): 2 INJECTION INTRAMUSCULAR; INTRAVENOUS; SUBCUTANEOUS at 10:55

## 2022-10-17 RX ADMIN — Medication 125 MILLIGRAM(S): at 12:56

## 2022-10-17 RX ADMIN — HYDROMORPHONE HYDROCHLORIDE 1 MILLIGRAM(S): 2 INJECTION INTRAMUSCULAR; INTRAVENOUS; SUBCUTANEOUS at 22:22

## 2022-10-17 RX ADMIN — FENTANYL CITRATE 1 PATCH: 50 INJECTION INTRAVENOUS at 12:55

## 2022-10-17 RX ADMIN — HYDROMORPHONE HYDROCHLORIDE 1 MILLIGRAM(S): 2 INJECTION INTRAMUSCULAR; INTRAVENOUS; SUBCUTANEOUS at 07:36

## 2022-10-17 RX ADMIN — SODIUM CHLORIDE 70 MILLILITER(S): 9 INJECTION INTRAMUSCULAR; INTRAVENOUS; SUBCUTANEOUS at 18:36

## 2022-10-17 RX ADMIN — FENTANYL CITRATE 1 PATCH: 50 INJECTION INTRAVENOUS at 12:53

## 2022-10-17 RX ADMIN — Medication 125 MILLIGRAM(S): at 08:05

## 2022-10-17 RX ADMIN — HYDROMORPHONE HYDROCHLORIDE 1 MILLIGRAM(S): 2 INJECTION INTRAMUSCULAR; INTRAVENOUS; SUBCUTANEOUS at 01:06

## 2022-10-17 RX ADMIN — Medication 25 MILLIGRAM(S): at 18:26

## 2022-10-17 RX ADMIN — ENOXAPARIN SODIUM 40 MILLIGRAM(S): 100 INJECTION SUBCUTANEOUS at 08:05

## 2022-10-17 RX ADMIN — HYDROMORPHONE HYDROCHLORIDE 1 MILLIGRAM(S): 2 INJECTION INTRAMUSCULAR; INTRAVENOUS; SUBCUTANEOUS at 22:02

## 2022-10-17 RX ADMIN — PANTOPRAZOLE SODIUM 40 MILLIGRAM(S): 20 TABLET, DELAYED RELEASE ORAL at 12:55

## 2022-10-17 RX ADMIN — FENTANYL CITRATE 1 PATCH: 50 INJECTION INTRAVENOUS at 19:00

## 2022-10-17 RX ADMIN — FENTANYL CITRATE 1 PATCH: 50 INJECTION INTRAVENOUS at 07:06

## 2022-10-17 RX ADMIN — SODIUM CHLORIDE 70 MILLILITER(S): 9 INJECTION INTRAMUSCULAR; INTRAVENOUS; SUBCUTANEOUS at 01:06

## 2022-10-17 NOTE — PROGRESS NOTE ADULT - SUBJECTIVE AND OBJECTIVE BOX
Patient is a 66y old  Female who presents with a chief complaint of metastatic breast CA to spine s/p  C7-T2 corpectomy & fusion, posterior C4-T5 fusion (17 Oct 2022 09:41)      HPI:  Thi is a 65 yo female with PMH of left arm skin CA s/p excision at age 16, stomach ulcer, her last mammogram was in 2014, who was brought to [facility] ED 9/10/22 with progressive weakness to the point that she felt she could not get out of bed. She also reported remote shoulder pain, decreased appetite. Was found to have two lumps in her right breast but could not tolerate recommended biopsy due to pain. CT scan of the Cervical spine performed which showed Multiple lytic lesions of the cervical and visualized thoracic spine as well as right first and second ribs concerning for metastases versus multiple myeloma.    She was admitted under medicine service for further workup and management, including of her pain. Further imaging done showed Metastatic breast cancer with dominant right breast mass, right chest wall, axillary and retropectoral involvement; osseous, pulmonary, and likely hepatic metastatic disease. She was also found to have metastatic adenopathy of the thorax, diffuse extensive osseous metastatic disease with multiple lucent and sclerotic lesions and compression fractures of T1, T9, T12, and L2. Severe T1 compression with epidural extension; cord compression cannot be excluded. +additional neoplastic disease of the spine with epidural extension. Multifocal neoplastic lesions involving the pelvis and ribs as well.    Patient was seen by Oncology, CT surgery and Neurosurgery team. She underwent C7-T2 corpectomy & fusion, posterior C4-T5 fusion. Post-op course uncomplicated.  Hamm removed, pt requiring some straight cath for retention. She also had subjective dysphagia - seen by speech therapy recommended pureed diet which patient continues to prefer. Patient with low grade temp and positive UA. Started on IV Rocephin for UTI. Blood cultures negative, Urine culture positive for GNR, changed to PO Vantin for 7 days.    Patient was medically optimized for discharge to [facility] IRF on 9/30/22. (30 Sep 2022 14:32)      PAST MEDICAL & SURGICAL HISTORY:  Stomach ulcer  20 years ago   no pain not taking any meds      Skin cancer of arm, left  pt was 16 year old      H/O excision of mass  left arm  when patient was 16 years ago      S/P hysterectomy          MEDICATIONS  (STANDING):  Biotene Dry Mouth Oral Rinse 5 milliLiter(s) Swish and Spit three times a day  cyclobenzaprine 5 milliGRAM(s) Oral three times a day  enoxaparin Injectable 40 milliGRAM(s) SubCutaneous every 24 hours  fentaNYL   Patch  25 MICROgram(s)/Hr 1 Patch Transdermal every 72 hours  magnesium oxide 400 milliGRAM(s) Oral three times a day with meals  metoprolol tartrate 25 milliGRAM(s) Oral two times a day  pantoprazole  Injectable 40 milliGRAM(s) IV Push daily  pregabalin 25 milliGRAM(s) Oral every 8 hours  saccharomyces boulardii 250 milliGRAM(s) Oral two times a day  sodium chloride 0.9%. 1000 milliLiter(s) (70 mL/Hr) IV Continuous <Continuous>  vancomycin    Solution 125 milliGRAM(s) Oral every 6 hours    MEDICATIONS  (PRN):  acetaminophen    Suspension .. 650 milliGRAM(s) Oral every 6 hours PRN Mild Pain (1 - 3)  ALBUTerol    0.083% 2.5 milliGRAM(s) Nebulizer every 6 hours PRN Shortness of Breath and/or Wheezing  ALPRAZolam 0.25 milliGRAM(s) Oral every 6 hours PRN anxiety  HYDROmorphone  Injectable 1 milliGRAM(s) IV Push every 4 hours PRN mod to severe pain 4-10  ondansetron Injectable 8 milliGRAM(s) IV Push every 6 hours PRN Nausea and/or Vomiting  simethicone 80 milliGRAM(s) Chew every 6 hours PRN Gas      Allergies    tetanus toxoid (Hives)    Intolerances    lactose (Other)        VITALS  66y  Vital Signs Last 24 Hrs  T(C): 36.4 (17 Oct 2022 07:59), Max: 36.6 (16 Oct 2022 21:35)  T(F): 97.6 (17 Oct 2022 07:59), Max: 97.8 (16 Oct 2022 21:35)  HR: 117 (17 Oct 2022 07:59) (100 - 117)  BP: 131/84 (17 Oct 2022 07:59) (122/73 - 132/74)  BP(mean): --  RR: 15 (17 Oct 2022 07:59) (15 - 16)  SpO2: 96% (17 Oct 2022 07:59) (95% - 98%)    Parameters below as of 17 Oct 2022 07:59  Patient On (Oxygen Delivery Method): room air      Daily     Daily         RECENT LABS:                          9.5    12.12 )-----------( 374      ( 17 Oct 2022 06:22 )             29.4     10-17    133<L>  |  101  |  7   ----------------------------<  97  3.6   |  19<L>  |  0.27<L>    Ca    9.7      17 Oct 2022 06:22    TPro  4.1<L>  /  Alb  1.4<L>  /  TBili  0.4  /  DBili  x   /  AST  48<H>  /  ALT  16  /  AlkPhos  131<H>  10-16    LIVER FUNCTIONS - ( 16 Oct 2022 06:45 )  Alb: 1.4 g/dL / Pro: 4.1 g/dL / ALK PHOS: 131 U/L / ALT: 16 U/L / AST: 48 U/L / GGT: x                   CAPILLARY BLOOD GLUCOSE        Review of Systems:   	  · ROS	Patient developed some more formed stool this morning with no further watery diarrhea. had 3 BM semi-formed yesterday and begining to tolerate po diet as well as po meds. Continues to have breakthrough pain, and requesting dialudid nearly every 4 hours. Felt some relief with fentantyl, although feels less controlled today (due for patch switch)    Will increase fentanyl patch dose to 25 in effort to wean/reduce breakthrough dilaudid as discussed with patient. Tolerating without N/V    C diff returend +    Physical Exam:   · Constitutional Comments	c-collar in place. Low vocal volume, still mildly anxious but less distressed than last week  O x 3 grossly  	  · Respiratory	clear to auscultation bilaterally; no wheezes; no rales; no rhonchi    · Cardiovascular	regular rate and rhythm; S1 S2 present; no gallops; no rub; no murmur  · Gastrointestinal	soft; no R/G +mild distension     	  	  · Motor	bilateral calves soft, no TTP  no pedal edema  Hf 3/5 quad 3+/5 ankle PF 4/5             Patient is a 66y old  Female who presents with a chief complaint of metastatic breast CA to spine s/p  C7-T2 corpectomy & fusion, posterior C4-T5 fusion (17 Oct 2022 09:41)      HPI:  Thi is a 67 yo female with PMH of left arm skin CA s/p excision at age 16, stomach ulcer, her last mammogram was in 2014, who was brought to [facility] ED 9/10/22 with progressive weakness to the point that she felt she could not get out of bed. She also reported remote shoulder pain, decreased appetite. Was found to have two lumps in her right breast but could not tolerate recommended biopsy due to pain. CT scan of the Cervical spine performed which showed Multiple lytic lesions of the cervical and visualized thoracic spine as well as right first and second ribs concerning for metastases versus multiple myeloma.    She was admitted under medicine service for further workup and management, including of her pain. Further imaging done showed Metastatic breast cancer with dominant right breast mass, right chest wall, axillary and retropectoral involvement; osseous, pulmonary, and likely hepatic metastatic disease. She was also found to have metastatic adenopathy of the thorax, diffuse extensive osseous metastatic disease with multiple lucent and sclerotic lesions and compression fractures of T1, T9, T12, and L2. Severe T1 compression with epidural extension; cord compression cannot be excluded. +additional neoplastic disease of the spine with epidural extension. Multifocal neoplastic lesions involving the pelvis and ribs as well.    Patient was seen by Oncology, CT surgery and Neurosurgery team. She underwent C7-T2 corpectomy & fusion, posterior C4-T5 fusion. Post-op course uncomplicated.  Hamm removed, pt requiring some straight cath for retention. She also had subjective dysphagia - seen by speech therapy recommended pureed diet which patient continues to prefer. Patient with low grade temp and positive UA. Started on IV Rocephin for UTI. Blood cultures negative, Urine culture positive for GNR, changed to PO Vantin for 7 days.    Patient was medically optimized for discharge to [facility] IRF on 9/30/22. (30 Sep 2022 14:32)      PAST MEDICAL & SURGICAL HISTORY:  Stomach ulcer  20 years ago   no pain not taking any meds      Skin cancer of arm, left  pt was 16 year old      H/O excision of mass  left arm  when patient was 16 years ago      S/P hysterectomy          MEDICATIONS  (STANDING):  Biotene Dry Mouth Oral Rinse 5 milliLiter(s) Swish and Spit three times a day  cyclobenzaprine 5 milliGRAM(s) Oral three times a day  enoxaparin Injectable 40 milliGRAM(s) SubCutaneous every 24 hours  fentaNYL   Patch  25 MICROgram(s)/Hr 1 Patch Transdermal every 72 hours  magnesium oxide 400 milliGRAM(s) Oral three times a day with meals  metoprolol tartrate 25 milliGRAM(s) Oral two times a day  pantoprazole  Injectable 40 milliGRAM(s) IV Push daily  pregabalin 25 milliGRAM(s) Oral every 8 hours  saccharomyces boulardii 250 milliGRAM(s) Oral two times a day  sodium chloride 0.9%. 1000 milliLiter(s) (70 mL/Hr) IV Continuous <Continuous>  vancomycin    Solution 125 milliGRAM(s) Oral every 6 hours    MEDICATIONS  (PRN):  acetaminophen    Suspension .. 650 milliGRAM(s) Oral every 6 hours PRN Mild Pain (1 - 3)  ALBUTerol    0.083% 2.5 milliGRAM(s) Nebulizer every 6 hours PRN Shortness of Breath and/or Wheezing  ALPRAZolam 0.25 milliGRAM(s) Oral every 6 hours PRN anxiety  HYDROmorphone  Injectable 1 milliGRAM(s) IV Push every 4 hours PRN mod to severe pain 4-10  ondansetron Injectable 8 milliGRAM(s) IV Push every 6 hours PRN Nausea and/or Vomiting  simethicone 80 milliGRAM(s) Chew every 6 hours PRN Gas      Allergies    tetanus toxoid (Hives)    Intolerances    lactose (Other)        VITALS  66y  Vital Signs Last 24 Hrs  T(C): 36.4 (17 Oct 2022 07:59), Max: 36.6 (16 Oct 2022 21:35)  T(F): 97.6 (17 Oct 2022 07:59), Max: 97.8 (16 Oct 2022 21:35)  HR: 117 (17 Oct 2022 07:59) (100 - 117)  BP: 131/84 (17 Oct 2022 07:59) (122/73 - 132/74)  BP(mean): --  RR: 15 (17 Oct 2022 07:59) (15 - 16)  SpO2: 96% (17 Oct 2022 07:59) (95% - 98%)    Parameters below as of 17 Oct 2022 07:59  Patient On (Oxygen Delivery Method): room air      Daily     Daily         RECENT LABS:                          9.5    12.12 )-----------( 374      ( 17 Oct 2022 06:22 )             29.4     10-17    133<L>  |  101  |  7   ----------------------------<  97  3.6   |  19<L>  |  0.27<L>    Ca    9.7      17 Oct 2022 06:22    TPro  4.1<L>  /  Alb  1.4<L>  /  TBili  0.4  /  DBili  x   /  AST  48<H>  /  ALT  16  /  AlkPhos  131<H>  10-16    LIVER FUNCTIONS - ( 16 Oct 2022 06:45 )  Alb: 1.4 g/dL / Pro: 4.1 g/dL / ALK PHOS: 131 U/L / ALT: 16 U/L / AST: 48 U/L / GGT: x                   CAPILLARY BLOOD GLUCOSE        Review of Systems:   	  · ROS	Patient developed some more formed stool this morning with no further watery diarrhea. had 3 BM semi-formed yesterday and begining to tolerate po diet as well as po meds. Continues to have breakthrough pain, and requesting dialudid nearly every 4 hours. Felt some relief with fentantyl, although feels less controlled today (due for patch switch)    Will increase fentanyl patch dose to 25 in effort to wean/reduce breakthrough dilaudid as discussed with patient. Tolerating without N/V    C diff returend +    Physical Exam:   · Constitutional Comments	c-collar in place. Low vocal volume, still mildly anxious but less distressed than last week  O x 3 grossly  	  · Respiratory	clear to auscultation bilaterally; no wheezes; no rales; no rhonchi    · Cardiovascular	regular rate and rhythm; S1 S2 present; no gallops; no rub; no murmur  · Gastrointestinal	soft; no R/G +mild distension     	  	  · Motor	bilateral calves soft, no TTP  no pedal edema  Hf 3/5 quad 3+/5 ankle PF 4/5  +RUE increased swelling proximal right arm and forearm, non pitting, no erythema, no TTP, trace swelling right hand non pitting

## 2022-10-17 NOTE — PROGRESS NOTE ADULT - SUBJECTIVE AND OBJECTIVE BOX
SHELLEY DE LEON   66y   Female    Admitting: MARIA EUGENIA Carrizales  HPI:  Neha is a 67 yo female with PMH of left arm skin CA s/p excision at age 16, stomach ulcer, her last mammogram was in 2014, who was brought to [facility] ED 9/10/22 with progressive weakness to the point that she felt she could not get out of bed. She also reported shoulder pain, decreased appetite. Was found to have two lumps in her right breast but could not tolerate recommended biopsy due to pain. CT scan of the Cervical spine performed which showed Multiple lytic lesions of the cervical and visualized thoracic spine as well as right first and second ribs concerning for metastases.    She was admitted under medicine service for further workup and management, including of her pain. Further imaging done showed Metastatic breast cancer with dominant right breast mass, right chest wall, axillary and retropectoral involvement; osseous, pulmonary, and likely hepatic metastatic disease. She was also found to have metastatic adenopathy of the thorax, diffuse extensive osseous metastatic disease with multiple lucent and sclerotic lesions and compression fractures of T1, T9, T12, and L2. Severe T1 compression with epidural extension; cord compression cannot be excluded. +additional neoplastic disease of the spine with epidural extension. Multifocal neoplastic lesions involving the pelvis and ribs as well.    Patient was seen by Oncology, CT surgery and Neurosurgery team. She underwent C7-T2 corpectomy & fusion, posterior C4-T5 fusion. Post-op course uncomplicated.  Hamm removed, pt requiring some straight cath for retention. She also had subjective dysphagia - seen by speech therapy recommended pureed diet which patient continues to prefer. Started on IV Rocephin for UTI. Blood cultures negative, Urine culture positive for GNR, changed to PO Vantin for 7 days.    PAST MEDICAL & SURGICAL HISTORY:  Stomach ulcer  20 years ago       Skin cancer of arm, left  pt was 16 year old      H/O excision of mass  left arm  when patient was 16 years ago      S/P hysterectomy        HEALTH ISSUES - PROBLEM Dx:  Metastatic breast cancer    Pancolitis    Protein calorie malnutrition    Hyponatremia    Hypokalemia    Bone metastases      MEDICATIONS  (STANDING):  Biotene Dry Mouth Oral Rinse 5 milliLiter(s) Swish and Spit three times a day  cyclobenzaprine 5 milliGRAM(s) Oral three times a day  enoxaparin Injectable 40 milliGRAM(s) SubCutaneous every 24 hours  fentaNYL   Patch  12 MICROgram(s)/Hr 1 Patch Transdermal every 72 hours  magnesium oxide 400 milliGRAM(s) Oral three times a day with meals  metoprolol tartrate 25 milliGRAM(s) Oral two times a day  pantoprazole  Injectable 40 milliGRAM(s) IV Push daily  pregabalin 25 milliGRAM(s) Oral every 8 hours  saccharomyces boulardii 250 milliGRAM(s) Oral two times a day  sodium chloride 0.9%. 1000 milliLiter(s) (70 mL/Hr) IV Continuous <Continuous>  vancomycin    Solution 125 milliGRAM(s) Oral every 6 hours    MEDICATIONS  (PRN):  acetaminophen    Suspension .. 650 milliGRAM(s) Oral every 6 hours PRN Mild Pain (1 - 3)  ALBUTerol    0.083% 2.5 milliGRAM(s) Nebulizer every 6 hours PRN Shortness of Breath and/or Wheezing  ALPRAZolam 0.25 milliGRAM(s) Oral every 6 hours PRN anxiety  HYDROmorphone  Injectable 1 milliGRAM(s) IV Push every 4 hours PRN mod to severe pain 4-10  ondansetron Injectable 8 milliGRAM(s) IV Push every 6 hours PRN Nausea and/or Vomiting  simethicone 80 milliGRAM(s) Chew every 6 hours PRN Gas    Allergies    tetanus toxoid (Hives)    Intolerances    lactose (Other)      INTERVAL HPI/OVERNIGHT EVENTS:  Patient S&E at bedside. No c/o CP or SOB.    VITAL SIGNS:  T(F): 97.6 (10-17-22 @ 07:59)  HR: 117 (10-17-22 @ 07:59)  BP: 131/84 (10-17-22 @ 07:59)  RR: 15 (10-17-22 @ 07:59)  SpO2: 96% (10-17-22 @ 07:59)    PHYSICAL EXAM:  Constitutional: NAD  Eyes: sclera non-icteric  Neck: brace in place  Respiratory: decreased BS bases ant.  Cardiovascular: RRR, no M/R/G  Gastrointestinal: soft, ND, no masses palpable  Extremities: no calf tenderness  Neurological: Awake, alert.    Labs:             9.5    12.12 )-----------( 374      ( 10-17 @ 06:22 )             29.4                9.1    11.77 )-----------( 395      ( 10-16 @ 06:45 )             27.5                9.3    11.69 )-----------( 439      ( 10-15 @ 06:30 )             26.9       10-17    133<L>  |  101  |  7   ----------------------------<  97  3.6   |  19<L>  |  0.27<L>    Ca    9.7      17 Oct 2022 06:22    TPro  4.1<L>  /  Alb  1.4<L>  /  TBili  0.4  /  DBili  x   /  AST  48<H>  /  ALT  16  /  AlkPhos  131<H>  10-16    Consultant notes reviewed : YES [x ] ; NO [ ]

## 2022-10-17 NOTE — PROGRESS NOTE ADULT - SUBJECTIVE AND OBJECTIVE BOX
Pt was seen for 25 min for supportive tx. Pt c/o anxiety, pain, stomach pain. Pt reported doing well since last seen. She continues to participate in her rehab txs as much as she can. She admitted having difficulty to do PT because she did not have her medication prior to the session which resulted in significant pain and she felt she could not do much. However, she reported being able to sleep and rest a little more on the weekend, her swallowing was improved, and is eating and drinking more fluids now, and feels slightly better. She admitted feeling overwhelmed and pessimistic, and even thinking that she might better go to a hospice institution like [place]. Discussed the fact that she met with hospice and how it can help moving forward but with the understanding that having hospice services doesn't mean it's the end of it. Pt reported waking up from a dream feeling like she was choking, which was used to educate Pt about the cognitive, affective and somatic sxs of anxiety, and helped her identify some of them. Pt reported using the breathing exercise during the weekend; discussed other tools to manage anxiety, and encouraged continued use of coping tools. Support and encouragement were provided.

## 2022-10-17 NOTE — CHART NOTE - NSCHARTNOTEFT_GEN_A_CORE
Nutrition Follow Up Note  Hospital Course   (Per Electronic Medical Record)    Source:  Patient [X]  Medical Record [X]      Diet: Pureed, Ensure Clear TID  At this time patient w/ improved appetite/intake, consuming 0-75% of meal tray. Reports early satiety, educated patient on small frequent meals and optimal nutrition. No issues w/ chewing and swallowing current diet texture. Denies N/V/C, however w/ diarrhea, recommend banatrol BID and pureed banana to assist w/ regular BM.          Enteral/Parenteral Nutrition: Not Applicable    Current Weight: lb on /    Pertinent Medications: MEDICATIONS  (STANDING):  Biotene Dry Mouth Oral Rinse 5 milliLiter(s) Swish and Spit three times a day  cyclobenzaprine 5 milliGRAM(s) Oral three times a day  enoxaparin Injectable 40 milliGRAM(s) SubCutaneous every 24 hours  fentaNYL   Patch  25 MICROgram(s)/Hr 1 Patch Transdermal every 72 hours  magnesium oxide 400 milliGRAM(s) Oral three times a day with meals  metoprolol tartrate 25 milliGRAM(s) Oral two times a day  pantoprazole  Injectable 40 milliGRAM(s) IV Push daily  pregabalin 25 milliGRAM(s) Oral every 8 hours  saccharomyces boulardii 250 milliGRAM(s) Oral two times a day  sodium chloride 0.9%. 1000 milliLiter(s) (70 mL/Hr) IV Continuous <Continuous>  vancomycin    Solution 125 milliGRAM(s) Oral every 6 hours    MEDICATIONS  (PRN):  acetaminophen    Suspension .. 650 milliGRAM(s) Oral every 6 hours PRN Mild Pain (1 - 3)  ALBUTerol    0.083% 2.5 milliGRAM(s) Nebulizer every 6 hours PRN Shortness of Breath and/or Wheezing  ALPRAZolam 0.25 milliGRAM(s) Oral every 6 hours PRN anxiety  HYDROmorphone  Injectable 1 milliGRAM(s) IV Push every 4 hours PRN mod to severe pain 4-10  ondansetron Injectable 8 milliGRAM(s) IV Push every 6 hours PRN Nausea and/or Vomiting  simethicone 80 milliGRAM(s) Chew every 6 hours PRN Gas      Pertinent Labs:  10-17 Na133 mmol/L<L> Glu 97 mg/dL K+ 3.6 mmol/L Cr  0.27 mg/dL<L> BUN 7 mg/dL 10-16 Alb 1.4 g/dL<L>        Skin: Surgical Incision Thoracic spine    Edema: No edema noted per nursing flowsheets     Last Bowel Movement: on 10/17 Per nursing flowsheet    Estimated Needs:   [X] No Change Since Previous Assessment    Previous Nutrition Diagnosis:   Severe Protein Calorie Malnutrition    Nutrition Diagnosis is [X] Ongoing -     Interventions:   1) Continue Pureed diet.   2) Continue Ensure Clear 8oz PO TID (Provides 720kcal & 24grams of Protein) to optimize PO intake and nutritional status  3) Recommend Banatrol TID to aid with loose stools  4) Monitor PO intake, GI tolerance, skin integrity, labs, weight, and bowel movement regularity.   5) Honor food preferences as feasible. Assist with meals PRN and encourage PO intake.  6) Follow SLP recommendations  7) Provide ongoing diet education as needed  8) RD remains available upon request and will follow-up per protocol       Monitoring & Evaluation:   [X] Weights   [X] PO Intake   [X] Skin Integrity   [X] Follow Up (Per Protocol)  [X] Tolerance to Diet Prescription   [X] Other: Labs     Registered Dietitian/Nutritionist Remains Available.  Layla Garland RD, MS, CDN    Phone# [phone]

## 2022-10-17 NOTE — PROGRESS NOTE ADULT - ASSESSMENT
65 yo female PMH stomach ulcer who presented to [facility] 9/10/22 with progressive weakness, discovered multiple lytic lesions of the cervical and thoracic spine along with lung and liver secondary to metastatic primary newly discovered breast cancer with axillary and retropectoral involvement. Imaging also showed Compression fractures of T1, T9, T12, and L2. Severe T1 compression with epidural extension.S/p C7-T2 corpectomy & fusion, posterior C4-T5 fusion, hospital course significant for urinary retention and dysphagia, UTI, now admitted for rehab- pt/ot/dvt ppx    #Acute hypoxic respiratory failure in need of supplemental oxygenation likely multifactorial including:  *Metastatic disease within mediastinum and rohit  *Metastatic disease within ribs  *Bilateral lung nodules RLL  *Bilateral GGO  *Bilateral atelectasis  -PE ruled out with neg CTA  -C/w alb neb treatement q 6hrs prn  -Incentive spirometer, OOB to chair, mobilize  -Might need oxygen due to increase in cancer burden in lungs  -TTE : EF 70-75%. tachy, hyperdynamic LVF  -Appreciate pulmonary recommendations    *Metastatic Breast CA to lung, liver, ribs, brain with spine involvement and neurogenic bladder  - s/p right breast biopsy shows  positive for invasive ductal carcinoma  - Compression fractures of T1, T9, T12, and L2. Severe T1 compression with epidural extension.   - s/p C7-T2 corpectomy & fusion, posterior C4-T5 fusion  - Patient will not require chemo/radiation while in rehab. 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  consult palliative to readdress GOC, and oncology to assert realistic goals for cancer treatment, as wel as family meeting   d/w rehab team    *n/v/d/ LEUKOCYTOSIS/ hypokalemia / hyponatremia- likely due to colitis, C Diff by PCR Result: Detected (10.13.22 @ 22:00)  clinically mildly improved  po vanco STARTED 10/11/22,   leukocytosis and hypokalemia - improved   c/w iv fluids NS likely due to gi losses vs malignancy  Zofran prn  ppi  BC X 2 negative 10/11/22  ua- unable to get sample due to contamination from stool and difficult straight cath, but ID reluctant to treat with abtx even if positive.  am labs  replete K prn  GI consulted - recc noted  ID recc noted  encouraged compliance with po vanco and meds.   tachycardia- likely multifactorial- pain / debility due to cancer with mets- c/w IV fluids, monitor    * HTN  - metoprolol   - EKG sinus tach- likely due to colitis  - cardio recc noted  - encouraged PO hydration      *Enterobacter cloacae UTI  - s/p Vantin BID for 7 days 9/30-10/7/22  - monitor bladder scans  - SC for PVR >350 ml, toileting schedule    * Hypercalcemia- improved with fluids  - likely related to bone mets  - c/w IVF  - repeat BMP and albumin ordered    *Normocytic anemia  - Monitor Hg/Hct, iron levels, B12, folate WNL  - Transfuse if hG <7    Severe protein-calorie malnutrition.  Hypoalbuminemia  - nutrition eval noted    DVT ppx: Lovenox      will follow  d/w dr. boggs  67 yo female PMH stomach ulcer who presented to [facility] 9/10/22 with progressive weakness, discovered multiple lytic lesions of the cervical and thoracic spine along with lung and liver secondary to metastatic primary newly discovered breast cancer with axillary and retropectoral involvement. Imaging also showed Compression fractures of T1, T9, T12, and L2. Severe T1 compression with epidural extension.S/p C7-T2 corpectomy & fusion, posterior C4-T5 fusion, hospital course significant for urinary retention and dysphagia, UTI, now admitted for rehab- pt/ot/dvt ppx    #Acute hypoxic respiratory failure in need of supplemental oxygenation likely multifactorial including:  *Metastatic disease within mediastinum and rohit  *Metastatic disease within ribs  *Bilateral lung nodules RLL  *Bilateral GGO  *Bilateral atelectasis  -PE ruled out with neg CTA  -C/w alb neb treatement q 6hrs prn  -Incentive spirometer, OOB to chair, mobilize  -Might need oxygen due to increase in cancer burden in lungs  -TTE : EF 70-75%. tachy, hyperdynamic LVF  -Appreciate pulmonary recommendations    *Metastatic Breast CA to lung, liver, ribs, brain with spine involvement and neurogenic bladder  - s/p right breast biopsy shows  positive for invasive ductal carcinoma  - Compression fractures of T1, T9, T12, and L2. Severe T1 compression with epidural extension.   - s/p C7-T2 corpectomy & fusion, posterior C4-T5 fusion  - Patient will not require chemo/radiation while in rehab. 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  consult palliative to readdress GOC, and oncology to assert realistic goals for cancer treatment, as wel as family meeting   d/w rehab team    *n/v/d/ LEUKOCYTOSIS/ hypokalemia / hyponatremia- likely due to colitis, C Diff by PCR Result: Detected (10.13.22 @ 22:00)  clinically mildly improved  po vanco STARTED 10/11/22,   leukocytosis and hypokalemia - improved   c/w iv fluids NS likely due to gi losses vs malignancy  Zofran prn  ppi  BC X 2 negative 10/11/22  ua- unable to get sample due to contamination from stool and difficult straight cath, but ID reluctant to treat with abtx even if positive.  am labs  replete K prn  GI consulted - recc noted  ID recc noted  encouraged compliance with po vanco and meds.   tachycardia- likely multifactorial- pain / debility due to cancer with mets- c/w IV fluids, monitor    * HTN  - metoprolol   - EKG sinus tach- likely due to colitis  - cardio recc noted  - encouraged PO hydration      *Enterobacter cloacae UTI  - s/p Vantin BID for 7 days 9/30-10/7/22  - monitor bladder scans  - SC for PVR >350 ml, toileting schedule    * Hypercalcemia- improved with fluids  - likely related to bone mets  - c/w IVF  - repeat BMP and albumin ordered    *Normocytic anemia  - Monitor Hg/Hct, iron levels, B12, folate WNL  - Transfuse if hG <7    Severe protein-calorie malnutrition.  Hypoalbuminemia  - nutrition eval noted    DVT ppx: Lovenox      will follow  d/w dr. boggs  67 yo female PMH stomach ulcer who presented to [facility] 9/10/22 with progressive weakness, discovered multiple lytic lesions of the cervical and thoracic spine along with lung and liver secondary to metastatic primary newly discovered breast cancer with axillary and retropectoral involvement. Imaging also showed Compression fractures of T1, T9, T12, and L2. Severe T1 compression with epidural extension.S/p C7-T2 corpectomy & fusion, posterior C4-T5 fusion, hospital course significant for urinary retention and dysphagia, UTI, now admitted for rehab- pt/ot/dvt ppx    #Acute hypoxic respiratory failure in need of supplemental oxygenation likely multifactorial including:  *Metastatic disease within mediastinum and rohit  *Metastatic disease within ribs  *Bilateral lung nodules RLL  *Bilateral GGO  *Bilateral atelectasis  -PE ruled out with neg CTA  -C/w alb neb treatement q 6hrs prn  -Incentive spirometer, OOB to chair, mobilize  -Might need oxygen due to increase in cancer burden in lungs  -TTE : EF 70-75%. tachy, hyperdynamic LVF  -Appreciate pulmonary recommendations    *Metastatic Breast CA to lung, liver, ribs, brain with spine involvement and neurogenic bladder  - s/p right breast biopsy shows  positive for invasive ductal carcinoma  - Compression fractures of T1, T9, T12, and L2. Severe T1 compression with epidural extension.   - s/p C7-T2 corpectomy & fusion, posterior C4-T5 fusion  - Patient will not require chemo/radiation while in rehab. 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  - oncology consult called today    # RUE EDEMA-   DOPPLER R/O DVT  arm elevation advised  monitor    # pain due to mets-   patient is unable to tolerate all PT/OT  meds changed to fentanyl and Dilaudid prn- pain is improving    *n/v/d/ LEUKOCYTOSIS/ hypokalemia / hyponatremia- likely due to colitis, C Diff by PCR Result: Detected (10.13.22 @ 22:00)  clinically mildly improved  po vanco STARTED 10/11/22,   leukocytosis and hypokalemia - improved   c/w iv fluids NS likely due to gi losses vs malignancy  Zofran prn  ppi  BC X 2 negative 10/11/22  ua- unable to get sample due to contamination from stool and difficult straight cath, but ID reluctant to treat with abtx even if positive.  am labs  replete K prn  GI consulted - recc noted  ID recc noted  encouraged compliance with po vanco and meds.   tachycardia- likely multifactorial- pain / debility due to cancer with mets- c/w IV fluids, monitor    * HTN  - metoprolol   - EKG sinus tach- likely due to colitis  - cardio recc noted  - encouraged PO hydration      *Enterobacter cloacae UTI  - s/p Vantin BID for 7 days 9/30-10/7/22  - monitor bladder scans  - SC for PVR >350 ml, toileting schedule    * Hypercalcemia- improved with fluids  - likely related to bone mets  - c/w IVF  - repeat BMP and albumin ordered    *Normocytic anemia  - Monitor Hg/Hct, iron levels, B12, folate WNL  - Transfuse if hG <7    Severe protein-calorie malnutrition.  Hypoalbuminemia  - nutrition eval noted    DVT ppx: Lovenox      will follow  d/w dr. boggs

## 2022-10-17 NOTE — PROGRESS NOTE ADULT - ASSESSMENT
Pt alert, attentive, intact language, thought processes goal-directed, no morbid thought contents noted, anxious affect and mood, denied AH/VH, denied SI/HI/I/P, calm behavior, experiencing difficulty coping with her current condition. Plan: Continue supportive tx.

## 2022-10-17 NOTE — PROGRESS NOTE ADULT - PROBLEM SELECTOR PLAN 1
Patient with recently diagnosed Stage IV breast cancer with clinically mets to bone, liver, lungs, brain.  Status post C7-T2 corpectomy & fusion, posterior C4-T5 fusion.  Patient has been seen by radiation oncology, medical oncology at outside hospital.  Plans are for radiation to spine/brain following acute rehab.  She is a candidate for systemic therapy as well (based on immunohistochemical profile. Current course complicated by GI issues/colitis. Supportive H/O care while on acute rehab.  Rehab continues.

## 2022-10-17 NOTE — PROGRESS NOTE ADULT - ASSESSMENT
67 yo female PMH left arm skin CA s/p excision, stomach ulcer who presented to [facility] 9/10/22 with progressive weakness, found to have multiple lytic lesions of the cervical and thoracic spine as well as right first and second ribs, lung, liver secondary to metastatic breast cancer with dominant right breast mass, right chest wall, axillary and retropectoral involvement. Compression fractures of T1, T9, T12, and L2. Severe T1 compression with epidural extension. She underwent C7-T2 corpectomy & fusion, posterior C4-T5 fusion    # Metastatic Breast CA to brain, lung, liver, ribs, with spine involvement  - Path: invasive ductal carcinoma  - s/p C7-T2 corpectomy & fusion, posterior C4-T5 fusion (Dr Bennett)  - Plastic note appreciated. Remaining staples and suture removed 10/14  - Continue comprehensive rehab program OT, PT, SLP as tolerated  - Oncology 10/17: Pt has medical and rads oncology in community, may also be candidate for systemic therapy on dc, to f/u as outpatient after rehab  - Palliative care consult appreciated 10/14. Pt would be hospice eligible If pt decides not to pursue treatment.  Pt should also f/u w/ palliative/pain management as out pt  - Precautions: CA, fall, aspiration, spinal, cervical collar, LSO Brace (Cuauhtemoc; [facility]), contact isolation    # Pain management  - Dilaudid 1 mg q4 hour PRN mod-severe breakthrough pain  - Cyclobenzaprine 5 q8  - Lyrica 25 q8  - dc'd Gabapentin due to intolerance/nausea  - Oxycodone ER 20mg Q12 hours --> dc due to inability to tolerate po 10/14  - fentanyl patch 12 mcg q 72 hours 10/14--> increase to 25 mcg 10/17    # r/o C diff  - CTPA 10/8: No pulmonary embolus. Interlobular septal thickening and bilateral ground glass opacities are new from prior exam and may represent edema, lymphangitic carcinomatosis and/or pneumonitis .Bilateral metastatic lung nodules measuring up to 9 mm within right lower lobe are unchanged. Trace bilateral pleural effusions and mild bibasilar atelectasis. Metastatic disease within the mediastinum and rohit as well as bone metastases are grossly unchanged.  - CT A/P 10/11: Pancolitis, correlate clinically for inflammatory/infectious etiology, including pseudomembranous colitis. Partially visualized right breast mass. Pulmonary and hepatic metastatic disease as noted on prior exam. Permeative, osteolytic metastatic disease, including L2 compression fracture with possible epidural involvement  - WBC 11.16 10/10-->16.97 10/11--> 19.42 10/12 -> 19.91 10/13 --> 11.99 10/14--> 11.69 10/15--> 12.12 10/17  - Continue PO Vanc, 125 mg q6h, day 7 10/17  - Continue IVF NS  - BCx NGTD   - C Diff Toxin + 10/13    # hypokalemia  - replete IV K  - K+ 3.6 10/17 resolved    # hyponatremia  - Na 133 10/17,improving  - IVF NS , encourage po intake  - BMP 10/20    # enterobacter UTI  - s/p Vantin BID x 7 days    # adjustment symptoms with anxiety and depression  - working on behavioral techniques with psychology, support  - psychiatry consult 10/14 appreciated. Patient declines any medications for mood.    # Bowel Regimen  - Florastor  - Zofran 8 mg PRN    # FEN   - Pureed solids and thins  - MBS 10/13: difficulty with propelling pureed solids to back of throat, some anxiety although pharyngeal swallow appears grossly intact. Will increase SLP time to work on sensory technique    # DVT ppx  - Lovenox  - SCD, TEDs    # Case discussed in IDT rounds 10/12:  - CG bed mobility, mod assist sit to supine transfers, reduce motivation/endurance and participation in therapy sessions due to physical complaints and some anxiety, total assist bADLs currently  - goals: mod independent transfers, ambulation with RW, negotiate 6-8 steps with mod independent, min assist bathing/shower transfer  - caregiver training  - target dc home 10/19 with caregiver support and home PT, OT, SLP.  - disability form completed 10/7  - family meeting next Wednesday 10/19 at 2 PM     brother Quique [phone]         # LABS  CBc BMp 10/20    CODE STATUS: FULL CODE         Outpatient Follow-up (Specialty/Name of physician):    Marj Del Castillo)  Hematology; Hospice Palliative Medicine; Internal Medicine; Medical Oncology  [address] [place] [address]  Phone: [phone]  Fax: [phone]  Follow Up Time: 2 weeks    Bret Ibarra)  Radiation Oncology  [address] [place] [address]  Phone: [phone]  Fax: [phone]  Follow Up Time: 2 weeks 67 yo female PMH left arm skin CA s/p excision, stomach ulcer who presented to [facility] 9/10/22 with progressive weakness, found to have multiple lytic lesions of the cervical and thoracic spine as well as right first and second ribs, lung, liver secondary to metastatic breast cancer with dominant right breast mass, right chest wall, axillary and retropectoral involvement. Compression fractures of T1, T9, T12, and L2. Severe T1 compression with epidural extension. She underwent C7-T2 corpectomy & fusion, posterior C4-T5 fusion    # Metastatic Breast CA to brain, lung, liver, ribs, with spine involvement  - Path: invasive ductal carcinoma  - s/p C7-T2 corpectomy & fusion, posterior C4-T5 fusion (Dr Bennett)  - Plastic note appreciated. Remaining staples and suture removed 10/14  - Continue comprehensive rehab program OT, PT, SLP as tolerated  - Oncology 10/17: Pt has medical and rads oncology in community, may also be candidate for systemic therapy on dc, to f/u as outpatient after rehab  - Palliative care consult appreciated 10/14. Pt would be hospice eligible If pt decides not to pursue treatment.  Pt should also f/u w/ palliative/pain management as out pt  - Precautions: CA, fall, aspiration, spinal, cervical collar, LSO Brace (Cuauhtemoc; [facility]), contact isolation    # Pain management  - Dilaudid 1 mg q4 hour PRN mod-severe breakthrough pain  - Cyclobenzaprine 5 q8  - Lyrica 25 q8  - dc'd Gabapentin due to intolerance/nausea  - Oxycodone ER 20mg Q12 hours --> dc due to inability to tolerate po 10/14  - fentanyl patch 12 mcg q 72 hours 10/14--> increase to 25 mcg 10/17    # r/o C diff  - CTPA 10/8: No pulmonary embolus. Interlobular septal thickening and bilateral ground glass opacities are new from prior exam and may represent edema, lymphangitic carcinomatosis and/or pneumonitis .Bilateral metastatic lung nodules measuring up to 9 mm within right lower lobe are unchanged. Trace bilateral pleural effusions and mild bibasilar atelectasis. Metastatic disease within the mediastinum and rohit as well as bone metastases are grossly unchanged.  - CT A/P 10/11: Pancolitis, correlate clinically for inflammatory/infectious etiology, including pseudomembranous colitis. Partially visualized right breast mass. Pulmonary and hepatic metastatic disease as noted on prior exam. Permeative, osteolytic metastatic disease, including L2 compression fracture with possible epidural involvement  - WBC 11.16 10/10-->16.97 10/11--> 19.42 10/12 -> 19.91 10/13 --> 11.99 10/14--> 11.69 10/15--> 12.12 10/17  - Continue PO Vanc, 125 mg q6h, day 7 10/17  - Continue IVF NS  - BCx NGTD   - C Diff Toxin + 10/13    # hypokalemia  - replete IV K  - K+ 3.6 10/17 resolved    # hyponatremia  - Na 133 10/17,improving  - IVF NS , encourage po intake  - BMP 10/20    # enterobacter UTI  - s/p Vantin BID x 7 days    # adjustment symptoms with anxiety and depression  - working on behavioral techniques with psychology, support  - psychiatry consult 10/14 appreciated. Patient declines any medications for mood.    # Bowel Regimen  - Florastor  - Zofran 8 mg PRN    # FEN   - Pureed solids and thins  - MBS 10/13: difficulty with propelling pureed solids to back of throat, some anxiety although pharyngeal swallow appears grossly intact. Will increase SLP time to work on sensory technique    # DVT ppx  - Lovenox  - SCD, TEDs  - RUe swelling: doppler 10/17    # Case discussed in IDT rounds 10/12:  - CG bed mobility, mod assist sit to supine transfers, reduce motivation/endurance and participation in therapy sessions due to physical complaints and some anxiety, total assist bADLs currently  - goals: mod independent transfers, ambulation with RW, negotiate 6-8 steps with mod independent, min assist bathing/shower transfer  - caregiver training  - target dc home 10/19 with caregiver support and home PT, OT, SLP.  - disability form completed 10/7  - family meeting next Wednesday 10/19 at 2 PM     brother Quique [phone]         # LABS  doppler RUE  CBc BMp 10/20    CODE STATUS: FULL CODE         Outpatient Follow-up (Specialty/Name of physician):    Marj Del Castillo (MD)  Hematology; Hospice Palliative Medicine; Internal Medicine; Medical Oncology  [address] [place] [address]  Phone: [phone]  Fax: [phone]  Follow Up Time: 2 weeks    Bret Ibarra)  Radiation Oncology  [address] [place] [address]  Phone: [phone]  Fax: [phone]  Follow Up Time: 2 weeks

## 2022-10-17 NOTE — PROGRESS NOTE ADULT - ASSESSMENT
Neha is a 65 yo female with PMH of left arm skin CA s/p excision at age 16, stomach ulcer, her last mammogram was in 2014, who was brought to [facility] ED 9/10/22 with progressive weakness to the point that she felt she could not get out of bed. She also reported shoulder pain, decreased appetite. Was found to have two lumps in her right breast but could not tolerate recommended biopsy due to pain. CT scan of the Cervical spine performed which showed Multiple lytic lesions of the cervical and visualized thoracic spine as well as right first and second ribs concerning for metastases.    She was admitted under medicine service for further workup and management, including of her pain. Further imaging done showed Metastatic breast cancer with dominant right breast mass, right chest wall, axillary and retropectoral involvement; osseous, pulmonary, and likely hepatic metastatic disease. She was also found to have metastatic adenopathy of the thorax, diffuse extensive osseous metastatic disease with multiple lucent and sclerotic lesions and compression fractures of T1, T9, T12, and L2. Severe T1 compression with epidural extension; cord compression cannot be excluded. +additional neoplastic disease of the spine with epidural extension. Multifocal neoplastic lesions involving the pelvis and ribs as well.    Patient was seen by Oncology, CT surgery and Neurosurgery team. She underwent C7-T2 corpectomy & fusion, posterior C4-T5 fusion. Post-op course uncomplicated.  Hamm removed, pt requiring some straight cath for retention. She also had subjective dysphagia - seen by speech therapy recommended pureed diet which patient continues to prefer. Started on IV Rocephin for UTI. Blood cultures negative, Urine culture positive for GNR, changed to PO Vantin for 7 days.    Patient was medically optimized for discharge to [facility] on 9/30/22.

## 2022-10-17 NOTE — PROGRESS NOTE ADULT - SUBJECTIVE AND OBJECTIVE BOX
66y old  Female who presents with a chief complaint of metastatic breast CA to spine s/p  C7-T2 corpectomy & fusion, posterior C4-T5 fusion     seen and examined at the bedside, c/o 5-6/10 pain in the neck and hips, says pain has improved since last week, since being on the fentanyl patch, no nausea or vomiting, still with some loose stools,   no LOC, NO CP, NO SOB.      Vital Signs Last 24 Hrs  T(C): 36.4 (17 Oct 2022 07:59), Max: 36.6 (16 Oct 2022 21:35)  T(F): 97.6 (17 Oct 2022 07:59), Max: 97.8 (16 Oct 2022 21:35)  HR: 117 (17 Oct 2022 07:59) (100 - 117)  BP: 131/84 (17 Oct 2022 07:59) (122/73 - 132/74)  BP(mean): --  RR: 15 (17 Oct 2022 07:59) (15 - 16)  SpO2: 96% (17 Oct 2022 07:59) (95% - 98%)    Parameters below as of 17 Oct 2022 07:59  Patient On (Oxygen Delivery Method): room air      GENERAL- NAD  EAR/NOSE/MOUTH/THROAT - no pharyngeal exudates, no oral lesions  MMM  EYES- ALEC, conjunctiva and Sclera clear  NECK- supple  RESPIRATORY-  clear to auscultation bilaterally, non laboured breathing  CARDIOVASCULAR - SIS2, RRR  GI - soft NT BS present  EXTREMITIES- no pedal edema  NEUROLOGY- no new gross focal deficits  PSYCHIATRY- AAO X 3                  9.5                  133  | 19   | 7            12.12 >-----------< 374     ------------------------< 97                    29.4                 3.6  | 101  | 0.27                                         Ca 9.7   Mg x     Ph x          C Diff by PCR Result: Detected (10.13.22 @ 22:00)    Magnesium, Serum: 1.6 mg/dL (10.11.22 @ 06:00)        MEDICATIONS  (STANDING):  Biotene Dry Mouth Oral Rinse 5 milliLiter(s) Swish and Spit three times a day  cyclobenzaprine 5 milliGRAM(s) Oral three times a day  enoxaparin Injectable 40 milliGRAM(s) SubCutaneous every 24 hours  fentaNYL   Patch  12 MICROgram(s)/Hr 1 Patch Transdermal every 72 hours  magnesium oxide 400 milliGRAM(s) Oral three times a day with meals  metoprolol tartrate 25 milliGRAM(s) Oral two times a day  pantoprazole  Injectable 40 milliGRAM(s) IV Push daily  pregabalin 25 milliGRAM(s) Oral every 8 hours  saccharomyces boulardii 250 milliGRAM(s) Oral two times a day  sodium chloride 0.9%. 1000 milliLiter(s) (70 mL/Hr) IV Continuous <Continuous>  vancomycin    Solution 125 milliGRAM(s) Oral every 6 hours    MEDICATIONS  (PRN):  acetaminophen    Suspension .. 650 milliGRAM(s) Oral every 6 hours PRN Mild Pain (1 - 3)  ALBUTerol    0.083% 2.5 milliGRAM(s) Nebulizer every 6 hours PRN Shortness of Breath and/or Wheezing  ALPRAZolam 0.25 milliGRAM(s) Oral every 6 hours PRN anxiety  HYDROmorphone  Injectable 1 milliGRAM(s) IV Push every 4 hours PRN mod to severe pain 4-10  ondansetron Injectable 8 milliGRAM(s) IV Push every 6 hours PRN Nausea and/or Vomiting  simethicone 80 milliGRAM(s) Chew every 6 hours PRN Gas   66y old  Female who presents with a chief complaint of metastatic breast CA to spine s/p  C7-T2 corpectomy & fusion, posterior C4-T5 fusion     seen and examined at the bedside, c/o 5-6/10 pain in the neck and hips, says pain has improved since last week, since being on the fentanyl patch, no nausea or vomiting, still with some loose stools,   no LOC, NO CP, NO SOB.      Vital Signs Last 24 Hrs  T(C): 36.4 (17 Oct 2022 07:59), Max: 36.6 (16 Oct 2022 21:35)  T(F): 97.6 (17 Oct 2022 07:59), Max: 97.8 (16 Oct 2022 21:35)  HR: 117 (17 Oct 2022 07:59) (100 - 117)  BP: 131/84 (17 Oct 2022 07:59) (122/73 - 132/74)  BP(mean): --  RR: 15 (17 Oct 2022 07:59) (15 - 16)  SpO2: 96% (17 Oct 2022 07:59) (95% - 98%)    Parameters below as of 17 Oct 2022 07:59  Patient On (Oxygen Delivery Method): room air      GENERAL- NAD  EAR/NOSE/MOUTH/THROAT - no pharyngeal exudates, no oral lesions  MMM  EYES- ALEC, conjunctiva and Sclera clear  NECK- supple  RESPIRATORY-  clear to auscultation bilaterally, non laboured breathing  CARDIOVASCULAR - SIS2, RRR  GI - soft NT BS present  EXTREMITIES- no pedal edema, RUE swelling   NEUROLOGY- no new gross focal deficits  PSYCHIATRY- AAO X 3                  9.5                  133  | 19   | 7            12.12 >-----------< 374     ------------------------< 97                    29.4                 3.6  | 101  | 0.27                                         Ca 9.7   Mg x     Ph x          C Diff by PCR Result: Detected (10.13.22 @ 22:00)    Magnesium, Serum: 1.6 mg/dL (10.11.22 @ 06:00)        MEDICATIONS  (STANDING):  Biotene Dry Mouth Oral Rinse 5 milliLiter(s) Swish and Spit three times a day  cyclobenzaprine 5 milliGRAM(s) Oral three times a day  enoxaparin Injectable 40 milliGRAM(s) SubCutaneous every 24 hours  fentaNYL   Patch  12 MICROgram(s)/Hr 1 Patch Transdermal every 72 hours  magnesium oxide 400 milliGRAM(s) Oral three times a day with meals  metoprolol tartrate 25 milliGRAM(s) Oral two times a day  pantoprazole  Injectable 40 milliGRAM(s) IV Push daily  pregabalin 25 milliGRAM(s) Oral every 8 hours  saccharomyces boulardii 250 milliGRAM(s) Oral two times a day  sodium chloride 0.9%. 1000 milliLiter(s) (70 mL/Hr) IV Continuous <Continuous>  vancomycin    Solution 125 milliGRAM(s) Oral every 6 hours    MEDICATIONS  (PRN):  acetaminophen    Suspension .. 650 milliGRAM(s) Oral every 6 hours PRN Mild Pain (1 - 3)  ALBUTerol    0.083% 2.5 milliGRAM(s) Nebulizer every 6 hours PRN Shortness of Breath and/or Wheezing  ALPRAZolam 0.25 milliGRAM(s) Oral every 6 hours PRN anxiety  HYDROmorphone  Injectable 1 milliGRAM(s) IV Push every 4 hours PRN mod to severe pain 4-10  ondansetron Injectable 8 milliGRAM(s) IV Push every 6 hours PRN Nausea and/or Vomiting  simethicone 80 milliGRAM(s) Chew every 6 hours PRN Gas   66y old  Female who presents with a chief complaint of metastatic breast CA to spine s/p  C7-T2 corpectomy & fusion, posterior C4-T5 fusion     seen and examined at the bedside, c/o 5-6/10 pain in the neck and hips, says pain has improved since last week, since being on the fentanyl patch, no nausea or vomiting, still with some loose stools,   no LOC, NO CP, NO SOB.      Vital Signs Last 24 Hrs  T(C): 36.4 (17 Oct 2022 07:59), Max: 36.6 (16 Oct 2022 21:35)  T(F): 97.6 (17 Oct 2022 07:59), Max: 97.8 (16 Oct 2022 21:35)  HR: 117 (17 Oct 2022 07:59) (100 - 117)  BP: 131/84 (17 Oct 2022 07:59) (122/73 - 132/74)  BP(mean): --  RR: 15 (17 Oct 2022 07:59) (15 - 16)  SpO2: 96% (17 Oct 2022 07:59) (95% - 98%)    Parameters below as of 17 Oct 2022 07:59  Patient On (Oxygen Delivery Method): room air      GENERAL- NAD  EAR/NOSE/MOUTH/THROAT - no pharyngeal exudates, no oral lesions  MMM  EYES- ALEC, conjunctiva and Sclera clear  NECK- supple  RESPIRATORY-  clear to auscultation bilaterally, non laboured breathing  CARDIOVASCULAR - SIS2, RRR  GI - soft NT BS present  EXTREMITIES- no pedal edema, RUE swelling   NEUROLOGY- LE weakness  PSYCHIATRY- AAO X 3                  9.5                  133  | 19   | 7            12.12 >-----------< 374     ------------------------< 97                    29.4                 3.6  | 101  | 0.27                                         Ca 9.7   Mg x     Ph x          C Diff by PCR Result: Detected (10.13.22 @ 22:00)    Magnesium, Serum: 1.6 mg/dL (10.11.22 @ 06:00)        MEDICATIONS  (STANDING):  Biotene Dry Mouth Oral Rinse 5 milliLiter(s) Swish and Spit three times a day  cyclobenzaprine 5 milliGRAM(s) Oral three times a day  enoxaparin Injectable 40 milliGRAM(s) SubCutaneous every 24 hours  fentaNYL   Patch  12 MICROgram(s)/Hr 1 Patch Transdermal every 72 hours  magnesium oxide 400 milliGRAM(s) Oral three times a day with meals  metoprolol tartrate 25 milliGRAM(s) Oral two times a day  pantoprazole  Injectable 40 milliGRAM(s) IV Push daily  pregabalin 25 milliGRAM(s) Oral every 8 hours  saccharomyces boulardii 250 milliGRAM(s) Oral two times a day  sodium chloride 0.9%. 1000 milliLiter(s) (70 mL/Hr) IV Continuous <Continuous>  vancomycin    Solution 125 milliGRAM(s) Oral every 6 hours    MEDICATIONS  (PRN):  acetaminophen    Suspension .. 650 milliGRAM(s) Oral every 6 hours PRN Mild Pain (1 - 3)  ALBUTerol    0.083% 2.5 milliGRAM(s) Nebulizer every 6 hours PRN Shortness of Breath and/or Wheezing  ALPRAZolam 0.25 milliGRAM(s) Oral every 6 hours PRN anxiety  HYDROmorphone  Injectable 1 milliGRAM(s) IV Push every 4 hours PRN mod to severe pain 4-10  ondansetron Injectable 8 milliGRAM(s) IV Push every 6 hours PRN Nausea and/or Vomiting  simethicone 80 milliGRAM(s) Chew every 6 hours PRN Gas

## 2022-10-18 PROCEDURE — 99233 SBSQ HOSP IP/OBS HIGH 50: CPT

## 2022-10-18 PROCEDURE — 99232 SBSQ HOSP IP/OBS MODERATE 35: CPT

## 2022-10-18 RX ORDER — PANTOPRAZOLE SODIUM 20 MG/1
40 TABLET, DELAYED RELEASE ORAL
Refills: 0 | Status: DISCONTINUED | OUTPATIENT
Start: 2022-10-18 | End: 2022-10-27

## 2022-10-18 RX ORDER — ONDANSETRON 8 MG/1
8 TABLET, FILM COATED ORAL EVERY 8 HOURS
Refills: 0 | Status: DISCONTINUED | OUTPATIENT
Start: 2022-10-18 | End: 2022-10-27

## 2022-10-18 RX ORDER — CYCLOBENZAPRINE HYDROCHLORIDE 10 MG/1
5 TABLET, FILM COATED ORAL THREE TIMES A DAY
Refills: 0 | Status: DISCONTINUED | OUTPATIENT
Start: 2022-10-18 | End: 2022-10-27

## 2022-10-18 RX ORDER — HYDROMORPHONE HYDROCHLORIDE 2 MG/ML
1 INJECTION INTRAMUSCULAR; INTRAVENOUS; SUBCUTANEOUS EVERY 4 HOURS
Refills: 0 | Status: DISCONTINUED | OUTPATIENT
Start: 2022-10-18 | End: 2022-10-20

## 2022-10-18 RX ADMIN — FENTANYL CITRATE 1 PATCH: 50 INJECTION INTRAVENOUS at 07:06

## 2022-10-18 RX ADMIN — HYDROMORPHONE HYDROCHLORIDE 1 MILLIGRAM(S): 2 INJECTION INTRAMUSCULAR; INTRAVENOUS; SUBCUTANEOUS at 13:18

## 2022-10-18 RX ADMIN — CYCLOBENZAPRINE HYDROCHLORIDE 5 MILLIGRAM(S): 10 TABLET, FILM COATED ORAL at 17:33

## 2022-10-18 RX ADMIN — Medication 125 MILLIGRAM(S): at 07:40

## 2022-10-18 RX ADMIN — SODIUM CHLORIDE 70 MILLILITER(S): 9 INJECTION INTRAMUSCULAR; INTRAVENOUS; SUBCUTANEOUS at 07:39

## 2022-10-18 RX ADMIN — HYDROMORPHONE HYDROCHLORIDE 1 MILLIGRAM(S): 2 INJECTION INTRAMUSCULAR; INTRAVENOUS; SUBCUTANEOUS at 14:08

## 2022-10-18 RX ADMIN — Medication 125 MILLIGRAM(S): at 23:39

## 2022-10-18 RX ADMIN — HYDROMORPHONE HYDROCHLORIDE 1 MILLIGRAM(S): 2 INJECTION INTRAMUSCULAR; INTRAVENOUS; SUBCUTANEOUS at 07:22

## 2022-10-18 RX ADMIN — HYDROMORPHONE HYDROCHLORIDE 1 MILLIGRAM(S): 2 INJECTION INTRAMUSCULAR; INTRAVENOUS; SUBCUTANEOUS at 06:25

## 2022-10-18 RX ADMIN — MAGNESIUM OXIDE 400 MG ORAL TABLET 400 MILLIGRAM(S): 241.3 TABLET ORAL at 07:40

## 2022-10-18 RX ADMIN — ENOXAPARIN SODIUM 40 MILLIGRAM(S): 100 INJECTION SUBCUTANEOUS at 07:40

## 2022-10-18 RX ADMIN — HYDROMORPHONE HYDROCHLORIDE 1 MILLIGRAM(S): 2 INJECTION INTRAMUSCULAR; INTRAVENOUS; SUBCUTANEOUS at 21:23

## 2022-10-18 RX ADMIN — FENTANYL CITRATE 1 PATCH: 50 INJECTION INTRAVENOUS at 20:55

## 2022-10-18 RX ADMIN — Medication 25 MILLIGRAM(S): at 07:41

## 2022-10-18 RX ADMIN — Medication 25 MILLIGRAM(S): at 17:34

## 2022-10-18 RX ADMIN — HYDROMORPHONE HYDROCHLORIDE 1 MILLIGRAM(S): 2 INJECTION INTRAMUSCULAR; INTRAVENOUS; SUBCUTANEOUS at 20:53

## 2022-10-18 RX ADMIN — Medication 125 MILLIGRAM(S): at 17:40

## 2022-10-18 RX ADMIN — Medication 25 MILLIGRAM(S): at 07:48

## 2022-10-18 RX ADMIN — Medication 125 MILLIGRAM(S): at 00:17

## 2022-10-18 RX ADMIN — CYCLOBENZAPRINE HYDROCHLORIDE 5 MILLIGRAM(S): 10 TABLET, FILM COATED ORAL at 07:40

## 2022-10-18 RX ADMIN — Medication 250 MILLIGRAM(S): at 17:36

## 2022-10-18 RX ADMIN — Medication 125 MILLIGRAM(S): at 13:25

## 2022-10-18 NOTE — PROGRESS NOTE ADULT - SUBJECTIVE AND OBJECTIVE BOX
Progress: in bed, resting comfortably, c/o being very tired, not in pain     Present Symptoms:   Dyspnea: no  Nausea/Vomiting:   Anxiety:    Depressed Mood:   Fatigue: yes  Loss of appetite: yes  Pain:  not now                 location:   Review of Systems: [All others negative  MEDICATIONS  (STANDING):  Biotene Dry Mouth Oral Rinse 5 milliLiter(s) Swish and Spit three times a day  enoxaparin Injectable 40 milliGRAM(s) SubCutaneous every 24 hours  fentaNYL   Patch  25 MICROgram(s)/Hr 1 Patch Transdermal every 72 hours  magnesium oxide 400 milliGRAM(s) Oral three times a day with meals  metoprolol tartrate 25 milliGRAM(s) Oral two times a day  pantoprazole    Tablet 40 milliGRAM(s) Oral before breakfast  pregabalin 25 milliGRAM(s) Oral every 8 hours  saccharomyces boulardii 250 milliGRAM(s) Oral two times a day  vancomycin    Solution 125 milliGRAM(s) Oral every 6 hours    MEDICATIONS  (PRN):  acetaminophen    Suspension .. 650 milliGRAM(s) Oral every 6 hours PRN Mild Pain (1 - 3)  ALBUTerol    0.083% 2.5 milliGRAM(s) Nebulizer every 6 hours PRN Shortness of Breath and/or Wheezing  ALPRAZolam 0.25 milliGRAM(s) Oral every 6 hours PRN anxiety  cyclobenzaprine 5 milliGRAM(s) Oral three times a day PRN Muscle Spasm  HYDROmorphone  Injectable 1 milliGRAM(s) IntraMuscular every 4 hours PRN moderate to severe pain 4-10  ondansetron   Disintegrating Tablet 8 milliGRAM(s) Oral every 8 hours PRN Nausea and/or Vomiting  simethicone 80 milliGRAM(s) Chew every 6 hours PRN Gas      PHYSICAL EXAM:  Vital Signs Last 24 Hrs  T(C): 36.6 (18 Oct 2022 08:29), Max: 36.6 (18 Oct 2022 08:29)  T(F): 97.9 (18 Oct 2022 08:29), Max: 97.9 (18 Oct 2022 08:29)  HR: 117 (18 Oct 2022 08:29) (116 - 123)  BP: 133/85 (18 Oct 2022 08:29) (133/85 - 138/85)  BP(mean): --  RR: 16 (18 Oct 2022 08:29) (15 - 16)  SpO2: 96% (18 Oct 2022 08:29) (94% - 96%)    Parameters below as of 18 Oct 2022 08:29  Patient On (Oxygen Delivery Method): room air      General: alert  oriented , converses, soft spoken       HEENT: n/c, c- collar in place , incision noted     Lungs: dim to bases, unlabored    CV: normal  -tachy  GI: abd soft, n/t    Musculoskeletal: weak, edematous arms   Skin: pale, w/d     Neuro: no deficits noted   Oral intake ability:  oral feeding w/ set up  Diet: pureed /thins     LABS:                          9.5    12.12 )-----------( 374      ( 17 Oct 2022 06:22 )             29.4     10-17    133<L>  |  101  |  7   ----------------------------<  97  3.6   |  19<L>  |  0.27<L>    Ca    9.7      17 Oct 2022 06:22          RADIOLOGY & ADDITIONAL STUDIES:     ADVANCE DIRECTIVES: full code  Advanced Care Planning discussion total time spent:

## 2022-10-18 NOTE — PROVIDER CONTACT NOTE (OTHER) - BACKGROUND
Pt nauseous and states she is unable to swallow.
pt admitted for pathological fracture in neoplastic disease, other specific site, initial encounter for fracture
65 yo with multiple lytic lesions of cervical and thoracic spine, right first and second ribs, lung and liver secondary to metastatic breast cancer.
Patient is s/p laminectomy. Patient has breast cancer that spread to the cervical and thoracic spine.

## 2022-10-18 NOTE — PROGRESS NOTE ADULT - SUBJECTIVE AND OBJECTIVE BOX
CC: f/u for C diff    Patient reports: she has a poor appetite, diarrhea improved.    REVIEW OF SYSTEMS:  All other review of systems negative (Comprehensive ROS): very weak, non ambulatory    Antimicrobials Day #  : 10/11- , day 8  vancomycin    Solution 125 milliGRAM(s) Oral every 6 hours    Other Medications Reviewed  MEDICATIONS  (STANDING):  Biotene Dry Mouth Oral Rinse 5 milliLiter(s) Swish and Spit three times a day  enoxaparin Injectable 40 milliGRAM(s) SubCutaneous every 24 hours  fentaNYL   Patch  25 MICROgram(s)/Hr 1 Patch Transdermal every 72 hours  magnesium oxide 400 milliGRAM(s) Oral three times a day with meals  metoprolol tartrate 25 milliGRAM(s) Oral two times a day  pantoprazole    Tablet 40 milliGRAM(s) Oral before breakfast  pregabalin 25 milliGRAM(s) Oral every 8 hours  saccharomyces boulardii 250 milliGRAM(s) Oral two times a day  vancomycin    Solution 125 milliGRAM(s) Oral every 6 hours    T(F): 97.9 (10-18-22 @ 08:29), Max: 97.9 (10-18-22 @ 08:29)  HR: 117 (10-18-22 @ 08:29)  BP: 133/85 (10-18-22 @ 08:29)  RR: 16 (10-18-22 @ 08:29)  SpO2: 96% (10-18-22 @ 08:29)  Wt(kg): --    PHYSICAL EXAM:  General: alert, no acute distress. neck in collar, anterior incision intact  Eyes:  anicteric, no conjunctival injection, no discharge  Oropharynx: no lesions or injection 	  Neck: in collar  Lungs: clear to auscultation  Heart: regular rate and rhythm; no murmur, rubs or gallops  Abdomen: soft, nondistended, nontender, without mass or organomegaly  Skin: no lesions  Extremities: no clubbing, cyanosis, or edema  Neurologic: alert, oriented, moves all extremities, non ambulatory    LAB RESULTS:                        9.5    12.12 )-----------( 374      ( 17 Oct 2022 06:22 )             29.4     10-17    133<L>  |  101  |  7   ----------------------------<  97  3.6   |  19<L>  |  0.27<L>    Ca    9.7      17 Oct 2022 06:22          MICROBIOLOGY:  RECENT CULTURES:      RADIOLOGY REVIEWED:    < from: CT Abdomen and Pelvis No Cont (10.11.22 @ 11:41) >  IMPRESSION:    Limited, noncontrast exam.    Pancolitis, correlate clinically for inflammatory/infectious etiology,   including pseudomembranous colitis.    Partially visualized right breast mass.    Pulmonary and hepatic metastatic disease as noted on prior exam.    Permeative, osteolytic metastatic disease, including L2 compression   fracture with possible epidural involvement.  This can be further characterized by MRI as clinically indicated.    Other findings as discussed above.    --- End of Report ---

## 2022-10-18 NOTE — PROGRESS NOTE ADULT - ASSESSMENT
65 yo female PMH left arm skin CA s/p excision, stomach ulcer who presented to [facility] 9/10/22 with metastatic breast CA with multiple lytic lesions of the cervical and thoracic spine, right first and second ribs, lung, liver and right chest wall, axillary and retropectoral involvement. She underwent C7-T2 corpectomy & fusion, posterior C4-T5 fusion    # Metastatic Breast CA to brain, lung, liver, ribs, with spine involvement  - Path: invasive ductal carcinoma  - Compression fractures of T1, T9, T12, and L2. Severe T1 compression with epidural extension.   - s/p C7-T2 corpectomy & fusion, posterior C4-T5 fusion (Dr Bennett)  - Plastics appreciated. Remaining staples and suture removed 10/14  - Continue comprehensive rehab program OT, PT, SLP as tolerated  - Oncology 10/17: Pt has medical and rads oncology in community, may also be candidate for systemic therapy on dc, to f/u as outpatient after rehab  - Palliative care consult appreciated 10/14. Pt would be hospice eligible If pt decides not to pursue treatment.  Pt should also f/u w/ palliative/pain management as out pt  - Precautions: CA, fall, aspiration, spinal, cervical collar, LSO Brace (Cuauhtemoc; [facility]), contact isolation    # Pain management  - Dilaudid 1 mg q4 hour PRN mod-severe breakthrough pain. Switched to IM 10/18 due to infiltrated IV 10/18  - Cyclobenzaprine 5 q8--> changed to PRN, patient agreeable 10/18  - Lyrica 25 q8  - fentanyl patch increase to 25 mcg 10/17. Improved overall pain control 10/18    # C diff colitis with N/V, diarrhea, tachycardia  - CTPA 10/8: No pulmonary embolus  - CT A/P 10/11: Pancolitis, correlate clinically for inflammatory/infectious etiology, including pseudomembranous colitis.  - BCx Negative  - C Diff Toxin + 10/13  - /85 - 138/85, -123 10/18  - WBC 11.16 10/10-->16.97 10/11--> 19.42 10/12 -> 19.91 10/13 --> 11.99 10/14--> 11.69 10/15--> 12.12 10/17  - Continue PO Vanc, 125 mg q6h, Day #8 10/18  - CBC 10/19    # BUE swelling  - doppler negative DVT 10/17  - likely third spacing due to IVf and hypoalbuminemia (1.4 10/16))  - Patient with improved po intake. Hold IVF  - switch meds to IM or po   - BMP in AM 10/19    # hypokalemia  - replete IV K  - K+ 3.6 10/17 resolved  - BMP 10/19    # hyponatremia  - Na 133 10/17,improving  -  encourage po intake  - BMP 10/19    # enterobacter UTI  - s/p Vantin BID x 7 days    # adjustment symptoms with anxiety and depression  - working on behavioral techniques with psychology, support  - psychiatry consult 10/14 appreciated. Patient declines any medications for mood.    # Bowel Regimen  - Florastor  - Zofran 8 mg PRN. Switched to oral dissolvable 10/18    # FEN   - Pureed solids and thins  - MBS 10/13: difficulty with propelling pureed solids to back of throat, some anxiety although pharyngeal swallow appears grossly intact. Will increase SLP time to work on sensory technique    # DVT ppx  - RUe swelling: doppler 10/17 negative  - Lovenox  - SCD, TEDs    # Case discussed in IDT rounds 10/12:  - CG bed mobility, mod assist sit to supine transfers, reduce motivation/endurance and participation in therapy sessions due to physical complaints and some anxiety, total assist bADLs currently  - goals: mod independent transfers, ambulation with RW, negotiate 6-8 steps with mod independent, min assist bathing/shower transfer  - caregiver training  - disability form completed 10/7  - family meeting tomorrow 10/19 at 2 PM      # LABS  CBc BMp 10/19  family meeting 10/19 at 2 PM    brother Quique [phone]   CODE STATUS: FULL CODE         Outpatient Follow-up (Specialty/Name of physician):    Marj Del Castillo)  Hematology; Hospice Palliative Medicine; Internal Medicine; Medical Oncology  [address] [place] [address]  Phone: [phone]  Fax: [phone]  Follow Up Time: 2 weeks    Bret Ibarra)  Radiation Oncology  [address] [place] [address]  Phone: [phone]  Fax: [phone]  Follow Up Time: 2 weeks

## 2022-10-18 NOTE — PROGRESS NOTE ADULT - SUBJECTIVE AND OBJECTIVE BOX
Patient is a 66y old  Female who presents with a chief complaint of metastatic breast CA to spine s/p  C7-T2 corpectomy & fusion, posterior C4-T5 fusion (18 Oct 2022 11:11)      HPI:  Thi is a 67 yo female with PMH of left arm skin CA s/p excision at age 16, stomach ulcer, her last mammogram was in , who was brought to [facility] ED 9/10/22 with progressive weakness to the point that she felt she could not get out of bed. She also reported remote shoulder pain, decreased appetite. Was found to have two lumps in her right breast but could not tolerate recommended biopsy due to pain. CT scan of the Cervical spine performed which showed Multiple lytic lesions of the cervical and visualized thoracic spine as well as right first and second ribs concerning for metastases versus multiple myeloma.    She was admitted under medicine service for further workup and management, including of her pain. Further imaging done showed Metastatic breast cancer with dominant right breast mass, right chest wall, axillary and retropectoral involvement; osseous, pulmonary, and likely hepatic metastatic disease. She was also found to have metastatic adenopathy of the thorax, diffuse extensive osseous metastatic disease with multiple lucent and sclerotic lesions and compression fractures of T1, T9, T12, and L2. Severe T1 compression with epidural extension; cord compression cannot be excluded. +additional neoplastic disease of the spine with epidural extension. Multifocal neoplastic lesions involving the pelvis and ribs as well.    Patient was seen by Oncology, CT surgery and Neurosurgery team. She underwent C7-T2 corpectomy & fusion, posterior C4-T5 fusion. Post-op course uncomplicated.  Hamm removed, pt requiring some straight cath for retention. She also had subjective dysphagia - seen by speech therapy recommended pureed diet which patient continues to prefer. Patient with low grade temp and positive UA. Started on IV Rocephin for UTI. Blood cultures negative, Urine culture positive for GNR, changed to PO Vantin for 7 days.    Patient was medically optimized for discharge to [facility] IRF on 22. (30 Sep 2022 14:32)      PAST MEDICAL & SURGICAL HISTORY:  Stomach ulcer  20 years ago   no pain not taking any meds      Skin cancer of arm, left  pt was 16 year old      H/O excision of mass  left arm  when patient was 16 years ago      S/P hysterectomy          MEDICATIONS  (STANDING):  Biotene Dry Mouth Oral Rinse 5 milliLiter(s) Swish and Spit three times a day  cyclobenzaprine 5 milliGRAM(s) Oral three times a day  enoxaparin Injectable 40 milliGRAM(s) SubCutaneous every 24 hours  fentaNYL   Patch  25 MICROgram(s)/Hr 1 Patch Transdermal every 72 hours  magnesium oxide 400 milliGRAM(s) Oral three times a day with meals  metoprolol tartrate 25 milliGRAM(s) Oral two times a day  pantoprazole  Injectable 40 milliGRAM(s) IV Push daily  pregabalin 25 milliGRAM(s) Oral every 8 hours  saccharomyces boulardii 250 milliGRAM(s) Oral two times a day  vancomycin    Solution 125 milliGRAM(s) Oral every 6 hours    MEDICATIONS  (PRN):  acetaminophen    Suspension .. 650 milliGRAM(s) Oral every 6 hours PRN Mild Pain (1 - 3)  ALBUTerol    0.083% 2.5 milliGRAM(s) Nebulizer every 6 hours PRN Shortness of Breath and/or Wheezing  ALPRAZolam 0.25 milliGRAM(s) Oral every 6 hours PRN anxiety  HYDROmorphone  Injectable 1 milliGRAM(s) IV Push every 4 hours PRN mod to severe pain 4-10  ondansetron Injectable 8 milliGRAM(s) IV Push every 6 hours PRN Nausea and/or Vomiting  simethicone 80 milliGRAM(s) Chew every 6 hours PRN Gas      Allergies    tetanus toxoid (Hives)    Intolerances    lactose (Other)        VITALS  66y  Vital Signs Last 24 Hrs  T(C): 36.6 (18 Oct 2022 08:29), Max: 36.6 (18 Oct 2022 08:29)  T(F): 97.9 (18 Oct 2022 08:29), Max: 97.9 (18 Oct 2022 08:29)  HR: 117 (18 Oct 2022 08:29) (116 - 123)  BP: 133/85 (18 Oct 2022 08:29) (133/85 - 138/85)  BP(mean): --  RR: 16 (18 Oct 2022 08:29) (15 - 16)  SpO2: 96% (18 Oct 2022 08:29) (94% - 96%)    Parameters below as of 18 Oct 2022 08:29  Patient On (Oxygen Delivery Method): room air      Daily     Daily Weight in k (18 Oct 2022 02:55)        RECENT LABS:                          9.5    12.12 )-----------( 374      ( 17 Oct 2022 06:22 )             29.4     10-17    133<L>  |  101  |  7   ----------------------------<  97  3.6   |  19<L>  |  0.27<L>    Ca    9.7      17 Oct 2022 06:22        ACC: [id number] EXAM:  US DPLX UPR EXT VEINS LTD RT                          PROCEDURE DATE:  10/17/2022          INTERPRETATION:  CLINICAL INFORMATION: Right upper extremity swelling.   Metastatic breast cancer    COMPARISON: None available.    TECHNIQUE: Duplex sonography of the RIGHT UPPER extremity veins with   color and spectral Doppler, with and without compression. Portable   technique is utilized.    FINDINGS:    The right subclavian, axillary and brachial veins are patent and   compressible where applicable.  The basilic and cephalic veins   (superficial veins) are patent and without thrombus. The contralateral   left subclavian vein is also patent.  Doppler examination shows normal spontaneous and phasic flow.    The internal jugular vein was not visualized.    Axillary lymphadenopathy is seen.    IMPRESSION:  No evidence of right upper extremity deep venous thrombosis.    --- End of Report ---            JULIAN MOE MD; Attending Radiologist  This document has been electronically signed. Oct 17 2022  6:27PM            CAPILLARY BLOOD GLUCOSE            Review of Systems:   	  · ROS	Patient had 3 BM yesterday, none today. Overall, she is taking more po--tolerating more po meds, although she continues to refuse some (including metoprolol this morning) and feels that zofran is making her nausea worse. She also feels that lyrica helps with radiating pain to left hip, but possible nausea from that and flexeril (similar to complaints with robaxin and gabapentin). Lyrica is at very low starting dose; can change flexeril to PRN, patient agreeable    notes increased UE swelling . Doppler RUE negative DVT 10/17    Physical Exam:   · Constitutional Comments	c-collar in place. sl increased verbalization and sl less fatigue. Mood appears depressed, less anxious  O x 3    Pain appears better controlled  	  · Respiratory	clear to auscultation bilaterally; no wheezes; no rales; no rhonchi. Fair effort    · Cardiovascular	regular rate and rhythm; S1 S2 present; no gallops; no rub; no murmur  · Gastrointestinal	soft; no R/G +mild distension +BS    	  	  · Motor	bilateral calves soft, no TTP  no pedal edema  Hf 3/5 quad 3+/5 ankle PF 4/5    +RUE 2+ swelling throughout no redness,   +IV infiltrated

## 2022-10-18 NOTE — PROVIDER CONTACT NOTE (OTHER) - ACTION/TREATMENT ORDERED:
Pa said to watch pulse rate if any higher than notify
Pt administered sublingual anti nausea medication but continues to refuse medications.
PA advised nothing can be done since patient is asymptomatic.
PA recommends giving PM meds and reassessing pain and SOB an hour after night time medications. PA suspects the tachycardia may be due to patient's pain.

## 2022-10-18 NOTE — PROGRESS NOTE ADULT - ASSESSMENT
66 year old woman with metastatic breast cancer, c.difficile positive, slowly improving with oral vancomycin

## 2022-10-18 NOTE — PROGRESS NOTE ADULT - ASSESSMENT
66y female with a PMH of a gastric ulcer who was recently admitted to [facility] with overall failure to thrive and found to have widely metastatic breast cancer with lung, liver, brain and bone mets.  S/p C-T spine surgery with anterior corpectomy and fusion of C7-T2 and posterior C4-T5 fusion. Post op UTI treated with CTX and cefpodoxime and sent to rehab on 9/30.  She has had a poor appetite, has c/o N/V and has c/o abdominal cramps. Had some diarrhea and a CT scan on 10/11 a showed pancolitis.  Her CT of A/P on 10/2 did not show any colonic thickening. She is taking narcotics which are antiperistaltics and can limit diarrhea.  A stool for C Diff was ordered and PO vanco started.  She received CTX and cefpodoxime, and while her organism was resistant to these agents I would like to limit antibiotics until GI issues controlled.  Her C diff assay was cancelled by the lab on 10/11/22, specimen was found leaky in the bag.  Sample apparently has been submitted and it is positive- PCR positive  Leukocytosis now moderating  GI PCR negative  She is doing better clinically but still with limited po intake.  Blood cultures finalized as negative  Suggest:  PO vanco 125 4x/day, day # 8, would complete a 10-14 days course  Hold off on empiric systemic antibiotics, especially now with pancolitis & suspicion of C diff   Encourage po intake.  Supportive care

## 2022-10-18 NOTE — PROVIDER CONTACT NOTE (OTHER) - REASON
high blood pressure/ refusing meds
Patient upper extremities swollen
SOB and tachycardic
Patient refusing PO medications today.

## 2022-10-18 NOTE — PROGRESS NOTE ADULT - SUBJECTIVE AND OBJECTIVE BOX
INTERVAL HPI/OVERNIGHT EVENTS:  HPI:  Neha is a 65 yo female with PMH of left arm skin CA s/p excision at age 16, stomach ulcer, her last mammogram was in , who was brought to [facility] ED 9/10/22 with progressive weakness to the point that she felt she could not get out of bed. She also reported remote shoulder pain, decreased appetite. Was found to have two lumps in her right breast but could not tolerate recommended biopsy due to pain. CT scan of the Cervical spine performed which showed Multiple lytic lesions of the cervical and visualized thoracic spine as well as right first and second ribs concerning for metastases versus multiple myeloma.    She was admitted under medicine service for further workup and management, including of her pain. Further imaging done showed Metastatic breast cancer with dominant right breast mass, right chest wall, axillary and retropectoral involvement; osseous, pulmonary, and likely hepatic metastatic disease. She was also found to have metastatic adenopathy of the thorax, diffuse extensive osseous metastatic disease with multiple lucent and sclerotic lesions and compression fractures of T1, T9, T12, and L2. Severe T1 compression with epidural extension; cord compression cannot be excluded. +additional neoplastic disease of the spine with epidural extension. Multifocal neoplastic lesions involving the pelvis and ribs as well.    Patient was seen by Oncology, CT surgery and Neurosurgery team. She underwent C7-T2 corpectomy & fusion, posterior C4-T5 fusion. Post-op course uncomplicated.  Hamm removed, pt requiring some straight cath for retention. She also had subjective dysphagia - seen by speech therapy recommended pureed diet which patient continues to prefer. Patient with low grade temp and positive UA. Started on IV Rocephin for UTI. Blood cultures negative, Urine culture positive for GNR, changed to PO Vantin for 7 days.    Patient was medically optimized for discharge to [facility] IRF on 22. (30 Sep 2022 14:32)    MEDICATIONS  (STANDING):  Biotene Dry Mouth Oral Rinse 5 milliLiter(s) Swish and Spit three times a day  enoxaparin Injectable 40 milliGRAM(s) SubCutaneous every 24 hours  fentaNYL   Patch  25 MICROgram(s)/Hr 1 Patch Transdermal every 72 hours  magnesium oxide 400 milliGRAM(s) Oral three times a day with meals  metoprolol tartrate 25 milliGRAM(s) Oral two times a day  pantoprazole    Tablet 40 milliGRAM(s) Oral before breakfast  pregabalin 25 milliGRAM(s) Oral every 8 hours  saccharomyces boulardii 250 milliGRAM(s) Oral two times a day  vancomycin    Solution 125 milliGRAM(s) Oral every 6 hours    MEDICATIONS  (PRN):  acetaminophen    Suspension .. 650 milliGRAM(s) Oral every 6 hours PRN Mild Pain (1 - 3)  ALBUTerol    0.083% 2.5 milliGRAM(s) Nebulizer every 6 hours PRN Shortness of Breath and/or Wheezing  ALPRAZolam 0.25 milliGRAM(s) Oral every 6 hours PRN anxiety  cyclobenzaprine 5 milliGRAM(s) Oral three times a day PRN Muscle Spasm  HYDROmorphone  Injectable 1 milliGRAM(s) IntraMuscular every 4 hours PRN moderate to severe pain 4-10  ondansetron   Disintegrating Tablet 8 milliGRAM(s) Oral every 8 hours PRN Nausea and/or Vomiting  simethicone 80 milliGRAM(s) Chew every 6 hours PRN Gas      Allergies    tetanus toxoid (Hives)    Intolerances    lactose (Other)      PAST MEDICAL & SURGICAL HISTORY:  Stomach ulcer  20 years ago   no pain not taking any meds      Skin cancer of arm, left  pt was 16 year old      H/O excision of mass  left arm  when patient was 16 years ago      S/P hysterectomy    PHYSICAL EXAM:   Vital Signs:  Vital Signs Last 24 Hrs  T(C): 36.6 (18 Oct 2022 08:29), Max: 36.6 (18 Oct 2022 08:29)  T(F): 97.9 (18 Oct 2022 08:29), Max: 97.9 (18 Oct 2022 08:29)  HR: 117 (18 Oct 2022 08:29) (116 - 123)  BP: 133/85 (18 Oct 2022 08:29) (133/85 - 138/85)  BP(mean): --  RR: 16 (18 Oct 2022 08:29) (15 - 16)  SpO2: 96% (18 Oct 2022 08:29) (94% - 96%)    Parameters below as of 18 Oct 2022 08:29  Patient On (Oxygen Delivery Method): room air      Daily     Daily Weight in k (18 Oct 2022 02:55)I&O's Summary      GENERAL:  Appears stated age,   HEENT:  NC/AT,  conjunctivae clear and pink,  CHEST:  Full & symmetric excursion, no increased effort, breath sounds clear  HEART:  Regular rhythm, S1, S2, no murmur  ABDOMEN:  Soft, non-tender, non-distended, normoactive bowel sounds,  EXTEREMITIES:  no edema  SKIN:  No rash/warm/dry  NEURO:  Alert, oriented,       LABS:                        9.5    12.12 )-----------( 374      ( 17 Oct 2022 06:22 )             29.4     10-17    133<L>  |  101  |  7   ----------------------------<  97  3.6   |  19<L>  |  0.27<L>    Ca    9.7      17 Oct 2022 06:22          amylase   lipase  RADIOLOGY & ADDITIONAL TESTS:   INTERVAL HPI/OVERNIGHT EVENTS:  HPI:    65 y/o female admitted to rehab, found to be Cdiff positive. Tolerating regular diet, still with some loose stools.    MEDICATIONS  (STANDING):  Biotene Dry Mouth Oral Rinse 5 milliLiter(s) Swish and Spit three times a day  enoxaparin Injectable 40 milliGRAM(s) SubCutaneous every 24 hours  fentaNYL   Patch  25 MICROgram(s)/Hr 1 Patch Transdermal every 72 hours  magnesium oxide 400 milliGRAM(s) Oral three times a day with meals  metoprolol tartrate 25 milliGRAM(s) Oral two times a day  pantoprazole    Tablet 40 milliGRAM(s) Oral before breakfast  pregabalin 25 milliGRAM(s) Oral every 8 hours  saccharomyces boulardii 250 milliGRAM(s) Oral two times a day  vancomycin    Solution 125 milliGRAM(s) Oral every 6 hours    MEDICATIONS  (PRN):  acetaminophen    Suspension .. 650 milliGRAM(s) Oral every 6 hours PRN Mild Pain (1 - 3)  ALBUTerol    0.083% 2.5 milliGRAM(s) Nebulizer every 6 hours PRN Shortness of Breath and/or Wheezing  ALPRAZolam 0.25 milliGRAM(s) Oral every 6 hours PRN anxiety  cyclobenzaprine 5 milliGRAM(s) Oral three times a day PRN Muscle Spasm  HYDROmorphone  Injectable 1 milliGRAM(s) IntraMuscular every 4 hours PRN moderate to severe pain 4-10  ondansetron   Disintegrating Tablet 8 milliGRAM(s) Oral every 8 hours PRN Nausea and/or Vomiting  simethicone 80 milliGRAM(s) Chew every 6 hours PRN Gas      Allergies    tetanus toxoid (Hives)    Intolerances    lactose (Other)      PAST MEDICAL & SURGICAL HISTORY:  Stomach ulcer  20 years ago   no pain not taking any meds      Skin cancer of arm, left  pt was 16 year old      H/O excision of mass  left arm  when patient was 16 years ago      S/P hysterectomy    PHYSICAL EXAM:   Vital Signs:  Vital Signs Last 24 Hrs  T(C): 36.6 (18 Oct 2022 08:29), Max: 36.6 (18 Oct 2022 08:29)  T(F): 97.9 (18 Oct 2022 08:29), Max: 97.9 (18 Oct 2022 08:29)  HR: 117 (18 Oct 2022 08:29) (116 - 123)  BP: 133/85 (18 Oct 2022 08:29) (133/85 - 138/85)  BP(mean): --  RR: 16 (18 Oct 2022 08:29) (15 - 16)  SpO2: 96% (18 Oct 2022 08:29) (94% - 96%)    Parameters below as of 18 Oct 2022 08:29  Patient On (Oxygen Delivery Method): room air      Daily     Daily Weight in k (18 Oct 2022 02:55)I&O's Summary      GENERAL:  Appears stated age,   HEENT:  NC/AT,  conjunctivae clear and pink,  CHEST:  Full & symmetric excursion, no increased effort, breath sounds clear  HEART:  Regular rhythm, S1, S2, no murmur  ABDOMEN:  Soft, non-tender, non-distended, normoactive bowel sounds,  EXTEREMITIES:  no edema  SKIN:  No rash/warm/dry  NEURO:  Alert, oriented,       LABS:                        9.5    12.12 )-----------( 374      ( 17 Oct 2022 06:22 )             29.4     10-17    133<L>  |  101  |  7   ----------------------------<  97  3.6   |  19<L>  |  0.27<L>    Ca    9.7      17 Oct 2022 06:22          amylase   lipase  RADIOLOGY & ADDITIONAL TESTS:     67 y/o female admitted to rehab, found to be C.difficile positive. Tolerating regular diet, still with some loose stools.    MEDICATIONS  (STANDING):  Biotene Dry Mouth Oral Rinse 5 milliLiter(s) Swish and Spit three times a day  enoxaparin Injectable 40 milliGRAM(s) SubCutaneous every 24 hours  fentaNYL   Patch  25 MICROgram(s)/Hr 1 Patch Transdermal every 72 hours  magnesium oxide 400 milliGRAM(s) Oral three times a day with meals  metoprolol tartrate 25 milliGRAM(s) Oral two times a day  pantoprazole    Tablet 40 milliGRAM(s) Oral before breakfast  pregabalin 25 milliGRAM(s) Oral every 8 hours  saccharomyces boulardii 250 milliGRAM(s) Oral two times a day  vancomycin    Solution 125 milliGRAM(s) Oral every 6 hours    MEDICATIONS  (PRN):  acetaminophen    Suspension .. 650 milliGRAM(s) Oral every 6 hours PRN Mild Pain (1 - 3)  ALBUTerol    0.083% 2.5 milliGRAM(s) Nebulizer every 6 hours PRN Shortness of Breath and/or Wheezing  ALPRAZolam 0.25 milliGRAM(s) Oral every 6 hours PRN anxiety  cyclobenzaprine 5 milliGRAM(s) Oral three times a day PRN Muscle Spasm  HYDROmorphone  Injectable 1 milliGRAM(s) IntraMuscular every 4 hours PRN moderate to severe pain 4-10  ondansetron   Disintegrating Tablet 8 milliGRAM(s) Oral every 8 hours PRN Nausea and/or Vomiting  simethicone 80 milliGRAM(s) Chew every 6 hours PRN Gas      Allergies    tetanus toxoid (Hives)    Intolerances    lactose (Other)      PAST MEDICAL & SURGICAL HISTORY:  Stomach ulcer  20 years ago   no pain not taking any meds      Skin cancer of arm, left  pt was 16 year old      H/O excision of mass  left arm  when patient was 16 years ago      S/P hysterectomy    PHYSICAL EXAM:   Vital Signs:  Vital Signs Last 24 Hrs  T(C): 36.6 (18 Oct 2022 08:29), Max: 36.6 (18 Oct 2022 08:29)  T(F): 97.9 (18 Oct 2022 08:29), Max: 97.9 (18 Oct 2022 08:29)  HR: 117 (18 Oct 2022 08:29) (116 - 123)  BP: 133/85 (18 Oct 2022 08:29) (133/85 - 138/85)  BP(mean): --  RR: 16 (18 Oct 2022 08:29) (15 - 16)  SpO2: 96% (18 Oct 2022 08:29) (94% - 96%)    Parameters below as of 18 Oct 2022 08:29  Patient On (Oxygen Delivery Method): room air      Daily     Daily Weight in k (18 Oct 2022 02:55)I&O's Summary      GENERAL:  Appears stated age,   HEENT:  NC/AT,  conjunctivae clear and pink,  CHEST:  Full & symmetric excursion, no increased effort, breath sounds clear  HEART:  Regular rhythm, S1, S2, no murmur  ABDOMEN:  Soft, non-tender, non-distended, normoactive bowel sounds,  EXTEREMITIES:  no edema  SKIN:  No rash/warm/dry  NEURO:  Alert, oriented,       LABS:                        9.5    12.12 )-----------( 374      ( 17 Oct 2022 06:22 )             29.4     10-17    133<L>  |  101  |  7   ----------------------------<  97  3.6   |  19<L>  |  0.27<L>    Ca    9.7      17 Oct 2022 06:22

## 2022-10-18 NOTE — PROVIDER CONTACT NOTE (OTHER) - ASSESSMENT
Bilateral lung sounds clear. Patient denies chest pain/dizziness/light headedness.
Patient bilateral upper extremities swollen.
Pt able to swallow liquid but refuses PO medications and meals despite encouragement and education.

## 2022-10-18 NOTE — PROVIDER CONTACT NOTE (OTHER) - SITUATION
Patient complaining of SOB every few words that has been going on for 4 to 5 days. Patient tachycardic 114 (manual). Patient states pain is 6/10.
Refusing PO medications.
patients , refusing all meds
Patient upper extremities swollen

## 2022-10-18 NOTE — PROGRESS NOTE ADULT - SUBJECTIVE AND OBJECTIVE BOX
66y old  Female who presents with a chief complaint of metastatic breast CA to spine s/p  C7-T2 corpectomy & fusion, posterior C4-T5 fusion     seen and examined at the bedside, c/o 5-6/10 pain in the neck and hips, says pain has improved since last week, dose of fentanyl patch increased, no nausea or vomiting, last BM at 3 am,   c/o UE swelling+,  no LOC, NO CP, NO SOB.      Vital Signs Last 24 Hrs  T(C): 36.6 (18 Oct 2022 08:29), Max: 36.6 (18 Oct 2022 08:29)  T(F): 97.9 (18 Oct 2022 08:29), Max: 97.9 (18 Oct 2022 08:29)  HR: 117 (18 Oct 2022 08:29) (116 - 123)  BP: 133/85 (18 Oct 2022 08:29) (133/85 - 138/85)  BP(mean): --  RR: 16 (18 Oct 2022 08:29) (15 - 16)  SpO2: 96% (18 Oct 2022 08:29) (94% - 96%)    Parameters below as of 18 Oct 2022 08:29  Patient On (Oxygen Delivery Method): room air          GENERAL- NAD  EAR/NOSE/MOUTH/THROAT - no pharyngeal exudates, no oral lesions  MMM  EYES- ALEC, conjunctiva and Sclera clear  NECK- supple  RESPIRATORY-  clear to auscultation bilaterally, non laboured breathing  CARDIOVASCULAR - SIS2, RRR  GI - soft NT BS present  EXTREMITIES- no pedal edema, R/LUE swelling   NEUROLOGY- b/l LE weakness  PSYCHIATRY- AAO X 3                  9.5                  133  | 19   | 7            12.12 >-----------< 374     ------------------------< 97                    29.4                 3.6  | 101  | 0.27                                         Ca 9.7   Mg x     Ph x          < from: US Duplex Venous Upper Ext Ltd, Right (10.17.22 @ 18:04) >    IMPRESSION:  No evidence of right upper extremity deep venous thrombosis.  Axillary lymphadenopathy is seen.        MEDICATIONS  (STANDING):  Biotene Dry Mouth Oral Rinse 5 milliLiter(s) Swish and Spit three times a day  cyclobenzaprine 5 milliGRAM(s) Oral three times a day  enoxaparin Injectable 40 milliGRAM(s) SubCutaneous every 24 hours  fentaNYL   Patch  25 MICROgram(s)/Hr 1 Patch Transdermal every 72 hours  magnesium oxide 400 milliGRAM(s) Oral three times a day with meals  metoprolol tartrate 25 milliGRAM(s) Oral two times a day  pantoprazole  Injectable 40 milliGRAM(s) IV Push daily  pregabalin 25 milliGRAM(s) Oral every 8 hours  saccharomyces boulardii 250 milliGRAM(s) Oral two times a day  sodium chloride 0.9%. 1000 milliLiter(s) (70 mL/Hr) IV Continuous <Continuous>  vancomycin    Solution 125 milliGRAM(s) Oral every 6 hours    MEDICATIONS  (PRN):  acetaminophen    Suspension .. 650 milliGRAM(s) Oral every 6 hours PRN Mild Pain (1 - 3)  ALBUTerol    0.083% 2.5 milliGRAM(s) Nebulizer every 6 hours PRN Shortness of Breath and/or Wheezing  ALPRAZolam 0.25 milliGRAM(s) Oral every 6 hours PRN anxiety  HYDROmorphone  Injectable 1 milliGRAM(s) IV Push every 4 hours PRN mod to severe pain 4-10  ondansetron Injectable 8 milliGRAM(s) IV Push every 6 hours PRN Nausea and/or Vomiting  simethicone 80 milliGRAM(s) Chew every 6 hours PRN Gas

## 2022-10-18 NOTE — PROGRESS NOTE ADULT - PROBLEM SELECTOR PLAN 1
Cdiff colitis appears to be slowly improving on oral vancomycin, to complete full course ( day 8 out of 14)  Will sign off please reconsult prn C.difficile colitis appears to be slowly improving on oral vancomycin. To complete full course ( day 8 out of 14)  Will sign off, please reconsult prn

## 2022-10-18 NOTE — PROGRESS NOTE ADULT - ASSESSMENT
A/P  66y old  Female who presents with a chief complaint of metastatic breast CA to spine s/p  C7-T2 corpectomy & fusion, posterior C4-T5 fusion multiple lytic lesions of the cervical and thoracic spine along with lung and liver secondary to metastatic primary newly discovered breast cancer with axillary and retropectoral involvement. Imaging also showed Compression fractures of T1, T9, T12, and L2. Severe T1 compression with epidural extension.  hospital course significant for urinary retention and dysphagia, UTI, now admitted for rehab at       Metastatic Breast CA to lung, liver, ribs, brain with spine involvement and neurogenic bladder  - s/p right breast biopsy - positive for invasive ductal carcinoma  - Compression fractures of T1, T9, T12, and L2. Severe T1 compression with epidural extension.   - s/p C7-T2 corpectomy & fusion, posterior C4-T5 fusion- c-collar   - Patient will not require chemo/radiation while in rehab. -  possible Chemo after rehab   - OP f/u with radiation oncology for Brain METS  - oncology consult called - noted - discussed possible systemic therapy when d/c     pain due to mets-   -patient is limited w/ PT/OT  due to pain   -fentanyl patch started and now inc to 25 mcg/hr - effective   - c/w Dilaudid for breakthrough pain q4 hrs. PRN    -c diff improving form colitis stand point  GI consulted - rec  noted  ID rec noted  Severe protein-calorie malnutrition.  Hypoalbuminemia  - nutrition eval     Palliative :   as a f/u, chart reviewed, pt visited mult times t/o day today. Pt very weakened and fatigued, but wakes easily, pleasant, soft spoken . She apologizes but asks for me to come back due to her extreme fatigue.  I inquired about pain - Pt does report  pain is better w/ inc patch .  Able to sleep more.    Plan is to follow along and continue to visit for best opportunity to discuss goc.

## 2022-10-18 NOTE — PROGRESS NOTE ADULT - ASSESSMENT
67 yo female PMH stomach ulcer who presented to [facility] 9/10/22 with progressive weakness, discovered multiple lytic lesions of the cervical and thoracic spine along with lung and liver secondary to metastatic primary newly discovered breast cancer with axillary and retropectoral involvement. Imaging also showed Compression fractures of T1, T9, T12, and L2. Severe T1 compression with epidural extension.S/p C7-T2 corpectomy & fusion, posterior C4-T5 fusion, hospital course significant for urinary retention and dysphagia, UTI, now admitted for rehab- pt/ot/dvt ppx    #Acute hypoxic respiratory failure in need of supplemental oxygenation likely multifactorial including:  *Metastatic disease within mediastinum and rohit  *Metastatic disease within ribs  *Bilateral lung nodules RLL  *Bilateral GGO  *Bilateral atelectasis  -PE ruled out with neg CTA  -C/w alb neb treatement q 6hrs prn  -Incentive spirometer, OOB to chair, mobilize  -Might need oxygen due to increase in cancer burden in lungs  -TTE : EF 70-75%. tachy, hyperdynamic LVF  -Appreciate pulmonary recommendations    *Metastatic Breast CA to lung, liver, ribs, brain with spine involvement and neurogenic bladder  - s/p right breast biopsy shows  positive for invasive ductal carcinoma  - Compression fractures of T1, T9, T12, and L2. Severe T1 compression with epidural extension.   - s/p C7-T2 corpectomy & fusion, posterior C4-T5 fusion  - Patient will not require chemo/radiation while in rehab. 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  - oncology consult called today    #B/L UE swelling- likely due to mets,  RUE- NO DVT, axillary Lymphadenopathy noted.  -LUE - Swelling likely due to IV infiltration  -arm elevation advised  -monitor    # pain due to mets-   patient is unable to tolerate all PT/OT  meds changed to fentanyl and Dilaudid prn- pain is improving      #c.diff colitis- n/v/d/ LEUKOCYTOSIS/ hypokalemia / hyponatremia-   -C Diff by PCR Result: Detected (10.13.22 @ 22:00)  -clinically mildly improved  -po vanco STARTED 10/11/22,   leukocytosis and hypokalemia - improved   stop iv fluids since eating slightly better  start raglan prn, per patient zofran ineffective  c/w ppi  BC X 2 negative 10/11/22  replete K prn  GI consulted - recc noted  ID recc noted  encouraged compliance with po vanco and meds.   tachycardia- likely multifactorial- pain / debility due to cancer with mets- c/w IV fluids, monitor    * HTN  - metoprolol   - EKG sinus tach- likely due to colitis  - cardio recc noted  - encouraged PO hydration      *Enterobacter cloacae UTI  - s/p Vantin BID for 7 days 9/30-10/7/22  - monitor bladder scans  - SC for PVR >350 ml, toileting schedule    * Hypercalcemia- improved with fluids  - likely related to bone mets  - c/w IVF  - repeat BMP and albumin ordered    *Normocytic anemia  - Monitor Hg/Hct, iron levels, B12, folate WNL  - Transfuse if hG <7    Severe protein-calorie malnutrition.  Hypoalbuminemia  - nutrition eval noted    DVT ppx: Lovenox      will follow  d/w dr. boggs

## 2022-10-19 LAB
ALBUMIN SERPL ELPH-MCNC: 1.5 G/DL — LOW (ref 3.3–5)
ALP SERPL-CCNC: 130 U/L — HIGH (ref 40–120)
ALT FLD-CCNC: 8 U/L — LOW (ref 10–45)
ANION GAP SERPL CALC-SCNC: 11 MMOL/L — SIGNIFICANT CHANGE UP (ref 5–17)
AST SERPL-CCNC: 34 U/L — SIGNIFICANT CHANGE UP (ref 10–40)
BILIRUB SERPL-MCNC: 0.4 MG/DL — SIGNIFICANT CHANGE UP (ref 0.2–1.2)
BUN SERPL-MCNC: 7 MG/DL — SIGNIFICANT CHANGE UP (ref 7–23)
CALCIUM SERPL-MCNC: 9.8 MG/DL — SIGNIFICANT CHANGE UP (ref 8.4–10.5)
CHLORIDE SERPL-SCNC: 103 MMOL/L — SIGNIFICANT CHANGE UP (ref 96–108)
CO2 SERPL-SCNC: 22 MMOL/L — SIGNIFICANT CHANGE UP (ref 22–31)
CREAT SERPL-MCNC: 0.26 MG/DL — LOW (ref 0.5–1.3)
EGFR: 120 ML/MIN/1.73M2 — SIGNIFICANT CHANGE UP
GLUCOSE SERPL-MCNC: 106 MG/DL — HIGH (ref 70–99)
HCT VFR BLD CALC: 27 % — LOW (ref 34.5–45)
HGB BLD-MCNC: 8.9 G/DL — LOW (ref 11.5–15.5)
MCHC RBC-ENTMCNC: 28.6 PG — SIGNIFICANT CHANGE UP (ref 27–34)
MCHC RBC-ENTMCNC: 33 GM/DL — SIGNIFICANT CHANGE UP (ref 32–36)
MCV RBC AUTO: 86.8 FL — SIGNIFICANT CHANGE UP (ref 80–100)
NRBC # BLD: 0 /100 WBCS — SIGNIFICANT CHANGE UP (ref 0–0)
PLATELET # BLD AUTO: 317 K/UL — SIGNIFICANT CHANGE UP (ref 150–400)
POTASSIUM SERPL-MCNC: 3.5 MMOL/L — SIGNIFICANT CHANGE UP (ref 3.5–5.3)
POTASSIUM SERPL-SCNC: 3.5 MMOL/L — SIGNIFICANT CHANGE UP (ref 3.5–5.3)
PROT SERPL-MCNC: 4.2 G/DL — LOW (ref 6–8.3)
RBC # BLD: 3.11 M/UL — LOW (ref 3.8–5.2)
RBC # FLD: 15.7 % — HIGH (ref 10.3–14.5)
SARS-COV-2 RNA SPEC QL NAA+PROBE: SIGNIFICANT CHANGE UP
SODIUM SERPL-SCNC: 136 MMOL/L — SIGNIFICANT CHANGE UP (ref 135–145)
WBC # BLD: 10.4 K/UL — SIGNIFICANT CHANGE UP (ref 3.8–10.5)
WBC # FLD AUTO: 10.4 K/UL — SIGNIFICANT CHANGE UP (ref 3.8–10.5)

## 2022-10-19 PROCEDURE — 99233 SBSQ HOSP IP/OBS HIGH 50: CPT

## 2022-10-19 RX ORDER — DEXAMETHASONE 0.5 MG/5ML
4 ELIXIR ORAL EVERY 12 HOURS
Refills: 0 | Status: DISCONTINUED | OUTPATIENT
Start: 2022-10-19 | End: 2022-10-27

## 2022-10-19 RX ADMIN — HYDROMORPHONE HYDROCHLORIDE 1 MILLIGRAM(S): 2 INJECTION INTRAMUSCULAR; INTRAVENOUS; SUBCUTANEOUS at 17:15

## 2022-10-19 RX ADMIN — ENOXAPARIN SODIUM 40 MILLIGRAM(S): 100 INJECTION SUBCUTANEOUS at 09:41

## 2022-10-19 RX ADMIN — Medication 125 MILLIGRAM(S): at 14:17

## 2022-10-19 RX ADMIN — HYDROMORPHONE HYDROCHLORIDE 1 MILLIGRAM(S): 2 INJECTION INTRAMUSCULAR; INTRAVENOUS; SUBCUTANEOUS at 16:15

## 2022-10-19 RX ADMIN — FENTANYL CITRATE 1 PATCH: 50 INJECTION INTRAVENOUS at 07:02

## 2022-10-19 RX ADMIN — FENTANYL CITRATE 1 PATCH: 50 INJECTION INTRAVENOUS at 21:06

## 2022-10-19 RX ADMIN — HYDROMORPHONE HYDROCHLORIDE 1 MILLIGRAM(S): 2 INJECTION INTRAMUSCULAR; INTRAVENOUS; SUBCUTANEOUS at 09:41

## 2022-10-19 RX ADMIN — HYDROMORPHONE HYDROCHLORIDE 1 MILLIGRAM(S): 2 INJECTION INTRAMUSCULAR; INTRAVENOUS; SUBCUTANEOUS at 05:58

## 2022-10-19 RX ADMIN — HYDROMORPHONE HYDROCHLORIDE 1 MILLIGRAM(S): 2 INJECTION INTRAMUSCULAR; INTRAVENOUS; SUBCUTANEOUS at 10:41

## 2022-10-19 RX ADMIN — HYDROMORPHONE HYDROCHLORIDE 1 MILLIGRAM(S): 2 INJECTION INTRAMUSCULAR; INTRAVENOUS; SUBCUTANEOUS at 05:28

## 2022-10-19 NOTE — PROGRESS NOTE ADULT - ASSESSMENT
A/P  66y old  Female who presents with a chief complaint of metastatic breast CA to spine s/p  C7-T2 corpectomy & fusion, posterior C4-T5 fusion multiple lytic lesions of the cervical and thoracic spine along with lung and liver secondary to metastatic primary newly discovered breast cancer with axillary and retropectoral involvement. Imaging also showed Compression fractures of T1, T9, T12, and L2. Severe T1 compression with epidural extension.  hospital course significant for urinary retention and dysphagia, UTI, now admitted for rehab at     This AM, uncomfortable, c/o pain to neck, L side and down L leg .       Assessment/Plan:     Metastatic Breast CA to lung, liver, ribs, brain with spine involvement and neurogenic bladder  - s/p right breast biopsy - positive for invasive ductal carcinoma  - Compression fractures of T1, T9, T12, and L2. Severe T1 compression with epidural extension.   - s/p C7-T2 corpectomy & fusion, posterior C4-T5 fusion- c-collar   - Patient will not on chemo/radiation while in rehab. -  possible Chemo after rehab - needs f/u w/ onc  - OP f/u with radiation oncology for Brain METS  - oncology consult called - noted - discussed possible systemic therapy when d/c     pain due to mets-   -patient is limited w/ PT/OT  due to pain   -fentanyl patch started and now inc to 25 mcg/hr - effective   - will add dexamethasone in conjunction w/ opioids to assist w/ pain control  and neuropathy pain   - c/w Dilaudid IVP for breakthrough pain q4 hrs. PRN    -c diff improving form colitis stand point  GI consulted - rec  noted  ID rec noted  Severe protein-calorie malnutrition.  Hypoalbuminemia- nutrition eval           Palliative :   as a f/u, chart reviewed, pt visited this morning. Pt very weakened and fatigued, in bed trying to feed self. Reports she is not hungry but knows she needs to eat.  Pt c/o being uncomfortable today and is in pain, to her neck, L side and L leg.  Had already received some prn dilaudid.    we had some discussion on ways to help her cope, we discussed if spiritual support was important to her, and she says it is. Offered  chaplaincy support and pt accepted.  Then pt began talking about that she is now considering hospice care, as pt feels her body is failing her, and she may not want to go through chemotherapy. She says she did speak with her brother about this as well.   I explained that hospice  is something that we can help her with and she would be appropriate for if she decides to pursue it.  Pt thankful for support.   There is a family meeting today w/ medical and rehab teams - rehab team made aware of pts hospice consideration.  I would recommend [facility] if pt and family agreeable.  For pain - recommend adding dexamethasone 4 mg bid as adjunct to current  opioid therapy.   Reviewed all w/ Dr Carrizales   today  Spiritual support- will place chaplaincy consult .  Will cont to follow and cont supportive care .    Family meeting pending for this afternoon.

## 2022-10-19 NOTE — PROGRESS NOTE ADULT - SUBJECTIVE AND OBJECTIVE BOX
CC: f/u for C diff    Patient reports: poor appetite, minimal intake, n/v last night, mild abdominal cramping    REVIEW OF SYSTEMS:  All other review of systems negative (Comprehensive ROS): limited, essentially stays in bed all day    Antimicrobials Day #  :day 9/14  vancomycin    Solution 125 milliGRAM(s) Oral every 6 hours    Other Medications Reviewed  MEDICATIONS  (STANDING):  Biotene Dry Mouth Oral Rinse 5 milliLiter(s) Swish and Spit three times a day  enoxaparin Injectable 40 milliGRAM(s) SubCutaneous every 24 hours  fentaNYL   Patch  25 MICROgram(s)/Hr 1 Patch Transdermal every 72 hours  magnesium oxide 400 milliGRAM(s) Oral three times a day with meals  metoprolol tartrate 25 milliGRAM(s) Oral two times a day  pantoprazole    Tablet 40 milliGRAM(s) Oral before breakfast  pregabalin 25 milliGRAM(s) Oral every 8 hours  saccharomyces boulardii 250 milliGRAM(s) Oral two times a day  vancomycin    Solution 125 milliGRAM(s) Oral every 6 hours    T(F): 98.3 (10-19-22 @ 07:25), Max: 98.4 (10-18-22 @ 20:45)  HR: 113 (10-19-22 @ 07:25)  BP: 141/81 (10-19-22 @ 07:25)  RR: 16 (10-19-22 @ 07:25)  SpO2: 95% (10-19-22 @ 07:25)  Wt(kg): --    PHYSICAL EXAM:  General: alert, no acute distress, neck in collar  Eyes:  anicteric, no conjunctival injection, no discharge  Oropharynx: no lesions or injection 	  Neck: collar in place  Lungs: clear to auscultation  Heart: regular rate and rhythm; no murmur, rubs or gallops  Abdomen: soft, nondistended, nontender, without mass or organomegaly  Skin: no lesions  Extremities: no clubbing, cyanosis, or edema  Neurologic: alert, oriented, moves all extremities    LAB RESULTS:                        8.9    10.40 )-----------( 317      ( 19 Oct 2022 06:13 )             27.0     10-19    136  |  103  |  7   ----------------------------<  106<H>  3.5   |  22  |  0.26<L>    Ca    9.8      19 Oct 2022 06:13    TPro  4.2<L>  /  Alb  1.5<L>  /  TBili  0.4  /  DBili  x   /  AST  34  /  ALT  8<L>  /  AlkPhos  130<H>  10-19    LIVER FUNCTIONS - ( 19 Oct 2022 06:13 )  Alb: 1.5 g/dL / Pro: 4.2 g/dL / ALK PHOS: 130 U/L / ALT: 8 U/L / AST: 34 U/L / GGT: x             MICROBIOLOGY:  RECENT CULTURES:      RADIOLOGY REVIEWED:  < from:  Duplex Venous Upper Ext Ltd, Right (10.17.22 @ 18:04) >  IMPRESSION:  No evidence of right upper extremity deep venous thrombosis.    < end of copied text >

## 2022-10-19 NOTE — PROGRESS NOTE ADULT - ASSESSMENT
67 yo female PMH left arm skin CA s/p excision, stomach ulcer who presented to [facility] 9/10/22 with metastatic breast CA with multiple lytic lesions of the cervical and thoracic spine, right first and second ribs, lung, liver and right chest wall, axillary and retropectoral involvement. She underwent C7-T2 corpectomy & fusion, posterior C4-T5 fusion    # Metastatic Breast CA (invasive ductal carcinoma) to brain, lung, liver, ribs, with spine involvement  - Compression fractures of T1, T9, T12, and L2. Severe T1 compression with epidural extension.   - s/p C7-T2 corpectomy & fusion, posterior C4-T5 fusion (Dr Bennett)  - Plastics appreciated. Remaining staples and suture removed 10/14  - Continue comprehensive rehab program OT, PT, SLP as tolerated  - Oncology 10/17: Pt has medical and rads oncology in community, may also be candidate for systemic therapy on dc, to f/u as outpatient after rehab  - Palliative care consult appreciated 10/14. Pt would be hospice eligible If pt decides not to pursue treatment.  Pt should also f/u w/ palliative/pain management as out pt  - Precautions: CA, fall, aspiration, spinal, cervical collar, LSO Brace (Cuauhtemoc; [facility]), contact isolation    # Pain management  - Dilaudid 1 mg IM q4 hour PRN mod-severe breakthrough pain.  - Cyclobenzaprine 5 q8-changed to PRN  - Lyrica 25 q8 dc 10/19, patient agreeable  - fentanyl patch increased to 25 mcg 10/17    # C diff colitis with N/V, diarrhea, tachycardia  - CTPA 10/8: No pulmonary embolus  - CT A/P 10/11: Pancolitis, correlate clinically for inflammatory/infectious etiology, including pseudomembranous colitis.  - BCx Negative  - C Diff Toxin + 10/13  - WBC 11.16 10/10-->16.97 10/11--> 19.42 10/12 -> 19.91 10/13 --> 11.99 10/14--> 11.69 10/15--> 12.12 10/17--> 10.9 10/19  - -124, encourage compliance with metoprolol  - Continue PO Vanc, 125 mg q6h, Day #9 10/19  - CBC 10/20    # BUE swelling  - doppler negative RUE DVT 10/17  - likely third spacing due to IVf and hypoalbuminemia (1.4 10/16))  - Patient with improved po intake. Hold IVF, reviewed with patient  - ACE wrap for edema management    # hypokalemia  - replete IV K  - K+ 3,5 10./19    # hyponatremia  - Na 136 10/19 improved  - likely due to D5NS  -  encourage po intake    # enterobacter UTI  - s/p Vantin BID x 7 days    # adjustment symptoms with anxiety and depression  - working on behavioral techniques with psychology, support  - psychiatry consult 10/14 appreciated. Patient declines any medications for mood.    # Bowel Regimen  - Florastor  - Zofran 8 mg PRN. Switched to oral dissolvable    # FEN   - Pureed solids and thins  - MBS 10/13: difficulty with propelling pureed solids to back of throat, some anxiety although pharyngeal swallow appears grossly intact. Will increase SLP time to work on sensory technique    # DVT ppx  - RUe swelling: doppler 10/17 negative  - Lovenox  - SCD, TEDs    # Case discussed in IDT rounds 10/12:  - CG bed mobility, mod assist sit to supine transfers, reduce motivation/endurance and participation in therapy sessions due to physical complaints and some anxiety, total assist bADLs currently  - goals: mod independent transfers, ambulation with RW, negotiate 6-8 steps with mod independent, min assist bathing/shower transfer  - caregiver training  - disability form completed 10/7  - family meeting today 10/19 at 2 PM      # LABS  CBc BMp 10/20  family meeting 10/19 at 2 PM    brother Quique [phone]   CODE STATUS: FULL CODE         Outpatient Follow-up (Specialty/Name of physician):    Marj Del Castillo)  Hematology; Hospice Palliative Medicine; Internal Medicine; Medical Oncology  [address] [place] [address]  Phone: [phone]  Fax: [phone]  Follow Up Time: 2 weeks    Bret Ibarra)  Radiation Oncology  [address] [place] [address]  Phone: [phone]  Fax: [phone]  Follow Up Time: 2 weeks 65 yo female PMH left arm skin CA s/p excision, stomach ulcer who presented to [facility] 9/10/22 with metastatic breast CA with multiple lytic lesions of the cervical and thoracic spine, right first and second ribs, lung, liver and right chest wall, axillary and retropectoral involvement. She underwent C7-T2 corpectomy & fusion, posterior C4-T5 fusion    # Metastatic Breast CA (invasive ductal carcinoma) to brain, lung, liver, ribs, with spine involvement  - Compression fractures of T1, T9, T12, and L2. Severe T1 compression with epidural extension.   - s/p C7-T2 corpectomy & fusion, posterior C4-T5 fusion (Dr Bennett)  - Plastics appreciated. Remaining staples and suture removed 10/14  - Continue comprehensive rehab program OT, PT, SLP as tolerated  - Oncology 10/17: Pt has medical and rads oncology in community, may also be candidate for systemic therapy on dc, to f/u as outpatient after rehab  - Palliative care consult appreciated 10/14. Pt would be hospice eligible If pt decides not to pursue treatment.  Pt should also f/u w/ palliative/pain management as out pt  - Precautions: CA, fall, aspiration, spinal, cervical collar, LSO Brace (Cuauhtemoc; [facility]), contact isolation    # Pain management  - Dilaudid 1 mg IM q4 hour PRN mod-severe breakthrough pain.  - Cyclobenzaprine 5 q8-changed to PRN  - Lyrica 25 q8 dc 10/19, patient agreeable  - fentanyl patch increased to 25 mcg 10/17    # C diff colitis with N/V, diarrhea, tachycardia  - CTPA 10/8: No pulmonary embolus  - CT A/P 10/11: Pancolitis, correlate clinically for inflammatory/infectious etiology, including pseudomembranous colitis.  - BCx Negative  - C Diff Toxin + 10/13  - WBC 11.16 10/10-->16.97 10/11--> 19.42 10/12 -> 19.91 10/13 --> 11.99 10/14--> 11.69 10/15--> 12.12 10/17--> 10.9 10/19  - -124, encourage compliance with metoprolol  - Continue PO Vanc, 125 mg q6h, Day #9 10/19  - CBC 10/20    # BUE swelling  - doppler negative RUE DVT 10/17  - likely third spacing due to IVf and hypoalbuminemia (1.4 10/16))  - Patient with improved po intake. Hold IVF, reviewed with patient  - ACE wrap for edema management    # hypokalemia  - replete IV K  - K+ 3,5 10./19    # hyponatremia  - Na 136 10/19 improved  - likely due to D5NS  -  encourage po intake    # enterobacter UTI  - s/p Vantin BID x 7 days    # adjustment symptoms with anxiety and depression  - working on behavioral techniques with psychology, support  - psychiatry consult 10/14 appreciated. Patient declines any medications for mood.    # Bowel Regimen  - Florastor  - Zofran 8 mg PRN. Switched to oral dissolvable    # FEN   - Pureed solids and thins  - MBS 10/13: difficulty with propelling pureed solids to back of throat, some anxiety although pharyngeal swallow appears grossly intact. Will increase SLP time to work on sensory technique    # DVT ppx  - RUe swelling: doppler 10/17 negative  - Lovenox  - SCD, TEDs    # Case discussed in IDT rounds 10/19:  - rolling with min assist, max assist bed mobility, increase po acceptance. Only tolerated OOB one day, refuses trial commode transfers, Limited by pain and nausea, poor endurance  - goals: wheelchair level, transfer min-mod assist  - [place] when medically stable estimated 10/26 versus possible hospice referral, to discuss with patient and family  - disability form completed 10/7  - family meeting today 10/19 at 2 PM      # LABS  CBc BMp 10/20  family meeting 10/19 at 2 PM    brother Quique [phone]   CODE STATUS: FULL CODE         Outpatient Follow-up (Specialty/Name of physician):    Marj Del Castillo)  Hematology; Hospice Palliative Medicine; Internal Medicine; Medical Oncology  [address] [place] [address]  Phone: [phone]  Fax: [phone]  Follow Up Time: 2 weeks    Bret Ibarra)  Radiation Oncology  [address] [place] [address]  Phone: [phone]  Fax: [phone]  Follow Up Time: 2 weeks

## 2022-10-19 NOTE — PROGRESS NOTE ADULT - SUBJECTIVE AND OBJECTIVE BOX
Progress: in bed trying to feed self, uncomfortable, c/o pain to L side and down L leg    Present Symptoms:   Dyspnea: no  Nausea/Vomiting: not now  Anxiety:    Depressed Mood:   Fatigue: yes  Loss of appetite: yes  Pain:     yes              location: L side, L Leg, neck   Review of Systems:MEDICATIONS  (STANDING):  Biotene Dry Mouth Oral Rinse 5 milliLiter(s) Swish and Spit three times a day  dexAMETHasone     Tablet 4 milliGRAM(s) Oral every 12 hours  enoxaparin Injectable 40 milliGRAM(s) SubCutaneous every 24 hours  fentaNYL   Patch  25 MICROgram(s)/Hr 1 Patch Transdermal every 72 hours  magnesium oxide 400 milliGRAM(s) Oral three times a day with meals  metoprolol tartrate 25 milliGRAM(s) Oral two times a day  pantoprazole    Tablet 40 milliGRAM(s) Oral before breakfast  saccharomyces boulardii 250 milliGRAM(s) Oral two times a day  vancomycin    Solution 125 milliGRAM(s) Oral every 6 hours    MEDICATIONS  (PRN):  acetaminophen    Suspension .. 650 milliGRAM(s) Oral every 6 hours PRN Mild Pain (1 - 3)  ALBUTerol    0.083% 2.5 milliGRAM(s) Nebulizer every 6 hours PRN Shortness of Breath and/or Wheezing  ALPRAZolam 0.25 milliGRAM(s) Oral every 6 hours PRN anxiety  cyclobenzaprine 5 milliGRAM(s) Oral three times a day PRN Muscle Spasm  HYDROmorphone  Injectable 1 milliGRAM(s) IntraMuscular every 4 hours PRN moderate to severe pain 4-10  ondansetron   Disintegrating Tablet 8 milliGRAM(s) Oral every 8 hours PRN Nausea and/or Vomiting  simethicone 80 milliGRAM(s) Chew every 6 hours PRN Gas      PHYSICAL EXAM:  Vital Signs Last 24 Hrs  T(C): 36.8 (19 Oct 2022 07:25), Max: 36.9 (18 Oct 2022 20:45)  T(F): 98.3 (19 Oct 2022 07:25), Max: 98.4 (18 Oct 2022 20:45)  HR: 113 (19 Oct 2022 07:25) (113 - 124)  BP: 141/81 (19 Oct 2022 07:25) (138/84 - 141/81)  BP(mean): --  RR: 16 (19 Oct 2022 07:25) (16 - 16)  SpO2: 95% (19 Oct 2022 07:25) (95% - 96%)    Parameters below as of 19 Oct 2022 07:25  Patient On (Oxygen Delivery Method): room air      General: alert  oriented x 3, converses       HEENT: n/c, a/t, c- collar , healing incision neck    Lungs: dim to bases    CV: normal- tachy    GI: abd soft, n/t    : normal    Musculoskeletal: edematous UE's,      Skin: pale, w/d     Neuro: awake, alert, oriented , converses    Oral intake ability:  oral feeding - minimal  Diet: soft / puree      LABS:                          8.9    10.40 )-----------( 317      ( 19 Oct 2022 06:13 )             27.0     10-19    136  |  103  |  7   ----------------------------<  106<H>  3.5   |  22  |  0.26<L>    Ca    9.8      19 Oct 2022 06:13    TPro  4.2<L>  /  Alb  1.5<L>  /  TBili  0.4  /  DBili  x   /  AST  34  /  ALT  8<L>  /  AlkPhos  130<H>  10-19        RADIOLOGY & ADDITIONAL STUDIES:     ADVANCE DIRECTIVES:  HCP    Advanced Care Planning discussion total time spent:

## 2022-10-19 NOTE — PROGRESS NOTE ADULT - ASSESSMENT
66y female with a PMH of a gastric ulcer who was recently admitted to [facility] with overall failure to thrive and found to have widely metastatic breast cancer with lung, liver, brain and bone mets.  S/p C-T spine surgery with anterior corpectomy and fusion of C7-T2 and posterior C4-T5 fusion. Post op UTI treated with CTX and cefpodoxime and sent to rehab on 9/30.  She has had a poor appetite, has c/o N/V and has c/o abdominal cramps. Had some diarrhea and a CT scan on 10/11 a showed pancolitis.  Her CT of A/P on 10/2 did not show any colonic thickening. She is taking narcotics which are antiperistaltics and can limit diarrhea.  A stool for C Diff was ordered and PO vanco started.  She received CTX and cefpodoxime, and while her organism was resistant to these agents I would like to limit antibiotics until GI issues controlled.  Her C diff assay was cancelled by the lab on 10/11/22, specimen was found leaky in the bag.  Sample apparently has been submitted and it is positive- PCR positive  Leukocytosis now moderating and she has shown some improvement with po vanco  GI PCR negative  She is doing better clinically but still with limited po intake.  Blood cultures finalized as negative  Suggest:  PO vanco 125 4x/day, day # 9, would complete a 14 days course  Hold off on empiric systemic antibiotics, especially now with pancolitis & confirmed C diff  Encourage po intake.  Supportive care, appreciate palliative care f/u

## 2022-10-19 NOTE — PROGRESS NOTE ADULT - SUBJECTIVE AND OBJECTIVE BOX
Patient is a 66y old  Female who presents with a chief complaint of metastatic breast CA to spine s/p  C7-T2 corpectomy & fusion, posterior C4-T5 fusion (19 Oct 2022 09:38)      HPI:  Thi is a 65 yo female with PMH of left arm skin CA s/p excision at age 16, stomach ulcer, her last mammogram was in , who was brought to [facility] ED 9/10/22 with progressive weakness to the point that she felt she could not get out of bed. She also reported remote shoulder pain, decreased appetite. Was found to have two lumps in her right breast but could not tolerate recommended biopsy due to pain. CT scan of the Cervical spine performed which showed Multiple lytic lesions of the cervical and visualized thoracic spine as well as right first and second ribs concerning for metastases versus multiple myeloma.    She was admitted under medicine service for further workup and management, including of her pain. Further imaging done showed Metastatic breast cancer with dominant right breast mass, right chest wall, axillary and retropectoral involvement; osseous, pulmonary, and likely hepatic metastatic disease. She was also found to have metastatic adenopathy of the thorax, diffuse extensive osseous metastatic disease with multiple lucent and sclerotic lesions and compression fractures of T1, T9, T12, and L2. Severe T1 compression with epidural extension; cord compression cannot be excluded. +additional neoplastic disease of the spine with epidural extension. Multifocal neoplastic lesions involving the pelvis and ribs as well.    Patient was seen by Oncology, CT surgery and Neurosurgery team. She underwent C7-T2 corpectomy & fusion, posterior C4-T5 fusion. Post-op course uncomplicated.  Hamm removed, pt requiring some straight cath for retention. She also had subjective dysphagia - seen by speech therapy recommended pureed diet which patient continues to prefer. Patient with low grade temp and positive UA. Started on IV Rocephin for UTI. Blood cultures negative, Urine culture positive for GNR, changed to PO Vantin for 7 days.    Patient was medically optimized for discharge to [facility] IRF on 22. (30 Sep 2022 14:32)      PAST MEDICAL & SURGICAL HISTORY:  Stomach ulcer  20 years ago   no pain not taking any meds      Skin cancer of arm, left  pt was 16 year old      H/O excision of mass  left arm  when patient was 16 years ago      S/P hysterectomy          MEDICATIONS  (STANDING):  Biotene Dry Mouth Oral Rinse 5 milliLiter(s) Swish and Spit three times a day  enoxaparin Injectable 40 milliGRAM(s) SubCutaneous every 24 hours  fentaNYL   Patch  25 MICROgram(s)/Hr 1 Patch Transdermal every 72 hours  magnesium oxide 400 milliGRAM(s) Oral three times a day with meals  metoprolol tartrate 25 milliGRAM(s) Oral two times a day  pantoprazole    Tablet 40 milliGRAM(s) Oral before breakfast  pregabalin 25 milliGRAM(s) Oral every 8 hours  saccharomyces boulardii 250 milliGRAM(s) Oral two times a day  vancomycin    Solution 125 milliGRAM(s) Oral every 6 hours    MEDICATIONS  (PRN):  acetaminophen    Suspension .. 650 milliGRAM(s) Oral every 6 hours PRN Mild Pain (1 - 3)  ALBUTerol    0.083% 2.5 milliGRAM(s) Nebulizer every 6 hours PRN Shortness of Breath and/or Wheezing  ALPRAZolam 0.25 milliGRAM(s) Oral every 6 hours PRN anxiety  cyclobenzaprine 5 milliGRAM(s) Oral three times a day PRN Muscle Spasm  HYDROmorphone  Injectable 1 milliGRAM(s) IntraMuscular every 4 hours PRN moderate to severe pain 4-10  ondansetron   Disintegrating Tablet 8 milliGRAM(s) Oral every 8 hours PRN Nausea and/or Vomiting  simethicone 80 milliGRAM(s) Chew every 6 hours PRN Gas      Allergies    tetanus toxoid (Hives)    Intolerances    lactose (Other)        VITALS  66y  Vital Signs Last 24 Hrs  T(C): 36.8 (19 Oct 2022 07:25), Max: 36.9 (18 Oct 2022 20:45)  T(F): 98.3 (19 Oct 2022 07:25), Max: 98.4 (18 Oct 2022 20:45)  HR: 113 (19 Oct 2022 07:25) (113 - 124)  BP: 141/81 (19 Oct 2022 07:25) (138/84 - 141/81)  BP(mean): --  RR: 16 (19 Oct 2022 07:25) (16 - 16)  SpO2: 95% (19 Oct 2022 07:25) (95% - 96%)    Parameters below as of 19 Oct 2022 07:25  Patient On (Oxygen Delivery Method): room air      Daily     Daily Weight in k.3 (19 Oct 2022 05:43)        RECENT LABS:                          8.9    10.40 )-----------( 317      ( 19 Oct 2022 06:13 )             27.0     10-19    136  |  103  |  7   ----------------------------<  106<H>  3.5   |  22  |  0.26<L>    Ca    9.8      19 Oct 2022 06:13    TPro  4.2<L>  /  Alb  1.5<L>  /  TBili  0.4  /  DBili  x   /  AST  34  /  ALT  8<L>  /  AlkPhos  130<H>  10-19    LIVER FUNCTIONS - ( 19 Oct 2022 06:13 )  Alb: 1.5 g/dL / Pro: 4.2 g/dL / ALK PHOS: 130 U/L / ALT: 8 U/L / AST: 34 U/L / GGT: x                   CAPILLARY BLOOD GLUCOSE        Review of Systems:   	  · ROS	Patient had no BM yesterday but had one this morning around 3 AM which she states was wtaery. She continues to have poor po tolerance, and continues to refuse many oral medications including beta blocker. She reports that it is the muscle relaxer and lyrica that is causing her nausea, however flexeril was switched to PRN yesterday. Will dc lyrica as patient refusing for the most part and will monitor pain off medication. she reports tolerating the fentanyl 25 mcg dose    po intake per patient estimate 10%. Has BUE swelling--likely third spacing, reviewed in detail [facility] patient    Physical Exam:   · Constitutional Comments	c-collar in place. anterior incision intact, no redness or oozing +steri strips  still fatigued, dysphoric mood no tearfulness  	  · Respiratory	clear to auscultation bilaterally; no wheezes; no rales; no rhonchi. Fair effort    · Cardiovascular	regular rate and rhythm; S1 S2 present; no gallops; no rub; no murmur  · Gastrointestinal	soft; no R/G no TTP although reports persistent left lower quadrant abd pain (baseline, chronic)    	  	  · Motor	BUE with 2+ swelling, trace hand, no warmth or erythema  was positioned  with hand elevated, elbows at lowest point

## 2022-10-20 LAB
ANION GAP SERPL CALC-SCNC: 11 MMOL/L — SIGNIFICANT CHANGE UP (ref 5–17)
BUN SERPL-MCNC: 8 MG/DL — SIGNIFICANT CHANGE UP (ref 7–23)
CALCIUM SERPL-MCNC: 9.9 MG/DL — SIGNIFICANT CHANGE UP (ref 8.4–10.5)
CHLORIDE SERPL-SCNC: 103 MMOL/L — SIGNIFICANT CHANGE UP (ref 96–108)
CO2 SERPL-SCNC: 24 MMOL/L — SIGNIFICANT CHANGE UP (ref 22–31)
CREAT SERPL-MCNC: 0.3 MG/DL — LOW (ref 0.5–1.3)
EGFR: 117 ML/MIN/1.73M2 — SIGNIFICANT CHANGE UP
GLUCOSE SERPL-MCNC: 99 MG/DL — SIGNIFICANT CHANGE UP (ref 70–99)
HCT VFR BLD CALC: 28.6 % — LOW (ref 34.5–45)
HGB BLD-MCNC: 9.3 G/DL — LOW (ref 11.5–15.5)
MCHC RBC-ENTMCNC: 28.6 PG — SIGNIFICANT CHANGE UP (ref 27–34)
MCHC RBC-ENTMCNC: 32.5 GM/DL — SIGNIFICANT CHANGE UP (ref 32–36)
MCV RBC AUTO: 88 FL — SIGNIFICANT CHANGE UP (ref 80–100)
NRBC # BLD: 0 /100 WBCS — SIGNIFICANT CHANGE UP (ref 0–0)
PLATELET # BLD AUTO: 291 K/UL — SIGNIFICANT CHANGE UP (ref 150–400)
POTASSIUM SERPL-MCNC: 3.6 MMOL/L — SIGNIFICANT CHANGE UP (ref 3.5–5.3)
POTASSIUM SERPL-SCNC: 3.6 MMOL/L — SIGNIFICANT CHANGE UP (ref 3.5–5.3)
RBC # BLD: 3.25 M/UL — LOW (ref 3.8–5.2)
RBC # FLD: 15.9 % — HIGH (ref 10.3–14.5)
SODIUM SERPL-SCNC: 138 MMOL/L — SIGNIFICANT CHANGE UP (ref 135–145)
WBC # BLD: 7.68 K/UL — SIGNIFICANT CHANGE UP (ref 3.8–10.5)
WBC # FLD AUTO: 7.68 K/UL — SIGNIFICANT CHANGE UP (ref 3.8–10.5)

## 2022-10-20 PROCEDURE — 99232 SBSQ HOSP IP/OBS MODERATE 35: CPT

## 2022-10-20 PROCEDURE — 99497 ADVNCD CARE PLAN 30 MIN: CPT

## 2022-10-20 PROCEDURE — 99233 SBSQ HOSP IP/OBS HIGH 50: CPT

## 2022-10-20 RX ORDER — HYDROMORPHONE HYDROCHLORIDE 2 MG/ML
2 INJECTION INTRAMUSCULAR; INTRAVENOUS; SUBCUTANEOUS EVERY 6 HOURS
Refills: 0 | Status: DISCONTINUED | OUTPATIENT
Start: 2022-10-20 | End: 2022-10-24

## 2022-10-20 RX ORDER — METOCLOPRAMIDE HCL 10 MG
10 TABLET ORAL EVERY 6 HOURS
Refills: 0 | Status: DISCONTINUED | OUTPATIENT
Start: 2022-10-20 | End: 2022-10-27

## 2022-10-20 RX ADMIN — MAGNESIUM OXIDE 400 MG ORAL TABLET 400 MILLIGRAM(S): 241.3 TABLET ORAL at 18:42

## 2022-10-20 RX ADMIN — HYDROMORPHONE HYDROCHLORIDE 1 MILLIGRAM(S): 2 INJECTION INTRAMUSCULAR; INTRAVENOUS; SUBCUTANEOUS at 12:55

## 2022-10-20 RX ADMIN — HYDROMORPHONE HYDROCHLORIDE 1 MILLIGRAM(S): 2 INJECTION INTRAMUSCULAR; INTRAVENOUS; SUBCUTANEOUS at 00:49

## 2022-10-20 RX ADMIN — Medication 125 MILLIGRAM(S): at 12:30

## 2022-10-20 RX ADMIN — HYDROMORPHONE HYDROCHLORIDE 1 MILLIGRAM(S): 2 INJECTION INTRAMUSCULAR; INTRAVENOUS; SUBCUTANEOUS at 09:41

## 2022-10-20 RX ADMIN — ONDANSETRON 8 MILLIGRAM(S): 8 TABLET, FILM COATED ORAL at 09:05

## 2022-10-20 RX ADMIN — Medication 4 MILLIGRAM(S): at 18:42

## 2022-10-20 RX ADMIN — FENTANYL CITRATE 1 PATCH: 50 INJECTION INTRAVENOUS at 16:40

## 2022-10-20 RX ADMIN — Medication 250 MILLIGRAM(S): at 18:41

## 2022-10-20 RX ADMIN — FENTANYL CITRATE 1 PATCH: 50 INJECTION INTRAVENOUS at 20:57

## 2022-10-20 RX ADMIN — Medication 125 MILLIGRAM(S): at 10:02

## 2022-10-20 RX ADMIN — HYDROMORPHONE HYDROCHLORIDE 1 MILLIGRAM(S): 2 INJECTION INTRAMUSCULAR; INTRAVENOUS; SUBCUTANEOUS at 01:20

## 2022-10-20 RX ADMIN — Medication 25 MILLIGRAM(S): at 18:41

## 2022-10-20 RX ADMIN — HYDROMORPHONE HYDROCHLORIDE 2 MILLIGRAM(S): 2 INJECTION INTRAMUSCULAR; INTRAVENOUS; SUBCUTANEOUS at 18:12

## 2022-10-20 RX ADMIN — HYDROMORPHONE HYDROCHLORIDE 1 MILLIGRAM(S): 2 INJECTION INTRAMUSCULAR; INTRAVENOUS; SUBCUTANEOUS at 13:55

## 2022-10-20 RX ADMIN — FENTANYL CITRATE 1 PATCH: 50 INJECTION INTRAVENOUS at 12:55

## 2022-10-20 RX ADMIN — HYDROMORPHONE HYDROCHLORIDE 1 MILLIGRAM(S): 2 INJECTION INTRAMUSCULAR; INTRAVENOUS; SUBCUTANEOUS at 08:41

## 2022-10-20 RX ADMIN — HYDROMORPHONE HYDROCHLORIDE 2 MILLIGRAM(S): 2 INJECTION INTRAMUSCULAR; INTRAVENOUS; SUBCUTANEOUS at 19:15

## 2022-10-20 NOTE — PROGRESS NOTE ADULT - SUBJECTIVE AND OBJECTIVE BOX
Patient is a 66y old  Female who presents with a chief complaint of metastatic breast CA to spine s/p  C7-T2 corpectomy & fusion, posterior C4-T5 fusion (20 Oct 2022 10:41)      HPI:  Thi is a 67 yo female with PMH of left arm skin CA s/p excision at age 16, stomach ulcer, her last mammogram was in , who was brought to [facility] ED 9/10/22 with progressive weakness to the point that she felt she could not get out of bed. She also reported remote shoulder pain, decreased appetite. Was found to have two lumps in her right breast but could not tolerate recommended biopsy due to pain. CT scan of the Cervical spine performed which showed Multiple lytic lesions of the cervical and visualized thoracic spine as well as right first and second ribs concerning for metastases versus multiple myeloma.    She was admitted under medicine service for further workup and management, including of her pain. Further imaging done showed Metastatic breast cancer with dominant right breast mass, right chest wall, axillary and retropectoral involvement; osseous, pulmonary, and likely hepatic metastatic disease. She was also found to have metastatic adenopathy of the thorax, diffuse extensive osseous metastatic disease with multiple lucent and sclerotic lesions and compression fractures of T1, T9, T12, and L2. Severe T1 compression with epidural extension; cord compression cannot be excluded. +additional neoplastic disease of the spine with epidural extension. Multifocal neoplastic lesions involving the pelvis and ribs as well.    Patient was seen by Oncology, CT surgery and Neurosurgery team. She underwent C7-T2 corpectomy & fusion, posterior C4-T5 fusion. Post-op course uncomplicated.  Hamm removed, pt requiring some straight cath for retention. She also had subjective dysphagia - seen by speech therapy recommended pureed diet which patient continues to prefer. Patient with low grade temp and positive UA. Started on IV Rocephin for UTI. Blood cultures negative, Urine culture positive for GNR, changed to PO Vantin for 7 days.    Patient was medically optimized for discharge to [facility] IRF on 22. (30 Sep 2022 14:32)      PAST MEDICAL & SURGICAL HISTORY:  Stomach ulcer  20 years ago   no pain not taking any meds      Skin cancer of arm, left  pt was 16 year old      H/O excision of mass  left arm  when patient was 16 years ago      S/P hysterectomy          MEDICATIONS  (STANDING):  Biotene Dry Mouth Oral Rinse 5 milliLiter(s) Swish and Spit three times a day  dexAMETHasone     Tablet 4 milliGRAM(s) Oral every 12 hours  enoxaparin Injectable 40 milliGRAM(s) SubCutaneous every 24 hours  fentaNYL   Patch  25 MICROgram(s)/Hr 1 Patch Transdermal every 72 hours  magnesium oxide 400 milliGRAM(s) Oral three times a day with meals  metoprolol tartrate 25 milliGRAM(s) Oral two times a day  pantoprazole    Tablet 40 milliGRAM(s) Oral before breakfast  saccharomyces boulardii 250 milliGRAM(s) Oral two times a day  vancomycin    Solution 125 milliGRAM(s) Oral every 6 hours    MEDICATIONS  (PRN):  acetaminophen    Suspension .. 650 milliGRAM(s) Oral every 6 hours PRN Mild Pain (1 - 3)  ALBUTerol    0.083% 2.5 milliGRAM(s) Nebulizer every 6 hours PRN Shortness of Breath and/or Wheezing  ALPRAZolam 0.25 milliGRAM(s) Oral every 6 hours PRN anxiety  cyclobenzaprine 5 milliGRAM(s) Oral three times a day PRN Muscle Spasm  HYDROmorphone  Injectable 1 milliGRAM(s) IntraMuscular every 4 hours PRN moderate to severe pain 4-10  ondansetron   Disintegrating Tablet 8 milliGRAM(s) Oral every 8 hours PRN Nausea and/or Vomiting  simethicone 80 milliGRAM(s) Chew every 6 hours PRN Gas      Allergies    tetanus toxoid (Hives)    Intolerances    lactose (Other)        VITALS  66y  Vital Signs Last 24 Hrs  T(C): 36.3 (19 Oct 2022 20:32), Max: 36.3 (19 Oct 2022 20:32)  T(F): 97.4 (19 Oct 2022 20:32), Max: 97.4 (19 Oct 2022 20:32)  HR: 112 (19 Oct 2022 20:32) (112 - 118)  BP: 141/82 (19 Oct 2022 20:32) (131/80 - 141/82)  BP(mean): --  RR: 16 (19 Oct 2022 20:32) (16 - 16)  SpO2: 96% (19 Oct 2022 20:32) (96% - 96%)    Parameters below as of 19 Oct 2022 20:32  Patient On (Oxygen Delivery Method): room air      Daily     Daily Weight in k.2 (20 Oct 2022 04:08)        RECENT LABS:                          9.3    7.68  )-----------( 291      ( 20 Oct 2022 07:06 )             28.6     10-    138  |  103  |  8   ----------------------------<  99  3.6   |  24  |  0.30<L>    Ca    9.9      20 Oct 2022 07:06    TPro  4.2<L>  /  Alb  1.5<L>  /  TBili  0.4  /  DBili  x   /  AST  34  /  ALT  8<L>  /  AlkPhos  130<H>  10    LIVER FUNCTIONS - ( 19 Oct 2022 06:13 )  Alb: 1.5 g/dL / Pro: 4.2 g/dL / ALK PHOS: 130 U/L / ALT: 8 U/L / AST: 34 U/L / GGT: x               Review of Systems:   	  · ROS	Seen and examined at bedside. Had BM this morning, was not formed. Continues to have poor PO intake, was not able to tolerate many medications overnight. However, does endorse PO zofran improving her n/v symptoms. Appears to be tolerating the steroids thus far. Her legs are bothering her more than usual, with the neuropathic-like pain now spanning her entire lower extremity, whereas previously it was more proximal. We spoke about the discussion with her family yesterday about palliative care, and emphasized that the decision is ultimately hers to make. She is receptive to prioritizing her comfort, and will continue to discuss the options with the palliative care team later in the day. Patient also expressed interest in virtually meeting with her oncology team to discuss options on that front.     Physical Exam:   · Constitutional Comments	c-collar in place. anterior incision intact, no redness or oozing +steri strips  still fatigued, dysphoric mood no tearfulness  	  · Respiratory	clear to auscultation bilaterally; no wheezes; no rales; no rhonchi. Fair effort  · Cardiovascular	regular rate and rhythm; S1 S2 present. Tachycardia and weak pulse on manual exam, extremities are cool but distal pulses are present.   · Gastrointestinal	soft; no R/G no TTP although reports persistent left lower quadrant abd pain (baseline, chronic)    	  	  · Motor	Upper extremities remain edematous without pitting, overall improved. Lower extremities with trace edema. Overall her strength reflects her debilitated state.              Patient is a 66y old  Female who presents with a chief complaint of metastatic breast CA to spine s/p  C7-T2 corpectomy & fusion, posterior C4-T5 fusion (20 Oct 2022 10:41)      HPI:  Thi is a 67 yo female with PMH of left arm skin CA s/p excision at age 16, stomach ulcer, her last mammogram was in , who was brought to [facility] ED 9/10/22 with progressive weakness to the point that she felt she could not get out of bed. She also reported remote shoulder pain, decreased appetite. Was found to have two lumps in her right breast but could not tolerate recommended biopsy due to pain. CT scan of the Cervical spine performed which showed Multiple lytic lesions of the cervical and visualized thoracic spine as well as right first and second ribs concerning for metastases versus multiple myeloma.    She was admitted under medicine service for further workup and management, including of her pain. Further imaging done showed Metastatic breast cancer with dominant right breast mass, right chest wall, axillary and retropectoral involvement; osseous, pulmonary, and likely hepatic metastatic disease. She was also found to have metastatic adenopathy of the thorax, diffuse extensive osseous metastatic disease with multiple lucent and sclerotic lesions and compression fractures of T1, T9, T12, and L2. Severe T1 compression with epidural extension; cord compression cannot be excluded. +additional neoplastic disease of the spine with epidural extension. Multifocal neoplastic lesions involving the pelvis and ribs as well.    Patient was seen by Oncology, CT surgery and Neurosurgery team. She underwent C7-T2 corpectomy & fusion, posterior C4-T5 fusion. Post-op course uncomplicated.  Hamm removed, pt requiring some straight cath for retention. She also had subjective dysphagia - seen by speech therapy recommended pureed diet which patient continues to prefer. Patient with low grade temp and positive UA. Started on IV Rocephin for UTI. Blood cultures negative, Urine culture positive for GNR, changed to PO Vantin for 7 days.    Patient was medically optimized for discharge to [facility] IRF on 22. (30 Sep 2022 14:32)      PAST MEDICAL & SURGICAL HISTORY:  Stomach ulcer  20 years ago   no pain not taking any meds      Skin cancer of arm, left  pt was 16 year old      H/O excision of mass  left arm  when patient was 16 years ago      S/P hysterectomy          MEDICATIONS  (STANDING):  Biotene Dry Mouth Oral Rinse 5 milliLiter(s) Swish and Spit three times a day  dexAMETHasone     Tablet 4 milliGRAM(s) Oral every 12 hours  enoxaparin Injectable 40 milliGRAM(s) SubCutaneous every 24 hours  fentaNYL   Patch  25 MICROgram(s)/Hr 1 Patch Transdermal every 72 hours  magnesium oxide 400 milliGRAM(s) Oral three times a day with meals  metoprolol tartrate 25 milliGRAM(s) Oral two times a day  pantoprazole    Tablet 40 milliGRAM(s) Oral before breakfast  saccharomyces boulardii 250 milliGRAM(s) Oral two times a day  vancomycin    Solution 125 milliGRAM(s) Oral every 6 hours    MEDICATIONS  (PRN):  acetaminophen    Suspension .. 650 milliGRAM(s) Oral every 6 hours PRN Mild Pain (1 - 3)  ALBUTerol    0.083% 2.5 milliGRAM(s) Nebulizer every 6 hours PRN Shortness of Breath and/or Wheezing  ALPRAZolam 0.25 milliGRAM(s) Oral every 6 hours PRN anxiety  cyclobenzaprine 5 milliGRAM(s) Oral three times a day PRN Muscle Spasm  HYDROmorphone  Injectable 1 milliGRAM(s) IntraMuscular every 4 hours PRN moderate to severe pain 4-10  ondansetron   Disintegrating Tablet 8 milliGRAM(s) Oral every 8 hours PRN Nausea and/or Vomiting  simethicone 80 milliGRAM(s) Chew every 6 hours PRN Gas      Allergies    tetanus toxoid (Hives)    Intolerances    lactose (Other)        VITALS  66y  Vital Signs Last 24 Hrs  T(C): 36.3 (19 Oct 2022 20:32), Max: 36.3 (19 Oct 2022 20:32)  T(F): 97.4 (19 Oct 2022 20:32), Max: 97.4 (19 Oct 2022 20:32)  HR: 112 (19 Oct 2022 20:32) (112 - 118)  BP: 141/82 (19 Oct 2022 20:32) (131/80 - 141/82)  BP(mean): --  RR: 16 (19 Oct 2022 20:32) (16 - 16)  SpO2: 96% (19 Oct 2022 20:32) (96% - 96%)    Parameters below as of 19 Oct 2022 20:32  Patient On (Oxygen Delivery Method): room air      Daily     Daily Weight in k.2 (20 Oct 2022 04:08)        RECENT LABS:                          9.3    7.68  )-----------( 291      ( 20 Oct 2022 07:06 )             28.6     10-    138  |  103  |  8   ----------------------------<  99  3.6   |  24  |  0.30<L>    Ca    9.9      20 Oct 2022 07:06    TPro  4.2<L>  /  Alb  1.5<L>  /  TBili  0.4  /  DBili  x   /  AST  34  /  ALT  8<L>  /  AlkPhos  130<H>  10    LIVER FUNCTIONS - ( 19 Oct 2022 06:13 )  Alb: 1.5 g/dL / Pro: 4.2 g/dL / ALK PHOS: 130 U/L / ALT: 8 U/L / AST: 34 U/L / GGT: x               Review of Systems:   	  · ROS	Seen and examined at bedside. Had BM this morning, was not formed. Continues to have poor PO intake, was not able to tolerate many medications overnight. However, does endorse PO dissolvable zofran improving her n/v symptoms. Started on steroids but unable to tolerate.     Her legs are bothering her more than usual, with the neuropathic-like pain now spanning her entire lower extremity, whereas previously it was more proximal. We spoke about the discussion with her family yesterday about palliative care, and emphasized that the decision is ultimately hers to make. She is receptive to prioritizing her comfort, and will continue to discuss the options with the palliative care team later in the day. Patient also expressed interest in virtually meeting with her oncology team to discuss options on that front.     Physical Exam:   · Constitutional Comments	c-collar in place. anterior incision intact, no redness or oozing +steri strips  still fatigued, dysphoric mood no tearfulness  	  · Respiratory	clear to auscultation bilaterally; no wheezes; no rales; no rhonchi. Fair effort  · Cardiovascular	regular rate and rhythm; S1 S2 present. Tachycardia and weak pulse on manual exam, extremities are cool but distal pulses are present.   · Gastrointestinal	soft; no R/G no TTP although reports persistent left lower quadrant abd pain (baseline, chronic)    	  	  · Motor	Upper extremities remain edematous without pitting, overall improved. Lower extremities with trace edema. Overall her strength reflects her debilitated state.

## 2022-10-20 NOTE — PROGRESS NOTE ADULT - SUBJECTIVE AND OBJECTIVE BOX
66y old  Female who presents with a chief complaint of metastatic breast CA to spine s/p  C7-T2 corpectomy & fusion, posterior C4-T5 fusion     seen and examined at the bedside, c/o 6/10 pain in the neck and hips, associate with nausea this am, no  vomiting, loose BM this am,   poor po tolerance, and  refuses oral medications including vanco, she missed doses in the last 1-2 days. encouraged to take vanco.  no LOC, NO CP, NO SOB.    Vital Signs Last 24 Hrs  T(C): 36.3 (19 Oct 2022 20:32), Max: 36.3 (19 Oct 2022 20:32)  T(F): 97.4 (19 Oct 2022 20:32), Max: 97.4 (19 Oct 2022 20:32)  HR: 112 (19 Oct 2022 20:32) (112 - 118)  BP: 141/82 (19 Oct 2022 20:32) (131/80 - 141/82)  BP(mean): --  RR: 16 (19 Oct 2022 20:32) (16 - 16)  SpO2: 96% (19 Oct 2022 20:32) (96% - 96%)    Parameters below as of 19 Oct 2022 20:32  Patient On (Oxygen Delivery Method): room air      GENERAL- NAD  EAR/NOSE/MOUTH/THROAT - no pharyngeal exudates, no oral lesions  MMM  EYES- ALEC, conjunctiva and Sclera clear  NECK- supple  RESPIRATORY-  clear to auscultation bilaterally, non laboured breathing  CARDIOVASCULAR - SIS2, RRR  GI - soft NT BS present  EXTREMITIES- no pedal edema, R/LUE swelling   NEUROLOGY- b/l LE weakness  PSYCHIATRY- AAO X 3                       9.3                  138  | 24   | 8            7.68  >-----------< 291     ------------------------< 99                    28.6                 3.6  | 103  | 0.30                                         Ca 9.9   Mg x     Ph x          MEDICATIONS  (STANDING):  Biotene Dry Mouth Oral Rinse 5 milliLiter(s) Swish and Spit three times a day  dexAMETHasone     Tablet 4 milliGRAM(s) Oral every 12 hours  enoxaparin Injectable 40 milliGRAM(s) SubCutaneous every 24 hours  fentaNYL   Patch  25 MICROgram(s)/Hr 1 Patch Transdermal every 72 hours  magnesium oxide 400 milliGRAM(s) Oral three times a day with meals  metoprolol tartrate 25 milliGRAM(s) Oral two times a day  pantoprazole    Tablet 40 milliGRAM(s) Oral before breakfast  saccharomyces boulardii 250 milliGRAM(s) Oral two times a day  vancomycin    Solution 125 milliGRAM(s) Oral every 6 hours    MEDICATIONS  (PRN):  acetaminophen    Suspension .. 650 milliGRAM(s) Oral every 6 hours PRN Mild Pain (1 - 3)  ALBUTerol    0.083% 2.5 milliGRAM(s) Nebulizer every 6 hours PRN Shortness of Breath and/or Wheezing  ALPRAZolam 0.25 milliGRAM(s) Oral every 6 hours PRN anxiety  cyclobenzaprine 5 milliGRAM(s) Oral three times a day PRN Muscle Spasm  HYDROmorphone  Injectable 1 milliGRAM(s) IntraMuscular every 4 hours PRN moderate to severe pain 4-10  ondansetron   Disintegrating Tablet 8 milliGRAM(s) Oral every 8 hours PRN Nausea and/or Vomiting  simethicone 80 milliGRAM(s) Chew every 6 hours PRN Gas

## 2022-10-20 NOTE — PROGRESS NOTE ADULT - SUBJECTIVE AND OBJECTIVE BOX
Progress: resting in bed, awake, alert, c/o nausea and pain today     Present Symptoms:   Dyspnea: no  Nausea/Vomiting: yes nausea  Anxiety:    Depressed Mood: yes  Fatigue: yes  Loss of appetite: yes  Pain:       yes            location: lower abdomen and legs      MEDICATIONS  (STANDING):  Biotene Dry Mouth Oral Rinse 5 milliLiter(s) Swish and Spit three times a day  dexAMETHasone     Tablet 4 milliGRAM(s) Oral every 12 hours  enoxaparin Injectable 40 milliGRAM(s) SubCutaneous every 24 hours  fentaNYL   Patch  25 MICROgram(s)/Hr 1 Patch Transdermal every 72 hours  magnesium oxide 400 milliGRAM(s) Oral three times a day with meals  metoprolol tartrate 25 milliGRAM(s) Oral two times a day  pantoprazole    Tablet 40 milliGRAM(s) Oral before breakfast  saccharomyces boulardii 250 milliGRAM(s) Oral two times a day  vancomycin    Solution 125 milliGRAM(s) Oral every 6 hours    MEDICATIONS  (PRN):  acetaminophen    Suspension .. 650 milliGRAM(s) Oral every 6 hours PRN Mild Pain (1 - 3)  ALBUTerol    0.083% 2.5 milliGRAM(s) Nebulizer every 6 hours PRN Shortness of Breath and/or Wheezing  ALPRAZolam 0.25 milliGRAM(s) Oral every 6 hours PRN anxiety  cyclobenzaprine 5 milliGRAM(s) Oral three times a day PRN Muscle Spasm  HYDROmorphone  Injectable 2 milliGRAM(s) IntraMuscular every 6 hours PRN Moderate Pain (4 - 6)  metoclopramide   Syrup 10 milliGRAM(s) Oral every 6 hours PRN nausea  ondansetron   Disintegrating Tablet 8 milliGRAM(s) Oral every 8 hours PRN Nausea and/or Vomiting  simethicone 80 milliGRAM(s) Chew every 6 hours PRN Gas      PHYSICAL EXAM:  Vital Signs Last 24 Hrs  T(C): 36.3 (19 Oct 2022 20:32), Max: 36.3 (19 Oct 2022 20:32)  T(F): 97.4 (19 Oct 2022 20:32), Max: 97.4 (19 Oct 2022 20:32)  HR: 112 (19 Oct 2022 20:32) (112 - 118)  BP: 141/82 (19 Oct 2022 20:32) (131/80 - 141/82)  BP(mean): --  RR: 16 (19 Oct 2022 20:32) (16 - 16)  SpO2: 96% (19 Oct 2022 20:32) (96% - 96%)    Parameters below as of 19 Oct 2022 20:32  Patient On (Oxygen Delivery Method): room air      General: alert  oriented converses      HEENT: n/c, a/t, c- collar + neck incision drying     Lungs: dim at bases, breathing comfortable   CV: normal - tachy   GI: abd soft, sl tender    : normal    Musculoskeletal: edematous, weak    Skin: w/d,    Neuro: no deficits   Oral intake ability:  oral feeding w/ set up   Diet: soft, pureed as emmie w/ thin liquids      LABS:                          9.3    7.68  )-----------( 291      ( 20 Oct 2022 07:06 )             28.6     10-20    138  |  103  |  8   ----------------------------<  99  3.6   |  24  |  0.30<L>    Ca    9.9      20 Oct 2022 07:06    TPro  4.2<L>  /  Alb  1.5<L>  /  TBili  0.4  /  DBili  x   /  AST  34  /  ALT  8<L>  /  AlkPhos  130<H>  10-19        RADIOLOGY & ADDITIONAL STUDIES:     ADVANCE DIRECTIVES: Hcp full code  Advanced Care Planning discussion total time spent:

## 2022-10-20 NOTE — PROGRESS NOTE ADULT - ASSESSMENT
67 yo female PMH left arm skin CA s/p excision, stomach ulcer who presented to [facility] 9/10/22 with metastatic breast CA with multiple lytic lesions of the cervical and thoracic spine, right first and second ribs, lung, liver and right chest wall, axillary and retropectoral involvement. She underwent C7-T2 corpectomy & fusion, posterior C4-T5 fusion    # Metastatic Breast CA (invasive ductal carcinoma) to brain, lung, liver, ribs, with spine involvement  - Compression fractures of T1, T9, T12, and L2. Severe T1 compression with epidural extension.   - s/p C7-T2 corpectomy & fusion, posterior C4-T5 fusion (Dr Bennett)  - Plastics appreciated. Remaining staples and suture removed 10/14  - Continue comprehensive rehab program OT, PT, SLP as tolerated  - Oncology 10/17: Pt has medical and rads oncology in community, may also be candidate for systemic therapy on dc, to f/u as outpatient after rehab  - Palliative care consult appreciated 10/14. Pt would be hospice eligible If pt decides not to pursue treatment.  Pt should also f/u w/ palliative/pain management as out pt. Conversation is ongoing   - Precautions: CA, fall, aspiration, spinal, cervical collar, LSO Brace (Cuauhtemoc; [facility]), contact isolation    # Pain management  - Dilaudid 1 mg IM q4 hour PRN mod-severe breakthrough pain.  - Cyclobenzaprine 5 q8-changed to PRN  - Lyrica 25 q8 dc 10/19, patient agreeable  - fentanyl patch increased to 25 mcg 10/17  - Dexamethasone tablet 4 mg PO q12h     # C diff colitis with N/V, diarrhea, tachycardia  - CTPA 10/8: No pulmonary embolus  - CT A/P 10/11: Pancolitis, correlate clinically for inflammatory/infectious etiology, including pseudomembranous colitis.  - BCx Negative  - C Diff Toxin + 10/13  - WBC 11.16 10/10-->16.97 10/11--> 19.42 10/12 -> 19.91 10/13 --> 11.99 10/14--> 11.69 10/15--> 12.12 10/17--> 10.9 10/19 -> 7.68 10/10  - -118, encourage compliance with metoprolol  - Continue PO Vanc, 125 mg q6h, Day #9 10/19  - CBC 10/24    # BUE swelling  - doppler negative RUE DVT 10/17  - likely third spacing due to IVf and hypoalbuminemia (1.4 10/16))  - Patient with improved po intake. Hold IVF, reviewed with patient  - ACE wrap for edema management    # hypokalemia  - replete IV K  - K+ 3,5 10/19 -> 3.6 10/20    # hyponatremia  - Na 136 10/19 improved -> 138 10/20  - likely due to D5NS  -  encourage po intake    # enterobacter UTI  - s/p Vantin BID x 7 days    # adjustment symptoms with anxiety and depression  - working on behavioral techniques with psychology, support  - psychiatry consult 10/14 appreciated. Patient declines any medications for mood.    # Bowel Regimen  - Florastor  - Zofran 8 mg PRN. Switched to oral dissolvable    # FEN   - Pureed solids and thins  - MBS 10/13: difficulty with propelling pureed solids to back of throat, some anxiety although pharyngeal swallow appears grossly intact. Will increase SLP time to work on sensory technique    # DVT ppx  - RUe swelling: doppler 10/17 negative  - Lovenox  - SCD, TEDs    # Case discussed in IDT rounds 10/19:  - rolling with min assist, max assist bed mobility, increase po acceptance. Only tolerated OOB one day, refuses trial commode transfers, Limited by pain and nausea, poor endurance  - goals: wheelchair level, transfer min-mod assist  - Kelly when medically stable estimated 10/26 versus possible hospice referral, to discuss with patient and family  - disability form completed 10/7      # LABS  CBC BMP 10/24  Ongoing discussion w/ family, pt re: [facility]     brother Quique [phone]   CODE STATUS: FULL CODE         Outpatient Follow-up (Specialty/Name of physician):    Marj Del Castillo)  Hematology; Hospice Palliative Medicine; Internal Medicine; Medical Oncology  [address] [place] [address]  Phone: [phone]  Fax: [phone]  Follow Up Time: 2 weeks    Bret Ibarra)  Radiation Oncology  [address] [place] [address]  Phone: [phone]  Fax: [phone]  Follow Up Time: 2 weeks   67 yo female PMH left arm skin CA s/p excision, stomach ulcer who presented to [facility] 9/10/22 with metastatic breast CA with multiple lytic lesions of the cervical and thoracic spine, right first and second ribs, lung, liver and right chest wall, axillary and retropectoral involvement. She underwent C7-T2 corpectomy & fusion, posterior C4-T5 fusion    # Metastatic Breast CA (invasive ductal carcinoma) to brain, lung, liver, ribs, with spine involvement  - Compression fractures of T1, T9, T12, and L2. Severe T1 compression with epidural extension.   - s/p C7-T2 corpectomy & fusion, posterior C4-T5 fusion (Dr Bennett)  - Plastics appreciated. Remaining staples and suture removed 10/14  - Continue comprehensive rehab program OT, PT, SLP as tolerated  - Oncology 10/17: Pt has medical and rads oncology in community, may also be candidate for systemic therapy on dc, to f/u as outpatient after rehab  - Palliative care consult appreciated 10/14. Pt would be hospice eligible If pt decides not to pursue treatment.  Pt should also f/u w/ palliative/pain management as out pt. Conversation is ongoing, Sw to assist in arranging telehealth appointment with oncology for treatment options and prognosis disucssion  - Precautions: CA, fall, aspiration, spinal, cervical collar, LSO Brace (Cuauhtemoc; [facility]), contact isolation    # Pain management  - Dilaudid 1 mg IM q4 hour PRN mod-severe breakthrough pain.--> changed to 2 mg IM a 6 PRN 10/20  - Cyclobenzaprine 5 q8-changed to PRN  - Lyrica 25 q8 dc 10/19, patient agreeable  - fentanyl patch increased to 25 mcg 10/17  - Dexamethasone tablet 4 mg PO q12h     # C diff colitis with N/V, diarrhea, tachycardia  - CTPA 10/8: No pulmonary embolus  - CT A/P 10/11: Pancolitis, correlate clinically for inflammatory/infectious etiology, including pseudomembranous colitis.  - BCx Negative  - C Diff Toxin + 10/13  - WBC  7.68 10/10 normalized  - -118, encourage compliance with metoprolol  - Continue PO Vanc, 125 mg q6h, Day #10/14 10/20  - CBC 10/24    # BUE swelling  - doppler negative RUE DVT 10/17  - likely third spacing due to IVf and hypoalbuminemia (1.4 10/16))  - Patient with improved po intake. Hold IVF, reviewed with patient  - ACE wrap for edema management    # hypokalemia  - replete IV K  - K+ 3,5 10/19 -> 3.6 10/20    # hyponatremia  - Na 136 10/19 improved -> 138 10/20  - likely due to D5NS  -  encourage po intake    # enterobacter UTI  - s/p Vantin BID x 7 days    # adjustment symptoms with anxiety and depression  - working on behavioral techniques with psychology, support  - psychiatry consult 10/14 appreciated. Patient declines any medications for mood.    # Bowel Regimen  - Florastor  - Zofran 8 mg PRN. Switched to oral dissolvable    # FEN   - Pureed solids and thins  - MBS 10/13: difficulty with propelling pureed solids to back of throat, some anxiety although pharyngeal swallow appears grossly intact. Will increase SLP time to work on sensory technique    # DVT ppx  - RUe swelling: doppler 10/17 negative  - Lovenox  - SCD, TEDs    # Case discussed in IDT rounds 10/19:  - rolling with min assist, max assist bed mobility, increase po acceptance. Only tolerated OOB one day, refuses trial commode transfers, Limited by pain and nausea, poor endurance  - goals: wheelchair level, transfer min-mod assist  - Kelly when medically stable estimated 10/26 versus possible hospice referral, to discuss with patient and family  - disability form completed 10/7      # LABS  CBC BMP 10/24  Ongoing discussion w/ family, pt re: [facility]     brother Quique [phone]   CODE STATUS: FULL CODE         Outpatient Follow-up (Specialty/Name of physician):    Marj Del Castillo)  Hematology; Hospice Palliative Medicine; Internal Medicine; Medical Oncology  [address] [place] [address]  Phone: [phone]  Fax: [phone]  Follow Up Time: 2 weeks    Bret Ibarra)  Radiation Oncology  [address] [place] [address]  Phone: [phone]  Fax: [phone]  Follow Up Time: 2 weeks

## 2022-10-20 NOTE — PROGRESS NOTE ADULT - ASSESSMENT
67 yo female PMH stomach ulcer who presented to [facility] 9/10/22 with progressive weakness, discovered multiple lytic lesions of the cervical and thoracic spine along with lung and liver secondary to metastatic primary newly discovered breast cancer with axillary and retropectoral involvement. Imaging also showed Compression fractures of T1, T9, T12, and L2. Severe T1 compression with epidural extension.S/p C7-T2 corpectomy & fusion, posterior C4-T5 fusion, hospital course significant for urinary retention and dysphagia, UTI, now admitted for rehab- pt/ot/dvt ppx    #Acute hypoxic respiratory failure in need of supplemental oxygenation likely multifactorial including:  *Metastatic disease within mediastinum and rohit  *Metastatic disease within ribs  *Bilateral lung nodules RLL  *Bilateral GGO  *Bilateral atelectasis  -PE ruled out with neg CTA  -C/w alb neb treatement q 6hrs prn  -Incentive spirometer, OOB to chair, mobilize  -Might need oxygen due to increase in cancer burden in lungs  -TTE : EF 70-75%. tachy, hyperdynamic LVF  -Appreciate pulmonary recommendations    *Metastatic Breast CA to lung, liver, ribs, brain with spine involvement and neurogenic bladder  - s/p right breast biopsy shows  positive for invasive ductal carcinoma  - Compression fractures of T1, T9, T12, and L2. Severe T1 compression with epidural extension.   - s/p C7-T2 corpectomy & fusion, posterior C4-T5 fusion  - Patient will not require chemo/radiation while in rehab. 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  - oncology consult called today    #B/L UE swelling- likely due to mets,  RUE- NO DVT, axillary Lymphadenopathy noted.  -LUE - Swelling likely due to IV infiltration  -arm elevation advised  -monitor    # pain due to mets-   patient is unable to tolerate all PT/OT  meds changed to fentanyl and Dilaudid prn  dexa added 10/19/22    #c.diff colitis- n/v/d/ LEUKOCYTOSIS/ hypokalemia / hyponatremia-   -C Diff by PCR Result: Detected (10.13.22 @ 22:00)  -clinically mildly improved  -po vanco STARTED 10/11/22,   leukocytosis and hypokalemia - improved   stoped iv fluids 10/18/22  zofran SL  c/w ppi  BC X 2 negative 10/11/22  replete K prn  GI consulted - recc noted  ID recc noted  encouraged compliance with po vanco and meds.   tachycardia- likely multifactorial- pain / debility due to cancer with mets- c/w IV fluids, monitor    * HTN  - metoprolol   - EKG sinus tach- likely due to colitis  - cardio recc noted  - encouraged PO hydration      *Enterobacter cloacae UTI  - s/p Vantin BID for 7 days 9/30-10/7/22  - monitor bladder scans  - SC for PVR >350 ml, toileting schedule    * Hypercalcemia- improved with fluids  - likely related to bone mets  - c/w IVF  - repeat BMP and albumin ordered    *Normocytic anemia  - Monitor Hg/Hct, iron levels, B12, folate WNL  - Transfuse if hG <7    Severe protein-calorie malnutrition.  Hypoalbuminemia  - nutrition eval noted    DVT ppx: Lovenox      will follow  d/w dr. boggs

## 2022-10-20 NOTE — PROGRESS NOTE ADULT - ASSESSMENT
A/P  65 yo female PMH stomach ulcer who presented to [facility] 9/10/22 with progressive weakness, discovered multiple lytic lesions of the cervical and thoracic spine along with lung and liver secondary to metastatic primary newly discovered breast cancer with axillary and retropectoral involvement. Imaging also showed Compression fractures of T1, T9, T12, and L2. Severe T1 compression with epidural extension. S/p C7-T2 corpectomy & fusion, posterior C4-T5 fusion, hospital course significant for urinary retention and dysphagia, UTI, now admitted for rehab.          Assessment/Plan:     Metastatic Breast CA to lung, liver, ribs, brain with spine involvement and neurogenic bladder  - s/p right breast biopsy - positive for invasive ductal carcinoma  - Compression fractures of T1, T9, T12, and L2. Severe T1 compression with epidural extension.   - s/p C7-T2 corpectomy & fusion, posterior C4-T5 fusion- c-collar   - Patient l not on chemo/radiation while in rehab. -  possible Chemo after rehab - needs f/u w/ oncology to discuss treatment options  - OP f/u with radiation oncology for Brain METS  - oncology consult called - noted - discussed possible systemic therapy when d/c - as outpt     pain due to mets-   -patient is limited w/ PT/OT  due to pain   -fentanyl patch at 25 mcg/hr  - continue  - added dexamethasone in conjunction w/ opioids to assist w/ pain control  and neuropathy pain - pt has not taken yet, due to nausea  - c/w Dilaudid - pt lost IV access,  now changed to IM - suggest inc to 2mg but Q 6 Hrs PRN     -c diff improving form colitis stand point  GI consulted - rec  noted  ID rec noted  Severe protein-calorie malnutrition.  Hypoalbuminemia- nutrition eval     Nausea- now on zofran ODT  - suggest add reglan  prn       adjustment symptoms with anxiety and depression  - working on behavioral techniques with psychology, support  - psychiatry consult 10/14 appreciated. Patient declined  any medications for mood- suggest f/u w/ psychiatry       Palliative ;  as a follow up, chart reviewed, pt seen bedside. She is awake, alert, converses, but is c/o being very tired , w/ nausea and pain today .    Has been taking in very little p.o and has now lost IV access  today due to swelling on arms. We cont discussions re - goc, see goc note above . Pt and family should speak w/ her oncology team to see what treatment options will be  available to her .  This may help in her treatment decision process, as pt considering hospice services.   med recs above. d/w rehab team today    rec psychiatry follow up      cont supportive care   Plan to cont to follow w/ med team.

## 2022-10-20 NOTE — PROGRESS NOTE ADULT - CONVERSATION DETAILS
Met with pt bedside, discussed how she was feeling today, she says it "feels like my body is not doing what I want it to do." She tells me she is considering hospice and wanted more details today, which I gave her, I explained home and inpatient at a facility. Pt said he does not think home hospice would be an option , but also needs to speak with her brothers and sister. We also spoke about how she, and her siblings  might speak w/ her oncology team to see what treatments they are considering for her, this may help her in her decision making about hospice. Pt agreed .  Spoke about her support from family , and how she was thankful for that. Answered all questions today, and pt has my contact info for any other questions.

## 2022-10-20 NOTE — PROGRESS NOTE ADULT - ATTENDING COMMENTS
Progress note amended to include my discussions with patient, resident, hospitalist, RN, SW, PT and my findings    Patient still with nausea and reduced po acceptance, however tolerating dissolvable zofran with some relative improvement symptoms. Unable to tolerate prednisone; palliative recommendations for pain management discussed. Dilaudid changed from 1 IM q4 to 2 mg IM q 6 hours. BUE: still swollen, but improved from yesterday, patient reports having them elevated overnight. Has faint, trace pretibial edema BLE, no calf TTP or warmth    Labs reviewd, Na 138, WBC 7.68. No further diarrhea. Continue program, to work on telehealth appointment with oncology to discuss treatment/palliative options, possible hospice care pending that discussion

## 2022-10-21 PROCEDURE — 99233 SBSQ HOSP IP/OBS HIGH 50: CPT

## 2022-10-21 PROCEDURE — 90834 PSYTX W PT 45 MINUTES: CPT

## 2022-10-21 PROCEDURE — 99232 SBSQ HOSP IP/OBS MODERATE 35: CPT

## 2022-10-21 RX ORDER — ALPRAZOLAM 0.25 MG
0.25 TABLET ORAL EVERY 6 HOURS
Refills: 0 | Status: DISCONTINUED | OUTPATIENT
Start: 2022-10-21 | End: 2022-10-27

## 2022-10-21 RX ADMIN — FENTANYL CITRATE 1 PATCH: 50 INJECTION INTRAVENOUS at 19:21

## 2022-10-21 RX ADMIN — Medication 125 MILLIGRAM(S): at 17:29

## 2022-10-21 RX ADMIN — HYDROMORPHONE HYDROCHLORIDE 2 MILLIGRAM(S): 2 INJECTION INTRAMUSCULAR; INTRAVENOUS; SUBCUTANEOUS at 06:28

## 2022-10-21 RX ADMIN — FENTANYL CITRATE 1 PATCH: 50 INJECTION INTRAVENOUS at 08:11

## 2022-10-21 RX ADMIN — Medication 125 MILLIGRAM(S): at 12:29

## 2022-10-21 RX ADMIN — Medication 125 MILLIGRAM(S): at 00:32

## 2022-10-21 RX ADMIN — HYDROMORPHONE HYDROCHLORIDE 2 MILLIGRAM(S): 2 INJECTION INTRAMUSCULAR; INTRAVENOUS; SUBCUTANEOUS at 22:40

## 2022-10-21 RX ADMIN — HYDROMORPHONE HYDROCHLORIDE 2 MILLIGRAM(S): 2 INJECTION INTRAMUSCULAR; INTRAVENOUS; SUBCUTANEOUS at 21:55

## 2022-10-21 RX ADMIN — HYDROMORPHONE HYDROCHLORIDE 2 MILLIGRAM(S): 2 INJECTION INTRAMUSCULAR; INTRAVENOUS; SUBCUTANEOUS at 16:44

## 2022-10-21 RX ADMIN — HYDROMORPHONE HYDROCHLORIDE 2 MILLIGRAM(S): 2 INJECTION INTRAMUSCULAR; INTRAVENOUS; SUBCUTANEOUS at 06:58

## 2022-10-21 RX ADMIN — HYDROMORPHONE HYDROCHLORIDE 2 MILLIGRAM(S): 2 INJECTION INTRAMUSCULAR; INTRAVENOUS; SUBCUTANEOUS at 01:01

## 2022-10-21 RX ADMIN — Medication 250 MILLIGRAM(S): at 17:36

## 2022-10-21 RX ADMIN — HYDROMORPHONE HYDROCHLORIDE 2 MILLIGRAM(S): 2 INJECTION INTRAMUSCULAR; INTRAVENOUS; SUBCUTANEOUS at 00:31

## 2022-10-21 RX ADMIN — HYDROMORPHONE HYDROCHLORIDE 2 MILLIGRAM(S): 2 INJECTION INTRAMUSCULAR; INTRAVENOUS; SUBCUTANEOUS at 15:50

## 2022-10-21 RX ADMIN — ENOXAPARIN SODIUM 40 MILLIGRAM(S): 100 INJECTION SUBCUTANEOUS at 08:30

## 2022-10-21 RX ADMIN — Medication 125 MILLIGRAM(S): at 08:24

## 2022-10-21 RX ADMIN — Medication 4 MILLIGRAM(S): at 17:41

## 2022-10-21 NOTE — PROGRESS NOTE ADULT - ASSESSMENT
Palliative:   Pt has advanced metastatic ca and is favoring hospice approach.  At this time it is unclear if she is candidate for in patient hospice.  We will reevaluate after this weekend

## 2022-10-21 NOTE — PROGRESS NOTE ADULT - ASSESSMENT
Pt alert, attentive, intact language, thought processes goal-directed, no abnormal thought contents noted, mildly depressed affect, euthymic mood, denied AH/VH, denied SI/HI/I/P, calm behavior, making efforts to cope by realistic means. Plan: Continue supportive tx.

## 2022-10-21 NOTE — PROGRESS NOTE ADULT - SUBJECTIVE AND OBJECTIVE BOX
Patient is a 66y old  Female who presents with a chief complaint of metastatic breast CA to spine s/p  C7-T2 corpectomy & fusion, posterior C4-T5 fusion (21 Oct 2022 11:58)      HPI:  Thi is a 67 yo female with PMH of left arm skin CA s/p excision at age 16, stomach ulcer, her last mammogram was in , who was brought to [facility] ED 9/10/22 with progressive weakness to the point that she felt she could not get out of bed. She also reported remote shoulder pain, decreased appetite. Was found to have two lumps in her right breast but could not tolerate recommended biopsy due to pain. CT scan of the Cervical spine performed which showed Multiple lytic lesions of the cervical and visualized thoracic spine as well as right first and second ribs concerning for metastases versus multiple myeloma.    She was admitted under medicine service for further workup and management, including of her pain. Further imaging done showed Metastatic breast cancer with dominant right breast mass, right chest wall, axillary and retropectoral involvement; osseous, pulmonary, and likely hepatic metastatic disease. She was also found to have metastatic adenopathy of the thorax, diffuse extensive osseous metastatic disease with multiple lucent and sclerotic lesions and compression fractures of T1, T9, T12, and L2. Severe T1 compression with epidural extension; cord compression cannot be excluded. +additional neoplastic disease of the spine with epidural extension. Multifocal neoplastic lesions involving the pelvis and ribs as well.    Patient was seen by Oncology, CT surgery and Neurosurgery team. She underwent C7-T2 corpectomy & fusion, posterior C4-T5 fusion. Post-op course uncomplicated.  Hamm removed, pt requiring some straight cath for retention. She also had subjective dysphagia - seen by speech therapy recommended pureed diet which patient continues to prefer. Patient with low grade temp and positive UA. Started on IV Rocephin for UTI. Blood cultures negative, Urine culture positive for GNR, changed to PO Vantin for 7 days.    Patient was medically optimized for discharge to [facility] IRF on 22. (30 Sep 2022 14:32)      PAST MEDICAL & SURGICAL HISTORY:  Stomach ulcer  20 years ago   no pain not taking any meds      Skin cancer of arm, left  pt was 16 year old      H/O excision of mass  left arm  when patient was 16 years ago      S/P hysterectomy          MEDICATIONS  (STANDING):  Biotene Dry Mouth Oral Rinse 5 milliLiter(s) Swish and Spit three times a day  dexAMETHasone     Tablet 4 milliGRAM(s) Oral every 12 hours  enoxaparin Injectable 40 milliGRAM(s) SubCutaneous every 24 hours  fentaNYL   Patch  25 MICROgram(s)/Hr 1 Patch Transdermal every 72 hours  magnesium oxide 400 milliGRAM(s) Oral three times a day with meals  metoprolol tartrate 25 milliGRAM(s) Oral two times a day  pantoprazole    Tablet 40 milliGRAM(s) Oral before breakfast  saccharomyces boulardii 250 milliGRAM(s) Oral two times a day  vancomycin    Solution 125 milliGRAM(s) Oral every 6 hours    MEDICATIONS  (PRN):  acetaminophen    Suspension .. 650 milliGRAM(s) Oral every 6 hours PRN Mild Pain (1 - 3)  ALBUTerol    0.083% 2.5 milliGRAM(s) Nebulizer every 6 hours PRN Shortness of Breath and/or Wheezing  ALPRAZolam 0.25 milliGRAM(s) Oral every 6 hours PRN anxiety  cyclobenzaprine 5 milliGRAM(s) Oral three times a day PRN Muscle Spasm  HYDROmorphone  Injectable 2 milliGRAM(s) IntraMuscular every 6 hours PRN Moderate Pain (4 - 6)  metoclopramide   Syrup 10 milliGRAM(s) Oral every 6 hours PRN nausea  ondansetron   Disintegrating Tablet 8 milliGRAM(s) Oral every 8 hours PRN Nausea and/or Vomiting  simethicone 80 milliGRAM(s) Chew every 6 hours PRN Gas      Allergies    tetanus toxoid (Hives)    Intolerances    lactose (Other)        VITALS  66y  Vital Signs Last 24 Hrs  T(C): 36.7 (21 Oct 2022 09:25), Max: 36.7 (20 Oct 2022 20:55)  T(F): 98.1 (21 Oct 2022 09:25), Max: 98.1 (21 Oct 2022 09:25)  HR: 107 (21 Oct 2022 09:25) (106 - 109)  BP: 134/84 (21 Oct 2022 09:25) (127/72 - 142/87)  BP(mean): --  RR: 16 (21 Oct 2022 09:25) (16 - 16)  SpO2: 94% (21 Oct 2022 09:25) (94% - 96%)    Parameters below as of 21 Oct 2022 09:25  Patient On (Oxygen Delivery Method): room air      Daily     Daily Weight in k.2 (20 Oct 2022 22:28)        RECENT LABS:                          9.3    7.68  )-----------( 291      ( 20 Oct 2022 07:06 )             28.6     10    138  |  103  |  8   ----------------------------<  99  3.6   |  24  |  0.30<L>    Ca    9.9      20 Oct 2022 07:06            Review of Systems:   	  · ROS	Seen and examined at bedside after therapy was completed earlier in day. She continues to have diarrhea, 1 loose BM since yesterday. However, patient states that the modified analgesic regimen has significantly improved her overall pain levels; "smothering" the pain she had been experiencing. For this reason, she has been better able to tolerate PO medications and participate more actively in therapies. Concerning the ongoing palliative care + hospice conversations, patient states she feels at peace with hospice, but after speaking to our inhouse oncologist yesterday, needs more time to consider her different options.     Physical Exam:   · Constitutional Comments	Overall her affect is brighter today and she appears more comfortable in bed. Not straining or sounding SOB when she speaks.   c-collar in place. anterior incision intact, no redness or oozing +steri strips  	  · Respiratory	clear to auscultation bilaterally; no wheezes; no rales; no rhonchi. Fair effort  · Cardiovascular	regular rate and rhythm; S1 S2 present. Edema in extremities has diminished to 1+ upper, trace lower   · Gastrointestinal	soft; no R/G no TTP although reports persistent left lower quadrant abd pain (baseline, chronic)    	  	  · Motor	Upper extremities remain edematous without pitting, overall improved. Lower extremities with trace edema. Overall her strength reflects her debilitated state.              Patient is a 66y old  Female who presents with a chief complaint of metastatic breast CA to spine s/p  C7-T2 corpectomy & fusion, posterior C4-T5 fusion (21 Oct 2022 11:58)      HPI:  Thi is a 65 yo female with PMH of left arm skin CA s/p excision at age 16, stomach ulcer, her last mammogram was in , who was brought to [facility] ED 9/10/22 with progressive weakness to the point that she felt she could not get out of bed. She also reported remote shoulder pain, decreased appetite. Was found to have two lumps in her right breast but could not tolerate recommended biopsy due to pain. CT scan of the Cervical spine performed which showed Multiple lytic lesions of the cervical and visualized thoracic spine as well as right first and second ribs concerning for metastases versus multiple myeloma.    She was admitted under medicine service for further workup and management, including of her pain. Further imaging done showed Metastatic breast cancer with dominant right breast mass, right chest wall, axillary and retropectoral involvement; osseous, pulmonary, and likely hepatic metastatic disease. She was also found to have metastatic adenopathy of the thorax, diffuse extensive osseous metastatic disease with multiple lucent and sclerotic lesions and compression fractures of T1, T9, T12, and L2. Severe T1 compression with epidural extension; cord compression cannot be excluded. +additional neoplastic disease of the spine with epidural extension. Multifocal neoplastic lesions involving the pelvis and ribs as well.    Patient was seen by Oncology, CT surgery and Neurosurgery team. She underwent C7-T2 corpectomy & fusion, posterior C4-T5 fusion. Post-op course uncomplicated.  Hamm removed, pt requiring some straight cath for retention. She also had subjective dysphagia - seen by speech therapy recommended pureed diet which patient continues to prefer. Patient with low grade temp and positive UA. Started on IV Rocephin for UTI. Blood cultures negative, Urine culture positive for GNR, changed to PO Vantin for 7 days.    Patient was medically optimized for discharge to  IRF on 22. (30 Sep 2022 14:32)      PAST MEDICAL & SURGICAL HISTORY:  Stomach ulcer  20 years ago   no pain not taking any meds      Skin cancer of arm, left  pt was 16 year old      H/O excision of mass  left arm  when patient was 16 years ago      S/P hysterectomy          MEDICATIONS  (STANDING):  Biotene Dry Mouth Oral Rinse 5 milliLiter(s) Swish and Spit three times a day  dexAMETHasone     Tablet 4 milliGRAM(s) Oral every 12 hours  enoxaparin Injectable 40 milliGRAM(s) SubCutaneous every 24 hours  fentaNYL   Patch  25 MICROgram(s)/Hr 1 Patch Transdermal every 72 hours  magnesium oxide 400 milliGRAM(s) Oral three times a day with meals  metoprolol tartrate 25 milliGRAM(s) Oral two times a day  pantoprazole    Tablet 40 milliGRAM(s) Oral before breakfast  saccharomyces boulardii 250 milliGRAM(s) Oral two times a day  vancomycin    Solution 125 milliGRAM(s) Oral every 6 hours    MEDICATIONS  (PRN):  acetaminophen    Suspension .. 650 milliGRAM(s) Oral every 6 hours PRN Mild Pain (1 - 3)  ALBUTerol    0.083% 2.5 milliGRAM(s) Nebulizer every 6 hours PRN Shortness of Breath and/or Wheezing  ALPRAZolam 0.25 milliGRAM(s) Oral every 6 hours PRN anxiety  cyclobenzaprine 5 milliGRAM(s) Oral three times a day PRN Muscle Spasm  HYDROmorphone  Injectable 2 milliGRAM(s) IntraMuscular every 6 hours PRN Moderate Pain (4 - 6)  metoclopramide   Syrup 10 milliGRAM(s) Oral every 6 hours PRN nausea  ondansetron   Disintegrating Tablet 8 milliGRAM(s) Oral every 8 hours PRN Nausea and/or Vomiting  simethicone 80 milliGRAM(s) Chew every 6 hours PRN Gas      Allergies    tetanus toxoid (Hives)    Intolerances    lactose (Other)        VITALS  66y  Vital Signs Last 24 Hrs  T(C): 36.7 (21 Oct 2022 09:25), Max: 36.7 (20 Oct 2022 20:55)  T(F): 98.1 (21 Oct 2022 09:25), Max: 98.1 (21 Oct 2022 09:25)  HR: 107 (21 Oct 2022 09:25) (106 - 109)  BP: 134/84 (21 Oct 2022 09:25) (127/72 - 142/87)  BP(mean): --  RR: 16 (21 Oct 2022 09:25) (16 - 16)  SpO2: 94% (21 Oct 2022 09:25) (94% - 96%)    Parameters below as of 21 Oct 2022 09:25  Patient On (Oxygen Delivery Method): room air      Daily     Daily Weight in k.2 (20 Oct 2022 22:28)        RECENT LABS:                          9.3    7.68  )-----------( 291      ( 20 Oct 2022 07:06 )             28.6     10    138  |  103  |  8   ----------------------------<  99  3.6   |  24  |  0.30<L>    Ca    9.9      20 Oct 2022 07:06            Review of Systems:   	  · ROS	Seen and examined at bedside after therapy was completed earlier in day. She continues to have diarrhea, 1 loose BM since yesterday. However, patient states that the modified analgesic regimen has significantly improved her overall pain levels; "smothering" the pain she had been experiencing on 2mg dose. For this reason, she has been better able to tolerate PO medications and participate more actively in therapies.     Concerning the ongoing palliative care + hospice conversations, patient states she feels at peace with hospice, but after speaking to our inhouse oncologist yesterday, needs more time to consider her different options. Team support and availability including reconsulting oncology for any additional questions or pallaitive care discussed    still with reduced po intake    Physical Exam:   · Constitutional Comments	Overall her affect is brighter today and she appears more comfortable in bed. Not straining or sounding SOB when she speaks.     c-collar in place.  	  · Respiratory	clear to auscultation bilaterally; no wheezes; no rales; no rhonchi. Fair effort  · Cardiovascular	regular rate and rhythm; S1 S2 present.   · Gastrointestinal	soft; no R/G no TTP although reports persistent left lower quadrant abd pain (baseline, chronic)    	  	  · Motor	Upper extremities remain edematous without pitting, overall improved. Lower extremities with trace edema. Overall her strength reflects her debilitated state.

## 2022-10-21 NOTE — PROGRESS NOTE ADULT - PROBLEM SELECTOR PLAN 1
Patient with recently diagnosed Stage IV breast cancer with clinically mets to bone, liver, lungs, brain.  Status post C7-T2 corpectomy & fusion, posterior C4-T5 fusion.  Patient has been seen by radiation oncology, medical oncology at outside hospital.  Plans were for radiation to spine/brain following acute rehab.  She is a candidate for systemic therapy as well (based on immunohistochemical profile). Supportive H/O care while on acute rehab.  *I did discuss with patient goals of care and palliative cancer treatment options.  Explained that there are treatments available for her metastatic breast cancer with goals to try to prolong her life while considering quality of life issues.  Patient expressed her understanding, however, did state that since she has leaned toward hospice she has felt a sense of peace and may decide on that approach.  She was appreciative of the information and did say that she would continue to think about it.  Her questions have been answered to her apparent satisfaction at this time.  Rehab continues.

## 2022-10-21 NOTE — PROGRESS NOTE ADULT - ATTENDING COMMENTS
Progress note amended to include my discussions with patient, resident, hospitalist, RN, SW, PT and my findings    Patient overall looks greatly improved, much more comfortable, less fatigued, more communicative and positive. She reports pain is responding much better to increased dilaudid dose 2 mg instead of 1 mg. She also had meeting with oncologist to talk about options; she reports that when she was feeling very physically ill, that hospice seemed like a peaceful solution. She still feels that way, but knowing there may be immunotherapy instead of traditional chemo options is making her consider treatment as well. She will continue to think and discuss with family. support given.    CBc and BMp ordered for AM. Day #11 vanco, but may benefit from PPX therapy given risk, discussed. Id also suggests possible component of non infectious diarrhea as well, perhaps complete a 2 week course and than monitor, reserving fidaxomicin for relapse. Continue program

## 2022-10-21 NOTE — PROGRESS NOTE ADULT - ASSESSMENT
66y female with a PMH of a gastric ulcer who was recently admitted to [facility] with overall failure to thrive and found to have widely metastatic breast cancer with lung, liver, brain and bone mets.  S/p C-T spine surgery with anterior corpectomy and fusion of C7-T2 and posterior C4-T5 fusion. Post op UTI treated with CTX and cefpodoxime and sent to rehab on 9/30.  She has had a poor appetite, has c/o N/V and has c/o abdominal cramps. Had some diarrhea and a CT scan on 10/11 a showed pancolitis.  Her CT of A/P on 10/2 did not show any colonic thickening. She is taking narcotics which are antiperistaltics and can limit diarrhea.  A stool for C Diff was ordered and PO vanco started.  She received CTX and cefpodoxime, and while her organism was resistant to these agents I would like to limit antibiotics until GI issues controlled.  Her C diff assay was cancelled by the lab on 10/11/22, specimen was found leaky in the bag.  Sample apparently has been submitted and it is positive- PCR positive  Leukocytosis now moderating/resolved  and she has shown some improvement with po vanco  GI PCR negative  She is doing better clinically but still with limited po intake.  Blood cultures finalized as negative  Suggest:  PO vanco 125 4x/day, day # 11, would complete a 14 days course  Hold off on empiric systemic antibiotics, especially now with pancolitis & confirmed C diff  Encourage po intake.  Supportive care, appreciate palliative care f/u  She may have a component of non infectious diarrhea as well, perhaps complete a 2 week course and than monitor, reserving fidaxomicin for relapse.

## 2022-10-21 NOTE — PROGRESS NOTE ADULT - SUBJECTIVE AND OBJECTIVE BOX
SHELLEY DE LEON   66y   Female    Admitting: MARIA EUGENIA Carrizales  HPI:  Neha is a 67 yo female with PMH of left arm skin CA s/p excision at age 16, stomach ulcer, her last mammogram was in 2014, who was brought to [facility] ED 9/10/22 with progressive weakness to the point that she felt she could not get out of bed. She also reported shoulder pain, decreased appetite. Was found to have two lumps in her right breast but could not tolerate recommended biopsy due to pain. CT scan of the Cervical spine performed which showed Multiple lytic lesions of the cervical and visualized thoracic spine as well as right first and second ribs concerning for metastases.    She was admitted under medicine service for further workup and management, including of her pain. Further imaging done showed Metastatic breast cancer with dominant right breast mass, right chest wall, axillary and retropectoral involvement; osseous, pulmonary, and likely hepatic metastatic disease. She was also found to have metastatic adenopathy of the thorax, diffuse extensive osseous metastatic disease with multiple lucent and sclerotic lesions and compression fractures of T1, T9, T12, and L2. Severe T1 compression with epidural extension; cord compression cannot be excluded. +additional neoplastic disease of the spine with epidural extension. Multifocal neoplastic lesions involving the pelvis and ribs as well.    Patient was seen by Oncology, CT surgery and Neurosurgery team. She underwent C7-T2 corpectomy & fusion, posterior C4-T5 fusion. Post-op course uncomplicated.  Hamm removed, pt requiring some straight cath for retention. She also had subjective dysphagia - seen by speech therapy recommended pureed diet which patient continues to prefer. Started on IV Rocephin for UTI. Blood cultures negative, Urine culture positive for GNR, changed to PO Vantin for 7 days.      PAST MEDICAL & SURGICAL HISTORY:  Stomach ulcer  20 years ago       Skin cancer of arm, left  pt was 16 year old      H/O excision of mass  left arm  when patient was 16 years ago      S/P hysterectomy        HEALTH ISSUES - PROBLEM Dx:  Metastatic breast cancer    Pancolitis    Protein calorie malnutrition    Hyponatremia    Hypokalemia    Bone metastases      MEDICATIONS  (STANDING):  Biotene Dry Mouth Oral Rinse 5 milliLiter(s) Swish and Spit three times a day  dexAMETHasone     Tablet 4 milliGRAM(s) Oral every 12 hours  enoxaparin Injectable 40 milliGRAM(s) SubCutaneous every 24 hours  fentaNYL   Patch  25 MICROgram(s)/Hr 1 Patch Transdermal every 72 hours  magnesium oxide 400 milliGRAM(s) Oral three times a day with meals  metoprolol tartrate 25 milliGRAM(s) Oral two times a day  pantoprazole    Tablet 40 milliGRAM(s) Oral before breakfast  saccharomyces boulardii 250 milliGRAM(s) Oral two times a day  vancomycin    Solution 125 milliGRAM(s) Oral every 6 hours    MEDICATIONS  (PRN):  acetaminophen    Suspension .. 650 milliGRAM(s) Oral every 6 hours PRN Mild Pain (1 - 3)  ALBUTerol    0.083% 2.5 milliGRAM(s) Nebulizer every 6 hours PRN Shortness of Breath and/or Wheezing  ALPRAZolam 0.25 milliGRAM(s) Oral every 6 hours PRN anxiety  cyclobenzaprine 5 milliGRAM(s) Oral three times a day PRN Muscle Spasm  HYDROmorphone  Injectable 2 milliGRAM(s) IntraMuscular every 6 hours PRN Moderate Pain (4 - 6)  metoclopramide   Syrup 10 milliGRAM(s) Oral every 6 hours PRN nausea  ondansetron   Disintegrating Tablet 8 milliGRAM(s) Oral every 8 hours PRN Nausea and/or Vomiting  simethicone 80 milliGRAM(s) Chew every 6 hours PRN Gas    Allergies    tetanus toxoid (Hives)    Intolerances    lactose (Other)      INTERVAL HPI/OVERNIGHT EVENTS:  Patient S&E at bedside. Weak.     VITAL SIGNS:  T(F): 98 (10-20-22 @ 20:55)  HR: 106 (10-20-22 @ 20:55)  BP: 127/72 (10-20-22 @ 20:55)  RR: 16 (10-20-22 @ 20:55)  SpO2: 96% (10-20-22 @ 20:55)      PHYSICAL EXAM:  Constitutional: NAD  Eyes: sclera non-icteric  Neck: neck  brace in place  Respiratory: decreased BS bases ant.  Cardiovascular: RRR, no M/R/G  Gastrointestinal: soft, NTND, no masses palpable  Extremities: no calf tenderness  Neurological: Awake, alert.    Labs:             9.3    7.68  )-----------( 291      ( 10-20 @ 07:06 )             28.6                8.9    10.40 )-----------( 317      ( 10-19 @ 06:13 )             27.0       10-20    138  |  103  |  8   ----------------------------<  99  3.6   |  24  |  0.30<L>    Ca    9.9      20 Oct 2022 07:06      Consultant notes reviewed : YES [x ] ; NO [ ]

## 2022-10-21 NOTE — PROGRESS NOTE ADULT - SUBJECTIVE AND OBJECTIVE BOX
CC: f/u for C Diff    Patient reports: she is eating a little better, still is experiencing diarrhea, no longer foul smelling and infrequent    REVIEW OF SYSTEMS:  All other review of systems negative (Comprehensive ROS)    Antimicrobials Day #  :day 11/14  vancomycin    Solution 125 milliGRAM(s) Oral every 6 hours    Other Medications Reviewed  MEDICATIONS  (STANDING):  Biotene Dry Mouth Oral Rinse 5 milliLiter(s) Swish and Spit three times a day  dexAMETHasone     Tablet 4 milliGRAM(s) Oral every 12 hours  enoxaparin Injectable 40 milliGRAM(s) SubCutaneous every 24 hours  fentaNYL   Patch  25 MICROgram(s)/Hr 1 Patch Transdermal every 72 hours  magnesium oxide 400 milliGRAM(s) Oral three times a day with meals  metoprolol tartrate 25 milliGRAM(s) Oral two times a day  pantoprazole    Tablet 40 milliGRAM(s) Oral before breakfast  saccharomyces boulardii 250 milliGRAM(s) Oral two times a day  vancomycin    Solution 125 milliGRAM(s) Oral every 6 hours    T(F): 98.1 (10-21-22 @ 09:25), Max: 98.1 (10-21-22 @ 09:25)  HR: 107 (10-21-22 @ 09:25)  BP: 134/84 (10-21-22 @ 09:25)  RR: 16 (10-21-22 @ 09:25)  SpO2: 94% (10-21-22 @ 09:25)  Wt(kg): --    PHYSICAL EXAM:  General: alert, no acute distress  Eyes:  anicteric, no conjunctival injection, no discharge  Oropharynx: no lesions or injection 	  Neck:collar in place, without adenopathy, incisions intact  Lungs: clear to auscultation  Heart: regular rate and rhythm; no murmur, rubs or gallops  Abdomen: soft, nondistended, nontender, without mass or organomegaly  Skin: no lesions  Extremities: no clubbing, cyanosis, or edema  Neurologic: alert, oriented, moves all extremities    LAB RESULTS:                        9.3    7.68  )-----------( 291      ( 20 Oct 2022 07:06 )             28.6     10-20    138  |  103  |  8   ----------------------------<  99  3.6   |  24  |  0.30<L>    Ca    9.9      20 Oct 2022 07:06          MICROBIOLOGY:  RECENT CULTURES:      RADIOLOGY REVIEWED:

## 2022-10-21 NOTE — PROGRESS NOTE ADULT - SUBJECTIVE AND OBJECTIVE BOX
Pt was seen for 40 min for supportive tx. Pt c/o pain, anxiety. Pt reported feeling better this morning. Pt reported being able to sleep better last night than ever since admission to rehab, eating a little more, and not feelings as nauseous. Pt also reported that she feels that the current dose and scheduling of her pain medication, Dilaudid, is helping her to have at least 2 more hours of relief than she was having before. Pt appeared more relaxed and less exhausted than in previous meetings and after some initial difficulty due to pain and readjusting of her position on the bed, she was able to discuss more freely during the session. Pt reported having a conversation with the palliative tx team a few days ago and with her oncologist this morning, where different options were presented to her. She acknowledged that when she was told about about her condition at [facility] she immediately thought she needed to go to [facility] for end of life hospice care. However, she expressed being aware of different options from home base hospice care, etc., and feeling somewhat more hopeful. Pt expressed hesitancy about what course of action to choose, she was encouraged to clarify with herself what is important for her in life and think of choices that are consisetn with her values. Pt again expressed some doubts whether the back surgery was the best course of action, especially in light of other complications she's had along the way like C. Diff., and the possibility of death in the short-term. Discussed the uncertainty of predictions and the variability in outcomes for cancer in general, in light of recent developments in dx and tx, and how these issues should not preclude having hope and quality of life. Pt participated actively during the session and expressed agreement with the issues discussed. Support and encouragement were provided.

## 2022-10-21 NOTE — PROGRESS NOTE ADULT - ASSESSMENT
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 She also reported shoulder pain, decreased appetite. Was found to have two lumps in her right breast but could not tolerate recommended biopsy due to pain. CT scan of the Cervical spine performed which showed Multiple lytic lesions of the cervical and visualized thoracic spine as well as right first and second ribs concerning for metastases.    She was admitted under medicine service for further workup and management, including of her pain. Further imaging done showed Metastatic breast cancer with dominant right breast mass, right chest wall, axillary and retropectoral involvement; osseous, pulmonary, and likely hepatic metastatic disease. She was also found to have metastatic adenopathy of the thorax, diffuse extensive osseous metastatic disease with multiple lucent and sclerotic lesions and compression fractures of T1, T9, T12, and L2. Severe T1 compression with epidural extension; cord compression cannot be excluded. +additional neoplastic disease of the spine with epidural extension. Multifocal neoplastic lesions involving the pelvis and ribs as well.    Patient was seen by Oncology, CT surgery and Neurosurgery team. She underwent C7-T2 corpectomy & fusion, posterior C4-T5 fusion. Post-op course uncomplicated.  Hamm removed, pt requiring some straight cath for retention. She also had subjective dysphagia - seen by speech therapy recommended pureed diet which patient continues to prefer. Started on IV Rocephin for UTI. Blood cultures negative, Urine culture positive for GNR, changed to PO Vantin for 7 days.    Patient was medically optimized for discharge to [facility] IRF on 9/30/22.

## 2022-10-21 NOTE — PROGRESS NOTE ADULT - ASSESSMENT
67 yo female PMH left arm skin CA s/p excision, stomach ulcer who presented to [facility] 9/10/22 with metastatic breast CA with multiple lytic lesions of the cervical and thoracic spine, right first and second ribs, lung, liver and right chest wall, axillary and retropectoral involvement. She underwent C7-T2 corpectomy & fusion, posterior C4-T5 fusion    # Metastatic Breast CA (invasive ductal carcinoma) to brain, lung, liver, ribs, with spine involvement  - Compression fractures of T1, T9, T12, and L2. Severe T1 compression with epidural extension.   - s/p C7-T2 corpectomy & fusion, posterior C4-T5 fusion (Dr Bennett)  - Plastics appreciated. Remaining staples and suture removed 10/14  - Continue comprehensive rehab program OT, PT, SLP as tolerated  - Oncology 10/17: Pt has medical and rads oncology in community, may also be candidate for systemic therapy on dc, to f/u as outpatient after rehab  - Palliative care consult appreciated 10/14. Pt would be hospice eligible If pt decides not to pursue treatment.  Pt should also f/u w/ palliative/pain management as out pt. Conversation is ongoing, Sw to assist in arranging telehealth appointment with oncology for treatment options and prognosis disucssion  - Precautions: CA, fall, aspiration, spinal, cervical collar, LSO Brace ([employer]htem[employer]; [facility]), contact isolation    # Pain management  - Dilaudid 1 mg IM q4 hour PRN mod-severe breakthrough pain.--> changed to 2 mg IM a 6 PRN 10/20  - Cyclobenzaprine 5 q8-changed to PRN  - Lyrica 25 q8 dc 10/19, patient agreeable  - fentanyl patch increased to 25 mcg 10/17  - Dexamethasone tablet 4 mg PO q12h     # C diff colitis with N/V, diarrhea, tachycardia  - CTPA 10/8: No pulmonary embolus  - CT A/P 10/11: Pancolitis, correlate clinically for inflammatory/infectious etiology, including pseudomembranous colitis.  - BCx Negative  - C Diff Toxin + 10/13  - WBC  7.68 10/10 normalized  - -118, encourage compliance with metoprolol  - Continue PO Vanc, 125 mg q6h, Day #10/14 10/20  - CBC 10/24    # BUE swelling  - doppler negative RUE DVT 10/17  - likely third spacing due to IVf and hypoalbuminemia (1.4 10/16))  - Patient with improved po intake. Hold IVF, reviewed with patient  - ACE wrap for edema management    # hypokalemia  - replete IV K  - K+ 3,5 10/19 -> 3.6 10/20    # hyponatremia  - Na 136 10/19 improved -> 138 10/20  - likely due to D5NS  -  encourage po intake    # enterobacter UTI  - s/p Vantin BID x 7 days    # adjustment symptoms with anxiety and depression  - working on behavioral techniques with psychology, support  - psychiatry consult 10/14 appreciated. Patient declines any medications for mood.    # Bowel Regimen  - Florastor  - Zofran 8 mg PRN. Switched to oral dissolvable    # FEN   - Pureed solids and thins  - MBS 10/13: difficulty with propelling pureed solids to back of throat, some anxiety although pharyngeal swallow appears grossly intact. Will increase SLP time to work on sensory technique    # DVT ppx  - RUe swelling: doppler 10/17 negative  - Lovenox  - SCD, TEDs    # Case discussed in IDT rounds 10/19:  - rolling with min assist, max assist bed mobility, increase po acceptance. Only tolerated OOB one day, refuses trial commode transfers, Limited by pain and nausea, poor endurance  - goals: wheelchair level, transfer min-mod assist  - Kelly when medically stable estimated 10/26 versus possible hospice referral, to discuss with patient and family  - disability form completed 10/7      # LABS  CBC BMP 10/24  Ongoing discussion w/ family, pt re: [facility]     brother Quique [phone]   CODE STATUS: FULL CODE         Outpatient Follow-up (Specialty/Name of physician):    Marj Del Castillo)  Hematology; Hospice Palliative Medicine; Internal Medicine; Medical Oncology  [address] [place] [address]  Phone: [phone]  Fax: [phone]  Follow Up Time: 2 weeks    Bret Ibarra)  Radiation Oncology  [address] [place] [address]  Phone: [phone]  Fax: [phone]  Follow Up Time: 2 weeks 67 yo female PMH left arm skin CA s/p excision, stomach ulcer who presented to [facility] 9/10/22 with metastatic breast CA with multiple lytic lesions of the cervical and thoracic spine, right first and second ribs, lung, liver and right chest wall, axillary and retropectoral involvement. She underwent C7-T2 corpectomy & fusion, posterior C4-T5 fusion    # Metastatic Breast CA (invasive ductal carcinoma) to brain, lung, liver, ribs, with spine involvement  - Compression fractures of T1, T9, T12, and L2. Severe T1 compression with epidural extension.   - s/p C7-T2 corpectomy & fusion, posterior C4-T5 fusion (Dr Bennett)  - Plastics appreciated. Remaining staples and suture removed 10/14  - Continue comprehensive rehab program OT, PT, SLP as tolerated  - Oncology 10/17: Pt has medical and rads oncology in community, may also be candidate for systemic therapy on dc, to f/u as outpatient after rehab  - Palliative care consult appreciated 10/14. Pt would be hospice eligible If pt decides not to pursue treatment.  Pt should also f/u w/ palliative/pain management as out pt. Conversation is ongoing, Sw to assist in arranging telehealth appointment with oncology for treatment options and prognosis disucssion  - Precautions: CA, fall, aspiration, spinal, cervical collar, LSO Brace ([employer]auhtemoc; [facility]), contact isolation    # Pain management  - Dilaudid 1 mg IM q4 hour PRN mod-severe breakthrough pain.--> changed to 2 mg IM a 6 PRN 10/20  - Cyclobenzaprine 5 q8-changed to PRN  - Lyrica 25 q8 dc 10/19, patient agreeable  - fentanyl patch increased to 25 mcg 10/17  - Dexamethasone tablet 4 mg PO q12h     # C diff colitis with N/V, diarrhea, tachycardia  - CTPA 10/8: No pulmonary embolus  - CT A/P 10/11: Pancolitis, correlate clinically for inflammatory/infectious etiology, including pseudomembranous colitis.  - BCx Negative  - C Diff Toxin + 10/13  - WBC  7.68 10/10 normalized  - -118, encourage compliance with metoprolol  - Continue PO Vanc, 125 mg q6h, Day #11/14 10/20  - CBC 10/24    # BUE swelling  - doppler negative RUE DVT 10/17  - likely third spacing due to IVf and hypoalbuminemia (1.4 10/16))  - Patient with improved po intake. Hold IVF, reviewed with patient  - ACE wrap for edema management    # hypokalemia  - replete IV K  - K+ 3,5 10/19 -> 3.6 10/20    # hyponatremia  - Na 136 10/19 improved -> 138 10/20  - likely due to D5NS  -  encourage po intake    # enterobacter UTI  - s/p Vantin BID x 7 days    # adjustment symptoms with anxiety and depression  - working on behavioral techniques with psychology, support  - psychiatry consult 10/14 appreciated. Patient declines any medications for mood.    # Bowel Regimen  - Florastor  - Zofran 8 mg PRN. Switched to oral dissolvable    # FEN   - Pureed solids and thins  - MBS 10/13: difficulty with propelling pureed solids to back of throat, some anxiety although pharyngeal swallow appears grossly intact. Will increase SLP time to work on sensory technique    # DVT ppx  - RUe swelling: doppler 10/17 negative  - Lovenox  - SCD, TEDs    # Case discussed in IDT rounds 10/19:  - rolling with min assist, max assist bed mobility, increase po acceptance. Only tolerated OOB one day, refuses trial commode transfers, Limited by pain and nausea, poor endurance  - goals: wheelchair level, transfer min-mod assist  - Kelly when medically stable estimated 10/26 versus possible hospice referral, to discuss with patient and family  - disability form completed 10/7      # LABS  CBC BMP 10/24  Ongoing discussion w/ family, pt re: [facility]     brother Quique [phone]   CODE STATUS: FULL CODE         Outpatient Follow-up (Specialty/Name of physician):    Marj Del Castillo)  Hematology; Hospice Palliative Medicine; Internal Medicine; Medical Oncology  [address] [place] [address]  Phone: [phone]  Fax: [phone]  Follow Up Time: 2 weeks    Bret Ibarra)  Radiation Oncology  [address] [place] [address]  Phone: [phone]  Fax: [phone]  Follow Up Time: 2 weeks 65 yo female PMH left arm skin CA s/p excision, stomach ulcer who presented to [facility] 9/10/22 with metastatic breast CA with multiple lytic lesions of the cervical and thoracic spine, right first and second ribs, lung, liver and right chest wall, axillary and retropectoral involvement. She underwent C7-T2 corpectomy & fusion, posterior C4-T5 fusion    # Metastatic Breast CA (invasive ductal carcinoma) to brain, lung, liver, ribs, with spine involvement  - Compression fractures of T1, T9, T12, and L2. Severe T1 compression with epidural extension.   - s/p C7-T2 corpectomy & fusion, posterior C4-T5 fusion (Dr Bennett)  - Plastics appreciated. Remaining staples and suture removed 10/14  - Continue comprehensive rehab program OT, PT, SLP as tolerated  - Palliative care and oncology appreciated. Patient deciding on hospice care vs possible immunotherapy  - Precautions: CA, fall, aspiration, spinal, cervical collar, LSO Brace ([place]; [facility]), contact isolation    # Pain management  - Dilaudid changed to 2 mg IM a 6 PRN 10/20 with significant improvement  - Cyclobenzaprine 5 q8 PRN  - Lyrica 25 q8 dc 10/19  - fentanyl patch 25 mcg 10/17  - Dexamethasone tablet 4 mg PO q12h     # C diff colitis with N/V, diarrhea, tachycardia  - CTPA 10/8: No pulmonary embolus  - CT A/P 10/11: Pancolitis, correlate clinically for inflammatory/infectious etiology, including pseudomembranous colitis.  - BCx Negative  - C Diff Toxin + 10/13  - WBC  7.68 10/10 normalized  - Continue PO Vanc, 125 mg q6h, Day #11/14 10/21  - may also benefit from PPX vanco upon completion Rx, discussed with patient  - ID appreciated 10/21,  may have a component of non infectious diarrhea as well, perhaps complete a 2 week course and than monitor, reserving fidaxomicin for relapse.  - CBC 10/22    # BUE swelling  - doppler negative RUE DVT 10/17  - likely third spacing due to IVf and hypoalbuminemia (1.4 10/16))  - Patient with improved po intake. Hold IVF, reviewed with patient  - ACE wrap for edema management    # hypokalemia  - replete IV K  - K+ 3,5 10/19 -> 3.6 10/20  - BMP 10/22    # hyponatremia  - Na 136 10/19 improved -> 138 10/20  - likely due to D5NS  -  encourage po intake  - BMp 10/22    # enterobacter UTI  - s/p Vantin BID x 7 days    # adjustment symptoms with anxiety and depression  - working on behavioral techniques with psychology, support  - psychiatry consult 10/14 appreciated. Patient declines any medications for mood.    # Bowel Regimen  - Florastor  - Zofran 8 mg PRN. Switched to oral dissolvable    # FEN   - Pureed solids and thins  - MBS 10/13: difficulty with propelling pureed solids to back of throat, some anxiety although pharyngeal swallow appears grossly intact. Will increase SLP time to work on sensory technique  - if patient not pursuing hospice may need to consider feeding tube due to poor po intake    # DVT ppx  - RUe swelling: doppler 10/17 negative  - Lovenox  - SCD, TEDs    # Case discussed in IDT rounds 10/19:  - rolling with min assist, max assist bed mobility, increase po acceptance. Only tolerated OOB one day, refuses trial commode transfers, Limited by pain and nausea, poor endurance  - goals: wheelchair level, transfer min-mod assist  - Kelly when medically stable estimated 10/26 versus possible hospice referral, to discuss with patient and family  - disability form completed 10/7      # LABS  CBC [facility] 10/22      brother Qiuque [phone]   CODE STATUS: FULL CODE         Outpatient Follow-up (Specialty/Name of physician):    Marj Del Castillo)  Hematology; Hospice Palliative Medicine; Internal Medicine; Medical Oncology  [address] [place] [address]  Phone: [phone]  Fax: [phone]  Follow Up Time: 2 weeks    Bret Ibarra)  Radiation Oncology  [address] [place] [address]  Phone: [phone]  Fax: [phone]  Follow Up Time: 2 weeks

## 2022-10-21 NOTE — PROGRESS NOTE ADULT - SUBJECTIVE AND OBJECTIVE BOX
Progress: clinically stable    Present Symptoms:   Dyspnea: n  Nausea/Vomiting: n  Anxiety:  n  Depressed Mood: y  Fatigue: y  Loss of appetite: n  Pain: controlled                location:   Review of Systems: [All others negative   MEDICATIONS  (STANDING):  Biotene Dry Mouth Oral Rinse 5 milliLiter(s) Swish and Spit three times a day  dexAMETHasone     Tablet 4 milliGRAM(s) Oral every 12 hours  enoxaparin Injectable 40 milliGRAM(s) SubCutaneous every 24 hours  fentaNYL   Patch  25 MICROgram(s)/Hr 1 Patch Transdermal every 72 hours  magnesium oxide 400 milliGRAM(s) Oral three times a day with meals  metoprolol tartrate 25 milliGRAM(s) Oral two times a day  pantoprazole    Tablet 40 milliGRAM(s) Oral before breakfast  saccharomyces boulardii 250 milliGRAM(s) Oral two times a day  vancomycin    Solution 125 milliGRAM(s) Oral every 6 hours    MEDICATIONS  (PRN):  acetaminophen    Suspension .. 650 milliGRAM(s) Oral every 6 hours PRN Mild Pain (1 - 3)  ALBUTerol    0.083% 2.5 milliGRAM(s) Nebulizer every 6 hours PRN Shortness of Breath and/or Wheezing  ALPRAZolam 0.25 milliGRAM(s) Oral every 6 hours PRN anxiety  cyclobenzaprine 5 milliGRAM(s) Oral three times a day PRN Muscle Spasm  HYDROmorphone  Injectable 2 milliGRAM(s) IntraMuscular every 6 hours PRN Moderate Pain (4 - 6)  metoclopramide   Syrup 10 milliGRAM(s) Oral every 6 hours PRN nausea  ondansetron   Disintegrating Tablet 8 milliGRAM(s) Oral every 8 hours PRN Nausea and/or Vomiting  simethicone 80 milliGRAM(s) Chew every 6 hours PRN Gas      PHYSICAL EXAM:  Vital Signs Last 24 Hrs  T(C): 36.7 (21 Oct 2022 09:25), Max: 36.7 (20 Oct 2022 20:55)  T(F): 98.1 (21 Oct 2022 09:25), Max: 98.1 (21 Oct 2022 09:25)  HR: 107 (21 Oct 2022 09:25) (106 - 109)  BP: 134/84 (21 Oct 2022 09:25) (127/72 - 142/87)  BP(mean): --  RR: 16 (21 Oct 2022 09:25) (16 - 16)  SpO2: 94% (21 Oct 2022 09:25) (94% - 96%)    Parameters below as of 21 Oct 2022 09:25  Patient On (Oxygen Delivery Method): room air      General: alert  oriented x __3__      HEENT: collar in palce  Lungs: comfortable   CV: normal    GI: normal    : normal    Musculoskeletal: normal   Skin: normal    Neuro: as per physiatry  Oral intake ability:  oral feeding      LABS:                          9.3    7.68  )-----------( 291      ( 20 Oct 2022 07:06 )             28.6     10-20    138  |  103  |  8   ----------------------------<  99  3.6   |  24  |  0.30<L>    Ca    9.9      20 Oct 2022 07:06          RADIOLOGY & ADDITIONAL STUDIES:    ADVANCE DIRECTIVES:  Advanced Care Planning discussion total time spent:

## 2022-10-22 LAB
ANION GAP SERPL CALC-SCNC: 8 MMOL/L — SIGNIFICANT CHANGE UP (ref 5–17)
BUN SERPL-MCNC: 9 MG/DL — SIGNIFICANT CHANGE UP (ref 7–23)
CALCIUM SERPL-MCNC: 10.3 MG/DL — SIGNIFICANT CHANGE UP (ref 8.4–10.5)
CHLORIDE SERPL-SCNC: 102 MMOL/L — SIGNIFICANT CHANGE UP (ref 96–108)
CO2 SERPL-SCNC: 25 MMOL/L — SIGNIFICANT CHANGE UP (ref 22–31)
CREAT SERPL-MCNC: 0.29 MG/DL — LOW (ref 0.5–1.3)
EGFR: 118 ML/MIN/1.73M2 — SIGNIFICANT CHANGE UP
GLUCOSE SERPL-MCNC: 106 MG/DL — HIGH (ref 70–99)
HCT VFR BLD CALC: 26.5 % — LOW (ref 34.5–45)
HGB BLD-MCNC: 8.6 G/DL — LOW (ref 11.5–15.5)
MCHC RBC-ENTMCNC: 28.8 PG — SIGNIFICANT CHANGE UP (ref 27–34)
MCHC RBC-ENTMCNC: 32.5 GM/DL — SIGNIFICANT CHANGE UP (ref 32–36)
MCV RBC AUTO: 88.6 FL — SIGNIFICANT CHANGE UP (ref 80–100)
NRBC # BLD: 0 /100 WBCS — SIGNIFICANT CHANGE UP (ref 0–0)
PLATELET # BLD AUTO: 268 K/UL — SIGNIFICANT CHANGE UP (ref 150–400)
POTASSIUM SERPL-MCNC: 4 MMOL/L — SIGNIFICANT CHANGE UP (ref 3.5–5.3)
POTASSIUM SERPL-SCNC: 4 MMOL/L — SIGNIFICANT CHANGE UP (ref 3.5–5.3)
RBC # BLD: 2.99 M/UL — LOW (ref 3.8–5.2)
RBC # FLD: 15.9 % — HIGH (ref 10.3–14.5)
SODIUM SERPL-SCNC: 135 MMOL/L — SIGNIFICANT CHANGE UP (ref 135–145)
WBC # BLD: 7.11 K/UL — SIGNIFICANT CHANGE UP (ref 3.8–10.5)
WBC # FLD AUTO: 7.11 K/UL — SIGNIFICANT CHANGE UP (ref 3.8–10.5)

## 2022-10-22 PROCEDURE — 99232 SBSQ HOSP IP/OBS MODERATE 35: CPT

## 2022-10-22 RX ORDER — LIDOCAINE 4 G/100G
1 CREAM TOPICAL DAILY
Refills: 0 | Status: DISCONTINUED | OUTPATIENT
Start: 2022-10-22 | End: 2022-10-27

## 2022-10-22 RX ADMIN — Medication 25 MILLIGRAM(S): at 06:36

## 2022-10-22 RX ADMIN — HYDROMORPHONE HYDROCHLORIDE 2 MILLIGRAM(S): 2 INJECTION INTRAMUSCULAR; INTRAVENOUS; SUBCUTANEOUS at 18:34

## 2022-10-22 RX ADMIN — LIDOCAINE 1 PATCH: 4 CREAM TOPICAL at 18:20

## 2022-10-22 RX ADMIN — Medication 125 MILLIGRAM(S): at 22:29

## 2022-10-22 RX ADMIN — ENOXAPARIN SODIUM 40 MILLIGRAM(S): 100 INJECTION SUBCUTANEOUS at 05:37

## 2022-10-22 RX ADMIN — HYDROMORPHONE HYDROCHLORIDE 2 MILLIGRAM(S): 2 INJECTION INTRAMUSCULAR; INTRAVENOUS; SUBCUTANEOUS at 19:20

## 2022-10-22 RX ADMIN — Medication 125 MILLIGRAM(S): at 06:35

## 2022-10-22 RX ADMIN — Medication 125 MILLIGRAM(S): at 14:38

## 2022-10-22 RX ADMIN — HYDROMORPHONE HYDROCHLORIDE 2 MILLIGRAM(S): 2 INJECTION INTRAMUSCULAR; INTRAVENOUS; SUBCUTANEOUS at 06:09

## 2022-10-22 RX ADMIN — HYDROMORPHONE HYDROCHLORIDE 2 MILLIGRAM(S): 2 INJECTION INTRAMUSCULAR; INTRAVENOUS; SUBCUTANEOUS at 05:37

## 2022-10-22 RX ADMIN — Medication 125 MILLIGRAM(S): at 00:02

## 2022-10-22 RX ADMIN — HYDROMORPHONE HYDROCHLORIDE 2 MILLIGRAM(S): 2 INJECTION INTRAMUSCULAR; INTRAVENOUS; SUBCUTANEOUS at 13:25

## 2022-10-22 RX ADMIN — LIDOCAINE 1 PATCH: 4 CREAM TOPICAL at 19:00

## 2022-10-22 RX ADMIN — HYDROMORPHONE HYDROCHLORIDE 2 MILLIGRAM(S): 2 INJECTION INTRAMUSCULAR; INTRAVENOUS; SUBCUTANEOUS at 12:33

## 2022-10-22 RX ADMIN — FENTANYL CITRATE 1 PATCH: 50 INJECTION INTRAVENOUS at 19:00

## 2022-10-22 NOTE — PROGRESS NOTE ADULT - SUBJECTIVE AND OBJECTIVE BOX
Patient is a 66y old  Female who presents with a chief complaint of metastatic breast CA to spine s/p  C7-T2 corpectomy & fusion, posterior C4-T5 fusion (21 Oct 2022 16:29)      HPI:  Thi is a 65 yo female with PMH of left arm skin CA s/p excision at age 16, stomach ulcer, her last mammogram was in , who was brought to [facility] ED 9/10/22 with progressive weakness to the point that she felt she could not get out of bed. She also reported remote shoulder pain, decreased appetite. Was found to have two lumps in her right breast but could not tolerate recommended biopsy due to pain. CT scan of the Cervical spine performed which showed Multiple lytic lesions of the cervical and visualized thoracic spine as well as right first and second ribs concerning for metastases versus multiple myeloma.    She was admitted under medicine service for further workup and management, including of her pain. Further imaging done showed Metastatic breast cancer with dominant right breast mass, right chest wall, axillary and retropectoral involvement; osseous, pulmonary, and likely hepatic metastatic disease. She was also found to have metastatic adenopathy of the thorax, diffuse extensive osseous metastatic disease with multiple lucent and sclerotic lesions and compression fractures of T1, T9, T12, and L2. Severe T1 compression with epidural extension; cord compression cannot be excluded. +additional neoplastic disease of the spine with epidural extension. Multifocal neoplastic lesions involving the pelvis and ribs as well.    Patient was seen by Oncology, CT surgery and Neurosurgery team. She underwent C7-T2 corpectomy & fusion, posterior C4-T5 fusion. Post-op course uncomplicated.  Hamm removed, pt requiring some straight cath for retention. She also had subjective dysphagia - seen by speech therapy recommended pureed diet which patient continues to prefer. Patient with low grade temp and positive UA. Started on IV Rocephin for UTI. Blood cultures negative, Urine culture positive for GNR, changed to PO Vantin for 7 days.    Patient was medically optimized for discharge to [facility] IRF on 22. (30 Sep 2022 14:32)      PAST MEDICAL & SURGICAL HISTORY:  Stomach ulcer  20 years ago   no pain not taking any meds      Skin cancer of arm, left  pt was 16 year old      H/O excision of mass  left arm  when patient was 16 years ago      S/P hysterectomy          MEDICATIONS  (STANDING):  Biotene Dry Mouth Oral Rinse 5 milliLiter(s) Swish and Spit three times a day  dexAMETHasone     Tablet 4 milliGRAM(s) Oral every 12 hours  enoxaparin Injectable 40 milliGRAM(s) SubCutaneous every 24 hours  fentaNYL   Patch  25 MICROgram(s)/Hr 1 Patch Transdermal every 72 hours  magnesium oxide 400 milliGRAM(s) Oral three times a day with meals  metoprolol tartrate 25 milliGRAM(s) Oral two times a day  pantoprazole    Tablet 40 milliGRAM(s) Oral before breakfast  saccharomyces boulardii 250 milliGRAM(s) Oral two times a day  vancomycin    Solution 125 milliGRAM(s) Oral every 6 hours    MEDICATIONS  (PRN):  acetaminophen    Suspension .. 650 milliGRAM(s) Oral every 6 hours PRN Mild Pain (1 - 3)  ALBUTerol    0.083% 2.5 milliGRAM(s) Nebulizer every 6 hours PRN Shortness of Breath and/or Wheezing  ALPRAZolam 0.25 milliGRAM(s) Oral every 6 hours PRN anxiety  cyclobenzaprine 5 milliGRAM(s) Oral three times a day PRN Muscle Spasm  HYDROmorphone  Injectable 2 milliGRAM(s) IntraMuscular every 6 hours PRN Moderate Pain (4 - 6)  metoclopramide   Syrup 10 milliGRAM(s) Oral every 6 hours PRN nausea  ondansetron   Disintegrating Tablet 8 milliGRAM(s) Oral every 8 hours PRN Nausea and/or Vomiting  simethicone 80 milliGRAM(s) Chew every 6 hours PRN Gas      Allergies    tetanus toxoid (Hives)    Intolerances    lactose (Other)        VITALS  66y  Vital Signs Last 24 Hrs  T(C): 36.3 (21 Oct 2022 19:50), Max: 36.3 (21 Oct 2022 19:50)  T(F): 97.4 (21 Oct 2022 19:50), Max: 97.4 (21 Oct 2022 19:50)  HR: 113 (22 Oct 2022 06:45) (102 - 113)  BP: 133/81 (22 Oct 2022 06:45) (123/68 - 133/81)  BP(mean): --  RR: 15 (22 Oct 2022 06:45) (15 - 15)  SpO2: 96% (22 Oct 2022 06:45) (96% - 97%)    Parameters below as of 22 Oct 2022 06:45  Patient On (Oxygen Delivery Method): room air      Daily     Daily Weight in k.1 (22 Oct 2022 05:22)        RECENT LABS:                          8.6    7.11  )-----------( 268      ( 22 Oct 2022 06:30 )             26.5     10    135  |  102  |  9   ----------------------------<  106<H>  4.0   |  25  |  0.29<L>    Ca    10.3      22 Oct 2022 06:30                CAPILLARY BLOOD GLUCOSE            Review of Systems:   	  · ROS	Patient states she is in mild discomfort, last dilaudid 3 hours prior to our exam. She states she still can't tolerate oral meds (prednisone) and that pain is in back and hip and radaiates down leg. Has been unable to take lyrica and gabapentin as well    Agreeable to lidocaine patch. tolerating fentanyl and increased dialudid dose    Physical Exam:   · Constitutional Comments	Overall her mood is improved. Improved endurance, improved vocal volume, looks less dry and anxious/distressed  	  · Respiratory	clear to auscultation bilaterally; no wheezes; no rales; no rhonchi. Fair effort  · Cardiovascular	regular rate and rhythm; S1 S2 present.   · Gastrointestinal	soft; no R/G no TTP although reports persistent left lower quadrant abd pain (baseline, chronic)    	  	  · Motor	1-2+ swelling UE but less tense, no redness or warmth  hand trace swelling. Fluid accumulation positionally related

## 2022-10-22 NOTE — PROGRESS NOTE ADULT - ASSESSMENT
65 yo female PMH left arm skin CA s/p excision, stomach ulcer who presented to [facility] 9/10/22 with metastatic breast CA with multiple lytic lesions of the cervical and thoracic spine, right first and second ribs, lung, liver and right chest wall, axillary and retropectoral involvement. She underwent C7-T2 corpectomy & fusion, posterior C4-T5 fusion    # Metastatic Breast CA (invasive ductal carcinoma) to brain, lung, liver, ribs, with spine involvement  - Compression fractures of T1, T9, T12, and L2. Severe T1 compression with epidural extension.   - s/p C7-T2 corpectomy & fusion, posterior C4-T5 fusion (Dr Bennett)  - Plastics appreciated. Remaining staples and suture removed 10/14  - Continue comprehensive rehab program OT, PT, SLP as tolerated  - Palliative care and oncology appreciated. Patient deciding on hospice care vs possible immunotherapy  - Precautions: CA, fall, aspiration, spinal, cervical collar, LSO Brace ([place]; [facility]), contact isolation    # Pain management  - Dilaudid changed to 2 mg IM a 6 PRN 10/20 with significant improvement  - Cyclobenzaprine 5 q8 PRN  - Lyrica 25 q8 dc 10/19  - fentanyl patch 25 mcg 10/17  - Dexamethasone tablet 4 mg PO q12h   - lidocaine patch added to back and hip daily. Discussed with patient 10/22  - care not to use MHP jose given metastatic disease and if considering treatment discussed    # C diff colitis with N/V, diarrhea, tachycardia  - CTPA 10/8: No pulmonary embolus  - CT A/P 10/11: Pancolitis, correlate clinically for inflammatory/infectious etiology, including pseudomembranous colitis.  - BCx Negative  - C Diff Toxin + 10/13  - Continue PO Vanc, 125 mg q6h, Day #12/14 10/22  - may also benefit from PPX vanco upon completion Rx, discussed with patient  - ID appreciated 10/21,  may have a component of non infectious diarrhea as well, perhaps complete a 2 week course and than monitor, reserving fidaxomicin for relapse.  - WBC 7.11 10/22    # BUE swelling  - doppler negative RUE DVT 10/17  - likely third spacing due to IVf and hypoalbuminemia (1.4 10/16))  - Patient with improved po intake. Hold IVF, reviewed with patient  - ACE wrap for edema management    # hypokalemia  - replete IV K  - K+ 3,5 10/19 -> 3.6 10/20--> 4.0 10/22    # hyponatremia  - Na 136 10/19 improved -> 138 10/20--> 135 10/22  - likely due to D5NS  -  encourage po intake    # enterobacter UTI  - s/p Vantin BID x 7 days    # adjustment symptoms with anxiety and depression  - working on behavioral techniques with psychology, support  - psychiatry consult 10/14 appreciated. Patient declines any medications for mood.    # Bowel Regimen  - Florastor  - Zofran 8 mg PRN. Switched to oral dissolvable    # FEN   - Pureed solids and thins  - MBS 10/13: difficulty with propelling pureed solids to back of throat, some anxiety although pharyngeal swallow appears grossly intact. Will increase SLP time to work on sensory technique  - if patient not pursuing hospice may need to consider feeding tube due to poor po intake    # DVT ppx  - RUe swelling: doppler 10/17 negative  - Lovenox  - SCD, TEDs    # Case discussed in IDT rounds 10/19:  - rolling with min assist, max assist bed mobility, increase po acceptance. Only tolerated OOB one day, refuses trial commode transfers, Limited by pain and nausea, poor endurance  - goals: wheelchair level, transfer min-mod assist  - Kelly when medically stable estimated 10/26 versus possible hospice referral, to discuss with patient and family  - disability form completed 10/7      # LABS  CBC BMP 10/24      brother Quique [phone]   CODE STATUS: FULL CODE         Outpatient Follow-up (Specialty/Name of physician):    Marj Del Castillo)  Hematology; Hospice Palliative Medicine; Internal Medicine; Medical Oncology  [address] [place] [address]  Phone: [phone]  Fax: [phone]  Follow Up Time: 2 weeks    Bret Ibarra)  Radiation Oncology  [address] [place] [address]  Phone: [phone]  Fax: [phone]  Follow Up Time: 2 weeks

## 2022-10-23 PROCEDURE — 99232 SBSQ HOSP IP/OBS MODERATE 35: CPT

## 2022-10-23 RX ORDER — FENTANYL CITRATE 50 UG/ML
1 INJECTION INTRAVENOUS
Refills: 0 | Status: DISCONTINUED | OUTPATIENT
Start: 2022-10-23 | End: 2022-10-27

## 2022-10-23 RX ADMIN — LIDOCAINE 1 PATCH: 4 CREAM TOPICAL at 06:13

## 2022-10-23 RX ADMIN — Medication 125 MILLIGRAM(S): at 09:31

## 2022-10-23 RX ADMIN — HYDROMORPHONE HYDROCHLORIDE 2 MILLIGRAM(S): 2 INJECTION INTRAMUSCULAR; INTRAVENOUS; SUBCUTANEOUS at 01:21

## 2022-10-23 RX ADMIN — FENTANYL CITRATE 1 PATCH: 50 INJECTION INTRAVENOUS at 12:39

## 2022-10-23 RX ADMIN — HYDROMORPHONE HYDROCHLORIDE 2 MILLIGRAM(S): 2 INJECTION INTRAMUSCULAR; INTRAVENOUS; SUBCUTANEOUS at 00:35

## 2022-10-23 RX ADMIN — FENTANYL CITRATE 1 PATCH: 50 INJECTION INTRAVENOUS at 06:12

## 2022-10-23 RX ADMIN — LIDOCAINE 1 PATCH: 4 CREAM TOPICAL at 19:41

## 2022-10-23 RX ADMIN — HYDROMORPHONE HYDROCHLORIDE 2 MILLIGRAM(S): 2 INJECTION INTRAMUSCULAR; INTRAVENOUS; SUBCUTANEOUS at 14:26

## 2022-10-23 RX ADMIN — HYDROMORPHONE HYDROCHLORIDE 2 MILLIGRAM(S): 2 INJECTION INTRAMUSCULAR; INTRAVENOUS; SUBCUTANEOUS at 21:05

## 2022-10-23 RX ADMIN — ENOXAPARIN SODIUM 40 MILLIGRAM(S): 100 INJECTION SUBCUTANEOUS at 06:09

## 2022-10-23 RX ADMIN — LIDOCAINE 1 PATCH: 4 CREAM TOPICAL at 15:21

## 2022-10-23 RX ADMIN — HYDROMORPHONE HYDROCHLORIDE 2 MILLIGRAM(S): 2 INJECTION INTRAMUSCULAR; INTRAVENOUS; SUBCUTANEOUS at 08:18

## 2022-10-23 RX ADMIN — FENTANYL CITRATE 1 PATCH: 50 INJECTION INTRAVENOUS at 08:46

## 2022-10-23 RX ADMIN — Medication 125 MILLIGRAM(S): at 15:20

## 2022-10-23 RX ADMIN — LIDOCAINE 1 PATCH: 4 CREAM TOPICAL at 15:20

## 2022-10-23 RX ADMIN — Medication 125 MILLIGRAM(S): at 21:06

## 2022-10-23 RX ADMIN — HYDROMORPHONE HYDROCHLORIDE 2 MILLIGRAM(S): 2 INJECTION INTRAMUSCULAR; INTRAVENOUS; SUBCUTANEOUS at 09:10

## 2022-10-23 RX ADMIN — HYDROMORPHONE HYDROCHLORIDE 2 MILLIGRAM(S): 2 INJECTION INTRAMUSCULAR; INTRAVENOUS; SUBCUTANEOUS at 22:55

## 2022-10-23 RX ADMIN — FENTANYL CITRATE 1 PATCH: 50 INJECTION INTRAVENOUS at 19:40

## 2022-10-23 RX ADMIN — FENTANYL CITRATE 1 PATCH: 50 INJECTION INTRAVENOUS at 07:00

## 2022-10-23 RX ADMIN — Medication 125 MILLIGRAM(S): at 04:31

## 2022-10-23 RX ADMIN — LIDOCAINE 1 PATCH: 4 CREAM TOPICAL at 19:40

## 2022-10-23 RX ADMIN — HYDROMORPHONE HYDROCHLORIDE 2 MILLIGRAM(S): 2 INJECTION INTRAMUSCULAR; INTRAVENOUS; SUBCUTANEOUS at 15:15

## 2022-10-23 NOTE — PROGRESS NOTE ADULT - SUBJECTIVE AND OBJECTIVE BOX
Patient is a 66y old  Female who presents with a chief complaint of metastatic breast CA to spine s/p  C7-T2 corpectomy & fusion, posterior C4-T5 fusion (22 Oct 2022 14:07)      HPI:  Thi is a 65 yo female with PMH of left arm skin CA s/p excision at age 16, stomach ulcer, her last mammogram was in 2014, who was brought to [facility] ED 9/10/22 with progressive weakness to the point that she felt she could not get out of bed. She also reported remote shoulder pain, decreased appetite. Was found to have two lumps in her right breast but could not tolerate recommended biopsy due to pain. CT scan of the Cervical spine performed which showed Multiple lytic lesions of the cervical and visualized thoracic spine as well as right first and second ribs concerning for metastases versus multiple myeloma.    She was admitted under medicine service for further workup and management, including of her pain. Further imaging done showed Metastatic breast cancer with dominant right breast mass, right chest wall, axillary and retropectoral involvement; osseous, pulmonary, and likely hepatic metastatic disease. She was also found to have metastatic adenopathy of the thorax, diffuse extensive osseous metastatic disease with multiple lucent and sclerotic lesions and compression fractures of T1, T9, T12, and L2. Severe T1 compression with epidural extension; cord compression cannot be excluded. +additional neoplastic disease of the spine with epidural extension. Multifocal neoplastic lesions involving the pelvis and ribs as well.    Patient was seen by Oncology, CT surgery and Neurosurgery team. She underwent C7-T2 corpectomy & fusion, posterior C4-T5 fusion. Post-op course uncomplicated.  Hamm removed, pt requiring some straight cath for retention. She also had subjective dysphagia - seen by speech therapy recommended pureed diet which patient continues to prefer. Patient with low grade temp and positive UA. Started on IV Rocephin for UTI. Blood cultures negative, Urine culture positive for GNR, changed to PO Vantin for 7 days.    Patient was medically optimized for discharge to [facility] IRF on 9/30/22. (30 Sep 2022 14:32)      PAST MEDICAL & SURGICAL HISTORY:  Stomach ulcer  20 years ago   no pain not taking any meds      Skin cancer of arm, left  pt was 16 year old      H/O excision of mass  left arm  when patient was 16 years ago      S/P hysterectomy          MEDICATIONS  (STANDING):  Biotene Dry Mouth Oral Rinse 5 milliLiter(s) Swish and Spit three times a day  dexAMETHasone     Tablet 4 milliGRAM(s) Oral every 12 hours  enoxaparin Injectable 40 milliGRAM(s) SubCutaneous every 24 hours  fentaNYL   Patch  25 MICROgram(s)/Hr 1 Patch Transdermal every 72 hours  lidocaine   4% Patch 1 Patch Transdermal daily  lidocaine   4% Patch 1 Patch Transdermal daily  magnesium oxide 400 milliGRAM(s) Oral three times a day with meals  metoprolol tartrate 25 milliGRAM(s) Oral two times a day  pantoprazole    Tablet 40 milliGRAM(s) Oral before breakfast  saccharomyces boulardii 250 milliGRAM(s) Oral two times a day  vancomycin    Solution 125 milliGRAM(s) Oral every 6 hours    MEDICATIONS  (PRN):  acetaminophen    Suspension .. 650 milliGRAM(s) Oral every 6 hours PRN Mild Pain (1 - 3)  ALBUTerol    0.083% 2.5 milliGRAM(s) Nebulizer every 6 hours PRN Shortness of Breath and/or Wheezing  ALPRAZolam 0.25 milliGRAM(s) Oral every 6 hours PRN anxiety  cyclobenzaprine 5 milliGRAM(s) Oral three times a day PRN Muscle Spasm  HYDROmorphone  Injectable 2 milliGRAM(s) IntraMuscular every 6 hours PRN Moderate Pain (4 - 6)  metoclopramide   Syrup 10 milliGRAM(s) Oral every 6 hours PRN nausea  ondansetron   Disintegrating Tablet 8 milliGRAM(s) Oral every 8 hours PRN Nausea and/or Vomiting  simethicone 80 milliGRAM(s) Chew every 6 hours PRN Gas      Allergies    tetanus toxoid (Hives)    Intolerances    lactose (Other)        VITALS  66y  Vital Signs Last 24 Hrs  T(C): 36.8 (23 Oct 2022 08:30), Max: 36.8 (23 Oct 2022 08:30)  T(F): 98.3 (23 Oct 2022 08:30), Max: 98.3 (23 Oct 2022 08:30)  HR: 118 (23 Oct 2022 08:30) (110 - 118)  BP: 124/79 (23 Oct 2022 08:30) (124/79 - 137/72)  BP(mean): --  RR: 16 (23 Oct 2022 08:30) (16 - 16)  SpO2: 93% (23 Oct 2022 08:30) (93% - 95%)    Parameters below as of 23 Oct 2022 08:30  Patient On (Oxygen Delivery Method): room air      Daily     Daily         RECENT LABS:                          8.6    7.11  )-----------( 268      ( 22 Oct 2022 06:30 )             26.5     10-22    135  |  102  |  9   ----------------------------<  106<H>  4.0   |  25  |  0.29<L>    Ca    10.3      22 Oct 2022 06:30                CAPILLARY BLOOD GLUCOSE                Review of Systems:   	  · ROS	Patient appeared uncomfortable today. She reports that the fentanyl patch was removed earlier this morning around 6 AM but not replaced yet. She received dilaudid IM however inadequately controlling pain. Has not been able to tolerate many po medications, although po intake slightly better but still severely under daily amount. Medication and EMR reviewed with nursing, fentanyl still active however was not due to be placed until 12 noon. Rescheduled for 9 AM for immediate replacement, discussed with patient    no diarrhea    Physical Exam:   · Constitutional Comments	Overall imrpoved endurance although still fatigued, less anxious and increased attempts po  	  · Respiratory	clear to auscultation bilaterally; no wheezes; no rales; no rhonchi. Fair effort  · Cardiovascular	regular rate and rhythm; S1 S2 present.   · Gastrointestinal	soft; no R/G +BS    	  	  · Motor	1-2+ hands today, 2+ elbow, non tense +pitting  no erythema or warmth  UE repositioned/elevated

## 2022-10-23 NOTE — PROGRESS NOTE ADULT - ASSESSMENT
67 yo female PMH left arm skin CA s/p excision, stomach ulcer who presented to [facility] 9/10/22 with metastatic breast CA with multiple lytic lesions of the cervical and thoracic spine, right first and second ribs, lung, liver and right chest wall, axillary and retropectoral involvement. She underwent C7-T2 corpectomy & fusion, posterior C4-T5 fusion    # Metastatic Breast CA (invasive ductal carcinoma) to brain, lung, liver, ribs, with spine involvement  - Compression fractures of T1, T9, T12, and L2. Severe T1 compression with epidural extension.   - s/p C7-T2 corpectomy & fusion, posterior C4-T5 fusion (Dr Bennett)  - Plastics appreciated. Remaining staples and suture removed 10/14  - Continue comprehensive rehab program OT, PT, SLP as tolerated  - Palliative care and oncology appreciated. Patient deciding on hospice care vs possible immunotherapy  - Precautions: CA, fall, aspiration, spinal, cervical collar, LSO Brace ([place]; [facility]), contact isolation    # Pain management  - Dilaudid changed to 2 mg IM a 6 PRN 10/20 with significant improvement  - Cyclobenzaprine 5 q8 PRN  - gabapentin and lyrica due to patient's intolerance  - fentanyl patch 25 mcg 10/17. May need further increase  - Dexamethasone tablet 4 mg PO q12h. Patient has not been able to tolerate   - lidocaine patch added to back and hip daily. Discussed with patient 10/22  - care not to use MHP jose given metastatic disease and if considering treatment discussed    # C diff colitis with N/V, diarrhea, tachycardia  - CTPA 10/8: No pulmonary embolus  - CT A/P 10/11: Pancolitis, correlate clinically for inflammatory/infectious etiology, including pseudomembranous colitis.  - BCx Negative  - C Diff Toxin + 10/13  - Continue PO Vanc, 125 mg q6h, Day #13/14 10/23  - may also benefit from PPX vanco upon completion Rx, discussed with patient  - Consider vanco for PPX. ID appreciated, rec fidaxomicin for relapse. Reviewed with patient  - WBC 7.11 10/22    # tachycardia  - from poor po intake, C diff  - patient with difficulty tolerating po metoprolol  - -118 10/23    # BUE swelling  - doppler negative RUE DVT 10/17  - likely third spacing due to IVf and hypoalbuminemia (1.4 10/16))  - Patient with improved po intake. Hold IVF, reviewed with patient  - ACE wrap for edema management    # hypokalemia  - replete IV K  - K+ 3,5 10/19 -> 3.6 10/20--> 4.0 10/22  - [facility] 10/24    # hyponatremia  - Na 136 10/19 improved -> 138 10/20--> 135 10/22  - likely due to D5NS  -  encourage po intake  - BMP 10/24    # enterobacter UTI  - s/p Vantin BID x 7 days    # adjustment symptoms with anxiety and depression  - working on behavioral techniques with psychology, support  - psychiatry consult 10/14 appreciated. Patient declines any medications for mood.    # Bowel Regimen  - Florastor  - Zofran 8 mg PRN. Switched to oral dissolvable    # FEN   - Pureed solids and thins  - MBS 10/13: difficulty with propelling pureed solids to back of throat, some anxiety although pharyngeal swallow appears grossly intact. Will increase SLP time to work on sensory technique  - if patient not pursuing hospice may need to consider feeding tube due to poor po intake    # DVT ppx  - RUe swelling: doppler 10/17 negative  - ACe wrapping, edema massage PRN  - Lovenox  - SCD, TEDs    # Case discussed in IDT rounds 10/19:  - rolling with min assist, max assist bed mobility, increase po acceptance. Only tolerated OOB one day, refuses trial commode transfers, Limited by pain and nausea, poor endurance  - goals: wheelchair level, transfer min-mod assist  - Kelly when medically stable estimated 10/26 versus possible hospice referral, to discuss with patient and family  - disability form completed 10/7      # LABS  CBC [facility] 10/24      brother Quique [phone]   CODE STATUS: FULL CODE         Outpatient Follow-up (Specialty/Name of physician):    Marj Del Castillo)  Hematology; Hospice Palliative Medicine; Internal Medicine; Medical Oncology  [address] [place] [address]  Phone: [phone]  Fax: [phone]  Follow Up Time: 2 weeks    Bret Ibarra)  Radiation Oncology  [address] [place] [address]  Phone: [phone]  Fax: [phone]  Follow Up Time: 2 weeks

## 2022-10-24 LAB
ANION GAP SERPL CALC-SCNC: 9 MMOL/L — SIGNIFICANT CHANGE UP (ref 5–17)
BUN SERPL-MCNC: 8 MG/DL — SIGNIFICANT CHANGE UP (ref 7–23)
CALCIUM SERPL-MCNC: 10.8 MG/DL — HIGH (ref 8.4–10.5)
CHLORIDE SERPL-SCNC: 101 MMOL/L — SIGNIFICANT CHANGE UP (ref 96–108)
CO2 SERPL-SCNC: 26 MMOL/L — SIGNIFICANT CHANGE UP (ref 22–31)
CREAT SERPL-MCNC: 0.36 MG/DL — LOW (ref 0.5–1.3)
EGFR: 112 ML/MIN/1.73M2 — SIGNIFICANT CHANGE UP
GLUCOSE SERPL-MCNC: 101 MG/DL — HIGH (ref 70–99)
HCT VFR BLD CALC: 28.3 % — LOW (ref 34.5–45)
HGB BLD-MCNC: 9 G/DL — LOW (ref 11.5–15.5)
MCHC RBC-ENTMCNC: 28.6 PG — SIGNIFICANT CHANGE UP (ref 27–34)
MCHC RBC-ENTMCNC: 31.8 GM/DL — LOW (ref 32–36)
MCV RBC AUTO: 89.8 FL — SIGNIFICANT CHANGE UP (ref 80–100)
NRBC # BLD: 0 /100 WBCS — SIGNIFICANT CHANGE UP (ref 0–0)
PLATELET # BLD AUTO: 307 K/UL — SIGNIFICANT CHANGE UP (ref 150–400)
POTASSIUM SERPL-MCNC: 3.9 MMOL/L — SIGNIFICANT CHANGE UP (ref 3.5–5.3)
POTASSIUM SERPL-SCNC: 3.9 MMOL/L — SIGNIFICANT CHANGE UP (ref 3.5–5.3)
RBC # BLD: 3.15 M/UL — LOW (ref 3.8–5.2)
RBC # FLD: 15.9 % — HIGH (ref 10.3–14.5)
SODIUM SERPL-SCNC: 136 MMOL/L — SIGNIFICANT CHANGE UP (ref 135–145)
WBC # BLD: 5.54 K/UL — SIGNIFICANT CHANGE UP (ref 3.8–10.5)
WBC # FLD AUTO: 5.54 K/UL — SIGNIFICANT CHANGE UP (ref 3.8–10.5)

## 2022-10-24 PROCEDURE — 99232 SBSQ HOSP IP/OBS MODERATE 35: CPT | Mod: GC

## 2022-10-24 PROCEDURE — 99232 SBSQ HOSP IP/OBS MODERATE 35: CPT

## 2022-10-24 PROCEDURE — 99233 SBSQ HOSP IP/OBS HIGH 50: CPT

## 2022-10-24 RX ORDER — HYDROMORPHONE HYDROCHLORIDE 2 MG/ML
1.5 INJECTION INTRAMUSCULAR; INTRAVENOUS; SUBCUTANEOUS EVERY 4 HOURS
Refills: 0 | Status: DISCONTINUED | OUTPATIENT
Start: 2022-10-24 | End: 2022-10-25

## 2022-10-24 RX ORDER — HYDROMORPHONE HYDROCHLORIDE 2 MG/ML
2 INJECTION INTRAMUSCULAR; INTRAVENOUS; SUBCUTANEOUS ONCE
Refills: 0 | Status: DISCONTINUED | OUTPATIENT
Start: 2022-10-24 | End: 2022-10-24

## 2022-10-24 RX ADMIN — Medication 125 MILLIGRAM(S): at 04:00

## 2022-10-24 RX ADMIN — HYDROMORPHONE HYDROCHLORIDE 2 MILLIGRAM(S): 2 INJECTION INTRAMUSCULAR; INTRAVENOUS; SUBCUTANEOUS at 04:33

## 2022-10-24 RX ADMIN — LIDOCAINE 1 PATCH: 4 CREAM TOPICAL at 05:08

## 2022-10-24 RX ADMIN — HYDROMORPHONE HYDROCHLORIDE 1.5 MILLIGRAM(S): 2 INJECTION INTRAMUSCULAR; INTRAVENOUS; SUBCUTANEOUS at 14:32

## 2022-10-24 RX ADMIN — HYDROMORPHONE HYDROCHLORIDE 1.5 MILLIGRAM(S): 2 INJECTION INTRAMUSCULAR; INTRAVENOUS; SUBCUTANEOUS at 15:15

## 2022-10-24 RX ADMIN — FENTANYL CITRATE 1 PATCH: 50 INJECTION INTRAVENOUS at 19:00

## 2022-10-24 RX ADMIN — HYDROMORPHONE HYDROCHLORIDE 2 MILLIGRAM(S): 2 INJECTION INTRAMUSCULAR; INTRAVENOUS; SUBCUTANEOUS at 09:52

## 2022-10-24 RX ADMIN — HYDROMORPHONE HYDROCHLORIDE 2 MILLIGRAM(S): 2 INJECTION INTRAMUSCULAR; INTRAVENOUS; SUBCUTANEOUS at 10:44

## 2022-10-24 RX ADMIN — FENTANYL CITRATE 1 PATCH: 50 INJECTION INTRAVENOUS at 07:03

## 2022-10-24 RX ADMIN — Medication 125 MILLIGRAM(S): at 11:28

## 2022-10-24 RX ADMIN — HYDROMORPHONE HYDROCHLORIDE 1.5 MILLIGRAM(S): 2 INJECTION INTRAMUSCULAR; INTRAVENOUS; SUBCUTANEOUS at 22:20

## 2022-10-24 RX ADMIN — HYDROMORPHONE HYDROCHLORIDE 2 MILLIGRAM(S): 2 INJECTION INTRAMUSCULAR; INTRAVENOUS; SUBCUTANEOUS at 05:10

## 2022-10-24 RX ADMIN — ENOXAPARIN SODIUM 40 MILLIGRAM(S): 100 INJECTION SUBCUTANEOUS at 04:38

## 2022-10-24 RX ADMIN — HYDROMORPHONE HYDROCHLORIDE 1.5 MILLIGRAM(S): 2 INJECTION INTRAMUSCULAR; INTRAVENOUS; SUBCUTANEOUS at 18:49

## 2022-10-24 RX ADMIN — HYDROMORPHONE HYDROCHLORIDE 1.5 MILLIGRAM(S): 2 INJECTION INTRAMUSCULAR; INTRAVENOUS; SUBCUTANEOUS at 18:15

## 2022-10-24 RX ADMIN — HYDROMORPHONE HYDROCHLORIDE 1.5 MILLIGRAM(S): 2 INJECTION INTRAMUSCULAR; INTRAVENOUS; SUBCUTANEOUS at 22:50

## 2022-10-24 RX ADMIN — Medication 125 MILLIGRAM(S): at 18:16

## 2022-10-24 NOTE — PROGRESS NOTE ADULT - SUBJECTIVE AND OBJECTIVE BOX
SHELLEY DE LEON, 66y Female  MRN: [id number]  ATTENDING: Claire Carrizales    HPI:  66F, PMHx GERD, stomach cancer,, s/p hysterectomy, admitted at [facility] 9/10/2022 with progressive weakness.  CT scan of the cervical spine consistent with multiple lytic lesions cervical and thoracic spine, ribs, likely metastatic disease.  Mammogram showed a dominant right breast mass, right chest wall, axillary and retropectoral lymphadenopathy.  Extensive imaging shows osseous, pulmonary and likely metastatic disease.  X-ray is also consistent with multiple lucent and sclerotic lesion and compression fractures T1, T9, T12 and L2 plus additional neoplastic disease of the spine with epidural extension.    Multidisciplinary approach including oncology, radiation oncology, neurosurgery; patient underwent C7-T2 corpectomy & fusion, posterior C4-T5 fusion. Post-op course uncomplicated.  Hamm removed, pt requiring some straight cath for retention. She also had subjective dysphagia - seen by speech therapy recommended pureed diet which patient continues to prefer. Started on IV Rocephin for UTI. Blood cultures negative, Urine culture positive for GNR, changed to PO Vancomycin for 7 days.  Patient is still complaining of back pain.  She has yet to see medical oncology in ambulatory to discuss further treatment.  Oncology consulted as above.      MEDICATIONS:  acetaminophen    Suspension .. 650 milliGRAM(s) Oral every 6 hours PRN  ALBUTerol    0.083% 2.5 milliGRAM(s) Nebulizer every 6 hours PRN  ALPRAZolam 0.25 milliGRAM(s) Oral every 6 hours PRN  Biotene Dry Mouth Oral Rinse 5 milliLiter(s) Swish and Spit three times a day  cyclobenzaprine 5 milliGRAM(s) Oral three times a day PRN  dexAMETHasone     Tablet 4 milliGRAM(s) Oral every 12 hours  enoxaparin Injectable 40 milliGRAM(s) SubCutaneous every 24 hours  fentaNYL   Patch  25 MICROgram(s)/Hr 1 Patch Transdermal every 72 hours  HYDROmorphone  Injectable 2 milliGRAM(s) IntraMuscular every 6 hours PRN  lidocaine   4% Patch 1 Patch Transdermal daily  lidocaine   4% Patch 1 Patch Transdermal daily  magnesium oxide 400 milliGRAM(s) Oral three times a day with meals  metoclopramide   Syrup 10 milliGRAM(s) Oral every 6 hours PRN  metoprolol tartrate 25 milliGRAM(s) Oral two times a day  ondansetron   Disintegrating Tablet 8 milliGRAM(s) Oral every 8 hours PRN  pantoprazole    Tablet 40 milliGRAM(s) Oral before breakfast  saccharomyces boulardii 250 milliGRAM(s) Oral two times a day  simethicone 80 milliGRAM(s) Chew every 6 hours PRN  vancomycin    Solution 125 milliGRAM(s) Oral every 6 hours    All other medications reviewed.    SUBJECTIVE:  alert; in pain. No overnight events    VITALS:  T(C): 36.5 (10-24-22 @ 04:41), Max: 36.8 (10-23-22 @ 08:30)  T(F): 97.7 (10-24-22 @ 04:41), Max: 98.3 (10-23-22 @ 08:30)  HR: 115 (10-24-22 @ 04:41) (115 - 118)  BP: 126/75 (10-24-22 @ 04:41) (124/79 - 126/75)    PHYSICAL EXAM:  Constitutional: alert, frail  HEENT: normocephalic, anicteric sclerae, no mucositis or thrush; wearing collar  Respiratory: bilateral clear to auscultation anteriorly  Cardiovascular : S1, S2 regular, rhythmic, no murmurs, gallops or rubs  Abdomen: soft, distended, + normoactive BS, no palpable HS- megaly  Extremities: no tenderness;  -c/c/e, pulses equal bilaterally    LABS:  (10-22) WBC: 7.11 K/uL,Hemoglobin: 8.6 g/dL, Hematocrit: 26.5 %,    Platelet: 268 K/uL    RADIOLOGY:  ACC: [id number] EXAM:  CT ABDOMEN AND PELVIS                          PROCEDURE DATE:  10/11/2022          INTERPRETATION:  CLINICAL INFORMATION: Abdominal pain. Metastatic breast   cancer.    COMPARISON: CT scan abdomen pelvis 10/2/2022.    CONTRAST/COMPLICATIONS:  IV Contrast: NONE  Oral Contrast: NONE  Complications: None reported at time of study completion    PROCEDURE:  CT of the Abdomen and Pelvis was performed.  Sagittal and coronal reformats were performed.    FINDINGS:    LOWER CHEST:  Partially imaged heterogeneous soft tissue mass right breast and skin   thickening.    Again again noted are few pulmonary nodules at the visualized bilateral   lung bases.  Dependent bibasilar atelectatic changes.  Trace left-sided pleural effusion.    Streak artifact degrades image quality.  The evaluation of the solid organ parenchyma is limited without   intravenous contrast.    LIVER: Bilobar low-density lesions again noted, suspicious for metastatic   disease.  BILE DUCTS: Normal caliber.  GALLBLADDER: Intraluminal high density, may reflect biliary sludge or   carious excretion.  SPLEEN: Within normal limits.  PANCREAS: Within normal limits.  ADRENALS: Within normal limits.  KIDNEYS/URETERS:  Horseshoe kidney.  Punctate nonobstructing intrarenal calcification lower pole left kidney.    BLADDER: Dependent, layering calcifications left bladder.  REPRODUCTIVE ORGANS: Hysterectomy.    BOWEL:  The evaluation of the stomach and gastrointestinal tract is limited   without distention.  Evaluation of the bowel wall is limited without intravenous contrast.  There is suggestion of diffuse colonic bowel wall thickening, including   the rectum.  There is right-sided pericolic inflammatory stranding.  No bowel obstruction.  The appendix is notclearly demonstrated on this exam.  PERITONEUM: No ascites.    VESSELS: Atherosclerotic changes.  RETROPERITONEUM/LYMPH NODES: No lymphadenopathy.    ABDOMINAL WALL:  Tiny fat-containing umbilical hernia.    BONES:  Again noted is extensive permeative osteolytic metastatic disease   visualized axial and appendicular skeleton, including compression   deformities T12 and L2 vertebral bodies.  There is suggestion of epidural involvement at L2, where there is central   canal involvement.    IMPRESSION:    Limited, noncontrast exam.    Pancolitis, correlate clinically for inflammatory/infectious etiology,   including pseudomembranous colitis.    Partially visualized right breast mass.    Pulmonary and hepatic metastatic disease as noted on prior exam.    Permeative, osteolytic metastatic disease, including L2 compression   fracture with possible epidural involvement.  This can be further characterized by MRI as clinically indicated.

## 2022-10-24 NOTE — PROGRESS NOTE ADULT - PROBLEM SELECTOR PLAN 1
Metastatic breast cancer diagnosed September 2022, multiple sites of disease of (extensive bone, liver, lungs, brain), treatment naïve.  Recovering post C7-T2 corpectomy and fusion, posterior C4-T5 fusion.  Patient aware of the extent of disease leaning towards comfort care/hospice, even though she was explained that there are a multitude of systemic treatments available for which she qualifies.  Needs to follow-up with RT oncology (spine, brain) in ambulatory.  Palliative care consulted; while on acute rehab patient will be treated supportively.  Multiple questions answered.

## 2022-10-24 NOTE — PROGRESS NOTE ADULT - ATTENDING COMMENTS
Patient seen at bedside this am   Patient lying supine in bed   Reports continued pain - generalised and would like pain medications more frequently. Fentanyl patch was increased last week - Patient with poor tolerance for oral medications and per nursing staff has been refusing oral medications  Case discussed with Palliative Care who attempted to see patient today, but patient reported fatigue and did not participate in conversation. Patient unable to participate in therapy due to pain, fatigue , cancer. Await fu to decide on GOC    ID fu noted - Patient completing course of treatment for C diff . Continues to have watery stool - per RN had one this am     2. Neck incision appears healed

## 2022-10-24 NOTE — PROGRESS NOTE ADULT - ASSESSMENT
67 yo female PMH stomach ulcer who presented to [facility] 9/10/22 with progressive weakness, discovered multiple lytic lesions of the cervical and thoracic spine along with lung and liver secondary to metastatic primary newly discovered breast cancer with axillary and retropectoral involvement. Imaging also showed Compression fractures of T1, T9, T12, and L2. Severe T1 compression with epidural extension.S/p C7-T2 corpectomy & fusion, posterior C4-T5 fusion, hospital course significant for urinary retention and dysphagia, UTI, now admitted for rehab- pt/ot/dvt ppx    #Acute hypoxic respiratory failure in need of supplemental oxygenation likely multifactorial including:  *Metastatic disease within mediastinum and rohit  *Metastatic disease within ribs  *Bilateral lung nodules RLL  *Bilateral GGO  *Bilateral atelectasis  -PE ruled out with neg CTA  -C/w alb neb treatement q 6hrs prn  -Incentive spirometer, OOB to chair, mobilize  -Might need oxygen due to increase in cancer burden in lungs  -TTE : EF 70-75%. tachy, hyperdynamic LVF  -Appreciate pulmonary recommendations    *Metastatic Breast CA to lung, liver, ribs, brain with spine involvement and neurogenic bladder  - s/p right breast biopsy shows  positive for invasive ductal carcinoma  - Compression fractures of T1, T9, T12, and L2. Severe T1 compression with epidural extension.   - s/p C7-T2 corpectomy & fusion, posterior C4-T5 fusion  - Patient will not require chemo/radiation while in rehab. 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  - oncology consult     #B/L UE swelling- likely due to mets,  RUE- NO DVT, axillary Lymphadenopathy noted.  -LUE - Swelling likely due to IV infiltration  -arm elevation advised  -monitor    # pain due to mets-   patient is unable to tolerate all PT/OT  meds changed to fentanyl and Dilaudid prn  dexa added 10/19/22    #c.diff colitis- n/v/d/ LEUKOCYTOSIS/ hypokalemia / hyponatremia-   -C Diff by PCR Result: Detected (10.13.22 @ 22:00)  -clinically mildly improved  -po vanco STARTED 10/11/22,   leukocytosis and hypokalemia - improved   stoped iv fluids 10/18/22  zofran SL  c/w ppi  BC X 2 negative 10/11/22  replete K prn  GI consulted - recc noted  ID recc noted  encouraged compliance with po vanco and meds.   tachycardia- likely multifactorial- pain / debility due to cancer with mets- c/w IV fluids, monitor    * HTN  - metoprolol   - EKG sinus tach- likely due to colitis  - cardio recc noted  - encouraged PO hydration      *Enterobacter cloacae UTI  - s/p Vantin BID for 7 days 9/30-10/7/22  - monitor bladder scans  - SC for PVR >350 ml, toileting schedule    * Hypercalcemia- improved with fluids  - likely related to bone mets  - c/w IVF  - repeat BMP and albumin ordered    *Normocytic anemia  - Monitor Hg/Hct, iron levels, B12, folate WNL  - Transfuse if hG <7    Severe protein-calorie malnutrition.  Hypoalbuminemia  - nutrition eval noted    DVT ppx: Lovenox    67 yo female PMH stomach ulcer who presented to [facility] 9/10/22 with progressive weakness, discovered multiple lytic lesions of the cervical and thoracic spine along with lung and liver secondary to metastatic primary newly discovered breast cancer with axillary and retropectoral involvement. Imaging also showed Compression fractures of T1, T9, T12, and L2. Severe T1 compression with epidural extension.S/p C7-T2 corpectomy & fusion, posterior C4-T5 fusion, hospital course significant for urinary retention and dysphagia, UTI, now admitted for rehab- pt/ot/dvt ppx      #Metastatic Breast CA to lung, liver, ribs, brain with spine involvement and neurogenic bladder  - s/p right breast biopsy shows  positive for invasive ductal carcinoma  - Compression fractures of T1, T9, T12, and L2. Severe T1 compression with epidural extension.   - s/p C7-T2 corpectomy & fusion, posterior C4-T5 fusion--> having troubling swallowing.  - Patient will not require chemo/radiation while in rehab. 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  - oncology consulted, reviewed Notes today from Dr. Miguel.     # pain due to mets-   patient is unable to tolerate all PT/OT  meds changed to fentanyl and Dilaudid prn  dexa added 10/19/22  - F/u with Palliative care today. Will work on pain control as still in pain. ?Inc fentanyl patch. Rehab inc frequency of dilaudid. D/w Dr. Pierre.    #Acute hypoxic respiratory failure in need of supplemental oxygenation likely multifactorial including:  *Metastatic disease within mediastinum and rohit  *Metastatic disease within ribs  *Bilateral lung nodules RLL  *Bilateral GGO  *Bilateral atelectasis  -PE ruled out with neg CTA  -C/w alb neb treatement q 6hrs prn  -Incentive spirometer, OOB to chair, mobilize  -Might need oxygen due to increase in cancer burden in lungs  -TTE : EF 70-75%. tachy, hyperdynamic LVF  -Appreciate pulmonary recommendations    #B/L UE swelling- likely due to mets,  RUE- NO DVT, axillary Lymphadenopathy noted.  # Anasarca w/ hypoalbuminemia  # Severe protein calorie malnutrition  -previously on IVF, will minimize fluids if possible.  -arm elevation advised  -monitor  -nutrition consulted     #c.diff colitis- n/v/d/ LEUKOCYTOSIS/ hypokalemia / hyponatremia- improved.  -C Diff by PCR Result: Detected (10.13.22 @ 22:00)  -po vanco STARTED 10/11/22,   -zofran SL  -c/w ppi  -BC X 2 negative 10/11/22  -replete K prn  -GI and ID consulted.    # tachycardia- likely multifactorial- pain / debility due to cancer with mets    # HTN  - metoprolol   - Cardiology consulted.  - encouraged PO hydration    # Enterobacter cloacae UTI  - s/p Vantin BID for 7 days 9/30-10/7/22  - monitor bladder scans  - SC for PVR >350 ml, toileting schedule    # Hypercalcemia- improved with fluids  - likely related to bone mets  - was on IVF  - will obtain ionized calcium with next bloodwork.    # Normocytic anemia  - Monitor Hg/Hct, iron levels, B12, folate WNL  - Transfuse if hG <7    DVT ppx: Lovenox

## 2022-10-24 NOTE — PROGRESS NOTE ADULT - ASSESSMENT
67 yo female PMH left arm skin CA s/p excision, stomach ulcer who presented to [facility] 9/10/22 with metastatic breast CA with multiple lytic lesions of the cervical and thoracic spine, right first and second ribs, lung, liver and right chest wall, axillary and retropectoral involvement. She underwent C7-T2 corpectomy & fusion, posterior C4-T5 fusion    # Metastatic Breast CA (invasive ductal carcinoma) to brain, lung, liver, ribs, with spine involvement  - Being followed by inhouse oncology, rec o/p follow up   - Compression fractures of T1, T9, T12, and L2. Severe T1 compression with epidural extension.   - s/p C7-T2 corpectomy & fusion, posterior C4-T5 fusion (Dr Bennett)  - Plastics appreciated. Remaining staples and suture removed 10/14  - Continue comprehensive rehab program OT, PT, SLP as tolerated  - Palliative care and oncology appreciated. Patient deciding on hospice care vs possible immunotherapy  - Precautions: CA, fall, aspiration, spinal, cervical collar, LSO Brace ([place]; [place] [facility]), contact isolation    # Pain management  - When approached by palliative today, patient reported she was tired and to try again tomorrow   - Dilaudid again changed for more frequency dosing - 1.5 mg IM q4h PRN   - Cyclobenzaprine 5 q8 PRN  - Stopped gabapentin and lyrica due to patient's intolerance  - fentanyl patch 25 mcg 10/17. May need further increase  - Dexamethasone tablet 4 mg PO q12h. Patient has not been able to tolerate   - lidocaine patch added to back and hip daily. Discussed with patient 10/22  - care not to use P jose given metastatic disease and if considering treatment discussed    # C diff colitis with N/V, diarrhea, tachycardia  - CTPA 10/8: No pulmonary embolus  - CT A/P 10/11: Pancolitis, correlate inflammatory/infectious etiology  - BCx Negative  - C Diff Toxin + 10/13  - Complete PO Vanc, 125 mg q6h, Day #14/14 10/24  - Per IOD, hold off on further antibioitics (incl. ppx) at this time. May reserve fidaxomicin for relapse   - WBC improved @ 5.54 10/24    # tachycardia  - from poor po intake, C diff  - patient with difficulty tolerating po metoprolol  - -115 10/24    # BUE swelling  - doppler negative RUE DVT 10/17  - likely third spacing due to IVf and hypoalbuminemia (1.4 10/16))  - Patient with improved po intake. Hold IVF, reviewed with patient  - ACE wrap for edema management    # hypokalemia  - replete IV K  - K+ 3,5 10/19 -> 3.6 10/20--> 4.0 10/22 -> 3.9 10/24   - BMP 10/26    # hyponatremia  - Na 136 10/19 improved -> 138 10/20--> 135 10/22 -> 136 10/24   - likely due to D5NS  - Encourage po intake  - [facility] 10/26    # enterobacter UTI  - s/p Vantin BID x 7 days    # adjustment symptoms with anxiety and depression  - working on behavioral techniques with psychology, support  - psychiatry consult 10/14 appreciated. Patient declines any medications for mood.    # Bowel Regimen  - Florastor  - Zofran 8 mg PRN. Switched to oral dissolvable    # FEN   - Pureed solids and thins  - MBS 10/13: difficulty with propelling pureed solids to back of throat, some anxiety although pharyngeal swallow appears grossly intact. Will increase SLP time to work on sensory technique  - if patient not pursuing hospice may need to consider feeding tube due to poor po intake    # DVT ppx  - RUe swelling: doppler 10/17 negative  - ACe wrapping, edema massage PRN  - Lovenox  - SCD, TEDs    # Case discussed in IDT rounds 10/19:  - rolling with min assist, max assist bed mobility, increase po acceptance. Only tolerated OOB one day, refuses trial commode transfers, Limited by pain and nausea, poor endurance  - goals: wheelchair level, transfer min-mod assist  - Kelly when medically stable estimated 10/26 versus possible hospice referral, to discuss with patient and family  - disability form completed 10/7      # LABS  CBC [facility] 10/26      brother Quique [phone]   CODE STATUS: FULL CODE         Outpatient Follow-up (Specialty/Name of physician):    Marj Del Castillo)  Hematology; Hospice Palliative Medicine; Internal Medicine; Medical Oncology  [address] [place] [address]  Phone: [phone]  Fax: [phone]  Follow Up Time: 2 weeks    Bret Ibarra)  Radiation Oncology  [address] [place] [address]  Phone: [phone]  Fax: [phone]  Follow Up Time: 2 weeks   67 yo female PMH left arm skin CA s/p excision, stomach ulcer who presented to [facility] 9/10/22 with metastatic breast CA with multiple lytic lesions of the cervical and thoracic spine, right first and second ribs, lung, liver and right chest wall, axillary and retropectoral involvement. She underwent C7-T2 corpectomy & fusion, posterior C4-T5 fusion    # Metastatic Breast CA (invasive ductal carcinoma) to brain, lung, liver, ribs, with spine involvement  - Being followed by inhouse oncology, rec o/p follow up   - Compression fractures of T1, T9, T12, and L2. Severe T1 compression with epidural extension.   - s/p C7-T2 corpectomy & fusion, posterior C4-T5 fusion (Dr Bennett)  - Plastics appreciated. Remaining staples and suture removed 10/14  - Continue comprehensive rehab program OT, PT, SLP as tolerated  - Palliative care and oncology appreciated. Patient deciding on hospice care vs possible immunotherapy  - Precautions: fall, aspiration, spinal, cervical collar, LSO Brace ([place]; [place] [facility]), contact isolation    # Pain management  - When approached by palliative today, patient reported she was tired and to try again tomorrow   - Dilaudid again changed for more frequency dosing - 1.5 mg IM q4h PRN   - Cyclobenzaprine 5 q8 PRN  - Stopped gabapentin and lyrica due to patient's intolerance  - fentanyl patch 25 mcg 10/17. May need further increase  - Dexamethasone tablet 4 mg PO q12h. Patient has not been able to tolerate   - lidocaine patch added to back and hip daily.   - care not to use MHP jose given metastatic disease and if considering treatment discussed    # C diff colitis with N/V, diarrhea, tachycardia  - - CT A/P 10/11: Pancolitis, correlate inflammatory/infectious etiology  - BCx Negative  - C Diff Toxin + 10/13  - Complete PO Vanc, 125 mg q6h, Day #14/14 10/24  - Per ID, hold off on further antibioitics (incl. ppx) at this time. May reserve fidaxomicin for relapse   - WBC improved @ 5.54 10/24    # tachycardia  - - patient with difficulty tolerating po metoprolol  - -115 10/24    # BUE swelling  - doppler negative RUE DVT 10/17  - likely third spacing due to IVf and hypoalbuminemia (1.4 10/16))  - Patient with improved po intake. Hold IVF, reviewed with patient  - ACE wrap for edema management    # hypokalemia  - replete IV K  - K+ 3,5 10/19 -> 3.6 10/20--> 4.0 10/22 -> 3.9 10/24   - [facility] 10/26    # hyponatremia  - Na 136 10/19 improved -> 138 10/20--> 135 10/22 -> 136 10/24   - likely due to D5NS  - Encourage po intake  - [facility] 10/26    # enterobacter UTI  - s/p Vantin BID x 7 days    # adjustment symptoms with anxiety and depression  - working on behavioral techniques with psychology, support  - psychiatry consult 10/14 appreciated. Patient declines any medications for mood.    # Bowel Regimen  - Florastor  - Zofran 8 mg PRN. Switched to oral dissolvable    # FEN   - Pureed solids and thins  - [facility] 10/13: difficulty with propelling pureed solids to back of throat, some anxiety although pharyngeal swallow appears grossly intact. Will increase SLP time to work on sensory technique  - if patient not pursuing hospice may need to consider feeding tube due to poor po intake    # DVT ppx  - Lovenox  - SCD, TEDs    # Case discussed in IDT rounds 10/19:  - rolling with min assist, max assist bed mobility, increase po acceptance. Only tolerated OOB one day, refuses trial commode transfers, Limited by pain and nausea, poor endurance  - goals: wheelchair level, transfer min-mod assist  - Kelly when medically stable estimated 10/26 versus possible hospice referral, to discuss with patient and family  - disability form completed 10/7      # LABS  CBC [facility] 10/26      brother Quique [phone]   CODE STATUS: FULL CODE

## 2022-10-24 NOTE — CHART NOTE - NSCHARTNOTESELECT_GEN_ALL_CORE
IPOC
Nutrition Services
Event Note
Event Note
Neuropsychology
Nutrition Services
Event Note

## 2022-10-24 NOTE — PROGRESS NOTE ADULT - SUBJECTIVE AND OBJECTIVE BOX
Patient is a 66y old  Female who presents with a chief complaint of metastatic breast CA to spine s/p  C7-T2 corpectomy & fusion, posterior C4-T5 fusion (24 Oct 2022 14:52)      HPI:  Thi is a 67 yo female with PMH of left arm skin CA s/p excision at age 16, stomach ulcer, her last mammogram was in , who was brought to [facility] ED 9/10/22 with progressive weakness to the point that she felt she could not get out of bed. She also reported remote shoulder pain, decreased appetite. Was found to have two lumps in her right breast but could not tolerate recommended biopsy due to pain. CT scan of the Cervical spine performed which showed Multiple lytic lesions of the cervical and visualized thoracic spine as well as right first and second ribs concerning for metastases versus multiple myeloma.    She was admitted under medicine service for further workup and management, including of her pain. Further imaging done showed Metastatic breast cancer with dominant right breast mass, right chest wall, axillary and retropectoral involvement; osseous, pulmonary, and likely hepatic metastatic disease. She was also found to have metastatic adenopathy of the thorax, diffuse extensive osseous metastatic disease with multiple lucent and sclerotic lesions and compression fractures of T1, T9, T12, and L2. Severe T1 compression with epidural extension; cord compression cannot be excluded. +additional neoplastic disease of the spine with epidural extension. Multifocal neoplastic lesions involving the pelvis and ribs as well.    Patient was seen by Oncology, CT surgery and Neurosurgery team. She underwent C7-T2 corpectomy & fusion, posterior C4-T5 fusion. Post-op course uncomplicated.  Hamm removed, pt requiring some straight cath for retention. She also had subjective dysphagia - seen by speech therapy recommended pureed diet which patient continues to prefer. Patient with low grade temp and positive UA. Started on IV Rocephin for UTI. Blood cultures negative, Urine culture positive for GNR, changed to PO Vantin for 7 days.    Patient was medically optimized for discharge to [facility] IRF on 22. (30 Sep 2022 14:32)      PAST MEDICAL & SURGICAL HISTORY:  Stomach ulcer  20 years ago   no pain not taking any meds      Skin cancer of arm, left  pt was 16 year old      H/O excision of mass  left arm  when patient was 16 years ago      S/P hysterectomy          MEDICATIONS  (STANDING):  Biotene Dry Mouth Oral Rinse 5 milliLiter(s) Swish and Spit three times a day  dexAMETHasone     Tablet 4 milliGRAM(s) Oral every 12 hours  enoxaparin Injectable 40 milliGRAM(s) SubCutaneous every 24 hours  fentaNYL   Patch  25 MICROgram(s)/Hr 1 Patch Transdermal every 72 hours  lidocaine   4% Patch 1 Patch Transdermal daily  lidocaine   4% Patch 1 Patch Transdermal daily  magnesium oxide 400 milliGRAM(s) Oral three times a day with meals  metoprolol tartrate 25 milliGRAM(s) Oral two times a day  pantoprazole    Tablet 40 milliGRAM(s) Oral before breakfast  saccharomyces boulardii 250 milliGRAM(s) Oral two times a day  vancomycin    Solution 125 milliGRAM(s) Oral every 6 hours    MEDICATIONS  (PRN):  acetaminophen    Suspension .. 650 milliGRAM(s) Oral every 6 hours PRN Mild Pain (1 - 3)  ALBUTerol    0.083% 2.5 milliGRAM(s) Nebulizer every 6 hours PRN Shortness of Breath and/or Wheezing  ALPRAZolam 0.25 milliGRAM(s) Oral every 6 hours PRN anxiety  cyclobenzaprine 5 milliGRAM(s) Oral three times a day PRN Muscle Spasm  HYDROmorphone  Injectable 1.5 milliGRAM(s) IntraMuscular every 4 hours PRN Severe Pain (7 - 10)  metoclopramide   Syrup 10 milliGRAM(s) Oral every 6 hours PRN nausea  ondansetron   Disintegrating Tablet 8 milliGRAM(s) Oral every 8 hours PRN Nausea and/or Vomiting  simethicone 80 milliGRAM(s) Chew every 6 hours PRN Gas      Allergies    tetanus toxoid (Hives)    Intolerances    lactose (Other)        VITALS  66y  Vital Signs Last 24 Hrs  T(C): 36.7 (24 Oct 2022 08:44), Max: 36.7 (24 Oct 2022 08:44)  T(F): 98.1 (24 Oct 2022 08:44), Max: 98.1 (24 Oct 2022 08:44)  HR: 109 (24 Oct 2022 08:44) (109 - 115)  BP: 117/89 (24 Oct 2022 08:44) (117/89 - 126/75)  BP(mean): 92 (24 Oct 2022 04:41) (92 - 92)  RR: 16 (24 Oct 2022 08:44) (16 - 16)  SpO2: 95% (24 Oct 2022 08:44) (94% - 96%)    Parameters below as of 24 Oct 2022 08:44  Patient On (Oxygen Delivery Method): room air      Daily     Daily Weight in k.2 (24 Oct 2022 05:07)        RECENT LABS:                          9.0    5.54  )-----------( 307      ( 24 Oct 2022 06:25 )             28.3     10-    136  |  101  |  8   ----------------------------<  101<H>  3.9   |  26  |  0.36<L>    Ca    10.8<H>      24 Oct 2022 06:25                CAPILLARY BLOOD GLUCOSE      Review of Systems:   	  · ROS	Patient seen and examined at bedside. Her pain has returned to an uncomfortable level - she was unable to have her sheets cleaned or participate in much physical therapy because of the discomfort. Today, it is most pronounced in her legs, but she also has pain in the c spine whenever the bed is adjusted. She only had 1 BM since yesterday, which per nurse was loose but not diarrhea. Ordered a stat IM injection of Dilaudid and changed frequency of medication moving forward, so that she could receive every 4 hours.       Physical Exam:   · Constitutional Comments	She is debilitated, physically able to move some extremities but overall quite weak and fatigued. Needs more time to gauge next steps in care. Tolerating selective PO medications.   	  · Respiratory	clear to auscultation bilaterally; no wheezes; no rales; no rhonchi. Fair effort  · Cardiovascular	regular rate and rhythm; S1 S2 present.   · Gastrointestinal	soft; non-distended +BS, which are rumbling and low-pitched     	  	  · Motor	Upper extremities are edematous, non-pitting. Strength generally limited by pain but she is antigravity at all joints. Lower extremities have trace edema, she is able to elevate her legs and is 2/5 w/ DF and PF. Knee movement limited by pain.                    Patient is a 66y old  Female who presents with a chief complaint of metastatic breast CA to spine s/p  C7-T2 corpectomy & fusion, posterior C4-T5 fusion (24 Oct 2022 14:52)      HPI:  Thi is a 65 yo female with PMH of left arm skin CA s/p excision at age 16, stomach ulcer, her last mammogram was in , who was brought to [facility] ED 9/10/22 with progressive weakness to the point that she felt she could not get out of bed. She also reported remote shoulder pain, decreased appetite. Was found to have two lumps in her right breast but could not tolerate recommended biopsy due to pain. CT scan of the Cervical spine performed which showed Multiple lytic lesions of the cervical and visualized thoracic spine as well as right first and second ribs concerning for metastases versus multiple myeloma.    She was admitted under medicine service for further workup and management, including of her pain. Further imaging done showed Metastatic breast cancer with dominant right breast mass, right chest wall, axillary and retropectoral involvement; osseous, pulmonary, and likely hepatic metastatic disease. She was also found to have metastatic adenopathy of the thorax, diffuse extensive osseous metastatic disease with multiple lucent and sclerotic lesions and compression fractures of T1, T9, T12, and L2. Severe T1 compression with epidural extension; cord compression cannot be excluded. +additional neoplastic disease of the spine with epidural extension. Multifocal neoplastic lesions involving the pelvis and ribs as well.    Patient was seen by Oncology, CT surgery and Neurosurgery team. She underwent C7-T2 corpectomy & fusion, posterior C4-T5 fusion. Post-op course uncomplicated.  Hamm removed, pt requiring some straight cath for retention. She also had subjective dysphagia - seen by speech therapy recommended pureed diet which patient continues to prefer. Patient with low grade temp and positive UA. Started on IV Rocephin for UTI. Blood cultures negative, Urine culture positive for GNR, changed to PO Vantin for 7 days.    Patient was medically optimized for discharge to [facility] IRF on 22. (30 Sep 2022 14:32)        Today's subjective :    Patient reports pain - generalised and would like more frequent pain  meds           VITALS  66y  Vital Signs Last 24 Hrs  T(C): 36.7 (24 Oct 2022 08:44), Max: 36.7 (24 Oct 2022 08:44)  T(F): 98.1 (24 Oct 2022 08:44), Max: 98.1 (24 Oct 2022 08:44)  HR: 109 (24 Oct 2022 08:44) (109 - 115)  BP: 117/89 (24 Oct 2022 08:44) (117/89 - 126/75)  BP(mean): 92 (24 Oct 2022 04:41) (92 - 92)  RR: 16 (24 Oct 2022 08:44) (16 - 16)  SpO2: 95% (24 Oct 2022 08:44) (94% - 96%)    Parameters below as of 24 Oct 2022 08:44  Patient On (Oxygen Delivery Method): room air      Daily     Daily Weight in k.2 (24 Oct 2022 05:07)        RECENT LABS:                          9.0    5.54  )-----------( 307      ( 24 Oct 2022 06:25 )             28.3     10-24    136  |  101  |  8   ----------------------------<  101<H>  3.9   |  26  |  0.36<L>    Ca    10.8<H>      24 Oct 2022 06:25                CAPILLARY BLOOD GLUCOSE      Review of Systems:   	  · ROS	Patient seen and examined at bedside. Her pain has returned to an uncomfortable level - she was unable to have her sheets cleaned or participate in much physical therapy because of the discomfort. Today, it is most pronounced in her legs, but she also has pain in the c spine whenever the bed is adjusted. She only had 1 BM since yesterday, which per nurse was loose but not diarrhea. Ordered a stat IM injection of Dilaudid and changed frequency of medication moving forward, so that she could receive every 4 hours.     Denies HA/dizziness  Denies dyspnea     Physical Exam:   · Constitutional Comments	Appears tired   	  · Respiratory	clear to auscultation bilaterally; no wheezes; no rales; no rhonchi. Fair effort  · Cardiovascular	regular rate and rhythm; S1 S2 present.   · Gastrointestinal	soft; non-distended +BS, which are rumbling and low-pitched     	  	  · Motor	Upper extremities are edematous, non-pitting. Strength generally limited by pain but she is antigravity at all joints. Lower extremities have trace edema, she is able to elevate her legs and is 2/5 w/ DF and PF. Knee movement limited by pain.         MEDICATIONS  (STANDING):  Biotene Dry Mouth Oral Rinse 5 milliLiter(s) Swish and Spit three times a day  dexAMETHasone     Tablet 4 milliGRAM(s) Oral every 12 hours  enoxaparin Injectable 40 milliGRAM(s) SubCutaneous every 24 hours  fentaNYL   Patch  25 MICROgram(s)/Hr 1 Patch Transdermal every 72 hours  lidocaine   4% Patch 1 Patch Transdermal daily  lidocaine   4% Patch 1 Patch Transdermal daily  magnesium oxide 400 milliGRAM(s) Oral three times a day with meals  metoprolol tartrate 25 milliGRAM(s) Oral two times a day  pantoprazole    Tablet 40 milliGRAM(s) Oral before breakfast  saccharomyces boulardii 250 milliGRAM(s) Oral two times a day  vancomycin    Solution 125 milliGRAM(s) Oral every 6 hours    MEDICATIONS  (PRN):  acetaminophen    Suspension .. 650 milliGRAM(s) Oral every 6 hours PRN Mild Pain (1 - 3)  ALBUTerol    0.083% 2.5 milliGRAM(s) Nebulizer every 6 hours PRN Shortness of Breath and/or Wheezing  ALPRAZolam 0.25 milliGRAM(s) Oral every 6 hours PRN anxiety  cyclobenzaprine 5 milliGRAM(s) Oral three times a day PRN Muscle Spasm  HYDROmorphone  Injectable 1.5 milliGRAM(s) IntraMuscular every 4 hours PRN Severe Pain (7 - 10)  metoclopramide   Syrup 10 milliGRAM(s) Oral every 6 hours PRN nausea  ondansetron   Disintegrating Tablet 8 milliGRAM(s) Oral every 8 hours PRN Nausea and/or Vomiting  simethicone 80 milliGRAM(s) Chew every 6 hours PRN Gas

## 2022-10-24 NOTE — PROGRESS NOTE ADULT - SUBJECTIVE AND OBJECTIVE BOX
Patient is a 66y old  Female who presents with a chief complaint of metastatic breast CA to spine s/p  C7-T2 corpectomy & fusion, posterior C4-T5 fusion (24 Oct 2022 07:13)      24 HOUR EVENTS:  No overnight events reported.     SUBJECTIVE:  Patient seen and examined at bedside.     ALLERGIES:  lactose (Other)  tetanus toxoid (Hives)    MEDICATIONS  (STANDING):  Biotene Dry Mouth Oral Rinse 5 milliLiter(s) Swish and Spit three times a day  dexAMETHasone     Tablet 4 milliGRAM(s) Oral every 12 hours  enoxaparin Injectable 40 milliGRAM(s) SubCutaneous every 24 hours  fentaNYL   Patch  25 MICROgram(s)/Hr 1 Patch Transdermal every 72 hours  lidocaine   4% Patch 1 Patch Transdermal daily  lidocaine   4% Patch 1 Patch Transdermal daily  magnesium oxide 400 milliGRAM(s) Oral three times a day with meals  metoprolol tartrate 25 milliGRAM(s) Oral two times a day  pantoprazole    Tablet 40 milliGRAM(s) Oral before breakfast  saccharomyces boulardii 250 milliGRAM(s) Oral two times a day  vancomycin    Solution 125 milliGRAM(s) Oral every 6 hours    MEDICATIONS  (PRN):  acetaminophen    Suspension .. 650 milliGRAM(s) Oral every 6 hours PRN Mild Pain (1 - 3)  ALBUTerol    0.083% 2.5 milliGRAM(s) Nebulizer every 6 hours PRN Shortness of Breath and/or Wheezing  ALPRAZolam 0.25 milliGRAM(s) Oral every 6 hours PRN anxiety  cyclobenzaprine 5 milliGRAM(s) Oral three times a day PRN Muscle Spasm  HYDROmorphone  Injectable 2 milliGRAM(s) IntraMuscular every 6 hours PRN Moderate Pain (4 - 6)  metoclopramide   Syrup 10 milliGRAM(s) Oral every 6 hours PRN nausea  ondansetron   Disintegrating Tablet 8 milliGRAM(s) Oral every 8 hours PRN Nausea and/or Vomiting  simethicone 80 milliGRAM(s) Chew every 6 hours PRN Gas    Vital Signs Last 24 Hrs  T(F): 97.7 (24 Oct 2022 04:41), Max: 98.3 (23 Oct 2022 08:30)  HR: 115 (24 Oct 2022 04:41) (115 - 118)  BP: 126/75 (24 Oct 2022 04:41) (124/79 - 126/75)  RR: 16 (24 Oct 2022 04:41) (16 - 16)  SpO2: 94% (24 Oct 2022 04:41) (93% - 96%)  I&O's Summary    23 Oct 2022 07:01  -  24 Oct 2022 07:00  --------------------------------------------------------  IN: 240 mL / OUT: 0 mL / NET: 240 mL      PHYSICAL EXAM:  General: NAD, A/O x 3  ENT: Moist mucous membranes, no thrush  Neck: Supple, No JVD  Lungs: Clear to auscultation bilaterally, good air entry, non-labored breathing  Cardio: RRR, S1/S2, No murmur  Abdomen: Soft, Nontender, Nondistended; Bowel sounds present  Extremities: No calf tenderness, No pitting edema    LABS:                        9.0    5.54  )-----------( 307      ( 24 Oct 2022 06:25 )             28.3     10-22    135  |  102  |  9   ----------------------------<  106  4.0   |  25  |  0.29    Ca    10.3      22 Oct 2022 06:30        COVID-19 PCR: NotDetec (10-19-22 @ 07:25)  COVID-19 PCR: NotDetec (10-13-22 @ 05:37)  COVID-19 PCR: NotDetec (10-07-22 @ 06:10)  COVID-19 PCR: NotDetec (09-30-22 @ 20:50)  COVID-19 PCR: NotDetec (09-27-22 @ 13:00)    RADIOLOGY & ADDITIONAL TESTS:  < from: TTE Echo Complete w/o Contrast w/ Doppler (10.09.22 @ 08:30) >  Summary:   1. The heart rate is tachycardic   110s BPM throughout the study.   2. Hyperdynamic global left ventricular systolic function.   3. Left ventricular ejection fraction, by visual estimation, is 70 to   75%.   4. Decreased left ventricular internal cavity size.   5. Increased LV wall thickness.   6. Mildly increased left ventricle intracavitary gradient which may be   due to hyperdynamic left ventricle systolic function.   7. The right ventricle is not well visualized, appears normal in size   with normal systolic function in parasternal long axis view.   8. The left atrium appears normal in size.   9. Mild tricuspid regurgitation.  10. No aortic valve stenosis.  11. Trivial pericardial effusion.  12. Subcostal window not well visualized.    < end of copied text >        Care Discussed with Consultants/Other Providers:    Patient is a 66y old  Female who presents with a chief complaint of metastatic breast CA to spine s/p  C7-T2 corpectomy & fusion, posterior C4-T5 fusion (24 Oct 2022 07:13)      24 HOUR EVENTS:  No overnight events reported.     SUBJECTIVE:  Patient seen and examined at bedside. States she has diffuse pain from the midwaist and down. She has diffuse swelling everywhere that has now worsened in the last two days. She is thinking about hospice, and leaning towards this option.    ALLERGIES:  lactose (Other)  tetanus toxoid (Hives)    MEDICATIONS  (STANDING):  Biotene Dry Mouth Oral Rinse 5 milliLiter(s) Swish and Spit three times a day  dexAMETHasone     Tablet 4 milliGRAM(s) Oral every 12 hours  enoxaparin Injectable 40 milliGRAM(s) SubCutaneous every 24 hours  fentaNYL   Patch  25 MICROgram(s)/Hr 1 Patch Transdermal every 72 hours  lidocaine   4% Patch 1 Patch Transdermal daily  lidocaine   4% Patch 1 Patch Transdermal daily  magnesium oxide 400 milliGRAM(s) Oral three times a day with meals  metoprolol tartrate 25 milliGRAM(s) Oral two times a day  pantoprazole    Tablet 40 milliGRAM(s) Oral before breakfast  saccharomyces boulardii 250 milliGRAM(s) Oral two times a day  vancomycin    Solution 125 milliGRAM(s) Oral every 6 hours    MEDICATIONS  (PRN):  acetaminophen    Suspension .. 650 milliGRAM(s) Oral every 6 hours PRN Mild Pain (1 - 3)  ALBUTerol    0.083% 2.5 milliGRAM(s) Nebulizer every 6 hours PRN Shortness of Breath and/or Wheezing  ALPRAZolam 0.25 milliGRAM(s) Oral every 6 hours PRN anxiety  cyclobenzaprine 5 milliGRAM(s) Oral three times a day PRN Muscle Spasm  HYDROmorphone  Injectable 2 milliGRAM(s) IntraMuscular every 6 hours PRN Moderate Pain (4 - 6)  metoclopramide   Syrup 10 milliGRAM(s) Oral every 6 hours PRN nausea  ondansetron   Disintegrating Tablet 8 milliGRAM(s) Oral every 8 hours PRN Nausea and/or Vomiting  simethicone 80 milliGRAM(s) Chew every 6 hours PRN Gas    Vital Signs Last 24 Hrs  T(F): 97.7 (24 Oct 2022 04:41), Max: 98.3 (23 Oct 2022 08:30)  HR: 115 (24 Oct 2022 04:41) (115 - 118)  BP: 126/75 (24 Oct 2022 04:41) (124/79 - 126/75)  RR: 16 (24 Oct 2022 04:41) (16 - 16)  SpO2: 94% (24 Oct 2022 04:41) (93% - 96%)  I&O's Summary    23 Oct 2022 07:01  -  24 Oct 2022 07:00  --------------------------------------------------------  IN: 240 mL / OUT: 0 mL / NET: 240 mL      PHYSICAL EXAM:  General: NAD, Awake and alert.   ENT: Moist mucous membranes, no thrush  Neck: wearing cervical collar. old blood on anterior part of neck.  Lungs: Clear to auscultation bilaterally, good air entry, non-labored breathing  Cardio: RRR, S1/S2, No murmur  Abdomen: Soft, Nontender, Nondistended; Bowel sounds present  Extremities: No calf tenderness,  diffuse anasarca    LABS:                        9.0    5.54  )-----------( 307      ( 24 Oct 2022 06:25 )             28.3     10-22    135  |  102  |  9   ----------------------------<  106  4.0   |  25  |  0.29    Ca    10.3      22 Oct 2022 06:30        COVID-19 PCR: NotDetec (10-19-22 @ 07:25)  COVID-19 PCR: NotDetec (10-13-22 @ 05:37)  COVID-19 PCR: NotDetec (10-07-22 @ 06:10)  COVID-19 PCR: NotDetec (09-30-22 @ 20:50)  COVID-19 PCR: NotDetec (09-27-22 @ 13:00)    RADIOLOGY & ADDITIONAL TESTS:  < from: TTE Echo Complete w/o Contrast w/ Doppler (10.09.22 @ 08:30) >  Summary:   1. The heart rate is tachycardic   110s BPM throughout the study.   2. Hyperdynamic global left ventricular systolic function.   3. Left ventricular ejection fraction, by visual estimation, is 70 to   75%.   4. Decreased left ventricular internal cavity size.   5. Increased LV wall thickness.   6. Mildly increased left ventricle intracavitary gradient which may be   due to hyperdynamic left ventricle systolic function.   7. The right ventricle is not well visualized, appears normal in size   with normal systolic function in parasternal long axis view.   8. The left atrium appears normal in size.   9. Mild tricuspid regurgitation.  10. No aortic valve stenosis.  11. Trivial pericardial effusion.  12. Subcostal window not well visualized.    < end of copied text >        Care Discussed with Consultants/Other Providers:    Patient is a 66y old  Female who presents with a chief complaint of metastatic breast CA to spine s/p  C7-T2 corpectomy & fusion, posterior C4-T5 fusion (24 Oct 2022 07:13)      24 HOUR EVENTS:  No overnight events reported.     SUBJECTIVE:  Patient seen and examined at bedside. States she has diffuse pain from the midwaist and down. She has diffuse swelling everywhere that has now worsened in the last two days. She is thinking about hospice, and leaning towards this option.    ALLERGIES:  lactose (Other)  tetanus toxoid (Hives)    MEDICATIONS  (STANDING):  Biotene Dry Mouth Oral Rinse 5 milliLiter(s) Swish and Spit three times a day  dexAMETHasone     Tablet 4 milliGRAM(s) Oral every 12 hours  enoxaparin Injectable 40 milliGRAM(s) SubCutaneous every 24 hours  fentaNYL   Patch  25 MICROgram(s)/Hr 1 Patch Transdermal every 72 hours  lidocaine   4% Patch 1 Patch Transdermal daily  lidocaine   4% Patch 1 Patch Transdermal daily  magnesium oxide 400 milliGRAM(s) Oral three times a day with meals  metoprolol tartrate 25 milliGRAM(s) Oral two times a day  pantoprazole    Tablet 40 milliGRAM(s) Oral before breakfast  saccharomyces boulardii 250 milliGRAM(s) Oral two times a day  vancomycin    Solution 125 milliGRAM(s) Oral every 6 hours    MEDICATIONS  (PRN):  acetaminophen    Suspension .. 650 milliGRAM(s) Oral every 6 hours PRN Mild Pain (1 - 3)  ALBUTerol    0.083% 2.5 milliGRAM(s) Nebulizer every 6 hours PRN Shortness of Breath and/or Wheezing  ALPRAZolam 0.25 milliGRAM(s) Oral every 6 hours PRN anxiety  cyclobenzaprine 5 milliGRAM(s) Oral three times a day PRN Muscle Spasm  HYDROmorphone  Injectable 2 milliGRAM(s) IntraMuscular every 6 hours PRN Moderate Pain (4 - 6)  metoclopramide   Syrup 10 milliGRAM(s) Oral every 6 hours PRN nausea  ondansetron   Disintegrating Tablet 8 milliGRAM(s) Oral every 8 hours PRN Nausea and/or Vomiting  simethicone 80 milliGRAM(s) Chew every 6 hours PRN Gas    Vital Signs Last 24 Hrs  T(F): 97.7 (24 Oct 2022 04:41), Max: 98.3 (23 Oct 2022 08:30)  HR: 115 (24 Oct 2022 04:41) (115 - 118)  BP: 126/75 (24 Oct 2022 04:41) (124/79 - 126/75)  RR: 16 (24 Oct 2022 04:41) (16 - 16)  SpO2: 94% (24 Oct 2022 04:41) (93% - 96%)  I&O's Summary    23 Oct 2022 07:01  -  24 Oct 2022 07:00  --------------------------------------------------------  IN: 240 mL / OUT: 0 mL / NET: 240 mL      PHYSICAL EXAM:  General: NAD, Awake and alert. debilitated and ill appearing.  ENT: Moist mucous membranes, no thrush  Neck: wearing cervical collar. old blood on anterior part of neck.  Lungs: Clear to auscultation bilaterally, good air entry, non-labored breathing  Cardio: RRR, S1/S2, No murmur  Abdomen: Soft, Nontender, Nondistended; Bowel sounds present  Extremities: No calf tenderness,  diffuse anasarca    LABS:                        9.0    5.54  )-----------( 307      ( 24 Oct 2022 06:25 )             28.3     10-22    135  |  102  |  9   ----------------------------<  106  4.0   |  25  |  0.29    Ca    10.3      22 Oct 2022 06:30        COVID-19 PCR: NotDetec (10-19-22 @ 07:25)  COVID-19 PCR: NotDetec (10-13-22 @ 05:37)  COVID-19 PCR: NotDetec (10-07-22 @ 06:10)  COVID-19 PCR: NotDetec (09-30-22 @ 20:50)  COVID-19 PCR: NotDetec (09-27-22 @ 13:00)    RADIOLOGY & ADDITIONAL TESTS:  < from: TTE Echo Complete w/o Contrast w/ Doppler (10.09.22 @ 08:30) >  Summary:   1. The heart rate is tachycardic   110s BPM throughout the study.   2. Hyperdynamic global left ventricular systolic function.   3. Left ventricular ejection fraction, by visual estimation, is 70 to   75%.   4. Decreased left ventricular internal cavity size.   5. Increased LV wall thickness.   6. Mildly increased left ventricle intracavitary gradient which may be   due to hyperdynamic left ventricle systolic function.   7. The right ventricle is not well visualized, appears normal in size   with normal systolic function in parasternal long axis view.   8. The left atrium appears normal in size.   9. Mild tricuspid regurgitation.  10. No aortic valve stenosis.  11. Trivial pericardial effusion.  12. Subcostal window not well visualized.    < end of copied text >        Care Discussed with Consultants/Other Providers:   Dr. Pierre and resident.

## 2022-10-24 NOTE — PROGRESS NOTE ADULT - ASSESSMENT
66y female with a PMH of a gastric ulcer who was recently admitted to [facility] with overall failure to thrive and found to have widely metastatic breast cancer with lung, liver, brain and bone mets.  S/p C-T spine surgery with anterior corpectomy and fusion of C7-T2 and posterior C4-T5 fusion. Post op UTI treated with CTX and cefpodoxime and sent to rehab on 9/30.  She has had a poor appetite, has c/o N/V and has c/o abdominal cramps. Had some diarrhea and a CT scan on 10/11 a showed pancolitis.  Her CT of A/P on 10/2 did not show any colonic thickening. She is taking narcotics which are antiperistaltics and can limit diarrhea.  A stool for C Diff was ordered and PO vanco started.  She received CTX and cefpodoxime, and while her organism was resistant to these agents I would like to limit antibiotics until GI issues controlled.  Her C diff assay was cancelled by the lab on 10/11/22, specimen was found leaky in the bag.  Sample apparently has been submitted and it is positive- PCR positive  Leukocytosis now moderating/resolved  and she has shown some improvement with po vanco  GI PCR negative  She is doing better clinically but still with limited po intake.  Blood cultures finalized as negative  Suggest:  PO vanco 125 4x/day, day # 14, would complete a 14 days course  Hold off on empiric systemic antibiotics, especially now with pancolitis & confirmed C diff  Encourage po intake.  Supportive care, appreciate palliative care f/u  She may have a component of non infectious diarrhea as well, perhaps complete a 2 week course and than monitor, reserving fidaxomicin for relapse.  We could also consider a low dose or vanco taper, as her age, steroids,non ambulatory state, and advanced malignancy create an environment where she will be at high risk for relapse.  Will consider above options over next 24 hours

## 2022-10-24 NOTE — CHART NOTE - NSCHARTNOTEFT_GEN_A_CORE
Nutrition Follow Up Note  Hospital Course   (Per Electronic Medical Record)    Source:  Patient [X]  Family Member [X]   Nursing Staff [X]   Medical Record [X]      Diet: Diet, Pureed:   Banatrol TF     Qty per Day:  2  Supplement Feeding Modality:  Oral  Ensure Clear Cans or Servings Per Day:  1       Frequency:  Three Times a day (10-17-22 @ 13:32) [Active]        Nutrition Follow Up: Patient noted with variable PO intakes. Observed patients breakfast tray in which <25% of her meal was consumed. Patient expressed that she is overwhelmed by staff/providers coming into her room around the same time in the morning which interferes with eating her breakfast. Patient agreed to receive an early breakfast tray daily. Per patient, she consumes most of her meals from fluids/liquids + Jello, Italian Ice, Patient is also currently receiving and tolerating Ensure Plus High Protein 8oz PO TID (Provides 1,050kcal & 60grams of Protein) to optimize PO intake and nutritional status. Patient receptive to receive a house made smoothie prepared in kitchen 1x/day (contains almond milk, 1/2 banana, and honey). Patient states she continues to have loose stools. Currently receiving Banatrol BID. Discussed benefits and proper use of Banatrol supplement. Food preferences obtained. Recommend continue current nutrition plan of care as tolerated.         START [id number]    136  |  101  |  8   ----------------------------<  101<H>  3.9   |  26  |  0.36<L>    Ca    10.8<H>      24 Oct 2022 06:25    END CHEMFISH  START MEDSACTIVEMEDICATIONS  (STANDING):  Biotene Dry Mouth Oral Rinse 5 milliLiter(s) Swish and Spit three times a day  dexAMETHasone     Tablet 4 milliGRAM(s) Oral every 12 hours  enoxaparin Injectable 40 milliGRAM(s) SubCutaneous every 24 hours  fentaNYL   Patch  25 MICROgram(s)/Hr 1 Patch Transdermal every 72 hours  lidocaine   4% Patch 1 Patch Transdermal daily  lidocaine   4% Patch 1 Patch Transdermal daily  magnesium oxide 400 milliGRAM(s) Oral three times a day with meals  metoprolol tartrate 25 milliGRAM(s) Oral two times a day  pantoprazole    Tablet 40 milliGRAM(s) Oral before breakfast  saccharomyces boulardii 250 milliGRAM(s) Oral two times a day  vancomycin    Solution 125 milliGRAM(s) Oral every 6 hours    MEDICATIONS  (PRN):  acetaminophen    Suspension .. 650 milliGRAM(s) Oral every 6 hours PRN Mild Pain (1 - 3)  ALBUTerol    0.083% 2.5 milliGRAM(s) Nebulizer every 6 hours PRN Shortness of Breath and/or Wheezing  ALPRAZolam 0.25 milliGRAM(s) Oral every 6 hours PRN anxiety  cyclobenzaprine 5 milliGRAM(s) Oral three times a day PRN Muscle Spasm  HYDROmorphone  Injectable 1.5 milliGRAM(s) IntraMuscular every 4 hours PRN Severe Pain (7 - 10)  metoclopramide   Syrup 10 milliGRAM(s) Oral every 6 hours PRN nausea  ondansetron   Disintegrating Tablet 8 milliGRAM(s) Oral every 8 hours PRN Nausea and/or Vomiting  simethicone 80 milliGRAM(s) Chew every 6 hours PRN Gas  END MEDSACTIVE  START DIETORDEREND DIETORDER  START ADMITDXPathological fracture in neoplastic disease, other specified site, initial encounter for fracture    END ADMITDX  START IOFS  END IOFS  START SKINPUEND SKINPU        Pertinent Medications: MEDICATIONS  (STANDING):  Biotene Dry Mouth Oral Rinse 5 milliLiter(s) Swish and Spit three times a day  dexAMETHasone     Tablet 4 milliGRAM(s) Oral every 12 hours  enoxaparin Injectable 40 milliGRAM(s) SubCutaneous every 24 hours  fentaNYL   Patch  25 MICROgram(s)/Hr 1 Patch Transdermal every 72 hours  lidocaine   4% Patch 1 Patch Transdermal daily  lidocaine   4% Patch 1 Patch Transdermal daily  magnesium oxide 400 milliGRAM(s) Oral three times a day with meals  metoprolol tartrate 25 milliGRAM(s) Oral two times a day  pantoprazole    Tablet 40 milliGRAM(s) Oral before breakfast  saccharomyces boulardii 250 milliGRAM(s) Oral two times a day  vancomycin    Solution 125 milliGRAM(s) Oral every 6 hours    MEDICATIONS  (PRN):  acetaminophen    Suspension .. 650 milliGRAM(s) Oral every 6 hours PRN Mild Pain (1 - 3)  ALBUTerol    0.083% 2.5 milliGRAM(s) Nebulizer every 6 hours PRN Shortness of Breath and/or Wheezing  ALPRAZolam 0.25 milliGRAM(s) Oral every 6 hours PRN anxiety  cyclobenzaprine 5 milliGRAM(s) Oral three times a day PRN Muscle Spasm  HYDROmorphone  Injectable 1.5 milliGRAM(s) IntraMuscular every 4 hours PRN Severe Pain (7 - 10)  metoclopramide   Syrup 10 milliGRAM(s) Oral every 6 hours PRN nausea  ondansetron   Disintegrating Tablet 8 milliGRAM(s) Oral every 8 hours PRN Nausea and/or Vomiting  simethicone 80 milliGRAM(s) Chew every 6 hours PRN Gas      Pertinent Labs:  10-24 Na136 mmol/L Glu 101 mg/dL<H> K+ 3.9 mmol/L Cr  0.36 mg/dL<L> BUN 8 mg/dL 10-19 Alb 1.5 g/dL<L>            Enteral/Parenteral Nutrition: Not Applicable    Current Weight (lbs):  132. 7 lb on 10/24  Weight Trends (lbs):   132.4 (10/22), 130.5 (10/20), 130.7 (10/19) , 130 (10/18)      Skin: Surgical Incision noted     Edema: + 1 edema @ left/right arm noted    Last Bowel Movement: on 10/23        Estimated Needs:   [X] No Change Since Previous Assessment    Previous Nutrition Diagnosis:   Severe Malnutrition    Nutrition Diagnosis is [X] Ongoing -       Interventions:   1. Recommend continue w/ current nutrition plan of care as tolerated  2. Will provide house made smoothie prepared in kitchen (contains almond milk, 1/2 banana, honey)      Monitoring & Evaluation:   [X] Weights   [X] PO Intake   [X] Skin Integrity   [X] Follow Up (Per Protocol)  [X] Tolerance to Diet Prescription   [X] Other: Labs & POCT    Registered Dietitian/Nutritionist Remains Available.  Nathan Bveerly RD, CDN    Phone# [phone]

## 2022-10-24 NOTE — PROGRESS NOTE ADULT - SUBJECTIVE AND OBJECTIVE BOX
CC: f/u for C Diff    Patient reports: she reports 1 loose BM/day    REVIEW OF SYSTEMS:  All other review of systems negative (Comprehensive ROS)    Antimicrobials Day #  :day 14 of vanco  vancomycin    Solution 125 milliGRAM(s) Oral every 6 hours    Other Medications Reviewed  MEDICATIONS  (STANDING):  Biotene Dry Mouth Oral Rinse 5 milliLiter(s) Swish and Spit three times a day  dexAMETHasone     Tablet 4 milliGRAM(s) Oral every 12 hours  enoxaparin Injectable 40 milliGRAM(s) SubCutaneous every 24 hours  fentaNYL   Patch  25 MICROgram(s)/Hr 1 Patch Transdermal every 72 hours  lidocaine   4% Patch 1 Patch Transdermal daily  lidocaine   4% Patch 1 Patch Transdermal daily  magnesium oxide 400 milliGRAM(s) Oral three times a day with meals  metoprolol tartrate 25 milliGRAM(s) Oral two times a day  pantoprazole    Tablet 40 milliGRAM(s) Oral before breakfast  saccharomyces boulardii 250 milliGRAM(s) Oral two times a day  vancomycin    Solution 125 milliGRAM(s) Oral every 6 hours    T(F): 98.1 (10-24-22 @ 08:44), Max: 98.1 (10-24-22 @ 08:44)  HR: 109 (10-24-22 @ 08:44)  BP: 117/89 (10-24-22 @ 08:44)  RR: 16 (10-24-22 @ 08:44)  SpO2: 95% (10-24-22 @ 08:44)  Wt(kg): --    PHYSICAL EXAM:  General: alert, no acute distress  Eyes:  anicteric, no conjunctival injection, no discharge  Oropharynx: no lesions or injection 	  Neck: in collar without adenopathy  Lungs: clear to auscultation  Heart: regular rate and rhythm; no murmur, rubs or gallops  Abdomen: soft, nondistended, nontender, without mass or organomegaly  Skin: no lesions  Extremities: no clubbing, cyanosis, or edema  Neurologic: alert, oriented, moves all extremities, functional paraplegic    LAB RESULTS:                        9.0    5.54  )-----------( 307      ( 24 Oct 2022 06:25 )             28.3     10-24    136  |  101  |  8   ----------------------------<  101<H>  3.9   |  26  |  0.36<L>    Ca    10.8<H>      24 Oct 2022 06:25          MICROBIOLOGY:  RECENT CULTURES:      RADIOLOGY REVIEWED:

## 2022-10-24 NOTE — CHART NOTE - NSCHARTNOTEFT_GEN_A_CORE
Went to visit with pt today as a follow up.  She was asleep, she did wake up and reported she was very tired and wanted to sleep. Asked if I would come back another day. Will attempt to see again tomorrow .

## 2022-10-25 LAB — SARS-COV-2 RNA SPEC QL NAA+PROBE: SIGNIFICANT CHANGE UP

## 2022-10-25 PROCEDURE — 99232 SBSQ HOSP IP/OBS MODERATE 35: CPT

## 2022-10-25 PROCEDURE — 99233 SBSQ HOSP IP/OBS HIGH 50: CPT

## 2022-10-25 RX ORDER — HYDROMORPHONE HYDROCHLORIDE 2 MG/ML
1.5 INJECTION INTRAMUSCULAR; INTRAVENOUS; SUBCUTANEOUS
Refills: 0 | Status: DISCONTINUED | OUTPATIENT
Start: 2022-10-25 | End: 2022-10-27

## 2022-10-25 RX ORDER — NALOXONE HYDROCHLORIDE 4 MG/.1ML
2 SPRAY NASAL ONCE
Refills: 0 | Status: DISCONTINUED | OUTPATIENT
Start: 2022-10-25 | End: 2022-10-27

## 2022-10-25 RX ORDER — FENTANYL CITRATE 50 UG/ML
1 INJECTION INTRAVENOUS
Refills: 0 | Status: DISCONTINUED | OUTPATIENT
Start: 2022-10-25 | End: 2022-10-27

## 2022-10-25 RX ORDER — METOPROLOL TARTRATE 50 MG
25 TABLET ORAL ONCE
Refills: 0 | Status: COMPLETED | OUTPATIENT
Start: 2022-10-25 | End: 2022-10-25

## 2022-10-25 RX ADMIN — Medication 125 MILLIGRAM(S): at 05:42

## 2022-10-25 RX ADMIN — HYDROMORPHONE HYDROCHLORIDE 1.5 MILLIGRAM(S): 2 INJECTION INTRAMUSCULAR; INTRAVENOUS; SUBCUTANEOUS at 23:40

## 2022-10-25 RX ADMIN — Medication 25 MILLIGRAM(S): at 13:22

## 2022-10-25 RX ADMIN — HYDROMORPHONE HYDROCHLORIDE 1.5 MILLIGRAM(S): 2 INJECTION INTRAMUSCULAR; INTRAVENOUS; SUBCUTANEOUS at 03:50

## 2022-10-25 RX ADMIN — LIDOCAINE 1 PATCH: 4 CREAM TOPICAL at 12:54

## 2022-10-25 RX ADMIN — Medication 125 MILLIGRAM(S): at 13:04

## 2022-10-25 RX ADMIN — HYDROMORPHONE HYDROCHLORIDE 1.5 MILLIGRAM(S): 2 INJECTION INTRAMUSCULAR; INTRAVENOUS; SUBCUTANEOUS at 04:20

## 2022-10-25 RX ADMIN — LIDOCAINE 1 PATCH: 4 CREAM TOPICAL at 20:10

## 2022-10-25 RX ADMIN — FENTANYL CITRATE 1 PATCH: 50 INJECTION INTRAVENOUS at 19:00

## 2022-10-25 RX ADMIN — Medication 125 MILLIGRAM(S): at 19:55

## 2022-10-25 RX ADMIN — HYDROMORPHONE HYDROCHLORIDE 1.5 MILLIGRAM(S): 2 INJECTION INTRAMUSCULAR; INTRAVENOUS; SUBCUTANEOUS at 20:20

## 2022-10-25 RX ADMIN — HYDROMORPHONE HYDROCHLORIDE 1.5 MILLIGRAM(S): 2 INJECTION INTRAMUSCULAR; INTRAVENOUS; SUBCUTANEOUS at 20:50

## 2022-10-25 RX ADMIN — FENTANYL CITRATE 1 PATCH: 50 INJECTION INTRAVENOUS at 12:53

## 2022-10-25 RX ADMIN — HYDROMORPHONE HYDROCHLORIDE 1.5 MILLIGRAM(S): 2 INJECTION INTRAMUSCULAR; INTRAVENOUS; SUBCUTANEOUS at 17:10

## 2022-10-25 RX ADMIN — HYDROMORPHONE HYDROCHLORIDE 1.5 MILLIGRAM(S): 2 INJECTION INTRAMUSCULAR; INTRAVENOUS; SUBCUTANEOUS at 10:50

## 2022-10-25 RX ADMIN — FENTANYL CITRATE 1 PATCH: 50 INJECTION INTRAVENOUS at 07:31

## 2022-10-25 RX ADMIN — HYDROMORPHONE HYDROCHLORIDE 1.5 MILLIGRAM(S): 2 INJECTION INTRAMUSCULAR; INTRAVENOUS; SUBCUTANEOUS at 11:40

## 2022-10-25 RX ADMIN — Medication 25 MILLIGRAM(S): at 19:59

## 2022-10-25 RX ADMIN — LIDOCAINE 1 PATCH: 4 CREAM TOPICAL at 19:00

## 2022-10-25 RX ADMIN — HYDROMORPHONE HYDROCHLORIDE 1.5 MILLIGRAM(S): 2 INJECTION INTRAMUSCULAR; INTRAVENOUS; SUBCUTANEOUS at 17:51

## 2022-10-25 RX ADMIN — ENOXAPARIN SODIUM 40 MILLIGRAM(S): 100 INJECTION SUBCUTANEOUS at 05:42

## 2022-10-25 NOTE — PROGRESS NOTE ADULT - NSPROGADDITIONALINFOA_GEN_ALL_CORE
I have personally interviewed and examined this patient, reviewed pertinent clinical information, and performed the evaluation and management services provided at today's visit for inpatient medical follow up    I am available to discuss any issues related to the medical care of this patient on the unit, or by phone at [phone]
I have personally interviewed and examined this patient, reviewed pertinent clinical information, and performed the evaluation and management services provided at today's visit for inpatient medical follow up    I am available to discuss any issues related to the medical care of this patient on the unit, or by phone at [phone]
Post dated   Patient seen by resident and NP   Awaiting Palliative Care follow up to coordinate further treatment/care optimize pain management

## 2022-10-25 NOTE — PROGRESS NOTE ADULT - ASSESSMENT
# Metastatic Breast CA (invasive ductal carcinoma) to brain, lung, liver, ribs, with spine involvement  - Being followed by inhouse oncology, rec o/p follow up   - Compression fractures of T1, T9, T12, and L2. Severe T1 compression with epidural extension.   - s/p C7-T2 corpectomy & fusion, posterior C4-T5 fusion (Dr Bennett)  - Plastics appreciated. Remaining staples and suture removed 10/14  - Continue comprehensive rehab program OT, PT, SLP as tolerated  - Palliative care and oncology appreciated. Patient deciding on hospice care vs possible immunotherapy  - Precautions: fall, aspiration, spinal, cervical collar, LSO Brace (Cuauhtemoc; [facility]), contact isolation    # Pain management  - Dr. Handley following   - Dilaudid, continue 1.5 mg IM q4h PRN (last adjustment 10/24)   - Cyclobenzaprine 5 q8 PRN  - Stopped gabapentin and lyrica due to patient's intolerance  - fentanyl patch 25 mcg 10/17 was insufficient. Adding an additional 12 mcg 10/25 for 37 total   - Dexamethasone tablet 4 mg PO q12h. Patient has not been able to tolerate   - lidocaine patch added to back and hip daily.   - care not to use MHP jose given metastatic disease and if considering treatment discussed    # C diff colitis with N/V, diarrhea, tachycardia  - - CT A/P 10/11: Pancolitis, correlate inflammatory/infectious etiology  - BCx Negative  - C Diff Toxin + 10/13  - Complete PO Vanc, 125 mg, 14 days, tolerated majority of course   - Per ID, hold off on further antibioitics (incl. ppx) at this time. May reserve fidaxomicin for relapse   - WBC improved @ 5.54 10/24    # tachycardia  - patient with difficulty tolerating po metoprolol  - -109, 10/25    # BUE swelling  - doppler negative RUE DVT 10/17  - likely third spacing due to IVf and hypoalbuminemia (1.4 10/16))  - Patient with improved po intake. Hold IVF, reviewed with patient  - ACE wrap for edema management    # hypokalemia  - replete IV K  - K+ 3,5 10/19 -> 3.6 10/20--> 4.0 10/22 -> 3.9 10/24   - BMP 10/26    # hyponatremia  - Na 136 10/19 improved -> 138 10/20--> 135 10/22 -> 136 10/24   - likely due to D5NS  - Encourage po intake  - BMP 10/26    # enterobacter UTI  - s/p Vantin BID x 7 days    # adjustment symptoms with anxiety and depression  - working on behavioral techniques with psychology, support  - psychiatry consult 10/14 appreciated. Patient declines any medications for mood.    # Bowel Regimen  - Florastor  - Zofran 8 mg PRN. Switched to oral dissolvable    # FEN   - Pureed solids and thins  - MBS 10/13: difficulty with propelling pureed solids to back of throat, some anxiety although pharyngeal swallow appears grossly intact. Will increase SLP time to work on sensory technique  - if patient not pursuing hospice may need to consider feeding tube due to poor po intake    # DVT ppx  - Lovenox  - SCD, TEDs    # Case discussed in IDT rounds 10/19:  - rolling with min assist, max assist bed mobility, increase po acceptance. Only tolerated OOB one day, refuses trial commode transfers, Limited by pain and nausea, poor endurance  - goals: wheelchair level, transfer min-mod assist  - Kelly when medically stable estimated 10/26 versus possible hospice referral, to discuss with patient and family  - disability form completed 10/7      # LABS  CBC BMP 10/26      brother Quique [phone]   CODE STATUS: FULL CODE

## 2022-10-25 NOTE — PROGRESS NOTE ADULT - SUBJECTIVE AND OBJECTIVE BOX
SHELLEY DE LEON, 66y Female  MRN: [id number]  ATTENDING: Claire Carrizales    HPI:  66F, PMHx GERD, stomach cancer,, s/p hysterectomy, admitted at [facility] 9/10/2022 with progressive weakness.  CT scan of the cervical spine consistent with multiple lytic lesions cervical and thoracic spine, ribs, likely metastatic disease.  Mammogram showed a dominant right breast mass, right chest wall, axillary and retropectoral lymphadenopathy.  Extensive imaging shows osseous, pulmonary and likely metastatic disease.  X-ray is also consistent with multiple lucent and sclerotic lesion and compression fractures T1, T9, T12 and L2 plus additional neoplastic disease of the spine with epidural extension.    Multidisciplinary approach including oncology, radiation oncology, neurosurgery; patient underwent C7-T2 corpectomy & fusion, posterior C4-T5 fusion. Post-op course uncomplicated.  Hamm removed, pt requiring some straight cath for retention. She also had subjective dysphagia - seen by speech therapy recommended pureed diet which patient continues to prefer. Started on IV Rocephin for UTI. Blood cultures negative, Urine culture positive for GNR, changed to PO Vancomycin for 7 days.  Patient is still complaining of back pain.  She has yet to see medical oncology in ambulatory to discuss further treatment.  Oncology consulted as above.      MEDICATIONS:  acetaminophen    Suspension .. 650 milliGRAM(s) Oral every 6 hours PRN  ALBUTerol    0.083% 2.5 milliGRAM(s) Nebulizer every 6 hours PRN  ALPRAZolam 0.25 milliGRAM(s) Oral every 6 hours PRN  Biotene Dry Mouth Oral Rinse 5 milliLiter(s) Swish and Spit three times a day  cyclobenzaprine 5 milliGRAM(s) Oral three times a day PRN  dexAMETHasone     Tablet 4 milliGRAM(s) Oral every 12 hours  enoxaparin Injectable 40 milliGRAM(s) SubCutaneous every 24 hours  fentaNYL   Patch  25 MICROgram(s)/Hr 1 Patch Transdermal every 72 hours  HYDROmorphone  Injectable 2 milliGRAM(s) IntraMuscular every 6 hours PRN  lidocaine   4% Patch 1 Patch Transdermal daily  lidocaine   4% Patch 1 Patch Transdermal daily  magnesium oxide 400 milliGRAM(s) Oral three times a day with meals  metoclopramide   Syrup 10 milliGRAM(s) Oral every 6 hours PRN  metoprolol tartrate 25 milliGRAM(s) Oral two times a day  ondansetron   Disintegrating Tablet 8 milliGRAM(s) Oral every 8 hours PRN  pantoprazole    Tablet 40 milliGRAM(s) Oral before breakfast  saccharomyces boulardii 250 milliGRAM(s) Oral two times a day  simethicone 80 milliGRAM(s) Chew every 6 hours PRN  vancomycin    Solution 125 milliGRAM(s) Oral every 6 hours    All other medications reviewed.    SUBJECTIVE:  no changes over night; continues to be tired.    VITALS:  T(C): 36.5 (10-24-22 @ 04:41), Max: 36.8 (10-23-22 @ 08:30)  T(F): 97.7 (10-24-22 @ 04:41), Max: 98.3 (10-23-22 @ 08:30)  HR: 115 (10-24-22 @ 04:41) (115 - 118)  BP: 126/75 (10-24-22 @ 04:41) (124/79 - 126/75)    PHYSICAL EXAM:  Constitutional: alert, frail  HEENT: normocephalic, anicteric sclerae, no mucositis or thrush; wearing collar  Respiratory: bilateral clear to auscultation anteriorly  Cardiovascular : S1, S2 regular, rhythmic, no murmurs, gallops or rubs  Abdomen: soft, distended, + normoactive BS, no palpable HS- megaly  Extremities: no tenderness;  -c/c/e, pulses equal bilaterally    LABS:  (10-22) WBC: 7.11 K/uL,Hemoglobin: 8.6 g/dL, Hematocrit: 26.5 %,  Platelet: 268 K/uL    RADIOLOGY:  ACC: [id number] EXAM:  CT ABDOMEN AND PELVIS                          PROCEDURE DATE:  10/11/2022    INTERPRETATION:  CLINICAL INFORMATION: Abdominal pain. Metastatic breast   cancer.    COMPARISON: CT scan abdomen pelvis 10/2/2022.    CONTRAST/COMPLICATIONS:  IV Contrast: NONE  Oral Contrast: NONE  Complications: None reported at time of study completion    PROCEDURE:  CT of the Abdomen and Pelvis was performed.  Sagittal and coronal reformats were performed.    FINDINGS:    LOWER CHEST:  Partially imaged heterogeneous soft tissue mass right breast and skin   thickening.    Again again noted are few pulmonary nodules at the visualized bilateral   lung bases.  Dependent bibasilar atelectatic changes.  Trace left-sided pleural effusion.    Streak artifact degrades image quality.  The evaluation of the solid organ parenchyma is limited without   intravenous contrast.    LIVER: Bilobar low-density lesions again noted, suspicious for metastatic   disease.  BILE DUCTS: Normal caliber.  GALLBLADDER: Intraluminal high density, may reflect biliary sludge or   carious excretion.  SPLEEN: Within normal limits.  PANCREAS: Within normal limits.  ADRENALS: Within normal limits.  KIDNEYS/URETERS:  Horseshoe kidney.  Punctate nonobstructing intrarenal calcification lower pole left kidney.    BLADDER: Dependent, layering calcifications left bladder.  REPRODUCTIVE ORGANS: Hysterectomy.    BOWEL:  The evaluation of the stomach and gastrointestinal tract is limited   without distention.  Evaluation of the bowel wall is limited without intravenous contrast.  There is suggestion of diffuse colonic bowel wall thickening, including   the rectum.  There is right-sided pericolic inflammatory stranding.  No bowel obstruction.  The appendix is notclearly demonstrated on this exam.  PERITONEUM: No ascites.    VESSELS: Atherosclerotic changes.  RETROPERITONEUM/LYMPH NODES: No lymphadenopathy.    ABDOMINAL WALL:  Tiny fat-containing umbilical hernia.    BONES:  Again noted is extensive permeative osteolytic metastatic disease   visualized axial and appendicular skeleton, including compression   deformities T12 and L2 vertebral bodies.  There is suggestion of epidural involvement at L2, where there is central   canal involvement.    IMPRESSION:    Limited, noncontrast exam.    Pancolitis, correlate clinically for inflammatory/infectious etiology,   including pseudomembranous colitis.    Partially visualized right breast mass.    Pulmonary and hepatic metastatic disease as noted on prior exam.    Permeative, osteolytic metastatic disease, including L2 compression   fracture with possible epidural involvement.  This can be further characterized by MRI as clinically indicated.

## 2022-10-25 NOTE — PROGRESS NOTE ADULT - SUBJECTIVE AND OBJECTIVE BOX
Patient is a 67y old  Female who presents with a chief complaint of metastatic breast CA to spine s/p  C7-T2 corpectomy & fusion, posterior C4-T5 fusion (24 Oct 2022 16:17)      HPI:  Thi is a 65 yo female with PMH of left arm skin CA s/p excision at age 16, stomach ulcer, her last mammogram was in , who was brought to [facility] ED 9/10/22 with progressive weakness to the point that she felt she could not get out of bed. She also reported remote shoulder pain, decreased appetite. Was found to have two lumps in her right breast but could not tolerate recommended biopsy due to pain. CT scan of the Cervical spine performed which showed Multiple lytic lesions of the cervical and visualized thoracic spine as well as right first and second ribs concerning for metastases versus multiple myeloma.    She was admitted under medicine service for further workup and management, including of her pain. Further imaging done showed Metastatic breast cancer with dominant right breast mass, right chest wall, axillary and retropectoral involvement; osseous, pulmonary, and likely hepatic metastatic disease. She was also found to have metastatic adenopathy of the thorax, diffuse extensive osseous metastatic disease with multiple lucent and sclerotic lesions and compression fractures of T1, T9, T12, and L2. Severe T1 compression with epidural extension; cord compression cannot be excluded. +additional neoplastic disease of the spine with epidural extension. Multifocal neoplastic lesions involving the pelvis and ribs as well.    Patient was seen by Oncology, CT surgery and Neurosurgery team. She underwent C7-T2 corpectomy & fusion, posterior C4-T5 fusion. Post-op course uncomplicated.  Hamm removed, pt requiring some straight cath for retention. She also had subjective dysphagia - seen by speech therapy recommended pureed diet which patient continues to prefer. Patient with low grade temp and positive UA. Started on IV Rocephin for UTI. Blood cultures negative, Urine culture positive for GNR, changed to PO Vantin for 7 days.    Patient was medically optimized for discharge to [facility] IRF on 22. (30 Sep 2022 14:32)      PAST MEDICAL & SURGICAL HISTORY:  Stomach ulcer  20 years ago   no pain not taking any meds      Skin cancer of arm, left  pt was 16 year old      H/O excision of mass  left arm  when patient was 16 years ago      S/P hysterectomy          MEDICATIONS  (STANDING):  Biotene Dry Mouth Oral Rinse 5 milliLiter(s) Swish and Spit three times a day  dexAMETHasone     Tablet 4 milliGRAM(s) Oral every 12 hours  enoxaparin Injectable 40 milliGRAM(s) SubCutaneous every 24 hours  fentaNYL   Patch  12 MICROgram(s)/Hr 1 Patch Transdermal every 72 hours  fentaNYL   Patch  25 MICROgram(s)/Hr 1 Patch Transdermal every 72 hours  lidocaine   4% Patch 1 Patch Transdermal daily  lidocaine   4% Patch 1 Patch Transdermal daily  magnesium oxide 400 milliGRAM(s) Oral three times a day with meals  metoprolol tartrate 25 milliGRAM(s) Oral two times a day  pantoprazole    Tablet 40 milliGRAM(s) Oral before breakfast  saccharomyces boulardii 250 milliGRAM(s) Oral two times a day  vancomycin    Solution 125 milliGRAM(s) Oral every 6 hours    MEDICATIONS  (PRN):  acetaminophen    Suspension .. 650 milliGRAM(s) Oral every 6 hours PRN Mild Pain (1 - 3)  ALBUTerol    0.083% 2.5 milliGRAM(s) Nebulizer every 6 hours PRN Shortness of Breath and/or Wheezing  ALPRAZolam 0.25 milliGRAM(s) Oral every 6 hours PRN anxiety  cyclobenzaprine 5 milliGRAM(s) Oral three times a day PRN Muscle Spasm  HYDROmorphone  Injectable 1.5 milliGRAM(s) IntraMuscular every 4 hours PRN Severe Pain (7 - 10)  metoclopramide   Syrup 10 milliGRAM(s) Oral every 6 hours PRN nausea  ondansetron   Disintegrating Tablet 8 milliGRAM(s) Oral every 8 hours PRN Nausea and/or Vomiting  simethicone 80 milliGRAM(s) Chew every 6 hours PRN Gas      Allergies    tetanus toxoid (Hives)    Intolerances    lactose (Other)        VITALS  67y  Vital Signs Last 24 Hrs  T(C): 36.7 (24 Oct 2022 20:20), Max: 36.7 (24 Oct 2022 20:20)  T(F): 98.1 (24 Oct 2022 20:20), Max: 98.1 (24 Oct 2022 20:20)  HR: 108 (24 Oct 2022 20:20) (108 - 108)  BP: 151/91 (24 Oct 2022 20:20) (151/91 - 151/91)  BP(mean): --  RR: 16 (24 Oct 2022 20:20) (16 - 16)  SpO2: 94% (24 Oct 2022 20:20) (94% - 94%)    Parameters below as of 24 Oct 2022 20:20  Patient On (Oxygen Delivery Method): room air      Daily     Daily Weight in k.2 (24 Oct 2022 23:36)        RECENT LABS:                          9.0    5.54  )-----------( 307      ( 24 Oct 2022 06:25 )             28.3     10-24    136  |  101  |  8   ----------------------------<  101<H>  3.9   |  26  |  0.36<L>    Ca    10.8<H>      24 Oct 2022 06:25                CAPILLARY BLOOD GLUCOSE    Review of Systems:   	  · ROS	Patient seen and examined at bedside. She had a BM shortly before we arrived, her second in the past 24 hours. Patient is unsure of the stool consistency. Nurses report that she does not want to be changed prior to getting her pain medications. Pain is characterized as searing and 8/10. Collectively, the patient is very uncomfortable - mostly leg pain, partially neck pain - and is unable to participate in many therapies, or maintain adequate hygiene because of her pain. We are increasing her fentanyl patch and will follow up with Dr. Handley for further direction on goals of care + pain management. Patient aware and agreeable for the above.     Physical Exam:   · Constitutional Comments	Ill appearing, unable to participate in most therapies. Still with diarrhea. Tachycardic, answering questions appropriately. Able to move extremities but limited considerably by pain.   	  · Respiratory	clear to auscultation bilaterally; no wheezes; no rales; no rhonchi. Fair effort  · Cardiovascular	regular rate and rhythm; S1 S2 present.   · Gastrointestinal	soft; no R/G +BS    	  	  · Motor	Patient with very limited active and passive ROM due to pain. Her hand strength is 1-2+, elbows are 1-2+, and patient has difficulty lifting her arms. She can wiggle her toes but unable to bend at knees or hips. 1+ PE in UE, trace in bilateral lower.

## 2022-10-25 NOTE — PROGRESS NOTE ADULT - PROBLEM SELECTOR PLAN 1
Recently diagnosed de griselda metastatic breast cancer with liver, bones, lung, brain involvement, s/p C7-T2 corpectomy and fusion posterior C4-T5.  Continues supportive care; palliation of pain is at the forefront of this patient's care, though, as discussed, she may benefit from tumor debulking with systemic therapy.  For now, patient declined chemotherapy or antiestrogen therapy.  Discussed consideration for palliative RT to spine, after patient's discharge.

## 2022-10-25 NOTE — PROGRESS NOTE ADULT - ASSESSMENT
A/P 65 yo female PMH stomach ulcer who presented to [facility] 9/10/22 with progressive weakness, discovered multiple lytic lesions of the cervical and thoracic spine along with lung and liver secondary to metastatic primary newly discovered breast cancer with axillary and retropectoral involvement. Imaging also showed Compression fractures of T1, T9, T12, and L2. Severe T1 compression with epidural extension. S/p C7-T2 corpectomy & fusion, posterior C4-T5 fusion, hospital course significant for urinary retention and dysphagia, UTI, now admitted for rehab.  Has been unable to fully  participate in rehab due to pain, nausea , weakness       Assessment/Plans:      Metastatic Breast CA (invasive ductal carcinoma) to brain, lung, liver, ribs, with spine involvement  - Being followed by inhouse oncology,who rec o/p follow up - say pt may be candidate for certain therapies   - SW working on setting up tele med call for pts outpt oncologist   - Compression fractures of T1, T9, T12, and L2. Severe T1 compression with epidural extension.   - s/p C7-T2 corpectomy & fusion, posterior C4-T5 fusion (Dr Bennett  - Continue comprehensive rehab program OT, PT, SLP as tolerated  --- Patient deciding on hospice care vs possible immunotherapy    Pain management- Dilaudid, increase frequency to q 3 hrs PRN  10/25    - Cyclobenzaprine 5 q8 PRN  - Stopped gabapentin and lyrica due to patient's intolerance  - fentanyl patch 25 mcg 10/17 was insufficient.  an additional 12 mcg added on 10/25 for 37 total   - Dexamethasone tablet 4 mg PO q12h. Patient has not been able to tolerate  ( ?  she is now able to take some po  )  - lidocaine patch added to back and hip daily.        C diff colitis with N/V, diarrhea, tachycardia  - - CT A/P 10/11: Pancolitis, correlate inflammatory/infectious etiology  - BCx Negative  - C Diff Toxin + 10/13  - Complete PO Vanc, 125 mg, 14 days, tolerated majority of course   -WBC improved @ 5.54 10/24    tachycardia  - patient with difficulty tolerating po metoprolol     BUE swelling  - doppler negative RUE DVT 10/17  - likely third spacing due to IVf and hypoalbuminemia (1.4 10/16))  - Patient with improved po intake. ACE wrap for edema      anxiety and depression  - working on behavioral techniques with psychology, support  - psychiatry consult 10/14 appreciated. Patient declines any medications for mood. - Suggest psych follow up     # Bowel Regimen  - Florastor  - Zofran 8 mg PRN. Switched to oral dissolvable        Palliative : as  follow up today , able to met w/ pt bedside this afternoon. She was fatigued but alert to converse. She remains very weakened and c/o pain 6/10 when still , but goes as high as 9/10 w/ movement.   Her fentanyl was increased this morning to 37 mcg/hr, as d/w pt will  also change prn dilaudid frequency to q 3 hrs prn .   Spoke w/ pt about she is feeling/doing - pt feels like she is "going backwards" and is unable to do what she is supposed  to do.   I spoke with her about  her visits with inpt oncologist - pt says she is aware there are certain therapies that she would be a candidate for, however pt feels like this may not be what she wants, as she is not sure if she would be able to tolerate it.    Pt is considering hospice services, we spoke about this again today . We discussed [facility] , and other inpatient facilities.  Pt gave me permission to speak with her brother Quique.    Pt stated that she  will speak with her brother when he visits her tonight as well.     I called and spoke w/ Quique , says he visits her every night,  he was interested in speaking with pts outpt oncologist. But said he would understand and support his sister  if she chose hospice instead.  Conversation on going,  will reach out to SW dept to follow up on tele med call to pts oncologist.     Suggested psychiatry follow up as well.                A/P 67 yo female PMH stomach ulcer who presented to [facility] 9/10/22 with progressive weakness, discovered multiple lytic lesions of the cervical and thoracic spine along with lung and liver secondary to metastatic primary newly discovered breast cancer with axillary and retropectoral involvement. Imaging also showed Compression fractures of T1, T9, T12, and L2. Severe T1 compression with epidural extension. S/p C7-T2 corpectomy & fusion, posterior C4-T5 fusion, hospital course significant for urinary retention and dysphagia, UTI, now admitted for rehab.  Has been unable to fully  participate in rehab due to pain, nausea , weakness       Assessment/Plans:      Metastatic Breast CA (invasive ductal carcinoma) to brain, lung, liver, ribs, with spine involvement  - Being followed by inhouse oncology,who rec o/p follow up - say pt may be candidate for certain therapies   - SW working on setting up tele med call for pts outpt oncologist   - Compression fractures of T1, T9, T12, and L2. Severe T1 compression with epidural extension.   - s/p C7-T2 corpectomy & fusion, posterior C4-T5 fusion (Dr Bennett  - Continue comprehensive rehab program OT, PT, SLP as tolerated  --- Patient deciding on hospice care vs possible immunotherapy    Pain management- Dilaudid, increase frequency to q 3 hrs PRN  10/25    - Cyclobenzaprine 5 q8 PRN  - Stopped gabapentin and lyrica due to patient's intolerance  - fentanyl patch 25 mcg 10/17 was insufficient.  an additional 12 mcg added on 10/25 for 37 total   - Dexamethasone tablet 4 mg PO q12h. Patient has not been able to tolerate  ( ?  she is now able to take some po  )  - lidocaine patch added to back and hip daily.        C diff colitis with N/V, diarrhea, tachycardia  - - CT A/P 10/11: Pancolitis, correlate inflammatory/infectious etiology  - BCx Negative  - C Diff Toxin + 10/13  - Complete PO Vanc, 125 mg, 14 days, tolerated majority of course   -WBC improved @ 5.54 10/24    tachycardia  - patient with difficulty tolerating po metoprolol     BUE swelling  - doppler negative RUE DVT 10/17  - likely third spacing due to IVf and hypoalbuminemia (1.4 10/16))  - Patient with improved po intake. ACE wrap for edema      anxiety and depression  - working on behavioral techniques with psychology, support  - psychiatry consult 10/14 appreciated. Patient declines any medications for mood. - Suggest psych follow up     # Bowel Regimen  - Florastor  - Zofran 8 mg PRN. Switched to oral dissolvable        Palliative : as  follow up today , able to met w/ pt bedside this afternoon. She was fatigued but alert to converse. She remains very weakened and c/o pain 6/10 when still , but goes as high as 9/10 w/ movement.   Her fentanyl was increased this morning to 37 mcg/hr, as d/w pt will  also change prn dilaudid frequency to q 3 hrs prn .   Spoke w/ pt about she is feeling/doing - pt feels like she is "going backwards" and is unable to do what she is supposed  to do.   I spoke with her about  her visits with inpt oncologist - pt says she is aware there are certain therapies that she would be a candidate for, however pt feels like this may not be what she wants, as she is not sure if she would be able to tolerate it.    Pt is considering hospice services, we spoke about this again today . We discussed [facility] , and other inpatient facilities.  Pt gave me permission to speak with her brother Quique.    Pt stated that she  will speak with her brother when he visits her tonight as well.     I called and spoke w/ Quique , says he visits her every night,  he was interested in speaking with pts outpt oncologist. But said he would understand and support his sister if she chose hospice instead.  Conversation on going,  will reach out to  dept to follow up on tele med call to pts oncologist.     Suggested psychiatry follow up as well.        add:  called number found in pts chart for a Dr Del Castillo hematology/oncology in [place] - [phone], no answer, will  try again in AM

## 2022-10-25 NOTE — PROGRESS NOTE ADULT - SUBJECTIVE AND OBJECTIVE BOX
Progress: lying  in bed, appears weak, fatigued     Present Symptoms:   Dyspnea: no  Nausea/Vomiting: no  Anxiety:    Depressed Mood: yes  Fatigue: yes  Loss of appetite: yes  Pain:          yes         location: lower back , legs   Review of Systems: [All others negative   MEDICATIONS  (STANDING):  Biotene Dry Mouth Oral Rinse 5 milliLiter(s) Swish and Spit three times a day  dexAMETHasone     Tablet 4 milliGRAM(s) Oral every 12 hours  enoxaparin Injectable 40 milliGRAM(s) SubCutaneous every 24 hours  fentaNYL   Patch  12 MICROgram(s)/Hr 1 Patch Transdermal every 72 hours  fentaNYL   Patch  25 MICROgram(s)/Hr 1 Patch Transdermal every 72 hours  lidocaine   4% Patch 1 Patch Transdermal daily  lidocaine   4% Patch 1 Patch Transdermal daily  magnesium oxide 400 milliGRAM(s) Oral three times a day with meals  metoprolol tartrate 25 milliGRAM(s) Oral two times a day  pantoprazole    Tablet 40 milliGRAM(s) Oral before breakfast  saccharomyces boulardii 250 milliGRAM(s) Oral two times a day  vancomycin    Solution 125 milliGRAM(s) Oral every 6 hours    MEDICATIONS  (PRN):  acetaminophen    Suspension .. 650 milliGRAM(s) Oral every 6 hours PRN Mild Pain (1 - 3)  ALBUTerol    0.083% 2.5 milliGRAM(s) Nebulizer every 6 hours PRN Shortness of Breath and/or Wheezing  ALPRAZolam 0.25 milliGRAM(s) Oral every 6 hours PRN anxiety  cyclobenzaprine 5 milliGRAM(s) Oral three times a day PRN Muscle Spasm  HYDROmorphone  Injectable 1.5 milliGRAM(s) IntraMuscular every 3 hours PRN Severe Pain (7 - 10)  metoclopramide   Syrup 10 milliGRAM(s) Oral every 6 hours PRN nausea  ondansetron   Disintegrating Tablet 8 milliGRAM(s) Oral every 8 hours PRN Nausea and/or Vomiting  simethicone 80 milliGRAM(s) Chew every 6 hours PRN Gas      PHYSICAL EXAM:  Vital Signs Last 24 Hrs  T(C): 36.8 (25 Oct 2022 13:22), Max: 36.8 (25 Oct 2022 13:22)  T(F): 98.2 (25 Oct 2022 13:22), Max: 98.2 (25 Oct 2022 13:22)  HR: 117 (25 Oct 2022 13:22) (108 - 117)  BP: 138/85 (25 Oct 2022 13:22) (138/85 - 151/91)  BP(mean): --  RR: 16 (25 Oct 2022 13:22) (16 - 16)  SpO2: 93% (25 Oct 2022 13:22) (93% - 94%)    Parameters below as of 25 Oct 2022 13:22  Patient On (Oxygen Delivery Method): room air      General: alert  oriented x 3 , converses       HEENT: n/c, a/t, c-collar in place, lips dry      Lungs: dim to bases , breathing comfortably    CV: normal - tachy   GI: abd soft, n/t     : normal    Musculoskeletal: weakened, edematous     Skin: w/d     Neuro: no deficits   Oral intake ability:  oral feeding w/ assist   Diet: soft as  emmie     LABS:                          9.0    5.54  )-----------( 307      ( 24 Oct 2022 06:25 )             28.3     10-24    136  |  101  |  8   ----------------------------<  101<H>  3.9   |  26  |  0.36<L>    Ca    10.8<H>      24 Oct 2022 06:25          RADIOLOGY & ADDITIONAL STUDIES:     ADVANCE DIRECTIVES: HCP  Advanced Care Planning discussion total time spent:

## 2022-10-26 ENCOUNTER — TRANSCRIPTION ENCOUNTER (OUTPATIENT)
Age: 67
End: 2022-10-26

## 2022-10-26 LAB
ALBUMIN SERPL ELPH-MCNC: 1.5 G/DL — LOW (ref 3.3–5)
ALP SERPL-CCNC: 148 U/L — HIGH (ref 40–120)
ALT FLD-CCNC: 8 U/L — LOW (ref 10–45)
ANION GAP SERPL CALC-SCNC: 8 MMOL/L — SIGNIFICANT CHANGE UP (ref 5–17)
AST SERPL-CCNC: 47 U/L — HIGH (ref 10–40)
BILIRUB SERPL-MCNC: 0.3 MG/DL — SIGNIFICANT CHANGE UP (ref 0.2–1.2)
BUN SERPL-MCNC: 6 MG/DL — LOW (ref 7–23)
CALCIUM SERPL-MCNC: 10.5 MG/DL — SIGNIFICANT CHANGE UP (ref 8.4–10.5)
CHLORIDE SERPL-SCNC: 102 MMOL/L — SIGNIFICANT CHANGE UP (ref 96–108)
CO2 SERPL-SCNC: 27 MMOL/L — SIGNIFICANT CHANGE UP (ref 22–31)
CREAT SERPL-MCNC: 0.28 MG/DL — LOW (ref 0.5–1.3)
EGFR: 118 ML/MIN/1.73M2 — SIGNIFICANT CHANGE UP
GLUCOSE SERPL-MCNC: 89 MG/DL — SIGNIFICANT CHANGE UP (ref 70–99)
HCT VFR BLD CALC: 29.3 % — LOW (ref 34.5–45)
HGB BLD-MCNC: 9.1 G/DL — LOW (ref 11.5–15.5)
MCHC RBC-ENTMCNC: 28.4 PG — SIGNIFICANT CHANGE UP (ref 27–34)
MCHC RBC-ENTMCNC: 31.1 GM/DL — LOW (ref 32–36)
MCV RBC AUTO: 91.6 FL — SIGNIFICANT CHANGE UP (ref 80–100)
NRBC # BLD: 0 /100 WBCS — SIGNIFICANT CHANGE UP (ref 0–0)
PLATELET # BLD AUTO: 294 K/UL — SIGNIFICANT CHANGE UP (ref 150–400)
POTASSIUM SERPL-MCNC: 3.8 MMOL/L — SIGNIFICANT CHANGE UP (ref 3.5–5.3)
POTASSIUM SERPL-SCNC: 3.8 MMOL/L — SIGNIFICANT CHANGE UP (ref 3.5–5.3)
PROT SERPL-MCNC: 4.5 G/DL — LOW (ref 6–8.3)
RBC # BLD: 3.2 M/UL — LOW (ref 3.8–5.2)
RBC # FLD: 16.4 % — HIGH (ref 10.3–14.5)
SODIUM SERPL-SCNC: 137 MMOL/L — SIGNIFICANT CHANGE UP (ref 135–145)
WBC # BLD: 6.15 K/UL — SIGNIFICANT CHANGE UP (ref 3.8–10.5)
WBC # FLD AUTO: 6.15 K/UL — SIGNIFICANT CHANGE UP (ref 3.8–10.5)

## 2022-10-26 PROCEDURE — 99233 SBSQ HOSP IP/OBS HIGH 50: CPT

## 2022-10-26 PROCEDURE — 99232 SBSQ HOSP IP/OBS MODERATE 35: CPT | Mod: GC

## 2022-10-26 PROCEDURE — 99497 ADVNCD CARE PLAN 30 MIN: CPT

## 2022-10-26 PROCEDURE — 99232 SBSQ HOSP IP/OBS MODERATE 35: CPT

## 2022-10-26 RX ORDER — ALPRAZOLAM 0.25 MG
1 TABLET ORAL
Qty: 0 | Refills: 0 | DISCHARGE
Start: 2022-10-26

## 2022-10-26 RX ORDER — ONDANSETRON 8 MG/1
1 TABLET, FILM COATED ORAL
Qty: 0 | Refills: 0 | DISCHARGE
Start: 2022-10-26

## 2022-10-26 RX ORDER — PANTOPRAZOLE SODIUM 20 MG/1
1 TABLET, DELAYED RELEASE ORAL
Qty: 0 | Refills: 0 | DISCHARGE
Start: 2022-10-26

## 2022-10-26 RX ORDER — ENOXAPARIN SODIUM 100 MG/ML
40 INJECTION SUBCUTANEOUS
Qty: 0 | Refills: 0 | DISCHARGE
Start: 2022-10-26

## 2022-10-26 RX ORDER — FENTANYL CITRATE 50 UG/ML
1 INJECTION INTRAVENOUS
Qty: 0 | Refills: 0 | DISCHARGE
Start: 2022-10-26

## 2022-10-26 RX ORDER — LIDOCAINE 4 G/100G
2 CREAM TOPICAL
Qty: 0 | Refills: 0 | DISCHARGE
Start: 2022-10-26

## 2022-10-26 RX ORDER — SODIUM CHLORIDE 0.65 %
1 AEROSOL, SPRAY (ML) NASAL
Refills: 0 | Status: DISCONTINUED | OUTPATIENT
Start: 2022-10-26 | End: 2022-10-27

## 2022-10-26 RX ORDER — CYCLOBENZAPRINE HYDROCHLORIDE 10 MG/1
1 TABLET, FILM COATED ORAL
Qty: 0 | Refills: 0 | DISCHARGE
Start: 2022-10-26

## 2022-10-26 RX ORDER — DEXAMETHASONE 0.5 MG/5ML
1 ELIXIR ORAL
Qty: 0 | Refills: 0 | DISCHARGE
Start: 2022-10-26

## 2022-10-26 RX ORDER — SACCHAROMYCES BOULARDII 250 MG
1 POWDER IN PACKET (EA) ORAL
Qty: 0 | Refills: 0 | DISCHARGE
Start: 2022-10-26

## 2022-10-26 RX ORDER — SIMETHICONE 80 MG/1
1 TABLET, CHEWABLE ORAL
Qty: 0 | Refills: 0 | DISCHARGE
Start: 2022-10-26

## 2022-10-26 RX ORDER — SODIUM CHLORIDE 0.65 %
1 AEROSOL, SPRAY (ML) NASAL
Qty: 0 | Refills: 0 | DISCHARGE
Start: 2022-10-26

## 2022-10-26 RX ORDER — ACETAMINOPHEN 500 MG
20.31 TABLET ORAL
Qty: 0 | Refills: 0 | DISCHARGE
Start: 2022-10-26

## 2022-10-26 RX ORDER — VANCOMYCIN HCL 1 G
5 VIAL (EA) INTRAVENOUS
Qty: 0 | Refills: 0 | DISCHARGE
Start: 2022-10-26

## 2022-10-26 RX ORDER — METOCLOPRAMIDE HCL 10 MG
10 TABLET ORAL
Qty: 0 | Refills: 0 | DISCHARGE
Start: 2022-10-26

## 2022-10-26 RX ORDER — METOPROLOL TARTRATE 50 MG
1 TABLET ORAL
Qty: 0 | Refills: 0 | DISCHARGE
Start: 2022-10-26

## 2022-10-26 RX ORDER — MAGNESIUM OXIDE 400 MG ORAL TABLET 241.3 MG
1 TABLET ORAL
Qty: 0 | Refills: 0 | DISCHARGE
Start: 2022-10-26

## 2022-10-26 RX ADMIN — FENTANYL CITRATE 1 PATCH: 50 INJECTION INTRAVENOUS at 07:53

## 2022-10-26 RX ADMIN — LIDOCAINE 1 PATCH: 4 CREAM TOPICAL at 01:00

## 2022-10-26 RX ADMIN — FENTANYL CITRATE 1 PATCH: 50 INJECTION INTRAVENOUS at 09:26

## 2022-10-26 RX ADMIN — Medication 125 MILLIGRAM(S): at 22:05

## 2022-10-26 RX ADMIN — HYDROMORPHONE HYDROCHLORIDE 1.5 MILLIGRAM(S): 2 INJECTION INTRAMUSCULAR; INTRAVENOUS; SUBCUTANEOUS at 00:10

## 2022-10-26 RX ADMIN — LIDOCAINE 1 PATCH: 4 CREAM TOPICAL at 12:40

## 2022-10-26 RX ADMIN — Medication 125 MILLIGRAM(S): at 09:25

## 2022-10-26 RX ADMIN — HYDROMORPHONE HYDROCHLORIDE 1.5 MILLIGRAM(S): 2 INJECTION INTRAMUSCULAR; INTRAVENOUS; SUBCUTANEOUS at 02:45

## 2022-10-26 RX ADMIN — HYDROMORPHONE HYDROCHLORIDE 1.5 MILLIGRAM(S): 2 INJECTION INTRAMUSCULAR; INTRAVENOUS; SUBCUTANEOUS at 07:44

## 2022-10-26 RX ADMIN — LIDOCAINE 1 PATCH: 4 CREAM TOPICAL at 19:00

## 2022-10-26 RX ADMIN — HYDROMORPHONE HYDROCHLORIDE 1.5 MILLIGRAM(S): 2 INJECTION INTRAMUSCULAR; INTRAVENOUS; SUBCUTANEOUS at 13:50

## 2022-10-26 RX ADMIN — HYDROMORPHONE HYDROCHLORIDE 1.5 MILLIGRAM(S): 2 INJECTION INTRAMUSCULAR; INTRAVENOUS; SUBCUTANEOUS at 07:15

## 2022-10-26 RX ADMIN — HYDROMORPHONE HYDROCHLORIDE 1.5 MILLIGRAM(S): 2 INJECTION INTRAMUSCULAR; INTRAVENOUS; SUBCUTANEOUS at 11:30

## 2022-10-26 RX ADMIN — HYDROMORPHONE HYDROCHLORIDE 1.5 MILLIGRAM(S): 2 INJECTION INTRAMUSCULAR; INTRAVENOUS; SUBCUTANEOUS at 14:30

## 2022-10-26 RX ADMIN — HYDROMORPHONE HYDROCHLORIDE 1.5 MILLIGRAM(S): 2 INJECTION INTRAMUSCULAR; INTRAVENOUS; SUBCUTANEOUS at 22:00

## 2022-10-26 RX ADMIN — Medication 1 SPRAY(S): at 15:38

## 2022-10-26 RX ADMIN — HYDROMORPHONE HYDROCHLORIDE 1.5 MILLIGRAM(S): 2 INJECTION INTRAMUSCULAR; INTRAVENOUS; SUBCUTANEOUS at 17:58

## 2022-10-26 RX ADMIN — FENTANYL CITRATE 1 PATCH: 50 INJECTION INTRAVENOUS at 09:43

## 2022-10-26 RX ADMIN — FENTANYL CITRATE 1 PATCH: 50 INJECTION INTRAVENOUS at 19:00

## 2022-10-26 RX ADMIN — FENTANYL CITRATE 1 PATCH: 50 INJECTION INTRAVENOUS at 07:52

## 2022-10-26 RX ADMIN — Medication 125 MILLIGRAM(S): at 15:38

## 2022-10-26 RX ADMIN — HYDROMORPHONE HYDROCHLORIDE 1.5 MILLIGRAM(S): 2 INJECTION INTRAMUSCULAR; INTRAVENOUS; SUBCUTANEOUS at 10:46

## 2022-10-26 RX ADMIN — HYDROMORPHONE HYDROCHLORIDE 1.5 MILLIGRAM(S): 2 INJECTION INTRAMUSCULAR; INTRAVENOUS; SUBCUTANEOUS at 03:15

## 2022-10-26 RX ADMIN — HYDROMORPHONE HYDROCHLORIDE 1.5 MILLIGRAM(S): 2 INJECTION INTRAMUSCULAR; INTRAVENOUS; SUBCUTANEOUS at 22:30

## 2022-10-26 RX ADMIN — Medication 125 MILLIGRAM(S): at 01:50

## 2022-10-26 RX ADMIN — ENOXAPARIN SODIUM 40 MILLIGRAM(S): 100 INJECTION SUBCUTANEOUS at 05:56

## 2022-10-26 NOTE — PROGRESS NOTE ADULT - PROBLEM SELECTOR PLAN 1
Patients with de griselda, ER 40%/GA 40%/HER2 3+ metastatic breast cancer with extensive metastasis to the bones, liver, lung, brain, s/p C7-T2 corpectomy and fusion posterior C4-T5.  Patient is treatment naïve.  She would be eligible for combined treatment with an aromatase inhibitor, CDK4/6, and anti-HER2 therapy.  Advised transfer to [facility] for palliative RT services, as patient cannot start PT due to uncontrolled pain.  Case was discussed with pain management team at [place] (Dr. Handley) who is in agreement with above plan.  Case was also discussed with Dr.Shirin Bragg, covering oncology services at [facility] who will accept transfer, if patient is compliant.  Need social work intervention/ consult as well, given patient's lack of outside resources and minimal family support.

## 2022-10-26 NOTE — PROGRESS NOTE ADULT - ASSESSMENT
A/P   67 yo female PMH stomach ulcer who presented to [facility] 9/10/22 with progressive weakness, discovered multiple lytic lesions of the cervical and thoracic spine along with lung and liver secondary to metastatic primary newly discovered breast cancer with axillary and retropectoral involvement. Imaging also showed Compression fractures of T1, T9, T12, and L2. Severe T1 compression with epidural extension. S/p C7-T2 corpectomy & fusion, posterior C4-T5 fusion, hospital course significant for urinary retention and dysphagia, UTI, now admitted for rehab.  Has been unable to fully  participate in rehab due to pain, nausea , weakness Assessment/Plans:      Metastatic Breast CA (invasive ductal carcinoma) to brain, lung, liver, ribs, with spine involvement  - Being followed by inhouse oncology,who rec o/p follow up - say pt may be candidate for certain therapies   - Discussed palliative rad tx's to spine at [facility] as option today    - Compression fractures of T1, T9, T12, and L2. Severe T1 compression with epidural extension.   - s/p C7-T2 corpectomy & fusion, posterior C4-T5 fusion (Dr Bennett  - Continue comprehensive rehab program OT, PT, SLP as tolerated- pt not tolerating due to pain   --- Patient deciding on hospice care vs possible immunotherapy    Pain management- Dilaudid, increase frequency to q 3 hrs PRN  10/25    - Cyclobenzaprine 5 q8 PRN  - Stopped gabapentin and lyrica due to patient's intolerance  - fentanyl patch 25 mcg 10/17 was insufficient.  an additional 12 mcg added on 10/25 for 37 total   - Dexamethasone tablet 4 mg PO q12h. Patient has not been able to tolerate  ( ?  she is now able to take some po  )  - lidocaine patch added to back and hip daily.        C diff colitis with N/V, diarrhea, tachycardia  - - CT A/P 10/11: Pancolitis, correlate inflammatory/infectious etiology  - BCx Negative  - C Diff Toxin + 10/13  - Complete PO Vanc, 125 mg, 14 days, tolerated majority of course   -WBC improved @ 5.54 10/24    tachycardia  - patient with difficulty tolerating po metoprolol     BUE swelling  - doppler negative RUE DVT 10/17  - likely third spacing due to IVf and hypoalbuminemia (1.4 10/16))  - Patient with improved po intake. ACE wrap for edema      anxiety and depression  - working on behavioral techniques with psychology, support  - psychiatry consult 10/14 appreciated. Patient declines any medications for mood. - Suggest psych follow up     # Bowel Regimen  - Florastor  - Zofran 8 mg PRN. Switched to oral dissolvable      Palliative : as  follow up today , able to meet and speak directly w/ heme/onc today - Dr Miguel, we discussed pts situation/condition  and any available treatment options for her . Pain is her limiting factor, and she has been unable to participate in rehab., palliative rad tx's to spine was discussed as a way to alleviate some pain. . We met w/ pt bedside this morning, and reviewed with her in detail this option, we also spoke w/ pts brother Quique. Both Pt and brother are in agreement  to try this tx and transferring to [facility].   Informed both rehab and med team of this plan .   Please have palliative team follow up at [facility].

## 2022-10-26 NOTE — PROGRESS NOTE ADULT - SUBJECTIVE AND OBJECTIVE BOX
HISTORY OF PRESENT ILLNESS  Neha is a 67 yo female with PMH of left arm skin CA s/p excision at age 16, stomach ulcer, her last mammogram was in 2014, who was brought to [facility] ED 9/10/22 with progressive weakness to the point that she felt she could not get out of bed. She also reported remote shoulder pain, decreased appetite. Was found to have two lumps in her right breast but could not tolerate recommended biopsy due to pain. CT scan of the Cervical spine performed which showed Multiple lytic lesions of the cervical and visualized thoracic spine as well as right first and second ribs concerning for metastases versus multiple myeloma.    She was admitted under medicine service for further workup and management, including of her pain. Further imaging done showed Metastatic breast cancer with dominant right breast mass, right chest wall, axillary and retropectoral involvement; osseous, pulmonary, and likely hepatic metastatic disease. She was also found to have metastatic adenopathy of the thorax, diffuse extensive osseous metastatic disease with multiple lucent and sclerotic lesions and compression fractures of T1, T9, T12, and L2. Severe T1 compression with epidural extension; cord compression cannot be excluded. +additional neoplastic disease of the spine with epidural extension. Multifocal neoplastic lesions involving the pelvis and ribs as well.    Patient was seen by Oncology, CT surgery and Neurosurgery team. She underwent C7-T2 corpectomy & fusion, posterior C4-T5 fusion. Post-op course uncomplicated.  Hamm removed, pt requiring some straight cath for retention. She also had subjective dysphagia - seen by speech therapy recommended pureed diet which patient continues to prefer. Patient with low grade temp and positive UA. Started on IV Rocephin for UTI. Blood cultures negative, Urine culture positive for GNR, changed to PO Vantin for 7 days.    Patient was medically optimized for discharge to [facility] IRF on 9/30/22.       PAST MEDICAL & SURGICAL HISTORY:  Stomach ulcer  20 years ago   no pain not taking any meds      Skin cancer of arm, left  pt was 16 year old      H/O excision of mass  left arm  when patient was 16 years ago      S/P hysterectomy      VITALS  Vital Signs Last 24 Hrs  T(C): 36.7 (26 Oct 2022 09:42), Max: 36.8 (25 Oct 2022 13:22)  T(F): 98 (26 Oct 2022 09:42), Max: 98.2 (25 Oct 2022 13:22)  HR: 112 (26 Oct 2022 09:42) (103 - 117)  BP: 124/84 (26 Oct 2022 09:42) (124/84 - 138/85)  BP(mean): --  RR: 16 (26 Oct 2022 09:42) (16 - 16)  SpO2: 94% (26 Oct 2022 09:42) (93% - 95%)    Parameters below as of 26 Oct 2022 09:42  Patient On (Oxygen Delivery Method): room air        RECENT LABS                        9.1    6.15  )-----------( 294      ( 26 Oct 2022 06:15 )             29.3     10-26    137  |  102  |  6<L>  ----------------------------<  89  3.8   |  27  |  0.28<L>    Ca    10.5      26 Oct 2022 06:15    TPro  4.5<L>  /  Alb  1.5<L>  /  TBili  0.3  /  DBili  x   /  AST  47<H>  /  ALT  8<L>  /  AlkPhos  148<H>  10-26      LIVER FUNCTIONS - ( 26 Oct 2022 06:15 )  Alb: 1.5 g/dL / Pro: 4.5 g/dL / ALK PHOS: 148 U/L / ALT: 8 U/L / AST: 47 U/L / GGT: x             CURRENT MEDICATIONS  MEDICATIONS  (STANDING):  Biotene Dry Mouth Oral Rinse 5 milliLiter(s) Swish and Spit three times a day  dexAMETHasone     Tablet 4 milliGRAM(s) Oral every 12 hours  enoxaparin Injectable 40 milliGRAM(s) SubCutaneous every 24 hours  fentaNYL   Patch  12 MICROgram(s)/Hr 1 Patch Transdermal every 72 hours  fentaNYL   Patch  25 MICROgram(s)/Hr 1 Patch Transdermal every 72 hours  lidocaine   4% Patch 1 Patch Transdermal daily  lidocaine   4% Patch 1 Patch Transdermal daily  magnesium oxide 400 milliGRAM(s) Oral three times a day with meals  metoprolol tartrate 25 milliGRAM(s) Oral two times a day  pantoprazole    Tablet 40 milliGRAM(s) Oral before breakfast  saccharomyces boulardii 250 milliGRAM(s) Oral two times a day  vancomycin    Solution 125 milliGRAM(s) Oral every 6 hours    MEDICATIONS  (PRN):  acetaminophen    Suspension .. 650 milliGRAM(s) Oral every 6 hours PRN Mild Pain (1 - 3)  ALBUTerol    0.083% 2.5 milliGRAM(s) Nebulizer every 6 hours PRN Shortness of Breath and/or Wheezing  ALPRAZolam 0.25 milliGRAM(s) Oral every 6 hours PRN anxiety  cyclobenzaprine 5 milliGRAM(s) Oral three times a day PRN Muscle Spasm  HYDROmorphone  Injectable 1.5 milliGRAM(s) IntraMuscular every 3 hours PRN Severe Pain (7 - 10)  metoclopramide   Syrup 10 milliGRAM(s) Oral every 6 hours PRN nausea  naloxone Injectable 2 milliGRAM(s) IntraMuscular once PRN Opioid Intoxication  ondansetron   Disintegrating Tablet 8 milliGRAM(s) Oral every 8 hours PRN Nausea and/or Vomiting  simethicone 80 milliGRAM(s) Chew every 6 hours PRN Gas    Review of Systems:   	  · ROS	Patient seen and examined at bedside. Frail, very poor PO, in bed, reports pain sl improved 8/10 but still requesting Dilaudid 'around the clock' as prn, loose stool x1 in past 24 hours. Reports mostly leg pain, partially neck pain Evaluated by onc and palliative, agreed on Fentanyl increase, palliative radiation being arranged at [facility]/transfer possibly today.  Patient aware and agreeable for the above.     Physical Exam:   · Constitutional Comments	Ill appearing, unable to participate in most therapies. Still with diarrhea. Tachycardic, answering questions appropriately. Able to move extremities but limited considerably by pain.   	  · Respiratory	clear to auscultation bilaterally; no wheezes; no rales; no rhonchi. Fair effort  · Cardiovascular	regular rate and rhythm; S1 S2 present.   · Gastrointestinal	soft; no R/G +BS    	  	  · Motor	Patient with very limited active and passive ROM due to pain. Her hand strength is 1-2+, elbows are 1-2+, and patient has difficulty lifting her arms. She can wiggle her toes but unable to bend at knees or hips. 1+ PE in UE, trace in bilateral lower.      HISTORY OF PRESENT ILLNESS  Neha is a 67 yo female with PMH of left arm skin CA s/p excision at age 16, stomach ulcer, her last mammogram was in 2014, who was brought to [facility] ED 9/10/22 with progressive weakness to the point that she felt she could not get out of bed. She also reported remote shoulder pain, decreased appetite. Was found to have two lumps in her right breast but could not tolerate recommended biopsy due to pain. CT scan of the Cervical spine performed which showed Multiple lytic lesions of the cervical and visualized thoracic spine as well as right first and second ribs concerning for metastases versus multiple myeloma.    She was admitted under medicine service for further workup and management, including of her pain. Further imaging done showed Metastatic breast cancer with dominant right breast mass, right chest wall, axillary and retropectoral involvement; osseous, pulmonary, and likely hepatic metastatic disease. She was also found to have metastatic adenopathy of the thorax, diffuse extensive osseous metastatic disease with multiple lucent and sclerotic lesions and compression fractures of T1, T9, T12, and L2. Severe T1 compression with epidural extension; cord compression cannot be excluded. +additional neoplastic disease of the spine with epidural extension. Multifocal neoplastic lesions involving the pelvis and ribs as well.    Patient was seen by Oncology, CT surgery and Neurosurgery team. She underwent C7-T2 corpectomy & fusion, posterior C4-T5 fusion. Post-op course uncomplicated.  Hamm removed, pt requiring some straight cath for retention. She also had subjective dysphagia - seen by speech therapy recommended pureed diet which patient continues to prefer. Patient with low grade temp and positive UA. Started on IV Rocephin for UTI. Blood cultures negative, Urine culture positive for GNR, changed to PO Vantin for 7 days.    Patient was medically optimized for discharge to [facility] IRF on 9/30/22.           VITALS  Vital Signs Last 24 Hrs  T(C): 36.7 (26 Oct 2022 09:42), Max: 36.8 (25 Oct 2022 13:22)  T(F): 98 (26 Oct 2022 09:42), Max: 98.2 (25 Oct 2022 13:22)  HR: 112 (26 Oct 2022 09:42) (103 - 117)  BP: 124/84 (26 Oct 2022 09:42) (124/84 - 138/85)  BP(mean): --  RR: 16 (26 Oct 2022 09:42) (16 - 16)  SpO2: 94% (26 Oct 2022 09:42) (93% - 95%)    Parameters below as of 26 Oct 2022 09:42  Patient On (Oxygen Delivery Method): room air        RECENT LABS                        9.1    6.15  )-----------( 294      ( 26 Oct 2022 06:15 )             29.3     10-26    137  |  102  |  6<L>  ----------------------------<  89  3.8   |  27  |  0.28<L>    Ca    10.5      26 Oct 2022 06:15    TPro  4.5<L>  /  Alb  1.5<L>  /  TBili  0.3  /  DBili  x   /  AST  47<H>  /  ALT  8<L>  /  AlkPhos  148<H>  10-26      LIVER FUNCTIONS - ( 26 Oct 2022 06:15 )  Alb: 1.5 g/dL / Pro: 4.5 g/dL / ALK PHOS: 148 U/L / ALT: 8 U/L / AST: 47 U/L / GGT: x             CURRENT MEDICATIONS  MEDICATIONS  (STANDING):  Biotene Dry Mouth Oral Rinse 5 milliLiter(s) Swish and Spit three times a day  dexAMETHasone     Tablet 4 milliGRAM(s) Oral every 12 hours  enoxaparin Injectable 40 milliGRAM(s) SubCutaneous every 24 hours  fentaNYL   Patch  12 MICROgram(s)/Hr 1 Patch Transdermal every 72 hours  fentaNYL   Patch  25 MICROgram(s)/Hr 1 Patch Transdermal every 72 hours  lidocaine   4% Patch 1 Patch Transdermal daily  lidocaine   4% Patch 1 Patch Transdermal daily  magnesium oxide 400 milliGRAM(s) Oral three times a day with meals  metoprolol tartrate 25 milliGRAM(s) Oral two times a day  pantoprazole    Tablet 40 milliGRAM(s) Oral before breakfast  saccharomyces boulardii 250 milliGRAM(s) Oral two times a day  vancomycin    Solution 125 milliGRAM(s) Oral every 6 hours    MEDICATIONS  (PRN):  acetaminophen    Suspension .. 650 milliGRAM(s) Oral every 6 hours PRN Mild Pain (1 - 3)  ALBUTerol    0.083% 2.5 milliGRAM(s) Nebulizer every 6 hours PRN Shortness of Breath and/or Wheezing  ALPRAZolam 0.25 milliGRAM(s) Oral every 6 hours PRN anxiety  cyclobenzaprine 5 milliGRAM(s) Oral three times a day PRN Muscle Spasm  HYDROmorphone  Injectable 1.5 milliGRAM(s) IntraMuscular every 3 hours PRN Severe Pain (7 - 10)  metoclopramide   Syrup 10 milliGRAM(s) Oral every 6 hours PRN nausea  naloxone Injectable 2 milliGRAM(s) IntraMuscular once PRN Opioid Intoxication  ondansetron   Disintegrating Tablet 8 milliGRAM(s) Oral every 8 hours PRN Nausea and/or Vomiting  simethicone 80 milliGRAM(s) Chew every 6 hours PRN Gas    Review of Systems:   	  · ROS	Patient seen and examined at bedside. Frail, very poor PO, in bed, reports pain sl improved 8/10 but still requesting Dilaudid 'around the clock' as prn, loose stool x1 in past 24 hours. Reports mostly leg pain, partially neck pain Evaluated by onc and palliative, agreed on Fentanyl increase, palliative radiation being arranged at [facility]/transfer possibly today.  Patient aware and agreeable for the above.     Physical Exam:   · Constitutional Comments	Ill appearing, unable to participate in most therapies. Still with diarrhea. Tachycardic, answering questions appropriately. Able to move extremities but limited considerably by pain.   	  · Respiratory	clear to auscultation bilaterally; no wheezes; no rales; no rhonchi. Fair effort  · Cardiovascular	regular rate and rhythm; S1 S2 present.   · Gastrointestinal	soft; no R/G +BS    	  	  · Motor	Patient with very limited active and passive ROM due to pain. Her hand strength is 1-2+, elbows are 1-2+, and patient has difficulty lifting her arms. She can wiggle her toes but unable to bend at knees or hips. 1+ PE in UE, trace in bilateral lower.

## 2022-10-26 NOTE — DISCHARGE NOTE PROVIDER - NSDCMRMEDTOKEN_GEN_ALL_CORE_FT
acetaminophen 160 mg/5 mL oral suspension: 20.31 milliliter(s) orally every 6 hours, As needed, Mild Pain (1 - 3)  ALPRAZolam 0.25 mg oral tablet: 1 tab(s) orally every 6 hours, As needed, anxiety  bisacodyl 10 mg rectal suppository: 1 suppository(ies) rectal once a day, As needed, Constipation  cyclobenzaprine 5 mg oral tablet: 1 tab(s) orally 3 times a day, As needed, Muscle Spasm  dexamethasone 4 mg oral tablet: 1 tab(s) orally every 12 hours  enoxaparin: 40 milligram(s) subcutaneous once a day  fentaNYL 12 mcg/hr transdermal film, extended release: 1 patch transdermal every 72 hours  fentaNYL 25 mcg/hr transdermal film, extended release: 1 patch transdermal every 72 hours  HYDROmorphone 2 mg oral tablet: 1 tab(s) orally every 4 hours, As needed, Moderate Pain (4 - 6)  HYDROmorphone 4 mg oral tablet: 1 tab(s) orally every 4 hours, As needed, Severe Pain (7 - 10)  lidocaine 4% topical film: Apply topically to affected area once a day  magnesium oxide 400 mg oral tablet: 1 tab(s) orally 3 times a day (with meals)  metoclopramide 5 mg/5 mL oral syrup: 10 milliliter(s) orally every 6 hours, As needed, nausea  metoprolol tartrate 25 mg oral tablet: 1 tab(s) orally 2 times a day  ondansetron 8 mg oral tablet, disintegratin tab(s) orally every 8 hours, As needed, Nausea and/or Vomiting  pantoprazole 40 mg oral delayed release tablet: 1 tab(s) orally once a day (before a meal)  polyethylene glycol 3350 oral powder for reconstitution: 17 gram(s) orally every 12 hours  saccharomyces boulardii lyo 250 mg oral capsule: 1 cap(s) orally 2 times a day  senna leaf extract oral tablet: 2 tab(s) orally once a day (at bedtime)  simethicone 80 mg oral tablet, chewable: 1 tab(s) orally every 6 hours, As needed, Gas  vancomycin 25 mg/mL oral liquid: 5 milliliter(s) orally every 6 hours   acetaminophen 160 mg/5 mL oral suspension: 20.31 milliliter(s) orally every 6 hours, As needed, Mild Pain (1 - 3)  ALPRAZolam 0.25 mg oral tablet: 1 tab(s) orally every 6 hours, As needed, anxiety  bisacodyl 10 mg rectal suppository: 1 suppository(ies) rectal once a day, As needed, Constipation  cyclobenzaprine 5 mg oral tablet: 1 tab(s) orally 3 times a day, As needed, Muscle Spasm  dexamethasone 4 mg oral tablet: 1 tab(s) orally every 12 hours  enoxaparin: 40 milligram(s) subcutaneous once a day  fentaNYL 50 mcg/hr transdermal film, extended release: 1 patch transdermal every 72 hours  HYDROmorphone 2 mg oral tablet: 1 tab(s) orally every 4 hours, As needed, Moderate Pain (4 - 6)  HYDROmorphone 4 mg oral tablet: 1 tab(s) orally every 4 hours, As needed, Severe Pain (7 - 10)  lidocaine 4% topical film: Apply topically to affected area once a day  magnesium oxide 400 mg oral tablet: 1 tab(s) orally 3 times a day (with meals)  metoclopramide 5 mg/5 mL oral syrup: 10 milliliter(s) orally every 6 hours, As needed, nausea  metoprolol tartrate 25 mg oral tablet: 1 tab(s) orally 2 times a day  ondansetron 8 mg oral tablet, disintegratin tab(s) orally every 8 hours, As needed, Nausea and/or Vomiting  pantoprazole 40 mg oral delayed release tablet: 1 tab(s) orally once a day (before a meal)  polyethylene glycol 3350 oral powder for reconstitution: 17 gram(s) orally every 12 hours  saccharomyces boulardii lyo 250 mg oral capsule: 1 cap(s) orally 2 times a day  senna leaf extract oral tablet: 2 tab(s) orally once a day (at bedtime)  simethicone 80 mg oral tablet, chewable: 1 tab(s) orally every 6 hours, As needed, Gas  sodium chloride 0.65% nasal spray: 1 spray(s) nasal every 6 hours, As Needed

## 2022-10-26 NOTE — PROGRESS NOTE ADULT - SUBJECTIVE AND OBJECTIVE BOX
Progress: remains weakened and in pain     Present Symptoms:   Dyspnea: no  Nausea/Vomiting: no  Anxiety:    Depressed Mood: yes  Fatigue: yes  Loss of appetite: yes  Pain:         yes          location: back/legs  Review of Systems: [All others negativeMEDICATIONS  (STANDING):  Biotene Dry Mouth Oral Rinse 5 milliLiter(s) Swish and Spit three times a day  dexAMETHasone     Tablet 4 milliGRAM(s) Oral every 12 hours  enoxaparin Injectable 40 milliGRAM(s) SubCutaneous every 24 hours  fentaNYL   Patch  12 MICROgram(s)/Hr 1 Patch Transdermal every 72 hours  fentaNYL   Patch  25 MICROgram(s)/Hr 1 Patch Transdermal every 72 hours  lidocaine   4% Patch 1 Patch Transdermal daily  lidocaine   4% Patch 1 Patch Transdermal daily  magnesium oxide 400 milliGRAM(s) Oral three times a day with meals  metoprolol tartrate 25 milliGRAM(s) Oral two times a day  pantoprazole    Tablet 40 milliGRAM(s) Oral before breakfast  saccharomyces boulardii 250 milliGRAM(s) Oral two times a day  vancomycin    Solution 125 milliGRAM(s) Oral every 6 hours    MEDICATIONS  (PRN):  acetaminophen    Suspension .. 650 milliGRAM(s) Oral every 6 hours PRN Mild Pain (1 - 3)  ALBUTerol    0.083% 2.5 milliGRAM(s) Nebulizer every 6 hours PRN Shortness of Breath and/or Wheezing  ALPRAZolam 0.25 milliGRAM(s) Oral every 6 hours PRN anxiety  cyclobenzaprine 5 milliGRAM(s) Oral three times a day PRN Muscle Spasm  HYDROmorphone  Injectable 1.5 milliGRAM(s) IntraMuscular every 3 hours PRN Severe Pain (7 - 10)  metoclopramide   Syrup 10 milliGRAM(s) Oral every 6 hours PRN nausea  naloxone Injectable 2 milliGRAM(s) IntraMuscular once PRN Opioid Intoxication  ondansetron   Disintegrating Tablet 8 milliGRAM(s) Oral every 8 hours PRN Nausea and/or Vomiting  simethicone 80 milliGRAM(s) Chew every 6 hours PRN Gas      PHYSICAL EXAM:  Vital Signs Last 24 Hrs  T(C): 36.7 (26 Oct 2022 09:42), Max: 36.7 (26 Oct 2022 09:42)  T(F): 98 (26 Oct 2022 09:42), Max: 98 (26 Oct 2022 09:42)  HR: 112 (26 Oct 2022 09:42) (103 - 112)  BP: 124/84 (26 Oct 2022 09:42) (124/84 - 137/80)  BP(mean): --  RR: 16 (26 Oct 2022 09:42) (16 - 16)  SpO2: 94% (26 Oct 2022 09:42) (94% - 95%)    Parameters below as of 26 Oct 2022 09:42  Patient On (Oxygen Delivery Method): room air      General: alert  oriented x3 converses    HEENT: n/c, a/t , neck lac healing, c-collar     Lungs: dim to bases, breathing comfortable   CV: normal  -tachy  GI: normal    : normal    Musculoskeletal: weakened, edematous    Skin: pale, w/d     Neuro: no deficits   Oral intake ability:  oral feeding w/ assist   Diet: soft as emmie thin liquids      LABS:                          9.1    6.15  )-----------( 294      ( 26 Oct 2022 06:15 )             29.3     10-26    137  |  102  |  6<L>  ----------------------------<  89  3.8   |  27  |  0.28<L>    Ca    10.5      26 Oct 2022 06:15    TPro  4.5<L>  /  Alb  1.5<L>  /  TBili  0.3  /  DBili  x   /  AST  47<H>  /  ALT  8<L>  /  AlkPhos  148<H>  10-26        RADIOLOGY & ADDITIONAL STUDIES:     ADVANCE DIRECTIVES: HCP   Advanced Care Planning discussion total time spent:

## 2022-10-26 NOTE — DISCHARGE NOTE PROVIDER - CARE PROVIDER_API CALL
Melly Bragg)  Internal Medicine; Medical Oncology  [address] [place] [address]  Phone: [phone]  Fax: [phone]  Follow Up Time:

## 2022-10-26 NOTE — DISCHARGE NOTE PROVIDER - NSDCCPCAREPLAN_GEN_ALL_CORE_FT
PRINCIPAL DISCHARGE DIAGNOSIS  Diagnosis: Metastatic breast cancer  Assessment and Plan of Treatment: follow up oncology and radiation oncology. Continue pain regimen as needed.      SECONDARY DISCHARGE DIAGNOSES  Diagnosis: Protein calorie malnutrition  Assessment and Plan of Treatment: Follow up palliative and nutrition     PRINCIPAL DISCHARGE DIAGNOSIS  Diagnosis: Metastatic breast cancer  Assessment and Plan of Treatment: CT: Diffuse extensive osseous metastatic disease with multiple lucent and sclerotic lesions and compression fractures of T1, T9, T12, and L2. Severe T1 compression with epidural extension  Plan: Follow up oncology and radiation oncology. Continue pain regimen as needed.      SECONDARY DISCHARGE DIAGNOSES  Diagnosis: Protein calorie malnutrition  Assessment and Plan of Treatment: Follow up palliative and nutrition    Diagnosis: H/O spinal fusion  Assessment and Plan of Treatment: Please follow the instructions for cervical collar provided by surgical team    Diagnosis: Pancolitis  Assessment and Plan of Treatment: From C-Diff; s/p 14 day vancomycin course

## 2022-10-26 NOTE — PROGRESS NOTE ADULT - ASSESSMENT
65 yo female PMH stomach ulcer who presented to [facility] 9/10/22 with progressive weakness, discovered multiple lytic lesions of the cervical and thoracic spine along with lung and liver secondary to metastatic primary newly discovered breast cancer with axillary and retropectoral involvement. Imaging also showed Compression fractures of T1, T9, T12, and L2. Severe T1 compression with epidural extension.S/p C7-T2 corpectomy & fusion, posterior C4-T5 fusion, hospital course significant for urinary retention and dysphagia, UTI, now admitted for rehab- pt/ot/dvt ppx      #Metastatic Breast CA to lung, liver, ribs, brain with spine involvement and neurogenic bladder  - s/p right breast biopsy shows  positive for invasive ductal carcinoma  - Compression fractures of T1, T9, T12, and L2. Severe T1 compression with epidural extension.   - s/p C7-T2 corpectomy & fusion, posterior C4-T5 fusion--> having troubling swallowing.  - Patient will not require chemo/radiation while in rehab. 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  - oncology consulted, reviewed Notes today from Dr. Miguel.     # pain due to mets-   patient is unable to tolerate all PT/OT  meds changed to fentanyl and Dilaudid prn  dexa added 10/19/22  - F/u with Palliative care today. Will work on pain control as still in pain. ?Inc fentanyl patch. Rehab inc frequency of dilaudid. D/w Dr. Pierre.    #Acute hypoxic respiratory failure in need of supplemental oxygenation likely multifactorial including:  *Metastatic disease within mediastinum and rohit  *Metastatic disease within ribs  *Bilateral lung nodules RLL  *Bilateral GGO  *Bilateral atelectasis  -PE ruled out with neg CTA  -C/w alb neb treatement q 6hrs prn  -Incentive spirometer, OOB to chair, mobilize  -Might need oxygen due to increase in cancer burden in lungs  -TTE : EF 70-75%. tachy, hyperdynamic LVF  -Appreciate pulmonary recommendations    #B/L UE swelling- likely due to mets,  RUE- NO DVT, axillary Lymphadenopathy noted.  # Anasarca w/ hypoalbuminemia  # Severe protein calorie malnutrition  -previously on IVF, will minimize fluids if possible.  -arm elevation advised  -monitor  -nutrition consulted     #c.diff colitis- n/v/d/ LEUKOCYTOSIS/ hypokalemia / hyponatremia- improved.  -C Diff by PCR Result: Detected (10.13.22 @ 22:00)  -po vanco STARTED 10/11/22,   -zofran SL  -c/w ppi  -BC X 2 negative 10/11/22  -replete K prn  -GI and ID consulted.    # tachycardia- likely multifactorial- pain / debility due to cancer with mets    # HTN  - metoprolol   - Cardiology consulted.  - encouraged PO hydration    # Enterobacter cloacae UTI  - s/p Vantin BID for 7 days 9/30-10/7/22  - monitor bladder scans  - SC for PVR >350 ml, toileting schedule    # Hypercalcemia- improved with fluids  - likely related to bone mets  - was on IVF  - will obtain ionized calcium with next bloodwork.    # Normocytic anemia  - Monitor Hg/Hct, iron levels, B12, folate WNL  - Transfuse if hG <7    DVT ppx: Lovenox    67 yo female PMH stomach ulcer who presented to [facility] 9/10/22 with progressive weakness, discovered multiple lytic lesions of the cervical and thoracic spine along with lung and liver secondary to metastatic primary newly discovered breast cancer with axillary and retropectoral involvement. Imaging also showed Compression fractures of T1, T9, T12, and L2. Severe T1 compression with epidural extension.S/p C7-T2 corpectomy & fusion, posterior C4-T5 fusion, hospital course significant for urinary retention and dysphagia, UTI, now admitted for rehab- pt/ot/dvt ppx      #Metastatic Breast CA to lung, liver, ribs, brain with spine involvement and neurogenic bladder  - s/p right breast biopsy shows  positive for invasive ductal carcinoma  - Compression fractures of T1, T9, T12, and L2. Severe T1 compression with epidural extension.   - s/p C7-T2 corpectomy & fusion, posterior C4-T5 fusion--> having troubling swallowing.  - d/w Dr. Handley and Lainey Anderson from Palliative Care - Based on conversations w/ Oncologist, Dr. Miguel, pt will be transferring to [facility] for Palliative radiation to her spine, as pain is limiting her ability to rehab.   - OP f/u with radiation oncology for Brain METS    # pain due to mets  - patient is unable to tolerate all PT/OT  - cont fentanyl patch and prn dilaudid  - dexa added on 10/19, but pt has been having difficulty swallowing pills  - transfer to [facility] for palliative radiation, as mentioned above.     #Acute hypoxic respiratory failure in need of supplemental oxygenation likely multifactorial including:  #Metastatic disease within mediastinum and rohit  #Metastatic disease within ribs  #Bilateral lung nodules RLL  #Bilateral GGO  #Bilateral atelectasis  -PE ruled out with neg CTA  -C/w alb neb treatement q 6hrs prn  -Incentive spirometer, OOB to chair, mobilize  -Might need oxygen due to increase in cancer burden in lungs  -TTE : EF 70-75%. tachy, hyperdynamic LVF  -Appreciate pulmonary recommendations    #B/L UE swelling- likely due to mets,  RUE- NO DVT, axillary Lymphadenopathy noted.  # Anasarca w/ hypoalbuminemia  # Severe protein calorie malnutrition  -previously on IVF, will minimize fluids if possible.  -arm elevation advised  -monitor  -nutrition consulted     #c.diff colitis- n/v/d/ LEUKOCYTOSIS/ hypokalemia / hyponatremia- improved.  -C Diff by PCR Result: Detected (10.13.22 @ 22:00)  -po vanco STARTED 10/11/22,   -zofran SL  -c/w ppi  -BC X 2 negative 10/11/22  -replete K prn  -GI and ID consulted.    # tachycardia- likely multifactorial- pain / debility due to cancer with mets    # HTN  - metoprolol   - Cardiology consulted.  - encouraged PO hydration    # Enterobacter cloacae UTI  - s/p Vantin BID for 7 days 9/30-10/7/22  - monitor bladder scans  - SC for PVR >350 ml, toileting schedule    # Hypercalcemia- improved with fluids  - likely related to bone mets  - was on IVF  - will obtain ionized calcium with next bloodwork.    # Normocytic anemia  - Monitor Hg/Hct, iron levels, B12, folate WNL  - Transfuse if hG <7    DVT ppx: Lovenox    dispo: discharge to [facility] under care of Dr. Bragg.

## 2022-10-26 NOTE — PROGRESS NOTE ADULT - TIME BILLING
transfer to [facility], talking with Palliative care/rehab team, discussing pain management w/ pt

## 2022-10-26 NOTE — PROGRESS NOTE ADULT - ASSESSMENT
# Metastatic Breast CA (invasive ductal carcinoma) to brain, lung, liver, ribs, with spine involvement  - Being followed by inhouse oncology, possible transfer to [facility] today to Dr Hue Pickard service/onc. Palliative radiation planned.  - Compression fractures of T1, T9, T12, and L2. Severe T1 compression with epidural extension.   - s/p C7-T2 corpectomy & fusion, posterior C4-T5 fusion (Dr Bennett)  - Plastics appreciated. Remaining staples and suture removed 10/14  - Precautions: fall, aspiration, spinal, cervical collar, LSO Brace (Cuauhtemoc; [facility]), contact isolation-loose BM x1 on 10/25    # Pain management  - Dr. Handley following-FTT-?hospice home   - Dilaudid, continue 1.5 mg IM q4h PRN (last adjustment 10/24)   - Cyclobenzaprine 5 q8 PRN  - Stopped gabapentin and lyrica due to patient's intolerance  fentanyl 37mg now, increased on 10/25  - Dexamethasone tablet 4 mg PO q12h. Patient has not been able to tolerate   - lidocaine patch added to back and hip daily.   - care not to use MHP jose given metastatic disease and if considering treatment discussed    # C diff colitis with N/V, diarrhea, tachycardia  - - CT A/P 10/11: Pancolitis, correlate inflammatory/infectious etiology  - BCx Negative  - C Diff Toxin + 10/13  - Complete PO Vanc, 125 mg, 14 days, tolerated majority of course   - Per ID, hold off on further antibioitics (incl. ppx) at this time. May reserve fidaxomicin for relapse   - WBC improved @ 5.54 10/24    # edema/anasarca/FTT/hypoalbumin  - doppler negative RUE DVT 10/17  - Poor po intake. Hold IVF 2/2 edema  - ACE wrap for edema management  -nutrition and palliative appreciated    # hypokalemia  - replete K  - K+ stable  - BMP 10/26    # hyponatremia  - likely due to D5NS  - Encourage po intake  - BMP following    # enterobacter UTI  - s/p Vantin BID x 7 days  -denies symptoms    # adjustment symptoms with anxiety and depression  - working on behavioral techniques with psychology, support  - psychiatry consult 10/14 appreciated. Patient declines any medications for mood.    # Bowel Regimen  - Florastor  - Zofran 8 mg PRN. Switched to oral dissolvable, denies nausea today    # FEN   - Pureed solids and thins  - MBS 10/13: difficulty with propelling pureed solids to back of throat, some anxiety although pharyngeal swallow appears grossly intact. Will increase SLP time to work on sensory technique  - if patient not pursuing hospice may need to consider feeding tube due to poor po intake    # DVT ppx  - Lovenox  - SCD, TEDs    # Case discussed in IDT rounds 10/26:  - rolling with min assist, max assist bed mobility, increase po acceptance. Only tolerated OOB one day, refuses trial commode transfers, Limited by pain and nausea, poor endurance  - goals: wheelchair level, total assist  - Kelly when medically stable estimated versus possible hospice referral  - disability form completed 10/7    brother Quique [phone]   CODE STATUS: FULL CODE

## 2022-10-26 NOTE — PROGRESS NOTE ADULT - SUBJECTIVE AND OBJECTIVE BOX
SHELLEY DE LEON, 66y Female  MRN: [id number]  ATTENDING: Claire Carrizales    HPI:  66F, PMHx GERD, stomach cancer,, s/p hysterectomy, admitted at [facility] 9/10/2022 with progressive weakness.  CT scan of the cervical spine consistent with multiple lytic lesions cervical and thoracic spine, ribs, likely metastatic disease.  Mammogram showed a dominant right breast mass, right chest wall, axillary and retropectoral lymphadenopathy.  Extensive imaging shows osseous, pulmonary and likely metastatic disease.  X-ray is also consistent with multiple lucent and sclerotic lesion and compression fractures T1, T9, T12 and L2 plus additional neoplastic disease of the spine with epidural extension.    Multidisciplinary approach including oncology, radiation oncology, neurosurgery; patient underwent C7-T2 corpectomy & fusion, posterior C4-T5 fusion. Post-op course uncomplicated.  Hamm removed, pt requiring some straight cath for retention. She also had subjective dysphagia - seen by speech therapy recommended pureed diet which patient continues to prefer. Started on IV Rocephin for UTI. Blood cultures negative, Urine culture positive for GNR, changed to PO Vancomycin for 7 days.  Patient is still complaining of back pain.  She has yet to see medical oncology in ambulatory to discuss further treatment.  Oncology consulted as above.      MEDICATIONS:  acetaminophen    Suspension .. 650 milliGRAM(s) Oral every 6 hours PRN  ALBUTerol    0.083% 2.5 milliGRAM(s) Nebulizer every 6 hours PRN  ALPRAZolam 0.25 milliGRAM(s) Oral every 6 hours PRN  Biotene Dry Mouth Oral Rinse 5 milliLiter(s) Swish and Spit three times a day  cyclobenzaprine 5 milliGRAM(s) Oral three times a day PRN  dexAMETHasone     Tablet 4 milliGRAM(s) Oral every 12 hours  enoxaparin Injectable 40 milliGRAM(s) SubCutaneous every 24 hours  fentaNYL   Patch  25 MICROgram(s)/Hr 1 Patch Transdermal every 72 hours  HYDROmorphone  Injectable 2 milliGRAM(s) IntraMuscular every 6 hours PRN  lidocaine   4% Patch 1 Patch Transdermal daily  lidocaine   4% Patch 1 Patch Transdermal daily  magnesium oxide 400 milliGRAM(s) Oral three times a day with meals  metoclopramide   Syrup 10 milliGRAM(s) Oral every 6 hours PRN  metoprolol tartrate 25 milliGRAM(s) Oral two times a day  ondansetron   Disintegrating Tablet 8 milliGRAM(s) Oral every 8 hours PRN  pantoprazole    Tablet 40 milliGRAM(s) Oral before breakfast  saccharomyces boulardii 250 milliGRAM(s) Oral two times a day  simethicone 80 milliGRAM(s) Chew every 6 hours PRN  vancomycin    Solution 125 milliGRAM(s) Oral every 6 hours    All other medications reviewed.    SUBJECTIVE:  cannot move due to pain; otherwise no events over night.    VITALS:  T(C): 36.5 (10-24-22 @ 04:41), Max: 36.8 (10-23-22 @ 08:30)  T(F): 97.7 (10-24-22 @ 04:41), Max: 98.3 (10-23-22 @ 08:30)  HR: 115 (10-24-22 @ 04:41) (115 - 118)  BP: 126/75 (10-24-22 @ 04:41) (124/79 - 126/75)    PHYSICAL EXAM:  Constitutional: alert, frail  HEENT: normocephalic, anicteric sclerae, no mucositis or thrush; wearing collar  Respiratory: bilateral clear to auscultation anteriorly  Cardiovascular : S1, S2 regular, rhythmic, no murmurs, gallops or rubs  Abdomen: soft, distended, + normoactive BS, no palpable HS- megaly  Extremities: no tenderness;  -c/c/e, pulses equal bilaterally    LABS:  Hemoglobin: 9.1 g/dL (10-26-22 @ 06:15)  Hemoglobin: 9.0 g/dL (10-24-22 @ 06:25)  (10-22) WBC: 7.11 K/uL,Hemoglobin: 8.6 g/dL, Hematocrit: 26.5 %,  Platelet: 268 K/uL    RADIOLOGY:  ACC: [id number] EXAM:  CT ABDOMEN AND PELVIS                          PROCEDURE DATE:  10/11/2022    INTERPRETATION:  CLINICAL INFORMATION: Abdominal pain. Metastatic breast   cancer.    COMPARISON: CT scan abdomen pelvis 10/2/2022.    CONTRAST/COMPLICATIONS:  IV Contrast: NONE  Oral Contrast: NONE  Complications: None reported at time of study completion    PROCEDURE:  CT of the Abdomen and Pelvis was performed.  Sagittal and coronal reformats were performed.    FINDINGS:    LOWER CHEST:  Partially imaged heterogeneous soft tissue mass right breast and skin   thickening.    Again again noted are few pulmonary nodules at the visualized bilateral   lung bases.  Dependent bibasilar atelectatic changes.  Trace left-sided pleural effusion.    Streak artifact degrades image quality.  The evaluation of the solid organ parenchyma is limited without   intravenous contrast.    LIVER: Bilobar low-density lesions again noted, suspicious for metastatic   disease.  BILE DUCTS: Normal caliber.  GALLBLADDER: Intraluminal high density, may reflect biliary sludge or   carious excretion.  SPLEEN: Within normal limits.  PANCREAS: Within normal limits.  ADRENALS: Within normal limits.  KIDNEYS/URETERS:  Horseshoe kidney.  Punctate nonobstructing intrarenal calcification lower pole left kidney.    BLADDER: Dependent, layering calcifications left bladder.  REPRODUCTIVE ORGANS: Hysterectomy.    BOWEL:  The evaluation of the stomach and gastrointestinal tract is limited   without distention.  Evaluation of the bowel wall is limited without intravenous contrast.  There is suggestion of diffuse colonic bowel wall thickening, including   the rectum.  There is right-sided pericolic inflammatory stranding.  No bowel obstruction.  The appendix is notclearly demonstrated on this exam.  PERITONEUM: No ascites.    VESSELS: Atherosclerotic changes.  RETROPERITONEUM/LYMPH NODES: No lymphadenopathy.    ABDOMINAL WALL:  Tiny fat-containing umbilical hernia.    BONES:  Again noted is extensive permeative osteolytic metastatic disease   visualized axial and appendicular skeleton, including compression   deformities T12 and L2 vertebral bodies.  There is suggestion of epidural involvement at L2, where there is central   canal involvement.    IMPRESSION:    Limited, noncontrast exam.    Pancolitis, correlate clinically for inflammatory/infectious etiology,   including pseudomembranous colitis.    Partially visualized right breast mass.    Pulmonary and hepatic metastatic disease as noted on prior exam.    Permeative, osteolytic metastatic disease, including L2 compression   fracture with possible epidural involvement.  This can be further characterized by MRI as clinically indicated.

## 2022-10-26 NOTE — DISCHARGE NOTE PROVIDER - HOSPITAL COURSE
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 She also reported remote shoulder pain, decreased appetite. Was found to have two lumps in her right breast but could not tolerate recommended biopsy due to pain. CT scan of the Cervical spine performed which showed Multiple lytic lesions of the cervical and visualized thoracic spine as well as right first and second ribs concerning for metastases versus multiple myeloma.    She was admitted under medicine service for further workup and management, including of her pain. Further imaging done showed Metastatic breast cancer with dominant right breast mass, right chest wall, axillary and retropectoral involvement; osseous, pulmonary, and likely hepatic metastatic disease. She was also found to have metastatic adenopathy of the thorax, diffuse extensive osseous metastatic disease with multiple lucent and sclerotic lesions and compression fractures of T1, T9, T12, and L2. Severe T1 compression with epidural extension; cord compression cannot be excluded. +additional neoplastic disease of the spine with epidural extension. Multifocal neoplastic lesions involving the pelvis and ribs as well.    Patient was seen by Oncology, CT surgery and Neurosurgery team. She underwent C7-T2 corpectomy & fusion, posterior C4-T5 fusion. Post-op course uncomplicated.  Hamm removed, pt requiring some straight cath for retention. She also had subjective dysphagia - seen by speech therapy recommended pureed diet which patient continues to prefer. Patient with low grade temp and positive UA. Started on IV Rocephin for UTI. Blood cultures negative, Urine culture positive for GNR, changed to PO Vantin for 7 days.    Patient was medically optimized for discharge to [facility] IRF on 9/30/22.   While at rehab patient evaluated by medicine, palliative, hem-onc. Fentanyl patch increased on 10/25 for persistent back pain. Oncology requests transfer to [facility] for possible palliative radiation.      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 She also reported remote shoulder pain, decreased appetite. Was found to have two lumps in her right breast but could not tolerate recommended biopsy due to pain. CT scan of the Cervical spine performed which showed Multiple lytic lesions of the cervical and visualized thoracic spine as well as right first and second ribs concerning for metastases versus multiple myeloma.    At [facility], he was admitted under medicine service for further workup and management, including of her pain. Further imaging done showed Metastatic breast cancer with dominant right breast mass, right chest wall, axillary and retropectoral involvement; osseous, pulmonary, and likely hepatic metastatic disease. She was also found to have metastatic adenopathy of the thorax, diffuse extensive osseous metastatic disease with multiple lucent and sclerotic lesions and compression fractures of T1, T9, T12, and L2. Severe T1 compression with epidural extension; cord compression cannot be excluded. +additional neoplastic disease of the spine with epidural extension. Multifocal neoplastic lesions involving the pelvis and ribs as well.    Patient was seen by Oncology, CT surgery and Neurosurgery team. She underwent C7-T2 corpectomy & fusion, posterior C4-T5 fusion. Post-op course uncomplicated.  Hamm removed, pt requiring some straight cath for retention. She also had subjective dysphagia - seen by speech therapy recommended pureed diet which patient continues to prefer. Patient with low grade temp and positive UA. Started on IV Rocephin for UTI. Blood cultures negative, Urine culture positive for GNR, changed to PO Vantin for 7 days.    Patient was medically optimized for discharge to [facility] IRF on 9/30/22.     While at  acute rehab patient was evaluated by medicine, palliative, hem-onc.  In general, she was very limited in her ability to tolerate therapy, due to her pain, which is typically most prominent in her legs, and neuropathic in nature 2/2 compression fracutre. For this reason, her pain regimen has regularly been adjusted, and most recently her fentanyl patches were increased to 50 mcg q72h.  However, she is leaving hospital with 37 mcg patches, her previous dose, and the 50 mcg may be applied at the discretion of J provider. Additionally, patient tested positive for C Diff, and completed a 14-day course of PO Vancomycin on 10/25. In the past several days prior to her discharge, the patient has developed worsening edema and anasarca. Although hospice was discussed at various times during her admission, Oncology requests transfer to [facility] for possible palliative radiation. The neurosurgical intervention was without complication while at [place].    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 She also reported remote shoulder pain, decreased appetite. Was found to have two lumps in her right breast but could not tolerate recommended biopsy due to pain. CT scan of the Cervical spine performed which showed Multiple lytic lesions of the cervical and visualized thoracic spine as well as right first and second ribs concerning for metastases versus multiple myeloma.    At [facility], he was admitted under medicine service for further workup and management, including of her pain. Further imaging done showed Metastatic breast cancer with dominant right breast mass, right chest wall, axillary and retropectoral involvement; osseous, pulmonary, and likely hepatic metastatic disease. She was also found to have metastatic adenopathy of the thorax, diffuse extensive osseous metastatic disease with multiple lucent and sclerotic lesions and compression fractures of T1, T9, T12, and L2. Severe T1 compression with epidural extension; cord compression cannot be excluded. +additional neoplastic disease of the spine with epidural extension. Multifocal neoplastic lesions involving the pelvis and ribs as well.    Patient was seen by Oncology, CT surgery and Neurosurgery team. She underwent C7-T2 corpectomy & fusion, posterior C4-T5 fusion. Post-op course uncomplicated.  Hamm removed, pt requiring some straight cath for retention. She also had subjective dysphagia - seen by speech therapy recommended pureed diet which patient continues to prefer. Patient with low grade temp and positive UA. Started on IV Rocephin for UTI. Blood cultures negative, Urine culture positive for GNR, changed to PO Vantin for 7 days.    Patient was medically optimized for discharge to [facility] IRF on 9/30/22.     While at  acute rehab patient was evaluated by medicine, palliative, hem-onc.  In general, she was very limited in her ability to tolerate therapy, due to her pain, which is typically most prominent in her legs, and neuropathic in nature 2/2 compression fracutre. For this reason, her pain regimen has regularly been adjusted, and most recently her fentanyl patches were increased to 50 mcg q72h.  However, she is leaving hospital with 37 mcg patches, her previous dose, and the 50 mcg may be applied at the discretion of J provider. Additionally, patient tested positive for C Diff, and completed a 14-day course of PO Vancomycin on 10/25. In the past several days prior to her discharge, the patient has developed worsening edema and anasarca. Although hospice was discussed at various times during her admission, Oncology requests transfer to [facility] for possible palliative radiation. Patient agreeable.

## 2022-10-26 NOTE — GOALS OF CARE CONVERSATION - ADVANCED CARE PLANNING - CONVERSATION DETAILS
Patient has advanced metastatic breast cancer and has not yet rev=cieved any anti neop[lastic therapy nor radiation therapy.  She has severe back pain that is resistand to high doses of opiods.  Oncology feels that rt may help alleviate the pain.  Originally patient was refsuing this approach but after discussion today she understands that she may get good pain relief with rt and then can consider other therpaies.  She is willing to go  [facility] for tr.

## 2022-10-27 ENCOUNTER — INPATIENT (INPATIENT)
Facility: HOSPITAL | Age: 67
LOS: 11 days | Discharge: OTHER INSTITUTION | End: 2022-11-08
Attending: STUDENT IN AN ORGANIZED HEALTH CARE EDUCATION/TRAINING PROGRAM | Admitting: STUDENT IN AN ORGANIZED HEALTH CARE EDUCATION/TRAINING PROGRAM

## 2022-10-27 ENCOUNTER — TRANSCRIPTION ENCOUNTER (OUTPATIENT)
Age: 67
End: 2022-10-27

## 2022-10-27 VITALS
OXYGEN SATURATION: 96 % | SYSTOLIC BLOOD PRESSURE: 144 MMHG | HEART RATE: 112 BPM | DIASTOLIC BLOOD PRESSURE: 79 MMHG | RESPIRATION RATE: 18 BRPM | TEMPERATURE: 98 F

## 2022-10-27 VITALS — WEIGHT: 133.16 LBS

## 2022-10-27 DIAGNOSIS — Z90.710 ACQUIRED ABSENCE OF BOTH CERVIX AND UTERUS: Chronic | ICD-10-CM

## 2022-10-27 DIAGNOSIS — C80.1 MALIGNANT (PRIMARY) NEOPLASM, UNSPECIFIED: ICD-10-CM

## 2022-10-27 LAB
ANION GAP SERPL CALC-SCNC: 12 MMOL/L — SIGNIFICANT CHANGE UP (ref 5–17)
BUN SERPL-MCNC: 8 MG/DL — SIGNIFICANT CHANGE UP (ref 7–23)
CALCIUM SERPL-MCNC: 11.6 MG/DL — HIGH (ref 8.4–10.5)
CHLORIDE SERPL-SCNC: 105 MMOL/L — SIGNIFICANT CHANGE UP (ref 96–108)
CO2 SERPL-SCNC: 24 MMOL/L — SIGNIFICANT CHANGE UP (ref 22–31)
CREAT SERPL-MCNC: 0.3 MG/DL — LOW (ref 0.5–1.3)
EGFR: 116 ML/MIN/1.73M2 — SIGNIFICANT CHANGE UP
GLUCOSE SERPL-MCNC: 118 MG/DL — HIGH (ref 70–99)
HCT VFR BLD CALC: 30.2 % — LOW (ref 34.5–45)
HGB BLD-MCNC: 9.6 G/DL — LOW (ref 11.5–15.5)
MCHC RBC-ENTMCNC: 28.7 PG — SIGNIFICANT CHANGE UP (ref 27–34)
MCHC RBC-ENTMCNC: 31.8 GM/DL — LOW (ref 32–36)
MCV RBC AUTO: 90.1 FL — SIGNIFICANT CHANGE UP (ref 80–100)
NRBC # BLD: 0 /100 WBCS — SIGNIFICANT CHANGE UP (ref 0–0)
PLATELET # BLD AUTO: 313 K/UL — SIGNIFICANT CHANGE UP (ref 150–400)
POTASSIUM SERPL-MCNC: 3.7 MMOL/L — SIGNIFICANT CHANGE UP (ref 3.5–5.3)
POTASSIUM SERPL-SCNC: 3.7 MMOL/L — SIGNIFICANT CHANGE UP (ref 3.5–5.3)
RBC # BLD: 3.35 M/UL — LOW (ref 3.8–5.2)
RBC # FLD: 16.8 % — HIGH (ref 10.3–14.5)
SODIUM SERPL-SCNC: 141 MMOL/L — SIGNIFICANT CHANGE UP (ref 135–145)
WBC # BLD: 7.24 K/UL — SIGNIFICANT CHANGE UP (ref 3.8–10.5)
WBC # FLD AUTO: 7.24 K/UL — SIGNIFICANT CHANGE UP (ref 3.8–10.5)

## 2022-10-27 PROCEDURE — 99232 SBSQ HOSP IP/OBS MODERATE 35: CPT

## 2022-10-27 PROCEDURE — 99238 HOSP IP/OBS DSCHRG MGMT 30/<: CPT

## 2022-10-27 RX ORDER — FENTANYL CITRATE 50 UG/ML
1 INJECTION INTRAVENOUS
Refills: 0 | Status: DISCONTINUED | OUTPATIENT
Start: 2022-10-27 | End: 2022-10-27

## 2022-10-27 RX ORDER — CHLORHEXIDINE GLUCONATE 213 G/1000ML
1 SOLUTION TOPICAL DAILY
Refills: 0 | Status: DISCONTINUED | OUTPATIENT
Start: 2022-10-27 | End: 2022-11-08

## 2022-10-27 RX ORDER — FENTANYL CITRATE 50 UG/ML
1 INJECTION INTRAVENOUS
Qty: 0 | Refills: 0 | DISCHARGE
Start: 2022-10-27

## 2022-10-27 RX ORDER — HYDROMORPHONE HYDROCHLORIDE 2 MG/ML
1 INJECTION INTRAMUSCULAR; INTRAVENOUS; SUBCUTANEOUS EVERY 4 HOURS
Refills: 0 | Status: DISCONTINUED | OUTPATIENT
Start: 2022-10-27 | End: 2022-10-28

## 2022-10-27 RX ORDER — ACETAMINOPHEN 500 MG
650 TABLET ORAL EVERY 6 HOURS
Refills: 0 | Status: DISCONTINUED | OUTPATIENT
Start: 2022-10-27 | End: 2022-11-08

## 2022-10-27 RX ADMIN — ENOXAPARIN SODIUM 40 MILLIGRAM(S): 100 INJECTION SUBCUTANEOUS at 04:31

## 2022-10-27 RX ADMIN — HYDROMORPHONE HYDROCHLORIDE 1 MILLIGRAM(S): 2 INJECTION INTRAMUSCULAR; INTRAVENOUS; SUBCUTANEOUS at 20:05

## 2022-10-27 RX ADMIN — HYDROMORPHONE HYDROCHLORIDE 1.5 MILLIGRAM(S): 2 INJECTION INTRAMUSCULAR; INTRAVENOUS; SUBCUTANEOUS at 17:07

## 2022-10-27 RX ADMIN — LIDOCAINE 1 PATCH: 4 CREAM TOPICAL at 00:40

## 2022-10-27 RX ADMIN — FENTANYL CITRATE 1 PATCH: 50 INJECTION INTRAVENOUS at 09:31

## 2022-10-27 RX ADMIN — HYDROMORPHONE HYDROCHLORIDE 1.5 MILLIGRAM(S): 2 INJECTION INTRAMUSCULAR; INTRAVENOUS; SUBCUTANEOUS at 04:30

## 2022-10-27 RX ADMIN — Medication 125 MILLIGRAM(S): at 06:10

## 2022-10-27 RX ADMIN — HYDROMORPHONE HYDROCHLORIDE 1.5 MILLIGRAM(S): 2 INJECTION INTRAMUSCULAR; INTRAVENOUS; SUBCUTANEOUS at 11:28

## 2022-10-27 RX ADMIN — HYDROMORPHONE HYDROCHLORIDE 1.5 MILLIGRAM(S): 2 INJECTION INTRAMUSCULAR; INTRAVENOUS; SUBCUTANEOUS at 12:15

## 2022-10-27 RX ADMIN — HYDROMORPHONE HYDROCHLORIDE 1.5 MILLIGRAM(S): 2 INJECTION INTRAMUSCULAR; INTRAVENOUS; SUBCUTANEOUS at 08:31

## 2022-10-27 RX ADMIN — HYDROMORPHONE HYDROCHLORIDE 1.5 MILLIGRAM(S): 2 INJECTION INTRAMUSCULAR; INTRAVENOUS; SUBCUTANEOUS at 07:56

## 2022-10-27 RX ADMIN — HYDROMORPHONE HYDROCHLORIDE 1 MILLIGRAM(S): 2 INJECTION INTRAMUSCULAR; INTRAVENOUS; SUBCUTANEOUS at 20:14

## 2022-10-27 RX ADMIN — FENTANYL CITRATE 1 PATCH: 50 INJECTION INTRAVENOUS at 09:30

## 2022-10-27 RX ADMIN — Medication 125 MILLIGRAM(S): at 11:34

## 2022-10-27 RX ADMIN — HYDROMORPHONE HYDROCHLORIDE 1.5 MILLIGRAM(S): 2 INJECTION INTRAMUSCULAR; INTRAVENOUS; SUBCUTANEOUS at 16:06

## 2022-10-27 RX ADMIN — HYDROMORPHONE HYDROCHLORIDE 1.5 MILLIGRAM(S): 2 INJECTION INTRAMUSCULAR; INTRAVENOUS; SUBCUTANEOUS at 05:00

## 2022-10-27 NOTE — PROGRESS NOTE ADULT - NS ATTEND AMEND GEN_ALL_CORE FT
Patient seen and examined at the bedside. Agree with NP Sera's assessment and plan.  Complete course of tx for c.difficile colitis.  Will sign off, please reconsult as needed. Thank you.
Patient seen at bedside today   Had one loose stool   She continues to report diffuse pain and unable to participate in therapy sessions.   Fentanyl patch dose increased yesterday .   Patient also with poor PO intake  Hem- onc fu noted  Case discussed with Palliative Care - Patient to be transferred to [facility] for Palliative RT . Patient agreeable. Awaiting bed. Patient on contact isolation at the present time.
Patient seen bedside this am.   Continues to report pain neck/ extremeties  Awaiting bed at [facility] for palliative RT - Patient agreeable for transfer     2. No further loose stools- Per infection control isolation may be dc     3. Labs with elevated calcium in setting of cancer - will discuss with team regarding additional intervention
agree with above
agree with acp
seen w and w/o acp; agree with plan

## 2022-10-27 NOTE — DISCHARGE NOTE NURSING/CASE MANAGEMENT/SOCIAL WORK - NSDCPEFALRISK_GEN_ALL_CORE
For information on Fall & Injury Prevention, visit: [email or web address] OR  [email or web address] OR  https://www.cdc.gov/steadi/patient.html

## 2022-10-27 NOTE — PROGRESS NOTE ADULT - ASSESSMENT
67 yo female PMH stomach ulcer who presented to [facility] 9/10/22 with progressive weakness, discovered multiple lytic lesions of the cervical and thoracic spine along with lung and liver secondary to metastatic primary newly discovered breast cancer with axillary and retropectoral involvement. Imaging also showed Compression fractures of T1, T9, T12, and L2. Severe T1 compression with epidural extension.S/p C7-T2 corpectomy & fusion, posterior C4-T5 fusion, hospital course significant for urinary retention and dysphagia, UTI, now admitted for rehab. She is being transferred to [facility] for Palliative RT and palliative systemic therapy, as pain is limiting her ability to rehab.    #Metastatic Breast CA to lung, liver, ribs, brain with spine involvement and neurogenic bladder  - s/p right breast biopsy shows  positive for invasive ductal carcinoma  - Compression fractures of T1, T9, T12, and L2. Severe T1 compression with epidural extension.   - s/p C7-T2 corpectomy & fusion, posterior C4-T5 fusion--> having troubling swallowing.  - pt will be transferring to [facility] for Palliative radiation and Palliative systemic therapy, as pain is limiting her ability to rehab.     # pain due to mets  - patient is unable to tolerate PT/OT  - cont fentanyl patch and prn dilaudid  - dexa added on 10/19, but pt has been having difficulty swallowing pills  - transfer to [facility] for Palliative RT and systemic therapy, as mentioned above.     # Hypercalcemia of malignancy  - likely related to bony mets.  - initially improved with IVF, IVF now on hold d/t anasarca  - corrected calcium ranging from 13.2 to 14.4 (moderate to severe). Pt is to be transferred shortly to [facility]. Recommend evaluating patient for calcitonin (calcium lowering therapy)    #Acute hypoxic respiratory failure in need of supplemental oxygenation likely multifactorial including:  #Metastatic disease within mediastinum and rohit  #Metastatic disease within ribs  #Bilateral lung nodules RLL  #Bilateral GGO  #Bilateral atelectasis  -PE ruled out with neg CTA  -C/w alb neb treatement q 6hrs prn  -Incentive spirometer, OOB to chair, mobilize  -Might need oxygen due to increase in cancer burden in lungs  -TTE : EF 70-75%. tachy, hyperdynamic LVF  -Appreciate pulmonary recommendations    #B/L UE swelling- likely due to mets,  RUE- NO DVT, axillary Lymphadenopathy noted.  # Anasarca w/ hypoalbuminemia  # Severe protein calorie malnutrition  -previously on IVF, will minimize fluids if possible.  -arm elevation advised  -monitor  -nutrition consulted     #c.diff colitis- n/v/d/   # LEUKOCYTOSIS/ hypokalemia / hyponatremia- improved.  -C Diff by PCR Result: Detected (10.13.22 @ 22:00)  -po vanco STARTED 10/11/22,   -zofran SL  -c/w ppi  -BC X 2 negative 10/11/22  -replete K prn  -GI and ID consulted.    # tachycardia- likely multifactorial- pain / debility due to cancer with mets    # HTN  - metoprolol   - Cardiology consulted.  - encouraged PO hydration    # Enterobacter cloacae UTI  - s/p Vantin BID for 7 days 9/30-10/7/22  - monitor bladder scans  - SC for PVR >350 ml, toileting schedule    # Normocytic anemia  - Monitor Hg/Hct, iron levels, B12, folate WNL  - Transfuse if hG <7    DVT ppx: Lovenox    dispo: discharge to [facility] under care of Dr. Bragg.

## 2022-10-27 NOTE — PROGRESS NOTE ADULT - ASSESSMENT
A/P  67 yo female PMH stomach ulcer who presented to [facility] 9/10/22 with progressive weakness, discovered multiple lytic lesions of the cervical and thoracic spine along with lung and liver secondary to metastatic primary newly discovered breast cancer with axillary and retropectoral involvement. Imaging also showed Compression fractures of T1, T9, T12, and L2. Severe T1 compression with epidural extension. S/p C7-T2 corpectomy & fusion, posterior C4-T5 fusion, hospital course significant for urinary retention and dysphagia, UTI, now admitted for rehab.  Has been unable to fully  participate in rehab due to pain, nausea , weakness    Assessment/Plans:      Metastatic Breast CA (invasive ductal carcinoma) to brain, lung, liver, ribs, with spine involvement  - Being followed by inhouse oncology,who rec o/p follow up - say pt may be candidate for certain therapies   - Discussed palliative rad tx's to spine at [facility] as option today  - awaiting transfer   - Compression fractures of T1, T9, T12, and L2. Severe T1 compression with epidural extension.   - s/p C7-T2 corpectomy & fusion, posterior C4-T5 fusion (Dr Bennett  - Continue comprehensive rehab program OT, PT, SLP as tolerated- pt not tolerating due to pain     Pain management  - Dilaudid, increase frequency to q 3 hrs PRN  10/25    - Cyclobenzaprine 5 q8 PRN  - Stopped gabapentin and lyrica due to patient's intolerance  - fentanyl patch 37 mcg , not effective-  suggest inc to 50 mcg 10/27    - Dexamethasone tablet 4 mg PO q12h. Patient has not been able to tolerate  ( ?  she is now able to take some po  )  - lidocaine patch added to back and hip daily.        C diff colitis with N/V, diarrhea, tachycardia  - - CT A/P 10/11: Pancolitis, correlate inflammatory/infectious etiology  - BCx Negative  - C Diff Toxin + 10/13  - Complete PO Vanc, 125 mg, 14 days, tolerated majority of course   -WBC improved @ 5.54 10/24  - No BM since 10/25    tachycardia  - patient with difficulty tolerating po metoprolol     BUE swelling  - doppler negative RUE DVT 10/17  - likely third spacing due to IVf and hypoalbuminemia (1.4 10/16))  - Patient with improved po intake. ACE wrap for edema      anxiety and depression  - working on behavioral techniques with psychology, support  - psychiatry consult 10/14 appreciated. Patient declines any medications for mood. - Suggest psych follow up     # Bowel Regimen  - Florastor  - Zofran 8 mg PRN. Switched to oral dissolvable        Palliative : as  follow up today , pt awake in bed, reports bad night , no sleep. Still c/o pain , especially w/ movement. Using PRN dilaudid q 3 hrs  Suggest inc fentanyl patch to 50 mcg today.  Pt awaiting transfer to [facility] oncology team Dr Bragg for palliative radiation to spine.  Will cont to follow until d/c.

## 2022-10-27 NOTE — PROGRESS NOTE ADULT - SUBJECTIVE AND OBJECTIVE BOX
SHELLEY DE LEON, 66y Female  MRN: [id number]  ATTENDING: Claire Carrizales    HPI:  66F, PMHx GERD, stomach cancer,, s/p hysterectomy, admitted at [facility] 9/10/2022 with progressive weakness.  CT scan of the cervical spine consistent with multiple lytic lesions cervical and thoracic spine, ribs, likely metastatic disease.  Mammogram showed a dominant right breast mass, right chest wall, axillary and retropectoral lymphadenopathy.  Extensive imaging shows osseous, pulmonary and likely metastatic disease.  X-ray is also consistent with multiple lucent and sclerotic lesion and compression fractures T1, T9, T12 and L2 plus additional neoplastic disease of the spine with epidural extension.    Multidisciplinary approach including oncology, radiation oncology, neurosurgery; patient underwent C7-T2 corpectomy & fusion, posterior C4-T5 fusion. Post-op course uncomplicated.  Hamm removed, pt requiring some straight cath for retention. She also had subjective dysphagia - seen by speech therapy recommended pureed diet which patient continues to prefer. Started on IV Rocephin for UTI. Blood cultures negative, Urine culture positive for GNR, changed to PO Vancomycin for 7 days.  Patient is still complaining of back pain.  She has yet to see medical oncology in ambulatory to discuss further treatment.  Oncology consulted as above.      MEDICATIONS:  acetaminophen    Suspension .. 650 milliGRAM(s) Oral every 6 hours PRN  ALBUTerol    0.083% 2.5 milliGRAM(s) Nebulizer every 6 hours PRN  ALPRAZolam 0.25 milliGRAM(s) Oral every 6 hours PRN  Biotene Dry Mouth Oral Rinse 5 milliLiter(s) Swish and Spit three times a day  cyclobenzaprine 5 milliGRAM(s) Oral three times a day PRN  dexAMETHasone     Tablet 4 milliGRAM(s) Oral every 12 hours  enoxaparin Injectable 40 milliGRAM(s) SubCutaneous every 24 hours  fentaNYL   Patch  25 MICROgram(s)/Hr 1 Patch Transdermal every 72 hours  HYDROmorphone  Injectable 2 milliGRAM(s) IntraMuscular every 6 hours PRN  lidocaine   4% Patch 1 Patch Transdermal daily  lidocaine   4% Patch 1 Patch Transdermal daily  magnesium oxide 400 milliGRAM(s) Oral three times a day with meals  metoclopramide   Syrup 10 milliGRAM(s) Oral every 6 hours PRN  metoprolol tartrate 25 milliGRAM(s) Oral two times a day  ondansetron   Disintegrating Tablet 8 milliGRAM(s) Oral every 8 hours PRN  pantoprazole    Tablet 40 milliGRAM(s) Oral before breakfast  saccharomyces boulardii 250 milliGRAM(s) Oral two times a day  simethicone 80 milliGRAM(s) Chew every 6 hours PRN  vancomycin    Solution 125 milliGRAM(s) Oral every 6 hours    All other medications reviewed.    SUBJECTIVE:  Debilitated, chronically in pain, alert, answering questions.  Complains of pain in the hips.  Narcotics as needed  Continues treatment for C. difficile colitis.      VITALS:  T(C): 36.5 (10-24-22 @ 04:41), Max: 36.8 (10-23-22 @ 08:30)  T(F): 97.7 (10-24-22 @ 04:41), Max: 98.3 (10-23-22 @ 08:30)  HR: 115 (10-24-22 @ 04:41) (115 - 118)  BP: 126/75 (10-24-22 @ 04:41) (124/79 - 126/75)    PHYSICAL EXAM:  Constitutional: alert, frail  HEENT: normocephalic, anicteric sclerae, no mucositis or thrush; wearing collar  Respiratory: bilateral clear to auscultation anteriorly  Cardiovascular : S1, S2 regular, rhythmic, no murmurs, gallops or rubs  Abdomen: soft, distended, + normoactive BS, no palpable HS- megaly  Extremities: no tenderness;  -c/c/e, pulses equal bilaterally    LABS:  Hemoglobin: 9.1 g/dL (10-26-22 @ 06:15)  Hemoglobin: 9.0 g/dL (10-24-22 @ 06:25)  (10-22) WBC: 7.11 K/uL,Hemoglobin: 8.6 g/dL, Hematocrit: 26.5 %,  Platelet: 268 K/uL    RADIOLOGY:  ACC: [id number] EXAM:  CT ABDOMEN AND PELVIS                          PROCEDURE DATE:  10/11/2022    INTERPRETATION:  CLINICAL INFORMATION: Abdominal pain. Metastatic breast   cancer.    COMPARISON: CT scan abdomen pelvis 10/2/2022.    CONTRAST/COMPLICATIONS:  IV Contrast: NONE  Oral Contrast: NONE  Complications: None reported at time of study completion    PROCEDURE:  CT of the Abdomen and Pelvis was performed.  Sagittal and coronal reformats were performed.    FINDINGS:    LOWER CHEST:  Partially imaged heterogeneous soft tissue mass right breast and skin   thickening.    Again again noted are few pulmonary nodules at the visualized bilateral   lung bases.  Dependent bibasilar atelectatic changes.  Trace left-sided pleural effusion.    Streak artifact degrades image quality.  The evaluation of the solid organ parenchyma is limited without   intravenous contrast.    LIVER: Bilobar low-density lesions again noted, suspicious for metastatic   disease.  BILE DUCTS: Normal caliber.  GALLBLADDER: Intraluminal high density, may reflect biliary sludge or   carious excretion.  SPLEEN: Within normal limits.  PANCREAS: Within normal limits.  ADRENALS: Within normal limits.  KIDNEYS/URETERS:  Horseshoe kidney.  Punctate nonobstructing intrarenal calcification lower pole left kidney.    BLADDER: Dependent, layering calcifications left bladder.  REPRODUCTIVE ORGANS: Hysterectomy.    BOWEL:  The evaluation of the stomach and gastrointestinal tract is limited   without distention.  Evaluation of the bowel wall is limited without intravenous contrast.  There is suggestion of diffuse colonic bowel wall thickening, including   the rectum.  There is right-sided pericolic inflammatory stranding.  No bowel obstruction.  The appendix is notclearly demonstrated on this exam.  PERITONEUM: No ascites.    VESSELS: Atherosclerotic changes.  RETROPERITONEUM/LYMPH NODES: No lymphadenopathy.    ABDOMINAL WALL:  Tiny fat-containing umbilical hernia.    BONES:  Again noted is extensive permeative osteolytic metastatic disease   visualized axial and appendicular skeleton, including compression   deformities T12 and L2 vertebral bodies.  There is suggestion of epidural involvement at L2, where there is central   canal involvement.    IMPRESSION:    Limited, noncontrast exam.    Pancolitis, correlate clinically for inflammatory/infectious etiology,   including pseudomembranous colitis.    Partially visualized right breast mass.    Pulmonary and hepatic metastatic disease as noted on prior exam.    Permeative, osteolytic metastatic disease, including L2 compression   fracture with possible epidural involvement.  This can be further characterized by MRI as clinically indicated.

## 2022-10-27 NOTE — PROGRESS NOTE ADULT - SUBJECTIVE AND OBJECTIVE BOX
Progress: awake, in bed, weak but oriented, conversant     Present Symptoms:   Dyspnea: no  Nausea/Vomiting: nausea yes , no emesis   Anxiety:    Depressed Mood: yes  Fatigue: yes  Loss of appetite: yes  Pain:        yes           location: back/legs/pelvis  Review of Systems: [All others negative or Unable to obtain due to poor mentation]    MEDICATIONS  (STANDING):  Biotene Dry Mouth Oral Rinse 5 milliLiter(s) Swish and Spit three times a day  dexAMETHasone     Tablet 4 milliGRAM(s) Oral every 12 hours  enoxaparin Injectable 40 milliGRAM(s) SubCutaneous every 24 hours  fentaNYL   Patch  12 MICROgram(s)/Hr 1 Patch Transdermal every 72 hours  fentaNYL   Patch  25 MICROgram(s)/Hr 1 Patch Transdermal every 72 hours  lidocaine   4% Patch 1 Patch Transdermal daily  lidocaine   4% Patch 1 Patch Transdermal daily  magnesium oxide 400 milliGRAM(s) Oral three times a day with meals  metoprolol tartrate 25 milliGRAM(s) Oral two times a day  pantoprazole    Tablet 40 milliGRAM(s) Oral before breakfast  saccharomyces boulardii 250 milliGRAM(s) Oral two times a day    MEDICATIONS  (PRN):  acetaminophen    Suspension .. 650 milliGRAM(s) Oral every 6 hours PRN Mild Pain (1 - 3)  ALBUTerol    0.083% 2.5 milliGRAM(s) Nebulizer every 6 hours PRN Shortness of Breath and/or Wheezing  ALPRAZolam 0.25 milliGRAM(s) Oral every 6 hours PRN anxiety  cyclobenzaprine 5 milliGRAM(s) Oral three times a day PRN Muscle Spasm  HYDROmorphone  Injectable 1.5 milliGRAM(s) IntraMuscular every 3 hours PRN Severe Pain (7 - 10)  metoclopramide   Syrup 10 milliGRAM(s) Oral every 6 hours PRN nausea  naloxone Injectable 2 milliGRAM(s) IntraMuscular once PRN Opioid Intoxication  ondansetron   Disintegrating Tablet 8 milliGRAM(s) Oral every 8 hours PRN Nausea and/or Vomiting  simethicone 80 milliGRAM(s) Chew every 6 hours PRN Gas  sodium chloride 0.65% Nasal 1 Spray(s) Both Nostrils four times a day PRN Nasal Congestion      PHYSICAL EXAM:  Vital Signs Last 24 Hrs  T(C): 36.7 (26 Oct 2022 22:00), Max: 36.7 (26 Oct 2022 22:00)  T(F): 98.1 (26 Oct 2022 22:00), Max: 98.1 (26 Oct 2022 22:00)  HR: 105 (26 Oct 2022 22:00) (105 - 108)  BP: 147/82 (26 Oct 2022 22:00) (147/82 - 158/90)  BP(mean): --  RR: 15 (26 Oct 2022 22:00) (14 - 15)  SpO2: 96% (26 Oct 2022 22:00) (96% - 97%)    Parameters below as of 26 Oct 2022 22:00  Patient On (Oxygen Delivery Method): room air      General: weakened, pale, alert  oriented      HEENT: n/c, a/t, , lips dry     Lungs: clear, dim to bases    CV: normal -tachy   GI: abd soft, n/t     : normal    Musculoskeletal: edematous UE's, weakened, unable to move legs w/o help   Skin: pale, w/d     Neuro: no deficits   Oral intake ability:  oral feeding- minimal w/ assist   Diet: soft, as emmie, thin liquids     LABS:                          9.6    7.24  )-----------( 313      ( 27 Oct 2022 06:44 )             30.2     10-27    141  |  105  |  8   ----------------------------<  118<H>  3.7   |  24  |  0.30<L>    Ca    11.6<H>      27 Oct 2022 06:44    TPro  4.5<L>  /  Alb  1.5<L>  /  TBili  0.3  /  DBili  x   /  AST  47<H>  /  ALT  8<L>  /  AlkPhos  148<H>  10-26        RADIOLOGY & ADDITIONAL STUDIES:     ADVANCE DIRECTIVES: HCP  Advanced Care Planning discussion total time spent:

## 2022-10-27 NOTE — PATIENT PROFILE ADULT - FALL HARM RISK - HARM RISK INTERVENTIONS

## 2022-10-27 NOTE — PROGRESS NOTE ADULT - PROVIDER SPECIALTY LIST ADULT
Condition:: Full body skin exam Please Describe Your Condition:: Location is all over the body,mild in severity, no associated signs or symptoms. The patient has not noticed any new or changing spots or moles. Physiatry

## 2022-10-27 NOTE — PROGRESS NOTE ADULT - ASSESSMENT
# Metastatic Breast CA (invasive ductal carcinoma) to brain, lung, liver, ribs, with spine involvement  - Being followed by inhouse oncology, possible transfer to [facility] today to Melly Giraldo service/onc. Palliative radiation planned.  - Compression fractures of T1, T9, T12, and L2. Severe T1 compression with epidural extension.   - s/p C7-T2 corpectomy & fusion, posterior C4-T5 fusion (Dr Bennett)  - Plastics appreciated. Remaining staples and suture removed 10/14  - Precautions: fall, aspiration, spinal, cervical collar, LSO Brace (Cuauhtemoc; [facility]), contact isolation-loose BM x1 on 10/25. No BMs since.    # Pain management  - Dr. Handley following-FTT-?hospice home   - Dilaudid, continue 1.5 mg IM q4h PRN (last adjustment 10/24)   - Cyclobenzaprine 5 q8 PRN  - Stopped gabapentin and lyrica due to patient's intolerance  fentanyl 37mg now, increased on 10/25  - Dexamethasone tablet 4 mg PO q12h. Patient has not been able to tolerate   - lidocaine patch added to back and hip daily.   - care not to use MHP jose given metastatic disease and if considering treatment discussed    # C diff colitis with N/V, diarrhea, tachycardia  - - CT A/P 10/11: Pancolitis, correlate inflammatory/infectious etiology  - BCx Negative  - C Diff Toxin + 10/13  - Complete PO Vanc, 125 mg, 14 days, tolerated majority of course   - Per ID, hold off on further antibiotics (incl. ppx) at this time. May reserve fidaxomicin for relapse   - WBC improved, no BM since 10/25    # edema/anasarca/FTT/hypoalbumin  - doppler negative RUE DVT 10/17  - Poor po intake. Hold IVF 2/2 edema  - ACE wrap for edema management  -nutrition and palliative appreciated    # hypokalemia  - replete K  - K+ stable    # hyponatremia  - likely due to D5NS  - Encourage po intake  - BMP following    # enterobacter UTI  - s/p Vantin BID x 7 days  -denies symptoms    # adjustment symptoms with anxiety and depression  - working on behavioral techniques with psychology, support  - psychiatry consult 10/14 appreciated. Patient declines any medications for mood.    # Bowel Regimen  - Florastor  - Zofran 8 mg PRN. Switched to oral dissolvable, denies nausea today    # FEN   - Pureed solids and thins  - MBS 10/13: difficulty with propelling pureed solids to back of throat, some anxiety although pharyngeal swallow appears grossly intact. Will increase SLP time to work on sensory technique  - if patient not pursuing hospice may need to consider feeding tube due to poor po intake    # DVT ppx  - Lovenox  - SCD, TEDs    # Case discussed in IDT rounds 10/26:  - rolling with min assist, max assist bed mobility, increase po acceptance. Only tolerated OOB one day, refuses trial commode transfers, Limited by pain and nausea, poor endurance  - goals: wheelchair level, total assist  - Kelly when medically stable estimated versus possible hospice referral  - disability form completed 10/7    brother Qiuque [phone]   CODE STATUS: FULL CODE      # Metastatic Breast CA (invasive ductal carcinoma) to brain, lung, liver, ribs, with spine involvement  - Being followed by inhouse oncology, possible transfer to [facility] today to Melly Giraldo service/onc. Palliative radiation planned.  - Compression fractures of T1, T9, T12, and L2. Severe T1 compression with epidural extension.   - s/p C7-T2 corpectomy & fusion, posterior C4-T5 fusion (Dr Bennett)  - Plastics appreciated. Remaining staples and suture removed 10/14  - Precautions: fall, aspiration, spinal, cervical collar, LSO Brace (Cuauhtemoc; [facility]), contact isolation-loose BM x1 on 10/25. No BMs since.    # Pain management  - Dilaudid, continue 1.5 mg IM q4h PRN (last adjustment 10/24)   - Cyclobenzaprine 5 q8 PRN  fentanyl 37mg now, increased on 10/25  - Dexamethasone tablet 4 mg PO q12h. Patient has not been able to tolerate   - lidocaine patch added to back and hip daily.     # C diff colitis with N/V, diarrhea, tachycardia   Completed PO Vanc, 125 mg, 14 days, tolerated majority of course   - Per ID, hold off on further antibiotics (incl. ppx) at this time. May reserve fidaxomicin for relapse   - WBC improved, no BM since 10/25    # edema/anasarca/FTT/hypoalbumin  - doppler negative RUE DVT 10/17  - Poor po intake. Hold IVF 2/2 edema  - ACE wrap for edema management  -nutrition and palliative appreciated    Electrolyte abnormalities/ # hyponatremia/hypokalemia  resolved   - Encourage po intake    # enterobacter UTI  - s/p Vantin BID x 7 days    # adjustment symptoms with anxiety and depression  - working on behavioral techniques with psychology, support  Patient declines any medications for mood. seen by psych in rehab     # Bowel Regimen  - Florastor  - Zofran 8 mg PRN. Switched to oral dissolvable, denies nausea today    # FEN   - Pureed solids and thins  - MBS 10/13: difficulty with propelling pureed solids to back of throat, some anxiety although pharyngeal swallow appears grossly intact. Will increase SLP time to work on sensory technique    # DVT ppx  - Lovenox  - SCD, TEDs    Disp: Awaiting bed at [facility]     brother Quique [phone]   CODE STATUS: FULL CODE

## 2022-10-27 NOTE — PROGRESS NOTE ADULT - NUTRITIONAL ASSESSMENT
This patient has been assessed with a concern for Malnutrition and has been determined to have a diagnosis/diagnoses of Severe protein-calorie malnutrition.    This patient is being managed with:   Diet Minced and Moist-  Entered: Oct  4 2022 11:40AM    
This patient has been assessed with a concern for Malnutrition and has been determined to have a diagnosis/diagnoses of Severe protein-calorie malnutrition.    This patient is being managed with:   Diet Pureed-  Banatrol TF     Qty per Day:  2  Supplement Feeding Modality:  Oral  Ensure Clear Cans or Servings Per Day:  1       Frequency:  Three Times a day  Entered: Oct 17 2022  1:32PM    
This patient has been assessed with a concern for Malnutrition and has been determined to have a diagnosis/diagnoses of Severe protein-calorie malnutrition.    This patient is being managed with:   Diet Pureed-  Supplement Feeding Modality:  Oral  Ensure Enlive Cans or Servings Per Day:  1       Frequency:  Two Times a day  Entered: Oct  1 2022 12:29PM    Diet Pureed-  Entered: Sep 30 2022  5:18PM    The following pending diet order is being considered for treatment of Severe protein-calorie malnutrition:null
This patient has been assessed with a concern for Malnutrition and has been determined to have a diagnosis/diagnoses of Severe protein-calorie malnutrition.    This patient is being managed with:   Diet Pureed-  Supplement Feeding Modality:  Oral  Ensure Enlive Cans or Servings Per Day:  1       Frequency:  Two Times a day  Entered: Oct  5 2022  4:29PM    
This patient has been assessed with a concern for Malnutrition and has been determined to have a diagnosis/diagnoses of Severe protein-calorie malnutrition.    This patient is being managed with:   Diet Pureed-  Supplement Feeding Modality:  Oral  Ensure Enlive Cans or Servings Per Day:  1       Frequency:  Two Times a day  Entered: Oct  5 2022  4:29PM    
This patient has been assessed with a concern for Malnutrition and has been determined to have a diagnosis/diagnoses of Severe protein-calorie malnutrition.    This patient is being managed with:   Diet Full Liquid-  Entered: Oct 11 2022 12:40PM    
This patient has been assessed with a concern for Malnutrition and has been determined to have a diagnosis/diagnoses of Severe protein-calorie malnutrition.    This patient is being managed with:   Diet Minced and Moist-  Entered: Oct  4 2022 11:40AM    
This patient has been assessed with a concern for Malnutrition and has been determined to have a diagnosis/diagnoses of Severe protein-calorie malnutrition.    This patient is being managed with:   Diet Pureed-  Banatrol TF     Qty per Day:  2  Supplement Feeding Modality:  Oral  Ensure Clear Cans or Servings Per Day:  1       Frequency:  Three Times a day  Entered: Oct 17 2022  1:32PM    
This patient has been assessed with a concern for Malnutrition and has been determined to have a diagnosis/diagnoses of Severe protein-calorie malnutrition.    This patient is being managed with:   Diet Pureed-  Fiber/Residue Restricted  Supplement Feeding Modality:  Oral  Ensure Enlive Cans or Servings Per Day:  1       Frequency:  Three Times a day  Entered: Oct 10 2022  4:20PM    
This patient has been assessed with a concern for Malnutrition and has been determined to have a diagnosis/diagnoses of Severe protein-calorie malnutrition.    This patient is being managed with:   Diet Pureed-  Supplement Feeding Modality:  Oral  Ensure Clear Cans or Servings Per Day:  1       Frequency:  Three Times a day  Entered: Oct 14 2022 12:43PM    
This patient has been assessed with a concern for Malnutrition and has been determined to have a diagnosis/diagnoses of Severe protein-calorie malnutrition.    This patient is being managed with:   Diet Pureed-  Supplement Feeding Modality:  Oral  Ensure Clear Cans or Servings Per Day:  1       Frequency:  Three Times a day  Entered: Oct 14 2022 12:43PM    
This patient has been assessed with a concern for Malnutrition and has been determined to have a diagnosis/diagnoses of Severe protein-calorie malnutrition.    This patient is being managed with:   Diet Pureed-  Supplement Feeding Modality:  Oral  Ensure Enlive Cans or Servings Per Day:  1       Frequency:  Two Times a day  Entered: Oct  1 2022 12:29PM    Diet Pureed-  Entered: Sep 30 2022  5:18PM    The following pending diet order is being considered for treatment of Severe protein-calorie malnutrition:null
This patient has been assessed with a concern for Malnutrition and has been determined to have a diagnosis/diagnoses of Severe protein-calorie malnutrition.    This patient is being managed with:   Diet Pureed-  Supplement Feeding Modality:  Oral  Ensure Enlive Cans or Servings Per Day:  1       Frequency:  Two Times a day  Entered: Oct  5 2022  4:29PM    
This patient has been assessed with a concern for Malnutrition and has been determined to have a diagnosis/diagnoses of Severe protein-calorie malnutrition.    This patient is being managed with:   Diet Full Liquid-  Entered: Oct 11 2022 12:40PM    
This patient has been assessed with a concern for Malnutrition and has been determined to have a diagnosis/diagnoses of Severe protein-calorie malnutrition.    This patient is being managed with:   Diet Full Liquid-  Entered: Oct 11 2022 12:40PM    
This patient has been assessed with a concern for Malnutrition and has been determined to have a diagnosis/diagnoses of Severe protein-calorie malnutrition.    This patient is being managed with:   Diet Minced and Moist-  Entered: Oct  4 2022 11:40AM    
This patient has been assessed with a concern for Malnutrition and has been determined to have a diagnosis/diagnoses of Severe protein-calorie malnutrition.    This patient is being managed with:   Diet Pureed-  Supplement Feeding Modality:  Oral  Ensure Enlive Cans or Servings Per Day:  1       Frequency:  Two Times a day  Entered: Oct  5 2022  4:29PM    
This patient has been assessed with a concern for Malnutrition and has been determined to have a diagnosis/diagnoses of Severe protein-calorie malnutrition.    This patient is being managed with:   Diet Pureed-  Banatrol TF     Qty per Day:  2  Supplement Feeding Modality:  Oral  Ensure Clear Cans or Servings Per Day:  1       Frequency:  Three Times a day  Entered: Oct 17 2022  1:32PM    
This patient has been assessed with a concern for Malnutrition and has been determined to have a diagnosis/diagnoses of Severe protein-calorie malnutrition.    This patient is being managed with:   Diet Pureed-  Banatrol TF     Qty per Day:  2  Supplement Feeding Modality:  Oral  Ensure Clear Cans or Servings Per Day:  1       Frequency:  Three Times a day  Entered: Oct 17 2022  1:32PM    
This patient has been assessed with a concern for Malnutrition and has been determined to have a diagnosis/diagnoses of Severe protein-calorie malnutrition.    This patient is being managed with:   Diet Pureed-  Supplement Feeding Modality:  Oral  Ensure Enlive Cans or Servings Per Day:  1       Frequency:  Two Times a day  Entered: Oct  1 2022 12:29PM    
This patient has been assessed with a concern for Malnutrition and has been determined to have a diagnosis/diagnoses of Severe protein-calorie malnutrition.    This patient is being managed with:   Diet Pureed-  Supplement Feeding Modality:  Oral  Ensure Enlive Cans or Servings Per Day:  1       Frequency:  Two Times a day  Entered: Oct 13 2022  2:29PM    
This patient has been assessed with a concern for Malnutrition and has been determined to have a diagnosis/diagnoses of Severe protein-calorie malnutrition.    This patient is being managed with:   Diet Full Liquid-  Entered: Oct 11 2022 12:40PM    
This patient has been assessed with a concern for Malnutrition and has been determined to have a diagnosis/diagnoses of Severe protein-calorie malnutrition.    This patient is being managed with:   Diet Full Liquid-  Entered: Oct 11 2022 12:40PM    
This patient has been assessed with a concern for Malnutrition and has been determined to have a diagnosis/diagnoses of Severe protein-calorie malnutrition.    This patient is being managed with:   Diet Pureed-  Banatrol TF     Qty per Day:  2  Supplement Feeding Modality:  Oral  Ensure Clear Cans or Servings Per Day:  1       Frequency:  Three Times a day  Entered: Oct 17 2022  1:32PM    
This patient has been assessed with a concern for Malnutrition and has been determined to have a diagnosis/diagnoses of Severe protein-calorie malnutrition.    This patient is being managed with:   Diet Pureed-  Supplement Feeding Modality:  Oral  Ensure Clear Cans or Servings Per Day:  1       Frequency:  Three Times a day  Entered: Oct 14 2022 12:43PM    
This patient has been assessed with a concern for Malnutrition and has been determined to have a diagnosis/diagnoses of Severe protein-calorie malnutrition.    This patient is being managed with:   Diet Pureed-  Supplement Feeding Modality:  Oral  Ensure Clear Cans or Servings Per Day:  1       Frequency:  Three Times a day  Entered: Oct 14 2022 12:43PM      This patient has been assessed with a concern for Malnutrition and has been determined to have a diagnosis/diagnoses of Severe protein-calorie malnutrition.    This patient is being managed with:   Diet Pureed-  Supplement Feeding Modality:  Oral  Ensure Clear Cans or Servings Per Day:  1       Frequency:  Three Times a day  Entered: Oct 14 2022 12:43PM    
This patient has been assessed with a concern for Malnutrition and has been determined to have a diagnosis/diagnoses of Severe protein-calorie malnutrition.    This patient is being managed with:   Diet Pureed-  Supplement Feeding Modality:  Oral  Ensure Enlive Cans or Servings Per Day:  1       Frequency:  Two Times a day  Entered: Oct  5 2022  4:29PM    
This patient has been assessed with a concern for Malnutrition and has been determined to have a diagnosis/diagnoses of Severe protein-calorie malnutrition.    This patient is being managed with:   Diet Pureed-  Supplement Feeding Modality:  Oral  Ensure Enlive Cans or Servings Per Day:  1       Frequency:  Two Times a day  Entered: Oct 13 2022  2:29PM    
This patient has been assessed with a concern for Malnutrition and has been determined to have a diagnosis/diagnoses of Severe protein-calorie malnutrition.    This patient is being managed with:   Diet Pureed-  Supplement Feeding Modality:  Oral  Ensure Enlive Cans or Servings Per Day:  1       Frequency:  Two Times a day  Entered: Oct 13 2022  2:29PM    
This patient has been assessed with a concern for Malnutrition and has been determined to have a diagnosis/diagnoses of Severe protein-calorie malnutrition.    This patient is being managed with:   Diet Pureed-  Supplement Feeding Modality:  Oral  Ensure Enlive Cans or Servings Per Day:  1       Frequency:  Two Times a day  Entered: Oct  1 2022 12:29PM      This patient has been assessed with a concern for Malnutrition and has been determined to have a diagnosis/diagnoses of Severe protein-calorie malnutrition.    This patient is being managed with:   Diet Pureed-  Supplement Feeding Modality:  Oral  Ensure Enlive Cans or Servings Per Day:  1       Frequency:  Two Times a day  Entered: Oct  1 2022 12:29PM    
This patient has been assessed with a concern for Malnutrition and has been determined to have a diagnosis/diagnoses of Severe protein-calorie malnutrition.    This patient is being managed with:   Diet Pureed-  Banatrol TF     Qty per Day:  2  Supplement Feeding Modality:  Oral  Ensure Clear Cans or Servings Per Day:  1       Frequency:  Three Times a day  Entered: Oct 17 2022  1:32PM    
This patient has been assessed with a concern for Malnutrition and has been determined to have a diagnosis/diagnoses of Severe protein-calorie malnutrition.    This patient is being managed with:   Diet Pureed-  Supplement Feeding Modality:  Oral  Ensure Enlive Cans or Servings Per Day:  1       Frequency:  Two Times a day  Entered: Oct  5 2022  4:29PM    
This patient has been assessed with a concern for Malnutrition and has been determined to have a diagnosis/diagnoses of Severe protein-calorie malnutrition.    This patient is being managed with:   Diet Pureed-  Supplement Feeding Modality:  Oral  Ensure Enlive Cans or Servings Per Day:  1       Frequency:  Two Times a day  Entered: Oct  5 2022  4:29PM

## 2022-10-27 NOTE — PATIENT PROFILE ADULT - FALL HARM RISK - FACTORS NURSING JUDGEMENT
Possibly due to medication vs thiamine deficiency from severe malnutrition vs fatty liver  Lactic acid noted to be elevated but stable on 6/16 through 6/23 - 4 8 on 6/23  Lactic acid on 7/7: 5 8, 7/15: 4 9 Since 7/16 stable around 4  Continue supplements and tube feeding  Avoiding IV fluids due to anasarca  Recheck LA tomorrow  CTAP shows severe hepatomegaly which is likely contributing to this Yes

## 2022-10-27 NOTE — PROGRESS NOTE ADULT - PROBLEM SELECTOR PLAN 1
Advanced, treatment naïve ER 40%/NM 40%/HER2 3+ breast cancer with extensive bony metastasis, liver, lung and brain.  Currently being treated for C. difficile colitis.  Patient eligible for palliative RT and palliative systemic therapy–discussed case with VERONICA WANG (  [facility]). Awaiting transfer once bed available.  Case discussed with rehab team–in agreement with plan.

## 2022-10-27 NOTE — PROGRESS NOTE ADULT - SUBJECTIVE AND OBJECTIVE BOX
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 She also reported remote shoulder pain, decreased appetite. Was found to have two lumps in her right breast but could not tolerate recommended biopsy due to pain. CT scan of the Cervical spine performed which showed Multiple lytic lesions of the cervical and visualized thoracic spine as well as right first and second ribs concerning for metastases versus multiple myeloma.    She was admitted under medicine service for further workup and management, including of her pain. Further imaging done showed Metastatic breast cancer with dominant right breast mass, right chest wall, axillary and retropectoral involvement; osseous, pulmonary, and likely hepatic metastatic disease. She was also found to have metastatic adenopathy of the thorax, diffuse extensive osseous metastatic disease with multiple lucent and sclerotic lesions and compression fractures of T1, T9, T12, and L2. Severe T1 compression with epidural extension; cord compression cannot be excluded. +additional neoplastic disease of the spine with epidural extension. Multifocal neoplastic lesions involving the pelvis and ribs as well.    Patient was seen by Oncology, CT surgery and Neurosurgery team. She underwent C7-T2 corpectomy & fusion, posterior C4-T5 fusion. Post-op course uncomplicated.  Hamm removed, pt requiring some straight cath for retention. She also had subjective dysphagia - seen by speech therapy recommended pureed diet which patient continues to prefer. Patient with low grade temp and positive UA. Started on IV Rocephin for UTI. Blood cultures negative, Urine culture positive for GNR, changed to PO Vantin for 7 days.    Patient was medically optimized for discharge to [facility] IRF on 9/30/22.       PAST MEDICAL & SURGICAL HISTORY:  Stomach ulcer  20 years ago   no pain not taking any meds      Skin cancer of arm, left  pt was 16 year old      H/O excision of mass  left arm  when patient was 16 years ago      S/P hysterectomy      VITALS  Vital Signs Last 24 Hrs  T(C): 36.7 (26 Oct 2022 22:00), Max: 36.7 (26 Oct 2022 22:00)  T(F): 98.1 (26 Oct 2022 22:00), Max: 98.1 (26 Oct 2022 22:00)  HR: 105 (26 Oct 2022 22:00) (105 - 108)  BP: 147/82 (26 Oct 2022 22:00) (147/82 - 158/90)  BP(mean): --  RR: 15 (26 Oct 2022 22:00) (14 - 15)  SpO2: 96% (26 Oct 2022 22:00) (96% - 97%)    Parameters below as of 26 Oct 2022 22:00  Patient On (Oxygen Delivery Method): room air      RECENT LABS                        9.6    7.24  )-----------( 313      ( 27 Oct 2022 06:44 )             30.2     10-27    141  |  105  |  8   ----------------------------<  118<H>  3.7   |  24  |  0.30<L>    Ca    11.6<H>      27 Oct 2022 06:44    TPro  4.5<L>  /  Alb  1.5<L>  /  TBili  0.3  /  DBili  x   /  AST  47<H>  /  ALT  8<L>  /  AlkPhos  148<H>  10-26      LIVER FUNCTIONS - ( 26 Oct 2022 06:15 )  Alb: 1.5 g/dL / Pro: 4.5 g/dL / ALK PHOS: 148 U/L / ALT: 8 U/L / AST: 47 U/L / GGT: x             CURRENT MEDICATIONS  MEDICATIONS  (STANDING):  Biotene Dry Mouth Oral Rinse 5 milliLiter(s) Swish and Spit three times a day  dexAMETHasone     Tablet 4 milliGRAM(s) Oral every 12 hours  enoxaparin Injectable 40 milliGRAM(s) SubCutaneous every 24 hours  fentaNYL   Patch  12 MICROgram(s)/Hr 1 Patch Transdermal every 72 hours  fentaNYL   Patch  25 MICROgram(s)/Hr 1 Patch Transdermal every 72 hours  lidocaine   4% Patch 1 Patch Transdermal daily  lidocaine   4% Patch 1 Patch Transdermal daily  magnesium oxide 400 milliGRAM(s) Oral three times a day with meals  metoprolol tartrate 25 milliGRAM(s) Oral two times a day  pantoprazole    Tablet 40 milliGRAM(s) Oral before breakfast  saccharomyces boulardii 250 milliGRAM(s) Oral two times a day  vancomycin    Solution 125 milliGRAM(s) Oral every 6 hours    MEDICATIONS  (PRN):  acetaminophen    Suspension .. 650 milliGRAM(s) Oral every 6 hours PRN Mild Pain (1 - 3)  ALBUTerol    0.083% 2.5 milliGRAM(s) Nebulizer every 6 hours PRN Shortness of Breath and/or Wheezing  ALPRAZolam 0.25 milliGRAM(s) Oral every 6 hours PRN anxiety  cyclobenzaprine 5 milliGRAM(s) Oral three times a day PRN Muscle Spasm  HYDROmorphone  Injectable 1.5 milliGRAM(s) IntraMuscular every 3 hours PRN Severe Pain (7 - 10)  metoclopramide   Syrup 10 milliGRAM(s) Oral every 6 hours PRN nausea  naloxone Injectable 2 milliGRAM(s) IntraMuscular once PRN Opioid Intoxication  ondansetron   Disintegrating Tablet 8 milliGRAM(s) Oral every 8 hours PRN Nausea and/or Vomiting  simethicone 80 milliGRAM(s) Chew every 6 hours PRN Gas  sodium chloride 0.65% Nasal 1 Spray(s) Both Nostrils four times a day PRN Nasal Congestion      Review of Systems:   	  · ROS	Patient seen and examined at bedside. Frail, very poor PO, in bed, reports pain sl improved 8/10 but still requesting Dilaudid 'around the clock' as prn, no loose stool x1 in past 24 hours, no BMs reported since night of 10/25. Reports mostly leg pain, partially neck pain Evaluated by onc and palliative, Fentanyl 37mcg palliative radiation being arranged at [facility]/transfer possibly today.  Patient aware and agreeable for the above.     Physical Exam:   · Constitutional Comments	Ill appearing, unable to participate in most therapies. Still with diarrhea. Tachycardic, answering questions appropriately. Able to move extremities but limited considerably by pain.   	  · Respiratory	clear to auscultation bilaterally; no wheezes; no rales; no rhonchi. Fair effort  · Cardiovascular	regular rate and rhythm; S1 S2 present.   · Gastrointestinal	soft; no R/G +BS    	  	  · Motor	Patient with very limited active and passive ROM due to pain. Her hand strength is 1-2+, elbows are 1-2+, and patient has difficulty lifting her arms. She can wiggle her toes but unable to bend at knees or hips. 1+ PE in UE, trace in bilateral lower.     Continue comprehensive acute rehab program consisting of 3hrs/day of OT/PT and SLP. HISTORY OF PRESENT ILLNESS  Neha is a 67 yo female with PMH of left arm skin CA s/p excision at age 16, stomach ulcer, her last mammogram was in 2014, who was brought to [facility] ED 9/10/22 with progressive weakness to the point that she felt she could not get out of bed. She also reported remote shoulder pain, decreased appetite. Was found to have two lumps in her right breast but could not tolerate recommended biopsy due to pain. CT scan of the Cervical spine performed which showed Multiple lytic lesions of the cervical and visualized thoracic spine as well as right first and second ribs concerning for metastases versus multiple myeloma.    She was admitted under medicine service for further workup and management, including of her pain. Further imaging done showed Metastatic breast cancer with dominant right breast mass, right chest wall, axillary and retropectoral involvement; osseous, pulmonary, and likely hepatic metastatic disease. She was also found to have metastatic adenopathy of the thorax, diffuse extensive osseous metastatic disease with multiple lucent and sclerotic lesions and compression fractures of T1, T9, T12, and L2. Severe T1 compression with epidural extension; cord compression cannot be excluded. +additional neoplastic disease of the spine with epidural extension. Multifocal neoplastic lesions involving the pelvis and ribs as well.    Patient was seen by Oncology, CT surgery and Neurosurgery team. She underwent C7-T2 corpectomy & fusion, posterior C4-T5 fusion. Post-op course uncomplicated.  Hamm removed, pt requiring some straight cath for retention. She also had subjective dysphagia - seen by speech therapy recommended pureed diet which patient continues to prefer. Patient with low grade temp and positive UA. Started on IV Rocephin for UTI. Blood cultures negative, Urine culture positive for GNR, changed to PO Vantin for 7 days.    Patient was medically optimized for discharge to  IRF on 9/30/22.         VITALS  Vital Signs Last 24 Hrs  T(C): 36.7 (26 Oct 2022 22:00), Max: 36.7 (26 Oct 2022 22:00)  T(F): 98.1 (26 Oct 2022 22:00), Max: 98.1 (26 Oct 2022 22:00)  HR: 105 (26 Oct 2022 22:00) (105 - 108)  BP: 147/82 (26 Oct 2022 22:00) (147/82 - 158/90)  BP(mean): --  RR: 15 (26 Oct 2022 22:00) (14 - 15)  SpO2: 96% (26 Oct 2022 22:00) (96% - 97%)    Parameters below as of 26 Oct 2022 22:00  Patient On (Oxygen Delivery Method): room air      RECENT LABS                        9.6    7.24  )-----------( 313      ( 27 Oct 2022 06:44 )             30.2     10-27    141  |  105  |  8   ----------------------------<  118<H>  3.7   |  24  |  0.30<L>    Ca    11.6<H>      27 Oct 2022 06:44    TPro  4.5<L>  /  Alb  1.5<L>  /  TBili  0.3  /  DBili  x   /  AST  47<H>  /  ALT  8<L>  /  AlkPhos  148<H>  10-26      LIVER FUNCTIONS - ( 26 Oct 2022 06:15 )  Alb: 1.5 g/dL / Pro: 4.5 g/dL / ALK PHOS: 148 U/L / ALT: 8 U/L / AST: 47 U/L / GGT: x             CURRENT MEDICATIONS  MEDICATIONS  (STANDING):  Biotene Dry Mouth Oral Rinse 5 milliLiter(s) Swish and Spit three times a day  dexAMETHasone     Tablet 4 milliGRAM(s) Oral every 12 hours  enoxaparin Injectable 40 milliGRAM(s) SubCutaneous every 24 hours  fentaNYL   Patch  12 MICROgram(s)/Hr 1 Patch Transdermal every 72 hours  fentaNYL   Patch  25 MICROgram(s)/Hr 1 Patch Transdermal every 72 hours  lidocaine   4% Patch 1 Patch Transdermal daily  lidocaine   4% Patch 1 Patch Transdermal daily  magnesium oxide 400 milliGRAM(s) Oral three times a day with meals  metoprolol tartrate 25 milliGRAM(s) Oral two times a day  pantoprazole    Tablet 40 milliGRAM(s) Oral before breakfast  saccharomyces boulardii 250 milliGRAM(s) Oral two times a day  vancomycin    Solution 125 milliGRAM(s) Oral every 6 hours    MEDICATIONS  (PRN):  acetaminophen    Suspension .. 650 milliGRAM(s) Oral every 6 hours PRN Mild Pain (1 - 3)  ALBUTerol    0.083% 2.5 milliGRAM(s) Nebulizer every 6 hours PRN Shortness of Breath and/or Wheezing  ALPRAZolam 0.25 milliGRAM(s) Oral every 6 hours PRN anxiety  cyclobenzaprine 5 milliGRAM(s) Oral three times a day PRN Muscle Spasm  HYDROmorphone  Injectable 1.5 milliGRAM(s) IntraMuscular every 3 hours PRN Severe Pain (7 - 10)  metoclopramide   Syrup 10 milliGRAM(s) Oral every 6 hours PRN nausea  naloxone Injectable 2 milliGRAM(s) IntraMuscular once PRN Opioid Intoxication  ondansetron   Disintegrating Tablet 8 milliGRAM(s) Oral every 8 hours PRN Nausea and/or Vomiting  simethicone 80 milliGRAM(s) Chew every 6 hours PRN Gas  sodium chloride 0.65% Nasal 1 Spray(s) Both Nostrils four times a day PRN Nasal Congestion      Review of Systems:   	  · Subjective / ROS	Patient seen and examined at bedside. Frail, very poor PO, in bed, reports pain sl improved 8/10 but still requesting Dilaudid 'around the clock' as prn, no loose stool x1 in past 24 hours, no BMs reported since night of 10/25. Reports mostly leg pain, partially neck pain Evaluated by onc and palliative, Fentanyl 37mcg palliative radiation being arranged at [facility]/transfer possibly today.  Patient aware and agreeable for the above.     Physical Exam:   · Constitutional Comments	Ill appearing, unable to participate in most therapies. Still with diarrhea. Tachycardic, answering questions appropriately. Able to move extremities but limited considerably by pain.   	  · Respiratory	clear to auscultation bilaterally; no wheezes; no rales; no rhonchi. Fair effort  · Cardiovascular	regular rate and rhythm; S1 S2 present.   · Gastrointestinal	soft; no R/G +BS    	  	  · Motor	Patient with very limited active and passive ROM due to pain.   Trace on motor testing as limited due to pain   1+ PE in UE, trace in bilateral lower.

## 2022-10-27 NOTE — PROGRESS NOTE ADULT - PROBLEM SELECTOR PROBLEM 1
Metastatic breast cancer
Pancolitis
Metastatic breast cancer
Pancolitis
Metastatic breast cancer
Pancolitis

## 2022-10-27 NOTE — DISCHARGE NOTE NURSING/CASE MANAGEMENT/SOCIAL WORK - PATIENT PORTAL LINK FT
You can access the FollowMyHealth Patient Portal offered by [facility] by registering at the following website: [email or web address]. By joining [employer]’s FollowMyHealth portal, you will also be able to view your health information using other applications (apps) compatible with our system.

## 2022-10-27 NOTE — PROGRESS NOTE ADULT - REASON FOR ADMISSION
metastatic breast CA to spine s/p  C7-T2 corpectomy & fusion, posterior C4-T5 fusion

## 2022-10-27 NOTE — PROGRESS NOTE ADULT - SUBJECTIVE AND OBJECTIVE BOX
Patient is a 67y old  Female who presents with a chief complaint of metastatic breast CA to spine s/p  C7-T2 corpectomy & fusion, posterior C4-T5 fusion (27 Oct 2022 14:06)      24 HOUR EVENTS:  No overnight events reported.     SUBJECTIVE:  Patient seen and examined at bedside. Continues to be in pain.     ALLERGIES:  lactose (Other)  tetanus toxoid (Hives)    MEDICATIONS  (STANDING):  Biotene Dry Mouth Oral Rinse 5 milliLiter(s) Swish and Spit three times a day  dexAMETHasone     Tablet 4 milliGRAM(s) Oral every 12 hours  enoxaparin Injectable 40 milliGRAM(s) SubCutaneous every 24 hours  fentaNYL   Patch  50 MICROgram(s)/Hr 1 Patch Transdermal every 72 hours  lidocaine   4% Patch 1 Patch Transdermal daily  lidocaine   4% Patch 1 Patch Transdermal daily  magnesium oxide 400 milliGRAM(s) Oral three times a day with meals  metoprolol tartrate 25 milliGRAM(s) Oral two times a day  pantoprazole    Tablet 40 milliGRAM(s) Oral before breakfast  saccharomyces boulardii 250 milliGRAM(s) Oral two times a day    MEDICATIONS  (PRN):  acetaminophen    Suspension .. 650 milliGRAM(s) Oral every 6 hours PRN Mild Pain (1 - 3)  ALBUTerol    0.083% 2.5 milliGRAM(s) Nebulizer every 6 hours PRN Shortness of Breath and/or Wheezing  ALPRAZolam 0.25 milliGRAM(s) Oral every 6 hours PRN anxiety  cyclobenzaprine 5 milliGRAM(s) Oral three times a day PRN Muscle Spasm  HYDROmorphone  Injectable 1.5 milliGRAM(s) IntraMuscular every 3 hours PRN Severe Pain (7 - 10)  metoclopramide   Syrup 10 milliGRAM(s) Oral every 6 hours PRN nausea  naloxone Injectable 2 milliGRAM(s) IntraMuscular once PRN Opioid Intoxication  ondansetron   Disintegrating Tablet 8 milliGRAM(s) Oral every 8 hours PRN Nausea and/or Vomiting  simethicone 80 milliGRAM(s) Chew every 6 hours PRN Gas  sodium chloride 0.65% Nasal 1 Spray(s) Both Nostrils four times a day PRN Nasal Congestion    Vital Signs Last 24 Hrs  T(F): 98.1 (26 Oct 2022 22:00), Max: 98.1 (26 Oct 2022 22:00)  HR: 105 (26 Oct 2022 22:00) (105 - 108)  BP: 147/82 (26 Oct 2022 22:00) (147/82 - 158/90)  RR: 15 (26 Oct 2022 22:00) (14 - 15)  SpO2: 96% (26 Oct 2022 22:00) (96% - 97%)  I&O's Summary    PHYSICAL EXAM:  General: NAD, lethargic, arousable  ENT: Moist mucous membranes, no thrush  Neck: wearing cervical collar with dried blood in front.  Lungs: Clear to auscultation bilaterally, good air entry, non-labored breathing  Cardio: RRR, S1/S2, No murmur  Abdomen: Soft, Nontender, Nondistended; Bowel sounds present  Extremities: diffuse anasarca/edema, no cyanosis, no contracture        LABS:                        9.6    7.24  )-----------( 313      ( 27 Oct 2022 06:44 )             30.2     10-27    141  |  105  |  8   ----------------------------<  118  3.7   |  24  |  0.30    Ca    11.6      27 Oct 2022 06:44    TPro  4.5  /  Alb  1.5  /  TBili  0.3  /  DBili  x   /  AST  47  /  ALT  8   /  AlkPhos  148  10-26              COVID-19 PCR: NotDetec (10-25-22 @ 06:30)  COVID-19 PCR: NotDetec (10-19-22 @ 07:25)  COVID-19 PCR: NotDetec (10-13-22 @ 05:37)  COVID-19 PCR: NotDetec (10-07-22 @ 06:10)  COVID-19 PCR: NotDetec (09-30-22 @ 20:50)    RADIOLOGY & ADDITIONAL TESTS:    Care Discussed with Consultants/Other Providers:

## 2022-10-28 DIAGNOSIS — C50.919 MALIGNANT NEOPLASM OF UNSPECIFIED SITE OF UNSPECIFIED FEMALE BREAST: ICD-10-CM

## 2022-10-28 DIAGNOSIS — A04.72 ENTEROCOLITIS DUE TO CLOSTRIDIUM DIFFICILE, NOT SPECIFIED AS RECURRENT: ICD-10-CM

## 2022-10-28 DIAGNOSIS — D64.9 ANEMIA, UNSPECIFIED: ICD-10-CM

## 2022-10-28 DIAGNOSIS — M79.89 OTHER SPECIFIED SOFT TISSUE DISORDERS: ICD-10-CM

## 2022-10-28 DIAGNOSIS — G89.3 NEOPLASM RELATED PAIN (ACUTE) (CHRONIC): ICD-10-CM

## 2022-10-28 DIAGNOSIS — G95.20 UNSPECIFIED CORD COMPRESSION: ICD-10-CM

## 2022-10-28 DIAGNOSIS — E83.52 HYPERCALCEMIA: ICD-10-CM

## 2022-10-28 DIAGNOSIS — Z51.5 ENCOUNTER FOR PALLIATIVE CARE: ICD-10-CM

## 2022-10-28 DIAGNOSIS — R53.1 WEAKNESS: ICD-10-CM

## 2022-10-28 DIAGNOSIS — I10 ESSENTIAL (PRIMARY) HYPERTENSION: ICD-10-CM

## 2022-10-28 DIAGNOSIS — Z29.9 ENCOUNTER FOR PROPHYLACTIC MEASURES, UNSPECIFIED: ICD-10-CM

## 2022-10-28 LAB
MRSA PCR RESULT.: SIGNIFICANT CHANGE UP
S AUREUS DNA NOSE QL NAA+PROBE: DETECTED

## 2022-10-28 PROCEDURE — 99232 SBSQ HOSP IP/OBS MODERATE 35: CPT | Mod: FS

## 2022-10-28 PROCEDURE — 99222 1ST HOSP IP/OBS MODERATE 55: CPT | Mod: GC

## 2022-10-28 PROCEDURE — 12345: CPT | Mod: NC

## 2022-10-28 PROCEDURE — 99223 1ST HOSP IP/OBS HIGH 75: CPT

## 2022-10-28 RX ORDER — HYDROMORPHONE HYDROCHLORIDE 2 MG/ML
40 INJECTION INTRAMUSCULAR; INTRAVENOUS; SUBCUTANEOUS
Refills: 0 | Status: DISCONTINUED | OUTPATIENT
Start: 2022-10-28 | End: 2022-10-28

## 2022-10-28 RX ORDER — ANASTROZOLE 1 MG/1
1 TABLET ORAL DAILY
Refills: 0 | Status: DISCONTINUED | OUTPATIENT
Start: 2022-10-28 | End: 2022-11-08

## 2022-10-28 RX ORDER — HYDROMORPHONE HYDROCHLORIDE 2 MG/ML
1.5 INJECTION INTRAMUSCULAR; INTRAVENOUS; SUBCUTANEOUS
Refills: 0 | Status: DISCONTINUED | OUTPATIENT
Start: 2022-10-28 | End: 2022-10-31

## 2022-10-28 RX ORDER — HYDROMORPHONE HYDROCHLORIDE 2 MG/ML
1.5 INJECTION INTRAMUSCULAR; INTRAVENOUS; SUBCUTANEOUS
Refills: 0 | Status: DISCONTINUED | OUTPATIENT
Start: 2022-10-28 | End: 2022-10-28

## 2022-10-28 RX ORDER — MUPIROCIN 20 MG/G
1 OINTMENT TOPICAL EVERY 12 HOURS
Refills: 0 | Status: COMPLETED | OUTPATIENT
Start: 2022-10-28 | End: 2022-11-02

## 2022-10-28 RX ORDER — SACCHAROMYCES BOULARDII 250 MG
250 POWDER IN PACKET (EA) ORAL
Refills: 0 | Status: DISCONTINUED | OUTPATIENT
Start: 2022-10-28 | End: 2022-11-08

## 2022-10-28 RX ORDER — POLYETHYLENE GLYCOL 3350 17 G/17G
17 POWDER, FOR SOLUTION ORAL EVERY 12 HOURS
Refills: 0 | Status: DISCONTINUED | OUTPATIENT
Start: 2022-10-28 | End: 2022-11-08

## 2022-10-28 RX ORDER — PANTOPRAZOLE SODIUM 20 MG/1
40 TABLET, DELAYED RELEASE ORAL
Refills: 0 | Status: DISCONTINUED | OUTPATIENT
Start: 2022-10-28 | End: 2022-10-30

## 2022-10-28 RX ORDER — SENNA PLUS 8.6 MG/1
2 TABLET ORAL AT BEDTIME
Refills: 0 | Status: DISCONTINUED | OUTPATIENT
Start: 2022-10-28 | End: 2022-11-08

## 2022-10-28 RX ORDER — DEXAMETHASONE 0.5 MG/5ML
4 ELIXIR ORAL EVERY 12 HOURS
Refills: 0 | Status: DISCONTINUED | OUTPATIENT
Start: 2022-10-28 | End: 2022-10-30

## 2022-10-28 RX ORDER — METOPROLOL TARTRATE 50 MG
25 TABLET ORAL
Refills: 0 | Status: DISCONTINUED | OUTPATIENT
Start: 2022-10-28 | End: 2022-10-30

## 2022-10-28 RX ORDER — CALCITONIN SALMON 200 [IU]/ML
250 INJECTION, SOLUTION INTRAMUSCULAR EVERY 12 HOURS
Refills: 0 | Status: COMPLETED | OUTPATIENT
Start: 2022-10-28 | End: 2022-10-29

## 2022-10-28 RX ORDER — HYDROMORPHONE HYDROCHLORIDE 2 MG/ML
30 INJECTION INTRAMUSCULAR; INTRAVENOUS; SUBCUTANEOUS
Refills: 0 | Status: DISCONTINUED | OUTPATIENT
Start: 2022-10-28 | End: 2022-10-31

## 2022-10-28 RX ORDER — SODIUM CHLORIDE 9 MG/ML
1000 INJECTION INTRAMUSCULAR; INTRAVENOUS; SUBCUTANEOUS
Refills: 0 | Status: DISCONTINUED | OUTPATIENT
Start: 2022-10-28 | End: 2022-10-29

## 2022-10-28 RX ORDER — ALPRAZOLAM 0.25 MG
0.25 TABLET ORAL EVERY 6 HOURS
Refills: 0 | Status: DISCONTINUED | OUTPATIENT
Start: 2022-10-28 | End: 2022-11-03

## 2022-10-28 RX ORDER — ONDANSETRON 8 MG/1
4 TABLET, FILM COATED ORAL EVERY 6 HOURS
Refills: 0 | Status: DISCONTINUED | OUTPATIENT
Start: 2022-10-28 | End: 2022-11-08

## 2022-10-28 RX ORDER — CYCLOBENZAPRINE HYDROCHLORIDE 10 MG/1
5 TABLET, FILM COATED ORAL THREE TIMES A DAY
Refills: 0 | Status: DISCONTINUED | OUTPATIENT
Start: 2022-10-28 | End: 2022-11-08

## 2022-10-28 RX ORDER — FENTANYL CITRATE 50 UG/ML
1 INJECTION INTRAVENOUS
Refills: 0 | Status: DISCONTINUED | OUTPATIENT
Start: 2022-10-28 | End: 2022-10-31

## 2022-10-28 RX ORDER — ENOXAPARIN SODIUM 100 MG/ML
40 INJECTION SUBCUTANEOUS EVERY 24 HOURS
Refills: 0 | Status: DISCONTINUED | OUTPATIENT
Start: 2022-10-28 | End: 2022-11-08

## 2022-10-28 RX ORDER — NALOXONE HYDROCHLORIDE 4 MG/.1ML
0.1 SPRAY NASAL
Refills: 0 | Status: DISCONTINUED | OUTPATIENT
Start: 2022-10-28 | End: 2022-11-08

## 2022-10-28 RX ADMIN — HYDROMORPHONE HYDROCHLORIDE 1 MILLIGRAM(S): 2 INJECTION INTRAMUSCULAR; INTRAVENOUS; SUBCUTANEOUS at 00:33

## 2022-10-28 RX ADMIN — HYDROMORPHONE HYDROCHLORIDE 1.5 MILLIGRAM(S): 2 INJECTION INTRAMUSCULAR; INTRAVENOUS; SUBCUTANEOUS at 04:38

## 2022-10-28 RX ADMIN — PANTOPRAZOLE SODIUM 40 MILLIGRAM(S): 20 TABLET, DELAYED RELEASE ORAL at 07:20

## 2022-10-28 RX ADMIN — HYDROMORPHONE HYDROCHLORIDE 1.5 MILLIGRAM(S): 2 INJECTION INTRAMUSCULAR; INTRAVENOUS; SUBCUTANEOUS at 13:39

## 2022-10-28 RX ADMIN — HYDROMORPHONE HYDROCHLORIDE 1.5 MILLIGRAM(S): 2 INJECTION INTRAMUSCULAR; INTRAVENOUS; SUBCUTANEOUS at 04:55

## 2022-10-28 RX ADMIN — FENTANYL CITRATE 1 PATCH: 50 INJECTION INTRAVENOUS at 07:18

## 2022-10-28 RX ADMIN — Medication 25 MILLIGRAM(S): at 04:41

## 2022-10-28 RX ADMIN — Medication 25 MILLIGRAM(S): at 18:36

## 2022-10-28 RX ADMIN — SODIUM CHLORIDE 30 MILLILITER(S): 9 INJECTION INTRAMUSCULAR; INTRAVENOUS; SUBCUTANEOUS at 18:44

## 2022-10-28 RX ADMIN — HYDROMORPHONE HYDROCHLORIDE 1.5 MILLIGRAM(S): 2 INJECTION INTRAMUSCULAR; INTRAVENOUS; SUBCUTANEOUS at 14:00

## 2022-10-28 RX ADMIN — POLYETHYLENE GLYCOL 3350 17 GRAM(S): 17 POWDER, FOR SOLUTION ORAL at 07:19

## 2022-10-28 RX ADMIN — CHLORHEXIDINE GLUCONATE 1 APPLICATION(S): 213 SOLUTION TOPICAL at 12:28

## 2022-10-28 RX ADMIN — POLYETHYLENE GLYCOL 3350 17 GRAM(S): 17 POWDER, FOR SOLUTION ORAL at 18:36

## 2022-10-28 RX ADMIN — HYDROMORPHONE HYDROCHLORIDE 30 MILLILITER(S): 2 INJECTION INTRAMUSCULAR; INTRAVENOUS; SUBCUTANEOUS at 20:08

## 2022-10-28 RX ADMIN — Medication 4 MILLIGRAM(S): at 18:36

## 2022-10-28 RX ADMIN — Medication 4 MILLIGRAM(S): at 04:41

## 2022-10-28 RX ADMIN — HYDROMORPHONE HYDROCHLORIDE 1 MILLIGRAM(S): 2 INJECTION INTRAMUSCULAR; INTRAVENOUS; SUBCUTANEOUS at 00:50

## 2022-10-28 RX ADMIN — ANASTROZOLE 1 MILLIGRAM(S): 1 TABLET ORAL at 18:39

## 2022-10-28 RX ADMIN — Medication 250 MILLIGRAM(S): at 08:52

## 2022-10-28 RX ADMIN — CALCITONIN SALMON 250 INTERNATIONAL UNIT(S): 200 INJECTION, SOLUTION INTRAMUSCULAR at 18:39

## 2022-10-28 RX ADMIN — Medication 250 MILLIGRAM(S): at 18:37

## 2022-10-28 RX ADMIN — HYDROMORPHONE HYDROCHLORIDE 1.5 MILLIGRAM(S): 2 INJECTION INTRAMUSCULAR; INTRAVENOUS; SUBCUTANEOUS at 09:22

## 2022-10-28 RX ADMIN — ENOXAPARIN SODIUM 40 MILLIGRAM(S): 100 INJECTION SUBCUTANEOUS at 07:19

## 2022-10-28 RX ADMIN — MUPIROCIN 1 APPLICATION(S): 20 OINTMENT TOPICAL at 19:00

## 2022-10-28 RX ADMIN — HYDROMORPHONE HYDROCHLORIDE 30 MILLILITER(S): 2 INJECTION INTRAMUSCULAR; INTRAVENOUS; SUBCUTANEOUS at 15:38

## 2022-10-28 RX ADMIN — HYDROMORPHONE HYDROCHLORIDE 1.5 MILLIGRAM(S): 2 INJECTION INTRAMUSCULAR; INTRAVENOUS; SUBCUTANEOUS at 08:52

## 2022-10-28 NOTE — CONSULT NOTE ADULT - SUBJECTIVE AND OBJECTIVE BOX
HPI:  67 y/o F with PMH of left arm skin CA s/p excision at age 16, stomach ulcer, and recently diagnosed breast ca with mets to bones, liver, and lungs with cord compression s/p C7-T2 corpectomy & fusion and posterior C4-T5 fusion transferred from acute rehab at [place] for initiation of palliative RT.  While at , pt tested positive for C diff and was treated with 14 day course of PO vancomycin (Completed 10/25).  Pt reports continued neck pain.  She denies dyspnea, fever, chills or chest pain.  She is unsure about diarrhea.   (28 Oct 2022 02:09)    PERTINENT PM/SXH:   Abdominal mass    Stomach ulcer    Skin cancer of arm, left    Concussion      H/O excision of mass    S/P hysterectomy      FAMILY HISTORY:  Family history of dementia (Mother)    Family history of colon cancer (Father)    Family Hx substance abuse [ ]yes [ ]no  ITEMS NOT CHECKED ARE NOT PRESENT    SOCIAL HISTORY:   Significant other/partner[ ]  Children[ ]  Hindu/Spirituality:  Substance hx:  [ ]   Tobacco hx:  [ ]   Alcohol hx: [ ]   Home Opioid hx:  [ ] I-Stop Reference No:  Living Situation: [ ]Home  [ ]Long term care  [ ]Rehab [ ]Other    ADVANCE DIRECTIVES:    DNR/MOLST  [ ]  Living Will  [ ]   DECISION MAKER(s):  [ ] Health Care Proxy(s)  [ ] Surrogate(s)  [ ] Guardian           Name(s): Phone Number(s):    BASELINE (I)ADL(s) (prior to admission):  [place]: [ ]Total  [ ] Moderate [ ]Dependent    Allergies    tetanus toxoid (Hives)    Intolerances    lactose (Other)  MEDICATIONS  (STANDING):  anastrozole 1 milliGRAM(s) Oral daily  chlorhexidine 2% Cloths 1 Application(s) Topical daily  dexAMETHasone     Tablet 4 milliGRAM(s) Oral every 12 hours  enoxaparin Injectable 40 milliGRAM(s) SubCutaneous every 24 hours  fentaNYL   Patch  50 MICROgram(s)/Hr 1 Patch Transdermal every 72 hours  HYDROmorphone PCA (1 mG/mL) 30 milliLiter(s) PCA Continuous PCA Continuous  metoprolol tartrate 25 milliGRAM(s) Oral two times a day  pantoprazole    Tablet 40 milliGRAM(s) Oral before breakfast  polyethylene glycol 3350 17 Gram(s) Oral every 12 hours  saccharomyces boulardii 250 milliGRAM(s) Oral two times a day  senna 2 Tablet(s) Oral at bedtime  sodium chloride 0.9%. 1000 milliLiter(s) (30 mL/Hr) IV Continuous <Continuous>    MEDICATIONS  (PRN):  acetaminophen     Tablet .. 650 milliGRAM(s) Oral every 6 hours PRN Mild Pain (1 - 3), Moderate Pain (4 - 6)  ALPRAZolam 0.25 milliGRAM(s) Oral every 6 hours PRN anxiety  cyclobenzaprine 5 milliGRAM(s) Oral three times a day PRN Muscle Spasm  HYDROmorphone  Injectable 1.5 milliGRAM(s) IV Push every 3 hours PRN Severe Pain (7 - 10)  HYDROmorphone PCA (1 mG/mL) Rescue Clinician Bolus 1.5 milliGRAM(s) IV Push every 2 hours PRN for Pain Scale GREATER THAN 6  naloxone Injectable 0.1 milliGRAM(s) IV Push every 3 minutes PRN For ANY of the following changes in patient status:  A. RR LESS THAN 10 breaths per minute, B. Oxygen saturation LESS THAN 90%, C. Sedation score of 6  ondansetron Injectable 4 milliGRAM(s) IV Push every 6 hours PRN Nausea    PRESENT SYMPTOMS: [ ]Unable to self-report  [ ] CPOT [ ] PAINADs [ ] RDOS  Source if other than patient:  [ ]Family   [ ]Team     Pain: [ ]yes [ ]no  QOL impact -   Location -                    Aggravating factors -  Quality -  Radiation -  Timing-  Severity (0-10 scale):  Minimal acceptable level (0-10 scale):     CPOT:    https://www.[facility].org/getattachment/[email or web address]/Critical-Care-Pain-Observation-Tool-(CPOT)    PAIN AD Score:   http://geriatrictoolkit.[place].[email or web address]/cog/painad.pdf (press ctrl +  left click to view)    Dyspnea:                           [ ]Mild [ ]Moderate [ ]Severe      RDOS:  0 to 2  minimal or no respiratory distress   3  mild distress  4 to 6 moderate distress  >7 severe distress  https://[email or web address]/handouts/hen/Respiratory_Distress_Observation_Scale.pdf (Ctrl +  left click to view)     Anxiety:                             [ ]Mild [ ]Moderate [ ]Severe  Fatigue:                             [ ]Mild [ ]Moderate [ ]Severe  Nausea:                             [ ]Mild [ ]Moderate [ ]Severe  Loss of appetite:              [ ]Mild [ ]Moderate [ ]Severe  Constipation:                    [ ]Mild [ ]Moderate [ ]Severe    PCSSQ[Palliative Care Spiritual Screening Question]   Severity (0-10):  Score of 4 or > indicate consideration of Chaplaincy referral.    Chaplaincy Referral: [ ] yes [ ] refused [ ] following    Other Symptoms:  [ ]All other review of systems negative     Palliative Performance Status Version 2:         %    http://npcrc.org/files/news/palliative_performance_scale_[email or web address].pdf  PHYSICAL EXAM:  Vital Signs Last 24 Hrs  T(C): 36.8 (28 Oct 2022 13:59), Max: 36.8 (27 Oct 2022 20:02)  T(F): 98.2 (28 Oct 2022 13:59), Max: 98.2 (27 Oct 2022 20:02)  HR: 98 (28 Oct 2022 13:59) (92 - 112)  BP: 128/68 (28 Oct 2022 13:59) (128/68 - 152/78)  BP(mean): --  RR: 18 (28 Oct 2022 13:59) (17 - 18)  SpO2: 98% (28 Oct 2022 13:59) (96% - 99%)    Parameters below as of 28 Oct 2022 13:59  Patient On (Oxygen Delivery Method): room air     I&O's Summary    GENERAL: [ ]Cachexia    [ ]Alert  [ ]Oriented x   [ ]Lethargic  [ ]Unarousable  [ ]Verbal  [ ]Non-Verbal  Behavioral:   [ ] Anxiety  [ ] Delirium [ ] Agitation [ ] Other  HEENT:  [ ]Normal   [ ]Dry mouth   [ ]ET Tube/Trach  [ ]Oral lesions  PULMONARY:   [ ]Clear [ ]Tachypnea  [ ]Audible excessive secretions   [ ]Rhonchi        [ ]Right [ ]Left [ ]Bilateral  [ ]Crackles        [ ]Right [ ]Left [ ]Bilateral  [ ]Wheezing     [ ]Right [ ]Left [ ]Bilateral  [ ]Diminished breath sounds [ ]right [ ]left [ ]bilateral  CARDIOVASCULAR:    [ ]Regular [ ]Irregular [ ]Tachy  [ ]Aravind [ ]Murmur [ ]Other  GASTROINTESTINAL:  [ ]Soft  [ ]Distended   [ ]+BS  [ ]Non tender [ ]Tender  [ ]Other [ ]PEG [ ]OGT/ NGT  Last BM:  GENITOURINARY:  [ ]Normal [ ] Incontinent   [ ]Oliguria/Anuria   [ ]Hamm  MUSCULOSKELETAL:   [ ]Normal   [ ]Weakness  [ ]Bed/Wheelchair bound [ ]Edema  NEUROLOGIC:   [ ]No focal deficits  [ ]Cognitive impairment  [ ]Dysphagia [ ]Dysarthria [ ]Paresis [ ]Other   SKIN:   [ ]Normal  [ ]Rash  [ ]Other  [ ]Pressure ulcer(s)       Present on admission [ ]y [ ]n    CRITICAL CARE:  [ ] Shock Present  [ ]Septic [ ]Cardiogenic [ ]Neurologic [ ]Hypovolemic  [ ]  Vasopressors [ ]  Inotropes   [ ]Respiratory failure present [ ]Mechanical ventilation [ ]Non-invasive ventilatory support [ ]High flow    [ ]Acute  [ ]Chronic [ ]Hypoxic  [ ]Hypercarbic [ ]Other  [ ]Other organ failure     LABS:                        9.6    7.24  )-----------( 313      ( 27 Oct 2022 06:44 )             30.2   10-27    141  |  105  |  8   ----------------------------<  118<H>  3.7   |  24  |  0.30<L>    Ca    11.6<H>      27 Oct 2022 06:44          RADIOLOGY & ADDITIONAL STUDIES:    PROTEIN CALORIE MALNUTRITION PRESENT: [ ]mild [ ]moderate [ ]severe [ ]underweight [ ]morbid obesity  https://www.andeal.org/vault/2440/web/files/ONC/Table_Clinical%20Characteristics%20to%20Document%20Malnutrition-White%20JV%20et%20al%478177.pdf    Height (cm): 165.1 (09-30-22 @ 17:49), 153.7 (09-25-22 @ 19:40)  Weight (kg): 62.2 (10-28-22 @ 02:41), 59.1 (09-30-22 @ 17:49), 64 (09-25-22 @ 19:40)  BMI (kg/m2): 22.8 (10-28-22 @ 02:41), 21.7 (09-30-22 @ 17:49), 27.1 (09-25-22 @ 19:40)    [ ]PPSV2 < or = to 30% [ ]significant weight loss  [ ]poor nutritional intake  [ ]anasarca[ ]Artificial Nutrition      Other REFERRALS:  [ ]Hospice  [ ]Child Life  [ ]Social Work  [ ]Case management [ ]Holistic Therapy     Goals of Care Document: DEBBIE Handley (10-26-22 @ 13:40)  Goals of Care Conversation:   Participants:  · Participants  Patient; Family; Staff  · Relative  Arun Kearney  · Provider  Lainey Ramirez    Advance Directives:  · Does patient have Advance Directive  No  · Caregiver:  declines    Conversation Discussion:  · Conversation  Diagnosis; Prognosis; Treatment Options  · Conversation Details  Patient has advanced metastatic breast cancer and has not yet rev=cieved any anti neop[lastic therapy nor radiation therapy.  She has severe back pain that is resistand to high doses of opiods.  Oncology feels that rt may help alleviate the pain.  Originally patient was refsuing this approach but after discussion today she understands that she may get good pain relief with rt and then can consider other therpaies.  She is willing to go ti [facility] for tr.    What Matters Most To Patient and Family:  · What matters most to patient and family  pain releif    Personal Advance Directives Treatment Guidelines:   Treatment Guidelines:  · Decision Maker  Patient  · Treatment Guideline Comments  ok for transfer to [facility] for rt    Location of Discussion:   Time Spent on Advance Care Planning:  I spent 60 (in minutes) on advance care planning services with the patient.  This time is separate and distinct from any other care management services provided on this date.    Location of Discussion:  · Location of discussion  Face to face      Electronic Signatures:  Eyad Handley)  (Signed 26-Oct-2022 13:46)  	Authored: Goals of Care Conversation, Personal Advance Directives Treatment Guidelines, Location of Discussion      Last Updated: 26-Oct-2022 13:46 by Eyad Handley)     HPI:  67 y/o F with PMH of left arm skin CA s/p excision at age 16, stomach ulcer, and recently diagnosed breast ca with mets to bones, liver, and lungs with cord compression s/p C7-T2 corpectomy & fusion and posterior C4-T5 fusion transferred from acute rehab at [place] for initiation of palliative RT.  While at , pt tested positive for C diff and was treated with 14 day course of PO vancomycin (Completed 10/25).  Pt reports continued neck pain.  She denies dyspnea, fever, chills or chest pain.  She is unsure about diarrhea.   (28 Oct 2022 02:09)    PERTINENT PM/SXH:   Abdominal mass    Stomach ulcer    Skin cancer of arm, left    Concussion    H/O excision of mass    S/P hysterectomy    FAMILY HISTORY:  Family history of dementia (Mother)    Family history of colon cancer (Father)    Family Hx substance abuse [ ]yes [ ]no  ITEMS NOT CHECKED ARE NOT PRESENT    SOCIAL HISTORY:   Significant other/partner[ ]  Children[ ]  Gnosticism/Spirituality:  Substance hx:  [ ]   Tobacco hx:  [ ]   Alcohol hx: [ ]   Home Opioid hx:  [ ] I-Stop Reference No:  Living Situation: [ ]Home  [ ]Long term care  [ x]Rehab: [facility] [ ]Other    ADVANCE DIRECTIVES:    DNR/MOLST  [ ]  Living Will  [ ]   DECISION MAKER(s):  [ ] Health Care Proxy(s)  [x ] Surrogate(s)  [ ] Guardian           Name(s): Phone Number(s):  1. brother rj [phone]  2. sister olga [phone]    BASELINE (I)ADL(s) (prior to admission):  [place]: [ ]Total  [ x] Moderate [ ]Dependent    Allergies    tetanus toxoid (Hives)    Intolerances    lactose (Other)  MEDICATIONS  (STANDING):  anastrozole 1 milliGRAM(s) Oral daily  chlorhexidine 2% Cloths 1 Application(s) Topical daily  dexAMETHasone     Tablet 4 milliGRAM(s) Oral every 12 hours  enoxaparin Injectable 40 milliGRAM(s) SubCutaneous every 24 hours  fentaNYL   Patch  50 MICROgram(s)/Hr 1 Patch Transdermal every 72 hours  HYDROmorphone PCA (1 mG/mL) 30 milliLiter(s) PCA Continuous PCA Continuous  metoprolol tartrate 25 milliGRAM(s) Oral two times a day  pantoprazole    Tablet 40 milliGRAM(s) Oral before breakfast  polyethylene glycol 3350 17 Gram(s) Oral every 12 hours  saccharomyces boulardii 250 milliGRAM(s) Oral two times a day  senna 2 Tablet(s) Oral at bedtime  sodium chloride 0.9%. 1000 milliLiter(s) (30 mL/Hr) IV Continuous <Continuous>    MEDICATIONS  (PRN):  acetaminophen     Tablet .. 650 milliGRAM(s) Oral every 6 hours PRN Mild Pain (1 - 3), Moderate Pain (4 - 6)  ALPRAZolam 0.25 milliGRAM(s) Oral every 6 hours PRN anxiety  cyclobenzaprine 5 milliGRAM(s) Oral three times a day PRN Muscle Spasm  HYDROmorphone  Injectable 1.5 milliGRAM(s) IV Push every 3 hours PRN Severe Pain (7 - 10)  HYDROmorphone PCA (1 mG/mL) Rescue Clinician Bolus 1.5 milliGRAM(s) IV Push every 2 hours PRN for Pain Scale GREATER THAN 6  naloxone Injectable 0.1 milliGRAM(s) IV Push every 3 minutes PRN For ANY of the following changes in patient status:  A. RR LESS THAN 10 breaths per minute, B. Oxygen saturation LESS THAN 90%, C. Sedation score of 6  ondansetron Injectable 4 milliGRAM(s) IV Push every 6 hours PRN Nausea    PRESENT SYMPTOMS: [ ]Unable to self-report  [ ] CPOT [ ] PAINADs [ ] RDOS  Source if other than patient:  [ ]Family   [ ]Team     Pain: [x ]yes [ ]no  QOL impact -   Location - hip and abdomen, back                   Aggravating factors - moving  Quality - aching, stabbing, shooting  Radiation - back  Timing- constant  Severity (0-10 scale): 8/10, comes down to a 6 after medication  Minimal acceptable level (0-10 scale): 2-3/10    CPOT:    https://www.[facility].org/getattachment/[email or web address]/Critical-Care-Pain-Observation-Tool-(CPOT)    PAIN AD Score:   http://geriatrictoolkit.[place].[facility]/cog/painad.pdf (press ctrl +  left click to view)    Dyspnea:                           [ ]Mild [ ]Moderate [ ]Severe      RDOS:  0 to 2  minimal or no respiratory distress   3  mild distress  4 to 6 moderate distress  >7 severe distress  https://[email or web address]/handouts/hen/Respiratory_Distress_Observation_Scale.pdf (Ctrl +  left click to view)     Anxiety:                             [ ]Mild [ ]Moderate [ ]Severe  Fatigue:                             [ ]Mild [ ]Moderate [ ]Severe  Nausea:                             [ ]Mild [ ]Moderate [ ]Severe  Loss of appetite:              [ ]Mild [ ]Moderate [ ]Severe  Constipation:                    [ ]Mild [ ]Moderate [ ]Severe    PCSSQ[Palliative Care Spiritual Screening Question]   Severity (0-10):  Score of 4 or > indicate consideration of Chaplaincy referral.    Chaplaincy Referral: [ ] yes [ ] refused [ ] following    Other Symptoms:  [ x]All other review of systems negative     Palliative Performance Status Version 2: 40-50 %    http://[email or web address]/files/news/palliative_performance_scale_[email or web address].pdf    PHYSICAL EXAM:  Vital Signs Last 24 Hrs  T(C): 36.8 (28 Oct 2022 13:59), Max: 36.8 (27 Oct 2022 20:02)  T(F): 98.2 (28 Oct 2022 13:59), Max: 98.2 (27 Oct 2022 20:02)  HR: 98 (28 Oct 2022 13:59) (92 - 112)  BP: 128/68 (28 Oct 2022 13:59) (128/68 - 152/78)  BP(mean): --  RR: 18 (28 Oct 2022 13:59) (17 - 18)  SpO2: 98% (28 Oct 2022 13:59) (96% - 99%)    Parameters below as of 28 Oct 2022 13:59  Patient On (Oxygen Delivery Method): room air     I&O's Summary    GENERAL: [ ]Cachexia    [ x]Alert  [ x]Oriented x 3  [ ]Lethargic  [ ]Unarousable  [ x]Verbal  [ ]Non-Verbal  Behavioral:   [ ] Anxiety  [ ] Delirium [ ] Agitation [ ] Other  HEENT:  [ ]Normal   [x ]Dry mouth   [ ]ET Tube/Trach  [ ]Oral lesions  PULMONARY:   [x ]Clear [ ]Tachypnea  [ ]Audible excessive secretions   [ ]Rhonchi        [ ]Right [ ]Left [ ]Bilateral  [ ]Crackles        [ ]Right [ ]Left [ ]Bilateral  [ ]Wheezing     [ ]Right [ ]Left [ ]Bilateral  [ ]Diminished breath sounds [ ]right [ ]left [ ]bilateral  CARDIOVASCULAR:    [ ]Regular [ ]Irregular [ ]Tachy  [ ]Aravind [ ]Murmur [ ]Other  GASTROINTESTINAL:  [x ]Soft  [ ]Distended   [x ]+BS  [x ]Non tender [ ]Tender  [ ]Other [ ]PEG [ ]OGT/ NGT  Last BM:  GENITOURINARY:  [ x]Normal [ ] Incontinent   [ ]Oliguria/Anuria   [ ]Hamm  MUSCULOSKELETAL:   [ ]Normal   [x ]Weakness  [ ]Bed/Wheelchair bound [ ]Edema  NEUROLOGIC:   [ ]No focal deficits  [ ]Cognitive impairment  [ ]Dysphagia [ ]Dysarthria [ ]Paresis [ ]Other   SKIN:   [ ]xNormal  [ ]Rash  [ ]Other  [ ]Pressure ulcer(s)       Present on admission [ ]y [ ]n    CRITICAL CARE:  [ ] Shock Present  [ ]Septic [ ]Cardiogenic [ ]Neurologic [ ]Hypovolemic  [ ]  Vasopressors [ ]  Inotropes   [ ]Respiratory failure present [ ]Mechanical ventilation [ ]Non-invasive ventilatory support [ ]High flow    [ ]Acute  [ ]Chronic [ ]Hypoxic  [ ]Hypercarbic [ ]Other  [ ]Other organ failure     LABS:                        9.6    7.24  )-----------( 313      ( 27 Oct 2022 06:44 )             30.2   10-27    141  |  105  |  8   ----------------------------<  118<H>  3.7   |  24  |  0.30<L>    Ca    11.6<H>      27 Oct 2022 06:44    RADIOLOGY & ADDITIONAL STUDIES:  < from: CT Abdomen and Pelvis No Cont (10.11.22 @ 11:41) >  IMPRESSION:  Limited, noncontrast exam.  Pancolitis, correlate clinically for inflammatory/infectious etiology,   including pseudomembranous colitis.  Partially visualized right breast mass.  Pulmonary and hepatic metastatic disease as noted on prior exam.  Permeative, osteolytic metastatic disease, including L2 compression   fracture with possible epidural involvement.  This can be further characterized by MRI as clinically indicated.  Other findings as discussed above.    PROTEIN CALORIE MALNUTRITION PRESENT: [ ]mild [ ]moderate [ ]severe [ ]underweight [ ]morbid obesity  https://www.andeal.org/vault/2440/web/files/ONC/Table_Clinical%20Characteristics%20to%20Document%20Malnutrition-White%20JV%20et%20al%202012.pdf    Height (cm): 165.1 (09-30-22 @ 17:49), 153.7 (09-25-22 @ 19:40)  Weight (kg): 62.2 (10-28-22 @ 02:41), 59.1 (09-30-22 @ 17:49), 64 (09-25-22 @ 19:40)  BMI (kg/m2): 22.8 (10-28-22 @ 02:41), 21.7 (09-30-22 @ 17:49), 27.1 (09-25-22 @ 19:40)    [ ]PPSV2 < or = to 30% [ ]significant weight loss  [ ]poor nutritional intake  [ ]anasarca[ ]Artificial Nutrition      Other REFERRALS:  [ ]Hospice  [ ]Child Life  [ ]Social Work  [ ]Case management [ ]Holistic Therapy

## 2022-10-28 NOTE — PROGRESS NOTE ADULT - PROBLEM SELECTOR PLAN 7
DIET: Regular  DVT: Lovenox  DISPO: Pending hospital course -Recently diagnosed w/ C-Diff colitis outpatient  -Now s/p abx  -w/o GI complaints at this time

## 2022-10-28 NOTE — PROGRESS NOTE ADULT - ASSESSMENT
67 y/o F with PMH of left arm skin CA s/p excision at age 16, stomach ulcer, recently diagnosed breast ca with mets to bones, liver, and lungs with cord compression s/p C7-T2 corpectomy & fusion and posterior C4-T5 fusion (9/22) transferred from acute rehab at [place] for initiation of palliative RT.

## 2022-10-28 NOTE — PROGRESS NOTE ADULT - PROBLEM SELECTOR PLAN 6
-Recently diagnosed w/ C-Diff colitis outpatient  -Now s/p abx  -w/o GI complaints at this time -At baseline at this time  -Suspect anemia of chronic disease  -Monitor CBCs

## 2022-10-28 NOTE — CONSULT NOTE ADULT - ASSESSMENT
67y Female  with PMH of ------presenting with  Currently/ Most recently on   Admitted for  Oncology consulted for further evaluation       -Rest of care as per primary team  -Patient to followup with  ([facility]) upon discharge  -C/w Supportive care, pain control, Nutrition, PT, DVT ppx  -Oncology will continue to follow with you      Case discussed with Dr. Yemi FULTON  Oncology Physician Assistant  [facility] VAS[facility]  Pager [phone] also available on Teams    If before 8am/after 5pm or on weekends please page On-call Oncology Fellow   67y Female  with PMH of metastatic (treatment naïve) ER 40%/SD 40%/HER2 3+ breast cancer with extensive bony metastasis, liver, lung and brain.  Transferred from [facility] for inpatient palliative RT. Patient eligible for palliative RT and palliative systemic therapy.Of note patient recently treated for C. difficile colitis- completed vanc on 10/25.Oncology consulted for further evaluation       -Rest of care as per primary team  -Patient to followup with  ([facility]) upon discharge  -C/w Supportive care, pain control, Nutrition, PT, DVT ppx  -Oncology will continue to follow with you      Case discussed with Dr. Yemi FULTON  Oncology Physician Assistant  [facility]  Pager [phone] also available on Teams    If before 8am/after 5pm or on weekends please page On-call Oncology Fellow   67y Female  with PMH of metastatic (treatment naïve) ER 40%/[place] 40%/HER2 3+ breast cancer with extensive bony metastasis, liver, lung and brain.  Transferred from [facility] for inpatient palliative RT. Patient eligible for palliative RT and palliative systemic therapy.Of note patient recently treated for C. difficile colitis- completed vanc on 10/25.Oncology consulted for further evaluation     -Please get Radiation Oncology consult for palliative RT  -Palliative Care consult for symptom management   -Order Echo   -May consider inpatient chemotherapy after RT  -PT consult when medically optimized  -Rest of care as per primary team  -Patient to followup with Dr. Byrd ([facility]) upon discharge  -C/w Supportive care, pain control, Nutrition, PT, DVT ppx  -Oncology will continue to follow with you      Case discussed with Dr. Cathy FULTON  Oncology Physician Assistant  [facility]  Pager [phone] also available on Teams    If before 8am/after 5pm or on weekends please page On-call Oncology Fellow   67y Female  with PMH of metastatic (treatment naïve) ER 40%/[place] 40%/HER2 3+ breast cancer with extensive bony metastasis, liver, lung and brain.  Transferred from [facility] for inpatient palliative RT. Patient eligible for palliative RT and palliative systemic therapy.Of note patient recently treated for C. 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 Nominated her brother and sister as proxies.  -Rest of care as per primary team  -Patient to followup with Dr. Byrd ([facility]) upon discharge  -C/w Supportive care, pain control, Nutrition, PT, DVT ppx  -Oncology will continue to follow with you      Case discussed with Dr. Cathy FULTON  Oncology Physician Assistant  [facility]ASQUEZ[facility]  Pager [phone] also available on Teams    If before 8am/after 5pm or on weekends please page On-call Oncology Fellow   67y Female  with PMH of metastatic (treatment naïve) ER 40%/RI 40%/HER2 + breast cancer with extensive bony metastasis, liver, lung and brain.  Transferred from [facility] for inpatient palliative RT. Patient eligible for palliative RT and palliative systemic therapy.Of note patient recently treated for C. 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 Nominated her brother and sister as proxies.  -Rest of care as per primary team  -Patient to followup with Dr. Byrd ([facility]) upon discharge  -C/w Supportive care, pain control, Nutrition, PT, DVT ppx  -Oncology will continue to follow with you      Case discussed with Dr. Cathy FULTON  Oncology Physician Assistant  [facility]ASQUEZ[facility]  Pager [phone] also available on Teams    If before 8am/after 5pm or on weekends please page On-call Oncology Fellow   67y Female  with PMH of metastatic (treatment naïve) ER 40%/[place] 40%/HER2 + breast cancer with extensive bony metastasis, liver, lung and brain.  Transferred from [facility] for inpatient palliative RT. Patient eligible for palliative RT and palliative systemic therapy.Of note patient recently treated for C. 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 Nominated her brother and sister as proxies.  -Rest of care as per primary team  -Patient to followup with Dr. Byrd ([facility]) upon discharge  -C/w Supportive care, pain control, Nutrition, PT, DVT ppx  -Oncology will continue to follow with you      Case discussed with Dr. Cathy FULTON  Oncology Physician Assistant  [facility]AS[facility]  Pager [phone] also available on Teams    If before 8am/after 5pm or on weekends please page On-call Oncology Fellow   67y Female  with PMH of metastatic (treatment naïve) ER 40%/OR 40%/HER2 + breast cancer with extensive bony metastasis, liver, lung and brain.  Transferred from [facility] for inpatient palliative RT. Patient eligible for palliative RT and palliative systemic therapy.Of note patient recently treated for C. 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 Nominated her brother and sister as proxies.  -Rest of care as per primary team  -Patient to followup with Dr. Byrd ([facility]) upon discharge  -C/w Supportive care, pain control, Nutrition, PT, DVT ppx  -Oncology will continue to follow with you      Case discussed with Dr. Cathy FULTON  Oncology Physician Assistant  [facility]AS[facility]  Pager [phone] also available on Teams    If before 8am/after 5pm or on weekends please page On-call Oncology Fellow

## 2022-10-28 NOTE — PROGRESS NOTE ADULT - SUBJECTIVE AND OBJECTIVE BOX
Dominga Crews    Pager [phone]    Patient is a 67y old  Female who presents with a chief complaint of Bone pain (28 Oct 2022 07:43)      SUBJECTIVE / OVERNIGHT EVENTS: No acute overnight events. This morning pt reports diffuse body pains. Denies fevers, chills, nausea, vomiting, chest pain, SOB.    MEDICATIONS  (STANDING):  chlorhexidine 2% Cloths 1 Application(s) Topical daily  dexAMETHasone     Tablet 4 milliGRAM(s) Oral every 12 hours  enoxaparin Injectable 40 milliGRAM(s) SubCutaneous every 24 hours  fentaNYL   Patch  50 MICROgram(s)/Hr 1 Patch Transdermal every 72 hours  HYDROmorphone PCA (1 mG/mL) 30 milliLiter(s) PCA Continuous PCA Continuous  metoprolol tartrate 25 milliGRAM(s) Oral two times a day  pantoprazole    Tablet 40 milliGRAM(s) Oral before breakfast  polyethylene glycol 3350 17 Gram(s) Oral every 12 hours  saccharomyces boulardii 250 milliGRAM(s) Oral two times a day  senna 2 Tablet(s) Oral at bedtime    MEDICATIONS  (PRN):  acetaminophen     Tablet .. 650 milliGRAM(s) Oral every 6 hours PRN Mild Pain (1 - 3), Moderate Pain (4 - 6)  ALPRAZolam 0.25 milliGRAM(s) Oral every 6 hours PRN anxiety  cyclobenzaprine 5 milliGRAM(s) Oral three times a day PRN Muscle Spasm  HYDROmorphone  Injectable 1.5 milliGRAM(s) IV Push every 3 hours PRN Severe Pain (7 - 10)  HYDROmorphone PCA (1 mG/mL) Rescue Clinician Bolus 1.5 milliGRAM(s) IV Push every 2 hours PRN for Pain Scale GREATER THAN 6  naloxone Injectable 0.1 milliGRAM(s) IV Push every 3 minutes PRN For ANY of the following changes in patient status:  A. RR LESS THAN 10 breaths per minute, B. Oxygen saturation LESS THAN 90%, C. Sedation score of 6  ondansetron Injectable 4 milliGRAM(s) IV Push every 6 hours PRN Nausea    Allergies    tetanus toxoid (Hives)    Intolerances    lactose (Other)      Vital Signs Last 24 Hrs  T(C): 36.8 (28 Oct 2022 13:59), Max: 36.8 (27 Oct 2022 20:02)  T(F): 98.2 (28 Oct 2022 13:59), Max: 98.2 (27 Oct 2022 20:02)  HR: 98 (28 Oct 2022 13:59) (92 - 112)  BP: 128/68 (28 Oct 2022 13:59) (128/68 - 152/78)  BP(mean): --  RR: 18 (28 Oct 2022 13:59) (17 - 18)  SpO2: 98% (28 Oct 2022 13:59) (96% - 99%)    Parameters below as of 28 Oct 2022 13:59  Patient On (Oxygen Delivery Method): room air      Daily     Daily   CAPILLARY BLOOD GLUCOSE        I&O's Summary      PHYSICAL EXAM:  GENERAL: Distress 2/2 pain   EYES: conjunctiva and sclera clear  NECK: Supple, C collar in place   PULMONARY: Clear to auscultation bilaterally  CARDIAC: Regular rate and rhythm; No murmurs, rubs, or gallops  GASTROINTESTINAL: Abdomen soft, Nontender, Nondistended; Bowel sounds normal  EXTREMITIES:   No clubbing, cyanosis, or pedal edema  NEUROLOGY: aao x 3, grossly non-focal but w/ poor pt participation   SKIN: Warm and dry     LABS:                        9.6    7.24  )-----------( 313      ( 27 Oct 2022 06:44 )             30.2     Hgb Trend: 9.6<--, 9.1<--, 9.0<--, 8.6<--  10-27    141  |  105  |  8   ----------------------------<  118<H>  3.7   |  24  |  0.30<L>    Ca    11.6<H>      27 Oct 2022 06:44      Creatinine Trend: 0.30<--, 0.28<--, 0.36<--, 0.29<--, 0.30<--, 0.26<--              RADIOLOGY & ADDITIONAL TESTS:    Imaging Personally Reviewed.    Consultant(s) Notes Reviewed.    Care Discussed with Consultants/Other Providers.

## 2022-10-28 NOTE — CONSULT NOTE ADULT - ATTENDING COMMENTS
Pt seen and personally examined.  Relevant history modified above.  67 yo female recently diagnosed with breast cancer ER/MA positive HER 2 positive breast cancer who presented to [facility] s/p cervical decompression and surgical stabilization followed by transfer to [place] presents to [facility] for adjuvant radiation treatment.      At time of my exam, she was extremely fatigued 2/2 PCA, resulting in limited history.  She does note increasing weakness in the right arm as well as severe back pain.  She is in a neck collar.  She had quite extensive disease at time of presentation to [facility] particularly in the lower back.  It is possible that she is symptomatic from further progression at this site.  She has extensive disease, and it is impossible to deliver radiation to all the sites of bulky disease.  Care coordinated with medical oncology, who will initiate inpatient systemic therapy ASAP.     Plan  1) Would recommend palliative radiation to the lower cervical/upper thoracic spine - 4 Gy X 5.  Please confirm with neurosurgery that it is okay to remove cervical collar for treatment purposes  2) Please order MRI brain and spine to evaluate extent of disease and for other areas of disease that may require palliation.      Informed consent will be obtained with HCP

## 2022-10-28 NOTE — CONSULT NOTE ADULT - SUBJECTIVE AND OBJECTIVE BOX
INCOMPLETE NOTE      Patient is a 67y old  Female who presents with a chief complaint of Bone pain (28 Oct 2022 02:09)      HPI:  65 y/o F with PMH of left arm skin CA s/p excision at age 16, stomach ulcer, and recently diagnosed breast ca with mets to bones, liver, and lungs with cord compression s/p C7-T2 corpectomy & fusion and posterior C4-T5 fusion transferred from acute rehab at [place] for initiation of palliative RT.  While at , pt tested positive for C diff and was treated with 14 day course of PO vancomycin (Completed 10/25).  Pt reports continued neck pain.  She denies dyspnea, fever, chills or chest pain.  She is unsure about diarrhea.   (28 Oct 2022 02:09)       Oncologic History:      ROS: as above     PAST MEDICAL & SURGICAL HISTORY:  Stomach ulcer  20 years ago   no pain not taking any meds      Skin cancer of arm, left  pt was 16 year old      H/O excision of mass  left arm  when patient was 16 years ago      S/P hysterectomy          SOCIAL HISTORY:    FAMILY HISTORY:  Family history of dementia (Mother)    Family history of colon cancer (Father)        MEDICATIONS  (STANDING):  chlorhexidine 2% Cloths 1 Application(s) Topical daily  dexAMETHasone     Tablet 4 milliGRAM(s) Oral every 12 hours  enoxaparin Injectable 40 milliGRAM(s) SubCutaneous every 24 hours  fentaNYL   Patch  50 MICROgram(s)/Hr 1 Patch Transdermal every 72 hours  metoprolol tartrate 25 milliGRAM(s) Oral two times a day  pantoprazole    Tablet 40 milliGRAM(s) Oral before breakfast  polyethylene glycol 3350 17 Gram(s) Oral every 12 hours  saccharomyces boulardii 250 milliGRAM(s) Oral two times a day  senna 2 Tablet(s) Oral at bedtime    MEDICATIONS  (PRN):  acetaminophen     Tablet .. 650 milliGRAM(s) Oral every 6 hours PRN Mild Pain (1 - 3), Moderate Pain (4 - 6)  ALPRAZolam 0.25 milliGRAM(s) Oral every 6 hours PRN anxiety  cyclobenzaprine 5 milliGRAM(s) Oral three times a day PRN Muscle Spasm  HYDROmorphone  Injectable 1.5 milliGRAM(s) IV Push every 3 hours PRN Severe Pain (7 - 10)      Allergies    tetanus toxoid (Hives)    Intolerances    lactose (Other)      Vital Signs Last 24 Hrs  T(C): 36.7 (28 Oct 2022 04:36), Max: 36.8 (27 Oct 2022 20:02)  T(F): 98 (28 Oct 2022 04:36), Max: 98.2 (27 Oct 2022 20:02)  HR: 100 (28 Oct 2022 04:36) (92 - 112)  BP: 152/78 (28 Oct 2022 04:36) (141/85 - 152/78)  BP(mean): --  RR: 17 (28 Oct 2022 04:36) (17 - 18)  SpO2: 99% (28 Oct 2022 04:36) (96% - 99%)    Parameters below as of 28 Oct 2022 04:36  Patient On (Oxygen Delivery Method): room air        PHYSICAL EXAM  General: adult in NAD  HEENT: clear oropharynx, anicteric sclera, pink conjunctiva  Neck: supple  CV: normal S1/S2 with no murmur rubs or gallops  Lungs: positive air movement b/l ant lungs, clear to auscultation, no wheezes, no rales  Abdomen: soft non-tender non-distended, no hepatosplenomegaly  Ext: no clubbing cyanosis or edema  Skin: no rashes and no petechiae  Neuro: alert and oriented X 3, none focal    LABS:                          9.6    7.24  )-----------( 313      ( 27 Oct 2022 06:44 )             30.2         Mean Cell Volume : 90.1 fl  Mean Cell Hemoglobin : 28.7 pg  Mean Cell Hemoglobin Concentration : 31.8 gm/dL  Auto Neutrophil # : x  Auto Lymphocyte # : x  Auto Monocyte # : x  Auto Eosinophil # : x  Auto Basophil # : x  Auto Neutrophil % : x  Auto Lymphocyte % : x  Auto Monocyte % : x  Auto Eosinophil % : x  Auto Basophil % : x      Serial CBC's  10-27 @ 06:44  Hct-30.2 / Hgb-9.6 / Plat-313 / RBC-3.35 / WBC-7.24  Serial CBC's  10-26 @ 06:15  Hct-29.3 / Hgb-9.1 / Plat-294 / RBC-3.20 / WBC-6.15      10-27    141  |  105  |  8   ----------------------------<  118<H>  3.7   |  24  |  0.30<L>    Ca    11.6<H>      27 Oct 2022 06:44                        RADIOLOGY & ADDITIONAL STUDIES:     INCOMPLETE NOTE      Patient is a 67y old  Female who presents with a chief complaint of Bone pain (28 Oct 2022 02:09)      HPI:  65 y/o F with PMH of left arm skin CA s/p excision at age 16, stomach ulcer, and recently diagnosed breast ca with mets to bones, liver, and lungs with cord compression s/p C7-T2 corpectomy & fusion and posterior C4-T5 fusion transferred from acute rehab at [place] for initiation of palliative RT.  While at , pt tested positive for C diff and was treated with 14 day course of PO vancomycin (Completed 10/25).  Pt reports continued neck pain.  She denies dyspnea, fever, chills or chest pain.  She is unsure about diarrhea.   (28 Oct 2022 02:09)       Oncologic History:  65 yo female with PMH of left arm skin CA s/p excision at age 16, stomach ulcer, her last mammogram was in 2014, who was brought to [facility] ED 9/10/22 with progressive weakness to the point that she felt she could not get out of bed. She also reported remote shoulder pain, decreased appetite. Was found to have two lumps in her right breast but could not tolerate recommended biopsy due to pain. CT scan of the Cervical spine performed which showed Multiple lytic lesions of the cervical and visualized thoracic spine as well as right first and second ribs concerning for metastases versus multiple myeloma.    At [facility], she was admitted under medicine service for further workup and management, including of her pain. Further imaging done showed Metastatic breast cancer with dominant right breast mass, right chest wall, axillary and retropectoral involvement; osseous, pulmonary, and likely hepatic metastatic disease. She was also found to have metastatic adenopathy of the thorax, diffuse extensive osseous metastatic disease with multiple lucent and sclerotic lesions and compression fractures of T1, T9, T12, and L2. Severe T1 compression with epidural extension; cord compression cannot be excluded. +additional neoplastic disease of the spine with epidural extension. Multifocal neoplastic lesions involving the pelvis and ribs as well.    Patient was seen by Oncology, CT surgery and Neurosurgery team. She underwent C7-T2 corpectomy & fusion, posterior C4-T5 fusion. Post-op course uncomplicated.     Patient was medically optimized for discharge to [facility] IRF on 9/30/22.     While at  acute rehab patient was evaluated by medicine, palliative, hem-onc.  In general, she was very limited in her ability to tolerate therapy, due to her pain, which is typically most prominent in her legs, and neuropathic in nature 2/2 compression fracutre. For this reason, her pain regimen has regularly been adjusted, and most recently her fentanyl patches were increased to 50 mcg q72h. Although hospice was discussed at various times during her admission, Oncology requests transfer to [facility] for possible palliative radiation. The neurosurgical intervention was without complication while at [place].         ROS: as above     PAST MEDICAL & SURGICAL HISTORY:  Stomach ulcer  20 years ago   no pain not taking any meds      Skin cancer of arm, left  pt was 16 year old      H/O excision of mass  left arm  when patient was 16 years ago      S/P hysterectomy          SOCIAL HISTORY:    FAMILY HISTORY:  Family history of dementia (Mother)    Family history of colon cancer (Father)        MEDICATIONS  (STANDING):  chlorhexidine 2% Cloths 1 Application(s) Topical daily  dexAMETHasone     Tablet 4 milliGRAM(s) Oral every 12 hours  enoxaparin Injectable 40 milliGRAM(s) SubCutaneous every 24 hours  fentaNYL   Patch  50 MICROgram(s)/Hr 1 Patch Transdermal every 72 hours  metoprolol tartrate 25 milliGRAM(s) Oral two times a day  pantoprazole    Tablet 40 milliGRAM(s) Oral before breakfast  polyethylene glycol 3350 17 Gram(s) Oral every 12 hours  saccharomyces boulardii 250 milliGRAM(s) Oral two times a day  senna 2 Tablet(s) Oral at bedtime    MEDICATIONS  (PRN):  acetaminophen     Tablet .. 650 milliGRAM(s) Oral every 6 hours PRN Mild Pain (1 - 3), Moderate Pain (4 - 6)  ALPRAZolam 0.25 milliGRAM(s) Oral every 6 hours PRN anxiety  cyclobenzaprine 5 milliGRAM(s) Oral three times a day PRN Muscle Spasm  HYDROmorphone  Injectable 1.5 milliGRAM(s) IV Push every 3 hours PRN Severe Pain (7 - 10)      Allergies    tetanus toxoid (Hives)    Intolerances    lactose (Other)      Vital Signs Last 24 Hrs  T(C): 36.7 (28 Oct 2022 04:36), Max: 36.8 (27 Oct 2022 20:02)  T(F): 98 (28 Oct 2022 04:36), Max: 98.2 (27 Oct 2022 20:02)  HR: 100 (28 Oct 2022 04:36) (92 - 112)  BP: 152/78 (28 Oct 2022 04:36) (141/85 - 152/78)  BP(mean): --  RR: 17 (28 Oct 2022 04:36) (17 - 18)  SpO2: 99% (28 Oct 2022 04:36) (96% - 99%)    Parameters below as of 28 Oct 2022 04:36  Patient On (Oxygen Delivery Method): room air        PHYSICAL EXAM  GENERAL: Distress 2/2 pain   EYES: conjunctiva and sclera clear  ENMT: Moist mucous membranes   NECK: Supple  PULMONARY: Clear to auscultation bilaterally  CARDIAC: Regular rate and rhythm; No murmurs, rubs, or gallops  GASTROINTESTINAL: Abdomen soft, Nontender, Nondistended; Bowel sounds normal  EXTREMITIES:   No clubbing, cyanosis, or pedal edema  PSYCH: Normal Affect, Normal Behavior  NEUROLOGY:   - Mental status A&O x 3,  SKIN: No rashes or lesions  MUSCULOSKELETAL: Neck in C collar      LABS:                          9.6    7.24  )-----------( 313      ( 27 Oct 2022 06:44 )             30.2         Mean Cell Volume : 90.1 fl  Mean Cell Hemoglobin : 28.7 pg  Mean Cell Hemoglobin Concentration : 31.8 gm/dL  Auto Neutrophil # : x  Auto Lymphocyte # : x  Auto Monocyte # : x  Auto Eosinophil # : x  Auto Basophil # : x  Auto Neutrophil % : x  Auto Lymphocyte % : x  Auto Monocyte % : x  Auto Eosinophil % : x  Auto Basophil % : x      Serial CBC's  10-27 @ 06:44  Hct-30.2 / Hgb-9.6 / Plat-313 / RBC-3.35 / WBC-7.24  Serial CBC's  10-26 @ 06:15  Hct-29.3 / Hgb-9.1 / Plat-294 / RBC-3.20 / WBC-6.15      10-27    141  |  105  |  8   ----------------------------<  118<H>  3.7   |  24  |  0.30<L>    Ca    11.6<H>      27 Oct 2022 06:44                        RADIOLOGY & ADDITIONAL STUDIES:     Patient is a 67y old  Female who presents with a chief complaint of Bone pain (28 Oct 2022 02:09)      HPI:  67 y/o F with PMH of left arm skin CA s/p excision at age 16, stomach ulcer, and recently diagnosed breast ca with mets to bones, liver, and lungs with cord compression s/p C7-T2 corpectomy & fusion and posterior C4-T5 fusion transferred from acute rehab at [place] for initiation of palliative RT.  While at , pt tested positive for C diff and was treated with 14 day course of PO vancomycin (Completed 10/25).  Pt reports continued neck pain.  She denies dyspnea, fever, chills or chest pain.  She is unsure about diarrhea.   (28 Oct 2022 02:09)       Oncologic History:  65 yo female with PMH of left arm skin CA s/p excision at age 16, stomach ulcer, her last mammogram was in 2014, who was brought to [facility] ED 9/10/22 with progressive weakness to the point that she felt she could not get out of bed. She also reported remote shoulder pain, decreased appetite. Was found to have two lumps in her right breast but could not tolerate recommended biopsy due to pain. CT scan of the Cervical spine performed which showed Multiple lytic lesions of the cervical and visualized thoracic spine as well as right first and second ribs concerning for metastases versus multiple myeloma.    At [facility], she was admitted under medicine service for further workup and management, including of her pain. Further imaging done showed Metastatic breast cancer with dominant right breast mass, right chest wall, axillary and retropectoral involvement; osseous, pulmonary, and likely hepatic metastatic disease. She was also found to have metastatic adenopathy of the thorax, diffuse extensive osseous metastatic disease with multiple lucent and sclerotic lesions and compression fractures of T1, T9, T12, and L2. Severe T1 compression with epidural extension; cord compression cannot be excluded. +additional neoplastic disease of the spine with epidural extension. Multifocal neoplastic lesions involving the pelvis and ribs as well.    Patient was seen by Oncology, CT surgery and Neurosurgery team. She underwent C7-T2 corpectomy & fusion, posterior C4-T5 fusion. Post-op course uncomplicated.     Patient was medically optimized for discharge to [facility] IRF on 9/30/22.     While at  acute rehab patient was evaluated by medicine, palliative, hem-onc.  In general, she was very limited in her ability to tolerate therapy, due to her pain, which is typically most prominent in her legs, and neuropathic in nature 2/2 compression fracutre. For this reason, her pain regimen has regularly been adjusted, and most recently her fentanyl patches were increased to 50 mcg q72h. Although hospice was discussed at various times during her admission, Oncology requests transfer to [facility] for possible palliative radiation. The neurosurgical intervention was without complication while at [place].         ROS: as above     PAST MEDICAL & SURGICAL HISTORY:  Stomach ulcer  20 years ago   no pain not taking any meds      Skin cancer of arm, left  pt was 16 year old      H/O excision of mass  left arm  when patient was 16 years ago      S/P hysterectomy          SOCIAL HISTORY:    FAMILY HISTORY:  Family history of dementia (Mother)    Family history of colon cancer (Father)        MEDICATIONS  (STANDING):  chlorhexidine 2% Cloths 1 Application(s) Topical daily  dexAMETHasone     Tablet 4 milliGRAM(s) Oral every 12 hours  enoxaparin Injectable 40 milliGRAM(s) SubCutaneous every 24 hours  fentaNYL   Patch  50 MICROgram(s)/Hr 1 Patch Transdermal every 72 hours  metoprolol tartrate 25 milliGRAM(s) Oral two times a day  pantoprazole    Tablet 40 milliGRAM(s) Oral before breakfast  polyethylene glycol 3350 17 Gram(s) Oral every 12 hours  saccharomyces boulardii 250 milliGRAM(s) Oral two times a day  senna 2 Tablet(s) Oral at bedtime    MEDICATIONS  (PRN):  acetaminophen     Tablet .. 650 milliGRAM(s) Oral every 6 hours PRN Mild Pain (1 - 3), Moderate Pain (4 - 6)  ALPRAZolam 0.25 milliGRAM(s) Oral every 6 hours PRN anxiety  cyclobenzaprine 5 milliGRAM(s) Oral three times a day PRN Muscle Spasm  HYDROmorphone  Injectable 1.5 milliGRAM(s) IV Push every 3 hours PRN Severe Pain (7 - 10)      Allergies    tetanus toxoid (Hives)    Intolerances    lactose (Other)      Vital Signs Last 24 Hrs  T(C): 36.7 (28 Oct 2022 04:36), Max: 36.8 (27 Oct 2022 20:02)  T(F): 98 (28 Oct 2022 04:36), Max: 98.2 (27 Oct 2022 20:02)  HR: 100 (28 Oct 2022 04:36) (92 - 112)  BP: 152/78 (28 Oct 2022 04:36) (141/85 - 152/78)  BP(mean): --  RR: 17 (28 Oct 2022 04:36) (17 - 18)  SpO2: 99% (28 Oct 2022 04:36) (96% - 99%)    Parameters below as of 28 Oct 2022 04:36  Patient On (Oxygen Delivery Method): room air        PHYSICAL EXAM  GENERAL: Distress 2/2 pain   EYES: conjunctiva and sclera clear  ENMT: Moist mucous membranes   NECK: Supple  PULMONARY: Clear to auscultation bilaterally  CARDIAC: Regular rate and rhythm; No murmurs, rubs, or gallops  GASTROINTESTINAL: Abdomen soft, Nontender, Nondistended; Bowel sounds normal  EXTREMITIES:   No clubbing, cyanosis, or pedal edema  PSYCH: Normal Affect, Normal Behavior  NEUROLOGY:   - Mental status A&O x 3,  SKIN: No rashes or lesions  MUSCULOSKELETAL: Neck in C collar      LABS:                          9.6    7.24  )-----------( 313      ( 27 Oct 2022 06:44 )             30.2         Mean Cell Volume : 90.1 fl  Mean Cell Hemoglobin : 28.7 pg  Mean Cell Hemoglobin Concentration : 31.8 gm/dL  Auto Neutrophil # : x  Auto Lymphocyte # : x  Auto Monocyte # : x  Auto Eosinophil # : x  Auto Basophil # : x  Auto Neutrophil % : x  Auto Lymphocyte % : x  Auto Monocyte % : x  Auto Eosinophil % : x  Auto Basophil % : x      Serial CBC's  10-27 @ 06:44  Hct-30.2 / Hgb-9.6 / Plat-313 / RBC-3.35 / WBC-7.24  Serial CBC's  10-26 @ 06:15  Hct-29.3 / Hgb-9.1 / Plat-294 / RBC-3.20 / WBC-6.15      10-27    141  |  105  |  8   ----------------------------<  118<H>  3.7   |  24  |  0.30<L>    Ca    11.6<H>      27 Oct 2022 06:44                        RADIOLOGY & ADDITIONAL STUDIES:

## 2022-10-28 NOTE — CONSULT NOTE ADULT - ASSESSMENT
67y  Female  with PMH of metastatic (treatment naïve) ER 40%/MA 40%/HER2 3+ breast cancer with extensive bony metastasis, liver, lung and multiple subcentimeter intracranial lesions. multiple levels of spine bone mets , biopsy of cervical spine revealed Metastatic breast carcinoma,  she underwent C7-T2 corpectomy & fusion and posterior C4-T5 fusion at [facility]. was medically optimized for discharge to [facility] IRF acute rehab on 9/30/22. her stay in  IRF was complicated by C. difficile colitis- completed vanc on 10/25. She then tranferred to [facility] for palliative RT. Today, she endorses focal weakness on RUE started before surgery, gradually worsens during the stay in  IRF, but stable within last 5 days, 3/5 right hand grasp, 4/5 rest RUE; 5/5 LUE . Endorses b/l hip pain. 5/5 bilateral LE.      Surgical site examine limited due to the pain and patient declined. may attempted after Dilaudid PCA is on     Plan:  -repeat MRI of brain and full spine.   -Neurosurgery to re-eval the surgical site   -Discussed with patient about RT in [facility], patient states she has health prox brother jaren Caio  and Sister Judit Caio  67y  Female  with PMH of metastatic (treatment naïve) ER 40%/OR 40%/HER2 3+ breast cancer with extensive bony metastasis, liver, lung and multiple subcentimeter intracranial lesions. multiple levels of spine bone mets , biopsy of cervical spine revealed Metastatic breast carcinoma,  she underwent C7-T2 corpectomy & fusion and posterior C4-T5 fusion at [facility]. was medically optimized for discharge to [facility] IRF acute rehab on 9/30/22. her stay in  IRF was complicated by C. difficile colitis- completed vanc on 10/25. She then tranferred to [facility] for palliative RT. Today, she endorses focal weakness on RUE started before surgery, gradually worsens during the stay in  IRF, but stable within last 5 days, 3/5 right hand grasp, 4/5 rest RUE; 5/5 LUE . Endorses b/l hip pain. 5/5 bilateral LE.      Surgical site examine limited due to the pain and patient declined. may attempted after Dilaudid PCA is on     Plan:  -repeat MRI of brain and full spine.   -Neurosurgery to re-eval the surgical site   -Discussed with patient about RT in [facility], patient states she has health prox brother jaren Kearney  and Sister Judit Martínezmente     Case discussed with attending Dr. Dorman 67y  Female  with PMH of metastatic (treatment naïve) ER 40%/[place] 40%/HER2 3+ breast cancer with extensive bony metastasis, liver, lung and multiple subcentimeter intracranial lesions. multiple levels of spine bone mets , biopsy of cervical spine revealed Metastatic breast carcinoma,  she underwent C7-T2 corpectomy & fusion and posterior C4-T5 fusion at [facility]. was medically optimized for discharge to [facility] IRF acute rehab on 9/30/22. her stay in  IRF was complicated by C. difficile colitis- completed vanc on 10/25. She then tranferred to [facility] for palliative RT. Today, she endorses focal weakness on RUE started before surgery, gradually worsens during the stay in  IRF, but stable within last 5 days, 3/5 right hand grasp, 4/5 rest RUE; 5/5 LUE . Endorses b/l hip pain. 5/5 bilateral LE.      Surgical site examine limited due to the pain and patient declined. may attempted after Dilaudid PCA is on     Plan:  -repeat MRI of brain and full spine.   -Neurosurgery to re-eval the surgical site   -Discussed with patient about RT in [facility], patient states she has health prox brother jaren Kearney  and Sister Judit Kearney .  Case discussed with attending Dr. Dorman

## 2022-10-28 NOTE — H&P ADULT - ASSESSMENT
65 y/o F with PMH of left arm skin CA s/p excision at age 16, stomach ulcer, and recently diagnosed breast ca with mets to bones, liver, and lungs with cord compression s/p C7-T2 corpectomy & fusion and posterior C4-T5 fusion transferred from acute rehab at [place] for initiation of palliative RT.

## 2022-10-28 NOTE — PROGRESS NOTE ADULT - PROBLEM SELECTOR PLAN 3
- continue metoprolol  - Monitor pressures Hypercalcemia noted.  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 However now w/ upper ext swelling  -Corrected ca 13.6  -Would favor trial of calcitonin at this time ; 4units/kg q12 x 2 doses  -Check PTH/ PTH-rp, ionized calcium  -Monitor serum calcium

## 2022-10-28 NOTE — PROGRESS NOTE ADULT - PROBLEM SELECTOR PLAN 4
-Pt w/ B/L upper extremity swelling   -Suspect iso IVF +/- stasis +/- hypoalbuminemia. R/O DVT  -Per chart review US Duplex 10/17 w/o RUE DVT  -Obtain US Duplex LUE  -Encourage elevation - continue metoprolol  - Monitor pressures

## 2022-10-28 NOTE — CONSULT NOTE ADULT - SUBJECTIVE AND OBJECTIVE BOX
65 y/o F with PMH of left arm skin CA s/p excision at age 16, stomach ulcer, and recently diagnosed breast ca with mets to bones, liver, and lungs with cord compression s/p C7-T2 corpectomy & fusion and posterior C4-T5 fusion transferred from acute rehab at [facility] for initiation of palliative RT.  While at , pt tested positive for C diff and was treated with 14 day course of PO vancomycin (Completed 10/25).  Pt reports continued neck pain.  She denies dyspnea, fever, chills or chest pain.  She is unsure about diarrhea.   (28 Oct 2022 02:09)    PERTINENT PM/SXH:   Abdominal mass    Stomach ulcer    Skin cancer of arm, left    Concussion    H/O excision of mass    S/P hysterectomy    FAMILY HISTORY:  Family history of dementia (Mother)    Family history of colon cancer (Father)    Family Hx substance abuse [ ]yes [ ]no  ITEMS NOT CHECKED ARE NOT PRESENT    SOCIAL HISTORY:   Significant other/partner[ ]  Children[ ]  Holiness/Spirituality:  Substance hx:  [ ]   Tobacco hx:  [ ]   Alcohol hx: [ ]   Home Opioid hx:  [ ] I-Stop Reference No:  Living Situation: [ ]Home  [ ]Long term care  [ x]Rehab: [facility] [ ]Other    ADVANCE DIRECTIVES:    DNR/MOLST  [ ]  Living Will  [ ]   DECISION MAKER(s):  [ ] Health Care Proxy(s)  [x ] Surrogate(s)  [ ] Guardian           Name(s): Phone Number(s):  1. brother rj [phone]  2. sister olga [phone]    BASELINE (I)ADL(s) (prior to admission):  [place]: [ ]Total  [ x] Moderate [ ]Dependent    Allergies    tetanus toxoid (Hives)    Intolerances    lactose (Other)  MEDICATIONS  (STANDING):  anastrozole 1 milliGRAM(s) Oral daily  chlorhexidine 2% Cloths 1 Application(s) Topical daily  dexAMETHasone     Tablet 4 milliGRAM(s) Oral every 12 hours  enoxaparin Injectable 40 milliGRAM(s) SubCutaneous every 24 hours  fentaNYL   Patch  50 MICROgram(s)/Hr 1 Patch Transdermal every 72 hours  HYDROmorphone PCA (1 mG/mL) 30 milliLiter(s) PCA Continuous PCA Continuous  metoprolol tartrate 25 milliGRAM(s) Oral two times a day  pantoprazole    Tablet 40 milliGRAM(s) Oral before breakfast  polyethylene glycol 3350 17 Gram(s) Oral every 12 hours  saccharomyces boulardii 250 milliGRAM(s) Oral two times a day  senna 2 Tablet(s) Oral at bedtime  sodium chloride 0.9%. 1000 milliLiter(s) (30 mL/Hr) IV Continuous <Continuous>    MEDICATIONS  (PRN):  acetaminophen     Tablet .. 650 milliGRAM(s) Oral every 6 hours PRN Mild Pain (1 - 3), Moderate Pain (4 - 6)  ALPRAZolam 0.25 milliGRAM(s) Oral every 6 hours PRN anxiety  cyclobenzaprine 5 milliGRAM(s) Oral three times a day PRN Muscle Spasm  HYDROmorphone  Injectable 1.5 milliGRAM(s) IV Push every 3 hours PRN Severe Pain (7 - 10)  HYDROmorphone PCA (1 mG/mL) Rescue Clinician Bolus 1.5 milliGRAM(s) IV Push every 2 hours PRN for Pain Scale GREATER THAN 6  naloxone Injectable 0.1 milliGRAM(s) IV Push every 3 minutes PRN For ANY of the following changes in patient status:  A. RR LESS THAN 10 breaths per minute, B. Oxygen saturation LESS THAN 90%, C. Sedation score of 6  ondansetron Injectable 4 milliGRAM(s) IV Push every 6 hours PRN Nausea    PRESENT SYMPTOMS: [ ]Unable to self-report  [ ] CPOT [ ] PAINADs [ ] RDOS  Source if other than patient:  [ ]Family   [ ]Team     Pain: [x ]yes [ ]no  QOL impact -   Location - hip and abdomen, back                   Aggravating factors - moving  Quality - aching, stabbing, shooting  Radiation - back  Timing- constant  Severity (0-10 scale): 8/10, comes down to a 6 after medication  Minimal acceptable level (0-10 scale): 2-3/10    CPOT:    https://www.[facility].org/getattachment/ktj87s81-8g6o-9k7k-8w7e-9399s2570a9o/Critical-Care-Pain-Observation-Tool-(CPOT)    PAIN AD Score:   http://geriatrictoolkit.[facility]/cog/painad.pdf (press ctrl +  left click to view)    Dyspnea:                           [ ]Mild [ ]Moderate [ ]Severe      RDOS:  0 to 2  minimal or no respiratory distress   3  mild distress  4 to 6 moderate distress  >7 severe distress  https://homecareinformation.net/handouts/hen/Respiratory_Distress_Observation_Scale.pdf (Ctrl +  left click to view)     Anxiety:                             [ ]Mild [ ]Moderate [ ]Severe  Fatigue:                             [ ]Mild [ ]Moderate [ ]Severe  Nausea:                             [ ]Mild [ ]Moderate [ ]Severe  Loss of appetite:              [ ]Mild [ ]Moderate [ ]Severe  Constipation:                    [ ]Mild [ ]Moderate [ ]Severe    PCSSQ[Palliative Care Spiritual Screening Question]   Severity (0-10):  Score of 4 or > indicate consideration of Chaplaincy referral.    Chaplaincy Referral: [ ] yes [ ] refused [ ] following    Other Symptoms:  [ x]All other review of systems negative     Palliative Performance Status Version 2: 40-50 %    http://[facility].org/files/news/palliative_performance_scale_ppsv2.pdf    PHYSICAL EXAM:  Vital Signs Last 24 Hrs  T(C): 36.8 (28 Oct 2022 13:59), Max: 36.8 (27 Oct 2022 20:02)  T(F): 98.2 (28 Oct 2022 13:59), Max: 98.2 (27 Oct 2022 20:02)  HR: 98 (28 Oct 2022 13:59) (92 - 112)  BP: 128/68 (28 Oct 2022 13:59) (128/68 - 152/78)  BP(mean): --  RR: 18 (28 Oct 2022 13:59) (17 - 18)  SpO2: 98% (28 Oct 2022 13:59) (96% - 99%)    Parameters below as of 28 Oct 2022 13:59  Patient On (Oxygen Delivery Method): room air     I&O's Summary    GENERAL: [ ]Cachexia    [ x]Alert  [ x]Oriented x 3  [ ]Lethargic  [ ]Unarousable  [ x]Verbal  [ ]Non-Verbal  Behavioral:   [ ] Anxiety  [ ] Delirium [ ] Agitation [ ] Other  HEENT:  [ ]Normal   [x ]Dry mouth   [ ]ET Tube/Trach  [ ]Oral lesions  PULMONARY:   [x ]Clear [ ]Tachypnea  [ ]Audible excessive secretions   [ ]Rhonchi        [ ]Right [ ]Left [ ]Bilateral  [ ]Crackles        [ ]Right [ ]Left [ ]Bilateral  [ ]Wheezing     [ ]Right [ ]Left [ ]Bilateral  [ ]Diminished breath sounds [ ]right [ ]left [ ]bilateral  CARDIOVASCULAR:    [ ]Regular [ ]Irregular [ ]Tachy  [ ]Aravind [ ]Murmur [ ]Other  GASTROINTESTINAL:  [x ]Soft  [ ]Distended   [x ]+BS  [x ]Non tender [ ]Tender  [ ]Other [ ]PEG [ ]OGT/ NGT  Last BM:  GENITOURINARY:  [ x]Normal [ ] Incontinent   [ ]Oliguria/Anuria   [ ]Hamm  MUSCULOSKELETAL:   [ ]Normal   [x ]Weakness  [ ]Bed/Wheelchair bound [ ]Edema  NEUROLOGIC:   [ ]No focal deficits  [ ]Cognitive impairment  [ ]Dysphagia [ ]Dysarthria [ ]Paresis [ ]Other   SKIN:   [ ]xNormal  [ ]Rash  [ ]Other  [ ]Pressure ulcer(s)       Present on admission [ ]y [ ]n    CRITICAL CARE:  [ ] Shock Present  [ ]Septic [ ]Cardiogenic [ ]Neurologic [ ]Hypovolemic  [ ]  Vasopressors [ ]  Inotropes   [ ]Respiratory failure present [ ]Mechanical ventilation [ ]Non-invasive ventilatory support [ ]High flow    [ ]Acute  [ ]Chronic [ ]Hypoxic  [ ]Hypercarbic [ ]Other  [ ]Other organ failure     LABS:                        9.6    7.24  )-----------( 313      ( 27 Oct 2022 06:44 )             30.2   10-27    141  |  105  |  8   ----------------------------<  118<H>  3.7   |  24  |  0.30<L>    Ca    11.6<H>      27 Oct 2022 06:44    RADIOLOGY & ADDITIONAL STUDIES:  < from: CT Abdomen and Pelvis No Cont (10.11.22 @ 11:41) >  IMPRESSION:  Limited, noncontrast exam.  Pancolitis, correlate clinically for inflammatory/infectious etiology,   including pseudomembranous colitis.  Partially visualized right breast mass.  Pulmonary and hepatic metastatic disease as noted on prior exam.  Permeative, osteolytic metastatic disease, including L2 compression   fracture with possible epidural involvement.  This can be further characterized by MRI as clinically indicated.  Other findings as discussed above.    PROTEIN CALORIE MALNUTRITION PRESENT: [ ]mild [ ]moderate [ ]severe [ ]underweight [ ]morbid obesity  https://www.andeal.org/vault/2440/web/files/ONC/Table_Clinical%20Characteristics%20to%20Document%20Malnutrition-White%20JV%20et%20al%202012.pdf    Height (cm): 165.1 (09-30-22 @ 17:49), 153.7 (09-25-22 @ 19:40)  Weight (kg): 62.2 (10-28-22 @ 02:41), 59.1 (09-30-22 @ 17:49), 64 (09-25-22 @ 19:40)  BMI (kg/m2): 22.8 (10-28-22 @ 02:41), 21.7 (09-30-22 @ 17:49), 27.1 (09-25-22 @ 19:40)

## 2022-10-28 NOTE — H&P ADULT - NSHPLABSRESULTS_GEN_ALL_CORE
10-27    141  |  105  |  8   ----------------------------<  118<H>  3.7   |  24  |  0.30<L>    Ca    11.6<H>      27 Oct 2022 06:44    TPro  4.5<L>  /  Alb  1.5<L>  /  TBili  0.3  /  DBili  x   /  AST  47<H>  /  ALT  8<L>  /  AlkPhos  148<H>  10-26                            9.6    7.24  )-----------( 313      ( 27 Oct 2022 06:44 )             30.2

## 2022-10-28 NOTE — PROGRESS NOTE ADULT - PROBLEM SELECTOR PLAN 5
-At baseline at this time  -Suspect anemia of chronic disease  -Monitor CBCs -Pt w/ B/L upper extremity swelling   -Suspect iso IVF +/- stasis +/- hypoalbuminemia. R/O DVT  -Per chart review US Duplex 10/17 w/o RUE DVT  -Obtain US Duplex LUE  -Encourage elevation

## 2022-10-28 NOTE — PROGRESS NOTE ADULT - PROBLEM SELECTOR PLAN 2
- s/p C7-T2 corpectomy & fusion and posterior C4-T5 fusion  - continue dexamethasone 4mg Q12  - C/w PPI while on steroids  -Unclear anticipated duration of C-collar and steroids  -Will discuss w/ Neurosurgery

## 2022-10-28 NOTE — H&P ADULT - HISTORY OF PRESENT ILLNESS
67 y/o F with PMH of left arm skin CA s/p excision at age 16, stomach ulcer, and recently diagnosed breast ca with mets to bones, liver, and lungs with cord compression s/p C7-T2 corpectomy & fusion and posterior C4-T5 fusion transferred from acute rehab at [place] for initiation of palliative RT.  While at , pt tested positive for C diff and was treated with 14 day course of PO vancomycin (Completed 10/25).  Pt reports continued neck pain.  She denies dyspnea, fever, chills or chest pain.  She is unsure about diarrhea.

## 2022-10-28 NOTE — CONSULT NOTE ADULT - PROBLEM SELECTOR RECOMMENDATION 3
PPSV 40%  Needs assistance with some ADLs  Mainly limited by pain per pt  High risk of further decline given comorbidities

## 2022-10-28 NOTE — H&P ADULT - NSHPREVIEWOFSYSTEMS_GEN_ALL_CORE
Review of Systems:   CONSTITUTIONAL: No fever or chills  EYES: No eye pain, visual disturbances, or discharge  ENMT:  No difficulty hearing, no throat pain  NECK: No pain or stiffness  RESPIRATORY: No cough, No shortness of breath  CARDIOVASCULAR: No chest pain, palpitations, dizziness, or leg swelling  GASTROINTESTINAL: No abdominal pain, nausea, vomiting or diarrhea  GENITOURINARY: No dysuria, or hematuria  NEUROLOGICAL: No headaches, weakness, or numbness  SKIN: No rashes, or lesions   LYMPH NODES: No enlarged glands  ENDOCRINE: No heat or cold intolerance  MUSCULOSKELETAL: neck pain  PSYCHIATRIC: No depression or anxiety  HEME/LYMPH: No easy bruising, or bleeding  ALLERGY AND IMMUNOLOGIC: No hives or eczema

## 2022-10-28 NOTE — CONSULT NOTE ADULT - PROBLEM SELECTOR RECOMMENDATION 9
Stage IV extensive bony metastasis, liver, lung and brain.    Tx naive  Transferred from [facility] for RT  Oncology following  Considering inpatient DMT  Pt will f/u with Dr. Byrd ([facility]) on discharge.

## 2022-10-28 NOTE — CONSULT NOTE ADULT - PROBLEM SELECTOR RECOMMENDATION 4
Pt is full code  Surrogates are brother and sister as above  Will continue to follow  Page for uncontrolled symptoms [phone]

## 2022-10-28 NOTE — CONSULT NOTE ADULT - ASSESSMENT
65 y/o F with PMH of left arm skin CA s/p excision at age 16, stomach ulcer, and recently diagnosed breast ca with mets to bones, liver, and lungs with cord compression s/p C7-T2 corpectomy & fusion and posterior C4-T5 fusion transferred from acute rehab at [place] for initiation of palliative RT.  Palliative Care consulted for complex symptom management.

## 2022-10-28 NOTE — H&P ADULT - NSHPPHYSICALEXAM_GEN_ALL_CORE
Vital Signs Last 24 Hrs  T(C): 36.6 (27 Oct 2022 22:26), Max: 36.8 (27 Oct 2022 20:02)  T(F): 97.9 (27 Oct 2022 22:26), Max: 98.2 (27 Oct 2022 20:02)  HR: 92 (27 Oct 2022 22:26) (92 - 112)  BP: 141/85 (27 Oct 2022 22:26) (141/85 - 144/79)  BP(mean): --  RR: 18 (27 Oct 2022 22:26) (18 - 18)  SpO2: 97% (27 Oct 2022 22:26) (96% - 97%)    Parameters below as of 27 Oct 2022 22:26  Patient On (Oxygen Delivery Method): room air        PHYSICAL EXAM:  GENERAL: Distress 2/2 pain   EYES: conjunctiva and sclera clear  ENMT: Moist mucous membranes   NECK: Supple  PULMONARY: Clear to auscultation bilaterally  CARDIAC: Regular rate and rhythm; No murmurs, rubs, or gallops  GASTROINTESTINAL: Abdomen soft, Nontender, Nondistended; Bowel sounds normal  EXTREMITIES:   No clubbing, cyanosis, or pedal edema  PSYCH: Normal Affect, Normal Behavior  NEUROLOGY:   - Mental status A&O x 3,  SKIN: No rashes or lesions  MUSCULOSKELETAL: Neck in C collar

## 2022-10-28 NOTE — CONSULT NOTE ADULT - PROBLEM SELECTOR RECOMMENDATION 2
Uncontrolled  Severe   5 pRNS used in the past 24hrs with escalation of dose w/o much improvement, pt indicates that the pain medication wears off within an hour despite escalating dose of opioids  Pt on Fentanyl patch 50mcg/hr from prior   Would recommend:  - Start Dilaudid PCA 1mg demand, 15min lockout, CB 1.5mg q2hrs PRN for severe pain  - Bowel regimen   - Discussed option of gabapentin - pt indicates not tolerating medication before due to nausea/vomiting   - Continue with flexeril  - Page for uncontrolled symptoms 19593 Quinolones Counseling:  I discussed with the patient the risks of fluoroquinolones including but not limited to GI upset, allergic reaction, drug rash, diarrhea, dizziness, photosensitivity, yeast infections, liver function test abnormalities, tendonitis/tendon rupture.

## 2022-10-29 PROCEDURE — 99231 SBSQ HOSP IP/OBS SF/LOW 25: CPT

## 2022-10-29 PROCEDURE — 99233 SBSQ HOSP IP/OBS HIGH 50: CPT

## 2022-10-29 PROCEDURE — 99232 SBSQ HOSP IP/OBS MODERATE 35: CPT

## 2022-10-29 RX ORDER — SODIUM CHLORIDE 9 MG/ML
1000 INJECTION INTRAMUSCULAR; INTRAVENOUS; SUBCUTANEOUS
Refills: 0 | Status: DISCONTINUED | OUTPATIENT
Start: 2022-10-29 | End: 2022-11-02

## 2022-10-29 RX ADMIN — Medication 25 MILLIGRAM(S): at 18:45

## 2022-10-29 RX ADMIN — SODIUM CHLORIDE 75 MILLILITER(S): 9 INJECTION INTRAMUSCULAR; INTRAVENOUS; SUBCUTANEOUS at 18:45

## 2022-10-29 RX ADMIN — HYDROMORPHONE HYDROCHLORIDE 30 MILLILITER(S): 2 INJECTION INTRAMUSCULAR; INTRAVENOUS; SUBCUTANEOUS at 20:03

## 2022-10-29 RX ADMIN — Medication 4 MILLIGRAM(S): at 07:06

## 2022-10-29 RX ADMIN — FENTANYL CITRATE 1 PATCH: 50 INJECTION INTRAVENOUS at 19:46

## 2022-10-29 RX ADMIN — MUPIROCIN 1 APPLICATION(S): 20 OINTMENT TOPICAL at 07:07

## 2022-10-29 RX ADMIN — Medication 25 MILLIGRAM(S): at 07:06

## 2022-10-29 RX ADMIN — FENTANYL CITRATE 1 PATCH: 50 INJECTION INTRAVENOUS at 07:22

## 2022-10-29 RX ADMIN — HYDROMORPHONE HYDROCHLORIDE 30 MILLILITER(S): 2 INJECTION INTRAMUSCULAR; INTRAVENOUS; SUBCUTANEOUS at 08:10

## 2022-10-29 RX ADMIN — Medication 4 MILLIGRAM(S): at 18:44

## 2022-10-29 RX ADMIN — ENOXAPARIN SODIUM 40 MILLIGRAM(S): 100 INJECTION SUBCUTANEOUS at 18:44

## 2022-10-29 RX ADMIN — CHLORHEXIDINE GLUCONATE 1 APPLICATION(S): 213 SOLUTION TOPICAL at 12:38

## 2022-10-29 RX ADMIN — CALCITONIN SALMON 250 INTERNATIONAL UNIT(S): 200 INJECTION, SOLUTION INTRAMUSCULAR at 07:07

## 2022-10-29 RX ADMIN — MUPIROCIN 1 APPLICATION(S): 20 OINTMENT TOPICAL at 18:45

## 2022-10-29 NOTE — PROGRESS NOTE ADULT - SUBJECTIVE AND OBJECTIVE BOX
SUBJECTIVE AND OBJECTIVE: The patient used Dilaudid 18 demand doses and 19 attempts since PCA was started. As per the patient's RN the patient's pain was well controlled.   Indication for Geriatrics and Palliative Care Services/INTERVAL HPI:    OVERNIGHT EVENTS:    DNR on chart:  Allergies    tetanus toxoid (Hives)    Intolerances    lactose (Other)  MEDICATIONS  (STANDING):  anastrozole 1 milliGRAM(s) Oral daily  chlorhexidine 2% Cloths 1 Application(s) Topical daily  dexAMETHasone     Tablet 4 milliGRAM(s) Oral every 12 hours  enoxaparin Injectable 40 milliGRAM(s) SubCutaneous every 24 hours  fentaNYL   Patch  50 MICROgram(s)/Hr 1 Patch Transdermal every 72 hours  HYDROmorphone PCA (1 mG/mL) 30 milliLiter(s) PCA Continuous PCA Continuous  metoprolol tartrate 25 milliGRAM(s) Oral two times a day  mupirocin 2% Ointment 1 Application(s) Topical every 12 hours  pantoprazole    Tablet 40 milliGRAM(s) Oral before breakfast  polyethylene glycol 3350 17 Gram(s) Oral every 12 hours  saccharomyces boulardii 250 milliGRAM(s) Oral two times a day  senna 2 Tablet(s) Oral at bedtime  sodium chloride 0.9%. 1000 milliLiter(s) (75 mL/Hr) IV Continuous <Continuous>    MEDICATIONS  (PRN):  acetaminophen     Tablet .. 650 milliGRAM(s) Oral every 6 hours PRN Mild Pain (1 - 3), Moderate Pain (4 - 6)  ALPRAZolam 0.25 milliGRAM(s) Oral every 6 hours PRN anxiety  cyclobenzaprine 5 milliGRAM(s) Oral three times a day PRN Muscle Spasm  HYDROmorphone  Injectable 1.5 milliGRAM(s) IV Push every 3 hours PRN Severe Pain (7 - 10)  HYDROmorphone PCA (1 mG/mL) Rescue Clinician Bolus 1.5 milliGRAM(s) IV Push every 2 hours PRN for Pain Scale GREATER THAN 6  naloxone Injectable 0.1 milliGRAM(s) IV Push every 3 minutes PRN For ANY of the following changes in patient status:  A. RR LESS THAN 10 breaths per minute, B. Oxygen saturation LESS THAN 90%, C. Sedation score of 6  ondansetron Injectable 4 milliGRAM(s) IV Push every 6 hours PRN Nausea      PHYSICAL EXAM:  Vital Signs Last 24 Hrs  T(C): 36.3 (29 Oct 2022 14:56), Max: 36.7 (29 Oct 2022 11:00)  T(F): 97.4 (29 Oct 2022 14:56), Max: 98 (29 Oct 2022 11:00)  HR: 105 (29 Oct 2022 14:56) (98 - 105)  BP: 153/86 (29 Oct 2022 14:56) (132/86 - 153/86)  BP(mean): --  RR: 17 (29 Oct 2022 14:56) (17 - 18)  SpO2: 98% (29 Oct 2022 14:56) (98% - 99%)    Parameters below as of 29 Oct 2022 14:56  Patient On (Oxygen Delivery Method): room air     I&O's Summary     LABS:    No new labs.         RADIOLOGY & ADDITIONAL STUDIES:  Reviewed   Protein Calorie Malnutrition Present: [ ]mild [ ]moderate [ ]severe [ ]underweight [ ]morbid obesity  https://www.andeal.org/vault/2440/web/files/ONC/Table_Clinical%20Characteristics%20to%20Document%20Malnutrition-White%20JV%20et%20al%202012.pdf    Height (cm): 165.1 (09-30-22 @ 17:49), 153.7 (09-25-22 @ 19:40)  Weight (kg): 62.2 (10-28-22 @ 02:41), 59.1 (09-30-22 @ 17:49), 64 (09-25-22 @ 19:40)  BMI (kg/m2): 22.8 (10-28-22 @ 02:41), 21.7 (09-30-22 @ 17:49), 27.1 (09-25-22 @ 19:40)    [ ]PPSV2 < or = 30%  [ ]significant weight loss [ ]poor nutritional intake [ ]anasarca[ ]Artificial Nutrition    Other REFERRALS:  [ ]Hospice  [ ]Child Life  [ ]Social Work  [ ]Case management [ ]Holistic Therapy     Goals of Care Document:DEBBIE Handley (10-26-22 @ 13:40)  Goals of Care Conversation:   Participants:  · Participants  Patient; Family; Staff  · Relative  Arun Kearney  · Provider  Lainey Ramirez    Advance Directives:  · Does patient have Advance Directive  No  · Caregiver:  declines    Conversation Discussion:  · Conversation  Diagnosis; Prognosis; Treatment Options  · Conversation Details  Patient has advanced metastatic breast cancer and has not yet rev=cieved any anti neop[lastic therapy nor radiation therapy.  She has severe back pain that is resistand to high doses of opiods.  Oncology feels that rt may help alleviate the pain.  Originally patient was refsuing this approach but after discussion today she understands that she may get good pain relief with rt and then can consider other therpaies.  She is willing to go ti [facility] for tr.    What Matters Most To Patient and Family:  · What matters most to patient and family  pain releif    Personal Advance Directives Treatment Guidelines:   Treatment Guidelines:  · Decision Maker  Patient  · Treatment Guideline Comments  ok for transfer to [facility] for rt    Location of Discussion:   Time Spent on Advance Care Planning:  I spent 60 (in minutes) on advance care planning services with the patient.  This time is separate and distinct from any other care management services provided on this date.    Location of Discussion:  · Location of discussion  Face to face      Electronic Signatures:  Eyad Handley)  (Signed 26-Oct-2022 13:46)  	Authored: Goals of Care Conversation, Personal Advance Directives Treatment Guidelines, Location of Discussion      Last Updated: 26-Oct-2022 13:46 by Eyad Handley)

## 2022-10-29 NOTE — PROGRESS NOTE ADULT - ASSESSMENT
67y Female  with PMH of metastatic (treatment naïve) ER 40%/VA 40%/HER2 + breast cancer with extensive bony metastasis, liver, lung and brain.  Transferred from [facility] for inpatient palliative RT. Patient eligible for palliative RT and palliative systemic therapy.Of note patient recently treated for C. difficile colitis- completed vanc on 10/25.    -Radiation Oncology consult and recommendation for pall radiation revd and agreed.   -Palliative Care consult for symptom management revd adn agreed.   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 Nominated her brother and sister as proxies.  -Rest of care as per primary team    -Patient to followup with Dr. Byrd ([facility]) upon discharge  -C/w Supportive care, pain control, Nutrition, PT, DVT ppx  -Oncology will continue to follow with you

## 2022-10-29 NOTE — PROGRESS NOTE ADULT - PROBLEM SELECTOR PLAN 5
-Pt w/ B/L upper extremity swelling   -Suspect iso IVF +/- stasis +/- hypoalbuminemia. R/O DVT  -Per chart review US Duplex 10/17 w/o RUE DVT  -Refusing US Duplex LUE   -Encourage elevation

## 2022-10-29 NOTE — PROGRESS NOTE ADULT - SUBJECTIVE AND OBJECTIVE BOX
Dominga Crews    67y old  Female who presents with a chief complaint of Bone pain (29 Oct 2022 10:09)      SUBJECTIVE / OVERNIGHT EVENTS: No acute overnight events. This morning pt doing 'okay', continues to endorse pain primarily in the neck, pelvic region, lower back. Denies fevers, chills, nausea, vomiting, chest pain, SOB.    MEDICATIONS  (STANDING):  anastrozole 1 milliGRAM(s) Oral daily  chlorhexidine 2% Cloths 1 Application(s) Topical daily  dexAMETHasone     Tablet 4 milliGRAM(s) Oral every 12 hours  enoxaparin Injectable 40 milliGRAM(s) SubCutaneous every 24 hours  fentaNYL   Patch  50 MICROgram(s)/Hr 1 Patch Transdermal every 72 hours  HYDROmorphone PCA (1 mG/mL) 30 milliLiter(s) PCA Continuous PCA Continuous  metoprolol tartrate 25 milliGRAM(s) Oral two times a day  mupirocin 2% Ointment 1 Application(s) Topical every 12 hours  pantoprazole    Tablet 40 milliGRAM(s) Oral before breakfast  polyethylene glycol 3350 17 Gram(s) Oral every 12 hours  saccharomyces boulardii 250 milliGRAM(s) Oral two times a day  senna 2 Tablet(s) Oral at bedtime  sodium chloride 0.9%. 1000 milliLiter(s) (75 mL/Hr) IV Continuous <Continuous>    MEDICATIONS  (PRN):  acetaminophen     Tablet .. 650 milliGRAM(s) Oral every 6 hours PRN Mild Pain (1 - 3), Moderate Pain (4 - 6)  ALPRAZolam 0.25 milliGRAM(s) Oral every 6 hours PRN anxiety  cyclobenzaprine 5 milliGRAM(s) Oral three times a day PRN Muscle Spasm  HYDROmorphone  Injectable 1.5 milliGRAM(s) IV Push every 3 hours PRN Severe Pain (7 - 10)  HYDROmorphone PCA (1 mG/mL) Rescue Clinician Bolus 1.5 milliGRAM(s) IV Push every 2 hours PRN for Pain Scale GREATER THAN 6  naloxone Injectable 0.1 milliGRAM(s) IV Push every 3 minutes PRN For ANY of the following changes in patient status:  A. RR LESS THAN 10 breaths per minute, B. Oxygen saturation LESS THAN 90%, C. Sedation score of 6  ondansetron Injectable 4 milliGRAM(s) IV Push every 6 hours PRN Nausea    Allergies    tetanus toxoid (Hives)    Intolerances    lactose (Other)      Vital Signs Last 24 Hrs  T(C): 36.3 (29 Oct 2022 14:56), Max: 36.4 (28 Oct 2022 22:01)  T(F): 97.4 (29 Oct 2022 14:56), Max: 97.5 (28 Oct 2022 22:01)  HR: 105 (29 Oct 2022 14:56) (102 - 105)  BP: 153/86 (29 Oct 2022 14:56) (135/81 - 153/86)  BP(mean): --  RR: 17 (29 Oct 2022 14:56) (17 - 18)  SpO2: 98% (29 Oct 2022 14:56) (98% - 99%)    Parameters below as of 29 Oct 2022 14:56  Patient On (Oxygen Delivery Method): room air      Daily     Daily   CAPILLARY BLOOD GLUCOSE        I&O's Summary      PHYSICAL EXAM:  GENERAL: NAD  EYES: conjunctiva and sclera clear  NECK: Supple, C collar in place   PULMONARY: Clear to auscultation bilaterally  CARDIAC: Regular rate and rhythm; No murmurs, rubs, or gallops  GASTROINTESTINAL: Abdomen soft, Nontender, Nondistended; Bowel sounds normal  EXTREMITIES:   No clubbing, cyanosis, or pedal edema  NEUROLOGY: aao x 3, grossly non-focal but w/ poor pt participation   SKIN: Warm and dry       LABS:    Hgb Trend: 9.6<--, 9.1<--, 9.0<--        Creatinine Trend: 0.30<--, 0.28<--, 0.36<--, 0.29<--, 0.30<--, 0.26<--              RADIOLOGY & ADDITIONAL TESTS:    Imaging Personally Reviewed.    Consultant(s) Notes Reviewed.    Care Discussed with Consultants/Other Providers.

## 2022-10-29 NOTE — PROGRESS NOTE ADULT - ASSESSMENT
65 y/o F with PMH of left arm skin CA s/p excision at age 16, stomach ulcer, recently diagnosed breast ca with mets to bones, liver, and lungs with cord compression s/p C7-T2 corpectomy & fusion and posterior C4-T5 fusion (9/22) transferred from acute rehab at [place] for initiation of palliative RT.

## 2022-10-29 NOTE — PROGRESS NOTE ADULT - PROBLEM SELECTOR PLAN 1
-Sent in from rehab for palliative RT given worsening pain  - c/w fentanyl patch | Dilaudid  - Palliative care/onc consulted; appreciate recs.  - Rad onc consulted- recs MR Brain and full spine   - Will obtain MRI

## 2022-10-29 NOTE — PROGRESS NOTE ADULT - PROBLEM SELECTOR PLAN 1
- Continue Fentanyl patch 50mcg/hr   - Continue Dilaudid PCA 1mg demand, 15min lockout, CB 1.5mg q2hrs PRN for severe pain  - Bowel regimen   - Continue with flexeril  - Page for uncontrolled symptoms [id number].

## 2022-10-29 NOTE — PROGRESS NOTE ADULT - PROBLEM SELECTOR PLAN 2
- s/p C7-T2 corpectomy & fusion and posterior C4-T5 fusion  - continue dexamethasone 4mg Q12  - C/w PPI while on steroids  -Unclear anticipated duration of C-collar and steroids  -Discussed w/ Neurosurgery- Dr. Bennett, agrees w/ MR. Would like to be called following MR results

## 2022-10-29 NOTE — PROGRESS NOTE ADULT - ASSESSMENT
65 y/o F with PMH of left arm skin CA s/p excision at age 16, stomach ulcer, and recently diagnosed breast ca with mets to bones, liver, and lungs with cord compression s/p C7-T2 corpectomy & fusion and posterior C4-T5 fusion transferred from acute rehab at [place] for initiation of palliative RT.  Palliative Care consulted for complex symptom management.

## 2022-10-29 NOTE — PROGRESS NOTE ADULT - SUBJECTIVE AND OBJECTIVE BOX
Medicine Follow-up    INTERVAL HPI/OVERNIGHT EVENTS:  No o/n events. Pt reports pain up to 7-8/10 at worst and 5/10 most of other time on current regimen.  No other complaints at this time. Patient denies fever, chills, CP, palpitations, SOB, cough, N/V/D/C, dysuria..    VITAL SIGNS:  T(F): 97.2 (10-29-22 @ 07:02)  HR: 102 (10-29-22 @ 07:02)  BP: 135/81 (10-29-22 @ 07:02)  RR: 18 (10-29-22 @ 07:02)  SpO2: 98% (10-29-22 @ 07:02)  Wt(kg): --      PHYSICAL EXAM:    Constitutional: AAOx3, NAD   Eyes: PERRL, EOMI, sclera non-icteric  Neck: wearing a neck brcae  Respiratory: CTA b/l  Cardiovascular: RRR, normal S1S2  Gastrointestinal: soft, NTND, no masses palpable, no HSM  Extremities:  no c/c/has 3+ BLE edema  Psych: flat affect    MEDICATIONS  (STANDING):  anastrozole 1 milliGRAM(s) Oral daily  chlorhexidine 2% Cloths 1 Application(s) Topical daily  dexAMETHasone     Tablet 4 milliGRAM(s) Oral every 12 hours  enoxaparin Injectable 40 milliGRAM(s) SubCutaneous every 24 hours  fentaNYL   Patch  50 MICROgram(s)/Hr 1 Patch Transdermal every 72 hours  HYDROmorphone PCA (1 mG/mL) 30 milliLiter(s) PCA Continuous PCA Continuous  metoprolol tartrate 25 milliGRAM(s) Oral two times a day  mupirocin 2% Ointment 1 Application(s) Topical every 12 hours  pantoprazole    Tablet 40 milliGRAM(s) Oral before breakfast  polyethylene glycol 3350 17 Gram(s) Oral every 12 hours  saccharomyces boulardii 250 milliGRAM(s) Oral two times a day  senna 2 Tablet(s) Oral at bedtime  sodium chloride 0.9%. 1000 milliLiter(s) (30 mL/Hr) IV Continuous <Continuous>    MEDICATIONS  (PRN):  acetaminophen     Tablet .. 650 milliGRAM(s) Oral every 6 hours PRN Mild Pain (1 - 3), Moderate Pain (4 - 6)  ALPRAZolam 0.25 milliGRAM(s) Oral every 6 hours PRN anxiety  cyclobenzaprine 5 milliGRAM(s) Oral three times a day PRN Muscle Spasm  HYDROmorphone  Injectable 1.5 milliGRAM(s) IV Push every 3 hours PRN Severe Pain (7 - 10)  HYDROmorphone PCA (1 mG/mL) Rescue Clinician Bolus 1.5 milliGRAM(s) IV Push every 2 hours PRN for Pain Scale GREATER THAN 6  naloxone Injectable 0.1 milliGRAM(s) IV Push every 3 minutes PRN For ANY of the following changes in patient status:  A. RR LESS THAN 10 breaths per minute, B. Oxygen saturation LESS THAN 90%, C. Sedation score of 6  ondansetron Injectable 4 milliGRAM(s) IV Push every 6 hours PRN Nausea      tetanus toxoid (Hives)      LABS:      Revd online           RADIOLOGY & ADDITIONAL TESTS:  Studies reviewed online

## 2022-10-29 NOTE — PROGRESS NOTE ADULT - PROBLEM SELECTOR PLAN 3
Hypercalcemia noted.  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   -Corrected ca 13.6  -s/p calcitonin  -C/w gentle hydration   -Will favor administering bisphosphonate if w/ persistent hypercalcemia  -Obtain repeat BMP   -Check PTH/ PTH-rp, ionized calcium  -Monitor serum calcium

## 2022-10-29 NOTE — PROGRESS NOTE ADULT - PROBLEM SELECTOR PLAN 2
Will continue to f/u for pain             Foster Mcguire MD  Associate Chief Geriatrics and Palliative Care (Ga[facility]) [facility]   GaP Consult Service   , [employer] at [facility]      Please page the following number for clinical matters between the hours of 9 am and 5 pm from Monday through Friday : [phone]    After 5pm and on weekends, please see the contact information below:    In the event of newly developing, evolving, or worsening symptoms, please contact the Palliative Medicine team via pager (if the patient is at [facility] #[phone] or if the patient is at [facility] #[phone]) The Geriatric and Palliative Medicine service has coverage 24 hours a day/ 7 days a week to provide medical recommendations regarding symptom management needs via telephone

## 2022-10-29 NOTE — PROGRESS NOTE ADULT - SUBJECTIVE AND OBJECTIVE BOX
IStop:   Search Date: 10/29/2022 09:17:20 AM  This report was requested by: Foster Mcguire | Reference #: [id number]    There are no results for the search terms that you entered.  SUBJECTIVE AND OBJECTIVE:  Indication for Geriatrics and Palliative Care Services/INTERVAL HPI:    OVERNIGHT EVENTS:    DNR on chart:  Allergies    tetanus toxoid (Hives)    Intolerances    lactose (Other)  MEDICATIONS  (STANDING):  anastrozole 1 milliGRAM(s) Oral daily  chlorhexidine 2% Cloths 1 Application(s) Topical daily  dexAMETHasone     Tablet 4 milliGRAM(s) Oral every 12 hours  enoxaparin Injectable 40 milliGRAM(s) SubCutaneous every 24 hours  fentaNYL   Patch  50 MICROgram(s)/Hr 1 Patch Transdermal every 72 hours  HYDROmorphone PCA (1 mG/mL) 30 milliLiter(s) PCA Continuous PCA Continuous  metoprolol tartrate 25 milliGRAM(s) Oral two times a day  mupirocin 2% Ointment 1 Application(s) Topical every 12 hours  pantoprazole    Tablet 40 milliGRAM(s) Oral before breakfast  polyethylene glycol 3350 17 Gram(s) Oral every 12 hours  saccharomyces boulardii 250 milliGRAM(s) Oral two times a day  senna 2 Tablet(s) Oral at bedtime  sodium chloride 0.9%. 1000 milliLiter(s) (30 mL/Hr) IV Continuous <Continuous>    MEDICATIONS  (PRN):  acetaminophen     Tablet .. 650 milliGRAM(s) Oral every 6 hours PRN Mild Pain (1 - 3), Moderate Pain (4 - 6)  ALPRAZolam 0.25 milliGRAM(s) Oral every 6 hours PRN anxiety  cyclobenzaprine 5 milliGRAM(s) Oral three times a day PRN Muscle Spasm  HYDROmorphone  Injectable 1.5 milliGRAM(s) IV Push every 3 hours PRN Severe Pain (7 - 10)  HYDROmorphone PCA (1 mG/mL) Rescue Clinician Bolus 1.5 milliGRAM(s) IV Push every 2 hours PRN for Pain Scale GREATER THAN 6  naloxone Injectable 0.1 milliGRAM(s) IV Push every 3 minutes PRN For ANY of the following changes in patient status:  A. RR LESS THAN 10 breaths per minute, B. Oxygen saturation LESS THAN 90%, C. Sedation score of 6  ondansetron Injectable 4 milliGRAM(s) IV Push every 6 hours PRN Nausea      ITEMS UNCHECKED ARE NOT PRESENT    PRESENT SYMPTOMS: [ ]Unable to self-report - see [ ] CPOT [ ] PAINADS [ ] RDOS  Source if other than patient:  [ ]Family   [ ]Team     Pain:  [ ]yes [ ]no  QOL impact -   Location -                    Aggravating factors -  Quality -  Radiation -  Timing-  Severity (0-10 scale):  Minimal acceptable level (0-10 scale):     CPOT:    https://www.[facility].org/getattachment/[email or web address]/Critical-Care-Pain-Observation-Tool-(CPOT)    PAIN AD Score:	  http://geriatrictoolkit.[email or web address]/cog/painad.pdf (Ctrl + left click to view)    Dyspnea:                           [ ]Mild [ ]Moderate [ ]Severe    RDOS:  0 to 2  minimal or no respiratory distress   3  mild distress  4 to 6 moderate distress  >7 severe distress  https://homecareinformation.net/handouts/hen/Respiratory_Distress_Observation_Scale.pdf (Ctrl +  left click to view)     Anxiety:                             [ ]Mild [ ]Moderate [ ]Severe  Fatigue:                             [ ]Mild [ ]Moderate [ ]Severe  Nausea:                             [ ]Mild [ ]Moderate [ ]Severe  Loss of appetite:              [ ]Mild [ ]Moderate [ ]Severe  Constipation:                    [ ]Mild [ ]Moderate [ ]Severe    PCSSQ[Palliative Care Spiritual Screening Question]   Severity (0-10):  Score of 4 or > indicate consideration of Chaplaincy referral.  Chaplaincy Referral: [ ] yes [ ] refused [ ] following [ ] Deferred     Caregiver [place]? : [ ] yes [ ] no [ ] Deferred [ ] Declined             Social work referral [ ] Patient & Family Centered Care Referral [ ]     Anticipatory Grief present?:  [ ] yes [ ] no  [ ] Deferred                  Social work referral [ ] Chaplaincy Referral[ ]      Other Symptoms:  [ ]All other review of systems negative     Palliative Performance Status Version 2:         %      http://npcrc.org/files/news/palliative_performance_scale_ppsv2.pdf  PHYSICAL EXAM:  Vital Signs Last 24 Hrs  T(C): 36.2 (29 Oct 2022 07:02), Max: 36.8 (28 Oct 2022 13:59)  T(F): 97.2 (29 Oct 2022 07:02), Max: 98.2 (28 Oct 2022 13:59)  HR: 102 (29 Oct 2022 07:02) (98 - 104)  BP: 135/81 (29 Oct 2022 07:02) (128/68 - 147/87)  BP(mean): --  RR: 18 (29 Oct 2022 07:02) (18 - 18)  SpO2: 98% (29 Oct 2022 07:02) (98% - 99%)    Parameters below as of 29 Oct 2022 07:02  Patient On (Oxygen Delivery Method): room air     I&O's Summary     GENERAL: [ ]Cachexia    [ ]Alert  [ ]Oriented x   [ ]Lethargic  [ ]Unarousable  [ ]Verbal  [ ]Non-Verbal  Behavioral:   [ ]Anxiety  [ ]Delirium [ ]Agitation [ ]Other  HEENT:  [ ]Normal   [ ]Dry mouth   [ ]ET Tube/Trach  [ ]Oral lesions  PULMONARY:   [ ]Clear [ ]Tachypnea  [ ]Audible excessive secretions   [ ]Rhonchi        [ ]Right [ ]Left [ ]Bilateral  [ ]Crackles        [ ]Right [ ]Left [ ]Bilateral  [ ]Wheezing     [ ]Right [ ]Left [ ]Bilateral  [ ]Diminished BS [ ] Right [ ]Left [ ]Bilateral  CARDIOVASCULAR:    [ ]Regular [ ]Irregular [ ]Tachy  [ ]Aravind [ ]Murmur [ ]Other  GASTROINTESTINAL:  [ ]Soft  [ ]Distended   [ ]+BS  [ ]Non tender [ ]Tender  [ ]Other [ ]PEG [ ]OGT/ NGT   Last BM:   GENITOURINARY:  [ ]Normal [ ]Incontinent   [ ]Oliguria/Anuria   [ ]Hamm  MUSCULOSKELETAL:   [ ]Normal   [ ]Weakness  [ ]Bed/Wheelchair bound [ ]Edema  NEUROLOGIC:   [ ]No focal deficits  [ ] Cognitive impairment  [ ] Dysphagia [ ]Dysarthria [ ] Paresis [ ]Other   SKIN:   [ ]Normal  [ ]Rash  [ ]Other  [ ]Pressure ulcer(s) [ ]y [ ]n present on admission    CRITICAL CARE:  [ ]Shock Present  [ ]Septic [ ]Cardiogenic [ ]Neurologic [ ]Hypovolemic  [ ]Vasopressors [ ]Inotropes  [ ]Respiratory failure present [ ]Mechanical Ventilation [ ]Non-invasive ventilatory support [ ]High-Flow   [ ]Acute  [ ]Chronic [ ]Hypoxic  [ ]Hypercarbic [ ]Other  [ ]Other organ failure     LABS:            RADIOLOGY & ADDITIONAL STUDIES:    Protein Calorie Malnutrition Present: [ ]mild [ ]moderate [ ]severe [ ]underweight [ ]morbid obesity  https://www.andeal.org/vault/9910/web/files/ONC/Table_Clinical%20Characteristics%20to%20Document%20Malnutrition-White%20JV%20et%20al%202012.pdf    Height (cm): 165.1 (09-30-22 @ 17:49), 153.7 (09-25-22 @ 19:40)  Weight (kg): 62.2 (10-28-22 @ 02:41), 59.1 (09-30-22 @ 17:49), 64 (09-25-22 @ 19:40)  BMI (kg/m2): 22.8 (10-28-22 @ 02:41), 21.7 (09-30-22 @ 17:49), 27.1 (09-25-22 @ 19:40)    [ ]PPSV2 < or = 30%  [ ]significant weight loss [ ]poor nutritional intake [ ]anasarca[ ]Artificial Nutrition    Other REFERRALS:  [ ]Hospice  [ ]Child Life  [ ]Social Work  [ ]Case management [ ]Holistic Therapy     Goals of Care Document:DEBBIE Handley (10-26-22 @ 13:40)  Goals of Care Conversation:   Participants:  · Participants  Patient; Family; Staff  · Relative  Arun Kearney  · Provider  Lainey Ramirez    Advance Directives:  · Does patient have Advance Directive  No  · Caregiver:  declines    Conversation Discussion:  · Conversation  Diagnosis; Prognosis; Treatment Options  · Conversation Details  Patient has advanced metastatic breast cancer and has not yet rev=cieved any anti neop[lastic therapy nor radiation therapy.  She has severe back pain that is resistand to high doses of opiods.  Oncology feels that rt may help alleviate the pain.  Originally patient was refsuing this approach but after discussion today she understands that she may get good pain relief with rt and then can consider other therpaies.  She is willing to go ti [facility] for tr.    What Matters Most To Patient and Family:  · What matters most to patient and family  pain releif    Personal Advance Directives Treatment Guidelines:   Treatment Guidelines:  · Decision Maker  Patient  · Treatment Guideline Comments  ok for transfer to [facility] for rt    Location of Discussion:   Time Spent on Advance Care Planning:  I spent 60 (in minutes) on advance care planning services with the patient.  This time is separate and distinct from any other care management services provided on this date.    Location of Discussion:  · Location of discussion  Face to face      Electronic Signatures:  Eyad Handley)  (Signed 26-Oct-2022 13:46)  	Authored: Goals of Care Conversation, Personal Advance Directives Treatment Guidelines, Location of Discussion      Last Updated: 26-Oct-2022 13:46 by Eyad Handley)

## 2022-10-30 LAB
ALBUMIN SERPL ELPH-MCNC: 2.6 G/DL — LOW (ref 3.3–5)
ALP SERPL-CCNC: 171 U/L — HIGH (ref 40–120)
ALT FLD-CCNC: 9 U/L — SIGNIFICANT CHANGE UP (ref 4–33)
ANION GAP SERPL CALC-SCNC: 14 MMOL/L — SIGNIFICANT CHANGE UP (ref 7–14)
AST SERPL-CCNC: 57 U/L — HIGH (ref 4–32)
BILIRUB SERPL-MCNC: <0.2 MG/DL — SIGNIFICANT CHANGE UP (ref 0.2–1.2)
BUN SERPL-MCNC: 7 MG/DL — SIGNIFICANT CHANGE UP (ref 7–23)
CA-I BLD-SCNC: 1.39 MMOL/L — HIGH (ref 1.15–1.29)
CALCIUM SERPL-MCNC: 10.3 MG/DL — SIGNIFICANT CHANGE UP (ref 8.4–10.5)
CHLORIDE SERPL-SCNC: 108 MMOL/L — HIGH (ref 98–107)
CO2 SERPL-SCNC: 24 MMOL/L — SIGNIFICANT CHANGE UP (ref 22–31)
CREAT SERPL-MCNC: 0.25 MG/DL — LOW (ref 0.5–1.3)
EGFR: 121 ML/MIN/1.73M2 — SIGNIFICANT CHANGE UP
GLUCOSE SERPL-MCNC: 108 MG/DL — HIGH (ref 70–99)
MAGNESIUM SERPL-MCNC: 1.7 MG/DL — SIGNIFICANT CHANGE UP (ref 1.6–2.6)
PHOSPHATE SERPL-MCNC: 2.2 MG/DL — LOW (ref 2.5–4.5)
POTASSIUM SERPL-MCNC: 3.1 MMOL/L — LOW (ref 3.5–5.3)
POTASSIUM SERPL-SCNC: 3.1 MMOL/L — LOW (ref 3.5–5.3)
PROT SERPL-MCNC: 4.6 G/DL — LOW (ref 6–8.3)
SODIUM SERPL-SCNC: 146 MMOL/L — HIGH (ref 135–145)

## 2022-10-30 PROCEDURE — 99232 SBSQ HOSP IP/OBS MODERATE 35: CPT

## 2022-10-30 PROCEDURE — 99233 SBSQ HOSP IP/OBS HIGH 50: CPT

## 2022-10-30 RX ORDER — PAMIDRONATE DISODIUM 9 MG/ML
60 INJECTION, SOLUTION INTRAVENOUS ONCE
Refills: 0 | Status: COMPLETED | OUTPATIENT
Start: 2022-10-30 | End: 2022-10-30

## 2022-10-30 RX ORDER — DEXAMETHASONE 0.5 MG/5ML
4 ELIXIR ORAL EVERY 12 HOURS
Refills: 0 | Status: DISCONTINUED | OUTPATIENT
Start: 2022-10-30 | End: 2022-11-08

## 2022-10-30 RX ORDER — PANTOPRAZOLE SODIUM 20 MG/1
40 TABLET, DELAYED RELEASE ORAL DAILY
Refills: 0 | Status: DISCONTINUED | OUTPATIENT
Start: 2022-10-31 | End: 2022-11-08

## 2022-10-30 RX ORDER — METOPROLOL TARTRATE 50 MG
2.5 TABLET ORAL EVERY 12 HOURS
Refills: 0 | Status: DISCONTINUED | OUTPATIENT
Start: 2022-10-30 | End: 2022-11-08

## 2022-10-30 RX ADMIN — Medication 4 MILLIGRAM(S): at 06:42

## 2022-10-30 RX ADMIN — MUPIROCIN 1 APPLICATION(S): 20 OINTMENT TOPICAL at 07:06

## 2022-10-30 RX ADMIN — PAMIDRONATE DISODIUM 64.17 MILLIGRAM(S): 9 INJECTION, SOLUTION INTRAVENOUS at 16:15

## 2022-10-30 RX ADMIN — Medication 2.5 MILLIGRAM(S): at 18:28

## 2022-10-30 RX ADMIN — FENTANYL CITRATE 1 PATCH: 50 INJECTION INTRAVENOUS at 18:35

## 2022-10-30 RX ADMIN — Medication 25 MILLIGRAM(S): at 07:05

## 2022-10-30 RX ADMIN — Medication 4 MILLIGRAM(S): at 18:28

## 2022-10-30 RX ADMIN — ENOXAPARIN SODIUM 40 MILLIGRAM(S): 100 INJECTION SUBCUTANEOUS at 07:07

## 2022-10-30 RX ADMIN — CHLORHEXIDINE GLUCONATE 1 APPLICATION(S): 213 SOLUTION TOPICAL at 11:51

## 2022-10-30 RX ADMIN — SODIUM CHLORIDE 75 MILLILITER(S): 9 INJECTION INTRAMUSCULAR; INTRAVENOUS; SUBCUTANEOUS at 09:57

## 2022-10-30 RX ADMIN — HYDROMORPHONE HYDROCHLORIDE 30 MILLILITER(S): 2 INJECTION INTRAMUSCULAR; INTRAVENOUS; SUBCUTANEOUS at 06:40

## 2022-10-30 RX ADMIN — PANTOPRAZOLE SODIUM 40 MILLIGRAM(S): 20 TABLET, DELAYED RELEASE ORAL at 07:06

## 2022-10-30 RX ADMIN — MUPIROCIN 1 APPLICATION(S): 20 OINTMENT TOPICAL at 18:28

## 2022-10-30 RX ADMIN — HYDROMORPHONE HYDROCHLORIDE 30 MILLILITER(S): 2 INJECTION INTRAMUSCULAR; INTRAVENOUS; SUBCUTANEOUS at 08:12

## 2022-10-30 RX ADMIN — HYDROMORPHONE HYDROCHLORIDE 30 MILLILITER(S): 2 INJECTION INTRAMUSCULAR; INTRAVENOUS; SUBCUTANEOUS at 20:04

## 2022-10-30 RX ADMIN — FENTANYL CITRATE 1 PATCH: 50 INJECTION INTRAVENOUS at 08:31

## 2022-10-30 RX ADMIN — Medication 250 MILLIGRAM(S): at 07:08

## 2022-10-30 NOTE — PROGRESS NOTE ADULT - SUBJECTIVE AND OBJECTIVE BOX
Medicine Follow-up    INTERVAL HPI/OVERNIGHT EVENTS:  No o/n events, did not sleep well. C/o fatigue.       PHYSICAL EXAM:    Constitutional: sleepy but arousable and answers all questions  Eyes: PERRL, EOMI, sclera non-icteric  Neck: supple, no masses, no JVD  Respiratory: CTA b/l when ascultation ant (not post as in pain with movement)  Cardiovascular: RRR, normal S1S2  Gastrointestinal: soft, NTND, no masses palpable, no HSM  Extremities:  no c/c/2+ BLE edema  Psych: flat affect    MEDICATIONS  (STANDING):  anastrozole 1 milliGRAM(s) Oral daily  chlorhexidine 2% Cloths 1 Application(s) Topical daily  dexAMETHasone     Tablet 4 milliGRAM(s) Oral every 12 hours  enoxaparin Injectable 40 milliGRAM(s) SubCutaneous every 24 hours  fentaNYL   Patch  50 MICROgram(s)/Hr 1 Patch Transdermal every 72 hours  HYDROmorphone PCA (1 mG/mL) 30 milliLiter(s) PCA Continuous PCA Continuous  metoprolol tartrate 25 milliGRAM(s) Oral two times a day  mupirocin 2% Ointment 1 Application(s) Topical every 12 hours  pantoprazole    Tablet 40 milliGRAM(s) Oral before breakfast  polyethylene glycol 3350 17 Gram(s) Oral every 12 hours  saccharomyces boulardii 250 milliGRAM(s) Oral two times a day  senna 2 Tablet(s) Oral at bedtime  sodium chloride 0.9%. 1000 milliLiter(s) (75 mL/Hr) IV Continuous <Continuous>    MEDICATIONS  (PRN):  acetaminophen     Tablet .. 650 milliGRAM(s) Oral every 6 hours PRN Mild Pain (1 - 3), Moderate Pain (4 - 6)  ALPRAZolam 0.25 milliGRAM(s) Oral every 6 hours PRN anxiety  cyclobenzaprine 5 milliGRAM(s) Oral three times a day PRN Muscle Spasm  HYDROmorphone  Injectable 1.5 milliGRAM(s) IV Push every 3 hours PRN Severe Pain (7 - 10)  HYDROmorphone PCA (1 mG/mL) Rescue Clinician Bolus 1.5 milliGRAM(s) IV Push every 2 hours PRN for Pain Scale GREATER THAN 6  naloxone Injectable 0.1 milliGRAM(s) IV Push every 3 minutes PRN For ANY of the following changes in patient status:  A. RR LESS THAN 10 breaths per minute, B. Oxygen saturation LESS THAN 90%, C. Sedation score of 6  ondansetron Injectable 4 milliGRAM(s) IV Push every 6 hours PRN Nausea      tetanus toxoid (Hives)      LABS:                 RADIOLOGY & ADDITIONAL TESTS:  Studies reviewed.

## 2022-10-30 NOTE — PROGRESS NOTE ADULT - SUBJECTIVE AND OBJECTIVE BOX
Dominga Crews     67y old  Female who presents with a chief complaint of Bone pain (30 Oct 2022 10:45)      SUBJECTIVE / OVERNIGHT EVENTS: No acute overnight events. This morning pt w/o complaints.  Denies fevers, chills, nausea, vomiting, chest pain, SOB.    MEDICATIONS  (STANDING):  anastrozole 1 milliGRAM(s) Oral daily  chlorhexidine 2% Cloths 1 Application(s) Topical daily  dexAMETHasone     Tablet 4 milliGRAM(s) Oral every 12 hours  enoxaparin Injectable 40 milliGRAM(s) SubCutaneous every 24 hours  fentaNYL   Patch  50 MICROgram(s)/Hr 1 Patch Transdermal every 72 hours  HYDROmorphone PCA (1 mG/mL) 30 milliLiter(s) PCA Continuous PCA Continuous  metoprolol tartrate 25 milliGRAM(s) Oral two times a day  mupirocin 2% Ointment 1 Application(s) Topical every 12 hours  pantoprazole    Tablet 40 milliGRAM(s) Oral before breakfast  polyethylene glycol 3350 17 Gram(s) Oral every 12 hours  saccharomyces boulardii 250 milliGRAM(s) Oral two times a day  senna 2 Tablet(s) Oral at bedtime  sodium chloride 0.9%. 1000 milliLiter(s) (75 mL/Hr) IV Continuous <Continuous>    MEDICATIONS  (PRN):  acetaminophen     Tablet .. 650 milliGRAM(s) Oral every 6 hours PRN Mild Pain (1 - 3), Moderate Pain (4 - 6)  ALPRAZolam 0.25 milliGRAM(s) Oral every 6 hours PRN anxiety  cyclobenzaprine 5 milliGRAM(s) Oral three times a day PRN Muscle Spasm  HYDROmorphone  Injectable 1.5 milliGRAM(s) IV Push every 3 hours PRN Severe Pain (7 - 10)  HYDROmorphone PCA (1 mG/mL) Rescue Clinician Bolus 1.5 milliGRAM(s) IV Push every 2 hours PRN for Pain Scale GREATER THAN 6  naloxone Injectable 0.1 milliGRAM(s) IV Push every 3 minutes PRN For ANY of the following changes in patient status:  A. RR LESS THAN 10 breaths per minute, B. Oxygen saturation LESS THAN 90%, C. Sedation score of 6  ondansetron Injectable 4 milliGRAM(s) IV Push every 6 hours PRN Nausea    Allergies    tetanus toxoid (Hives)    Intolerances    lactose (Other)      Vital Signs Last 24 Hrs  T(C): 36.3 (30 Oct 2022 07:03), Max: 36.9 (29 Oct 2022 18:00)  T(F): 97.4 (30 Oct 2022 07:03), Max: 98.4 (29 Oct 2022 18:00)  HR: 90 (30 Oct 2022 07:03) (81 - 105)  BP: 139/68 (30 Oct 2022 07:03) (131/83 - 153/86)  BP(mean): --  RR: 16 (30 Oct 2022 07:03) (16 - 18)  SpO2: 97% (30 Oct 2022 07:03) (96% - 98%)    Parameters below as of 30 Oct 2022 07:03  Patient On (Oxygen Delivery Method): room air      Daily     Daily   CAPILLARY BLOOD GLUCOSE        I&O's Summary      PHYSICAL EXAM:  General: awake alert , no distress   Neck: c collar in place   Lungs: CTA B/L   Cardio: RRR, S1/S2, No murmur  Abdomen: Soft, Nontender, Nondistended; Bowel sounds present  Extremities:  No LE edema  Neuro awake / alert, SROM  Skin : Warm and dry       LABS:    Hgb Trend: 9.6<--, 9.1<--, 9.0<--        Creatinine Trend: 0.30<--, 0.28<--, 0.36<--, 0.29<--, 0.30<--, 0.26<--              RADIOLOGY & ADDITIONAL TESTS:    Imaging Personally Reviewed.    Consultant(s) Notes Reviewed.    Care Discussed with Consultants/Other Providers.

## 2022-10-30 NOTE — PROGRESS NOTE ADULT - ASSESSMENT
67y Female  with PMH of metastatic (treatment naïve) ER 40%/UT 40%/HER2 + breast cancer with extensive bony metastasis, liver, lung and brain.  Transferred from [facility] for inpatient palliative RT. Patient eligible for palliative RT and palliative systemic therapy.Of note patient recently treated for C. difficile colitis- completed vanc on 10/25.    -Radiation Oncology consult and recommendation for pall radiation revd and agreed.   -Palliative Care consult for symptom management revd adn agreed.   -Order Echo - pending  -May consider inpatient chemotherapy with herceptin early next week pending echo  -Anastrozole started- continue -denies any treatment related side effects.   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 Nominated her brother and sister as proxies.  -Rest of care as per primary team    -Patient to followup with Dr. Byrd ([facility]) upon discharge  -C/w Supportive care, pain control, Nutrition, PT, DVT ppx  -Oncology will continue to follow with you

## 2022-10-30 NOTE — PROGRESS NOTE ADULT - PROBLEM SELECTOR PLAN 2
- s/p C7-T2 corpectomy & fusion and posterior C4-T5 fusion  - continue dexamethasone 4mg Q12  - C/w PPI while on steroids  -Unclear anticipated duration of C-collar and steroids. Would also benefit from neurosurg eval given worsening pain  -Discussed w/ Neurosurgery- Dr. Bennett 10/29, agrees w/ MR. Would like to be called following MR results

## 2022-10-30 NOTE — PROGRESS NOTE ADULT - PROBLEM SELECTOR PLAN 8
DIET: Regular  DVT: Lovenox  DISPO: Pending hospital course DIET: Pureed diet   DVT: Lovenox  DISPO: Pending hospital course

## 2022-10-30 NOTE — PROGRESS NOTE ADULT - PROBLEM SELECTOR PLAN 3
Hypercalcemia noted.  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   -Corrected ca 13.6  -s/p calcitonin  -C/w gentle hydration   -Will favor administering bisphosphonate if w/ persistent hypercalcemia  -Obtain BMPs daily if able- pt noted to be a hard stick   -Check PTH/ PTH-rp, ionized calcium  -Monitor serum calcium Hypercalcemia noted.  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   -Corrected ca 13.6  -s/p calcitonin  -C/w gentle hydration   -Pamidronate 60mg IVPB x 1 today, monitor response   -Obtain BMPs daily if able- pt noted to be a hard stick   -Check PTH/ PTH-rp, ionized calcium  -Monitor serum calcium

## 2022-10-30 NOTE — PROGRESS NOTE ADULT - ASSESSMENT
65 y/o F with PMH of left arm skin CA s/p excision at age 16, stomach ulcer, recently diagnosed breast ca with mets to bones, liver, and lungs with cord compression s/p C7-T2 corpectomy & fusion and posterior C4-T5 fusion (9/22) transferred from acute rehab at [place] for initiation of palliative RT.

## 2022-10-30 NOTE — CHART NOTE - NSCHARTNOTEFT_GEN_A_CORE
Writer spoke with patient Dr. Sultana. Aware that labs are from 10/27. Recommend drawing Ca every other day, IV hydration, and give dose of Pamidronate If  Ca high.

## 2022-10-31 ENCOUNTER — OUTPATIENT (OUTPATIENT)
Dept: OUTPATIENT SERVICES | Facility: HOSPITAL | Age: 67
LOS: 1 days | Discharge: HOME | End: 2022-10-31

## 2022-10-31 DIAGNOSIS — Z90.710 ACQUIRED ABSENCE OF BOTH CERVIX AND UTERUS: Chronic | ICD-10-CM

## 2022-10-31 LAB
ALBUMIN SERPL ELPH-MCNC: 2.5 G/DL — LOW (ref 3.3–5)
ALP SERPL-CCNC: 181 U/L — HIGH (ref 40–120)
ALT FLD-CCNC: 12 U/L — SIGNIFICANT CHANGE UP (ref 4–33)
ANION GAP SERPL CALC-SCNC: 10 MMOL/L — SIGNIFICANT CHANGE UP (ref 7–14)
ANION GAP SERPL CALC-SCNC: 11 MMOL/L — SIGNIFICANT CHANGE UP (ref 7–14)
ANION GAP SERPL CALC-SCNC: 11 MMOL/L — SIGNIFICANT CHANGE UP (ref 7–14)
AST SERPL-CCNC: 61 U/L — HIGH (ref 4–32)
BASOPHILS # BLD AUTO: 0.04 K/UL — SIGNIFICANT CHANGE UP (ref 0–0.2)
BASOPHILS NFR BLD AUTO: 0.4 % — SIGNIFICANT CHANGE UP (ref 0–2)
BILIRUB SERPL-MCNC: <0.2 MG/DL — SIGNIFICANT CHANGE UP (ref 0.2–1.2)
BUN SERPL-MCNC: 6 MG/DL — LOW (ref 7–23)
CALCIUM SERPL-MCNC: 10.2 MG/DL — SIGNIFICANT CHANGE UP (ref 8.4–10.5)
CALCIUM SERPL-MCNC: 9.6 MG/DL — SIGNIFICANT CHANGE UP (ref 8.4–10.5)
CALCIUM SERPL-MCNC: 9.8 MG/DL — SIGNIFICANT CHANGE UP (ref 8.4–10.5)
CHLORIDE SERPL-SCNC: 106 MMOL/L — SIGNIFICANT CHANGE UP (ref 98–107)
CHLORIDE SERPL-SCNC: 108 MMOL/L — HIGH (ref 98–107)
CHLORIDE SERPL-SCNC: 109 MMOL/L — HIGH (ref 98–107)
CO2 SERPL-SCNC: 18 MMOL/L — LOW (ref 22–31)
CO2 SERPL-SCNC: 22 MMOL/L — SIGNIFICANT CHANGE UP (ref 22–31)
CO2 SERPL-SCNC: 24 MMOL/L — SIGNIFICANT CHANGE UP (ref 22–31)
CREAT SERPL-MCNC: 0.24 MG/DL — LOW (ref 0.5–1.3)
CREAT SERPL-MCNC: 0.25 MG/DL — LOW (ref 0.5–1.3)
CREAT SERPL-MCNC: <0.2 MG/DL — LOW (ref 0.5–1.3)
EGFR: 121 ML/MIN/1.73M2 — SIGNIFICANT CHANGE UP
EGFR: 123 ML/MIN/1.73M2 — SIGNIFICANT CHANGE UP
EGFR: 128 ML/MIN/1.73M2 — SIGNIFICANT CHANGE UP
EOSINOPHIL # BLD AUTO: 0 K/UL — SIGNIFICANT CHANGE UP (ref 0–0.5)
EOSINOPHIL NFR BLD AUTO: 0 % — SIGNIFICANT CHANGE UP (ref 0–6)
GLUCOSE SERPL-MCNC: 115 MG/DL — HIGH (ref 70–99)
GLUCOSE SERPL-MCNC: 121 MG/DL — HIGH (ref 70–99)
GLUCOSE SERPL-MCNC: 95 MG/DL — SIGNIFICANT CHANGE UP (ref 70–99)
HCT VFR BLD CALC: 28.6 % — LOW (ref 34.5–45)
HGB BLD-MCNC: 9 G/DL — LOW (ref 11.5–15.5)
IANC: 7.92 K/UL — HIGH (ref 1.8–7.4)
IMM GRANULOCYTES NFR BLD AUTO: 4.9 % — HIGH (ref 0–0.9)
LYMPHOCYTES # BLD AUTO: 1.55 K/UL — SIGNIFICANT CHANGE UP (ref 1–3.3)
LYMPHOCYTES # BLD AUTO: 14.5 % — SIGNIFICANT CHANGE UP (ref 13–44)
MAGNESIUM SERPL-MCNC: 1.6 MG/DL — SIGNIFICANT CHANGE UP (ref 1.6–2.6)
MAGNESIUM SERPL-MCNC: 1.7 MG/DL — SIGNIFICANT CHANGE UP (ref 1.6–2.6)
MAGNESIUM SERPL-MCNC: 1.8 MG/DL — SIGNIFICANT CHANGE UP (ref 1.6–2.6)
MCHC RBC-ENTMCNC: 28.6 PG — SIGNIFICANT CHANGE UP (ref 27–34)
MCHC RBC-ENTMCNC: 31.5 GM/DL — LOW (ref 32–36)
MCV RBC AUTO: 90.8 FL — SIGNIFICANT CHANGE UP (ref 80–100)
MONOCYTES # BLD AUTO: 0.64 K/UL — SIGNIFICANT CHANGE UP (ref 0–0.9)
MONOCYTES NFR BLD AUTO: 6 % — SIGNIFICANT CHANGE UP (ref 2–14)
NEUTROPHILS # BLD AUTO: 7.92 K/UL — HIGH (ref 1.8–7.4)
NEUTROPHILS NFR BLD AUTO: 74.2 % — SIGNIFICANT CHANGE UP (ref 43–77)
NRBC # BLD: 0 /100 WBCS — SIGNIFICANT CHANGE UP (ref 0–0)
NRBC # FLD: 0.04 K/UL — HIGH (ref 0–0)
PHOSPHATE SERPL-MCNC: 2.3 MG/DL — LOW (ref 2.5–4.5)
PHOSPHATE SERPL-MCNC: 2.7 MG/DL — SIGNIFICANT CHANGE UP (ref 2.5–4.5)
PHOSPHATE SERPL-MCNC: 3.8 MG/DL — SIGNIFICANT CHANGE UP (ref 2.5–4.5)
PLATELET # BLD AUTO: 331 K/UL — SIGNIFICANT CHANGE UP (ref 150–400)
POTASSIUM SERPL-MCNC: 2.7 MMOL/L — CRITICAL LOW (ref 3.5–5.3)
POTASSIUM SERPL-MCNC: 3.9 MMOL/L — SIGNIFICANT CHANGE UP (ref 3.5–5.3)
POTASSIUM SERPL-MCNC: SIGNIFICANT CHANGE UP MMOL/L (ref 3.5–5.3)
POTASSIUM SERPL-SCNC: 2.7 MMOL/L — CRITICAL LOW (ref 3.5–5.3)
POTASSIUM SERPL-SCNC: 3.9 MMOL/L — SIGNIFICANT CHANGE UP (ref 3.5–5.3)
POTASSIUM SERPL-SCNC: SIGNIFICANT CHANGE UP MMOL/L (ref 3.5–5.3)
PROT SERPL-MCNC: 4.8 G/DL — LOW (ref 6–8.3)
RBC # BLD: 3.15 M/UL — LOW (ref 3.8–5.2)
RBC # FLD: 17.3 % — HIGH (ref 10.3–14.5)
SODIUM SERPL-SCNC: 136 MMOL/L — SIGNIFICANT CHANGE UP (ref 135–145)
SODIUM SERPL-SCNC: 141 MMOL/L — SIGNIFICANT CHANGE UP (ref 135–145)
SODIUM SERPL-SCNC: 142 MMOL/L — SIGNIFICANT CHANGE UP (ref 135–145)
WBC # BLD: 10.67 K/UL — HIGH (ref 3.8–10.5)
WBC # FLD AUTO: 10.67 K/UL — HIGH (ref 3.8–10.5)

## 2022-10-31 PROCEDURE — 99232 SBSQ HOSP IP/OBS MODERATE 35: CPT | Mod: FS

## 2022-10-31 PROCEDURE — 99233 SBSQ HOSP IP/OBS HIGH 50: CPT

## 2022-10-31 PROCEDURE — 99497 ADVNCD CARE PLAN 30 MIN: CPT

## 2022-10-31 RX ORDER — MAGNESIUM SULFATE 500 MG/ML
1 VIAL (ML) INJECTION ONCE
Refills: 0 | Status: COMPLETED | OUTPATIENT
Start: 2022-10-31 | End: 2022-10-31

## 2022-10-31 RX ORDER — FENTANYL CITRATE 50 UG/ML
1 INJECTION INTRAVENOUS
Refills: 0 | Status: DISCONTINUED | OUTPATIENT
Start: 2022-10-31 | End: 2022-11-03

## 2022-10-31 RX ORDER — POTASSIUM CHLORIDE 20 MEQ
10 PACKET (EA) ORAL
Refills: 0 | Status: COMPLETED | OUTPATIENT
Start: 2022-10-31 | End: 2022-10-31

## 2022-10-31 RX ORDER — POTASSIUM CHLORIDE 20 MEQ
40 PACKET (EA) ORAL EVERY 4 HOURS
Refills: 0 | Status: COMPLETED | OUTPATIENT
Start: 2022-10-31 | End: 2022-10-31

## 2022-10-31 RX ORDER — POTASSIUM PHOSPHATE, MONOBASIC POTASSIUM PHOSPHATE, DIBASIC 236; 224 MG/ML; MG/ML
15 INJECTION, SOLUTION INTRAVENOUS ONCE
Refills: 0 | Status: COMPLETED | OUTPATIENT
Start: 2022-10-31 | End: 2022-10-31

## 2022-10-31 RX ORDER — HYDROMORPHONE HYDROCHLORIDE 2 MG/ML
30 INJECTION INTRAMUSCULAR; INTRAVENOUS; SUBCUTANEOUS
Refills: 0 | Status: DISCONTINUED | OUTPATIENT
Start: 2022-10-31 | End: 2022-11-03

## 2022-10-31 RX ADMIN — Medication 100 MILLIEQUIVALENT(S): at 10:41

## 2022-10-31 RX ADMIN — Medication 4 MILLIGRAM(S): at 06:20

## 2022-10-31 RX ADMIN — Medication 2.5 MILLIGRAM(S): at 06:22

## 2022-10-31 RX ADMIN — FENTANYL CITRATE 1 PATCH: 50 INJECTION INTRAVENOUS at 12:31

## 2022-10-31 RX ADMIN — CHLORHEXIDINE GLUCONATE 1 APPLICATION(S): 213 SOLUTION TOPICAL at 12:30

## 2022-10-31 RX ADMIN — SODIUM CHLORIDE 75 MILLILITER(S): 9 INJECTION INTRAMUSCULAR; INTRAVENOUS; SUBCUTANEOUS at 12:20

## 2022-10-31 RX ADMIN — ENOXAPARIN SODIUM 40 MILLIGRAM(S): 100 INJECTION SUBCUTANEOUS at 06:22

## 2022-10-31 RX ADMIN — HYDROMORPHONE HYDROCHLORIDE 30 MILLILITER(S): 2 INJECTION INTRAMUSCULAR; INTRAVENOUS; SUBCUTANEOUS at 12:39

## 2022-10-31 RX ADMIN — HYDROMORPHONE HYDROCHLORIDE 30 MILLILITER(S): 2 INJECTION INTRAMUSCULAR; INTRAVENOUS; SUBCUTANEOUS at 16:37

## 2022-10-31 RX ADMIN — Medication 100 MILLIEQUIVALENT(S): at 12:17

## 2022-10-31 RX ADMIN — POTASSIUM PHOSPHATE, MONOBASIC POTASSIUM PHOSPHATE, DIBASIC 62.5 MILLIMOLE(S): 236; 224 INJECTION, SOLUTION INTRAVENOUS at 13:25

## 2022-10-31 RX ADMIN — SENNA PLUS 2 TABLET(S): 8.6 TABLET ORAL at 22:22

## 2022-10-31 RX ADMIN — FENTANYL CITRATE 1 PATCH: 50 INJECTION INTRAVENOUS at 18:06

## 2022-10-31 RX ADMIN — ANASTROZOLE 1 MILLIGRAM(S): 1 TABLET ORAL at 18:14

## 2022-10-31 RX ADMIN — Medication 4 MILLIGRAM(S): at 18:14

## 2022-10-31 RX ADMIN — HYDROMORPHONE HYDROCHLORIDE 30 MILLILITER(S): 2 INJECTION INTRAMUSCULAR; INTRAVENOUS; SUBCUTANEOUS at 08:32

## 2022-10-31 RX ADMIN — MUPIROCIN 1 APPLICATION(S): 20 OINTMENT TOPICAL at 18:13

## 2022-10-31 RX ADMIN — Medication 40 MILLIEQUIVALENT(S): at 12:27

## 2022-10-31 RX ADMIN — PANTOPRAZOLE SODIUM 40 MILLIGRAM(S): 20 TABLET, DELAYED RELEASE ORAL at 12:31

## 2022-10-31 RX ADMIN — HYDROMORPHONE HYDROCHLORIDE 30 MILLILITER(S): 2 INJECTION INTRAMUSCULAR; INTRAVENOUS; SUBCUTANEOUS at 20:10

## 2022-10-31 RX ADMIN — Medication 100 MILLIEQUIVALENT(S): at 09:27

## 2022-10-31 RX ADMIN — Medication 25 GRAM(S): at 09:27

## 2022-10-31 RX ADMIN — Medication 2.5 MILLIGRAM(S): at 18:16

## 2022-10-31 RX ADMIN — FENTANYL CITRATE 1 PATCH: 50 INJECTION INTRAVENOUS at 07:10

## 2022-10-31 RX ADMIN — Medication 40 MILLIEQUIVALENT(S): at 09:28

## 2022-10-31 NOTE — PROGRESS NOTE ADULT - ASSESSMENT
67y Female  with PMH of metastatic (treatment naïve) ER 40%/MT 40%/HER2 + breast cancer with extensive bony metastasis, liver, lung and brain.  Transferred from [facility] for inpatient palliative RT. Patient eligible for palliative RT and palliative systemic therapy.Of note patient recently treated for C. difficile colitis- completed vanc on 10/25.Oncology consulted for further evaluation     - Radiation Oncology consult for palliative RT, planfor alliative radiation to the lower cervical/upper thoracic spine - 4 Gy X 5. Also rec MRI brain and spine to evaluate extent of disease and for other areas of disease that may require palliation.    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 Nominated her brother and sister as proxies.  -Rest of care as per primary team  -Patient to followup with Dr. Byrd ([facility]) upon discharge  -C/w Supportive care, pain control, Nutrition, PT, DVT ppx  -Oncology will continue to follow with you      Case discussed with Dr. Yemi FULTON  Oncology Physician Assistant  [facility]  Pager [phone] also available on Teams    If before 8am/after 5pm or on weekends please page On-call Oncology Fellow

## 2022-10-31 NOTE — PROGRESS NOTE ADULT - PROBLEM SELECTOR PLAN 1
-Sent in from rehab for palliative RT given worsening pain  - c/w fentanyl patch | Dilaudid  - Palliative care/onc consulted; appreciate recs.  - Rad onc consulted- recs MR Brain and full spine   - Will obtain MRI -Sent in from rehab for palliative RT given worsening pain  - c/w fentanyl patch | Dilaudid  - Palliative care/onc consulted; appreciate recs.  - Rad onc consulted- recs MR Brain and full spine   - MRI ord

## 2022-10-31 NOTE — PROGRESS NOTE ADULT - PROBLEM SELECTOR PLAN 2
Improved with current regimen  Used 17 demands in the past 24hrs  Pt on Fentanyl patch 50mcg/hr from prior   Would recommend:  - Adjust Dilaudid PCA 1mg demand, increase to 20min lockout, CB 1.5mg q2hrs PRN for severe pain  - Increase Fentanyl patch to 75mcg/hr  - Bowel regimen   - Discussed option of gabapentin - pt indicates not tolerating medication before due to nausea/vomiting   - Continue with flexeril  - Page for uncontrolled symptoms [id number].

## 2022-10-31 NOTE — PROGRESS NOTE ADULT - PROBLEM SELECTOR PLAN 1
Stage IV extensive bony metastasis, liver, lung and brain.    Tx naive  Transferred from [facility] for RT  Oncology following  Considering inpatient DMT  Pt will f/u with Dr. Byrd ([facility]) on discharge.

## 2022-10-31 NOTE — PROGRESS NOTE ADULT - SUBJECTIVE AND OBJECTIVE BOX
SUBJECTIVE AND OBJECTIVE:  Pt indicates that pain is better controlled on PCA     Indication for Geriatrics and Palliative Care Services/INTERVAL HPI: complex symptom management in the setting of advanced illness    OVERNIGHT EVENTS: Pt used 17 demands in the past 24hrs     DNR on chart:Yes  Yes      Allergies    tetanus toxoid (Hives)    Intolerances    lactose (Other)  MEDICATIONS  (STANDING):  anastrozole 1 milliGRAM(s) Oral daily  chlorhexidine 2% Cloths 1 Application(s) Topical daily  dexAMETHasone  Injectable 4 milliGRAM(s) IV Push every 12 hours  enoxaparin Injectable 40 milliGRAM(s) SubCutaneous every 24 hours  fentaNYL   Patch  25 MICROgram(s)/Hr 1 Patch Transdermal every 72 hours  fentaNYL   Patch  50 MICROgram(s)/Hr 1 Patch Transdermal every 72 hours  HYDROmorphone PCA (1 mG/mL) 30 milliLiter(s) PCA Continuous PCA Continuous  metoprolol tartrate Injectable 2.5 milliGRAM(s) IV Push every 12 hours  mupirocin 2% Ointment 1 Application(s) Topical every 12 hours  pantoprazole  Injectable 40 milliGRAM(s) IV Push daily  polyethylene glycol 3350 17 Gram(s) Oral every 12 hours  saccharomyces boulardii 250 milliGRAM(s) Oral two times a day  senna 2 Tablet(s) Oral at bedtime  sodium chloride 0.9%. 1000 milliLiter(s) (75 mL/Hr) IV Continuous <Continuous>    MEDICATIONS  (PRN):  acetaminophen     Tablet .. 650 milliGRAM(s) Oral every 6 hours PRN Mild Pain (1 - 3), Moderate Pain (4 - 6)  ALPRAZolam 0.25 milliGRAM(s) Oral every 6 hours PRN anxiety  cyclobenzaprine 5 milliGRAM(s) Oral three times a day PRN Muscle Spasm  HYDROmorphone PCA (1 mG/mL) Rescue Clinician Bolus 1.5 milliGRAM(s) IV Push every 2 hours PRN for Pain Scale GREATER THAN 6  naloxone Injectable 0.1 milliGRAM(s) IV Push every 3 minutes PRN For ANY of the following changes in patient status:  A. RR LESS THAN 10 breaths per minute, B. Oxygen saturation LESS THAN 90%, C. Sedation score of 6  ondansetron Injectable 4 milliGRAM(s) IV Push every 6 hours PRN Nausea      ITEMS UNCHECKED ARE NOT PRESENT    PRESENT SYMPTOMS: [ ]Unable to self-report  [ ] CPOT [ ] PAINADs [ ] RDOS  Source if other than patient:  [ ]Family   [ ]Team     Pain: [x ]yes [ ]no  QOL impact -   Location - hip and abdomen, back                   Aggravating factors - moving  Quality - aching, stabbing, shooting  Radiation - back  Timing- constant  Severity (0-10 scale): 8/10, comes down to a 2 after medication  Minimal acceptable level (0-10 scale): 2-3/10    CPOT:    https://www.[facility].org/getattachment/[email or web address]-[email or web address]/Critical-Care-Pain-Observation-Tool-(CPOT)    PAIN AD Score:   http://geriatrictoolkit.[facility]/cog/painad.pdf (press ctrl +  left click to view)    Dyspnea:                           [ ]Mild [ ]Moderate [ ]Severe      RDOS:  0 to 2  minimal or no respiratory distress   3  mild distress  4 to 6 moderate distress  >7 severe distress  https://homecareinformation.net/handouts/hen/Respiratory_Distress_Observation_Scale.pdf (Ctrl +  left click to view)     Anxiety:                             [ ]Mild [ ]Moderate [ ]Severe  Fatigue:                             [ ]Mild [ ]Moderate [ ]Severe  Nausea:                             [ ]Mild [ ]Moderate [ ]Severe  Loss of appetite:              [ ]Mild [ ]Moderate [ ]Severe  Constipation:                    [ ]Mild [ ]Moderate [ ]Severe    PCSSQ[Palliative Care Spiritual Screening Question]   Severity (0-10):  Score of 4 or > indicate consideration of Chaplaincy referral.    Chaplaincy Referral: [ ] yes [ ] refused [ ] following    Other Symptoms:  [ x]All other review of systems negative     Palliative Performance Status Version 2: 40-50 %      http://npcrc.org/files/news/palliative_performance_scale_ppsv2.pdf    PHYSICAL EXAM:  Vital Signs Last 24 Hrs  T(C): 36.6 (31 Oct 2022 14:50), Max: 36.8 (30 Oct 2022 17:42)  T(F): 97.9 (31 Oct 2022 14:50), Max: 98.3 (30 Oct 2022 17:42)  HR: 72 (31 Oct 2022 14:50) (72 - 98)  BP: 138/75 (31 Oct 2022 14:50) (138/73 - 148/83)  BP(mean): --  RR: 18 (31 Oct 2022 14:50) (17 - 18)  SpO2: 97% (31 Oct 2022 14:50) (92% - 98%)    Parameters below as of 31 Oct 2022 14:50  Patient On (Oxygen Delivery Method): room air     I&O's Summary    31 Oct 2022 07:01  -  31 Oct 2022 15:49  --------------------------------------------------------  IN: 240 mL / OUT: 0 mL / NET: 240 mL    GENERAL: [ ]Cachexia    [ x]Alert  [ x]Oriented x 3  [ ]Lethargic  [ ]Unarousable  [ x]Verbal  [ ]Non-Verbal  Behavioral:   [ ] Anxiety  [ ] Delirium [ ] Agitation [ ] Other  HEENT:  [ ]Normal   [x ]Dry mouth   [ ]ET Tube/Trach  [ ]Oral lesions  PULMONARY:   [x ]Clear [ ]Tachypnea  [ ]Audible excessive secretions   [ ]Rhonchi        [ ]Right [ ]Left [ ]Bilateral  [ ]Crackles        [ ]Right [ ]Left [ ]Bilateral  [ ]Wheezing     [ ]Right [ ]Left [ ]Bilateral  [ ]Diminished breath sounds [ ]right [ ]left [ ]bilateral  CARDIOVASCULAR:    [ ]Regular [ ]Irregular [ ]Tachy  [ ]Aravind [ ]Murmur [ ]Other  GASTROINTESTINAL:  [x ]Soft  [ ]Distended   [x ]+BS  [x ]Non tender [ ]Tender  [ ]Other [ ]PEG [ ]OGT/ NGT  Last BM:  GENITOURINARY:  [ x]Normal [ ] Incontinent   [ ]Oliguria/Anuria   [ ]Hamm  MUSCULOSKELETAL:   [ ]Normal   [x ]Weakness  [ ]Bed/Wheelchair bound [ ]Edema  NEUROLOGIC:   [ ]No focal deficits  [ ]Cognitive impairment  [ ]Dysphagia [ ]Dysarthria [ ]Paresis [ ]Other   SKIN:   [ ]xNormal  [ ]Rash  [ ]Other  [ ]Pressure ulcer(s)       Present on admission [ ]y [ ]n    CRITICAL CARE:  [ ]Shock Present  [ ]Septic [ ]Cardiogenic [ ]Neurologic [ ]Hypovolemic  [ ]Vasopressors [ ]Inotropes  [ ]Respiratory failure present [ ]Mechanical Ventilation [ ]Non-invasive ventilatory support [ ]High-Flow   [ ]Acute  [ ]Chronic [ ]Hypoxic  [ ]Hypercarbic [ ]Other  [ ]Other organ failure     LABS:                        9.0    10.67 )-----------( 331      ( 31 Oct 2022 06:02 )             28.6   10-31    141  |  106  |  6<L>  ----------------------------<  121<H>  2.7<LL>   |  24  |  0.25<L>    Ca    10.2      31 Oct 2022 06:02  Phos  2.3     10-31  Mg     1.60     10-31    TPro  4.8<L>  /  Alb  2.5<L>  /  TBili  <0.2  /  DBili  x   /  AST  61<H>  /  ALT  12  /  AlkPhos  181<H>  10-31    RADIOLOGY & ADDITIONAL STUDIES: reviewed, pending MRI brain    Protein Calorie Malnutrition Present: [ ]mild [ ]moderate [ ]severe [ ]underweight [ ]morbid obesity  https://www.andeal.org/vault/2440/web/files/ONC/Table_Clinical%20Characteristics%20to%20Document%20Malnutrition-White%20JV%20et%20al%202012.pdf    Height (cm): 165.1 (09-30-22 @ 17:49), 153.7 (09-25-22 @ 19:40)  Weight (kg): 62.2 (10-28-22 @ 02:41), 59.1 (09-30-22 @ 17:49), 64 (09-25-22 @ 19:40)  BMI (kg/m2): 22.8 (10-28-22 @ 02:41), 21.7 (09-30-22 @ 17:49), 27.1 (09-25-22 @ 19:40)    [ ]PPSV2 < or = 30%  [ ]significant weight loss [ ]poor nutritional intake [ ]anasarca[ ]Artificial Nutrition    Other REFERRALS:  [ ]Hospice  [ ]Child Life  [ ]Social Work  [ ]Case management [ ]Holistic Therapy     Goals of Care Document:PATRICIA Edward (10-31-22 @ 12:32)  Goals of Care Conversation:   Participants:  · Participants  Patient; Staff; Alissa  · Provider  Kim Edward    Advance Directives:  · Does patient have Advance Directive  Yes   · Indicate Type  Health Care Proxy (HCP)  · Agent's Name  Arun Kearney [phone]  · Phone #  [phone]  · Are any of the items on the chart  No   · Caregiver:  declines    Conversation Discussion:  · Conversation  MOLST Discussed  · Conversation Details  65 y/o F with PMH of left arm skin CA s/p excision at age 16, stomach ulcer, recently diagnosed breast ca with mets to bones, liver, and lungs with cord compression s/p C7-T2 corpectomy & fusion and posterior C4-T5 fusion (9/22) transferred from acute rehab at [facility] for initiation of palliative RT.        resting in bed  Ox3 .  Explained 8/17/22 she could not walk- called her brother who is HCP Arun Kearney [phone] who took her to hospital. She was transferred to [place]--had spine surgery but still needs PAIN MNG  Transf to [facility], On  PCA Dilaudid pump now and followed by PALLIATIVE PAIN  Patient notes she does NOT want resuscitation - patient wants DNR-MOLST FORM COMPLETED-patient now DNR-    What Matters Most To Patient and Family:  · What matters most to patient and family  Getting pain controlled    Personal Advance Directives Treatment Guidelines:   Treatment Guidelines:  · Decision Maker  Patient  · Treatment Guidelines  DNR Order  · Treatment Guideline Comments  Getting pain control  Getting cancer treatement  DNR    MOLST:  · Completed  31-Oct-2022    Location of Discussion:   Time Spent on Advance Care Planning:  I spent 35 (in minutes) on advance care planning services with the patient.  This time is separate and distinct from any other care management services provided on this date.    Location of Discussion:  · Location of discussion  Face to face       Electronic Signatures:  Kim Edward)  (Signed 31-Oct-2022 12:41)  	Authored: Goals of Care Conversation, Personal Advance Directives Treatment Guidelines, Location of Discussion      Last Updated: 31-Oct-2022 12:41 by Kim Edward)

## 2022-10-31 NOTE — PROGRESS NOTE ADULT - PROBLEM SELECTOR PLAN 3
PPSV 40%  Needs assistance with some ADLs  Mainly limited by pain per pt  High risk of further decline given comorbidities.

## 2022-10-31 NOTE — PROGRESS NOTE ADULT - PROBLEM SELECTOR PLAN 3
Hypercalcemia noted.  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   -Corrected ca 13.6  -s/p calcitonin  -C/w gentle hydration   -Pamidronate 60mg IVPB x 1 today, monitor response   -Obtain BMPs daily if able- pt noted to be a hard stick   -Check PTH/ PTH-rp, ionized calcium  -Monitor serum calcium

## 2022-10-31 NOTE — PROGRESS NOTE ADULT - ASSESSMENT
67 y/o F with PMH of left arm skin CA s/p excision at age 16, stomach ulcer, and recently diagnosed breast ca with mets to bones, liver, and lungs with cord compression s/p C7-T2 corpectomy & fusion and posterior C4-T5 fusion transferred from acute rehab at [facility] for initiation of palliative RT.  Palliative Care consulted for complex symptom management.

## 2022-10-31 NOTE — PROGRESS NOTE ADULT - SUBJECTIVE AND OBJECTIVE BOX
Patient is a 67y old  Female who presents with a chief complaint of Bone pain (31 Oct 2022 10:47)      SUBJECTIVE / OVERNIGHT EVENTS:  resting in bed  Ox3 .  Explained 8/17/22 she could not walk- called her brother who is HCP Arun Kearney [phone] who took her to hospital. She was transferred to [place]--had spine surgery but still needs PAIN MNG  Transf to [facility], On  PCA Dilaudid pump now and followed by PALLIATIVE PAIN  Patient notes she does NOT want resuscitation - ptient wants DNR-MOLST FORM COMPLETED-patient now DNR-     MEDICATIONS  (STANDING):  anastrozole 1 milliGRAM(s) Oral daily  chlorhexidine 2% Cloths 1 Application(s) Topical daily  dexAMETHasone  Injectable 4 milliGRAM(s) IV Push every 12 hours  enoxaparin Injectable 40 milliGRAM(s) SubCutaneous every 24 hours  fentaNYL   Patch  25 MICROgram(s)/Hr 1 Patch Transdermal every 72 hours  fentaNYL   Patch  50 MICROgram(s)/Hr 1 Patch Transdermal every 72 hours  HYDROmorphone PCA (1 mG/mL) 30 milliLiter(s) PCA Continuous PCA Continuous  metoprolol tartrate Injectable 2.5 milliGRAM(s) IV Push every 12 hours  mupirocin 2% Ointment 1 Application(s) Topical every 12 hours  pantoprazole  Injectable 40 milliGRAM(s) IV Push daily  polyethylene glycol 3350 17 Gram(s) Oral every 12 hours  potassium chloride   Powder 40 milliEquivalent(s) Oral every 4 hours  potassium chloride  10 mEq/100 mL IVPB 10 milliEquivalent(s) IV Intermittent every 1 hour  potassium phosphate IVPB 15 milliMole(s) IV Intermittent once  saccharomyces boulardii 250 milliGRAM(s) Oral two times a day  senna 2 Tablet(s) Oral at bedtime  sodium chloride 0.9%. 1000 milliLiter(s) (75 mL/Hr) IV Continuous <Continuous>    MEDICATIONS  (PRN):  acetaminophen     Tablet .. 650 milliGRAM(s) Oral every 6 hours PRN Mild Pain (1 - 3), Moderate Pain (4 - 6)  ALPRAZolam 0.25 milliGRAM(s) Oral every 6 hours PRN anxiety  cyclobenzaprine 5 milliGRAM(s) Oral three times a day PRN Muscle Spasm  HYDROmorphone PCA (1 mG/mL) Rescue Clinician Bolus 1.5 milliGRAM(s) IV Push every 2 hours PRN for Pain Scale GREATER THAN 6  naloxone Injectable 0.1 milliGRAM(s) IV Push every 3 minutes PRN For ANY of the following changes in patient status:  A. RR LESS THAN 10 breaths per minute, B. Oxygen saturation LESS THAN 90%, C. Sedation score of 6  ondansetron Injectable 4 milliGRAM(s) IV Push every 6 hours PRN Nausea      I&O's Summary    31 Oct 2022 07:01  -  31 Oct 2022 12:18  --------------------------------------------------------  IN: 240 mL / OUT: 0 mL / NET: 240 mL    Vital Signs Last 24 Hrs  T(C): 36.6 (31 Oct 2022 09:53), Max: 36.8 (30 Oct 2022 17:42)  T(F): 97.9 (31 Oct 2022 09:53), Max: 98.3 (30 Oct 2022 17:42)  HR: 97 (31 Oct 2022 09:53) (77 - 98)  BP: 138/73 (31 Oct 2022 09:53) (127/66 - 148/83)  BP(mean): --  RR: 17 (31 Oct 2022 09:53) (17 - 18)  SpO2: 97% (31 Oct 2022 09:53) (92% - 98%)    Parameters below as of 31 Oct 2022 09:53  Patient On (Oxygen Delivery Method): room air        PHYSICAL EXAM:  GENERAL: NAD, well-developed  HEAD:  Atraumatic, Normocephalic  EYES: EOMI, PERRLA, conjunctiva and sclera clear  NECK: Supple, No JVD  CHEST/LUNG: Clear to auscultation bilaterally; No wheeze  HEART: Regular rate and rhythm; No murmurs, rubs, or gallops  ABDOMEN: Soft, Nontender, Nondistended; Bowel sounds present  EXTREMITIES:  2+ Peripheral Pulses, No clubbing, cyanosis, or edema  moves feet in bed   R arm not moving  Write with L arm  PSYCH: AAOx3      LABS:                        9.0    10.67 )-----------( 331      ( 31 Oct 2022 06:02 )             28.6     10-31    141  |  106  |  6<L>  ----------------------------<  121<H>  2.7<LL>   |  24  |  0.25<L>    Ca    10.2      31 Oct 2022 06:02  Phos  2.3     10-31  Mg     1.60     10-31    TPro  4.8<L>  /  Alb  2.5<L>  /  TBili  <0.2  /  DBili  x   /  AST  61<H>  /  ALT  12  /  AlkPhos  181<H>  10-31       Care Discussed with Consultants/Other Providers:  Resting in bed  K 2.7- supplemented  Rt Onc will do 2 single Rt Fx - on 11/1  Onc notes out out patient  Anastrogen and f/u at [facility] with Dr Ritika VALENTIN- seen with DONALDO Maxwell- patient Did MOLST FORM- DNR

## 2022-10-31 NOTE — PROGRESS NOTE ADULT - SUBJECTIVE AND OBJECTIVE BOX
INTERVAL HPI/OVERNIGHT EVENTS:  Patient seen at bedside.  Patient feeling "ok" as long as she is able to  control her pain with her PCA    VITAL SIGNS:  T(F): 97.9 (10-31-22 @ 09:53)  HR: 97 (10-31-22 @ 09:53)  BP: 138/73 (10-31-22 @ 09:53)  RR: 17 (10-31-22 @ 09:53)  SpO2: 97% (10-31-22 @ 09:53)  Wt(kg): --    PHYSICAL EXAM:  GENERAL: NAD, well-developed  HEAD:  Atraumatic, Normocephalic  EYES: EOMI, PERRLA, conjunctiva and sclera clear  NECK: Supple, No JVD  CHEST/LUNG: Clear to auscultation bilaterally; No wheeze  HEART: Regular rate and rhythm; No murmurs, rubs, or gallops  ABDOMEN: Soft, Nontender, Nondistended; Bowel sounds present  EXTREMITIES:  2+ Peripheral Pulses, No clubbing, cyanosis, or edema  moves feet in bed   R arm not moving  Write with L arm  PSYCH: AAOx3      MEDICATIONS  (STANDING):  anastrozole 1 milliGRAM(s) Oral daily  chlorhexidine 2% Cloths 1 Application(s) Topical daily  dexAMETHasone  Injectable 4 milliGRAM(s) IV Push every 12 hours  enoxaparin Injectable 40 milliGRAM(s) SubCutaneous every 24 hours  fentaNYL   Patch  25 MICROgram(s)/Hr 1 Patch Transdermal every 72 hours  fentaNYL   Patch  50 MICROgram(s)/Hr 1 Patch Transdermal every 72 hours  HYDROmorphone PCA (1 mG/mL) 30 milliLiter(s) PCA Continuous PCA Continuous  metoprolol tartrate Injectable 2.5 milliGRAM(s) IV Push every 12 hours  mupirocin 2% Ointment 1 Application(s) Topical every 12 hours  pantoprazole  Injectable 40 milliGRAM(s) IV Push daily  polyethylene glycol 3350 17 Gram(s) Oral every 12 hours  saccharomyces boulardii 250 milliGRAM(s) Oral two times a day  senna 2 Tablet(s) Oral at bedtime  sodium chloride 0.9%. 1000 milliLiter(s) (75 mL/Hr) IV Continuous <Continuous>    MEDICATIONS  (PRN):  acetaminophen     Tablet .. 650 milliGRAM(s) Oral every 6 hours PRN Mild Pain (1 - 3), Moderate Pain (4 - 6)  ALPRAZolam 0.25 milliGRAM(s) Oral every 6 hours PRN anxiety  cyclobenzaprine 5 milliGRAM(s) Oral three times a day PRN Muscle Spasm  HYDROmorphone PCA (1 mG/mL) Rescue Clinician Bolus 1.5 milliGRAM(s) IV Push every 2 hours PRN for Pain Scale GREATER THAN 6  naloxone Injectable 0.1 milliGRAM(s) IV Push every 3 minutes PRN For ANY of the following changes in patient status:  A. RR LESS THAN 10 breaths per minute, B. Oxygen saturation LESS THAN 90%, C. Sedation score of 6  ondansetron Injectable 4 milliGRAM(s) IV Push every 6 hours PRN Nausea      Allergies    tetanus toxoid (Hives)    Intolerances    lactose (Other)      LABS:                        9.0    10.67 )-----------( 331      ( 31 Oct 2022 06:02 )             28.6     10-31    141  |  106  |  6<L>  ----------------------------<  121<H>  2.7<LL>   |  24  |  0.25<L>    Ca    10.2      31 Oct 2022 06:02  Phos  2.3     10-31  Mg     1.60     10-31    TPro  4.8<L>  /  Alb  2.5<L>  /  TBili  <0.2  /  DBili  x   /  AST  61<H>  /  ALT  12  /  AlkPhos  181<H>  10-31          RADIOLOGY & ADDITIONAL TESTS:  Studies reviewed.

## 2022-10-31 NOTE — PROGRESS NOTE ADULT - ASSESSMENT
65 y/o F with PMH of left arm skin CA s/p excision at age 16, stomach ulcer, recently diagnosed breast ca with mets to bones, liver, and lungs with cord compression s/p C7-T2 corpectomy & fusion and posterior C4-T5 fusion (9/22) transferred from acute rehab at [facility] for initiation of palliative RT.

## 2022-10-31 NOTE — GOALS OF CARE CONVERSATION - ADVANCED CARE PLANNING - CONVERSATION DETAILS
resting in bed  Ox3 .  Explained 8/17/22 she could not walk- called her brother who is HCP Arun Kearney [phone] who took her to hospital. She was transferred to [place]--had spine surgery but still needs PAIN MNG  Transf to [facility], On  PCA Dilaudid pump now and followed by PALLIATIVE PAIN  Patient notes she does NOT want resuscitation - ptient wants DNR-MOLST FORM COMPLETED-patient now DNR- 65 y/o F with PMH of left arm skin CA s/p excision at age 16, stomach ulcer, recently diagnosed breast ca with mets to bones, liver, and lungs with cord compression s/p C7-T2 corpectomy & fusion and posterior C4-T5 fusion (9/22) transferred from acute rehab at [facility] for initiation of palliative RT.        resting in bed  Ox3 .  Explained 8/17/22 she could not walk- called her brother who is HCP Arun Kearney [phone] who took her to hospital. She was transferred to [place]--had spine surgery but still needs PAIN MNG  Transf to [facility], On  PCA Dilaudid pump now and followed by PALLIATIVE PAIN  Patient notes she does NOT want resuscitation - patient wants DNR-MOLST FORM COMPLETED-patient now DNR-

## 2022-10-31 NOTE — PROGRESS NOTE ADULT - PROBLEM SELECTOR PLAN 4
Pt is DNR/DNI by primary team, see C note  Surrogates are brother and sister as above  Will continue to follow  Page for uncontrolled symptoms [id number].

## 2022-11-01 DIAGNOSIS — Z02.9 ENCOUNTER FOR ADMINISTRATIVE EXAMINATIONS, UNSPECIFIED: ICD-10-CM

## 2022-11-01 PROCEDURE — 99222 1ST HOSP IP/OBS MODERATE 55: CPT

## 2022-11-01 PROCEDURE — 99233 SBSQ HOSP IP/OBS HIGH 50: CPT

## 2022-11-01 RX ADMIN — FENTANYL CITRATE 1 PATCH: 50 INJECTION INTRAVENOUS at 19:15

## 2022-11-01 RX ADMIN — Medication 4 MILLIGRAM(S): at 05:51

## 2022-11-01 RX ADMIN — SODIUM CHLORIDE 75 MILLILITER(S): 9 INJECTION INTRAMUSCULAR; INTRAVENOUS; SUBCUTANEOUS at 15:05

## 2022-11-01 RX ADMIN — Medication 2.5 MILLIGRAM(S): at 05:51

## 2022-11-01 RX ADMIN — MUPIROCIN 1 APPLICATION(S): 20 OINTMENT TOPICAL at 17:44

## 2022-11-01 RX ADMIN — Medication 2.5 MILLIGRAM(S): at 17:45

## 2022-11-01 RX ADMIN — SODIUM CHLORIDE 75 MILLILITER(S): 9 INJECTION INTRAMUSCULAR; INTRAVENOUS; SUBCUTANEOUS at 01:30

## 2022-11-01 RX ADMIN — CHLORHEXIDINE GLUCONATE 1 APPLICATION(S): 213 SOLUTION TOPICAL at 12:35

## 2022-11-01 RX ADMIN — HYDROMORPHONE HYDROCHLORIDE 30 MILLILITER(S): 2 INJECTION INTRAMUSCULAR; INTRAVENOUS; SUBCUTANEOUS at 14:59

## 2022-11-01 RX ADMIN — ANASTROZOLE 1 MILLIGRAM(S): 1 TABLET ORAL at 19:46

## 2022-11-01 RX ADMIN — Medication 4 MILLIGRAM(S): at 17:44

## 2022-11-01 RX ADMIN — POLYETHYLENE GLYCOL 3350 17 GRAM(S): 17 POWDER, FOR SOLUTION ORAL at 05:51

## 2022-11-01 RX ADMIN — MUPIROCIN 1 APPLICATION(S): 20 OINTMENT TOPICAL at 05:52

## 2022-11-01 RX ADMIN — FENTANYL CITRATE 1 PATCH: 50 INJECTION INTRAVENOUS at 19:16

## 2022-11-01 RX ADMIN — FENTANYL CITRATE 1 PATCH: 50 INJECTION INTRAVENOUS at 06:05

## 2022-11-01 RX ADMIN — HYDROMORPHONE HYDROCHLORIDE 30 MILLILITER(S): 2 INJECTION INTRAMUSCULAR; INTRAVENOUS; SUBCUTANEOUS at 08:38

## 2022-11-01 RX ADMIN — PANTOPRAZOLE SODIUM 40 MILLIGRAM(S): 20 TABLET, DELAYED RELEASE ORAL at 12:33

## 2022-11-01 RX ADMIN — ENOXAPARIN SODIUM 40 MILLIGRAM(S): 100 INJECTION SUBCUTANEOUS at 05:51

## 2022-11-01 RX ADMIN — HYDROMORPHONE HYDROCHLORIDE 30 MILLILITER(S): 2 INJECTION INTRAMUSCULAR; INTRAVENOUS; SUBCUTANEOUS at 20:14

## 2022-11-01 NOTE — PROGRESS NOTE ADULT - SUBJECTIVE AND OBJECTIVE BOX
Patient is a 67y old  Female who presents with a chief complaint of Bone pain (01 Nov 2022 09:08)      SUBJECTIVE / OVERNIGHT EVENTS:  Resting in bed  C color on  Awaiting RT    MEDICATIONS  (STANDING):  anastrozole 1 milliGRAM(s) Oral daily  chlorhexidine 2% Cloths 1 Application(s) Topical daily  dexAMETHasone  Injectable 4 milliGRAM(s) IV Push every 12 hours  enoxaparin Injectable 40 milliGRAM(s) SubCutaneous every 24 hours  fentaNYL   Patch  25 MICROgram(s)/Hr 1 Patch Transdermal every 72 hours  fentaNYL   Patch  50 MICROgram(s)/Hr 1 Patch Transdermal every 72 hours  HYDROmorphone PCA (1 mG/mL) 30 milliLiter(s) PCA Continuous PCA Continuous  metoprolol tartrate Injectable 2.5 milliGRAM(s) IV Push every 12 hours  mupirocin 2% Ointment 1 Application(s) Topical every 12 hours  pantoprazole  Injectable 40 milliGRAM(s) IV Push daily  polyethylene glycol 3350 17 Gram(s) Oral every 12 hours  saccharomyces boulardii 250 milliGRAM(s) Oral two times a day  senna 2 Tablet(s) Oral at bedtime  sodium chloride 0.9%. 1000 milliLiter(s) (75 mL/Hr) IV Continuous <Continuous>    MEDICATIONS  (PRN):  acetaminophen     Tablet .. 650 milliGRAM(s) Oral every 6 hours PRN Mild Pain (1 - 3), Moderate Pain (4 - 6)  ALPRAZolam 0.25 milliGRAM(s) Oral every 6 hours PRN anxiety  cyclobenzaprine 5 milliGRAM(s) Oral three times a day PRN Muscle Spasm  HYDROmorphone PCA (1 mG/mL) Rescue Clinician Bolus 1.5 milliGRAM(s) IV Push every 2 hours PRN for Pain Scale GREATER THAN 6  naloxone Injectable 0.1 milliGRAM(s) IV Push every 3 minutes PRN For ANY of the following changes in patient status:  A. RR LESS THAN 10 breaths per minute, B. Oxygen saturation LESS THAN 90%, C. Sedation score of 6  ondansetron Injectable 4 milliGRAM(s) IV Push every 6 hours PRN Nausea        I&O's Summary    31 Oct 2022 07:01  -  01 Nov 2022 07:00  --------------------------------------------------------  IN: 1500 mL / OUT: 1300 mL / NET: 200 mL    01 Nov 2022 07:01  -  01 Nov 2022 11:53  --------------------------------------------------------  IN: 120 mL / OUT: 0 mL / NET: 120 mL    Vital Signs Last 24 Hrs  T(C): 36.4 (01 Nov 2022 09:53), Max: 36.7 (31 Oct 2022 22:04)  T(F): 97.6 (01 Nov 2022 09:53), Max: 98.1 (31 Oct 2022 22:04)  HR: 77 (01 Nov 2022 09:53) (64 - 88)  BP: 135/84 (01 Nov 2022 09:53) (128/84 - 157/88)  BP(mean): --  RR: 12 (01 Nov 2022 09:53) (12 - 18)  SpO2: 96% (01 Nov 2022 09:53) (96% - 98%)    Parameters below as of 01 Nov 2022 05:45  Patient On (Oxygen Delivery Method): room air        PHYSICAL EXAM:  GENERAL: NAD, well-developed  HEAD:  Atraumatic, Normocephalic  EYES: EOMI, PERRLA, conjunctiva and sclera clear  NECK: Supple, No JVD  CHEST/LUNG: Clear to auscultation bilaterally; No wheeze  C collar  HEART: Regular rate and rhythm; No murmurs, rubs, or gallops  ABDOMEN: Soft, Nontender, Nondistended; Bowel sounds present  EXTREMITIES:  2+ Peripheral Pulses, No clubbing, cyanosis, or edema  PSYCH: AAOx3      LABS:                        9.0    10.67 )-----------( 331      ( 31 Oct 2022 06:02 )             28.6     10-31    142  |  109<H>  |  6<L>  ----------------------------<  95  3.9   |  22  |  0.24<L>    Ca    9.8      31 Oct 2022 19:50  Phos  2.7     10-31  Mg     1.70     10-31    TPro  4.8<L>  /  Alb  2.5<L>  /  TBili  <0.2  /  DBili  x   /  AST  61<H>  /  ALT  12  /  AlkPhos  181<H>  10-31          Care Discussed with Consultants/Other Providers:  Neuro-surgery Dr Mani Austin- on teams notes patient does NOT NEED NEURO Surgery or MRI  no need to send back to [place] to Neuro-surgery    Palliative asked to see patient AGAIN for Pain control and further GOC- patient now DNR  PMR to see patient today-- ???? back to [place] after her RT ??????????????????????????-  will f/u PMR

## 2022-11-01 NOTE — OCCUPATIONAL THERAPY INITIAL EVALUATION ADULT - PLANNED THERAPY INTERVENTIONS, OT EVAL
ADL retraining ADL retraining/balance training/bed mobility training/cognitive, visual perceptual/fine motor coordination training/motor coordination training/neuromuscular re-education/ROM/strengthening/stretching/transfer training

## 2022-11-01 NOTE — PROGRESS NOTE ADULT - ASSESSMENT
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  h/o c diff out patient. 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  h/o c diff out patient. none now    11/1/22  Neuro-surgery Dr Mani Austin- on teams notes patient does NOT NEED NEURO Surgery or MRI  no need to send back to [place] to Neuro-surgery    Palliative asked to see patient AGAIN for Pain control and further GOC- patient now DNR  PMR to see patient today-- ???? back to [facility] after her RT ??????????????????????????-  will f/u PMR

## 2022-11-01 NOTE — PHYSICAL THERAPY INITIAL EVALUATION ADULT - PERTINENT HX OF CURRENT PROBLEM, REHAB EVAL
67 y/o Female PMHx of left arm skin cancer s/p excision at age 16, stomach ulcer, and recently diagnosed breast ca with mets to bones, liver, and lungs with cord compression s/p C7-T2 corpectomy & fusion and posterior C4-T5 fusion transferred from acute rehab at [place] for initiation of palliative RT.

## 2022-11-01 NOTE — SWALLOW BEDSIDE ASSESSMENT ADULT - SWALLOW EVAL: DIAGNOSIS
Patient presents with oropharyngeal dysphagia given thin liquids, mildly thick liquids and puree marked by adequate bolus containment, prolonged bolus manipulation and prolonged anterior-posterior transport. Adequate oral clearance post primary swallow. Pharyngeal stage marked by observed initiation of the pharyngeal swallow trigger judged via laryngeal palpation. Patient with immediate cough response following thin liquids, possibly indicative of impaired airway protection. No overt s/sx impaired airway protection observed for puree and mildly thick liquids.

## 2022-11-01 NOTE — SWALLOW BEDSIDE ASSESSMENT ADULT - ADDITIONAL RECOMMENDATIONS
1. This department to follow up as schedule permits for diet tolerance. 2. Medical team advised to reconsult this department if there is a change in medical status/ability to tolerate recommended PO diet.

## 2022-11-01 NOTE — PROGRESS NOTE ADULT - PROBLEM SELECTOR PLAN 2
- s/p C7-T2 corpectomy & fusion and posterior C4-T5 fusion  - continue dexamethasone 4mg Q12  - C/w PPI while on steroids  -Unclear anticipated duration of C-collar and steroids. Would also benefit from neurosurg eval given worsening pain  -PRIOR MD Discussed w/ Neurosurgery- Dr. Bennett 10/29, agrees w/ MR. Would like to be called following MR results- Will d/w [facility]- surg if more MRI IS NEEDED - s/p C7-T2 corpectomy & fusion and posterior C4-T5 fusion  - continue dexamethasone 4mg Q12  - C/w PPI while on steroids  -Unclear anticipated duration of C-collar and steroids. Would also benefit from neurosurg eval given worsening pain  -PRIOR MD Discussed w/ Neurosurgery- Dr. Bennett 10/29, agrees w/ MRI then BUT   11/1/22-Neuro-surgery Dr Mani Austin- on teams notes patient does NOT NEED NEURO Surgery or MRI  no need to send back to [place] to Neuro-surgery  Palliative asked to see patient AGAIN for Pain control and further GOC- patient now DNR  PMR to see patient today-- ???? back to [facility] after her RT ??????????????????????????-  will f/u PMR

## 2022-11-01 NOTE — OCCUPATIONAL THERAPY INITIAL EVALUATION ADULT - PERTINENT HX OF CURRENT PROBLEM, REHAB EVAL
Pt is a 65 y/o female with PMH of left arm skin CA s/p excision at age 16, stomach ulcer, and recently diagnosed breast ca with mets to bones, liver, and lungs with cord compression s/p C7-T2 corpectomy & fusion and posterior C4-T5 fusion transferred from acute rehab at [place] for initiation of palliative RT.

## 2022-11-01 NOTE — PHYSICAL THERAPY INITIAL EVALUATION ADULT - MANUAL MUSCLE TESTING RESULTS, REHAB EVAL
distal b/l UE at least 3/5, proximal UE's at least 2/5 ; b/l ankles at least 2+/5 , b/l hip/knee at least 2-/5

## 2022-11-01 NOTE — PROGRESS NOTE ADULT - PROBLEM SELECTOR PLAN 8
Neuro-surgery Dr Mani Austin- on teams notes patient does NOT NEED NEURO Surgery or MRI  no need to send back to [place] to Neuro-surgery  Palliative asked to see patient AGAIN for Pain control and further GOC- patient now DNR  PMR to see patient today-- ???? back to [place] after her RT ??????????????????????????-  will f/u PMR  OUT patient f/u Dr Rivera at [facility] if still wanting cancer care

## 2022-11-01 NOTE — SWALLOW BEDSIDE ASSESSMENT ADULT - COMMENTS
Per Hospitalist Note 11/1/22: "67 y/o F with PMH of left arm skin CA s/p excision at age 16, stomach ulcer, recently diagnosed breast ca with mets to bones, liver, and lungs with cord compression s/p C7-T2 corpectomy & fusion and posterior C4-T5 fusion (9/22) transferred from acute rehab at [place] for initiation of palliative RT.  h/o c diff out patient. none now"     Patient received upright in bed, awake and alert. Hard cervical collar in place. Patient frequently requiring encouragement from SLP to participate in evaluation. Patient frequently expressing "Hold on, I need to go slow" during PO trials. Per Hospitalist Note 11/1/22: "67 y/o F with PMH of left arm skin CA s/p excision at age 16, stomach ulcer, recently diagnosed breast ca with mets to bones, liver, and lungs with cord compression s/p C7-T2 corpectomy & fusion and posterior C4-T5 fusion (9/22) transferred from acute rehab at [place] for initiation of palliative RT.  h/o c diff out patient. none now"     Patient received upright in bed, awake and alert able to follow 1-step directives and answer yes/no questions. Hard cervical collar in place. Patient frequently requiring encouragement from SLP to participate in evaluation. Patient frequently expressing "Hold on, I need to go slow" throughout evaluation.

## 2022-11-01 NOTE — CHART NOTE - NSCHARTNOTEFT_GEN_A_CORE
Today, we reattempted CT sim, Ms. Kearney moaned and writhed in pain; cannot stay still nor tolerate the CT sim.  On chart review, she is on total 75mcg fentanyl patch and PCA pump on demand dilaudid.    If she cannot tolerate sim, nor RT, nor stay still, we will be unable to treat her safely. Today, we reattempted CT sim, Ms. Kearney moaned and writhed in pain; cannot stay still nor tolerate the CT sim.  On chart review, she is on total 75mcg fentanyl patch and PCA pump on demand dilaudid.    If she cannot tolerate sim, nor RT, nor stay still, we will be unable to treat her safely.    ** addendum: after discussion with patient at bedside this afternoon, she has now refused to come down for RT.   RN was present.    We will not bring down for single fraction RT tomorrow.

## 2022-11-01 NOTE — CONSULT NOTE ADULT - SUBJECTIVE AND OBJECTIVE BOX
Patient is a 67y old  Female who presents with a chief complaint of Bone pain (01 Nov 2022 09:08)      HPI:  67 y/o F with PMH of left arm skin CA s/p excision at age 16, stomach ulcer, and recently diagnosed breast ca with mets to bones, liver, and lungs with cord compression s/p C7-T2 corpectomy & fusion and posterior C4-T5 fusion transferred from acute rehab at [place] for initiation of palliative RT.  While at , pt tested positive for C diff and was treated with 14 day course of PO vancomycin (Completed 10/25).  Pt reports continued neck pain.  She denies dyspnea, fever, chills or chest pain.  She is unsure about diarrhea.   (28 Oct 2022 02:09)    PT and OT pending    REVIEW OF SYSTEMS     PAST MEDICAL & SURGICAL HISTORY  Abdominal mass    Stomach ulcer    Skin cancer of arm, left    Concussion    H/O excision of mass    S/P hysterectomy         FUNCTIONAL HISTORY  Lives in hotel when not working  Independent prior to hospitalization and rehab    CURRENT FUNCTIONAL STATUS  pending    FAMILY HISTORY   Family history of dementia (Mother)  Family history of colon cancer (Father)      RECENT LABS/IMAGING  CBC Full  -  ( 31 Oct 2022 06:02 )  WBC Count : 10.67 K/uL  RBC Count : 3.15 M/uL  Hemoglobin : 9.0 g/dL  Hematocrit : 28.6 %  Platelet Count - Automated : 331 K/uL  Mean Cell Volume : 90.8 fL  Mean Cell Hemoglobin : 28.6 pg  Mean Cell Hemoglobin Concentration : 31.5 gm/dL  Auto Neutrophil # : 7.92 K/uL  Auto Lymphocyte # : 1.55 K/uL  Auto Monocyte # : 0.64 K/uL  Auto Eosinophil # : 0.00 K/uL  Auto Basophil # : 0.04 K/uL  Auto Neutrophil % : 74.2 %  Auto Lymphocyte % : 14.5 %  Auto Monocyte % : 6.0 %  Auto Eosinophil % : 0.0 %  Auto Basophil % : 0.4 %    10-31    142  |  109<H>  |  6<L>  ----------------------------<  95  3.9   |  22  |  0.24<L>    Ca    9.8      31 Oct 2022 19:50  Phos  2.7     10-31  Mg     1.70     10-31    TPro  4.8<L>  /  Alb  2.5<L>  /  TBili  <0.2  /  DBili  x   /  AST  61<H>  /  ALT  12  /  AlkPhos  181<H>  10-31        VITALS  T(C): 36.8 (11-01-22 @ 13:44), Max: 36.8 (11-01-22 @ 13:44)  HR: 93 (11-01-22 @ 13:44) (64 - 93)  BP: 150/72 (11-01-22 @ 13:44) (128/84 - 157/88)  RR: 12 (11-01-22 @ 09:53) (12 - 18)  SpO2: 97% (11-01-22 @ 13:44) (96% - 98%)  Wt(kg): --    ALLERGIES  lactose (Other)  tetanus toxoid (Hives)      MEDICATIONS   acetaminophen     Tablet .. 650 milliGRAM(s) Oral every 6 hours PRN  ALPRAZolam 0.25 milliGRAM(s) Oral every 6 hours PRN  anastrozole 1 milliGRAM(s) Oral daily  chlorhexidine 2% Cloths 1 Application(s) Topical daily  cyclobenzaprine 5 milliGRAM(s) Oral three times a day PRN  dexAMETHasone  Injectable 4 milliGRAM(s) IV Push every 12 hours  enoxaparin Injectable 40 milliGRAM(s) SubCutaneous every 24 hours  fentaNYL   Patch  25 MICROgram(s)/Hr 1 Patch Transdermal every 72 hours  fentaNYL   Patch  50 MICROgram(s)/Hr 1 Patch Transdermal every 72 hours  HYDROmorphone PCA (1 mG/mL) 30 milliLiter(s) PCA Continuous PCA Continuous  HYDROmorphone PCA (1 mG/mL) Rescue Clinician Bolus 1.5 milliGRAM(s) IV Push every 2 hours PRN  metoprolol tartrate Injectable 2.5 milliGRAM(s) IV Push every 12 hours  mupirocin 2% Ointment 1 Application(s) Topical every 12 hours  naloxone Injectable 0.1 milliGRAM(s) IV Push every 3 minutes PRN  ondansetron Injectable 4 milliGRAM(s) IV Push every 6 hours PRN  pantoprazole  Injectable 40 milliGRAM(s) IV Push daily  polyethylene glycol 3350 17 Gram(s) Oral every 12 hours  saccharomyces boulardii 250 milliGRAM(s) Oral two times a day  senna 2 Tablet(s) Oral at bedtime  sodium chloride 0.9%. 1000 milliLiter(s) IV Continuous <Continuous>      ----------------------------------------------------------------------------------------  PHYSICAL EXAM  Constitutional - NAD, Comfortable  HEENT - NCAT, EOMI  Neck - Supple, No limited ROM  Chest - CTA bilaterally, No wheeze, No rhonchi, No crackles  Cardiovascular - RRR, S1S2, No murmurs  Abdomen - BS+, Soft, NTND  Extremities - No C/C/E, No calf tenderness   Neurologic Exam -                    Cognitive - Awake, Alert, AAO to self, place, date, year, situation     Communication - Fluent, No dysarthria     Cranial Nerves - CN 2-12 intact     Motor - No focal deficits                    LEFT    UE - ShAB 5/5, EF 5/5, EE 5/5, WE 5/5,  5/5                    RIGHT UE - ShAB 5/5, EF 5/5, EE 5/5, WE 5/5,  5/5                    LEFT    LE - HF 5/5, KE 5/5, DF 5/5, PF 5/5                    RIGHT LE - HF 5/5, KE 5/5, DF 5/5, PF 5/5        Sensory - Intact to LT     Reflexes - DTR Intact, No primitive reflexive     Coordination - FTN intact     OculoVestibular - No saccades, No nystagmus, VOR         Balance - WNL Static  Psychiatric - Mood stable, Affect WNL  ----------------------------------------------------------------------------------------  ASSESSMENT/PLAN    Pain -  DVT PPX -   Rehab -  consult in progress Patient is a 67y old  Female who presents with a chief complaint of Bone pain (01 Nov 2022 09:08)      HPI:  67 y/o F with PMH of left arm skin CA s/p excision at age 16, stomach ulcer, and recently diagnosed breast ca with mets to bones, liver, and lungs with cord compression s/p C7-T2 corpectomy & fusion and posterior C4-T5 fusion transferred from acute rehab at [place] for initiation of palliative RT.  While at , pt tested positive for C diff and was treated with 14 day course of PO vancomycin (Completed 10/25).  Pt reports continued neck pain.  She denies dyspnea, fever, chills or chest pain.  She is unsure about diarrhea.   (28 Oct 2022 02:09)    PT and OT pending  Patient lethargic, able to nod slightly yes or no.  per nurse patient reports pain, using pca.  unable to tolerate CT stim for RT.  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  Independent prior to hospitalization and rehab    CURRENT FUNCTIONAL STATUS  pending    FAMILY HISTORY   Family history of dementia (Mother)  Family history of colon cancer (Father)      RECENT LABS/IMAGING  CBC Full  -  ( 31 Oct 2022 06:02 )  WBC Count : 10.67 K/uL  RBC Count : 3.15 M/uL  Hemoglobin : 9.0 g/dL  Hematocrit : 28.6 %  Platelet Count - Automated : 331 K/uL  Mean Cell Volume : 90.8 fL  Mean Cell Hemoglobin : 28.6 pg  Mean Cell Hemoglobin Concentration : 31.5 gm/dL  Auto Neutrophil # : 7.92 K/uL  Auto Lymphocyte # : 1.55 K/uL  Auto Monocyte # : 0.64 K/uL  Auto Eosinophil # : 0.00 K/uL  Auto Basophil # : 0.04 K/uL  Auto Neutrophil % : 74.2 %  Auto Lymphocyte % : 14.5 %  Auto Monocyte % : 6.0 %  Auto Eosinophil % : 0.0 %  Auto Basophil % : 0.4 %    10-31    142  |  109<H>  |  6<L>  ----------------------------<  95  3.9   |  22  |  0.24<L>    Ca    9.8      31 Oct 2022 19:50  Phos  2.7     10-31  Mg     1.70     10-31    TPro  4.8<L>  /  Alb  2.5<L>  /  TBili  <0.2  /  DBili  x   /  AST  61<H>  /  ALT  12  /  AlkPhos  181<H>  10-31        VITALS  T(C): 36.8 (11-01-22 @ 13:44), Max: 36.8 (11-01-22 @ 13:44)  HR: 93 (11-01-22 @ 13:44) (64 - 93)  BP: 150/72 (11-01-22 @ 13:44) (128/84 - 157/88)  RR: 12 (11-01-22 @ 09:53) (12 - 18)  SpO2: 97% (11-01-22 @ 13:44) (96% - 98%)  Wt(kg): --    ALLERGIES  lactose (Other)  tetanus toxoid (Hives)      MEDICATIONS   acetaminophen     Tablet .. 650 milliGRAM(s) Oral every 6 hours PRN  ALPRAZolam 0.25 milliGRAM(s) Oral every 6 hours PRN  anastrozole 1 milliGRAM(s) Oral daily  chlorhexidine 2% Cloths 1 Application(s) Topical daily  cyclobenzaprine 5 milliGRAM(s) Oral three times a day PRN  dexAMETHasone  Injectable 4 milliGRAM(s) IV Push every 12 hours  enoxaparin Injectable 40 milliGRAM(s) SubCutaneous every 24 hours  fentaNYL   Patch  25 MICROgram(s)/Hr 1 Patch Transdermal every 72 hours  fentaNYL   Patch  50 MICROgram(s)/Hr 1 Patch Transdermal every 72 hours  HYDROmorphone PCA (1 mG/mL) 30 milliLiter(s) PCA Continuous PCA Continuous  HYDROmorphone PCA (1 mG/mL) Rescue Clinician Bolus 1.5 milliGRAM(s) IV Push every 2 hours PRN  metoprolol tartrate Injectable 2.5 milliGRAM(s) IV Push every 12 hours  mupirocin 2% Ointment 1 Application(s) Topical every 12 hours  naloxone Injectable 0.1 milliGRAM(s) IV Push every 3 minutes PRN  ondansetron Injectable 4 milliGRAM(s) IV Push every 6 hours PRN  pantoprazole  Injectable 40 milliGRAM(s) IV Push daily  polyethylene glycol 3350 17 Gram(s) Oral every 12 hours  saccharomyces boulardii 250 milliGRAM(s) Oral two times a day  senna 2 Tablet(s) Oral at bedtime  sodium chloride 0.9%. 1000 milliLiter(s) IV Continuous <Continuous>      ----------------------------------------------------------------------------------------  PHYSICAL EXAM- limited by lethargy  Constitutional - NAD, Comfortable  HEENT -in aspen collar   Chest - no respiratory distress  Cardiovascular - RRR, S1S2   Extremities - No C/C/E, No calf tenderness   Neurologic Exam -                    Cognitive - sleepy     Communication - communicates by nodding yes/no      Motor - unable to participate due to lethargy      Balance - WNL Static  Psychiatric - calm  ----------------------------------------------------------------------------------------  ASSESSMENT/PLAN   65 yo f pmh breast ca with mets to bone, liver, lung, with cord compression s/p C7-T2 corpectomy and fusion, posterior C4-T5 fusion transferred from acute rehab for palliative RT  SLP: recommended for puree diet with mildly thick liquids, cinesophagram  Pain -fentanyl patch, dilaudid pca, flexeril  DVT PPX - lovenox  SLP: recommended puree diet with thickened liquids  PT and OT pending  Rehab -  patient is not a candidate for acute rehab due to lethargy, limited participation in exam. will monitor for improvement and tolerance with bedside PT and OT

## 2022-11-01 NOTE — PROGRESS NOTE ADULT - PROBLEM SELECTOR PLAN 3
Hypercalcemia noted.  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   -Corrected ca 13.6  -s/p calcitonin and C/w gentle hydration -Pamidronate 60mg IVPB x 1   -calcium 9.8 on 11/1  -f/u PTH/ PTH-rp, ionized calcium  -Monitor serum calcium

## 2022-11-01 NOTE — SWALLOW BEDSIDE ASSESSMENT ADULT - ASR SWALLOW RECOMMEND DIAG
Consider cinesophagram to objectively assess swallow mechanism if there is a concern for aspiration given C4-T5 Fusion/VFSS/MBS

## 2022-11-02 LAB
ALBUMIN SERPL ELPH-MCNC: 2.7 G/DL — LOW (ref 3.3–5)
ALP SERPL-CCNC: 199 U/L — HIGH (ref 40–120)
ALT FLD-CCNC: 12 U/L — SIGNIFICANT CHANGE UP (ref 4–33)
ANION GAP SERPL CALC-SCNC: 10 MMOL/L — SIGNIFICANT CHANGE UP (ref 7–14)
AST SERPL-CCNC: 60 U/L — HIGH (ref 4–32)
BASOPHILS # BLD AUTO: 0.03 K/UL — SIGNIFICANT CHANGE UP (ref 0–0.2)
BASOPHILS NFR BLD AUTO: 0.2 % — SIGNIFICANT CHANGE UP (ref 0–2)
BILIRUB SERPL-MCNC: 0.2 MG/DL — SIGNIFICANT CHANGE UP (ref 0.2–1.2)
BUN SERPL-MCNC: 5 MG/DL — LOW (ref 7–23)
CALCIUM SERPL-MCNC: 8.8 MG/DL — SIGNIFICANT CHANGE UP (ref 8.4–10.5)
CHLORIDE SERPL-SCNC: 107 MMOL/L — SIGNIFICANT CHANGE UP (ref 98–107)
CO2 SERPL-SCNC: 25 MMOL/L — SIGNIFICANT CHANGE UP (ref 22–31)
CREAT SERPL-MCNC: 0.24 MG/DL — LOW (ref 0.5–1.3)
EGFR: 123 ML/MIN/1.73M2 — SIGNIFICANT CHANGE UP
EOSINOPHIL # BLD AUTO: 0 K/UL — SIGNIFICANT CHANGE UP (ref 0–0.5)
EOSINOPHIL NFR BLD AUTO: 0 % — SIGNIFICANT CHANGE UP (ref 0–6)
GLUCOSE SERPL-MCNC: 128 MG/DL — HIGH (ref 70–99)
HCT VFR BLD CALC: 30.1 % — LOW (ref 34.5–45)
HGB BLD-MCNC: 9.4 G/DL — LOW (ref 11.5–15.5)
IANC: 9.86 K/UL — HIGH (ref 1.8–7.4)
IMM GRANULOCYTES NFR BLD AUTO: 6.5 % — HIGH (ref 0–0.9)
LYMPHOCYTES # BLD AUTO: 1.18 K/UL — SIGNIFICANT CHANGE UP (ref 1–3.3)
LYMPHOCYTES # BLD AUTO: 9.6 % — LOW (ref 13–44)
MAGNESIUM SERPL-MCNC: 1.6 MG/DL — SIGNIFICANT CHANGE UP (ref 1.6–2.6)
MCHC RBC-ENTMCNC: 28.7 PG — SIGNIFICANT CHANGE UP (ref 27–34)
MCHC RBC-ENTMCNC: 31.2 GM/DL — LOW (ref 32–36)
MCV RBC AUTO: 92 FL — SIGNIFICANT CHANGE UP (ref 80–100)
MONOCYTES # BLD AUTO: 0.4 K/UL — SIGNIFICANT CHANGE UP (ref 0–0.9)
MONOCYTES NFR BLD AUTO: 3.3 % — SIGNIFICANT CHANGE UP (ref 2–14)
NEUTROPHILS # BLD AUTO: 9.86 K/UL — HIGH (ref 1.8–7.4)
NEUTROPHILS NFR BLD AUTO: 80.4 % — HIGH (ref 43–77)
NRBC # BLD: 0 /100 WBCS — SIGNIFICANT CHANGE UP (ref 0–0)
NRBC # FLD: 0.05 K/UL — HIGH (ref 0–0)
PHOSPHATE SERPL-MCNC: 1.8 MG/DL — LOW (ref 2.5–4.5)
PLATELET # BLD AUTO: 335 K/UL — SIGNIFICANT CHANGE UP (ref 150–400)
POTASSIUM SERPL-MCNC: 2.7 MMOL/L — CRITICAL LOW (ref 3.5–5.3)
POTASSIUM SERPL-SCNC: 2.7 MMOL/L — CRITICAL LOW (ref 3.5–5.3)
PROT SERPL-MCNC: 4.9 G/DL — LOW (ref 6–8.3)
RBC # BLD: 3.27 M/UL — LOW (ref 3.8–5.2)
RBC # FLD: 18 % — HIGH (ref 10.3–14.5)
SODIUM SERPL-SCNC: 142 MMOL/L — SIGNIFICANT CHANGE UP (ref 135–145)
WBC # BLD: 12.27 K/UL — HIGH (ref 3.8–10.5)
WBC # FLD AUTO: 12.27 K/UL — HIGH (ref 3.8–10.5)

## 2022-11-02 PROCEDURE — 99233 SBSQ HOSP IP/OBS HIGH 50: CPT

## 2022-11-02 RX ORDER — POTASSIUM CHLORIDE 20 MEQ
40 PACKET (EA) ORAL EVERY 4 HOURS
Refills: 0 | Status: COMPLETED | OUTPATIENT
Start: 2022-11-02 | End: 2022-11-02

## 2022-11-02 RX ORDER — POTASSIUM PHOSPHATE, MONOBASIC POTASSIUM PHOSPHATE, DIBASIC 236; 224 MG/ML; MG/ML
15 INJECTION, SOLUTION INTRAVENOUS ONCE
Refills: 0 | Status: COMPLETED | OUTPATIENT
Start: 2022-11-02 | End: 2022-11-02

## 2022-11-02 RX ORDER — POTASSIUM CHLORIDE 20 MEQ
10 PACKET (EA) ORAL
Refills: 0 | Status: COMPLETED | OUTPATIENT
Start: 2022-11-02 | End: 2022-11-02

## 2022-11-02 RX ADMIN — FENTANYL CITRATE 1 PATCH: 50 INJECTION INTRAVENOUS at 18:27

## 2022-11-02 RX ADMIN — FENTANYL CITRATE 1 PATCH: 50 INJECTION INTRAVENOUS at 07:12

## 2022-11-02 RX ADMIN — Medication 40 MILLIEQUIVALENT(S): at 17:50

## 2022-11-02 RX ADMIN — HYDROMORPHONE HYDROCHLORIDE 30 MILLILITER(S): 2 INJECTION INTRAMUSCULAR; INTRAVENOUS; SUBCUTANEOUS at 14:13

## 2022-11-02 RX ADMIN — Medication 2.5 MILLIGRAM(S): at 17:51

## 2022-11-02 RX ADMIN — Medication 100 MILLIEQUIVALENT(S): at 19:05

## 2022-11-02 RX ADMIN — HYDROMORPHONE HYDROCHLORIDE 30 MILLILITER(S): 2 INJECTION INTRAMUSCULAR; INTRAVENOUS; SUBCUTANEOUS at 08:30

## 2022-11-02 RX ADMIN — Medication 2.5 MILLIGRAM(S): at 05:58

## 2022-11-02 RX ADMIN — ENOXAPARIN SODIUM 40 MILLIGRAM(S): 100 INJECTION SUBCUTANEOUS at 05:54

## 2022-11-02 RX ADMIN — POTASSIUM PHOSPHATE, MONOBASIC POTASSIUM PHOSPHATE, DIBASIC 62.5 MILLIMOLE(S): 236; 224 INJECTION, SOLUTION INTRAVENOUS at 22:41

## 2022-11-02 RX ADMIN — PANTOPRAZOLE SODIUM 40 MILLIGRAM(S): 20 TABLET, DELAYED RELEASE ORAL at 14:16

## 2022-11-02 RX ADMIN — CHLORHEXIDINE GLUCONATE 1 APPLICATION(S): 213 SOLUTION TOPICAL at 11:57

## 2022-11-02 RX ADMIN — SODIUM CHLORIDE 75 MILLILITER(S): 9 INJECTION INTRAMUSCULAR; INTRAVENOUS; SUBCUTANEOUS at 05:52

## 2022-11-02 RX ADMIN — Medication 4 MILLIGRAM(S): at 17:51

## 2022-11-02 RX ADMIN — Medication 100 MILLIEQUIVALENT(S): at 21:32

## 2022-11-02 RX ADMIN — ANASTROZOLE 1 MILLIGRAM(S): 1 TABLET ORAL at 14:15

## 2022-11-02 RX ADMIN — Medication 100 MILLIEQUIVALENT(S): at 17:50

## 2022-11-02 RX ADMIN — FENTANYL CITRATE 1 PATCH: 50 INJECTION INTRAVENOUS at 18:26

## 2022-11-02 RX ADMIN — MUPIROCIN 1 APPLICATION(S): 20 OINTMENT TOPICAL at 05:56

## 2022-11-02 RX ADMIN — HYDROMORPHONE HYDROCHLORIDE 30 MILLILITER(S): 2 INJECTION INTRAMUSCULAR; INTRAVENOUS; SUBCUTANEOUS at 20:22

## 2022-11-02 RX ADMIN — Medication 4 MILLIGRAM(S): at 05:56

## 2022-11-02 NOTE — PROGRESS NOTE ADULT - PROBLEM SELECTOR PLAN 2
- s/p C7-T2 corpectomy & fusion and posterior C4-T5 fusion  - continue dexamethasone 4mg Q12  - C/w PPI while on steroids  -Unclear anticipated duration of C-collar and steroids. Would also benefit from neurosurg eval given worsening pain  -PRIOR MD Discussed w/ Neurosurgery- Dr. Bennett 10/29, agrees w/ MRI then BUT   11/1/22-Neuro-surgery Dr Mani Austin- on teams notes patient does NOT NEED NEURO Surgery or MRI  no need to send back to [place] to Neuro-surgery  Palliative asked to see patient AGAIN for Pain control and further GOC- did not tolerate Rt due to pain

## 2022-11-02 NOTE — PROGRESS NOTE ADULT - PROBLEM SELECTOR PLAN 8
Palliative asked to see patient AGAIN for Pain control and further GOC- patient now DNR  PMR rec LEONOR  OUT patient f/u Dr Rivera at [facility] on Anastrozole - hormonal therapy  Called brother Mr Arun Kearney [phone]- 11/2 to update- left VM update

## 2022-11-02 NOTE — PROGRESS NOTE ADULT - SUBJECTIVE AND OBJECTIVE BOX
Did not tolerate RT because of pain  No more RT offered  Pal to f/u pain   Patient is a 67y old  Female who presents with a chief complaint of Bone pain (02 Nov 2022 10:52)      SUBJECTIVE / OVERNIGHT EVENTS:  Resting in bed   alert and talking- noted she did NOT tolerate RT due to PAIN  Did not answer questions about when she was transferred to [place]    MEDICATIONS  (STANDING):  anastrozole 1 milliGRAM(s) Oral daily  chlorhexidine 2% Cloths 1 Application(s) Topical daily  dexAMETHasone  Injectable 4 milliGRAM(s) IV Push every 12 hours  enoxaparin Injectable 40 milliGRAM(s) SubCutaneous every 24 hours  fentaNYL   Patch  25 MICROgram(s)/Hr 1 Patch Transdermal every 72 hours  fentaNYL   Patch  50 MICROgram(s)/Hr 1 Patch Transdermal every 72 hours  HYDROmorphone PCA (1 mG/mL) 30 milliLiter(s) PCA Continuous PCA Continuous  metoprolol tartrate Injectable 2.5 milliGRAM(s) IV Push every 12 hours  pantoprazole  Injectable 40 milliGRAM(s) IV Push daily  polyethylene glycol 3350 17 Gram(s) Oral every 12 hours  saccharomyces boulardii 250 milliGRAM(s) Oral two times a day  senna 2 Tablet(s) Oral at bedtime  sodium chloride 0.9%. 1000 milliLiter(s) (75 mL/Hr) IV Continuous <Continuous>    MEDICATIONS  (PRN):  acetaminophen     Tablet .. 650 milliGRAM(s) Oral every 6 hours PRN Mild Pain (1 - 3), Moderate Pain (4 - 6)  ALPRAZolam 0.25 milliGRAM(s) Oral every 6 hours PRN anxiety  cyclobenzaprine 5 milliGRAM(s) Oral three times a day PRN Muscle Spasm  HYDROmorphone PCA (1 mG/mL) Rescue Clinician Bolus 1.5 milliGRAM(s) IV Push every 2 hours PRN for Pain Scale GREATER THAN 6  naloxone Injectable 0.1 milliGRAM(s) IV Push every 3 minutes PRN For ANY of the following changes in patient status:  A. RR LESS THAN 10 breaths per minute, B. Oxygen saturation LESS THAN 90%, C. Sedation score of 6  ondansetron Injectable 4 milliGRAM(s) IV Push every 6 hours PRN Nausea        I&O's Summary    01 Nov 2022 07:01  -  02 Nov 2022 07:00  --------------------------------------------------------  IN: 1340 mL / OUT: 800 mL / NET: 540 mL      Vital Signs Last 24 Hrs  T(C): 36.4 (02 Nov 2022 09:54), Max: 36.6 (02 Nov 2022 05:13)  T(F): 97.5 (02 Nov 2022 09:54), Max: 97.8 (02 Nov 2022 05:13)  HR: 75 (02 Nov 2022 09:54) (75 - 93)  BP: 141/77 (02 Nov 2022 09:54) (138/77 - 149/80)  BP(mean): --  RR: 16 (02 Nov 2022 09:54) (16 - 17)  SpO2: 97% (02 Nov 2022 09:54) (97% - 100%)    Parameters below as of 02 Nov 2022 09:54  Patient On (Oxygen Delivery Method): room air      PHYSICAL EXAM:  GENERAL: NAD, well-developed  HEAD:  Atraumatic, Normocephalic  EYES: EOMI, PERRLA, conjunctiva and sclera clear  NECK: Supple, No JVD  neck brace  CHEST/LUNG: Clear to auscultation bilaterally; No wheeze  HEART: Regular rate and rhythm; No murmurs, rubs, or gallops  ABDOMEN: Soft, Nontender, Nondistended; Bowel sounds present  EXTREMITIES:  2+ Peripheral Pulses, No clubbing, cyanosis, or edema  PSYCH: AAOx3  NEUROLOGY:   Moves Both upper ext 5/5 stength- hold cups and eat  Loer ext- 3/5- wave feet in bed    LABS:    10-31    142  |  109<H>  |  6<L>  ----------------------------<  95  3.9   |  22  |  0.24<L>    Ca    9.8      31 Oct 2022 19:50  Phos  2.7     10-31  Mg     1.70     10-31           Care Discussed with Consultants/Other Providers:  11/1 Did not tolerate RT because of pain -No more RT offered  Pal to f/u pain  11/1- Reached out to Neuro- onc Dr Diaz [facility] - no more nuero-surgery planned- no more need for MRIs  Onc- Anastrozole PO then f/u out patient at [facility]   Patient is a 67y old  Female who presents with a chief complaint of Bone pain (02 Nov 2022 10:52)      SUBJECTIVE / OVERNIGHT EVENTS:  Resting in bed   alert and talking- noted she did NOT tolerate RT due to PAIN  Did not answer questions about when she was transferred to [place]    MEDICATIONS  (STANDING):  anastrozole 1 milliGRAM(s) Oral daily  chlorhexidine 2% Cloths 1 Application(s) Topical daily  dexAMETHasone  Injectable 4 milliGRAM(s) IV Push every 12 hours  enoxaparin Injectable 40 milliGRAM(s) SubCutaneous every 24 hours  fentaNYL   Patch  25 MICROgram(s)/Hr 1 Patch Transdermal every 72 hours  fentaNYL   Patch  50 MICROgram(s)/Hr 1 Patch Transdermal every 72 hours  HYDROmorphone PCA (1 mG/mL) 30 milliLiter(s) PCA Continuous PCA Continuous  metoprolol tartrate Injectable 2.5 milliGRAM(s) IV Push every 12 hours  pantoprazole  Injectable 40 milliGRAM(s) IV Push daily  polyethylene glycol 3350 17 Gram(s) Oral every 12 hours  saccharomyces boulardii 250 milliGRAM(s) Oral two times a day  senna 2 Tablet(s) Oral at bedtime  sodium chloride 0.9%. 1000 milliLiter(s) (75 mL/Hr) IV Continuous <Continuous>    MEDICATIONS  (PRN):  acetaminophen     Tablet .. 650 milliGRAM(s) Oral every 6 hours PRN Mild Pain (1 - 3), Moderate Pain (4 - 6)  ALPRAZolam 0.25 milliGRAM(s) Oral every 6 hours PRN anxiety  cyclobenzaprine 5 milliGRAM(s) Oral three times a day PRN Muscle Spasm  HYDROmorphone PCA (1 mG/mL) Rescue Clinician Bolus 1.5 milliGRAM(s) IV Push every 2 hours PRN for Pain Scale GREATER THAN 6  naloxone Injectable 0.1 milliGRAM(s) IV Push every 3 minutes PRN For ANY of the following changes in patient status:  A. RR LESS THAN 10 breaths per minute, B. Oxygen saturation LESS THAN 90%, C. Sedation score of 6  ondansetron Injectable 4 milliGRAM(s) IV Push every 6 hours PRN Nausea        I&O's Summary    01 Nov 2022 07:01  -  02 Nov 2022 07:00  --------------------------------------------------------  IN: 1340 mL / OUT: 800 mL / NET: 540 mL      Vital Signs Last 24 Hrs  T(C): 36.4 (02 Nov 2022 09:54), Max: 36.6 (02 Nov 2022 05:13)  T(F): 97.5 (02 Nov 2022 09:54), Max: 97.8 (02 Nov 2022 05:13)  HR: 75 (02 Nov 2022 09:54) (75 - 93)  BP: 141/77 (02 Nov 2022 09:54) (138/77 - 149/80)  BP(mean): --  RR: 16 (02 Nov 2022 09:54) (16 - 17)  SpO2: 97% (02 Nov 2022 09:54) (97% - 100%)    Parameters below as of 02 Nov 2022 09:54  Patient On (Oxygen Delivery Method): room air      PHYSICAL EXAM:  GENERAL: NAD, well-developed  HEAD:  Atraumatic, Normocephalic  EYES: EOMI, PERRLA, conjunctiva and sclera clear  NECK: Supple, No JVD  neck brace  CHEST/LUNG: Clear to auscultation bilaterally; No wheeze  HEART: Regular rate and rhythm; No murmurs, rubs, or gallops  ABDOMEN: Soft, Nontender, Nondistended; Bowel sounds present  EXTREMITIES:  2+ Peripheral Pulses, No clubbing, cyanosis, B/L le edema 2 to 3 plus  PSYCH: AAOx3  NEUROLOGY:   Moves Both upper ext 5/5 stength- hold cups and eat  Lower ext- 3/5- wave feet in bed    LABS:    10-31    142  |  109<H>  |  6<L>  ----------------------------<  95  3.9   |  22  |  0.24<L>    Ca    9.8      31 Oct 2022 19:50  Phos  2.7     10-31  Mg     1.70     10-31           Care Discussed with Consultants/Other Providers:  11/1 Did not tolerate RT because of pain -No more RT offered  Pal to f/u pain  11/1- Reached out to Neuro- onc Dr Diaz at [facility] - no more nuero-surgery planned- no more need for MRIs  Onc- Anastrozole PO then f/u out patient at [facility]

## 2022-11-02 NOTE — PROGRESS NOTE ADULT - PROBLEM SELECTOR PLAN 3
Hypercalcemia noted.  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   -Corrected ca 13.6  -s/p calcitonin and C/w gentle hydration -Pamidronate 60mg IVPB x 1   -calcium 9.8 on 11/1  -Monitor serum calcium

## 2022-11-02 NOTE — CHART NOTE - NSCHARTNOTEFT_GEN_A_CORE
Patient difficult for blood draws due to swelling. During the day U/S was used to obtain labs. Potassium and phosphorus supplemented. Patient refusing labs to assess response. Agrees to labs in AM to reassess levels. Will convey to day team.

## 2022-11-02 NOTE — PROGRESS NOTE ADULT - PROBLEM SELECTOR PLAN 1
-Sent in from rehab for palliative RT given worsening pain  - c/w fentanyl patch | Dilaudid pca  - Palliative care/onc consulted; appreciate recs- c/w PCA pump  d/w Rt- onc no more MRI needed  Neuro-surgery Dr Mani Austin- on teams notes patient does NOT NEED NEURO Surgery or MRI,  no need to send back to [place] to Neuro-surgery  Palliative asked to see patient AGAIN for Pain control and further GOC- patient now DNR  Oncology notes no plan for chemotherapy- Only PO Anastrozole and out patient f/u with Dr Rivera at [facility]

## 2022-11-02 NOTE — PROGRESS NOTE ADULT - ASSESSMENT
67 y/o F with PMH of left arm skin CA s/p excision at age 16, stomach ulcer, recently diagnosed breast ca with mets to bones, liver, and lungs with cord compression s/p C7-T2 corpectomy & fusion and posterior C4-T5 fusion (9/22) transferred from acute rehab at [place] for initiation of palliative RT.  h/o c diff out patient. none now    11/1/22  Neuro-surgery Dr Mani Austin- on teams notes patient does NOT NEED NEURO Surgery or MRI-no need to send back to [place] to Neuro-surgery  11/2- no RT to be given- not being offered-   Palliative asked to see patient AGAIN for Pain control and further GOC- patient now DNR  PMR rec LEONOR

## 2022-11-03 ENCOUNTER — TRANSCRIPTION ENCOUNTER (OUTPATIENT)
Age: 67
End: 2022-11-03

## 2022-11-03 DIAGNOSIS — R62.7 ADULT FAILURE TO THRIVE: ICD-10-CM

## 2022-11-03 LAB
ALBUMIN SERPL ELPH-MCNC: 2.7 G/DL — LOW (ref 3.3–5)
ALP SERPL-CCNC: 204 U/L — HIGH (ref 40–120)
ALT FLD-CCNC: 10 U/L — SIGNIFICANT CHANGE UP (ref 4–33)
ANION GAP SERPL CALC-SCNC: 10 MMOL/L — SIGNIFICANT CHANGE UP (ref 7–14)
AST SERPL-CCNC: 62 U/L — HIGH (ref 4–32)
BASOPHILS # BLD AUTO: 0.07 K/UL — SIGNIFICANT CHANGE UP (ref 0–0.2)
BASOPHILS NFR BLD AUTO: 0.6 % — SIGNIFICANT CHANGE UP (ref 0–2)
BILIRUB SERPL-MCNC: 0.2 MG/DL — SIGNIFICANT CHANGE UP (ref 0.2–1.2)
BUN SERPL-MCNC: 5 MG/DL — LOW (ref 7–23)
CALCIUM SERPL-MCNC: 8.6 MG/DL — SIGNIFICANT CHANGE UP (ref 8.4–10.5)
CHLORIDE SERPL-SCNC: 105 MMOL/L — SIGNIFICANT CHANGE UP (ref 98–107)
CO2 SERPL-SCNC: 23 MMOL/L — SIGNIFICANT CHANGE UP (ref 22–31)
CREAT SERPL-MCNC: 0.21 MG/DL — LOW (ref 0.5–1.3)
EGFR: 127 ML/MIN/1.73M2 — SIGNIFICANT CHANGE UP
EOSINOPHIL # BLD AUTO: 0 K/UL — SIGNIFICANT CHANGE UP (ref 0–0.5)
EOSINOPHIL NFR BLD AUTO: 0 % — SIGNIFICANT CHANGE UP (ref 0–6)
GLUCOSE SERPL-MCNC: 123 MG/DL — HIGH (ref 70–99)
HCT VFR BLD CALC: 30.1 % — LOW (ref 34.5–45)
HGB BLD-MCNC: 9.4 G/DL — LOW (ref 11.5–15.5)
IANC: 10.12 K/UL — HIGH (ref 1.8–7.4)
IMM GRANULOCYTES NFR BLD AUTO: 6.2 % — HIGH (ref 0–0.9)
LYMPHOCYTES # BLD AUTO: 1.16 K/UL — SIGNIFICANT CHANGE UP (ref 1–3.3)
LYMPHOCYTES # BLD AUTO: 9.3 % — LOW (ref 13–44)
MAGNESIUM SERPL-MCNC: 1.6 MG/DL — SIGNIFICANT CHANGE UP (ref 1.6–2.6)
MCHC RBC-ENTMCNC: 28.4 PG — SIGNIFICANT CHANGE UP (ref 27–34)
MCHC RBC-ENTMCNC: 31.2 GM/DL — LOW (ref 32–36)
MCV RBC AUTO: 90.9 FL — SIGNIFICANT CHANGE UP (ref 80–100)
MONOCYTES # BLD AUTO: 0.37 K/UL — SIGNIFICANT CHANGE UP (ref 0–0.9)
MONOCYTES NFR BLD AUTO: 3 % — SIGNIFICANT CHANGE UP (ref 2–14)
NEUTROPHILS # BLD AUTO: 10.12 K/UL — HIGH (ref 1.8–7.4)
NEUTROPHILS NFR BLD AUTO: 80.9 % — HIGH (ref 43–77)
NRBC # BLD: 0 /100 WBCS — SIGNIFICANT CHANGE UP (ref 0–0)
NRBC # FLD: 0.09 K/UL — HIGH (ref 0–0)
PHOSPHATE SERPL-MCNC: 2.2 MG/DL — LOW (ref 2.5–4.5)
PLATELET # BLD AUTO: 328 K/UL — SIGNIFICANT CHANGE UP (ref 150–400)
POTASSIUM SERPL-MCNC: 3.4 MMOL/L — LOW (ref 3.5–5.3)
POTASSIUM SERPL-SCNC: 3.4 MMOL/L — LOW (ref 3.5–5.3)
PROT SERPL-MCNC: 4.7 G/DL — LOW (ref 6–8.3)
RBC # BLD: 3.31 M/UL — LOW (ref 3.8–5.2)
RBC # FLD: 18.4 % — HIGH (ref 10.3–14.5)
SARS-COV-2 RNA SPEC QL NAA+PROBE: SIGNIFICANT CHANGE UP
SODIUM SERPL-SCNC: 138 MMOL/L — SIGNIFICANT CHANGE UP (ref 135–145)
WBC # BLD: 12.49 K/UL — HIGH (ref 3.8–10.5)
WBC # FLD AUTO: 12.49 K/UL — HIGH (ref 3.8–10.5)

## 2022-11-03 PROCEDURE — 99233 SBSQ HOSP IP/OBS HIGH 50: CPT

## 2022-11-03 PROCEDURE — 99498 ADVNCD CARE PLAN ADDL 30 MIN: CPT

## 2022-11-03 PROCEDURE — 99233 SBSQ HOSP IP/OBS HIGH 50: CPT | Mod: FS,25

## 2022-11-03 PROCEDURE — 99497 ADVNCD CARE PLAN 30 MIN: CPT | Mod: 25

## 2022-11-03 RX ORDER — DEXAMETHASONE 0.5 MG/5ML
16.67 ELIXIR ORAL
Qty: 0 | Refills: 0 | DISCHARGE
Start: 2022-11-03

## 2022-11-03 RX ORDER — POLYETHYLENE GLYCOL 3350 17 G/17G
17 POWDER, FOR SOLUTION ORAL
Qty: 0 | Refills: 0 | DISCHARGE
Start: 2022-11-03

## 2022-11-03 RX ORDER — METOPROLOL TARTRATE 50 MG
2.5 TABLET ORAL
Qty: 0 | Refills: 0 | DISCHARGE
Start: 2022-11-03

## 2022-11-03 RX ORDER — ANASTROZOLE 1 MG/1
1 TABLET ORAL
Qty: 0 | Refills: 0 | DISCHARGE
Start: 2022-11-03

## 2022-11-03 RX ORDER — POTASSIUM PHOSPHATE, MONOBASIC POTASSIUM PHOSPHATE, DIBASIC 236; 224 MG/ML; MG/ML
15 INJECTION, SOLUTION INTRAVENOUS ONCE
Refills: 0 | Status: COMPLETED | OUTPATIENT
Start: 2022-11-03 | End: 2022-11-03

## 2022-11-03 RX ORDER — ALPRAZOLAM 0.25 MG
1 TABLET ORAL
Qty: 0 | Refills: 0 | DISCHARGE
Start: 2022-11-03

## 2022-11-03 RX ORDER — CYCLOBENZAPRINE HYDROCHLORIDE 10 MG/1
1 TABLET, FILM COATED ORAL
Qty: 0 | Refills: 0 | DISCHARGE
Start: 2022-11-03

## 2022-11-03 RX ORDER — POTASSIUM CHLORIDE 20 MEQ
40 PACKET (EA) ORAL ONCE
Refills: 0 | Status: COMPLETED | OUTPATIENT
Start: 2022-11-03 | End: 2022-11-03

## 2022-11-03 RX ORDER — ONDANSETRON 8 MG/1
4 TABLET, FILM COATED ORAL
Qty: 0 | Refills: 0 | DISCHARGE
Start: 2022-11-03

## 2022-11-03 RX ORDER — PANTOPRAZOLE SODIUM 20 MG/1
40 TABLET, DELAYED RELEASE ORAL
Qty: 0 | Refills: 0 | DISCHARGE
Start: 2022-11-03

## 2022-11-03 RX ORDER — SENNA PLUS 8.6 MG/1
2 TABLET ORAL
Qty: 0 | Refills: 0 | DISCHARGE
Start: 2022-11-03

## 2022-11-03 RX ORDER — MIRTAZAPINE 45 MG/1
1 TABLET, ORALLY DISINTEGRATING ORAL
Qty: 0 | Refills: 0 | DISCHARGE
Start: 2022-11-03

## 2022-11-03 RX ORDER — MIRTAZAPINE 45 MG/1
15 TABLET, ORALLY DISINTEGRATING ORAL AT BEDTIME
Refills: 0 | Status: DISCONTINUED | OUTPATIENT
Start: 2022-11-03 | End: 2022-11-08

## 2022-11-03 RX ORDER — HYDROMORPHONE HYDROCHLORIDE 2 MG/ML
30 INJECTION INTRAMUSCULAR; INTRAVENOUS; SUBCUTANEOUS
Refills: 0 | Status: DISCONTINUED | OUTPATIENT
Start: 2022-11-03 | End: 2022-11-05

## 2022-11-03 RX ORDER — ALPRAZOLAM 0.25 MG
0.25 TABLET ORAL EVERY 6 HOURS
Refills: 0 | Status: DISCONTINUED | OUTPATIENT
Start: 2022-11-03 | End: 2022-11-08

## 2022-11-03 RX ADMIN — Medication 2.5 MILLIGRAM(S): at 17:37

## 2022-11-03 RX ADMIN — FENTANYL CITRATE 1 PATCH: 50 INJECTION INTRAVENOUS at 07:20

## 2022-11-03 RX ADMIN — ANASTROZOLE 1 MILLIGRAM(S): 1 TABLET ORAL at 12:36

## 2022-11-03 RX ADMIN — FENTANYL CITRATE 1 PATCH: 50 INJECTION INTRAVENOUS at 07:28

## 2022-11-03 RX ADMIN — MIRTAZAPINE 15 MILLIGRAM(S): 45 TABLET, ORALLY DISINTEGRATING ORAL at 21:53

## 2022-11-03 RX ADMIN — Medication 4 MILLIGRAM(S): at 06:39

## 2022-11-03 RX ADMIN — Medication 250 MILLIGRAM(S): at 06:39

## 2022-11-03 RX ADMIN — CHLORHEXIDINE GLUCONATE 1 APPLICATION(S): 213 SOLUTION TOPICAL at 14:01

## 2022-11-03 RX ADMIN — ENOXAPARIN SODIUM 40 MILLIGRAM(S): 100 INJECTION SUBCUTANEOUS at 06:40

## 2022-11-03 RX ADMIN — Medication 4 MILLIGRAM(S): at 17:37

## 2022-11-03 RX ADMIN — HYDROMORPHONE HYDROCHLORIDE 30 MILLILITER(S): 2 INJECTION INTRAMUSCULAR; INTRAVENOUS; SUBCUTANEOUS at 08:31

## 2022-11-03 RX ADMIN — POTASSIUM PHOSPHATE, MONOBASIC POTASSIUM PHOSPHATE, DIBASIC 62.5 MILLIMOLE(S): 236; 224 INJECTION, SOLUTION INTRAVENOUS at 17:37

## 2022-11-03 RX ADMIN — Medication 40 MILLIEQUIVALENT(S): at 12:35

## 2022-11-03 RX ADMIN — PANTOPRAZOLE SODIUM 40 MILLIGRAM(S): 20 TABLET, DELAYED RELEASE ORAL at 12:35

## 2022-11-03 RX ADMIN — FENTANYL CITRATE 1 PATCH: 50 INJECTION INTRAVENOUS at 12:38

## 2022-11-03 RX ADMIN — Medication 2.5 MILLIGRAM(S): at 06:39

## 2022-11-03 RX ADMIN — SENNA PLUS 2 TABLET(S): 8.6 TABLET ORAL at 21:53

## 2022-11-03 RX ADMIN — FENTANYL CITRATE 1 PATCH: 50 INJECTION INTRAVENOUS at 07:19

## 2022-11-03 RX ADMIN — FENTANYL CITRATE 1 PATCH: 50 INJECTION INTRAVENOUS at 12:00

## 2022-11-03 RX ADMIN — HYDROMORPHONE HYDROCHLORIDE 30 MILLILITER(S): 2 INJECTION INTRAMUSCULAR; INTRAVENOUS; SUBCUTANEOUS at 16:44

## 2022-11-03 RX ADMIN — HYDROMORPHONE HYDROCHLORIDE 30 MILLILITER(S): 2 INJECTION INTRAMUSCULAR; INTRAVENOUS; SUBCUTANEOUS at 20:15

## 2022-11-03 RX ADMIN — HYDROMORPHONE HYDROCHLORIDE 30 MILLILITER(S): 2 INJECTION INTRAMUSCULAR; INTRAVENOUS; SUBCUTANEOUS at 11:09

## 2022-11-03 NOTE — CHART NOTE - NSCHARTNOTEFT_GEN_A_CORE
[facility] meeting with Pallitive and brother  Patient seen with brother- HCP Quique [place] and palliative Attending and Palliative SW and team present and bedside  Patient wants her pain to be controlled and differ decision making to brother and HCP Quique.    Spoke with brother Quique separate from patient while she rested.  After explaining patients present diagnosis and prognosis , Quique understood she had months most likely not years to live and wishes her comfortable.  He is in agreement with [facility] with PCa pump.  Brother notes he lives at [place] and patient if manageable can go home with him. Pain is not presently managed.  Patient is already DNR    Palliative care greatly appreciated.  In Patient hospice reff done by FRIDA Edward  Int Med  [id number]

## 2022-11-03 NOTE — DIETITIAN NUTRITION RISK NOTIFICATION - TREATMENT: THE FOLLOWING DIET HAS BEEN RECOMMENDED
Diet, Pureed:   Mildly Thick Liquids (MILDTHICKLIQS)  Supplement Feeding Modality:  Oral  Ensure Clear Cans or Servings Per Day:  1       Frequency:  Three Times a day (11-01-22 @ 17:41) [Active]

## 2022-11-03 NOTE — PROGRESS NOTE ADULT - SUBJECTIVE AND OBJECTIVE BOX
INTERVAL HPI/OVERNIGHT EVENTS:  Patient seen at bedside.  As per primary team , family meeting planned for 3pm today  Briefly started discussed goals of care with patient  All concerns addressed      VITAL SIGNS:  T(F): 97.4 (11-03-22 @ 05:29)  HR: 86 (11-03-22 @ 05:29)  BP: 146/74 (11-03-22 @ 05:29)  RR: 17 (11-03-22 @ 05:29)  SpO2: 94% (11-03-22 @ 05:29)  Wt(kg): --    PHYSICAL EXAM:  GENERAL: NAD, well-developed  HEAD:  Atraumatic, Normocephalic  EYES: EOMI, PERRLA, conjunctiva and sclera clear  NECK: Supple, No JVD  C color  CHEST/LUNG: Clear to auscultation bilaterally; No wheeze  HEART: Regular rate and rhythm; No murmurs, rubs, or gallops  ABDOMEN: Soft, Nontender, Nondistended; Bowel sounds present  EXTREMITIES:  2+ Peripheral Pulses, No clubbing, cyanosis,   B/l le edema 1 plus  PSYCH: AAOx3      MEDICATIONS  (STANDING):  anastrozole 1 milliGRAM(s) Oral daily  chlorhexidine 2% Cloths 1 Application(s) Topical daily  dexAMETHasone  Injectable 4 milliGRAM(s) IV Push every 12 hours  enoxaparin Injectable 40 milliGRAM(s) SubCutaneous every 24 hours  fentaNYL   Patch  25 MICROgram(s)/Hr 1 Patch Transdermal every 72 hours  fentaNYL   Patch  50 MICROgram(s)/Hr 1 Patch Transdermal every 72 hours  HYDROmorphone PCA (1 mG/mL) 30 milliLiter(s) PCA Continuous PCA Continuous  metoprolol tartrate Injectable 2.5 milliGRAM(s) IV Push every 12 hours  pantoprazole  Injectable 40 milliGRAM(s) IV Push daily  polyethylene glycol 3350 17 Gram(s) Oral every 12 hours  saccharomyces boulardii 250 milliGRAM(s) Oral two times a day  senna 2 Tablet(s) Oral at bedtime    MEDICATIONS  (PRN):  acetaminophen     Tablet .. 650 milliGRAM(s) Oral every 6 hours PRN Mild Pain (1 - 3), Moderate Pain (4 - 6)  ALPRAZolam 0.25 milliGRAM(s) Oral every 6 hours PRN anxiety  cyclobenzaprine 5 milliGRAM(s) Oral three times a day PRN Muscle Spasm  HYDROmorphone PCA (1 mG/mL) Rescue Clinician Bolus 1.5 milliGRAM(s) IV Push every 2 hours PRN for Pain Scale GREATER THAN 6  naloxone Injectable 0.1 milliGRAM(s) IV Push every 3 minutes PRN For ANY of the following changes in patient status:  A. RR LESS THAN 10 breaths per minute, B. Oxygen saturation LESS THAN 90%, C. Sedation score of 6  ondansetron Injectable 4 milliGRAM(s) IV Push every 6 hours PRN Nausea      Allergies    tetanus toxoid (Hives)    Intolerances    lactose (Other)      LABS:                        9.4    12.49 )-----------( 328      ( 03 Nov 2022 05:50 )             30.1     11-03    138  |  105  |  5<L>  ----------------------------<  123<H>  3.4<L>   |  23  |  0.21<L>    Ca    8.6      03 Nov 2022 05:50  Phos  2.2     11-03  Mg     1.60     11-03    TPro  4.7<L>  /  Alb  2.7<L>  /  TBili  0.2  /  DBili  x   /  AST  62<H>  /  ALT  10  /  AlkPhos  204<H>  11-03          RADIOLOGY & ADDITIONAL TESTS:  Studies reviewed.

## 2022-11-03 NOTE — PROGRESS NOTE ADULT - PROBLEM SELECTOR PLAN 1
-Sent in from rehab for palliative RT given worsening pain  - c/w fentanyl patch | Dilaudid pca  - Palliative care/onc consulted; appreciate recs- c/w PCA pump  d/w Rt- onc no more MRI needed  Neuro-surgery Dr Mani Austin- on teams notes patient does NOT NEED NEURO Surgery or MRI,  no need to send back to [place] to Neuro-surgery  Palliative asked to see patient AGAIN for Pain control and further GOC- patient now DNR  Oncology notes no plan for chemotherapy- Only PO Anastrozole and out patient f/u with Dr Rivera at [facility]  Family meeting planned by pallitive at 3 pm- brother indicated he was not able to keep appoitments with patient

## 2022-11-03 NOTE — PROGRESS NOTE ADULT - ASSESSMENT
67 y/o F with PMH of left arm skin CA s/p excision at age 16, stomach ulcer, recently diagnosed breast ca with mets to bones, liver, and lungs with cord compression s/p C7-T2 corpectomy & fusion and posterior C4-T5 fusion (9/22) transferred from acute rehab at [place] for initiation of palliative RT.  h/o c diff out patient. none now    11/1/22  Neuro-surgery Dr Mani Austin- on teams notes patient does NOT NEED NEURO Surgery or MRI-no need to send back to [place] to Neuro-surgery  11/2- no RT to be given- not being offered-   Palliative asked to see patient AGAIN for Pain control and further GOC- patient now DNR  PMR rec LEONOR       65 y/o F with PMH of left arm skin CA s/p excision at age 16, stomach ulcer, recently diagnosed breast ca with mets to bones, liver, and lungs with cord compression s/p C7-T2 corpectomy & fusion and posterior C4-T5 fusion (9/22) transferred from acute rehab at [place] for initiation of palliative RT.  h/o c diff out patient. none now    11/1/22  Neuro-surgery Dr Mani Austin- on teams notes patient does NOT NEED NEURO Surgery or MRI-no need to send back to [place] to Neuro-surgery  11/2- no RT to be given- not being offered-   Palliative asked to see patient AGAIN for Pain control and further GOC- patient now DNR  PMR rec LEONOR    Breast cancer with mets to bone/liver/ lung with cord compression   FFT  Functional Quad  Severe protein calorie malnutrition

## 2022-11-03 NOTE — PROGRESS NOTE ADULT - PROBLEM SELECTOR PLAN 3
As per HCP brother Quique, pt has little to no appetite  Will add Remeron 15mg at bedtime for appetite stimulation As per HCP brother Quique, pt has little to no appetite, now with difficulty swallowing as well.   Will add Remeron 15mg at bedtime for appetite stimulation

## 2022-11-03 NOTE — PROGRESS NOTE ADULT - PROBLEM SELECTOR PLAN 5
Pt is DNR/DNI by primary team, see GOC note  HCP Brother Quique   Hospice referral made   Page for uncontrolled symptoms [id number]

## 2022-11-03 NOTE — DISCHARGE NOTE PROVIDER - DETAILS OF MALNUTRITION DIAGNOSIS/DIAGNOSES
This patient has been assessed with a concern for Malnutrition and was treated during this hospitalization for the following Nutrition diagnosis/diagnoses:     -  11/03/2022: Severe protein-calorie malnutrition

## 2022-11-03 NOTE — DIETITIAN INITIAL EVALUATION ADULT - PERTINENT MEDS FT
MEDICATIONS  (STANDING):  anastrozole 1 milliGRAM(s) Oral daily  chlorhexidine 2% Cloths 1 Application(s) Topical daily  dexAMETHasone  Injectable 4 milliGRAM(s) IV Push every 12 hours  enoxaparin Injectable 40 milliGRAM(s) SubCutaneous every 24 hours  fentaNYL   Patch  25 MICROgram(s)/Hr 1 Patch Transdermal every 72 hours  fentaNYL   Patch  50 MICROgram(s)/Hr 1 Patch Transdermal every 72 hours  HYDROmorphone PCA (1 mG/mL) 30 milliLiter(s) PCA Continuous PCA Continuous  metoprolol tartrate Injectable 2.5 milliGRAM(s) IV Push every 12 hours  pantoprazole  Injectable 40 milliGRAM(s) IV Push daily  polyethylene glycol 3350 17 Gram(s) Oral every 12 hours  saccharomyces boulardii 250 milliGRAM(s) Oral two times a day  senna 2 Tablet(s) Oral at bedtime    MEDICATIONS  (PRN):  acetaminophen     Tablet .. 650 milliGRAM(s) Oral every 6 hours PRN Mild Pain (1 - 3), Moderate Pain (4 - 6)  ALPRAZolam 0.25 milliGRAM(s) Oral every 6 hours PRN anxiety  cyclobenzaprine 5 milliGRAM(s) Oral three times a day PRN Muscle Spasm  HYDROmorphone PCA (1 mG/mL) Rescue Clinician Bolus 1.5 milliGRAM(s) IV Push every 2 hours PRN for Pain Scale GREATER THAN 6  naloxone Injectable 0.1 milliGRAM(s) IV Push every 3 minutes PRN For ANY of the following changes in patient status:  A. RR LESS THAN 10 breaths per minute, B. Oxygen saturation LESS THAN 90%, C. Sedation score of 6  ondansetron Injectable 4 milliGRAM(s) IV Push every 6 hours PRN Nausea

## 2022-11-03 NOTE — DISCHARGE NOTE PROVIDER - HOSPITAL COURSE
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  While at , pt tested positive for C diff and was treated with 14 day course of PO vancomycin (Completed 10/25).  Pt reports continued neck pain.  She denies dyspnea, fever, chills or chest pain.  She is unsure about diarrhea.      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  While at , pt tested positive for C diff and was treated with 14 day course of PO vancomycin (Completed 10/25).  Pt reports continued neck pain.  She denies dyspnea, fever, chills or chest pain.  She is unsure about diarrhea.      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  h/o c diff out patient. 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  While at , pt tested positive for C diff and was treated with 14 day course of PO vancomycin (Completed 10/25).  Pt reports continued neck pain.  She denies dyspnea, fever, chills or chest pain.  She is unsure about diarrhea.      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  h/o c diff out patient. none now    11/1/22  Neuro-surgery Dr Mani Austin- on teams notes patient does NOT NEED NEURO Surgery or MRI-no need to send back to [place] to Neuro-surgery  11/2- no RT to be given- not being offered-   Palliative asked to see patient AGAIN for Pain control and further GOC- patient now DNR  PMR rec LENOOR    Breast cancer with mets to bone/liver/ lung with cord compression   FFT  Functional Quad  Severe protein calorie malnutrition  Breast cancer metastasized to bone.   ·  Plan: -Sent in from rehab for palliative RT given worsening pain  - c/w fentanyl patch | Dilaudid pca  - Palliative care/onc consulted; appreciate recs- c/w PCA pump  d/w Rt- onc no more MRI needed  Neuro-surgery Dr Mani Austin- on teams notes patient does NOT NEED NEURO Surgery or MRI,  no need to send back to [place] to Neuro-surgery  Palliative asked to see patient AGAIN for Pain control and further GOC- patient now DNR  Oncology notes no plan for chemotherapy- Only PO Anastrozole and out patient f/u with Dr Rivera at [facility]  Family meeting planned by pallitive at 3 pm- brother indicated he was not able to keep appoitments with patient.     Problem/Plan - 2:  ·  Problem: Cervical spinal cord compression.   ·  Plan: - s/p C7-T2 corpectomy & fusion and posterior C4-T5 fusion  - continue dexamethasone 4mg Q12  - C/w PPI while on steroids  -Unclear anticipated duration of C-collar and steroids. Would also benefit from neurosurg eval given worsening pain  -PRIOR MD Discussed w/ Neurosurgery- Dr. Bennett 10/29, agrees w/ MRI then BUT   11/1/22-Neuro-surgery Dr Mani Austin- on teams notes patient does NOT NEED NEURO Surgery or MRI  no need to send back to [place] to Neuro-surgery  Palliative asked to see patient AGAIN for Pain control and further GOC- did not tolerate Rt due to pain.       Problem/Plan - 3:  ·  Problem: Hypercalcemia.   ·  Plan: Hypercalcemia noted.  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   -Corrected ca 13.6  -s/p calcitonin and C/w gentle hydration -Pamidronate 60mg IVPB x 1   -calcium 9.8 on 11/1  -Monitor serum calcium.     Problem/Plan - 4:  ·  Problem: Essential hypertension.   ·  Plan: - continue metoprolol  - Monitor pressures.     Problem/Plan - 5:  ·  Problem: Swelling of upper extremity.   ·  Plan: -Pt w/ B/L upper extremity swelling   -Suspect iso IVF +/- stasis +/- hypoalbuminemia. R/O DVT  -Per chart review US Duplex 10/17 w/o RUE DVT  -Refusing US Duplex LUE   -Encourage elevation.         65 y/o F with PMH of left arm skin CA s/p excision at age 16, stomach ulcer, and recently diagnosed breast ca with mets to bones, liver, and lungs with cord compression s/p C7-T2 corpectomy & fusion and posterior C4-T5 fusion transferred from acute rehab at [place] for initiation of palliative RT.  While at , pt tested positive for C diff and was treated with 14 day course of PO vancomycin (Completed 10/25).  Pt reports continued neck pain.  She denies dyspnea, fever, chills or chest pain.  She is unsure about diarrhea.      HOSPITAL COURSE:  65 y/o F with PMH of left arm skin CA s/p excision at age 16, stomach ulcer, recently diagnosed breast ca with mets to bones, liver, and lungs with cord compression s/p C7-T2 corpectomy & fusion and posterior C4-T5 fusion (9/22) transferred from acute rehab at [place] for initiation of palliative RT.  h/o c diff out patient. none now    11/1/22  Neuro-surgery Dr Mani Austin- on teams notes patient does NOT NEED NEURO Surgery or MRI-no need to send back to [place] to Neuro-surgery  11/2- no RT to be given- not being offered-   Palliative asked to see patient AGAIN for Pain control and further GOC- patient now DNR  PMR rec LEONOR    Breast cancer with mets to bone/liver/ lung with cord compression   FFT  Functional Quad  Severe protein calorie malnutrition  Breast cancer metastasized to bone.   ·  Plan: -Sent in from rehab for palliative RT given worsening pain  - c/w fentanyl patch | Dilaudid pca  - Palliative care/onc consulted; appreciate recs- c/w PCA pump  d/w Rt- onc no more MRI needed  Neuro-surgery Dr Mani Austin- on teams notes patient does NOT NEED NEURO Surgery or MRI,  no need to send back to [place] to Neuro-surgery  Palliative asked to see patient AGAIN for Pain control and further GOC- patient now DNR  Oncology notes no plan for chemotherapy- Only PO Anastrozole and out patient f/u with Dr Rivera at [facility]  Family meeting with brother Quique - HCP and Palliative and Internal Medicine  done 11/3/22- Brother agrees to [facility] hosp[facility] on PCA pump , as patient sited the most important thing for her is to be COMFORTABLE       Problem/Plan - 2:  ·  Problem: Cervical spinal cord compression.   ·  Plan: - s/p C7-T2 corpectomy & fusion and posterior C4-T5 fusion  - continue dexamethasone 4mg Q12  - C/w PPI while on steroids  -Unclear anticipated duration of C-collar and steroids. Would also benefit from neurosurg eval given worsening pain  -PRIOR MD Discussed w/ Neurosurgery- Dr. Bennett 10/29, agrees w/ MRI then BUT   11/1/22-Neuro-surgery Dr Mani Austin- on teams notes patient does NOT NEED NEURO Surgery or MRI  no need to send back to [place] to Neuro-surgery  Palliative asked to see patient AGAIN for Pain control and further GOC- did not tolerate Rt due to pain.       Problem/Plan - 3:  ·  Problem: Hypercalcemia.   ·  Plan: Hypercalcemia noted.  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   -Corrected ca 13.6  -s/p calcitonin and C/w gentle hydration -Pamidronate 60mg IVPB x 1   -calcium 9.8 on 11/1  -Monitor serum calcium.     Problem/Plan - 4:  ·  Problem: Essential hypertension.   ·  Plan: - continue metoprolol  - Monitor pressures.     Problem/Plan - 5:  ·  Problem: Swelling of upper extremity.   ·  Plan: -Pt w/ B/L upper extremity swelling   -Suspect iso IVF +/- stasis +/- hypoalbuminemia. R/O DVT  -Per chart review US Duplex 10/17 w/o RUE DVT  -Refusing US Duplex LUE   -Encourage elevation.    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  While at , pt tested positive for C diff and was treated with 14 day course of PO vancomycin (Completed 10/25).  Pt reports continued neck pain.  She denies dyspnea, fever, chills or chest pain.  She is unsure about diarrhea.      Hospital Course    Breast cancer metastasized to bone.   Sent in from rehab for palliative RT given worsening pain. Palliative care/onc consulted - c/w PCA pump. D/w Rt- onc no more MRI needed. Neuro-surgery Dr Mani Austin- on teams notes patient does NOT NEED NEURO Surgery or MRI, no need to send back to [place] to Neuro-surgery. Palliative asked to see patient AGAIN for Pain control and further GOC- patient now DNR. Oncology notes no plan for chemotherapy- Only PO Anastrozole and out patient f/u with Dr Rivera at [facility]. Family meeting completed by palliative with brother on 11/3---> Inpatient hospice at [place].    Cervical spinal cord compression.   S/p C7-T2 corpectomy & fusion and posterior C4-T5 fusion. PRIOR MD Discussed w/ Neurosurgery- Dr. Bennett 10/29, agrees w/ MRI then BUT 11/1/22-Neuro-surgery Dr Mani Austin- on teams notes patient does NOT NEED NEURO Surgery or MRI, no need to send back to [place] to Neuro-surgery. Continue dexamethasone 4mg Q12. C/w PPI while on steroids. Unclear anticipated duration of C-collar and steroids.    Hypercalcemia.   Hypercalcemia noted. S/p calcitonin and C/w gentle hydration -Pamidronate 60mg IVPB x 1. Suspect hypercalcemia of malignancy. Pending transfer to hospice.    Essential hypertension.   Continue metoprolol, Monitor pressures.    Swelling of upper extremity.   Pt w/ B/L upper extremity swelling. Suspect iso IVF +/- stasis +/- hypoalbuminemia. Per chart review US Duplex 10/17 w/o RUE DVT. Refusing US Duplex LUE. Encourage elevation.    Anemia.   At baseline at this time. Suspect anemia of chronic disease.    Prophylactic measure.   DIET: Pureed diet   DVT: Lovenox  DISPO: to hospice    11/3 [facility] with Palliative- Now planning for [facility] patient HOSPICE at [facility]      On 11/8/2022 this case was reviewed with Dr. Ruiz, the patient is medically stable and optimized for discharge. All medications were reviewed and prescriptions were sent to mutually agreed upon pharmacy.

## 2022-11-03 NOTE — PROGRESS NOTE ADULT - PROBLEM SELECTOR PLAN 8
Palliative asked to see patient AGAIN for Pain control and further GOC- patient now DNR  PMR rec LEONOR  OUT patient f/u Dr Rivera at [facility] on Anastrozole - hormonal therapy  Called brother Mr Arun Kearney [phone]- 11/2 to update- left VM update

## 2022-11-03 NOTE — DIETITIAN INITIAL EVALUATION ADULT - PERTINENT LABORATORY DATA
11-03    138  |  105  |  5<L>  ----------------------------<  123<H>  3.4<L>   |  23  |  0.21<L>    Ca    8.6      03 Nov 2022 05:50  Phos  2.2     11-03  Mg     1.60     11-03    TPro  4.7<L>  /  Alb  2.7<L>  /  TBili  0.2  /  DBili  x   /  AST  62<H>  /  ALT  10  /  AlkPhos  204<H>  11-03

## 2022-11-03 NOTE — PROGRESS NOTE ADULT - CONVERSATION DETAILS
Pt seen and examined at the bedside, pt complained of pain and deferred the conversation to her brother Quique. Reviewed the patient current medical status and trajectory of disease. Quique understands that his sisters cancer is advanced and no treatment will cure her. Quique states that his sister would prefer to focus on pain management and being more comfortable. Discussed extensively that the 1 time fraction of radiation will not extend her life and has agreed to no longer pursue this route. Quique shared that his sister had mentioned hospice care when she was first diagnosed. Explained the philosophy of hospice and the role of symptom management.  Explained that the patient could still follow up with oncology in the community. Quique tearful but understanding, accepted the hospice referral. Emotional support provided. Palliative care team will continue to follow pt's hospital course for symptom management. Pt seen and examined at the bedside, pt complained of pain and deferred the conversation to her brother Quique. Reviewed the patient current medical status and trajectory of disease. Quique understands that his sisters cancer is advanced and no treatment will cure her. Quique states that his sister would prefer to focus on pain management and being more comfortable. Discussed extensively that the 1 time fraction of radiation will not extend her life and has agreed to no longer pursue this route. Quique shared that his sister had mentioned hospice care when she was first diagnosed. Explained the philosophy of hospice and the role of symptom management.  Explained that the patient could still follow up with oncology in the community if improvement seen. Quique tearful but understanding, accepted the hospice referral. Emotional support provided. Palliative care team will continue to follow pt's hospital course for symptom management.

## 2022-11-03 NOTE — DIETITIAN INITIAL EVALUATION ADULT - NS FNS DIET ORDER
Diet, Pureed:   Mildly Thick Liquids (MILDTHICKLIQS)  Supplement Feeding Modality:  Oral  Ensure Clear Cans or Servings Per Day:  1       Frequency:  Three Times a day (11-01-22 @ 17:41)

## 2022-11-03 NOTE — DIETITIAN INITIAL EVALUATION ADULT - ORAL INTAKE PTA/DIET HISTORY
Patient reports poor appetite and PO intake over 1 month. She was on regular diet, lactose intolerance. NKFA.

## 2022-11-03 NOTE — DIETITIAN INITIAL EVALUATION ADULT - ORAL NUTRITION SUPPLEMENTS
1. Foodservice department will provide [employer] Vital shake x 1 (520kcal, 22gpro) and Magic Cup 2x daily (580kcal, 18gm pro) for added calories and protein.   2. Patient prefers Ensure Clear vs. Ensure Plus HP.

## 2022-11-03 NOTE — PROGRESS NOTE ADULT - SUBJECTIVE AND OBJECTIVE BOX
Pal for pain control Patient is a 67y old  Female who presents with a chief complaint of Bone pain (03 Nov 2022 10:01)      SUBJECTIVE / OVERNIGHT EVENTS:  resting in bed  c color on    MEDICATIONS  (STANDING):  anastrozole 1 milliGRAM(s) Oral daily  chlorhexidine 2% Cloths 1 Application(s) Topical daily  dexAMETHasone  Injectable 4 milliGRAM(s) IV Push every 12 hours  enoxaparin Injectable 40 milliGRAM(s) SubCutaneous every 24 hours  fentaNYL   Patch  25 MICROgram(s)/Hr 1 Patch Transdermal every 72 hours  fentaNYL   Patch  50 MICROgram(s)/Hr 1 Patch Transdermal every 72 hours  HYDROmorphone PCA (1 mG/mL) 30 milliLiter(s) PCA Continuous PCA Continuous  metoprolol tartrate Injectable 2.5 milliGRAM(s) IV Push every 12 hours  pantoprazole  Injectable 40 milliGRAM(s) IV Push daily  polyethylene glycol 3350 17 Gram(s) Oral every 12 hours  potassium chloride   Powder 40 milliEquivalent(s) Oral once  saccharomyces boulardii 250 milliGRAM(s) Oral two times a day  senna 2 Tablet(s) Oral at bedtime    MEDICATIONS  (PRN):  acetaminophen     Tablet .. 650 milliGRAM(s) Oral every 6 hours PRN Mild Pain (1 - 3), Moderate Pain (4 - 6)  ALPRAZolam 0.25 milliGRAM(s) Oral every 6 hours PRN anxiety  cyclobenzaprine 5 milliGRAM(s) Oral three times a day PRN Muscle Spasm  HYDROmorphone PCA (1 mG/mL) Rescue Clinician Bolus 1.5 milliGRAM(s) IV Push every 2 hours PRN for Pain Scale GREATER THAN 6  naloxone Injectable 0.1 milliGRAM(s) IV Push every 3 minutes PRN For ANY of the following changes in patient status:  A. RR LESS THAN 10 breaths per minute, B. Oxygen saturation LESS THAN 90%, C. Sedation score of 6  ondansetron Injectable 4 milliGRAM(s) IV Push every 6 hours PRN Nausea        I&O's Summary    02 Nov 2022 07:01  -  03 Nov 2022 07:00  --------------------------------------------------------  IN: 250 mL / OUT: 550 mL / NET: -300 mL    Vital Signs Last 24 Hrs  T(C): 36.3 (03 Nov 2022 05:29), Max: 36.7 (02 Nov 2022 22:15)  T(F): 97.4 (03 Nov 2022 05:29), Max: 98 (02 Nov 2022 22:15)  HR: 86 (03 Nov 2022 05:29) (83 - 96)  BP: 146/74 (03 Nov 2022 05:29) (146/74 - 152/93)  BP(mean): --  RR: 17 (03 Nov 2022 05:29) (17 - 18)  SpO2: 94% (03 Nov 2022 05:29) (94% - 97%)    Parameters below as of 03 Nov 2022 05:29  Patient On (Oxygen Delivery Method): room air        PHYSICAL EXAM:  GENERAL: NAD, well-developed  HEAD:  Atraumatic, Normocephalic  EYES: EOMI, PERRLA, conjunctiva and sclera clear  NECK: Supple, No JVD  C color  CHEST/LUNG: Clear to auscultation bilaterally; No wheeze  HEART: Regular rate and rhythm; No murmurs, rubs, or gallops  ABDOMEN: Soft, Nontender, Nondistended; Bowel sounds present  EXTREMITIES:  2+ Peripheral Pulses, No clubbing, cyanosis,   B/l le edema 1 plus  PSYCH: AAOx3      LABS:                        9.4    12.49 )-----------( 328      ( 03 Nov 2022 05:50 )             30.1     11-03    138  |  105  |  5<L>  ----------------------------<  123<H>  3.4<L>   |  23  |  0.21<L>    Ca    8.6      03 Nov 2022 05:50  Phos  2.2     11-03  Mg     1.60     11-03    TPro  4.7<L>  /  Alb  2.7<L>  /  TBili  0.2  /  DBili  x   /  AST  62<H>  /  ALT  10  /  AlkPhos  204<H>  11-03              RADIOLOGY & ADDITIONAL TESTS:    Imaging Personally Reviewed:    Consultant(s) Notes Reviewed:      Care Discussed with Consultants/Other Providers:      Pal for pain control Patient is a 67y old  Female who presents with a chief complaint of Bone pain (03 Nov 2022 10:01)      SUBJECTIVE / OVERNIGHT EVENTS:  resting in bed, Seen with RN who is feeding patient- Functional Quad  c color on    MEDICATIONS  (STANDING):  anastrozole 1 milliGRAM(s) Oral daily  chlorhexidine 2% Cloths 1 Application(s) Topical daily  dexAMETHasone  Injectable 4 milliGRAM(s) IV Push every 12 hours  enoxaparin Injectable 40 milliGRAM(s) SubCutaneous every 24 hours  fentaNYL   Patch  25 MICROgram(s)/Hr 1 Patch Transdermal every 72 hours  fentaNYL   Patch  50 MICROgram(s)/Hr 1 Patch Transdermal every 72 hours  HYDROmorphone PCA (1 mG/mL) 30 milliLiter(s) PCA Continuous PCA Continuous  metoprolol tartrate Injectable 2.5 milliGRAM(s) IV Push every 12 hours  pantoprazole  Injectable 40 milliGRAM(s) IV Push daily  polyethylene glycol 3350 17 Gram(s) Oral every 12 hours  potassium chloride   Powder 40 milliEquivalent(s) Oral once  saccharomyces boulardii 250 milliGRAM(s) Oral two times a day  senna 2 Tablet(s) Oral at bedtime    MEDICATIONS  (PRN):  acetaminophen     Tablet .. 650 milliGRAM(s) Oral every 6 hours PRN Mild Pain (1 - 3), Moderate Pain (4 - 6)  ALPRAZolam 0.25 milliGRAM(s) Oral every 6 hours PRN anxiety  cyclobenzaprine 5 milliGRAM(s) Oral three times a day PRN Muscle Spasm  HYDROmorphone PCA (1 mG/mL) Rescue Clinician Bolus 1.5 milliGRAM(s) IV Push every 2 hours PRN for Pain Scale GREATER THAN 6  naloxone Injectable 0.1 milliGRAM(s) IV Push every 3 minutes PRN For ANY of the following changes in patient status:  A. RR LESS THAN 10 breaths per minute, B. Oxygen saturation LESS THAN 90%, C. Sedation score of 6  ondansetron Injectable 4 milliGRAM(s) IV Push every 6 hours PRN Nausea        I&O's Summary    02 Nov 2022 07:01  -  03 Nov 2022 07:00  --------------------------------------------------------  IN: 250 mL / OUT: 550 mL / NET: -300 mL    Vital Signs Last 24 Hrs  T(C): 36.3 (03 Nov 2022 05:29), Max: 36.7 (02 Nov 2022 22:15)  T(F): 97.4 (03 Nov 2022 05:29), Max: 98 (02 Nov 2022 22:15)  HR: 86 (03 Nov 2022 05:29) (83 - 96)  BP: 146/74 (03 Nov 2022 05:29) (146/74 - 152/93)  BP(mean): --  RR: 17 (03 Nov 2022 05:29) (17 - 18)  SpO2: 94% (03 Nov 2022 05:29) (94% - 97%)    Parameters below as of 03 Nov 2022 05:29  Patient On (Oxygen Delivery Method): room air        PHYSICAL EXAM:  GENERAL: NAD, well-developed  HEAD:  Atraumatic, Normocephalic  EYES: EOMI, PERRLA, conjunctiva and sclera clear  NECK: Supple, No JVD  C color  CHEST/LUNG: Clear to auscultation bilaterally; No wheeze  HEART: Regular rate and rhythm; No murmurs, rubs, or gallops  ABDOMEN: Soft, Nontender, Nondistended; Bowel sounds present  EXTREMITIES:  2+ Peripheral Pulses, No clubbing, cyanosis,   B/l le edema 1 plus  PSYCH: AAOx3      LABS:                        9.4    12.49 )-----------( 328      ( 03 Nov 2022 05:50 )             30.1     11-03    138  |  105  |  5<L>  ----------------------------<  123<H>  3.4<L>   |  23  |  0.21<L>    Ca    8.6      03 Nov 2022 05:50  Phos  2.2     11-03  Mg     1.60     11-03    TPro  4.7<L>  /  Alb  2.7<L>  /  TBili  0.2  /  DBili  x   /  AST  62<H>  /  ALT  10  /  AlkPhos  204<H>  11-03              RADIOLOGY & ADDITIONAL TESTS:    Imaging Personally Reviewed:    Consultant(s) Notes Reviewed:      Care Discussed with Consultants/Other Providers:      Pal for pain control

## 2022-11-03 NOTE — DISCHARGE NOTE PROVIDER - NSDCMRMEDTOKEN_GEN_ALL_CORE_FT
acetaminophen 160 mg/5 mL oral suspension: 20.31 milliliter(s) orally every 6 hours, As needed, Mild Pain (1 - 3)  ALPRAZolam 0.25 mg oral tablet: 1 tab(s) orally every 6 hours, As needed, anxiety  bisacodyl 10 mg rectal suppository: 1 suppository(ies) rectal once a day, As needed, Constipation  cyclobenzaprine 5 mg oral tablet: 1 tab(s) orally 3 times a day, As needed, Muscle Spasm  dexamethasone 4 mg oral tablet: 1 tab(s) orally every 12 hours  enoxaparin: 40 milligram(s) subcutaneous once a day  fentaNYL 50 mcg/hr transdermal film, extended release: 1 patch transdermal every 72 hours  HYDROmorphone 2 mg oral tablet: 1 tab(s) orally every 4 hours, As needed, Moderate Pain (4 - 6)  HYDROmorphone 4 mg oral tablet: 1 tab(s) orally every 4 hours, As needed, Severe Pain (7 - 10)  lidocaine 4% topical film: Apply topically to affected area once a day  magnesium oxide 400 mg oral tablet: 1 tab(s) orally 3 times a day (with meals)  metoclopramide 5 mg/5 mL oral syrup: 10 milliliter(s) orally every 6 hours, As needed, nausea  metoprolol tartrate 25 mg oral tablet: 1 tab(s) orally 2 times a day  ondansetron 8 mg oral tablet, disintegratin tab(s) orally every 8 hours, As needed, Nausea and/or Vomiting  pantoprazole 40 mg oral delayed release tablet: 1 tab(s) orally once a day (before a meal)  polyethylene glycol 3350 oral powder for reconstitution: 17 gram(s) orally every 12 hours  saccharomyces boulardii lyo 250 mg oral capsule: 1 cap(s) orally 2 times a day  senna leaf extract oral tablet: 2 tab(s) orally once a day (at bedtime)  simethicone 80 mg oral tablet, chewable: 1 tab(s) orally every 6 hours, As needed, Gas  sodium chloride 0.65% nasal spray: 1 spray(s) nasal every 6 hours, As Needed   acetaminophen 160 mg/5 mL oral suspension: 20.31 milliliter(s) orally every 6 hours, As needed, Mild Pain (1 - 3)  ALPRAZolam 0.25 mg oral tablet: 1 tab(s) orally every 6 hours, As needed, anxiety  ALPRAZolam 0.25 mg oral tablet: 1 tab(s) orally every 6 hours, As needed, anxiety  anastrozole 1 mg oral tablet: 1 tab(s) orally once a day  bisacodyl 10 mg rectal suppository: 1 suppository(ies) rectal once a day, As needed, Constipation  cyclobenzaprine 5 mg oral tablet: 1 tab(s) orally 3 times a day, As needed, Muscle Spasm  cyclobenzaprine 5 mg oral tablet: 1 tab(s) orally 3 times a day, As needed, Muscle Spasm  dexamethasone 4 mg oral tablet: 1 tab(s) orally every 12 hours  dexamethasone 6 mg/25 mL-NaCl 0.9% intravenous solution: 16.67 milliliter(s) intravenous every 12 hours  enoxaparin: 40 milligram(s) subcutaneous once a day  fentaNYL 50 mcg/hr transdermal film, extended release: 1 patch transdermal every 72 hours  HYDROmorphone 2 mg oral tablet: 1 tab(s) orally every 4 hours, As needed, Moderate Pain (4 - 6)  HYDROmorphone 4 mg oral tablet: 1 tab(s) orally every 4 hours, As needed, Severe Pain (7 - 10)  lidocaine 4% topical film: Apply topically to affected area once a day  magnesium oxide 400 mg oral tablet: 1 tab(s) orally 3 times a day (with meals)  metoclopramide 5 mg/5 mL oral syrup: 10 milliliter(s) orally every 6 hours, As needed, nausea  metoprolol tartrate 1 mg/mL injectable solution: 2.5 milligram(s) injectable every 12 hours  metoprolol tartrate 25 mg oral tablet: 1 tab(s) orally 2 times a day  mirtazapine 15 mg oral tablet: 1 tab(s) orally once a day (at bedtime)  ondansetron 2 mg/mL injectable solution: 4 milligram(s) injectable every 6 hours  ondansetron 8 mg oral tablet, disintegratin tab(s) orally every 8 hours, As needed, Nausea and/or Vomiting  pantoprazole 40 mg intravenous injection: 40 milligram(s) intravenous once a day  pantoprazole 40 mg oral delayed release tablet: 1 tab(s) orally once a day (before a meal)  polyethylene glycol 3350 oral powder for reconstitution: 17 gram(s) orally every 12 hours  polyethylene glycol 3350 oral powder for reconstitution: 17 gram(s) orally every 12 hours  saccharomyces boulardii lyo 250 mg oral capsule: 1 cap(s) orally 2 times a day  senna leaf extract oral tablet: 2 tab(s) orally once a day (at bedtime)  senna leaf extract oral tablet: 2 tab(s) orally once a day (at bedtime)  simethicone 80 mg oral tablet, chewable: 1 tab(s) orally every 6 hours, As needed, Gas  sodium chloride 0.65% nasal spray: 1 spray(s) nasal every 6 hours, As Needed   acetaminophen 160 mg/5 mL oral suspension: 20.31 milliliter(s) orally every 6 hours, As needed, Mild Pain (1 - 3)  ALPRAZolam 0.25 mg oral tablet: 1 tab(s) orally every 6 hours, As needed, anxiety  anastrozole 1 mg oral tablet: 1 tab(s) orally once a day  cyclobenzaprine 5 mg oral tablet: 1 tab(s) orally 3 times a day, As needed, Muscle Spasm  dexamethasone 6 mg/25 mL-NaCl 0.9% intravenous solution: 16.67 milliliter(s) intravenous every 12 hours  HYDROmorphone: 1.5 milligram(s) by intravenous PCA every 2 hours, As Needed for pain greater than 6   Rescue Clinician Bolus may be given 15 minutes AFTER the initiation period.  Do NOT exceed 2 doses in 3 hour(s)  This is a Look-alike/Sound-alike Medication  HYDROmorphone: 1 mg/hr by intravenous PCA  Dilaudid PCA 1 mg  Volume 30   Demand dose 1 mg   Lockout 20 minutes   Continous 1 mg/hr  4 hr dose limit 16 mg   metoprolol tartrate 1 mg/mL injectable solution: 2.5 milligram(s) injectable every 12 hours  mirtazapine 15 mg oral tablet: 1 tab(s) orally once a day (at bedtime)  ondansetron 2 mg/mL injectable solution: 4 milligram(s) injectable every 6 hours  pantoprazole 40 mg intravenous injection: 40 milligram(s) intravenous once a day  polyethylene glycol 3350 oral powder for reconstitution: 17 gram(s) orally every 12 hours  saccharomyces boulardii lyo 250 mg oral capsule: 1 cap(s) orally 2 times a day  senna leaf extract oral tablet: 2 tab(s) orally once a day (at bedtime)   acetaminophen 160 mg/5 mL oral suspension: 20.31 milliliter(s) orally every 6 hours, As needed, Mild Pain (1 - 3)  ALPRAZolam 0.25 mg oral tablet: 1 tab(s) orally every 6 hours, As needed, anxiety  anastrozole 1 mg oral tablet: 1 tab(s) orally once a day  cyclobenzaprine 5 mg oral tablet: 1 tab(s) orally 3 times a day, As needed, Muscle Spasm  dexamethasone 6 mg/25 mL-NaCl 0.9% intravenous solution: 16.67 milliliter(s) intravenous every 12 hours  HYDROmorphone: 1.5 milligram(s) by intravenous PCA every 2 hours, As Needed for pain greater than 6   Rescue Clinician Bolus may be given 15 minutes AFTER the initiation period.  Do NOT exceed 2 doses in 3 hour(s)  This is a Look-alike/Sound-alike Medication  HYDROmorphone: 1 mg/hr by intravenous PCA  Dilaudid PCA 1 mg  Volume 30   Demand dose 1 mg   Lockout 20 minutes   Continous 1 mg/hr  4 hr dose limit 16 mg   metoprolol tartrate 1 mg/mL injectable solution: 2.5 milligram(s) injectable every 12 hours  mirtazapine 15 mg oral tablet: 1 tab(s) orally once a day (at bedtime)  ondansetron 2 mg/mL injectable solution: 4 milligram(s) injectable every 6 hours  pantoprazole 40 mg intravenous injection: 40 milligram(s) intravenous once a day  polyethylene glycol 3350 oral powder for reconstitution: 17 gram(s) orally every 12 hours  saccharomyces boulardii lyo 250 mg oral capsule: 1 cap(s) orally 2 times a day  senna leaf extract oral tablet: 2 tab(s) orally once a day (at bedtime)  sodium/potassium/phosphorus 160 mg-280 mg-250 mg oral powder for reconstitution: 1 packet(s) orally 3 times a day (with meals) stop after 2 days   acetaminophen 160 mg/5 mL oral suspension: 20.31 milliliter(s) orally every 6 hours, As needed, Mild Pain (1 - 3)  ALPRAZolam 0.25 mg oral tablet: 1 tab(s) orally every 6 hours, As needed, anxiety  anastrozole 1 mg oral tablet: 1 tab(s) orally once a day  cyclobenzaprine 5 mg oral tablet: 1 tab(s) orally 3 times a day, As needed, Muscle Spasm  dexamethasone 6 mg/25 mL-NaCl 0.9% intravenous solution: 16.67 milliliter(s) intravenous every 12 hours  HYDROmorphone: Dilauded PCA    Volume: 30mL  Initial Bolus Dose: 0 mg  Initial Demand Dose: 1.5 mg  Lockout 20 minutes  Continuous Rate: 1mg  Four Hour Limit 22 mg    HYDROmorphone 100 mg/50 mL-D5% intravenous solution: 1 milliliter(s) intravenous every 2 hours, As needed, breakthough painRescue Clinician Bolus may be given 15 minutes AFTER the initiation period.  Do NOT exceed 2 doses in 3 hour(s)  This is a Look-alike/Sound-alike Medication  metoprolol tartrate 1 mg/mL injectable solution: 2.5 milligram(s) injectable every 12 hours  mirtazapine 15 mg oral tablet: 1 tab(s) orally once a day (at bedtime)  ondansetron 2 mg/mL injectable solution: 4 milligram(s) injectable every 6 hours  pantoprazole 40 mg intravenous injection: 40 milligram(s) intravenous once a day  polyethylene glycol 3350 oral powder for reconstitution: 17 gram(s) orally every 12 hours  saccharomyces boulardii lyo 250 mg oral capsule: 1 cap(s) orally 2 times a day  senna leaf extract oral tablet: 2 tab(s) orally once a day (at bedtime)  sodium/potassium/phosphorus 160 mg-280 mg-250 mg oral powder for reconstitution: 1 packet(s) orally 3 times a day (with meals) stop after 2 days

## 2022-11-03 NOTE — PROGRESS NOTE ADULT - PROBLEM SELECTOR PLAN 2
Pt complains of pain despite PCA pump.   Used 42 demands in the past 24hrs  Fentanyl patch discontinued  Would recommend:  - Adjust Dilaudid PCA 1mg demand, continuous 1mg, 20min lockout, CB 1.5mg q2hrs PRN for severe pain  - Bowel regimen   - Continue with flexeril Pt complains of pain despite PCA pump.   Used 42 demands in the past 24hrs  Fentanyl patch discontinued  Would recommend:  - Adjust Dilaudid PCA 1mg demand, continuous 1mg/hr, 20min lockout, CB 1.5mg q2hrs PRN for severe pain  - Bowel regimen  - Continue with flexeril PRN

## 2022-11-03 NOTE — PROGRESS NOTE ADULT - ASSESSMENT
65 y/o F with PMH of left arm skin CA s/p excision at age 16, stomach ulcer, and recently diagnosed breast ca with mets to bones, liver, and lungs with cord compression s/p C7-T2 corpectomy & fusion and posterior C4-T5 fusion transferred from acute rehab at [facility] for initiation of palliative RT.  Palliative Care consulted for complex symptom management.

## 2022-11-03 NOTE — DISCHARGE NOTE PROVIDER - NSDCFUADDAPPT_GEN_ALL_CORE_FT
You are being discharged home with Hospice services and it is recommended to focus on comfort measures and supportive care. Continue with medications prescribed for pain and other symptom control. If you have questions or concerns you may contact the Hospice service line by calling [phone].

## 2022-11-03 NOTE — PROGRESS NOTE ADULT - ASSESSMENT
67y Female  with PMH of metastatic (treatment naïve) ER 40%/AK 40%/HER2 + breast cancer with extensive bony metastasis, liver, lung and brain.  Transferred from [facility] for inpatient palliative RT. Patient eligible for palliative RT and palliative systemic therapy.Of note patient recently treated for C. difficile colitis- completed vanc on 10/25.Oncology consulted for further evaluation     - Radiation Oncology consult for palliative RT, plan was for palliative radiation to the lower cervical/upper thoracic spine Patient however unable to tolerate lying flat 2/2 pain. Forgoing further treatment  No further NeuroSx recs  -Appreciate Palliative Care consult for symptom management. Patient on PCA for pain management.   -In re to [facility], discussed with patient her options moving forward. Patient currently on anastrzole which she has had some difficult taking 2/2 swallowing issues. Patient  presented with two options: Option 1-To pursure further systemic treatment, patient would have to follow at [facility] on monthly basis, transporting to and fro. Option 2- discharge to LTC with likely transition to hospice service and focus on comfort measures. Patient encouraged to think over options with her brother/HCP and discuss further during family meeting. GOC discussions ongoing, will followup  -C/w anastrozole , 1mg qday for now if in line with GOC  -noted to be slightly hypercalcemic, s/p pamidronate. 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 Nominated her brother and sister as proxies.  -Rest of care as per primary team  -Patient to followup with Dr. Byrd ([facility]) upon discharge  -C/w Supportive care, pain control, Nutrition, PT, DVT ppx  -Oncology will continue to follow with you      Case discussed with Dr. Yemi FULTON  Oncology Physician Assistant  Coby VASQUEZ/CATA [facility]  Pager [phone] also available on Teams    If before 8am/after 5pm or on weekends please page On-call Oncology Fellow   67y Female  with PMH of metastatic (treatment naïve) ER 40%/IN 40%/HER2 + breast cancer with extensive bony metastasis, liver, lung and brain.  Transferred from [facility] for inpatient palliative RT. Patient eligible for palliative RT and palliative systemic therapy.Of note patient recently treated for C. difficile colitis- completed vanc on 10/25.Oncology consulted for further evaluation     -Radiation Oncology consult for palliative RT, plan was for palliative radiation to the lower cervical/upper thoracic spine Patient however unable to tolerate lying flat 2/2 pain. Forgoing further treatment  No further NeuroSx recs  -Appreciate Palliative Care consult for symptom management. Patient on PCA for pain management.   -In re to [facility], discussed with patient her options moving forward. Patient currently on anastrzole which she has had some difficult taking 2/2 swallowing issues. Patient  presented with two options: Option 1-To pursure further systemic treatment, patient would have to follow at [facility] on monthly basis, transporting to and fro. Option 2- discharge to LTC with likely transition to hospice service and focus on comfort measures. Patient encouraged to think over options with her brother/HCP and discuss further during family meeting. GOC discussions ongoing, will followup  -C/w anastrozole , 1mg qday for now if in line with GOC  -noted to be slightly hypercalcemic, s/p pamidronate. 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 Nominated her brother and sister as proxies.  -Rest of care as per primary team  -Patient to followup with Dr. Byrd ([facility]) upon discharge  -C/w Supportive care, pain control, Nutrition, PT, DVT ppx  -Oncology will continue to follow with you      Case discussed with Dr. Yemi FULTON  Oncology Physician Assistant  Coby VASQUEZ/CATA [facility]  Pager [phone] also available on Teams    If before 8am/after 5pm or on weekends please page On-call Oncology Fellow

## 2022-11-03 NOTE — DIETITIAN INITIAL EVALUATION ADULT - SIGNS/SYMPTOMS
<75% nutritional needs >month, weight loss 8.66% x 8months, moderate muscle wasting and fat loss metastatic cancer <75% nutritional needs >month, wt loss 8.66% x 8months, moderate muscle wasting and fat loss, +edema

## 2022-11-03 NOTE — PROGRESS NOTE ADULT - SUBJECTIVE AND OBJECTIVE BOX
[facility] Geriatics and Palliaitve Care  Meron Sarkar, Palliative Care Nurse Practitioner  Contact Info: Page [phone] (including Night/Weekends), yoan on Microsoft Teams (Antonina Chance), or leave VM at Palliative Office [phone] (non-urgent)    Indication of Geriatrics and Palliative Medicine Services: pain management in the setting of advanced malignancy     DNR on chart:Yes  Yes    INTERVAL EVENTS: Pt on PCA pump, used 42 demands.  -------------------------------------------------------------------------------------------------------    PRESENT SYMPTOMS:     [ ] No     [X] Yes     [ ] Unable to self report        [ ] CPOT (ICU)        [ ] PAINADS       [ ] RDOS     PAIN:   If blank, patient unable to specify   Pain:  [X ]yes [ ]no  Location hip/back/abd                    Aggravating factors -movement   Quality - stabbing, aching  Radiation -back  Timing- constant  Pain at most severe level (0-10 scale): 10/10  Pain at minimal acceptable level (0-10 scale): 2/10    Dyspnea:                           [ ]Mild [ ]Moderate [ ]Severe  Anxiety:                             [X ]Mild [ ]Moderate [ ]Severe  Fatigue:                             [ ]Mild [X ]Moderate [ ]Severe  Nausea:                             [ ]Mild [ ]Moderate [ ]Severe  Loss of appetite:              [ ]Mild [ ]Moderate [X ]Severe  Constipation:                    [ X]Mild [ ]Moderate [ ]Severe    Other Symptoms:  [ ]All other review of systems negative     Home Medications for Symptoms if present:    I Stop Reference no:     -------------------------------------------------------------------------------------------------------    ITEMS UNCHECKED ARE NOT PRESENT    PHYSICAL:  Vital Signs Last 24 Hrs  T(C): 36.2 (03 Nov 2022 15:16), Max: 36.7 (02 Nov 2022 22:15)  T(F): 97.2 (03 Nov 2022 15:16), Max: 98 (02 Nov 2022 22:15)  HR: 83 (03 Nov 2022 15:16) (83 - 86)  BP: 151/83 (03 Nov 2022 15:16) (146/74 - 151/83)  BP(mean): --  RR: 18 (03 Nov 2022 15:16) (17 - 18)  SpO2: 94% (03 Nov 2022 15:16) (94% - 96%)    Parameters below as of 03 Nov 2022 15:16  Patient On (Oxygen Delivery Method): room air     I&O's Summary    02 Nov 2022 07:01  -  03 Nov 2022 07:00  --------------------------------------------------------  IN: 250 mL / OUT: 550 mL / NET: -300 mL       GENERAL:  [ ] Cachexia [X ] Frail [ X]Awake [X ]Oriented x3   [ ]Lethargic  [ ]Cachexia  [ ]Unarousable  [X ]Verbal  [ ]Non-Verbal    Behavioral:   [X ]Anxiety  [ ]Delirium [ ]Agitation [ ]Other    HEENT:  [ ]Normal   [X ]Dry mouth   [ ]ET Tube/Trach  [ ]Oral lesions    PULMONARY:   [ ]Clear [ ]Tachypnea  [ ]Audible excessive secretions   [ ]Rhonchi        [ ]Right [ ]Left [ ]Bilateral  [ ]Crackles        [ ]Right [ ]Left [ ]Bilateral  [ ]Wheezing     [ ]Right [ ]Left [ ]Bilateral  [X ]Diminished BS [ ] Right [ ]Left [ ]Bilateral    CARDIOVASCULAR:    [ X]Regular [ ]Irregular [ ]Tachy  [ ]Aravind [ ]Murmur [ ]Other    GASTROINTESTINAL:  [ X]Soft  [ ]Distended   [X ]+BS  [X ]Non tender [ ]Tender  [ ]PEG [ ]OGT/ NGT   Last BM: 10/31    GENITOURINARY:  [ ]Normal [X ]Incontinent   [ ]Oliguria/Anuria   [ ]Hamm    MUSCULOSKELETAL:   [ ]Normal   [ ]Weakness  [X ]Bed/Wheelchair bound [ ]Edema    NEUROLOGIC:   [X ]No focal deficits  [ ] Cognitive impairment  [ ] Dysphagia [ ]Dysarthria [ ] Paresis [ ]Other     SKIN:   [ X]Normal  [ ]Rash   [ ]Pressure ulcer(s) [ ]y [ ]n present on admission    -------------------------------------------------------------------------------------------------------    LABS:                        9.4    12.49 )-----------( 328      ( 03 Nov 2022 05:50 )             30.1   11-03    138  |  105  |  5<L>  ----------------------------<  123<H>  3.4<L>   |  23  |  0.21<L>    Ca    8.6      03 Nov 2022 05:50  Phos  2.2     11-03  Mg     1.60     11-03    TPro  4.7<L>  /  Alb  2.7<L>  /  TBili  0.2  /  DBili  x   /  AST  62<H>  /  ALT  10  /  AlkPhos  204<H>  11-03      -------------------------------------------------------------------------------------------------------  RADIOLOGY & ADDITIONAL STUDIES: < from: CT Abdomen and Pelvis No Cont (10.11.22 @ 11:41) >  IMPRESSION:    Limited, noncontrast exam.    Pancolitis, correlate clinically for inflammatory/infectious etiology,   including pseudomembranous colitis.    Partially visualized right breast mass.    Pulmonary and hepatic metastatic disease as noted on prior exam.    Permeative, osteolytic metastatic disease, including L2 compression   fracture with possible epidural involvement.  This can be further characterized by MRI as clinically indicated.    Other findings as discussed above.    --- End of Report ---    < end of copied text >    -------------------------------------------------------------------------------------------------------  MEDICATIONS:     MEDICATIONS  (STANDING):  anastrozole 1 milliGRAM(s) Oral daily  chlorhexidine 2% Cloths 1 Application(s) Topical daily  dexAMETHasone  Injectable 4 milliGRAM(s) IV Push every 12 hours  enoxaparin Injectable 40 milliGRAM(s) SubCutaneous every 24 hours  HYDROmorphone PCA (1 mG/mL) 30 milliLiter(s) PCA Continuous PCA Continuous  metoprolol tartrate Injectable 2.5 milliGRAM(s) IV Push every 12 hours  pantoprazole  Injectable 40 milliGRAM(s) IV Push daily  polyethylene glycol 3350 17 Gram(s) Oral every 12 hours  saccharomyces boulardii 250 milliGRAM(s) Oral two times a day  senna 2 Tablet(s) Oral at bedtime    MEDICATIONS  (PRN):  acetaminophen     Tablet .. 650 milliGRAM(s) Oral every 6 hours PRN Mild Pain (1 - 3), Moderate Pain (4 - 6)  ALPRAZolam 0.25 milliGRAM(s) Oral every 6 hours PRN anxiety  cyclobenzaprine 5 milliGRAM(s) Oral three times a day PRN Muscle Spasm  HYDROmorphone PCA (1 mG/mL) Rescue Clinician Bolus 1.5 milliGRAM(s) IV Push every 2 hours PRN for Pain Scale GREATER THAN 6  naloxone Injectable 0.1 milliGRAM(s) IV Push every 3 minutes PRN For ANY of the following changes in patient status:  A. RR LESS THAN 10 breaths per minute, B. Oxygen saturation LESS THAN 90%, C. Sedation score of 6  ondansetron Injectable 4 milliGRAM(s) IV Push every 6 hours PRN Nausea      -------------------------------------------------------------------------------------------------------    CRITICAL CARE:  [ ]Shock Present  [ ]Septic [ ]Cardiogenic [ ]Neurologic [ ]Hypovolemic  [ ]Vasopressors [ ]Inotropes  [ ]Respiratory failure present [ ]Mechanical Ventilation [ ]Non-invasive ventilatory support [ ]High-Flow   [ ]Acute  [ ]Chronic [ ]Hypoxic  [ ]Hypercarbic [ ]Other  [ ]Other organ failure     -------------------------------------------------------------------------------------------------------  REFERRALS:   [ ]Chaplaincy  [ ]Hospice  [ ]Child Life  [ ]Social Work  [ ]Case management [ ]Holistic Therapy    [facility] Geriatics and Palliaitve Care  Meron Sarkar, Palliative Care Nurse Practitioner  Contact Info: Page [phone] (including Night/Weekends), yoan on Microsoft Teams (Antonina Chance), or leave VM at Palliative Office [phone] (non-urgent)    Indication of Geriatrics and Palliative Medicine Services: pain management in the setting of advanced malignancy     DNR on chart:Yes  Yes    INTERVAL EVENTS: Pt on PCA pump, used 42 demands.  -------------------------------------------------------------------------------------------------------    PRESENT SYMPTOMS:     [ ] No     [X] Yes     [ ] Unable to self report        [ ] CPOT (ICU)        [ ] PAINADS       [ ] RDOS     PAIN:   If blank, patient unable to specify   Pain:  [X ]yes [ ]no  Location hip/back/abd                    Aggravating factors -movement   Quality - stabbing, aching  Radiation -back  Timing- constant  Pain at most severe level (0-10 scale): 10/10  Pain at minimal acceptable level (0-10 scale): 2/10    Dyspnea:                           [ ]Mild [ ]Moderate [ ]Severe  Anxiety:                             [X ]Mild [ ]Moderate [ ]Severe  Fatigue:                             [ ]Mild [X ]Moderate [ ]Severe  Nausea:                             [ ]Mild [ ]Moderate [ ]Severe  Loss of appetite:              [ ]Mild [ ]Moderate [X ]Severe  Constipation:                    [ X]Mild [ ]Moderate [ ]Severe    Other Symptoms:  [ ]All other review of systems negative     Home Medications for Symptoms if present:    I Stop Reference no:     -------------------------------------------------------------------------------------------------------    ITEMS UNCHECKED ARE NOT PRESENT    PHYSICAL:  Vital Signs Last 24 Hrs  T(C): 36.2 (03 Nov 2022 15:16), Max: 36.7 (02 Nov 2022 22:15)  T(F): 97.2 (03 Nov 2022 15:16), Max: 98 (02 Nov 2022 22:15)  HR: 83 (03 Nov 2022 15:16) (83 - 86)  BP: 151/83 (03 Nov 2022 15:16) (146/74 - 151/83)  BP(mean): --  RR: 18 (03 Nov 2022 15:16) (17 - 18)  SpO2: 94% (03 Nov 2022 15:16) (94% - 96%)    Parameters below as of 03 Nov 2022 15:16  Patient On (Oxygen Delivery Method): room air     I&O's Summary    02 Nov 2022 07:01  -  03 Nov 2022 07:00  --------------------------------------------------------  IN: 250 mL / OUT: 550 mL / NET: -300 mL       GENERAL:  [ ] Cachexia [X ] Frail [ X]Awake [X ]Oriented x3   [ ]Lethargic  [ ]Cachexia  [ ]Unarousable  [X ]Verbal  [ ]Non-Verbal    Behavioral:   [X ]Anxiety  [ ]Delirium [ ]Agitation [ ]Other    HEENT:  [ ]Normal   [X ]Dry mouth   [ ]ET Tube/Trach  [ ]Oral lesions    PULMONARY:   [ ]Clear [ ]Tachypnea  [ ]Audible excessive secretions   [ ]Rhonchi        [ ]Right [ ]Left [ ]Bilateral  [ ]Crackles        [ ]Right [ ]Left [ ]Bilateral  [ ]Wheezing     [ ]Right [ ]Left [ ]Bilateral  [X ]Diminished BS [ ] Right [ ]Left [ ]Bilateral    CARDIOVASCULAR:    [ X]Regular [ ]Irregular [ ]Tachy  [ ]Aravind [ ]Murmur [ ]Other    GASTROINTESTINAL:  [ X]Soft  [ ]Distended   [X ]+BS  [X ]Non tender [ ]Tender  [ ]PEG [ ]OGT/ NGT   Last BM: 10/31    GENITOURINARY:  [ ]Normal [X ]Incontinent   [ ]Oliguria/Anuria   [ ]Hamm    MUSCULOSKELETAL:   [ ]Normal   [ ]Weakness  [X ]Bed/Wheelchair bound [ ]Edema    NEUROLOGIC:   [X ]No focal deficits  [ ] Cognitive impairment  [ ] Dysphagia [ ]Dysarthria [ ] Paresis [ ]Other     SKIN:   [ X]Normal  [ ]Rash   [ ]Pressure ulcer(s) [ ]y [ ]n present on admission    -------------------------------------------------------------------------------------------------------    LABS:                        9.4    12.49 )-----------( 328      ( 03 Nov 2022 05:50 )             30.1   11-03    138  |  105  |  5<L>  ----------------------------<  123<H>  3.4<L>   |  23  |  0.21<L>    Ca    8.6      03 Nov 2022 05:50  Phos  2.2     11-03  Mg     1.60     11-03    TPro  4.7<L>  /  Alb  2.7<L>  /  TBili  0.2  /  DBili  x   /  AST  62<H>  /  ALT  10  /  AlkPhos  204<H>  11-03      -------------------------------------------------------------------------------------------------------  RADIOLOGY & ADDITIONAL STUDIES:   < from: CT Abdomen and Pelvis No Cont (10.11.22 @ 11:41) >  IMPRESSION:  Limited, noncontrast exam.  Pancolitis, correlate clinically for inflammatory/infectious etiology,   including pseudomembranous colitis.  Partially visualized right breast mass.  Pulmonary and hepatic metastatic disease as noted on prior exam.  Permeative, osteolytic metastatic disease, including L2 compression   fracture with possible epidural involvement.  This can be further characterized by MRI as clinically indicated.  Other findings as discussed above.    -------------------------------------------------------------------------------------------------------  MEDICATIONS:     MEDICATIONS  (STANDING):  anastrozole 1 milliGRAM(s) Oral daily  chlorhexidine 2% Cloths 1 Application(s) Topical daily  dexAMETHasone  Injectable 4 milliGRAM(s) IV Push every 12 hours  enoxaparin Injectable 40 milliGRAM(s) SubCutaneous every 24 hours  HYDROmorphone PCA (1 mG/mL) 30 milliLiter(s) PCA Continuous PCA Continuous  metoprolol tartrate Injectable 2.5 milliGRAM(s) IV Push every 12 hours  pantoprazole  Injectable 40 milliGRAM(s) IV Push daily  polyethylene glycol 3350 17 Gram(s) Oral every 12 hours  saccharomyces boulardii 250 milliGRAM(s) Oral two times a day  senna 2 Tablet(s) Oral at bedtime    MEDICATIONS  (PRN):  acetaminophen     Tablet .. 650 milliGRAM(s) Oral every 6 hours PRN Mild Pain (1 - 3), Moderate Pain (4 - 6)  ALPRAZolam 0.25 milliGRAM(s) Oral every 6 hours PRN anxiety  cyclobenzaprine 5 milliGRAM(s) Oral three times a day PRN Muscle Spasm  HYDROmorphone PCA (1 mG/mL) Rescue Clinician Bolus 1.5 milliGRAM(s) IV Push every 2 hours PRN for Pain Scale GREATER THAN 6  naloxone Injectable 0.1 milliGRAM(s) IV Push every 3 minutes PRN For ANY of the following changes in patient status:  A. RR LESS THAN 10 breaths per minute, B. Oxygen saturation LESS THAN 90%, C. Sedation score of 6  ondansetron Injectable 4 milliGRAM(s) IV Push every 6 hours PRN Nausea      -------------------------------------------------------------------------------------------------------    CRITICAL CARE:  [ ]Shock Present  [ ]Septic [ ]Cardiogenic [ ]Neurologic [ ]Hypovolemic  [ ]Vasopressors [ ]Inotropes  [ ]Respiratory failure present [ ]Mechanical Ventilation [ ]Non-invasive ventilatory support [ ]High-Flow   [ ]Acute  [ ]Chronic [ ]Hypoxic  [ ]Hypercarbic [ ]Other  [ ]Other organ failure     -------------------------------------------------------------------------------------------------------  REFERRALS:   [ ]Chanellelaincy  [ ]Hospice  [ ]Child Life  [ ]Social Work  [ ]Case management [ ]Holistic Therapy

## 2022-11-03 NOTE — PROGRESS NOTE ADULT - NS ATTEND AMEND GEN_ALL_CORE FT
Patient seen at bedside. Case discussed with DONALDO Madrigal. Plan as above.   GOC discussed at bedside with patient.   She has not been able to tolerate RT and is not able to receive RT.   She was started on anastrazole, however has had trouble swallowing oral medications and has been able to take this.   Patient without support in the community, will need long term placement.   Discussed with her that AI +/- CDK 4/6 inhibitor can be offered to her, however treatment is not curative and will require close monitoring and frequent clinic visits which may not be desired by the patient or feasible given mobility issues and severe pain.  Discussed option of hospice and supportive care.   Patient interested in the concept of hospice and supportive care.  Pal care to have family meeting with pt and brother this afternoon.   Will follow
Patient seen at bedside. Case discussed with DONALDO Madrigal. Plan as above.   Started on aromatase inhibitors, tolerating treatment well.   To receive RT to spine.   Pal care for pain control  SW /  input appreciated re dispo. Will need LTD.  Will need outpatient follow up to add on Palbociclib on AI.   WIll follow.
67 y/o F with PMH of left arm skin CA s/p excision at age 16, stomach ulcer, and recently diagnosed breast ca with mets to bones, liver, and lungs with cord compression s/p C7-T2 corpectomy & fusion and posterior C4-T5 fusion transferred from acute rehab at [place] for initiation of palliative RT.  Palliative Care consulted for complex symptom management.   GOC meeting with pts brother, pt deferring decision making and further discussions to brother.  After meeting goal is comfort, hospice care referral made  Fentanyl d/c, Adjusted PCA settings  Page for uncontrolled symptoms [id number]    ACP - spent 45 min of face to face discussion as above.

## 2022-11-03 NOTE — DIETITIAN INITIAL EVALUATION ADULT - REASON FOR ADMISSION
Primary malignant neoplasm    67 y/o F with PMH of left arm skin CA s/p excision at age 16, stomach ulcer, recently diagnosed breast ca with mets to bones, liver, and lungs with cord compression s/p C7-T2 corpectomy & fusion and posterior C4-T5 fusion (9/22) transferred from acute rehab at [place] for initiation of palliative RT.

## 2022-11-03 NOTE — PROGRESS NOTE ADULT - PROBLEM SELECTOR PLAN 2
- s/p C7-T2 corpectomy & fusion and posterior C4-T5 fusion  - continue dexamethasone 4mg Q12  - C/w PPI while on steroids  -Unclear anticipated duration of C-collar and steroids. Would also benefit from neurosurg eval given worsening pain  -PRIOR MD Discussed w/ Neurosurgery- Dr. Bennett 10/29, agrees w/ MRI then BUT   11/1/22-Neuro-surgery Dr Mani Austin- on teams notes patient does NOT NEED NEURO Surgery or MRI  no need to send back to [place] to Neuro-surgery  Palliative asked to see patient AGAIN for Pain control and further GOC- did not tolerate Rt due to pain

## 2022-11-03 NOTE — DISCHARGE NOTE PROVIDER - NSDCCPCAREPLAN_GEN_ALL_CORE_FT
PRINCIPAL DISCHARGE DIAGNOSIS  Diagnosis: Breast cancer metastasized to bone  Assessment and Plan of Treatment: You are being discharged home with Hospice services and it is recommended to focus on comfort measures and supportive care. Continue with medications prescribed for pain and other symptom control.       PRINCIPAL DISCHARGE DIAGNOSIS  Diagnosis: Breast cancer metastasized to bone  Assessment and Plan of Treatment: You are being discharged home with Hospice services and it is recommended to focus on comfort measures and supportive care. Continue with medications prescribed for pain and other symptom control. If you have questions or concerns you may contact the Hospice service line by calling [phone].

## 2022-11-03 NOTE — DIETITIAN INITIAL EVALUATION ADULT - OTHER INFO
S/p swallow evaluation 11/1 recommended Pureed, Mildly Thick Liquids recommended. Patient is tolerating diet well, but continues with poor po intake <50% of meals. Denies any nausea/vomiting/diarrhea/constipation at this time. Last bowel movement 10/29. Patient also ordered for Ensure Clear taking small amounts. Offered alternative option Hormel Vital shake more calorically dense and magic cup for added calories and protein.

## 2022-11-03 NOTE — PROGRESS NOTE ADULT - PROBLEM SELECTOR PLAN 1
Stage IV extensive bony metastasis, liver, lung and brain.    Tx naive  Transferred from [facility] for RT, however pt unable to tolerate RT.  Oncology no longer offering DMT. Hospice referral made. Stage IV extensive bony metastasis, liver, lung and brain.    Tx naive  Transferred from [facility] for RT, however pt unable to tolerate RT.  Oncology no longer offering DMT. Hospice referral made consistent with GOC.

## 2022-11-04 ENCOUNTER — TRANSCRIPTION ENCOUNTER (OUTPATIENT)
Age: 67
End: 2022-11-04

## 2022-11-04 LAB
ALBUMIN SERPL ELPH-MCNC: 2.6 G/DL — LOW (ref 3.3–5)
ALP SERPL-CCNC: 210 U/L — HIGH (ref 40–120)
ALT FLD-CCNC: 13 U/L — SIGNIFICANT CHANGE UP (ref 4–33)
ANION GAP SERPL CALC-SCNC: 10 MMOL/L — SIGNIFICANT CHANGE UP (ref 7–14)
AST SERPL-CCNC: 71 U/L — HIGH (ref 4–32)
BASOPHILS # BLD AUTO: 0 K/UL — SIGNIFICANT CHANGE UP (ref 0–0.2)
BASOPHILS NFR BLD AUTO: 0 % — SIGNIFICANT CHANGE UP (ref 0–2)
BILIRUB SERPL-MCNC: 0.3 MG/DL — SIGNIFICANT CHANGE UP (ref 0.2–1.2)
BUN SERPL-MCNC: 4 MG/DL — LOW (ref 7–23)
CALCIUM SERPL-MCNC: 8.4 MG/DL — SIGNIFICANT CHANGE UP (ref 8.4–10.5)
CHLORIDE SERPL-SCNC: 105 MMOL/L — SIGNIFICANT CHANGE UP (ref 98–107)
CO2 SERPL-SCNC: 25 MMOL/L — SIGNIFICANT CHANGE UP (ref 22–31)
CREAT SERPL-MCNC: 0.21 MG/DL — LOW (ref 0.5–1.3)
EGFR: 127 ML/MIN/1.73M2 — SIGNIFICANT CHANGE UP
EOSINOPHIL # BLD AUTO: 0 K/UL — SIGNIFICANT CHANGE UP (ref 0–0.5)
EOSINOPHIL NFR BLD AUTO: 0 % — SIGNIFICANT CHANGE UP (ref 0–6)
GLUCOSE BLDC GLUCOMTR-MCNC: 110 MG/DL — HIGH (ref 70–99)
GLUCOSE SERPL-MCNC: 114 MG/DL — HIGH (ref 70–99)
HCT VFR BLD CALC: 29.7 % — LOW (ref 34.5–45)
HGB BLD-MCNC: 9.5 G/DL — LOW (ref 11.5–15.5)
IANC: 12.16 K/UL — HIGH (ref 1.8–7.4)
LYMPHOCYTES # BLD AUTO: 1.02 K/UL — SIGNIFICANT CHANGE UP (ref 1–3.3)
LYMPHOCYTES # BLD AUTO: 6.8 % — LOW (ref 13–44)
MAGNESIUM SERPL-MCNC: 1.5 MG/DL — LOW (ref 1.6–2.6)
MCHC RBC-ENTMCNC: 29.1 PG — SIGNIFICANT CHANGE UP (ref 27–34)
MCHC RBC-ENTMCNC: 32 GM/DL — SIGNIFICANT CHANGE UP (ref 32–36)
MCV RBC AUTO: 90.8 FL — SIGNIFICANT CHANGE UP (ref 80–100)
MONOCYTES # BLD AUTO: 0.4 K/UL — SIGNIFICANT CHANGE UP (ref 0–0.9)
MONOCYTES NFR BLD AUTO: 2.7 % — SIGNIFICANT CHANGE UP (ref 2–14)
NEUTROPHILS # BLD AUTO: 13.56 K/UL — HIGH (ref 1.8–7.4)
NEUTROPHILS NFR BLD AUTO: 90.5 % — HIGH (ref 43–77)
PHOSPHATE SERPL-MCNC: 1.7 MG/DL — LOW (ref 2.5–4.5)
PLATELET # BLD AUTO: 341 K/UL — SIGNIFICANT CHANGE UP (ref 150–400)
POTASSIUM SERPL-MCNC: 3.3 MMOL/L — LOW (ref 3.5–5.3)
POTASSIUM SERPL-SCNC: 3.3 MMOL/L — LOW (ref 3.5–5.3)
PROT SERPL-MCNC: 4.8 G/DL — LOW (ref 6–8.3)
RBC # BLD: 3.27 M/UL — LOW (ref 3.8–5.2)
RBC # FLD: 18.5 % — HIGH (ref 10.3–14.5)
SODIUM SERPL-SCNC: 140 MMOL/L — SIGNIFICANT CHANGE UP (ref 135–145)
WBC # BLD: 14.98 K/UL — HIGH (ref 3.8–10.5)
WBC # FLD AUTO: 14.98 K/UL — HIGH (ref 3.8–10.5)

## 2022-11-04 PROCEDURE — 71045 X-RAY EXAM CHEST 1 VIEW: CPT | Mod: 26

## 2022-11-04 PROCEDURE — 99232 SBSQ HOSP IP/OBS MODERATE 35: CPT

## 2022-11-04 RX ORDER — SODIUM,POTASSIUM PHOSPHATES 278-250MG
1 POWDER IN PACKET (EA) ORAL
Refills: 0 | Status: COMPLETED | OUTPATIENT
Start: 2022-11-04 | End: 2022-11-05

## 2022-11-04 RX ORDER — HYDROMORPHONE HYDROCHLORIDE 2 MG/ML
1.5 INJECTION INTRAMUSCULAR; INTRAVENOUS; SUBCUTANEOUS
Qty: 0 | Refills: 0 | DISCHARGE
Start: 2022-11-04

## 2022-11-04 RX ORDER — HYDROMORPHONE HYDROCHLORIDE 2 MG/ML
1 INJECTION INTRAMUSCULAR; INTRAVENOUS; SUBCUTANEOUS
Qty: 0 | Refills: 0 | DISCHARGE
Start: 2022-11-04

## 2022-11-04 RX ORDER — SODIUM,POTASSIUM PHOSPHATES 278-250MG
1 POWDER IN PACKET (EA) ORAL
Qty: 0 | Refills: 0 | DISCHARGE
Start: 2022-11-04

## 2022-11-04 RX ORDER — HYDROMORPHONE HYDROCHLORIDE 2 MG/ML
1 INJECTION INTRAMUSCULAR; INTRAVENOUS; SUBCUTANEOUS ONCE
Refills: 0 | Status: DISCONTINUED | OUTPATIENT
Start: 2022-11-04 | End: 2022-11-08

## 2022-11-04 RX ORDER — POTASSIUM CHLORIDE 20 MEQ
40 PACKET (EA) ORAL ONCE
Refills: 0 | Status: COMPLETED | OUTPATIENT
Start: 2022-11-04 | End: 2022-11-04

## 2022-11-04 RX ORDER — SACCHAROMYCES BOULARDII 250 MG
1 POWDER IN PACKET (EA) ORAL
Qty: 0 | Refills: 0 | DISCHARGE
Start: 2022-11-04

## 2022-11-04 RX ORDER — MAGNESIUM SULFATE 500 MG/ML
2 VIAL (ML) INJECTION ONCE
Refills: 0 | Status: COMPLETED | OUTPATIENT
Start: 2022-11-04 | End: 2022-11-04

## 2022-11-04 RX ORDER — HYDROMORPHONE HYDROCHLORIDE 2 MG/ML
0 INJECTION INTRAMUSCULAR; INTRAVENOUS; SUBCUTANEOUS
Qty: 0 | Refills: 0 | DISCHARGE
Start: 2022-11-04

## 2022-11-04 RX ADMIN — HYDROMORPHONE HYDROCHLORIDE 30 MILLILITER(S): 2 INJECTION INTRAMUSCULAR; INTRAVENOUS; SUBCUTANEOUS at 08:22

## 2022-11-04 RX ADMIN — Medication 25 GRAM(S): at 14:36

## 2022-11-04 RX ADMIN — Medication 1 PACKET(S): at 17:22

## 2022-11-04 RX ADMIN — CHLORHEXIDINE GLUCONATE 1 APPLICATION(S): 213 SOLUTION TOPICAL at 11:58

## 2022-11-04 RX ADMIN — Medication 40 MILLIEQUIVALENT(S): at 14:36

## 2022-11-04 RX ADMIN — Medication 4 MILLIGRAM(S): at 17:23

## 2022-11-04 RX ADMIN — PANTOPRAZOLE SODIUM 40 MILLIGRAM(S): 20 TABLET, DELAYED RELEASE ORAL at 11:58

## 2022-11-04 RX ADMIN — ENOXAPARIN SODIUM 40 MILLIGRAM(S): 100 INJECTION SUBCUTANEOUS at 07:24

## 2022-11-04 RX ADMIN — MIRTAZAPINE 15 MILLIGRAM(S): 45 TABLET, ORALLY DISINTEGRATING ORAL at 21:35

## 2022-11-04 RX ADMIN — ANASTROZOLE 1 MILLIGRAM(S): 1 TABLET ORAL at 11:57

## 2022-11-04 RX ADMIN — HYDROMORPHONE HYDROCHLORIDE 30 MILLILITER(S): 2 INJECTION INTRAMUSCULAR; INTRAVENOUS; SUBCUTANEOUS at 20:03

## 2022-11-04 RX ADMIN — Medication 2.5 MILLIGRAM(S): at 17:22

## 2022-11-04 RX ADMIN — HYDROMORPHONE HYDROCHLORIDE 30 MILLILITER(S): 2 INJECTION INTRAMUSCULAR; INTRAVENOUS; SUBCUTANEOUS at 05:57

## 2022-11-04 NOTE — PROGRESS NOTE ADULT - ASSESSMENT
67 y/o F with PMH of left arm skin CA s/p excision at age 16, stomach ulcer, recently diagnosed breast ca with mets to bones, liver, and lungs with cord compression s/p C7-T2 corpectomy & fusion and posterior C4-T5 fusion (9/22) transferred from acute rehab at [place] for initiation of palliative RT.  h/o c diff out patient. none now    11/1/22  Neuro-surgery Dr Mani Austin- on teams notes patient does NOT NEED NEURO Surgery or MRI-no need to send back to [place] to Neuro-surgery  11/2- no RT to be given- not being offered-   Palliative asked to see patient AGAIN for Pain control and further GOC- patient now DNR  PMR rec LEONOR    Breast cancer with mets to bone/liver/ lung with cord compression   FFT  Functional Quad  Severe protein calorie malnutrition

## 2022-11-04 NOTE — PROGRESS NOTE ADULT - SUBJECTIVE AND OBJECTIVE BOX
Patient is a 67y old  Female who presents with a chief complaint of Bone pain (04 Nov 2022 09:04)      SUBJECTIVE / OVERNIGHT EVENTS:  Resting in bed.  NC oxygen, Neck color  Looks comfortable today on new PCA pump settings    MEDICATIONS  (STANDING):  anastrozole 1 milliGRAM(s) Oral daily  chlorhexidine 2% Cloths 1 Application(s) Topical daily  dexAMETHasone  Injectable 4 milliGRAM(s) IV Push every 12 hours  enoxaparin Injectable 40 milliGRAM(s) SubCutaneous every 24 hours  HYDROmorphone  Injectable 1 milliGRAM(s) IntraMuscular once  HYDROmorphone PCA (1 mG/mL) 30 milliLiter(s) PCA Continuous PCA Continuous  metoprolol tartrate Injectable 2.5 milliGRAM(s) IV Push every 12 hours  mirtazapine 15 milliGRAM(s) Oral at bedtime  pantoprazole  Injectable 40 milliGRAM(s) IV Push daily  polyethylene glycol 3350 17 Gram(s) Oral every 12 hours  saccharomyces boulardii 250 milliGRAM(s) Oral two times a day  senna 2 Tablet(s) Oral at bedtime    MEDICATIONS  (PRN):  acetaminophen     Tablet .. 650 milliGRAM(s) Oral every 6 hours PRN Mild Pain (1 - 3), Moderate Pain (4 - 6)  ALPRAZolam 0.25 milliGRAM(s) Oral every 6 hours PRN anxiety  cyclobenzaprine 5 milliGRAM(s) Oral three times a day PRN Muscle Spasm  HYDROmorphone PCA (1 mG/mL) Rescue Clinician Bolus 1.5 milliGRAM(s) IV Push every 2 hours PRN for Pain Scale GREATER THAN 6  naloxone Injectable 0.1 milliGRAM(s) IV Push every 3 minutes PRN For ANY of the following changes in patient status:  A. RR LESS THAN 10 breaths per minute, B. Oxygen saturation LESS THAN 90%, C. Sedation score of 6  ondansetron Injectable 4 milliGRAM(s) IV Push every 6 hours PRN Nausea      POCT Blood Glucose.: 110 mg/dL (04 Nov 2022 00:34)    Vital Signs Last 24 Hrs  T(C): 36.4 (04 Nov 2022 06:00), Max: 36.5 (03 Nov 2022 23:30)  T(F): 97.6 (04 Nov 2022 06:00), Max: 97.7 (03 Nov 2022 23:30)  HR: 82 (04 Nov 2022 06:00) (76 - 83)  BP: 154/95 (04 Nov 2022 06:00) (143/79 - 154/95)  BP(mean): --  RR: 18 (04 Nov 2022 06:00) (16 - 18)  SpO2: 95% (04 Nov 2022 06:00) (91% - 99%)    Parameters below as of 04 Nov 2022 06:00  Patient On (Oxygen Delivery Method): room air        PHYSICAL EXAM:  GENERAL: NAD, well-developed  NVC oxygen  HEAD:  Atraumatic, Normocephalic  neck color in place  EYES: EOMI, PERRLA, conjunctiva and sclera clear  NECK: Supple, No JVD  CHEST/LUNG: Clear to auscultation bilaterally; No wheeze  HEART: Regular rate and rhythm; No murmurs, rubs, or gallops  ABDOMEN: Soft, Nontender, Nondistended; Bowel sounds present  EXTREMITIES:  2+ Peripheral Pulses, No clubbing, cyanosis, or edema  PSYCH: AAOx3    LABS:                        9.4    12.49 )-----------( 328      ( 03 Nov 2022 05:50 )             30.1     11-03    138  |  105  |  5<L>  ----------------------------<  123<H>  3.4<L>   |  23  |  0.21<L>    Ca    8.6      03 Nov 2022 05:50  Phos  2.2     11-03  Mg     1.60     11-03    TPro  4.7<L>  /  Alb  2.7<L>  /  TBili  0.2  /  DBili  x   /  AST  62<H>  /  ALT  10  /  AlkPhos  204<H>  11-03          Care Discussed with Consultants/Other Providers:

## 2022-11-04 NOTE — PROGRESS NOTE ADULT - PROBLEM SELECTOR PLAN 1
-Sent in from rehab for palliative RT given worsening pain  - c/w fentanyl patch | Dilaudid pca  - Palliative care/onc consulted; appreciate recs- c/w PCA pump  d/w Rt- onc no more MRI needed  Neuro-surgery Dr Mani Austin- on teams notes patient does NOT NEED NEURO Surgery or MRI,  no need to send back to [place] to Neuro-surgery  Palliative asked to see patient AGAIN for Pain control and further GOC- patient now DNR  Oncology notes no plan for chemotherapy- Only PO Anastrozole and out patient f/u with Dr Rivera at [facility]  Family meeting planned by pallitive at 3 pm- brother indicated he was not able to keep appoitments with patient -Sent in from rehab for palliative RT given worsening pain  - c/w fentanyl patch | Dilaudid pca  - Palliative care/onc consulted; appreciate recs- c/w PCA pump  d/w Rt- onc no more MRI needed  Neuro-surgery Dr Mani Austin- on teams notes patient does NOT NEED NEURO Surgery or MRI,  no need to send back to [place] to Neuro-surgery  Palliative asked to see patient AGAIN for Pain control and further GOC- patient now DNR  Oncology notes no plan for chemotherapy- Only PO Anastrozole and out patient f/u with Dr Rivera at [facility]  Family meeting planned by palliative at 3 pm- brother 11/3---> In patient hospice at [place] today 11/4

## 2022-11-04 NOTE — PROVIDER CONTACT NOTE (OTHER) - ASSESSMENT
Pt IV leaking and infiltrated.
Pt seems increasingly lethargic into shift. VSS. Pt c/o shortness of breath and is breathing labored. O2 saturation on room air is 91%.

## 2022-11-04 NOTE — DISCHARGE NOTE NURSING/CASE MANAGEMENT/SOCIAL WORK - PATIENT PORTAL LINK FT
You can access the FollowMyHealth Patient Portal offered by [facility] by registering at the following website: [email or web address]. By joining [facility]’s FollowMyHealth portal, you will also be able to view your health information using other applications (apps) compatible with our system.

## 2022-11-04 NOTE — PROGRESS NOTE ADULT - PROBLEM SELECTOR PLAN 2
- s/p C7-T2 corpectomy & fusion and posterior C4-T5 fusion  - continue dexamethasone 4mg Q12  - C/w PPI while on steroids  -Unclear anticipated duration of C-collar and steroids. Would also benefit from neurosurg eval given worsening pain  -PRIOR MD Discussed w/ Neurosurgery- Dr. Bennett 10/29, agrees w/ MRI then BUT   11/1/22-Neuro-surgery Dr Mani Austin- on teams notes patient does NOT NEED NEURO Surgery or MRI  no need to send back to [place] to Neuro-surgery  Palliative asked to see patient AGAIN for Pain control and further GOC- did not tolerate Rt due to pain - s/p C7-T2 corpectomy & fusion and posterior C4-T5 fusion  - continue dexamethasone 4mg Q12  - C/w PPI while on steroids  -Unclear anticipated duration of C-collar and steroids. Would also benefit from neurosurg eval given worsening pain  -PRIOR MD Discussed w/ Neurosurgery- Dr. Bennett 10/29, agrees w/ MRI then BUT   11/1/22-Neuro-surgery Dr Mani Austin- on teams notes patient does NOT NEED NEURO Surgery or MRI  no need to send back to [place] to Neuro-surgery  Palliative did family meeting-11/4--> [place] in patient in 11/4 today with PCA pump with continuos Dilaudid settings- Comfortable today

## 2022-11-04 NOTE — CHART NOTE - NSCHARTNOTEFT_GEN_A_CORE
Notified by RN that patient had oxygen saturation of 91% on room air and appeared to be lethargic. Patient seen and assessed at bedside. States she feels okay but "is tired". Admits to having shortness of breath earlier that resolved shortly after. Denies chest pain, fevers, chills, nausea, vomiting, pain, palpitations, dizziness, or other complaints.     T(C): 36.5 (11-03-22 @ 23:30), Max: 36.5 (11-03-22 @ 23:30)  HR: 80 (11-03-22 @ 23:30) (80 - 86)  BP: 146/80 (11-03-22 @ 23:30) (143/79 - 151/83)  RR: 18 (11-03-22 @ 23:31) (16 - 18)  SpO2: 99% (11-03-22 @ 23:31) (91% - 99%)    CONSTITUTIONAL: Well groomed, no apparent distress. Responsive to verbal stimuli.   CV: RRR, +S1S2, no MRG; no JVD; no peripheral edema  PULM: Lungs clear to auscultation bilaterally with no rales, rhonci or whezing  GI: Soft, NT, ND, no rebound, no guarding  PSYCH: A&Ox4 Notified by RN that patient had oxygen saturation of 91% on room air and appeared to be lethargic. Patient seen and assessed at bedside. States she feels okay but "is tired". Admits to having shortness of breath earlier that resolved shortly after. Denies chest pain, fevers, chills, nausea, vomiting, pain, palpitations, dizziness, or other complaints.     T(C): 36.5 (11-03-22 @ 23:30), Max: 36.5 (11-03-22 @ 23:30)  HR: 80 (11-03-22 @ 23:30) (80 - 86)  BP: 146/80 (11-03-22 @ 23:30) (143/79 - 151/83)  RR: 18 (11-03-22 @ 23:31) (16 - 18)  SpO2: 99% (11-03-22 @ 23:31) (91% - 99%)    CONSTITUTIONAL: Well groomed, no apparent distress. Responsive to verbal stimuli.   CV: RRR, +S1S2, no MRG; no JVD; no peripheral edema  PULM: Lungs clear to auscultation bilaterally with no rales, rhonci or whezing  GI: Soft, NT, ND, no rebound, no guarding  PSYCH: A&Ox4    Plan:  - 2L NC placed with improvement of O2 sat to 99%  - CXR ordered to r/o infiltrates  - Pt otherwise is stable and A&Ox4  - Pt advised to cut down amount of Dilaudid she uses at it can be a causing factor of lethargy  - Will continue to monitor patient and relay to day team

## 2022-11-04 NOTE — PROVIDER CONTACT NOTE (OTHER) - ACTION/TREATMENT ORDERED:
2L NC applied. SPO2 99%.
Provider coming to bedside with ultrasound to obtain access. One time IM Dilaudid ordered in meantime if needed.

## 2022-11-04 NOTE — DISCHARGE NOTE NURSING/CASE MANAGEMENT/SOCIAL WORK - NSDCPEFALRISK_GEN_ALL_CORE
For information on Fall & Injury Prevention, visit: [email or web address] OR  [email or web address] OR  https://www.cdc.gov/steadi/patient.html

## 2022-11-04 NOTE — PROGRESS NOTE ADULT - PROBLEM SELECTOR PLAN 8
Palliative asked to see patient AGAIN for Pain control and further GOC- patient now DNR  PMR rec LEONOR  OUT patient f/u Dr Rivera at [facility] on Anastrozole - hormonal therapy  Called brother Mr Arun Kearney [phone]- 11/2 to update- left VM update    11/3 GOC with Palliative- Now planning for in patient HOSPICE at [facility] today 11/4 Palliative asked to see patient AGAIN for Pain control and further GOC- patient now DNR  PMR rec LEONOR  OUT patient f/u Dr Rivera at [facility] on Anastrozole - hormonal therapy  Called brother Mr Arun Kearney [phone]- 11/2 to update- left VM update    11/3 GOC with Palliative- Now planning for in patient HOSPICE at [facility] today 11/4  50 min to [place] d/c

## 2022-11-04 NOTE — PROVIDER CONTACT NOTE (OTHER) - BACKGROUND
PCA dose adjusted to 1mg continuous during the day.
Pt receiving Dilaudid 1mg continuous via PCA pump.

## 2022-11-04 NOTE — ADVANCED PRACTICE NURSE CONSULT - ASSESSMENT
Patient is aware and alert. ARROW insertion along with risks, benefits, possible complications and infection prevention explained to patient who verbalized understanding. All questions addressed. Left arm cleansed with CHG. ultra sound guidance, placed ARROW endurance extended dwell catheter system 20G /6cm into Right Brachial vein. Brisk blood return and flushed with 20Mls of normal saline. Minimal blood loss and patient tolerated procedure well. CHG dressing placed. All sharps accounted for. Report given to district RN and provider.

## 2022-11-05 PROCEDURE — 99233 SBSQ HOSP IP/OBS HIGH 50: CPT

## 2022-11-05 RX ORDER — HYDROMORPHONE HYDROCHLORIDE 2 MG/ML
2 INJECTION INTRAMUSCULAR; INTRAVENOUS; SUBCUTANEOUS
Refills: 0 | Status: DISCONTINUED | OUTPATIENT
Start: 2022-11-05 | End: 2022-11-08

## 2022-11-05 RX ORDER — HYDROMORPHONE HYDROCHLORIDE 2 MG/ML
30 INJECTION INTRAMUSCULAR; INTRAVENOUS; SUBCUTANEOUS
Refills: 0 | Status: DISCONTINUED | OUTPATIENT
Start: 2022-11-05 | End: 2022-11-08

## 2022-11-05 RX ADMIN — ANASTROZOLE 1 MILLIGRAM(S): 1 TABLET ORAL at 13:37

## 2022-11-05 RX ADMIN — PANTOPRAZOLE SODIUM 40 MILLIGRAM(S): 20 TABLET, DELAYED RELEASE ORAL at 13:36

## 2022-11-05 RX ADMIN — Medication 2.5 MILLIGRAM(S): at 05:30

## 2022-11-05 RX ADMIN — Medication 4 MILLIGRAM(S): at 17:53

## 2022-11-05 RX ADMIN — CHLORHEXIDINE GLUCONATE 1 APPLICATION(S): 213 SOLUTION TOPICAL at 13:35

## 2022-11-05 RX ADMIN — Medication 4 MILLIGRAM(S): at 05:30

## 2022-11-05 RX ADMIN — HYDROMORPHONE HYDROCHLORIDE 30 MILLILITER(S): 2 INJECTION INTRAMUSCULAR; INTRAVENOUS; SUBCUTANEOUS at 04:33

## 2022-11-05 RX ADMIN — Medication 250 MILLIGRAM(S): at 17:54

## 2022-11-05 RX ADMIN — HYDROMORPHONE HYDROCHLORIDE 30 MILLILITER(S): 2 INJECTION INTRAMUSCULAR; INTRAVENOUS; SUBCUTANEOUS at 20:18

## 2022-11-05 RX ADMIN — Medication 2.5 MILLIGRAM(S): at 20:39

## 2022-11-05 RX ADMIN — MIRTAZAPINE 15 MILLIGRAM(S): 45 TABLET, ORALLY DISINTEGRATING ORAL at 22:57

## 2022-11-05 RX ADMIN — HYDROMORPHONE HYDROCHLORIDE 30 MILLILITER(S): 2 INJECTION INTRAMUSCULAR; INTRAVENOUS; SUBCUTANEOUS at 17:16

## 2022-11-05 RX ADMIN — POLYETHYLENE GLYCOL 3350 17 GRAM(S): 17 POWDER, FOR SOLUTION ORAL at 20:40

## 2022-11-05 RX ADMIN — ENOXAPARIN SODIUM 40 MILLIGRAM(S): 100 INJECTION SUBCUTANEOUS at 07:03

## 2022-11-05 NOTE — PROGRESS NOTE ADULT - PROBLEM SELECTOR PLAN 2
- s/p C7-T2 corpectomy & fusion and posterior C4-T5 fusion  - continue dexamethasone 4mg Q12  - C/w PPI while on steroids  -Unclear anticipated duration of C-collar and steroids. Would also benefit from neurosurg eval given worsening pain  -PRIOR MD Discussed w/ Neurosurgery- Dr. Bennett 10/29, agrees w/ MRI then BUT   11/1/22-Neuro-surgery Dr Mani Austin- on teams notes patient does NOT NEED NEURO Surgery or MRI  no need to send back to [place] to Neuro-surgery

## 2022-11-05 NOTE — PROGRESS NOTE ADULT - SUBJECTIVE AND OBJECTIVE BOX
[facility] Division of Hospital Medicine  Asif Abarca MD  Available via MS Teams  Pager: [phone]    SUBJECTIVE / OVERNIGHT EVENTS:    Complains of 10/10 pain with movement       MEDICATIONS  (STANDING):  anastrozole 1 milliGRAM(s) Oral daily  chlorhexidine 2% Cloths 1 Application(s) Topical daily  dexAMETHasone  Injectable 4 milliGRAM(s) IV Push every 12 hours  enoxaparin Injectable 40 milliGRAM(s) SubCutaneous every 24 hours  HYDROmorphone  Injectable 1 milliGRAM(s) IntraMuscular once  HYDROmorphone PCA (1 mG/mL) 30 milliLiter(s) PCA Continuous PCA Continuous  metoprolol tartrate Injectable 2.5 milliGRAM(s) IV Push every 12 hours  mirtazapine 15 milliGRAM(s) Oral at bedtime  pantoprazole  Injectable 40 milliGRAM(s) IV Push daily  polyethylene glycol 3350 17 Gram(s) Oral every 12 hours  saccharomyces boulardii 250 milliGRAM(s) Oral two times a day  senna 2 Tablet(s) Oral at bedtime    MEDICATIONS  (PRN):  acetaminophen     Tablet .. 650 milliGRAM(s) Oral every 6 hours PRN Mild Pain (1 - 3), Moderate Pain (4 - 6)  ALPRAZolam 0.25 milliGRAM(s) Oral every 6 hours PRN anxiety  cyclobenzaprine 5 milliGRAM(s) Oral three times a day PRN Muscle Spasm  naloxone Injectable 0.1 milliGRAM(s) IV Push every 3 minutes PRN For ANY of the following changes in patient status:  A. RR LESS THAN 10 breaths per minute, B. Oxygen saturation LESS THAN 90%, C. Sedation score of 6  ondansetron Injectable 4 milliGRAM(s) IV Push every 6 hours PRN Nausea      I&O's Summary      PHYSICAL EXAM:  Vital Signs Last 24 Hrs  T(C): 36.3 (05 Nov 2022 13:58), Max: 36.7 (05 Nov 2022 05:15)  T(F): 97.3 (05 Nov 2022 13:58), Max: 98.1 (05 Nov 2022 05:15)  HR: 76 (05 Nov 2022 13:58) (76 - 89)  BP: 152/77 (05 Nov 2022 13:58) (126/58 - 152/77)  BP(mean): --  RR: 18 (05 Nov 2022 05:15) (18 - 18)  SpO2: 94% (05 Nov 2022 13:58) (94% - 95%)    Parameters below as of 05 Nov 2022 13:58  Patient On (Oxygen Delivery Method): room air      CONSTITUTIONAL: NAD, in c collar, cachetic   EYES:  conjunctiva and sclera clear  NECK: Supple  RESPIRATORY: Normal respiratory effort; lungs are clear to auscultation bilaterally  CARDIOVASCULAR: Regular rate and rhythm, normal S1 and S2, no murmur/rub/gallop  ABDOMEN: Nontender to palpation, normoactive bowel sounds, no rebound/guarding; No hepatosplenomegaly  MUSCULOSKELETAL:  3+ edema in the LE   PSYCH: A+O to person, place, and time; affect appropriate  NEUROLOGY: no gross motor or sensory deficits   SKIN: No rashes; no palpable lesions    LABS:                        9.5    14.98 )-----------( 341      ( 04 Nov 2022 10:46 )             29.7     11-04    140  |  105  |  4<L>  ----------------------------<  114<H>  3.3<L>   |  25  |  0.21<L>    Ca    8.4      04 Nov 2022 10:46  Phos  1.7     11-04  Mg     1.50     11-04    TPro  4.8<L>  /  Alb  2.6<L>  /  TBili  0.3  /  DBili  x   /  AST  71<H>  /  ALT  13  /  AlkPhos  210<H>  11-04              COVID-19 PCR: NotDetec (03 Nov 2022 17:43)  COVID-19 PCR: NotDetec (25 Oct 2022 06:30)  COVID-19 PCR: NotDetec (19 Oct 2022 07:25)  COVID-19 PCR: NotDetec (13 Oct 2022 05:37)  COVID-19 PCR: NotDetec (07 Oct 2022 06:10)  COVID-19 PCR: NotDetec (30 Sep 2022 20:50)  COVID-19 PCR: NotDetec (27 Sep 2022 13:00)  COVID-19 PCR: NotDetec (20 Sep 2022 06:00)  COVID-19 PCR: NotDetec (17 Sep 2022 03:30)  SARS-CoV-2: Celsote (10 Sep 2022 02:11)      RADIOLOGY & ADDITIONAL TESTS:  New Results Reviewed Today:   New Imaging Personally Reviewed Today:  New Electrocardiogram Personally Reviewed Today:  Prior or Outpatient Records Reviewed Today:    COMMUNICATION:  Care Discussed with Consultants/Other Providers and Details of Discussion:  Discussions with Patient/Family:  PCP Communication:

## 2022-11-05 NOTE — PROGRESS NOTE ADULT - PROBLEM SELECTOR PLAN 8
Palliative asked to see patient AGAIN for Pain control and further GOC- patient now DNR  PMR rec LEONOR  OUT patient f/u Dr Rivera at [facility] on Anastrozole - hormonal therapy  Called brother Mr Arun Kearney [phone]- 11/2 to update- left VM update    11/3 GOC with Palliative- Now planning for in patient HOSPICE at [facility]    - pending transfer (needs better pain control prior per patient to tolerate the transfer)

## 2022-11-05 NOTE — PROGRESS NOTE ADULT - ASSESSMENT
67 y/o F with PMH of left arm skin CA s/p excision at age 16, stomach ulcer, recently diagnosed breast ca with mets to bones, liver, and lungs with cord compression s/p C7-T2 corpectomy & fusion and posterior C4-T5 fusion (9/22) transferred from acute rehab at [place] for initiation of palliative RT.  h/o c diff out patient. none now    11/1/22  Neuro-surgery Dr Mani Austin- on teams notes patient does NOT NEED NEURO Surgery or MRI-no need to send back to [place] to Neuro-surgery  11/2- no RT to be given- not being offered-   Palliative asked to see patient AGAIN for Pain control and further GOC- patient now DNR  PMR rec LEONOR    Breast cancer with mets to bone/liver/ lung with cord compression   FFT  Functional Quad  Severe protein calorie malnutrition

## 2022-11-05 NOTE — CHART NOTE - NSCHARTNOTEFT_GEN_A_CORE
Informed by SW that bed available at [facility] today, however Pt declined to go indicating her pain is not controlled. Pt's brother at bedside also noted that Pt's pain is not controlled and would not be comfortable with her being moved. Pt is currently on a PCA pump with a continuous rate, and under usage of the pump allowance. Pt made aware of this and educated on utilizing the pump to better control the pain. Pt and her brother reassured that the role for hospice is comfort care and there will be ongoing management and adjustment of the PCA pump to manage pain. Attending Dr. Abarca made aware, he will also speak with pt.

## 2022-11-05 NOTE — PROGRESS NOTE ADULT - PROBLEM SELECTOR PLAN 1
-Sent in from rehab for palliative RT given worsening pain  - c/w fentanyl patch | Dilaudid pca  - Palliative care/onc consulted; appreciate recs- c/w PCA pump  d/w Rt- onc no more MRI needed  Neuro-surgery Dr Mani Austin- on teams notes patient does NOT NEED NEURO Surgery or MRI,  no need to send back to [place] to Neuro-surgery  Palliative asked to see patient AGAIN for Pain control and further GOC- patient now DNR  Oncology notes no plan for chemotherapy- Only PO Anastrozole and out patient f/u with Dr Rivera at [facility]  Family meeting planned by palliative at 3 pm- brother 11/3---> In patient hospice at [place]   - pending transfer once pain control is adequate

## 2022-11-05 NOTE — PROGRESS NOTE ADULT - PROBLEM SELECTOR PROBLEM 8
Discharge planning issues
Prophylactic measure
Discharge planning issues
Prophylactic measure
Prophylactic measure
Discharge planning issues
Prophylactic measure

## 2022-11-06 PROCEDURE — 99233 SBSQ HOSP IP/OBS HIGH 50: CPT

## 2022-11-06 RX ADMIN — Medication 4 MILLIGRAM(S): at 07:00

## 2022-11-06 RX ADMIN — MIRTAZAPINE 15 MILLIGRAM(S): 45 TABLET, ORALLY DISINTEGRATING ORAL at 23:27

## 2022-11-06 RX ADMIN — PANTOPRAZOLE SODIUM 40 MILLIGRAM(S): 20 TABLET, DELAYED RELEASE ORAL at 12:22

## 2022-11-06 RX ADMIN — Medication 2.5 MILLIGRAM(S): at 06:58

## 2022-11-06 RX ADMIN — Medication 2.5 MILLIGRAM(S): at 18:13

## 2022-11-06 RX ADMIN — ANASTROZOLE 1 MILLIGRAM(S): 1 TABLET ORAL at 12:22

## 2022-11-06 RX ADMIN — SENNA PLUS 2 TABLET(S): 8.6 TABLET ORAL at 23:27

## 2022-11-06 RX ADMIN — Medication 4 MILLIGRAM(S): at 18:13

## 2022-11-06 RX ADMIN — HYDROMORPHONE HYDROCHLORIDE 30 MILLILITER(S): 2 INJECTION INTRAMUSCULAR; INTRAVENOUS; SUBCUTANEOUS at 08:02

## 2022-11-06 RX ADMIN — HYDROMORPHONE HYDROCHLORIDE 30 MILLILITER(S): 2 INJECTION INTRAMUSCULAR; INTRAVENOUS; SUBCUTANEOUS at 20:39

## 2022-11-06 RX ADMIN — CHLORHEXIDINE GLUCONATE 1 APPLICATION(S): 213 SOLUTION TOPICAL at 12:22

## 2022-11-06 RX ADMIN — Medication 250 MILLIGRAM(S): at 18:13

## 2022-11-06 RX ADMIN — ENOXAPARIN SODIUM 40 MILLIGRAM(S): 100 INJECTION SUBCUTANEOUS at 07:00

## 2022-11-06 NOTE — CHART NOTE - NSCHARTNOTEFT_GEN_A_CORE
Based on the palliative care team note, this is a  65 y/o F with PMH of left arm skin CA s/p excision at age 16, stomach ulcer, and recently diagnosed breast ca with mets to bones, liver, and lungs with cord compression s/p C7-T2 corpectomy & fusion and posterior C4-T5 fusion transferred from acute rehab at [place] for initiation of palliative RT.  Started on a PCA for better pain management. Plan after GOC conversation on 11/3 was to discharge to [facility].     Palliative care team was consulted over the weekend 11/5 because the patient was complaining of uncontrolled pain especially on movement and declined to transfer to [place] due to ongoing pain . Reviewed PCA use and patient in a 24 hour period had 18 injections and 22 attempts. Dilaudid PCA settings at 1mg demand, continuous 1mg/hr, 20min lockout, CB 1.5mg q2hrs PRN for severe pain. Based on PCA usage, increased demand dose by taking half of the total injections and dividing by 24 hrs. New Dilaudid PCA settings at 1.5 mg demand, continuous 1 mg/hr, 20 min lockout, total 4 hr limit 22 mg, CB 2 mg q2h PRN for severe breakthrough pain. Recommended further PCA education and to time pushes 15-20 min before interventions that require movement. Based on recent progress note from primary care team, patient reports improved pain control.

## 2022-11-06 NOTE — PROGRESS NOTE ADULT - SUBJECTIVE AND OBJECTIVE BOX
[facility] Division of Hospital Medicine  Asif Abarca MD  Available via MS Teams  Pager: [phone]    SUBJECTIVE / OVERNIGHT EVENTS:    Pain is better controlled with better access to PCA button   Feels comfortable       MEDICATIONS  (STANDING):  anastrozole 1 milliGRAM(s) Oral daily  chlorhexidine 2% Cloths 1 Application(s) Topical daily  dexAMETHasone  Injectable 4 milliGRAM(s) IV Push every 12 hours  enoxaparin Injectable 40 milliGRAM(s) SubCutaneous every 24 hours  HYDROmorphone  Injectable 1 milliGRAM(s) IntraMuscular once  HYDROmorphone PCA (5 mG/mL) 30 milliLiter(s) PCA Continuous PCA Continuous  metoprolol tartrate Injectable 2.5 milliGRAM(s) IV Push every 12 hours  mirtazapine 15 milliGRAM(s) Oral at bedtime  pantoprazole  Injectable 40 milliGRAM(s) IV Push daily  polyethylene glycol 3350 17 Gram(s) Oral every 12 hours  saccharomyces boulardii 250 milliGRAM(s) Oral two times a day  senna 2 Tablet(s) Oral at bedtime    MEDICATIONS  (PRN):  acetaminophen     Tablet .. 650 milliGRAM(s) Oral every 6 hours PRN Mild Pain (1 - 3), Moderate Pain (4 - 6)  ALPRAZolam 0.25 milliGRAM(s) Oral every 6 hours PRN anxiety  cyclobenzaprine 5 milliGRAM(s) Oral three times a day PRN Muscle Spasm  HYDROmorphone PCA (5 mG/mL) Rescue Clinician Bolus 2 milliGRAM(s) IV Push every 2 hours PRN breakthough pain  naloxone Injectable 0.1 milliGRAM(s) IV Push every 3 minutes PRN For ANY of the following changes in patient status:  A. RR LESS THAN 10 breaths per minute, B. Oxygen saturation LESS THAN 90%, C. Sedation score of 6  ondansetron Injectable 4 milliGRAM(s) IV Push every 6 hours PRN Nausea      I&O's Summary    06 Nov 2022 07:01  -  06 Nov 2022 14:23  --------------------------------------------------------  IN: 0 mL / OUT: 900 mL / NET: -900 mL        PHYSICAL EXAM:  Vital Signs Last 24 Hrs  T(C): 36.7 (06 Nov 2022 12:00), Max: 36.8 (05 Nov 2022 22:12)  T(F): 98.1 (06 Nov 2022 12:00), Max: 98.3 (05 Nov 2022 22:12)  HR: 72 (06 Nov 2022 12:00) (72 - 97)  BP: 151/79 (06 Nov 2022 12:00) (151/79 - 160/94)  BP(mean): --  RR: 16 (06 Nov 2022 12:00) (16 - 20)  SpO2: 98% (06 Nov 2022 12:00) (94% - 99%)    Parameters below as of 06 Nov 2022 12:00  Patient On (Oxygen Delivery Method): nasal cannula  O2 Flow (L/min): 2    PHYSICAL EXAM:  GENERAL: NAD, well-developed  NVC oxygen  HEAD:  Atraumatic, Normocephalic  neck color in place  EYES: EOMI, PERRLA, conjunctiva and sclera clear  NECK: Supple, No JVD  CHEST/LUNG: Clear to auscultation bilaterally; No wheeze  HEART: Regular rate and rhythm; No murmurs, rubs, or gallops  ABDOMEN: Soft, Nontender, Nondistended; Bowel sounds present  EXTREMITIES:  2+ Peripheral Pulses, No clubbing, cyanosis, or edema  PSYCH: AAOx3    LABS:                    COVID-19 PCR: NotDetec (03 Nov 2022 17:43)  COVID-19 PCR: NotDetec (25 Oct 2022 06:30)  COVID-19 PCR: NotDetec (19 Oct 2022 07:25)  COVID-19 PCR: NotDetec (13 Oct 2022 05:37)  COVID-19 PCR: NotDetec (07 Oct 2022 06:10)  COVID-19 PCR: NotDetec (30 Sep 2022 20:50)  COVID-19 PCR: NotDetec (27 Sep 2022 13:00)  COVID-19 PCR: NotDetec (20 Sep 2022 06:00)  COVID-19 PCR: NotDetec (17 Sep 2022 03:30)  SARS-CoV-2: NotDetec (10 Sep 2022 02:11)      RADIOLOGY & ADDITIONAL TESTS:  New Results Reviewed Today:   New Imaging Personally Reviewed Today:  New Electrocardiogram Personally Reviewed Today:  Prior or Outpatient Records Reviewed Today:    COMMUNICATION:  Care Discussed with Consultants/Other Providers and Details of Discussion:  Discussions with Patient/Family:  PCP Communication:

## 2022-11-06 NOTE — PROGRESS NOTE ADULT - PROBLEM SELECTOR PLAN 7
DIET: Pureed diet   DVT: Lovenox  DISPO: to hospice    11/3 [facility] with Palliative- Now planning for in patient HOSPICE at [facility]    - pending transfer (needs better pain control prior per patient to tolerate the transfer)

## 2022-11-06 NOTE — PROGRESS NOTE ADULT - PROBLEM SELECTOR PLAN 1
-Sent in from rehab for palliative RT given worsening pain  - Palliative care/onc consulted; appreciate recs- c/w PCA pump  d/w Rt- onc no more MRI needed  Neuro-surgery Dr Mani Austin- on teams notes patient does NOT NEED NEURO Surgery or MRI,  no need to send back to [place] to Neuro-surgery  Palliative asked to see patient AGAIN for Pain control and further GOC- patient now DNR  Oncology notes no plan for chemotherapy- Only PO Anastrozole and out patient f/u with Dr Rivera at [facility] [place]  Family meeting completed by palliative with brother on 11/3---> Inpatient hospice at [place]   - pending transfer once pain control is adequate

## 2022-11-06 NOTE — PROGRESS NOTE ADULT - ASSESSMENT
67 y/o F with PMH of left arm skin CA s/p excision at age 16, stomach ulcer, recently diagnosed breast ca with mets to bones, liver, and lungs with cord compression s/p C7-T2 corpectomy & fusion and posterior C4-T5 fusion (9/22) transferred from acute rehab at [place] for initiation of palliative RT.  Now pending placement to hospice

## 2022-11-06 NOTE — PROGRESS NOTE ADULT - PROBLEM SELECTOR PLAN 5
-Pt w/ B/L upper extremity swelling   -Suspect iso IVF +/- stasis +/- hypoalbuminemia.   -Per chart review US Duplex 10/17 w/o RUE DVT  -Refusing US Duplex LUE   -Encourage elevation

## 2022-11-06 NOTE — PROGRESS NOTE ADULT - PROBLEM SELECTOR PLAN 2
- s/p C7-T2 corpectomy & fusion and posterior C4-T5 fusion  -PRIOR MD Discussed w/ Neurosurgery- Dr. Bennett 10/29, agrees w/ MRI then BUT   11/1/22-Neuro-surgery Dr Mani Austin- on teams notes patient does NOT NEED NEURO Surgery or MRI  no need to send back to [place] to Neuro-surgery  - continue dexamethasone 4mg Q12  - C/w PPI while on steroids  -Unclear anticipated duration of C-collar and steroids.

## 2022-11-06 NOTE — PROGRESS NOTE ADULT - PROBLEM SELECTOR PLAN 3
Hypercalcemia noted.  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

## 2022-11-07 PROCEDURE — 99233 SBSQ HOSP IP/OBS HIGH 50: CPT

## 2022-11-07 RX ADMIN — Medication 2.5 MILLIGRAM(S): at 17:14

## 2022-11-07 RX ADMIN — Medication 4 MILLIGRAM(S): at 17:13

## 2022-11-07 RX ADMIN — PANTOPRAZOLE SODIUM 40 MILLIGRAM(S): 20 TABLET, DELAYED RELEASE ORAL at 11:49

## 2022-11-07 RX ADMIN — HYDROMORPHONE HYDROCHLORIDE 30 MILLILITER(S): 2 INJECTION INTRAMUSCULAR; INTRAVENOUS; SUBCUTANEOUS at 11:53

## 2022-11-07 RX ADMIN — HYDROMORPHONE HYDROCHLORIDE 30 MILLILITER(S): 2 INJECTION INTRAMUSCULAR; INTRAVENOUS; SUBCUTANEOUS at 08:36

## 2022-11-07 RX ADMIN — Medication 2.5 MILLIGRAM(S): at 05:56

## 2022-11-07 RX ADMIN — MIRTAZAPINE 15 MILLIGRAM(S): 45 TABLET, ORALLY DISINTEGRATING ORAL at 22:00

## 2022-11-07 RX ADMIN — HYDROMORPHONE HYDROCHLORIDE 30 MILLILITER(S): 2 INJECTION INTRAMUSCULAR; INTRAVENOUS; SUBCUTANEOUS at 20:19

## 2022-11-07 RX ADMIN — SENNA PLUS 2 TABLET(S): 8.6 TABLET ORAL at 22:00

## 2022-11-07 RX ADMIN — Medication 4 MILLIGRAM(S): at 05:55

## 2022-11-07 RX ADMIN — POLYETHYLENE GLYCOL 3350 17 GRAM(S): 17 POWDER, FOR SOLUTION ORAL at 05:57

## 2022-11-07 RX ADMIN — POLYETHYLENE GLYCOL 3350 17 GRAM(S): 17 POWDER, FOR SOLUTION ORAL at 17:12

## 2022-11-07 RX ADMIN — ANASTROZOLE 1 MILLIGRAM(S): 1 TABLET ORAL at 11:49

## 2022-11-07 RX ADMIN — Medication 250 MILLIGRAM(S): at 17:13

## 2022-11-07 RX ADMIN — CHLORHEXIDINE GLUCONATE 1 APPLICATION(S): 213 SOLUTION TOPICAL at 11:49

## 2022-11-07 NOTE — CHART NOTE - NSCHARTNOTEFT_GEN_A_CORE
Source: Patient [x ]    Family [ ]     other [x ] Chart Review    Current Diet : Diet, Pureed:   Mildly Thick Liquids (MILDTHICKLIQS)  Supplement Feeding Modality:  Oral  Ensure Clear Cans or Servings Per Day:  1       Frequency:  Three Times a day (11-01-22 @ 17:41) [Active]    PO intake:  <25-75% [x ]   Height (cm): 165.1 (30 Sep 2022 17:49)  Weight (kg): 62.2 (28 Oct 2022 02:41), no new weight to assess  BMI (kg/m2): 22.8 (28 Oct 2022 02:41)      Nutrition Note:  66 year old Female with PMH of left arm skin CA s/p excision at age 16, stomach ulcer, recently diagnosed breast ca with mets to bones, liver, and lungs with cord compression s/p C7-T2 corpectomy & fusion and posterior C4-T5 fusion (9/22) transferred from acute rehab at [place] for initiation of palliative RT. Now pending placement to hospice, per chart.   Patient is seen for nutrition follow-up. Patient is seen feeding self during visit, consuming ~25% of meals, ~50% of supplement. Patient reports she is tired at times, so she will fall asleep when eating. Recommend to assist in feeding to optimize po intake. Patient was seen with a few unopened Ensure Supplement by bedside. Patient reports tolerating Hormel and Ensure Clear supplement well. Denies any GI distress during visit, reports last bowel movement 11/5/2022. Patient is on bowel regimen. Reports tolerating pureed and mildly thick liquid consistency. As per flow sheet, patient's po intake varies from 26-75% of meals, 0-75% of supplement. As per flow sheet, patient has 3+ edema to left leg, left knee, left arm and right arm, no pressure injury noted at this time. Noted low potassium- 3.3 (11/4/2022), phos-1.7 (11/4/2022), recommend replete K and phos, per MD discretion.       __________________ Pertinent Medications__________________   MEDICATIONS  (STANDING):  anastrozole 1 milliGRAM(s) Oral daily  chlorhexidine 2% Cloths 1 Application(s) Topical daily  dexAMETHasone  Injectable 4 milliGRAM(s) IV Push every 12 hours  enoxaparin Injectable 40 milliGRAM(s) SubCutaneous every 24 hours  HYDROmorphone  Injectable 1 milliGRAM(s) IntraMuscular once  HYDROmorphone PCA (5 mG/mL) 30 milliLiter(s) PCA Continuous PCA Continuous  metoprolol tartrate Injectable 2.5 milliGRAM(s) IV Push every 12 hours  mirtazapine 15 milliGRAM(s) Oral at bedtime  pantoprazole  Injectable 40 milliGRAM(s) IV Push daily  polyethylene glycol 3350 17 Gram(s) Oral every 12 hours  saccharomyces boulardii 250 milliGRAM(s) Oral two times a day  senna 2 Tablet(s) Oral at bedtime    MEDICATIONS  (PRN):  acetaminophen     Tablet .. 650 milliGRAM(s) Oral every 6 hours PRN Mild Pain (1 - 3), Moderate Pain (4 - 6)  ALPRAZolam 0.25 milliGRAM(s) Oral every 6 hours PRN anxiety  cyclobenzaprine 5 milliGRAM(s) Oral three times a day PRN Muscle Spasm  HYDROmorphone PCA (5 mG/mL) Rescue Clinician Bolus 2 milliGRAM(s) IV Push every 2 hours PRN breakthough pain  naloxone Injectable 0.1 milliGRAM(s) IV Push every 3 minutes PRN For ANY of the following changes in patient status:  A. RR LESS THAN 10 breaths per minute, B. Oxygen saturation LESS THAN 90%, C. Sedation score of 6  ondansetron Injectable 4 milliGRAM(s) IV Push every 6 hours PRN Nausea      __________________ Pertinent Labs__________________   11-04 Na 140 mmol/L Glu 114 mg/dL<H> K+ 3.3 mmol/L<L> Cr 0.21 mg/dL<L> BUN 4 mg/dL<L> Phos 1.7 mg/dL<L>        Estimated Needs:   [x ] no change since previous assessment      Previous Nutrition Diagnosis:     [x] Severe Malnutrition     Nutrition Diagnosis is [x ] ongoing     New Nutrition Diagnosis: [ x] not applicable    Interventions:     Recommend    [x ] Continue with current diet and supplement.  [x ] Replete electrolyte, per MD discretion.   [x ] Assistance in feeding, to optimize po intake.  [x ] Encourage PO intake and honor food preferences as able.       Monitoring and Evaluation:     [x ] PO intake [x] Tolerance to diet prescription [x ] weights [x] follow up per protocol  [x ] other: Bowel movement, labs.

## 2022-11-07 NOTE — PROGRESS NOTE ADULT - SUBJECTIVE AND OBJECTIVE BOX
Patient is a 67y old  Female who presents with a chief complaint of Bone pain (06 Nov 2022 14:22)      SUBJECTIVE / OVERNIGHT EVENTS: No acute events overnight. Pt complaining of some pain this AM but had not been pressing PCA while sleeping.   I spoke with patient's brother via phone - He expressed concern about her pain management. I explained use of the PCA for pain management and cautioned him against overly sedating pt. He expressed understanding that pain may not completely go away without overly sedating her and further pain management and titration of PCA can also safely be done at [facility]         ADDITIONAL REVIEW OF SYSTEMS:    MEDICATIONS  (STANDING):  anastrozole 1 milliGRAM(s) Oral daily  chlorhexidine 2% Cloths 1 Application(s) Topical daily  dexAMETHasone  Injectable 4 milliGRAM(s) IV Push every 12 hours  enoxaparin Injectable 40 milliGRAM(s) SubCutaneous every 24 hours  HYDROmorphone  Injectable 1 milliGRAM(s) IntraMuscular once  HYDROmorphone PCA (5 mG/mL) 30 milliLiter(s) PCA Continuous PCA Continuous  metoprolol tartrate Injectable 2.5 milliGRAM(s) IV Push every 12 hours  mirtazapine 15 milliGRAM(s) Oral at bedtime  pantoprazole  Injectable 40 milliGRAM(s) IV Push daily  polyethylene glycol 3350 17 Gram(s) Oral every 12 hours  saccharomyces boulardii 250 milliGRAM(s) Oral two times a day  senna 2 Tablet(s) Oral at bedtime    MEDICATIONS  (PRN):  acetaminophen     Tablet .. 650 milliGRAM(s) Oral every 6 hours PRN Mild Pain (1 - 3), Moderate Pain (4 - 6)  ALPRAZolam 0.25 milliGRAM(s) Oral every 6 hours PRN anxiety  cyclobenzaprine 5 milliGRAM(s) Oral three times a day PRN Muscle Spasm  HYDROmorphone PCA (5 mG/mL) Rescue Clinician Bolus 2 milliGRAM(s) IV Push every 2 hours PRN breakthough pain  naloxone Injectable 0.1 milliGRAM(s) IV Push every 3 minutes PRN For ANY of the following changes in patient status:  A. RR LESS THAN 10 breaths per minute, B. Oxygen saturation LESS THAN 90%, C. Sedation score of 6  ondansetron Injectable 4 milliGRAM(s) IV Push every 6 hours PRN Nausea      CAPILLARY BLOOD GLUCOSE        I&O's Summary    06 Nov 2022 07:01  -  07 Nov 2022 07:00  --------------------------------------------------------  IN: 0 mL / OUT: 900 mL / NET: -900 mL        PHYSICAL EXAM:  Vital Signs Last 24 Hrs  T(C): 36.8 (07 Nov 2022 06:17), Max: 36.9 (06 Nov 2022 22:03)  T(F): 98.3 (07 Nov 2022 06:17), Max: 98.5 (06 Nov 2022 22:03)  HR: 83 (07 Nov 2022 06:17) (73 - 83)  BP: 157/91 (07 Nov 2022 06:17) (147/83 - 157/91)  BP(mean): --  RR: 19 (07 Nov 2022 06:17) (18 - 19)  SpO2: 99% (07 Nov 2022 06:17) (98% - 100%)    Parameters below as of 07 Nov 2022 06:17  Patient On (Oxygen Delivery Method): nasal cannula  O2 Flow (L/min): 2    GENERAL: NAD, thin appearing  NVC oxygen  HEAD:  Atraumatic, Normocephalic  neck color in place  EYES: EOMI, PERRLA, conjunctiva and sclera clear  NECK: Supple, No JVD  CHEST/LUNG: Clear to auscultation bilaterally; No wheeze  HEART: Regular rate and rhythm; No murmurs, rubs, or gallops  ABDOMEN: Soft, Nontender, Nondistended; Bowel sounds present  EXTREMITIES:  2+ Peripheral Pulses, No clubbing, cyanosis, or edema  PSYCH: AAOx3    LABS:                      RADIOLOGY & ADDITIONAL TESTS:  Results Reviewed:   Imaging Personally Reviewed:  Electrocardiogram Personally Reviewed:    COORDINATION OF CARE:  Care Discussed with Consultants/Other Providers [Y/N]:  Prior or Outpatient Records Reviewed [Y/N]:

## 2022-11-07 NOTE — PROGRESS NOTE ADULT - PROBLEM SELECTOR PLAN 7
DIET: Pureed diet   DVT: Lovenox  DISPO: to hospice    11/3 [facility] with Palliative- Now planning for in patient HOSPICE at [facility]    - pending transfer (needs better pain control prior per patient to tolerate the transfer)

## 2022-11-07 NOTE — PROGRESS NOTE ADULT - ASSESSMENT
67 y/o F with PMH of left arm skin CA s/p excision at age 16, stomach ulcer, recently diagnosed breast ca with mets to bones, liver, and lungs with cord compression s/p C7-T2 corpectomy & fusion and posterior C4-T5 fusion (9/22) transferred from acute rehab at [place] for initiation of palliative RT.  Now pending placement to hospice

## 2022-11-07 NOTE — CHART NOTE - NSCHARTNOTESELECT_GEN_ALL_CORE
ACP NP/Event Note
rad med/Event Note
ACP-NP note/Event Note
ACP/Event Note
Abnormal Electrolytes/Event Note
Follow-up/Nutrition Services
Hospitalist/Event Note
Palliative Care/Event Note

## 2022-11-07 NOTE — PROGRESS NOTE ADULT - PROBLEM SELECTOR PLAN 2
- s/p C7-T2 corpectomy & fusion and posterior C4-T5 fusion  -PRIOR MD Discussed w/ Neurosurgery- Dr. Bennett 10/29, agrees w/ MRI then BUT   11/1/22-Neuro-surgery Dr Mani Austin- on teams notes patient does NOT NEED NEURO Surgery or MRI  no need to send back to [place] to Neuro-surgery  - continue dexamethasone 4mg Q12  - C/w PPI while on steroids  -Unclear anticipated duration of C-collar and steroids.

## 2022-11-07 NOTE — PROGRESS NOTE ADULT - PROBLEM SELECTOR PLAN 1
-Sent in from rehab for palliative RT given worsening pain  - Palliative care/onc consulted; appreciate recs- c/w PCA pump  d/w Rt- onc no more MRI needed  Neuro-surgery Dr Mani Austin- on teams notes patient does NOT NEED NEURO Surgery or MRI,  no need to send back to [place] to Neuro-surgery  Palliative asked to see patient AGAIN for Pain control and further GOC- patient now DNR  Oncology notes no plan for chemotherapy- Only PO Anastrozole and out patient f/u with Dr Rivera at [facility]  Family meeting completed by palliative with brother on 11/3---> Inpatient hospice at [place]   - pending transfer once pain control is adequate

## 2022-11-08 VITALS
OXYGEN SATURATION: 100 % | TEMPERATURE: 98 F | RESPIRATION RATE: 18 BRPM | SYSTOLIC BLOOD PRESSURE: 130 MMHG | HEART RATE: 72 BPM | DIASTOLIC BLOOD PRESSURE: 70 MMHG

## 2022-11-08 LAB
ANION GAP SERPL CALC-SCNC: 13 MMOL/L — SIGNIFICANT CHANGE UP (ref 7–14)
BUN SERPL-MCNC: 6 MG/DL — LOW (ref 7–23)
CALCIUM SERPL-MCNC: 9 MG/DL — SIGNIFICANT CHANGE UP (ref 8.4–10.5)
CHLORIDE SERPL-SCNC: 106 MMOL/L — SIGNIFICANT CHANGE UP (ref 98–107)
CO2 SERPL-SCNC: 21 MMOL/L — LOW (ref 22–31)
CREAT SERPL-MCNC: <0.2 MG/DL — LOW (ref 0.5–1.3)
EGFR: 128 ML/MIN/1.73M2 — SIGNIFICANT CHANGE UP
GLUCOSE SERPL-MCNC: 99 MG/DL — SIGNIFICANT CHANGE UP (ref 70–99)
MAGNESIUM SERPL-MCNC: 1.9 MG/DL — SIGNIFICANT CHANGE UP (ref 1.6–2.6)
PHOSPHATE SERPL-MCNC: 2 MG/DL — LOW (ref 2.5–4.5)
POTASSIUM SERPL-MCNC: 3.9 MMOL/L — SIGNIFICANT CHANGE UP (ref 3.5–5.3)
POTASSIUM SERPL-SCNC: 3.9 MMOL/L — SIGNIFICANT CHANGE UP (ref 3.5–5.3)
PTH RELATED PROT SERPL-MCNC: <2 PMOL/L — SIGNIFICANT CHANGE UP
SARS-COV-2 RNA SPEC QL NAA+PROBE: SIGNIFICANT CHANGE UP
SODIUM SERPL-SCNC: 140 MMOL/L — SIGNIFICANT CHANGE UP (ref 135–145)

## 2022-11-08 PROCEDURE — 99239 HOSP IP/OBS DSCHRG MGMT >30: CPT

## 2022-11-08 RX ORDER — HYDROMORPHONE HYDROCHLORIDE 2 MG/ML
1 INJECTION INTRAMUSCULAR; INTRAVENOUS; SUBCUTANEOUS
Qty: 0 | Refills: 0 | DISCHARGE
Start: 2022-11-08

## 2022-11-08 RX ADMIN — HYDROMORPHONE HYDROCHLORIDE 30 MILLILITER(S): 2 INJECTION INTRAMUSCULAR; INTRAVENOUS; SUBCUTANEOUS at 08:26

## 2022-11-08 RX ADMIN — ENOXAPARIN SODIUM 40 MILLIGRAM(S): 100 INJECTION SUBCUTANEOUS at 07:03

## 2022-11-08 RX ADMIN — Medication 4 MILLIGRAM(S): at 05:39

## 2022-11-08 RX ADMIN — Medication 2.5 MILLIGRAM(S): at 05:39

## 2022-11-08 NOTE — PROGRESS NOTE ADULT - NUTRITIONAL ASSESSMENT
This patient has been assessed with a concern for Malnutrition and has been determined to have a diagnosis/diagnoses of Severe protein-calorie malnutrition.    This patient is being managed with:   Diet Pureed-  Mildly Thick Liquids (MILDTHICKLIQS)  Supplement Feeding Modality:  Oral  Ensure Clear Cans or Servings Per Day:  1       Frequency:  Three Times a day  Entered: Nov 1 2022  5:42PM    

## 2022-11-08 NOTE — PROGRESS NOTE ADULT - SUBJECTIVE AND OBJECTIVE BOX
Patient is a 67y old  Female who presents with a chief complaint of Bone pain (07 Nov 2022 13:12)      SUBJECTIVE / OVERNIGHT EVENTS: No acute events overnight. Pt has no new complaints     ADDITIONAL REVIEW OF SYSTEMS:    MEDICATIONS  (STANDING):  anastrozole 1 milliGRAM(s) Oral daily  chlorhexidine 2% Cloths 1 Application(s) Topical daily  dexAMETHasone  Injectable 4 milliGRAM(s) IV Push every 12 hours  enoxaparin Injectable 40 milliGRAM(s) SubCutaneous every 24 hours  HYDROmorphone  Injectable 1 milliGRAM(s) IntraMuscular once  HYDROmorphone PCA (5 mG/mL) 30 milliLiter(s) PCA Continuous PCA Continuous  metoprolol tartrate Injectable 2.5 milliGRAM(s) IV Push every 12 hours  mirtazapine 15 milliGRAM(s) Oral at bedtime  pantoprazole  Injectable 40 milliGRAM(s) IV Push daily  polyethylene glycol 3350 17 Gram(s) Oral every 12 hours  saccharomyces boulardii 250 milliGRAM(s) Oral two times a day  senna 2 Tablet(s) Oral at bedtime    MEDICATIONS  (PRN):  acetaminophen     Tablet .. 650 milliGRAM(s) Oral every 6 hours PRN Mild Pain (1 - 3), Moderate Pain (4 - 6)  ALPRAZolam 0.25 milliGRAM(s) Oral every 6 hours PRN anxiety  cyclobenzaprine 5 milliGRAM(s) Oral three times a day PRN Muscle Spasm  HYDROmorphone PCA (5 mG/mL) Rescue Clinician Bolus 2 milliGRAM(s) IV Push every 2 hours PRN breakthough pain  naloxone Injectable 0.1 milliGRAM(s) IV Push every 3 minutes PRN For ANY of the following changes in patient status:  A. RR LESS THAN 10 breaths per minute, B. Oxygen saturation LESS THAN 90%, C. Sedation score of 6  ondansetron Injectable 4 milliGRAM(s) IV Push every 6 hours PRN Nausea      CAPILLARY BLOOD GLUCOSE        I&O's Summary      PHYSICAL EXAM:  Vital Signs Last 24 Hrs  T(C): 36.8 (08 Nov 2022 05:30), Max: 36.8 (08 Nov 2022 05:30)  T(F): 98.2 (08 Nov 2022 05:30), Max: 98.2 (08 Nov 2022 05:30)  HR: 72 (08 Nov 2022 05:30) (72 - 95)  BP: 130/70 (08 Nov 2022 05:30) (130/70 - 151/74)  BP(mean): --  RR: 18 (08 Nov 2022 05:30) (18 - 18)  SpO2: 100% (08 Nov 2022 05:30) (99% - 100%)    Parameters below as of 08 Nov 2022 05:30  Patient On (Oxygen Delivery Method): room air      GENERAL: NAD, thin appearing  NECK: neck color in place  EYES: EOMI, PERRLA, conjunctiva and sclera clear  NECK: Supple, No JVD  CHEST/LUNG: Clear to auscultation bilaterally; No wheeze  HEART: Regular rate and rhythm; No murmurs, rubs, or gallops  ABDOMEN: Soft, Nontender, Nondistended; Bowel sounds present  EXTREMITIES:  2+ Peripheral Pulses, No clubbing, cyanosis, or edema  PSYCH: AAOx3    LABS:    11-08    140  |  106  |  6<L>  ----------------------------<  99  3.9   |  21<L>  |  <0.20<L>    Ca    9.0      08 Nov 2022 03:58  Phos  2.0     11-08  Mg     1.90     11-08                  RADIOLOGY & ADDITIONAL TESTS:  Results Reviewed:   Imaging Personally Reviewed:  Electrocardiogram Personally Reviewed:    COORDINATION OF CARE:  Care Discussed with Consultants/Other Providers [Y/N]:  Prior or Outpatient Records Reviewed [Y/N]:

## 2022-11-08 NOTE — PROGRESS NOTE ADULT - PROBLEM SELECTOR PLAN 1
-Sent in from rehab for palliative RT given worsening pain  - Palliative care/onc consulted; appreciate recs- c/w PCA pump  d/w Rt- onc no more MRI needed  Neuro-surgery Dr Mani Austin- on teams notes patient does NOT NEED NEURO Surgery or MRI,  no need to send back to [place] to Neuro-surgery  Palliative asked to see patient AGAIN for Pain control and further GOC- patient now DNR  Oncology notes no plan for chemotherapy- Only PO Anastrozole and out patient f/u with Dr Rivera at [facility]  Family meeting completed by palliative with brother on 11/3---> Inpatient hospice at [place]   - pending transfer once pain control is adequate

## 2022-11-08 NOTE — PROGRESS NOTE ADULT - ASSESSMENT
67 y/o F with PMH of left arm skin CA s/p excision at age 16, stomach ulcer, recently diagnosed breast ca with mets to bones, liver, and lungs with cord compression s/p C7-T2 corpectomy & fusion and posterior C4-T5 fusion (9/22) transferred from acute rehab at [place] for initiation of palliative RT.  Now pending placement to hospice

## 2022-11-08 NOTE — PROGRESS NOTE ADULT - PROBLEM SELECTOR PROBLEM 2
Cervical spinal cord compression
Palliative care encounter
Neoplasm related pain
Cervical spinal cord compression
Neoplasm related pain
Cervical spinal cord compression

## 2022-11-08 NOTE — PROGRESS NOTE ADULT - PROVIDER SPECIALTY LIST ADULT
Heme/Onc
Hospitalist
Hospitalist
Palliative Care
Heme/Onc
Hospitalist
Heme/Onc
Heme/Onc
Hospitalist
Hospitalist
Palliative Care
Hospitalist
Hospitalist
Internal Medicine
Hospitalist
Internal Medicine
Internal Medicine
Hospitalist

## 2022-11-08 NOTE — PROGRESS NOTE ADULT - PROBLEM SELECTOR PROBLEM 1
Breast cancer metastasized to bone
Neoplasm related pain
Breast cancer metastasized to bone

## 2022-11-08 NOTE — PROGRESS NOTE ADULT - PROBLEM SELECTOR PLAN 7
DIET: Pureed diet   DVT: Lovenox  DISPO: to hospice    11/3 [facility] with Palliative- Now planning for in patient HOSPICE at [facility]    - Pending transfer today

## 2022-11-08 NOTE — PROGRESS NOTE ADULT - PROBLEM SELECTOR PLAN 2
- s/p C7-T2 corpectomy & fusion and posterior C4-T5 fusion  -PRIOR MD Discussed w/ Neurosurgery- Dr. Bennett 10/29, agrees w/ MRI then BUT   11/1/22-Neuro-surgery Dr Mani Austin- on teams notes patient does NOT NEED NEURO Surgery or MRI  no need to send back to [place] to Neuro-surgery  - continue dexamethasone 4mg Q12  - C/w PPI while on steroids  -Unclear anticipated duration of C-collar and steroids.

## 2022-11-08 NOTE — PROGRESS NOTE ADULT - NSPROGADDITIONALINFOA_GEN_ALL_CORE
Palliative meeting TODAY at 3 pm
I spent 35 min planning and facilitating discharge for this patient
Time spent on discharge: 35min
Seen by Oncology  Anastrozole started- continue -denies any treatment related side effects.   -noted to be slightly hypercalcemic, has been getting IVF but it is gradually rising - recommended IV bisphosphonate at this time pending response to systemic therapy and would continue gentle hydration and monitor    Needs repeat lytes / calcium level at least every other day please  HCP Brother Quique Kearney- notes- patient DOES NOT SWALLOW PILLS- only Liquids

## 2022-11-15 NOTE — ED ADULT TRIAGE NOTE - DOMESTIC TRAVEL HIGH RISK QUESTION
Repeat labs (kidney function and bloodwork) ordered  Schedule tylenol 1000mg three times a day for shoulder(every 8 hours)  Voltaren Gel only at the shoulder region  Schedule and complete ultrasound  Schedule shoulder injection in December  Elevate your legs to help with swelling    
No

## 2022-11-17 PROCEDURE — 93306 TTE W/DOPPLER COMPLETE: CPT

## 2022-11-17 PROCEDURE — 72129 CT CHEST SPINE W/DYE: CPT

## 2022-11-17 PROCEDURE — 72127 CT NECK SPINE W/O & W/DYE: CPT

## 2022-11-17 PROCEDURE — 81001 URINALYSIS AUTO W/SCOPE: CPT

## 2022-11-17 PROCEDURE — 71045 X-RAY EXAM CHEST 1 VIEW: CPT

## 2022-11-17 PROCEDURE — 82607 VITAMIN B-12: CPT

## 2022-11-17 PROCEDURE — 87635 SARS-COV-2 COVID-19 AMP PRB: CPT

## 2022-11-17 PROCEDURE — 97167 OT EVAL HIGH COMPLEX 60 MIN: CPT

## 2022-11-17 PROCEDURE — 83880 ASSAY OF NATRIURETIC PEPTIDE: CPT

## 2022-11-17 PROCEDURE — 92610 EVALUATE SWALLOWING FUNCTION: CPT

## 2022-11-17 PROCEDURE — 74176 CT ABD & PELVIS W/O CONTRAST: CPT

## 2022-11-17 PROCEDURE — U0005: CPT

## 2022-11-17 PROCEDURE — 97530 THERAPEUTIC ACTIVITIES: CPT

## 2022-11-17 PROCEDURE — 85027 COMPLETE CBC AUTOMATED: CPT

## 2022-11-17 PROCEDURE — 97140 MANUAL THERAPY 1/> REGIONS: CPT

## 2022-11-17 PROCEDURE — 74230 X-RAY XM SWLNG FUNCJ C+: CPT

## 2022-11-17 PROCEDURE — 84439 ASSAY OF FREE THYROXINE: CPT

## 2022-11-17 PROCEDURE — 87493 C DIFF AMPLIFIED PROBE: CPT

## 2022-11-17 PROCEDURE — 83735 ASSAY OF MAGNESIUM: CPT

## 2022-11-17 PROCEDURE — 97112 NEUROMUSCULAR REEDUCATION: CPT

## 2022-11-17 PROCEDURE — 87077 CULTURE AEROBIC IDENTIFY: CPT

## 2022-11-17 PROCEDURE — 80048 BASIC METABOLIC PNL TOTAL CA: CPT

## 2022-11-17 PROCEDURE — 87046 STOOL CULTR AEROBIC BACT EA: CPT

## 2022-11-17 PROCEDURE — 97535 SELF CARE MNGMENT TRAINING: CPT

## 2022-11-17 PROCEDURE — U0003: CPT

## 2022-11-17 PROCEDURE — 93971 EXTREMITY STUDY: CPT

## 2022-11-17 PROCEDURE — 71275 CT ANGIOGRAPHY CHEST: CPT

## 2022-11-17 PROCEDURE — 92507 TX SP LANG VOICE COMM INDIV: CPT

## 2022-11-17 PROCEDURE — 85025 COMPLETE CBC W/AUTO DIFF WBC: CPT

## 2022-11-17 PROCEDURE — 84145 PROCALCITONIN (PCT): CPT

## 2022-11-17 PROCEDURE — 93005 ELECTROCARDIOGRAM TRACING: CPT

## 2022-11-17 PROCEDURE — 84443 ASSAY THYROID STIM HORMONE: CPT

## 2022-11-17 PROCEDURE — 85379 FIBRIN DEGRADATION QUANT: CPT

## 2022-11-17 PROCEDURE — 80053 COMPREHEN METABOLIC PANEL: CPT

## 2022-11-17 PROCEDURE — 87040 BLOOD CULTURE FOR BACTERIA: CPT

## 2022-11-17 PROCEDURE — 86140 C-REACTIVE PROTEIN: CPT

## 2022-11-17 PROCEDURE — 36415 COLL VENOUS BLD VENIPUNCTURE: CPT

## 2022-11-17 PROCEDURE — 97163 PT EVAL HIGH COMPLEX 45 MIN: CPT

## 2022-11-17 PROCEDURE — 87507 IADNA-DNA/RNA PROBE TQ 12-25: CPT

## 2022-11-17 PROCEDURE — 92523 SPEECH SOUND LANG COMPREHEN: CPT

## 2022-11-17 PROCEDURE — 83540 ASSAY OF IRON: CPT

## 2022-11-17 PROCEDURE — 92526 ORAL FUNCTION THERAPY: CPT

## 2022-11-17 PROCEDURE — 87045 FECES CULTURE AEROBIC BACT: CPT

## 2022-11-17 PROCEDURE — 83550 IRON BINDING TEST: CPT

## 2022-11-17 PROCEDURE — 82746 ASSAY OF FOLIC ACID SERUM: CPT

## 2022-11-17 PROCEDURE — 97110 THERAPEUTIC EXERCISES: CPT

## 2022-11-17 PROCEDURE — 92611 MOTION FLUOROSCOPY/SWALLOW: CPT

## 2022-11-22 NOTE — PROGRESS NOTE ADULT - TIME BILLING
Writer has not yet received BP readings from Marcela Agarwal.  Called Chari, , to inquire.  Left message to call back.  LK  
Pain Management - chart review, patient interview
Metastatic Breast cancer mdm
Pain Management - chart review, patient interview
review of relevant history, clinical examination, review of data and images, discussion of treatment with the multidisciplinary team and any consultants involved in this patient’s care.
Pain Management - chart review, patient interview
Breast ca, metastatic cancer , mDm
Pain Management - chart review, patient interview
review of relevant history, clinical examination, review of data and images, discussion of treatment with the multidisciplinary team and any consultants involved in this patient’s care.

## 2023-02-21 NOTE — PROGRESS NOTE ADULT - SUBJECTIVE AND OBJECTIVE BOX
Patient is a 67y old  Female who presents with a chief complaint of metastatic breast CA to spine s/p  C7-T2 corpectomy & fusion, posterior C4-T5 fusion (25 Oct 2022 15:43)      24 HOUR EVENTS:  No overnight events reported.     SUBJECTIVE:  Patient seen and examined at bedside.     ALLERGIES:  lactose (Other)  tetanus toxoid (Hives)    MEDICATIONS  (STANDING):  Biotene Dry Mouth Oral Rinse 5 milliLiter(s) Swish and Spit three times a day  dexAMETHasone     Tablet 4 milliGRAM(s) Oral every 12 hours  enoxaparin Injectable 40 milliGRAM(s) SubCutaneous every 24 hours  fentaNYL   Patch  12 MICROgram(s)/Hr 1 Patch Transdermal every 72 hours  fentaNYL   Patch  25 MICROgram(s)/Hr 1 Patch Transdermal every 72 hours  lidocaine   4% Patch 1 Patch Transdermal daily  lidocaine   4% Patch 1 Patch Transdermal daily  magnesium oxide 400 milliGRAM(s) Oral three times a day with meals  metoprolol tartrate 25 milliGRAM(s) Oral two times a day  pantoprazole    Tablet 40 milliGRAM(s) Oral before breakfast  saccharomyces boulardii 250 milliGRAM(s) Oral two times a day  vancomycin    Solution 125 milliGRAM(s) Oral every 6 hours    MEDICATIONS  (PRN):  acetaminophen    Suspension .. 650 milliGRAM(s) Oral every 6 hours PRN Mild Pain (1 - 3)  ALBUTerol    0.083% 2.5 milliGRAM(s) Nebulizer every 6 hours PRN Shortness of Breath and/or Wheezing  ALPRAZolam 0.25 milliGRAM(s) Oral every 6 hours PRN anxiety  cyclobenzaprine 5 milliGRAM(s) Oral three times a day PRN Muscle Spasm  HYDROmorphone  Injectable 1.5 milliGRAM(s) IntraMuscular every 3 hours PRN Severe Pain (7 - 10)  metoclopramide   Syrup 10 milliGRAM(s) Oral every 6 hours PRN nausea  naloxone Injectable 2 milliGRAM(s) IntraMuscular once PRN Opioid Intoxication  ondansetron   Disintegrating Tablet 8 milliGRAM(s) Oral every 8 hours PRN Nausea and/or Vomiting  simethicone 80 milliGRAM(s) Chew every 6 hours PRN Gas    Vital Signs Last 24 Hrs  T(F): 98 (26 Oct 2022 09:42), Max: 98.2 (25 Oct 2022 13:22)  HR: 112 (26 Oct 2022 09:42) (103 - 117)  BP: 124/84 (26 Oct 2022 09:42) (124/84 - 138/85)  RR: 16 (26 Oct 2022 09:42) (16 - 16)  SpO2: 94% (26 Oct 2022 09:42) (93% - 95%)  I&O's Summary    PHYSICAL EXAM:  General: NAD, A/O x 3  ENT: Moist mucous membranes, no thrush  Neck: Supple, No JVD  Lungs: Clear to auscultation bilaterally, good air entry, non-labored breathing  Cardio: RRR, S1/S2, No murmur  Abdomen: Soft, Nontender, Nondistended; Bowel sounds present  Extremities: No calf tenderness, No pitting edema    LABS:                        9.1    6.15  )-----------( 294      ( 26 Oct 2022 06:15 )             29.3     10-26    137  |  102  |  6   ----------------------------<  89  3.8   |  27  |  0.28    Ca    10.5      26 Oct 2022 06:15    TPro  4.5  /  Alb  1.5  /  TBili  0.3  /  DBili  x   /  AST  47  /  ALT  8   /  AlkPhos  148  10-26      COVID-19 PCR: NotDetec (10-25-22 @ 06:30)  COVID-19 PCR: NotDetec (10-19-22 @ 07:25)  COVID-19 PCR: NotDetec (10-13-22 @ 05:37)  COVID-19 PCR: NotDetec (10-07-22 @ 06:10)  COVID-19 PCR: NotDetec (09-30-22 @ 20:50)    RADIOLOGY & ADDITIONAL TESTS:    Care Discussed with Consultants/Other Providers:    Patient is a 67y old  Female who presents with a chief complaint of metastatic breast CA to spine s/p  C7-T2 corpectomy & fusion, posterior C4-T5 fusion (25 Oct 2022 15:43)      24 HOUR EVENTS:  No overnight events reported.     SUBJECTIVE:  Patient seen and examined at bedside. still continues to be in pain, though today is slightly better.     ALLERGIES:  lactose (Other)  tetanus toxoid (Hives)    MEDICATIONS  (STANDING):  Biotene Dry Mouth Oral Rinse 5 milliLiter(s) Swish and Spit three times a day  dexAMETHasone     Tablet 4 milliGRAM(s) Oral every 12 hours  enoxaparin Injectable 40 milliGRAM(s) SubCutaneous every 24 hours  fentaNYL   Patch  12 MICROgram(s)/Hr 1 Patch Transdermal every 72 hours  fentaNYL   Patch  25 MICROgram(s)/Hr 1 Patch Transdermal every 72 hours  lidocaine   4% Patch 1 Patch Transdermal daily  lidocaine   4% Patch 1 Patch Transdermal daily  magnesium oxide 400 milliGRAM(s) Oral three times a day with meals  metoprolol tartrate 25 milliGRAM(s) Oral two times a day  pantoprazole    Tablet 40 milliGRAM(s) Oral before breakfast  saccharomyces boulardii 250 milliGRAM(s) Oral two times a day  vancomycin    Solution 125 milliGRAM(s) Oral every 6 hours    MEDICATIONS  (PRN):  acetaminophen    Suspension .. 650 milliGRAM(s) Oral every 6 hours PRN Mild Pain (1 - 3)  ALBUTerol    0.083% 2.5 milliGRAM(s) Nebulizer every 6 hours PRN Shortness of Breath and/or Wheezing  ALPRAZolam 0.25 milliGRAM(s) Oral every 6 hours PRN anxiety  cyclobenzaprine 5 milliGRAM(s) Oral three times a day PRN Muscle Spasm  HYDROmorphone  Injectable 1.5 milliGRAM(s) IntraMuscular every 3 hours PRN Severe Pain (7 - 10)  metoclopramide   Syrup 10 milliGRAM(s) Oral every 6 hours PRN nausea  naloxone Injectable 2 milliGRAM(s) IntraMuscular once PRN Opioid Intoxication  ondansetron   Disintegrating Tablet 8 milliGRAM(s) Oral every 8 hours PRN Nausea and/or Vomiting  simethicone 80 milliGRAM(s) Chew every 6 hours PRN Gas    Vital Signs Last 24 Hrs  T(F): 98 (26 Oct 2022 09:42), Max: 98.2 (25 Oct 2022 13:22)  HR: 112 (26 Oct 2022 09:42) (103 - 117)  BP: 124/84 (26 Oct 2022 09:42) (124/84 - 138/85)  RR: 16 (26 Oct 2022 09:42) (16 - 16)  SpO2: 94% (26 Oct 2022 09:42) (93% - 95%)  I&O's Summary    PHYSICAL EXAM:  General: NAD, Awake, alert  ENT: Moist mucous membranes, no thrush  Neck: wearing cervical collar with dried blood in front.  Lungs: Clear to auscultation bilaterally, good air entry, non-labored breathing  Cardio: RRR, S1/S2, No murmur  Abdomen: Soft, Nontender, Nondistended; Bowel sounds present  Extremities: diffuse anasarca/edema, no cyanosis, no contracture    LABS:                        9.1    6.15  )-----------( 294      ( 26 Oct 2022 06:15 )             29.3     10-26    137  |  102  |  6   ----------------------------<  89  3.8   |  27  |  0.28    Ca    10.5      26 Oct 2022 06:15    TPro  4.5  /  Alb  1.5  /  TBili  0.3  /  DBili  x   /  AST  47  /  ALT  8   /  AlkPhos  148  10-26      COVID-19 PCR: NotDetec (10-25-22 @ 06:30)  COVID-19 PCR: NotDetec (10-19-22 @ 07:25)  COVID-19 PCR: NotDetec (10-13-22 @ 05:37)  COVID-19 PCR: NotDetec (10-07-22 @ 06:10)  COVID-19 PCR: NotDetec (09-30-22 @ 20:50)    RADIOLOGY & ADDITIONAL TESTS:    Care Discussed with Consultants/Other Providers:   Tyler from palliative. Polyhydramnios in third trimester

## 2023-03-06 NOTE — PROGRESS NOTE ADULT - ASSESSMENT
66F  acute spine pain 2/2 breast CA mets  s/p C7-T2 corpectomy and fusion with posterior C4-T5 fusion on 9/21.     good pain control when still  however mobility still limited by pain  has not taken much opioid PRNs  pt states only iv dilaudid helps  pt due for [facility]ON[facility] soon    gabapentin 600 milliGRAM(s) Oral every 8 hours  methocarbamol 750 milliGRAM(s) Oral three times a day  oxyCODONE  ER Tablet 20 milliGRAM(s) Oral every 12 hours    Med Recs:  DC oxy po  - Recommend starting hydromorphone PO 2mg/4mg [id number] PRN mod/severe pain   negative

## 2023-11-14 NOTE — PROGRESS NOTE ADULT - NS ATTEND OPT1 GEN_ALL_CORE
I attest my time as attending is greater than 50% of the total combined time spent on qualifying patient care activities by the PA/NP and attending.
26

## 2023-12-04 NOTE — PROGRESS NOTE ADULT - NS_MD_PANP_GEN_ALL_CORE
What Is The Reason For Today's Visit?: History of Non-Melanoma Skin Cancer
How Many Skin Cancers Have You Had?: more than one
Attending and PA/NP shared services statement (NON-critical care):
When Was Your Last Cancer Diagnosed?: 30 yrs ago
Attending and PA/NP shared services statement (NON-critical care):
Attending and PA/NP shared services statement (NON-critical care):

## 2024-07-09 NOTE — BH CONSULTATION LIAISON ASSESSMENT NOTE - NSBHMSEMUSCLE_PSY_A_CORE
BMT Workup Calendar Review    Teaching Topic: Calendar review  Person(s) Involved in Teaching: Patient, Mother, and Father    Education Provided:  - Dates, times, and locations of workup appointments   - Rationale for each test/procedure/consultation    Instructional Materials Used/Given:   - Workup calendar  - Tentative BMT calendar      Plan: Patient and family verbalize understanding of workup plan and all questions have been answered. Encouraged family to call with additional questions or concerns. Plan to continue as planned, per workup calendar.    Sadie Ontiveros RN    
No
Unable to assess

## 2024-08-19 NOTE — PROGRESS NOTE ADULT - SUBJECTIVE AND OBJECTIVE BOX
INTERVAL HPI/OVERNIGHT EVENTS:  HPI:  This is a 65 yo female persistent diarrhea and pancolitis on CT imaging. Patient seen and examined at bedside. She notes diarrhea began  about 7 days ago, subsided slightly and then go worse. She is able to tolerate some liquids. Minimal nausea. She reports some abdominal pain. No BRBPR or melena noted.    MEDICATIONS  (STANDING):  Biotene Dry Mouth Oral Rinse 5 milliLiter(s) Swish and Spit three times a day  cyclobenzaprine 5 milliGRAM(s) Oral three times a day  dextrose 5% + sodium chloride 0.45%. 1000 milliLiter(s) (100 mL/Hr) IV Continuous <Continuous>  enoxaparin Injectable 40 milliGRAM(s) SubCutaneous every 24 hours  magnesium oxide 400 milliGRAM(s) Oral three times a day with meals  metoprolol tartrate 25 milliGRAM(s) Oral two times a day  oxyCODONE  ER Tablet 20 milliGRAM(s) Oral every 12 hours  pantoprazole  Injectable 40 milliGRAM(s) IV Push daily  pregabalin 25 milliGRAM(s) Oral every 8 hours  saccharomyces boulardii 250 milliGRAM(s) Oral two times a day  vancomycin    Solution 125 milliGRAM(s) Oral every 6 hours    MEDICATIONS  (PRN):  acetaminophen    Suspension .. 650 milliGRAM(s) Oral every 6 hours PRN Mild Pain (1 - 3)  ALBUTerol    0.083% 2.5 milliGRAM(s) Nebulizer every 6 hours PRN Shortness of Breath and/or Wheezing  bisacodyl Suppository 10 milliGRAM(s) Rectal daily PRN Constipation  HYDROmorphone  Injectable 0.5 milliGRAM(s) IntraMuscular every 6 hours PRN Moderate Pain (4 - 6)  HYDROmorphone  Injectable 1 milliGRAM(s) IntraMuscular every 6 hours PRN Severe Pain (7 - 10)  ondansetron Injectable 8 milliGRAM(s) IV Push every 6 hours PRN Nausea and/or Vomiting  simethicone 80 milliGRAM(s) Chew every 6 hours PRN Gas      Allergies    tetanus toxoid (Hives)    Intolerances    lactose (Other)      PAST MEDICAL & SURGICAL HISTORY:  Stomach ulcer  20 years ago   no pain not taking any meds      Skin cancer of arm, left  pt was 16 year old      H/O excision of mass  left arm  when patient was 16 years ago      S/P hysterectomy          REVIEW OF SYSTEMS: negative unless indicated in HPI    non smoker  no etoh abuse     PHYSICAL EXAM:   Vital Signs:  Vital Signs Last 24 Hrs  T(C): 36.8 (11 Oct 2022 08:30), Max: 36.8 (11 Oct 2022 08:30)  T(F): 98.2 (11 Oct 2022 08:30), Max: 98.2 (11 Oct 2022 08:30)  HR: 125 (11 Oct 2022 08:30) (125 - 133)  BP: 135/87 (11 Oct 2022 08:30) (135/87 - 140/80)  BP(mean): --  RR: 16 (11 Oct 2022 08:30) (16 - 16)  SpO2: 95% (11 Oct 2022 08:30) (95% - 98%)    Parameters below as of 11 Oct 2022 08:30  Patient On (Oxygen Delivery Method): room air      Daily     Daily Weight in k.4 (11 Oct 2022 05:33)I&O's Summary    10 Oct 2022 07:01  -  11 Oct 2022 07:00  --------------------------------------------------------  IN: 600 mL / OUT: 0 mL / NET: 600 mL        GENERAL:  Appears stated age,   HEENT:  NC/AT,  conjunctivae clear and pink, no thyromegaly, neck collar in place  CHEST:  Full & symmetric excursion, no increased effort, breath sounds clear  HEART:  Regular rhythm, S1, S2, no murmur  ABDOMEN:  Soft, non-tender, non-distended, normoactive bowel sounds  EXTEREMITIES:  no edema  SKIN:  No rash/warm/dry  NEURO:  Alert, oriented,     LABS:                        10.1   16.97 )-----------( 599      ( 11 Oct 2022 06:00 )             29.2     10    131<L>  |  96  |  11  ----------------------------<  96  3.1<L>   |  16<L>  |  0.39<L>    Ca    9.8      11 Oct 2022 06:00  Mg     1.6     10-11    TPro  5.2<L>  /  Alb  1.9<L>  /  TBili  0.5  /  DBili  x   /  AST  40  /  ALT  22  /  AlkPhos  148<H>  10-11        amylase   lipase  RADIOLOGY & ADDITIONAL TESTS:   normal. There is no impacted cerumen.      Mouth/Throat:      Mouth: Mucous membranes are moist.   Eyes:      Pupils: Pupils are equal, round, and reactive to light.   Cardiovascular:      Rate and Rhythm: Normal rate and regular rhythm.   Pulmonary:      Effort: Pulmonary effort is normal.      Breath sounds: Normal breath sounds.   Abdominal:      Palpations: Abdomen is soft.      Tenderness: There is no abdominal tenderness.   Musculoskeletal:         General: No tenderness. Normal range of motion.      Cervical back: Normal range of motion.   Skin:     General: Skin is warm and dry.   Neurological:      General: No focal deficit present.      Mental Status: He is alert.      Deep Tendon Reflexes: Reflexes normal.   Psychiatric:         Mood and Affect: Mood normal.              I attest that on 08/19/24 , I spent a total of 25 minutes, in addition to face-to-face time during the visit, reviewing the patient's records and labs before the visit, reviewing the patient's records and labs with the patient/guardian in the visit, counseling the patient/guardian on the above noted plan, discussing the plan with the patient's family members, and documenting the encounter after the visit.      Sincerely,  Aristides Davalos Jr., MD MPH  [facility]    This note was generated completely or in part utilizing Dragon dictation speech recognition software.  Occasionally, words are mistranscribed and despite editing, the text may contain inaccuracies due to incorrect word recognition.  If further clarification is needed please contact the office

## 2024-09-20 NOTE — PROGRESS NOTE ADULT - PROBLEM SELECTOR PLAN 1
-Sent in from rehab for palliative RT given worsening pain  - c/w fentanyl patch | Dilaudid  - Palliative care/onc consulted; appreciate recs.  - Rad onc consulted- recs MR Brain and full spine   - Will obtain MRI n/a

## 2025-01-29 NOTE — CHART NOTE - NSCHARTNOTEFT_GEN_A_CORE
H and P reviewed   pt is seen , chart reviewed   d/w Med oncology Dr Dhaliwal re plan     Pt is with c/o neck , back pain , right arm weakness and generalized weakness   NS consult appreciated   work up MR of brain , whole spine ordered   pt will need tissue biopsy   will d/w IR on Monday   call breast surgeon Dr Abbott on Monday good balance

## 2025-02-19 NOTE — ED ADULT NURSE REASSESSMENT NOTE - NS ED NURSE REASSESS COMMENT FT1
Assumed care of pt at 2300 as stated in report from VERITO Vasquez. Charting as noted. Patient A&O x4. pt denies pain/discomfort while at rest. pt reports sharp pain in right hip and lower back upon movement.  denies CP/SOB. Updated on the plan of care. Call bell within reach, bed locked in lowest position. IV site flushed w/ NS. No redness, swelling or pain noted to site. No signs of acute distress noted, safety maintained. Pt remains on CM in NSR. Detail Level: Generalized Detail Level: Zone Detail Level: Detailed

## (undated) DEVICE — SPONGE PEANUT AUTO COUNT

## (undated) DEVICE — DRAPE C ARM UNIVERSAL

## (undated) DEVICE — SOL IRR POUR H2O 1000ML

## (undated) DEVICE — WRAP COMPRESSION CALF MED

## (undated) DEVICE — DRAPE HALF SHEET 40X57"

## (undated) DEVICE — GLV 7.5 PROTEXIS

## (undated) DEVICE — SUT VICRYL 2-0 18" CP-2 UNDYED

## (undated) DEVICE — CONN SUCTION TUBE FRAZIER 2FT 5MM

## (undated) DEVICE — SUT MONOCRYL 4-0 18" P-3 UNDYED

## (undated) DEVICE — SUT VICRYL 0 18" CT-1 UNDYED

## (undated) DEVICE — ELCTR EDGE BOVIE INSULATED BLADE TIP 2.75"

## (undated) DEVICE — Device

## (undated) DEVICE — PREP SCRUB IODO DURAPREP 26CC

## (undated) DEVICE — DRAIN RESERVOIR FOR JACKSON PRATT 100CC CARDINAL

## (undated) DEVICE — TAPE SILK 3"

## (undated) DEVICE — POSITIONER FOAM EGG CRATE ULNAR (2PCS)

## (undated) DEVICE — FORCEP BIPOLAR 7.75X1.5MM 10/BX

## (undated) DEVICE — DRAIN JACKSON PRATT 7MM FLAT FULL W 15 FR TROCAR

## (undated) DEVICE — DRAPE IOBAN 17X23"

## (undated) DEVICE — LEGEND BALL 2MMX10CM

## (undated) DEVICE — TUBING JET BIPOLAR

## (undated) DEVICE — DRAIN JACKSON PRATT 10FR ROUND END NO TROCAR

## (undated) DEVICE — SUT ETHILON 3-0 18" PS-1

## (undated) DEVICE — GLV 7 PROTEXIS

## (undated) DEVICE — DRAPE SPLIT SHEETS 77X108"

## (undated) DEVICE — SUT MONOCRYL 4-0 27" PS-2 UNDYED

## (undated) DEVICE — DISSECTING TOOL MIDAS REX 10CM 2MM

## (undated) DEVICE — ELCTR EDGE BOVIE INSULATED BLADE TIP 6.5"

## (undated) DEVICE — DRAPE TOWEL 1000 SMALL 17" X 11"

## (undated) DEVICE — BLANKET WARMER LOWER ADULT

## (undated) DEVICE — PACK NEURO SO SIDE

## (undated) DEVICE — DRILL BIT MEDTRONIC 5.0MM PLATINIUM

## (undated) DEVICE — NDL SPINAL 18G X 3.5"

## (undated) DEVICE — DRAPE 3/4 SHEET 52X76"

## (undated) DEVICE — DRILL BIT K2 MEDICAL 2.3MM

## (undated) DEVICE — MARKER SKIN MULTI TIP 6"